# Patient Record
Sex: FEMALE | Race: WHITE | NOT HISPANIC OR LATINO | Employment: FULL TIME
[De-identification: names, ages, dates, MRNs, and addresses within clinical notes are randomized per-mention and may not be internally consistent; named-entity substitution may affect disease eponyms.]

---

## 2017-10-22 ENCOUNTER — HEALTH MAINTENANCE LETTER (OUTPATIENT)
Age: 52
End: 2017-10-22

## 2018-01-11 ENCOUNTER — TRANSFERRED RECORDS (OUTPATIENT)
Dept: HEALTH INFORMATION MANAGEMENT | Facility: CLINIC | Age: 53
End: 2018-01-11

## 2018-01-12 ENCOUNTER — TRANSFERRED RECORDS (OUTPATIENT)
Dept: HEALTH INFORMATION MANAGEMENT | Facility: CLINIC | Age: 53
End: 2018-01-12

## 2018-01-23 ENCOUNTER — TELEPHONE (OUTPATIENT)
Dept: SURGERY | Facility: CLINIC | Age: 53
End: 2018-01-23

## 2018-01-23 NOTE — TELEPHONE ENCOUNTER
Per the request of Dr. Hampton, called patient to schedule a clinic appointment (2nd opinion-rectal cancer). Left a message offering this Thursday 1/25/18 at 2:30 pm.  Provided my direct number, requesting a call back to confirm.   Dr. Hampton updated.

## 2018-01-25 ENCOUNTER — ONCOLOGY VISIT (OUTPATIENT)
Dept: ONCOLOGY | Facility: CLINIC | Age: 53
End: 2018-01-25
Attending: INTERNAL MEDICINE
Payer: COMMERCIAL

## 2018-01-25 ENCOUNTER — OFFICE VISIT (OUTPATIENT)
Dept: SURGERY | Facility: CLINIC | Age: 53
End: 2018-01-25
Payer: COMMERCIAL

## 2018-01-25 VITALS
TEMPERATURE: 98.2 F | HEART RATE: 95 BPM | DIASTOLIC BLOOD PRESSURE: 90 MMHG | BODY MASS INDEX: 32.58 KG/M2 | WEIGHT: 202.7 LBS | SYSTOLIC BLOOD PRESSURE: 128 MMHG | HEIGHT: 66 IN | OXYGEN SATURATION: 99 %

## 2018-01-25 VITALS
HEART RATE: 95 BPM | BODY MASS INDEX: 32.58 KG/M2 | DIASTOLIC BLOOD PRESSURE: 90 MMHG | HEIGHT: 66 IN | SYSTOLIC BLOOD PRESSURE: 128 MMHG | WEIGHT: 202.7 LBS | TEMPERATURE: 98.2 F | OXYGEN SATURATION: 99 %

## 2018-01-25 DIAGNOSIS — C20 RECTAL CANCER (H): Primary | ICD-10-CM

## 2018-01-25 PROCEDURE — G0463 HOSPITAL OUTPT CLINIC VISIT: HCPCS | Mod: ZF

## 2018-01-25 PROCEDURE — 99205 OFFICE O/P NEW HI 60 MIN: CPT | Mod: GC | Performed by: INTERNAL MEDICINE

## 2018-01-25 RX ORDER — ONDANSETRON 4 MG/1
TABLET, FILM COATED ORAL
Qty: 3 TABLET | Refills: 0 | Status: SHIPPED | OUTPATIENT
Start: 2018-01-25 | End: 2018-07-23

## 2018-01-25 RX ORDER — NEOMYCIN SULFATE 500 MG/1
TABLET ORAL
Qty: 6 TABLET | Refills: 0 | Status: SHIPPED | OUTPATIENT
Start: 2018-01-25 | End: 2018-02-26

## 2018-01-25 RX ORDER — POLYETHYLENE GLYCOL 3350 17 G/17G
238 POWDER, FOR SOLUTION ORAL SEE ADMIN INSTRUCTIONS
Qty: 238 G | Refills: 0 | Status: SHIPPED | OUTPATIENT
Start: 2018-01-25 | End: 2018-03-13

## 2018-01-25 RX ORDER — METRONIDAZOLE 500 MG/1
500 TABLET ORAL EVERY 8 HOURS
Qty: 3 TABLET | Refills: 0 | Status: SHIPPED | OUTPATIENT
Start: 2018-01-25 | End: 2018-01-26

## 2018-01-25 ASSESSMENT — PAIN SCALES - GENERAL
PAINLEVEL: NO PAIN (0)
PAINLEVEL: NO PAIN (0)

## 2018-01-25 NOTE — LETTER
2018       RE: Sarah Rajan  [address] [facility] [address]     Dear Colleague,    Thank you for referring your patient, Sarah Rajan, to the [facility] COLON AND RECTAL SURGERY at [facility]. Please see a copy of my visit note below.    Colon and Rectal Surgery Clinic Note      RE: Sarah Rajan  : 1965  JIMMY: 2018      Sarah is a very pleasant 52-year-old woman who presents today for second opinion regarding a low rectal cancer.  This was diagnosed on screening colonoscopy, though the patient has a history of rectal bleeding that had been attributed to hemorrhoids.  Pathology confirmed an invasive adenocarcinoma, moderately differentiated, with intact mismatch repair protein expression.  The patient was previously seen by Dr. Jann Curran.  Evaluation included CT scan of the chest abdomen and pelvis that was negative with the exception of 2 subcentimeter hepatic lesions officially indeterminate and felt to be of low likelihood for metastasis.  Pelvic MRI showed a T1/2 N0 lesion with several small benign-appearing nodes.  I reviewed the imaging and this shows a fairly flat lesion with a depressed center in the left lateral position.  The inferior margin of the tumor measures 4.8 cm from the anal verge.  It lies a centimeter or less from the top of the anal sphincter.  There are no threatened mesorectal margins.  The patient was seen by both medical and radiation oncology at [facility] and neoadjuvant chemoradiation was discussed, though this appears to have been prior to the MRI staging.    Past medical history is significant for depression.      Family history is significant for colorectal cancer in a paternal grandmother.    Social history: The patient is  and seen today with her  Cody.  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  She has 2 children in their teens.  Both children were delivered by  section.  She has no vaginal  deliveries or birth injuries.  She has no issues with fecal incontinence.    The patient is a non-smoker.  She drinks 1 glass of wine daily.    Physical examination pleasant moderately obese woman in no acute distress.  She is 5 foot 6 and weighs 202 pounds, with a BMI of 32.8.  Digital rectal examination shows a flat, depressed lesion with an inferior margin on my exam 4 cm from the anal verge.  This seems to reside just above the puborectalis.  It is mobile.  The margins of the lesion are soft but there is a depressed center.  Flexible sigmoidoscopy confirms the presence of a roughly 5 cm lesion.  On retroflexion, there appears to be a 2-3 cm margin below the inferior tumor along the puborectal impression to the point where the external sphincter is contracted.  Examination was chaperoned by Brett Cullen LPN and Amara Jackson NP.    I had a quite detailed discussion with Tiara about treatment of this tumor.  I advised that I thought a restorative resection would be feasible, though there would be some possibility that a linear stapler would not fit below the tumor and the patient may require a hand sutured coloanal anastomosis or a partial intersphincteric resection.  We discussed the potential for bowel dysfunction and low anterior resection syndrome related to very low anastomoses, as well as the need for a temporary covering ileostomy.  We discussed that there remains a possibility that an abdominoperineal resection with permanent colostomy would prove necessary.  I advised that I would perform a laparoscopic-assisted procedure with an open pelvic dissection.  We discussed the pros and cons of a robotic approach, which Dr. Curran had advocated and I explained that I am not currently doing robotic surgery.  From my review of the imaging, I do not see an indication for neoadjuvant chemoradiation or chemotherapy, though we did discuss the possibility that the staging could be in correct and  "postoperative adjuvant therapy may prove necessary.  Patient is quite anxious about the liver abnormalities on CT, and we will try to define this better by obtaining a liver MRI.    I advised the patient that Dr. Curran is an excellent surgeon and I was perfectly comfortable with him proceeding with surgery, but the patient prefers to have her care at the [place].  I reviewed all the risks associated with major pelvic surgery, including but not limited to bleeding, infection, DVT/PE, MI/CVA, anastomotic failure and its consequences, adjacent organ injury, and death.  I answered all of her and her 's questions to their stated satisfaction.  They expressed her understanding and eagerness to proceed.  I will be presenting her case at tumor board to be certain that there is a consensus on the final treatment plan and that there are no unsuspected concerning features on the MRI.  She will be seen preoperatively in the preanesthesia clinic and by the wound ostomy nurses.  We will look for an elective surgery date.    Total time 45 minutes, +10 minutes flexible sigmoidoscopy time.  Greater than half of the 45 minutes was spent counseling.      For details of past medical history, surgical history, family history, medications, allergies, and review of systems, please see details below.    Medical history:  No past medical history on file.    Surgical history:  No past surgical history on file.    Family history:  No family history on file.    Medications:  Current Outpatient Prescriptions   Medication Sig Dispense Refill     FLUoxetine (PROZAC) 20 MG capsule TK 1 C PO D  1     polyethylene glycol (MIRALAX) Packet Take 238 g by mouth See Admin Instructions One 8.3-ounce bottle (238 g). Refer to surgical packet for medication administration 238 g 0     magnesium citrate solution Take 296 mLs by mouth See Admin Instructions Refer to \"Getting Ready for a Colonoscopy\" patient handout. 296 mL 0     metroNIDAZOLE " "(FLAGYL) 500 MG tablet Take 1 tablet (500 mg) by mouth every 8 hours for 1 day prior to surgery.  Take in conjunction with Neomycin Sulfate. 3 tablet 0     ondansetron (ZOFRAN) 4 MG tablet Take one tablet by mouth every 6 hours as needed for nausea when taking neomycin and flagyl 3 tablet 0     neomycin (MYCIFRADIN) 500 MG tablet Take two tablets by mouth every 8 hours for one day prior to surgery. Take in conjunction with Flagyl 6 tablet 0     AMITRIPTYLINE HCL PO Take 25 mg by mouth At Bedtime       Allergies:  The patientis allergic to inapsine [droperidol].    Social history:  Social History   Substance Use Topics     Smoking status: Former Smoker     Quit date: 2/19/1986     Smokeless tobacco: Never Used     Alcohol use 0.0 oz/week     0 Standard drinks or equivalent per week     Marital status: .    Review of Systems:  Nursing Notes:   [place] Cullen LPN  1/25/2018  2:22 PM  Signed  Chief Complaint   Patient presents with     Clinic Care Coordination - Initial     New pt consult, rectal cancer.       Vitals:    01/25/18 1419   BP: 128/90   BP Location: Left arm   Patient Position: Chair   Cuff Size: Adult Large   Pulse: 95   Temp: 98.2  F (36.8  C)   TempSrc: Oral   SpO2: 99%   Weight: 202 lb 11.2 oz   Height: 5' 6\"       Body mass index is 32.72 kg/(m^2).    GABRIELA Larson MD   Professor and Chief  Division of Colon and Rectal Surgery  [facility]      Referring Provider:  Jann Curran MD  [facility] [address] [place] [address]     Primary Care Provider:  Wayne Green    Again, thank you for allowing me to participate in the care of your patient.      Sincerely,    Óscar Hampton MD      "

## 2018-01-25 NOTE — MR AVS SNAPSHOT
After Visit Summary   1/25/2018    Sarah Rajan    MRN: [id number]           Patient Information     Date Of Birth          1965        Visit Information        Provider Department      1/25/2018 3:30 PM Kyra Mortensen MD [facility]        Today's Diagnoses     Rectal cancer (H)    -  1      Care Instructions    We will present your case to Tumor Board    Schedule MRI Liver    See me back 3-4 weeks after surgery              Follow-ups after your visit        Your next 10 appointments already scheduled     Feb 06, 2018 10:30 AM CST   CONSULT with Mirian Ford MD   Radiation Oncology Clinic ([facility])    [facility]  1st Floor  [address] [facility] [address] [phone]              Future tests that were ordered for you today     Open Future Orders        Priority Expected Expires Ordered    FLEXIBLE SIGMOIDOSCOPY Routine  3/11/2018 1/25/2018    MRI Abdomen & pelvis w & wo contrast Routine  1/25/2019 1/25/2018            Who to contact     If you have questions or need follow up information about today's clinic visit or your schedule please contact [facility] directly at [phone].  Normal or non-critical lab and imaging results will be communicated to you by MyChart, letter or phone within 4 business days after the clinic has received the results. If you do not hear from us within 7 days, please contact the clinic through [employer] [facility] or phone. If you have a critical or abnormal lab result, we will notify you by phone as soon as possible.  Submit refill requests through [employer] or call your pharmacy and they will forward the refill request to us. Please allow 3 business days for your refill to be completed.          Additional Information About Your Visit        [employer] Information     [facility] lets you send messages to your doctor, view your test results, renew your prescriptions, schedule  "appointments and more. To sign up, go to [email or web address] . Click on \"Log in\" on the left side of the screen, which will take you to the Welcome page. Then click on \"Sign up Now\" on the right side of the page.     You will be asked to enter the access code listed below, as well as some personal information. Please follow the directions to create your username and password.     Your access code is: [email or web address]  Expires: 2018  2:46 PM     Your access code will  in 90 days. If you need help or a new code, please call your [place] clinic or [phone].        Care EveryWhere ID     This is your Care EveryWhere ID. This could be used by other organizations to access your [place] medical records  [id number]        Your Vitals Were     Pulse Temperature Height Pulse Oximetry BMI (Body Mass Index)       95 98.2  F (36.8  C) (Oral) 1.676 m (5' 5.98\") 99% 32.73 kg/m2        Blood Pressure from Last 3 Encounters:   18 128/90   18 128/90   16 118/74    Weight from Last 3 Encounters:   18 91.9 kg (202 lb 11.2 oz)   18 91.9 kg (202 lb 11.2 oz)   16 88.9 kg (196 lb)              Today, you had the following     No orders found for display         Today's Medication Changes          These changes are accurate as of 18  5:44 PM.  If you have any questions, ask your nurse or doctor.               Start taking these medicines.        Dose/Directions    magnesium citrate solution   Used for:  Rectal cancer (H)   Started by:  Óscar Hampton MD        Dose:  296 mL   Take 296 mLs by mouth See Admin Instructions Refer to \"Getting Ready for a Colonoscopy\" patient handout.   Quantity:  296 mL   Refills:  0       metroNIDAZOLE 500 MG tablet   Commonly known as:  FLAGYL   Used for:  Rectal cancer (H)   Started by:  Óscar Hampton MD        Dose:  500 mg   Take 1 tablet (500 mg) by mouth every 8 hours for 1 day prior to surgery.  Take in conjunction with Neomycin Sulfate. "   Quantity:  3 tablet   Refills:  0       neomycin 500 MG tablet   Commonly known as:  MYCIFRADIN   Used for:  Rectal cancer (H)   Started by:  Óscar Hampton MD        Take two tablets by mouth every 8 hours for one day prior to surgery. Take in conjunction with Flagyl   Quantity:  6 tablet   Refills:  0       ondansetron 4 MG tablet   Commonly known as:  ZOFRAN   Used for:  Rectal cancer (H)   Started by:  Óscar Hampton MD        Take one tablet by mouth every 6 hours as needed for nausea when taking neomycin and flagyl   Quantity:  3 tablet   Refills:  0       polyethylene glycol Packet   Commonly known as:  MIRALAX   Used for:  Rectal cancer (H)   Started by:  Óscar Hampton MD        Dose:  238 g   Take 238 g by mouth See Admin Instructions One 8.3-ounce bottle (238 g). Refer to surgical packet for medication administration   Quantity:  238 g   Refills:  0            Where to get your medicines      These medications were sent to [facility] [address] [place] [address] [place] [place] AT [address] [place], [place] [address]     Phone:  [phone]     magnesium citrate solution    metroNIDAZOLE 500 MG tablet    neomycin 500 MG tablet    ondansetron 4 MG tablet    polyethylene glycol Packet                Primary Care Provider Office Phone # Fax #    Wayne ManriquezFREDY youngbloodTRACIE [phone]       [facility] [address] [place] [address]        Equal Access to Services     [facility] Hospital[facility]ASON : Hadii aad ku hadasho Soomaali, waaxda luqadaha, qaybta kaalmada adeegyada, luz elena yuan hayalyssa mtz . So [facility] [phone].    ATENCIÓN: Si habla español, tiene a membreno disposición servicios gratuitos de asistencia lingüística. Claudette al [phone].    We comply with applicable federal civil rights laws and [place] laws. We do not discriminate on the basis of race, color, national origin, age, disability, sex,  "sexual orientation, or gender identity.            Thank you!     Thank you for choosing [facility]  for your care. Our goal is always to provide you with excellent care. Hearing back from our patients is one way we can continue to improve our services. Please take a few minutes to complete the written survey that you may receive in the mail after your visit with us. Thank you!             Your Updated Medication List - Protect others around you: Learn how to safely use, store and throw away your medicines at www.disposemymeds.org.          This list is accurate as of 1/25/18  5:44 PM.  Always use your most recent med list.                   Brand Name Dispense Instructions for use Diagnosis    AMITRIPTYLINE HCL PO      Take 25 mg by mouth At Bedtime        FLUoxetine 20 MG capsule    PROzac     TK 1 C PO D        magnesium citrate solution     296 mL    Take 296 mLs by mouth See Admin Instructions Refer to \"Getting Ready for a Colonoscopy\" patient handout.    Rectal cancer (H)       metroNIDAZOLE 500 MG tablet    FLAGYL    3 tablet    Take 1 tablet (500 mg) by mouth every 8 hours for 1 day prior to surgery.  Take in conjunction with Neomycin Sulfate.    Rectal cancer (H)       neomycin 500 MG tablet    MYCIFRADIN    6 tablet    Take two tablets by mouth every 8 hours for one day prior to surgery. Take in conjunction with Flagyl    Rectal cancer (H)       ondansetron 4 MG tablet    ZOFRAN    3 tablet    Take one tablet by mouth every 6 hours as needed for nausea when taking neomycin and flagyl    Rectal cancer (H)       polyethylene glycol Packet    MIRALAX    238 g    Take 238 g by mouth See Admin Instructions One 8.3-ounce bottle (238 g). Refer to surgical packet for medication administration    Rectal cancer (H)         "

## 2018-01-25 NOTE — NURSING NOTE
"Oncology Rooming Note    January 25, 2018 4:38 PM   Sarah Rajan is a 52 year old female who presents for:    No chief complaint on file.    Initial Vitals: /90 (BP Location: Left arm)  Pulse 95  Temp 98.2  F (36.8  C) (Oral)  Ht 1.676 m (5' 5.98\")  Wt 91.9 kg (202 lb 11.2 oz)  SpO2 99%  BMI 32.73 kg/m2 Estimated body mass index is 32.73 kg/(m^2) as calculated from the following:    Height as of this encounter: 1.676 m (5' 5.98\").    Weight as of this encounter: 91.9 kg (202 lb 11.2 oz). Body surface area is 2.07 meters squared.  No Pain (0) Comment: Data Unavailable   No LMP recorded. Patient is postmenopausal.  Allergies reviewed: Yes  Medications reviewed: Yes    Medications: Medication refills not needed today.  [facility] [id number] [place] [address] [facility] [address] [facility] [address]    Clinical concerns: None/ Dr Mortensen was NOT notified.    7 minutes for nursing intake (face to face time)     Candie Garza LPN                "

## 2018-01-25 NOTE — LETTER
2018       RE: Sarah Rajan  [address] [facility] [facility] [address]     Dear Colleague,    Thank you for referring your patient, Sarah Rajan, to the [facility] CANCER [facility]IC. Please see a copy of my visit note below.    Oncology outpatient note    Date of service: 2018    PC: Sarah Rajan is a 52 year old seen for a second opinion on recently diagnosed rectal cancer.  HPI:  Previously had BRBPR although previous anoscope indicated hemorrhoids therefore no further investigation; blood on toilet paper or outside of stool, never mixed in.  Screening colonoscopy 2018 revealed 3cm rectal mass and other polyps which were removed.  Pathology indicated moderately differentiated adenocarcinoma w/ intact MMR proteins.  CT staging scan-no metastatic disease; some liver lesions which are thought to be benign per radiology report, T2N1M0 by pelvic MRI read at [facility]  She is coming here from [facility] for a second opinion; reported some disagreement in staging between specialists.  Her uncle is a hospitalist and her cousin is an oncologist.  Does not want to be in a trial as does not want to get placebo (told that this trial-PROSPECT- is chemo vs chemo +radiation) and that she wants the radiation if there are lymph nodes involved and doesn't want to miss anything    Currently--  Feeling well  No changes in energy, although has been sleeping more in the last two weeks  No weight loss, has gained weight  No night sweats, chills  No abdominal pain  Has been understandably difficult for her to tell her children and a few close friends, coping ok, good social support    ROS:  10 point ROS done, which was negative except as already mentioned    PMH:  Depression  Restless leg syndrome  Dyslipidaemia    Outpatient medications:  Amitriptylline 10mg  Fluoxetine 20mg    FHx  Aunt and maternal grandmother had breast cancer  Pateral grandmother  of colorectal cancer  Mother had ischemic colitis    SHx:  ,  "two teenage children  EtOH: 1 drink daily, occasionally more on weekends  Tobacco: never smoker       Allergies   Allergen Reactions     Inapsine [Droperidol] Other (See Comments)     Elevated blood pressure and increased heart rate       Exam:  /90 (BP Location: Left arm)  Pulse 95  Temp 98.2  F (36.8  C) (Oral)  Ht 1.676 m (5' 5.98\")  Wt 91.9 kg (202 lb 11.2 oz)  SpO2 99%  BMI 32.73 kg/m2  CONSTITUTIONAL: No apparent distress  EYES: PERRLA, without pallor or jaundice  ENT/MOUTH: Ears unremarkable. No oral lesions  CVS: s1s2 normal  RESPIRATORY: Chest is clear  GI: Abdomen is benign  NEURO: He is alert and oriented ×3  INTEGUMENT: no concerning his skin rashes   LYMPHATIC: no palpable lymphadenopathy  MUSCULOSKELETAL: Unremarkable. No bony tenderness.   EXTREMITIES: no pedal edema  PSYCH: Mentation, mood and affect are appropriate    Labs.  Reviewed outside labs.  We also reviewed outside pathology.    Imaging.  Reviewed. The lymph nodes which are read as pathological by outside MRI are very questionable and she could have T2 N0 lesion instead of T2 N1 lesion.    Assessment and Plan  We discussed the situation in great detail today. We would like to discuss her case in the tumor board as well.  If the lymph nodes do not look very suspicious than I would agree with proceeding with surgery up front.  Otherwise it is reasonable to start with chemotherapy with FOLFOX as planned at [facility].  We will also get MRI of the liver to look for the tiny lesions in the liver which most likely are benign.  We discussed that if she proceeds with surgery and then depending upon the final pathology we will determine what adjuvant treatment would be recommended.  She has seen Dr. Hampton earlier today.    She tells me that she is coping well as she has wonderful family support. We offered her to be seen by a psychologist at this is a hard time for you and her family. At this time we will hold off on that but she will let " us know if she thinks it is needed.    We would like to see her back 3-4 weeks after the surgery to go over the final pathology and to discuss further management options    We answered all of her and her family's questions questions to their satisfaction and they are agreeable and comfortable with the plan    This patient was evaluated in conjunction with Dr. Daniella Jang, medical resident and I edited her note accordingly to match her combined assessment and plan    60 minutes were spent with the patient face-to-face and almost all of the time was spent in counseling and coordination of care.        Again, thank you for allowing me to participate in the care of your patient.      Sincerely,    Kyra Mortensen MD

## 2018-01-25 NOTE — NURSING NOTE
"Chief Complaint   Patient presents with     Clinic Care Coordination - Initial     New pt consult, rectal cancer.       Vitals:    01/25/18 1419   BP: 128/90   BP Location: Left arm   Patient Position: Chair   Cuff Size: Adult Large   Pulse: 95   Temp: 98.2  F (36.8  C)   TempSrc: Oral   SpO2: 99%   Weight: 202 lb 11.2 oz   Height: 5' 6\"       Body mass index is 32.72 kg/(m^2).    Brett CAMPOS LPN               "

## 2018-01-25 NOTE — PROGRESS NOTES
Oncology outpatient note    Date of service: 2018    PC: Sarah Rajan is a 52 year old seen for a second opinion on recently diagnosed rectal cancer.  HPI:  Previously had BRBPR although previous anoscope indicated hemorrhoids therefore no further investigation; blood on toilet paper or outside of stool, never mixed in.  Screening colonoscopy 2018 revealed 3cm rectal mass and other polyps which were removed.  Pathology indicated moderately differentiated adenocarcinoma w/ intact MMR proteins.  CT staging scan-no metastatic disease; some liver lesions which are thought to be benign per radiology report, T2N1M0 by pelvic MRI read at [facility]  She is coming here from [facility] for a second opinion; reported some disagreement in staging between specialists.  Her uncle is a hospitalist and her cousin is an oncologist.  Does not want to be in a trial as does not want to get placebo (told that this trial-PROSPECT- is chemo vs chemo +radiation) and that she wants the radiation if there are lymph nodes involved and doesn't want to miss anything    Currently--  Feeling well  No changes in energy, although has been sleeping more in the last two weeks  No weight loss, has gained weight  No night sweats, chills  No abdominal pain  Has been understandably difficult for her to tell her children and a few close friends, coping ok, good social support    ROS:  10 point ROS done, which was negative except as already mentioned    PMH:  Depression  Restless leg syndrome  Dyslipidaemia    Outpatient medications:  Amitriptylline 10mg  Fluoxetine 20mg    FHx  Aunt and maternal grandmother had breast cancer  Pateral grandmother  of colorectal cancer  Mother had ischemic colitis    SHx:  , two teenage children  EtOH: 1 drink daily, occasionally more on weekends  Tobacco: never smoker       Allergies   Allergen Reactions     Inapsine [Droperidol] Other (See Comments)     Elevated blood pressure and increased heart rate  "      Exam:  /90 (BP Location: Left arm)  Pulse 95  Temp 98.2  F (36.8  C) (Oral)  Ht 1.676 m (5' 5.98\")  Wt 91.9 kg (202 lb 11.2 oz)  SpO2 99%  BMI 32.73 kg/m2  CONSTITUTIONAL: No apparent distress  EYES: PERRLA, without pallor or jaundice  ENT/MOUTH: Ears unremarkable. No oral lesions  CVS: s1s2 normal  RESPIRATORY: Chest is clear  GI: Abdomen is benign  NEURO: He is alert and oriented ×3  INTEGUMENT: no concerning his skin rashes   LYMPHATIC: no palpable lymphadenopathy  MUSCULOSKELETAL: Unremarkable. No bony tenderness.   EXTREMITIES: no pedal edema  PSYCH: Mentation, mood and affect are appropriate    Labs.  Reviewed outside labs.  We also reviewed outside pathology.    Imaging.  Reviewed. The lymph nodes which are read as pathological by outside MRI are very questionable and she could have T2 N0 lesion instead of T2 N1 lesion.    Assessment and Plan  We discussed the situation in great detail today. We would like to discuss her case in the tumor board as well.  If the lymph nodes do not look very suspicious than I would agree with proceeding with surgery up front.  Otherwise it is reasonable to start with chemotherapy with FOLFOX as planned at [facility].  We will also get MRI of the liver to look for the tiny lesions in the liver which most likely are benign.  We discussed that if she proceeds with surgery and then depending upon the final pathology we will determine what adjuvant treatment would be recommended.  She has seen Dr. Hampton earlier today.    She tells me that she is coping well as she has wonderful family support. We offered her to be seen by a psychologist at this is a hard time for you and her family. At this time we will hold off on that but she will let us know if she thinks it is needed.    We would like to see her back 3-4 weeks after the surgery to go over the final pathology and to discuss further management options    We answered all of her and her family's questions questions " to their satisfaction and they are agreeable and comfortable with the plan    This patient was evaluated in conjunction with Dr. Daniella Jang, medical resident and I edited her note accordingly to match her combined assessment and plan    60 minutes were spent with the patient face-to-face and almost all of the time was spent in counseling and coordination of care.      Kyra Mortensen

## 2018-01-25 NOTE — MR AVS SNAPSHOT
"              After Visit Summary   1/25/2018    Sarah Rajan    MRN: [id number]           Patient Information     Date Of Birth          1965        Visit Information        Provider Department      1/25/2018 2:30 PM Óscar Hampton MD [facility] Colon and Rectal Surgery        Today's Diagnoses     Rectal cancer (H)    -  1       Follow-ups after your visit        Additional Services     PAC Visit Referral (For [facility] Only)           WOUND CARE REFERRAL       Referral to [facility]. Fax to [phone].    If \"All of the above\" is marked as the reason for the appointment, the reasons are: pre-surgical stoma site marking, pouch re-fitting, treatment of peristomal skin, peristomal hernia belt fitting, ileostomy lavage for possible food blockage, and sterile catheterization from urinary stoma.                  Your next 10 appointments already scheduled     Feb 06, 2018 10:30 AM CST   CONSULT with Mirian Ford MD   Radiation Oncology Clinic ([facility])    [facility]  1st Floor  [address] [address] [phone]              Future tests that were ordered for you today     Open Future Orders        Priority Expected Expires Ordered    FLEXIBLE SIGMOIDOSCOPY Routine  3/11/2018 1/25/2018    MRI Abdomen & pelvis w & wo contrast Routine  1/25/2019 1/25/2018            Who to contact     Please call your clinic at [phone] to:    Ask questions about your health    Make or cancel appointments    Discuss your medicines    Learn about your test results    Speak to your doctor   If you have compliments or concerns about an experience at your clinic, or if you wish to file a complaint, please contact [facility] Patient Relations at [phone] or email us at [email or web address]         Additional Information About Your Visit        MyChart Information     [employer] is an electronic gateway that provides easy, online " "access to your medical records. With [employer], you can request a clinic appointment, read your test results, renew a prescription or communicate with your care team.     To sign up for [employer] visit the website at [email or web address]   You will be asked to enter the access code listed below, as well as some personal information. Please follow the directions to create your username and password.     Your access code is: [email or web address]  Expires: 2018  2:46 PM     Your access code will  in 90 days. If you need help or a new code, please contact your [facility] or call [phone] for assistance.        Care EveryWhere ID     This is your Care EveryWhere ID. This could be used by other organizations to access your [facility] medical records  [id number]        Your Vitals Were     Pulse Temperature Height Pulse Oximetry BMI (Body Mass Index)       95 98.2  F (36.8  C) (Oral) 5' 6\" 99% 32.72 kg/m2        Blood Pressure from Last 3 Encounters:   18 128/90   18 128/90   16 118/74    Weight from Last 3 Encounters:   18 202 lb 11.2 oz   18 202 lb 11.2 oz   16 196 lb              We Performed the Following     Colonoscopy - HIM Scan     Mammogram - HIM Scan     PAC Visit Referral (For [facility] Only)     [facility]-Operative Worksheet     WOUND CARE REFERRAL          Today's Medication Changes          These changes are accurate as of 18  6:11 PM.  If you have any questions, ask your nurse or doctor.               Start taking these medicines.        Dose/Directions    magnesium citrate solution   Used for:  Rectal cancer (H)   Started by:  Óscar Hampton MD        Dose:  296 mL   Take 296 mLs by mouth See Admin Instructions Refer to \"Getting Ready for a Colonoscopy\" patient handout.   Quantity:  296 mL   Refills:  0       metroNIDAZOLE 500 MG tablet   Commonly known as:  FLAGYL   Used for:  Rectal cancer (H)   Started by:  Óscar Hampton MD        " Dose:  500 mg   Take 1 tablet (500 mg) by mouth every 8 hours for 1 day prior to surgery.  Take in conjunction with Neomycin Sulfate.   Quantity:  3 tablet   Refills:  0       neomycin 500 MG tablet   Commonly known as:  MYCIFRADIN   Used for:  Rectal cancer (H)   Started by:  Óscar Hampton MD        Take two tablets by mouth every 8 hours for one day prior to surgery. Take in conjunction with Flagyl   Quantity:  6 tablet   Refills:  0       ondansetron 4 MG tablet   Commonly known as:  ZOFRAN   Used for:  Rectal cancer (H)   Started by:  Óscar Hampton MD        Take one tablet by mouth every 6 hours as needed for nausea when taking neomycin and flagyl   Quantity:  3 tablet   Refills:  0       polyethylene glycol Packet   Commonly known as:  MIRALAX   Used for:  Rectal cancer (H)   Started by:  Óscar Hampton MD        Dose:  238 g   Take 238 g by mouth See Admin Instructions One 8.3-ounce bottle (238 g). Refer to surgical packet for medication administration   Quantity:  238 g   Refills:  0            Where to get your medicines      These medications were sent to [id number] - [place] [address] [place] AT [address] [place], [place] [address]     Phone:  [phone]     magnesium citrate solution    metroNIDAZOLE 500 MG tablet    neomycin 500 MG tablet    ondansetron 4 MG tablet    polyethylene glycol Packet                Primary Care Provider Office Phone # Fax #    Wayne Green [phone]       [facility] [address] [facility] [address]        Equal Access to Services     [facility] AH: Hadii aad ku hadasho Soomaali, waaxda luqadaha, qaybta kaalmada adeegyada, waxay lissy jacob. So [facility] [phone].    ATENCIÓN: Si habla español, tiene a membreno disposición servicios gratuitos de asistencia lingüística. Llame al [phone].    We comply with applicable federal  "civil rights laws and [place] laws. We do not discriminate on the basis of race, color, national origin, age, disability, sex, sexual orientation, or gender identity.            Thank you!     Thank you for choosing [facility] COLON AND RECTAL SURGERY  for your care. Our goal is always to provide you with excellent care. Hearing back from our patients is one way we can continue to improve our services. Please take a few minutes to complete the written survey that you may receive in the mail after your visit with us. Thank you!             Your Updated Medication List - Protect others around you: Learn how to safely use, store and throw away your medicines at www.disposemymeds.org.          This list is accurate as of 1/25/18  6:11 PM.  Always use your most recent med list.                   Brand Name Dispense Instructions for use Diagnosis    AMITRIPTYLINE HCL PO      Take 25 mg by mouth At Bedtime        FLUoxetine 20 MG capsule    PROzac     TK 1 C PO D        magnesium citrate solution     296 mL    Take 296 mLs by mouth See Admin Instructions Refer to \"Getting Ready for a Colonoscopy\" patient handout.    Rectal cancer (H)       metroNIDAZOLE 500 MG tablet    FLAGYL    3 tablet    Take 1 tablet (500 mg) by mouth every 8 hours for 1 day prior to surgery.  Take in conjunction with Neomycin Sulfate.    Rectal cancer (H)       neomycin 500 MG tablet    MYCIFRADIN    6 tablet    Take two tablets by mouth every 8 hours for one day prior to surgery. Take in conjunction with Flagyl    Rectal cancer (H)       ondansetron 4 MG tablet    ZOFRAN    3 tablet    Take one tablet by mouth every 6 hours as needed for nausea when taking neomycin and flagyl    Rectal cancer (H)       polyethylene glycol Packet    MIRALAX    238 g    Take 238 g by mouth See Admin Instructions One 8.3-ounce bottle (238 g). Refer to surgical packet for medication administration    Rectal cancer (H)         "

## 2018-01-25 NOTE — PROGRESS NOTES
Colon and Rectal Surgery Clinic Note      RE: Sarah Satinder  : 1965  JIMMY: 2018      Sarah is a very pleasant 52-year-old woman who presents today for second opinion regarding a low rectal cancer.  This was diagnosed on screening colonoscopy, though the patient has a history of rectal bleeding that had been attributed to hemorrhoids.  Pathology confirmed an invasive adenocarcinoma, moderately differentiated, with intact mismatch repair protein expression.  The patient was previously seen by Dr. Jann Curran.  Evaluation included CT scan of the chest abdomen and pelvis that was negative with the exception of 2 subcentimeter hepatic lesions officially indeterminate and felt to be of low likelihood for metastasis.  Pelvic MRI showed a T1/2 N0 lesion with several small benign-appearing nodes.  I reviewed the imaging and this shows a fairly flat lesion with a depressed center in the left lateral position.  The inferior margin of the tumor measures 4.8 cm from the anal verge.  It lies a centimeter or less from the top of the anal sphincter.  There are no threatened mesorectal margins.  The patient was seen by both medical and radiation oncology at [facility] and neoadjuvant chemoradiation was discussed, though this appears to have been prior to the MRI staging.    Past medical history is significant for depression.      Family history is significant for colorectal cancer in a paternal grandmother.    Social history: The patient is  and seen today with her  Cody.  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  She has 2 children in their teens.  Both children were delivered by  section.  She has no vaginal deliveries or birth injuries.  She has no issues with fecal incontinence.    The patient is a non-smoker.  She drinks 1 glass of wine daily.    Physical examination pleasant moderately obese woman in no acute distress.  She is 5 foot 6 and weighs 202 pounds, with a BMI of 32.8.  Digital  rectal examination shows a flat, depressed lesion with an inferior margin on my exam 4 cm from the anal verge.  This seems to reside just above the puborectalis.  It is mobile.  The margins of the lesion are soft but there is a depressed center.  Flexible sigmoidoscopy confirms the presence of a roughly 5 cm lesion.  On retroflexion, there appears to be a 2-3 cm margin below the inferior tumor along the puborectal impression to the point where the external sphincter is contracted.  Examination was chaperoned by Brett Cullen LPN and Amara Jackson NP.    I had a quite detailed discussion with Tiara about treatment of this tumor.  I advised that I thought a restorative resection would be feasible, though there would be some possibility that a linear stapler would not fit below the tumor and the patient may require a hand sutured coloanal anastomosis or a partial intersphincteric resection.  We discussed the potential for bowel dysfunction and low anterior resection syndrome related to very low anastomoses, as well as the need for a temporary covering ileostomy.  We discussed that there remains a possibility that an abdominoperineal resection with permanent colostomy would prove necessary.  I advised that I would perform a laparoscopic-assisted procedure with an open pelvic dissection.  We discussed the pros and cons of a robotic approach, which Dr. Curran had advocated and I explained that I am not currently doing robotic surgery.  From my review of the imaging, I do not see an indication for neoadjuvant chemoradiation or chemotherapy, though we did discuss the possibility that the staging could be in correct and postoperative adjuvant therapy may prove necessary.  Patient is quite anxious about the liver abnormalities on CT, and we will try to define this better by obtaining a liver MRI.    I advised the patient that Dr. Curran is an excellent surgeon and I was perfectly comfortable with him  "proceeding with surgery, but the patient prefers to have her care at the [facility].  I reviewed all the risks associated with major pelvic surgery, including but not limited to bleeding, infection, DVT/PE, MI/CVA, anastomotic failure and its consequences, adjacent organ injury, and death.  I answered all of her and her 's questions to their stated satisfaction.  They expressed her understanding and eagerness to proceed.  I will be presenting her case at tumor board to be certain that there is a consensus on the final treatment plan and that there are no unsuspected concerning features on the MRI.  She will be seen preoperatively in the preanesthesia clinic and by the wound ostomy nurses.  We will look for an elective surgery date.    Total time 45 minutes, +10 minutes flexible sigmoidoscopy time.  Greater than half of the 45 minutes was spent counseling.      For details of past medical history, surgical history, family history, medications, allergies, and review of systems, please see details below.    Medical history:  No past medical history on file.    Surgical history:  No past surgical history on file.    Family history:  No family history on file.    Medications:  Current Outpatient Prescriptions   Medication Sig Dispense Refill     FLUoxetine (PROZAC) 20 MG capsule TK 1 C PO D  1     polyethylene glycol (MIRALAX) Packet Take 238 g by mouth See Admin Instructions One 8.3-ounce bottle (238 g). Refer to surgical packet for medication administration 238 g 0     magnesium citrate solution Take 296 mLs by mouth See Admin Instructions Refer to \"Getting Ready for a Colonoscopy\" patient handout. 296 mL 0     metroNIDAZOLE (FLAGYL) 500 MG tablet Take 1 tablet (500 mg) by mouth every 8 hours for 1 day prior to surgery.  Take in conjunction with Neomycin Sulfate. 3 tablet 0     ondansetron (ZOFRAN) 4 MG tablet Take one tablet by mouth every 6 hours as needed for nausea when taking neomycin and flagyl 3 tablet 0 " "    neomycin (MYCIFRADIN) 500 MG tablet Take two tablets by mouth every 8 hours for one day prior to surgery. Take in conjunction with Flagyl 6 tablet 0     AMITRIPTYLINE HCL PO Take 25 mg by mouth At Bedtime       Allergies:  The patientis allergic to inapsine [droperidol].    Social history:  Social History   Substance Use Topics     Smoking status: Former Smoker     Quit date: 2/19/1986     Smokeless tobacco: Never Used     Alcohol use 0.0 oz/week     0 Standard drinks or equivalent per week     Marital status: .    Review of Systems:  Nursing Notes:   [place] Cullen LPN  1/25/2018  2:22 PM  Signed  Chief Complaint   Patient presents with     Clinic Care Coordination - Initial     New pt consult, rectal cancer.       Vitals:    01/25/18 1419   BP: 128/90   BP Location: Left arm   Patient Position: Chair   Cuff Size: Adult Large   Pulse: 95   Temp: 98.2  F (36.8  C)   TempSrc: Oral   SpO2: 99%   Weight: 202 lb 11.2 oz   Height: 5' 6\"       Body mass index is 32.72 kg/(m^2).    GABRIELA Larson MD   Professor and Chief  Division of Colon and Rectal Surgery  [facility]      Referring Provider:  Jann Curran MD  [facility] [address] [place] [address]     Primary Care Provider:  Wayne Green  "

## 2018-01-29 PROCEDURE — 00000346 ZZHCL STATISTIC REVIEW OUTSIDE SLIDES TC 88321: Performed by: INTERNAL MEDICINE

## 2018-01-31 LAB — COPATH REPORT: NORMAL

## 2018-02-01 ENCOUNTER — TELEPHONE (OUTPATIENT)
Dept: SURGERY | Facility: CLINIC | Age: 53
End: 2018-02-01

## 2018-02-01 NOTE — TELEPHONE ENCOUNTER
Patient called in regards to surgery scheduling and the feasibility of having surgery and then going on a trip to the [facility] two to three weeks afterward the procedure. This RN explained to the patient that she will be in the hospital for 5-7 days and then have an open abdominal wound after surgery that will not be healed at the time of her trip. Patient asked about the safety of traveling so close after surgery,tenetively having surgery on the 2/14 and then her leaving on her trip 3/3. This RN stated that she cannot predict her post surgical course and how she will both heal and feel after surgery. This nurse told her that she can not make any decisions for her but informed her she would be very far away if she did end up having post surgical complications. Patient verbalized understanding and will think about her plan of care.  Will inform Dr. Hampton of this phone conversation with the patient.

## 2018-02-02 ENCOUNTER — TELEPHONE (OUTPATIENT)
Dept: SURGERY | Facility: CLINIC | Age: 53
End: 2018-02-02

## 2018-02-02 NOTE — TELEPHONE ENCOUNTER
Spoke with clark today regarding her schedule for her text and upcoming surgical procedure. Patient was made aware that she had appointments scheduled on 2/6 and she will have her surgery on 2/14. patient was explained the reasoning behind the schedule and her plan of care going forward.  and she verbalized understanding of plan. Patient will call with questions.

## 2018-02-06 ENCOUNTER — APPOINTMENT (OUTPATIENT)
Dept: SURGERY | Facility: CLINIC | Age: 53
End: 2018-02-06
Payer: COMMERCIAL

## 2018-02-06 ENCOUNTER — OFFICE VISIT (OUTPATIENT)
Dept: RADIATION ONCOLOGY | Facility: CLINIC | Age: 53
End: 2018-02-06
Attending: RADIOLOGY
Payer: COMMERCIAL

## 2018-02-06 ENCOUNTER — OFFICE VISIT (OUTPATIENT)
Dept: WOUND CARE | Facility: CLINIC | Age: 53
End: 2018-02-06
Payer: COMMERCIAL

## 2018-02-06 ENCOUNTER — HOSPITAL ENCOUNTER (OUTPATIENT)
Dept: MRI IMAGING | Facility: CLINIC | Age: 53
Setting detail: OBSERVATION
End: 2018-02-06
Attending: COLON & RECTAL SURGERY
Payer: COMMERCIAL

## 2018-02-06 ENCOUNTER — OFFICE VISIT (OUTPATIENT)
Dept: SURGERY | Facility: CLINIC | Age: 53
End: 2018-02-06
Payer: COMMERCIAL

## 2018-02-06 ENCOUNTER — ALLIED HEALTH/NURSE VISIT (OUTPATIENT)
Dept: SURGERY | Facility: CLINIC | Age: 53
End: 2018-02-06
Payer: COMMERCIAL

## 2018-02-06 ENCOUNTER — ANESTHESIA EVENT (OUTPATIENT)
Dept: SURGERY | Facility: CLINIC | Age: 53
End: 2018-02-06

## 2018-02-06 VITALS
HEIGHT: 66 IN | DIASTOLIC BLOOD PRESSURE: 88 MMHG | WEIGHT: 201 LBS | TEMPERATURE: 98.1 F | RESPIRATION RATE: 16 BRPM | OXYGEN SATURATION: 98 % | BODY MASS INDEX: 32.3 KG/M2 | SYSTOLIC BLOOD PRESSURE: 136 MMHG | HEART RATE: 72 BPM

## 2018-02-06 VITALS — DIASTOLIC BLOOD PRESSURE: 86 MMHG | WEIGHT: 204 LBS | BODY MASS INDEX: 32.94 KG/M2 | SYSTOLIC BLOOD PRESSURE: 122 MMHG

## 2018-02-06 DIAGNOSIS — C20 MALIGNANT NEOPLASM OF RECTUM (H): Primary | ICD-10-CM

## 2018-02-06 DIAGNOSIS — C20 RECTAL CANCER (H): ICD-10-CM

## 2018-02-06 DIAGNOSIS — Z01.818 PREOP EXAMINATION: ICD-10-CM

## 2018-02-06 DIAGNOSIS — Z71.89 OSTOMY NURSE CONSULTATION: Primary | ICD-10-CM

## 2018-02-06 DIAGNOSIS — Z01.818 PREOP EXAMINATION: Primary | ICD-10-CM

## 2018-02-06 LAB
ANION GAP SERPL CALCULATED.3IONS-SCNC: 5 MMOL/L (ref 3–14)
BUN SERPL-MCNC: 14 MG/DL (ref 7–30)
CALCIUM SERPL-MCNC: 8.9 MG/DL (ref 8.5–10.1)
CHLORIDE SERPL-SCNC: 103 MMOL/L (ref 94–109)
CO2 SERPL-SCNC: 30 MMOL/L (ref 20–32)
CREAT SERPL-MCNC: 0.8 MG/DL (ref 0.52–1.04)
ERYTHROCYTE [DISTWIDTH] IN BLOOD BY AUTOMATED COUNT: 12.5 % (ref 10–15)
GFR SERPL CREATININE-BSD FRML MDRD: 76 ML/MIN/1.7M2
GLUCOSE SERPL-MCNC: 91 MG/DL (ref 70–99)
HCT VFR BLD AUTO: 41.3 % (ref 35–47)
HGB BLD-MCNC: 14.1 G/DL (ref 11.7–15.7)
MCH RBC QN AUTO: 30.3 PG (ref 26.5–33)
MCHC RBC AUTO-ENTMCNC: 34.1 G/DL (ref 31.5–36.5)
MCV RBC AUTO: 89 FL (ref 78–100)
PLATELET # BLD AUTO: 186 10E9/L (ref 150–450)
POTASSIUM SERPL-SCNC: 3.9 MMOL/L (ref 3.4–5.3)
RBC # BLD AUTO: 4.65 10E12/L (ref 3.8–5.2)
SODIUM SERPL-SCNC: 138 MMOL/L (ref 133–144)
WBC # BLD AUTO: 4.7 10E9/L (ref 4–11)

## 2018-02-06 PROCEDURE — A9585 GADOBUTROL INJECTION: HCPCS | Performed by: COLON & RECTAL SURGERY

## 2018-02-06 PROCEDURE — 25000128 H RX IP 250 OP 636: Performed by: COLON & RECTAL SURGERY

## 2018-02-06 PROCEDURE — 74183 MRI ABD W/O CNTR FLWD CNTR: CPT

## 2018-02-06 PROCEDURE — G0463 HOSPITAL OUTPT CLINIC VISIT: HCPCS | Mod: 25 | Performed by: RADIOLOGY

## 2018-02-06 RX ORDER — CALCIUM CARBONATE 500 MG/1
1-2 TABLET, CHEWABLE ORAL DAILY
Qty: 150 TABLET | COMMUNITY
Start: 2018-02-06

## 2018-02-06 RX ORDER — GADOBUTROL 604.72 MG/ML
10 INJECTION INTRAVENOUS ONCE
Status: COMPLETED | OUTPATIENT
Start: 2018-02-06 | End: 2018-02-06

## 2018-02-06 RX ADMIN — GADOBUTROL 10 ML: 604.72 INJECTION INTRAVENOUS at 17:02

## 2018-02-06 ASSESSMENT — ENCOUNTER SYMPTOMS
NECK PAIN: 0
BLOOD IN STOOL: 1
SPUTUM PRODUCTION: 0
TINGLING: 0
NERVOUS/ANXIOUS: 1
CLAUDICATION: 0
BACK PAIN: 1
CHILLS: 0
TREMORS: 0
CONSTIPATION: 0
BRUISES/BLEEDS EASILY: 0
LOSS OF CONSCIOUSNESS: 0
DYSURIA: 0
COUGH: 0
DIZZINESS: 0
DEPRESSION: 0
DOUBLE VISION: 0
WEAKNESS: 0
HEMOPTYSIS: 0
SORE THROAT: 0
POLYDIPSIA: 0
HEMATURIA: 0
FLANK PAIN: 0
DIARRHEA: 0
ORTHOPNEA: 0
SEIZURES: 0
FEVER: 0
SPEECH CHANGE: 0
MEMORY LOSS: 0
FOCAL WEAKNESS: 0
ABDOMINAL PAIN: 0
FREQUENCY: 0
PND: 0
EYE DISCHARGE: 0
DIAPHORESIS: 0
INSOMNIA: 0
WEIGHT LOSS: 0
SINUS PAIN: 0
SENSORY CHANGE: 0
PHOTOPHOBIA: 0
SHORTNESS OF BREATH: 0
EYE REDNESS: 0
NAUSEA: 0
VOMITING: 0
HEARTBURN: 1
HALLUCINATIONS: 0
FALLS: 0
WHEEZING: 0
HEADACHES: 0
STRIDOR: 0
PALPITATIONS: 0
MYALGIAS: 0
EYE PAIN: 0
BLURRED VISION: 0

## 2018-02-06 ASSESSMENT — LIFESTYLE VARIABLES
TOBACCO_USE: 1
SUBSTANCE_ABUSE: 0

## 2018-02-06 NOTE — PROGRESS NOTES
WOC Preoperative Ostomy    Referral from Dr. Hampton  Consult attended by patient and spouse  Diagnosis: rectal cancer Date of Surgery: 02/14   Type of Surgery: Laparoscopic Assisted Low Anterior Resection With Loop Ileostomy and Possible Colostomy,   Emotional readiness for surgery: tearful  Physical Limitations: With the following limitations:  Obese abdomen  Abdomen assessed for site by: Patient's ability to visiualize area, avoidance of skin creases and scars, palpating for rectus abdominus muscle and clothing fit  Teaching: Anatomy/picture of stoma, stoma/bowel function postop, intro to pouches, diet, returning to work/lifting, written/media offered and role of WOC/postop followup explained  Stoma site marking:  Marking pen and tegaderm   Location chosen: [facility] Jackson NP was available for supervision of care if needed or if questions should arise and regarding plan of care.  Devika Randolph RN CWON

## 2018-02-06 NOTE — MR AVS SNAPSHOT
After Visit Summary   2018    Sarah Rajan    MRN: [id number]           Patient Information     Date Of Birth          1965        Visit Information        Provider Department      2018 2:00 PM Rn, [facility]        Care Instructions    Preparing for Your Surgery      Name:  Sarah Rajan   MRN:  [id number]   :  1965   Today's Date:  2018     Arriving for surgery:  Surgery date:  18  Arrival time:  05:30 a.m.  Please come to:       [facility] [address]  [address] [place]  [address]    -   parking is available in front of the hospital from 5:15 am to 8:00 pm    -  Stop at the Information Desk in the lobby    -   Inform the information person that you are here for surgery. An escort to 3c will be provided. If you would not like an escort, please proceed to 3C on the 3rd floor. [phone]     What can I eat or drink?  -  Please follow bowel prep.    -  You may have water, apple juice or 7up/Sprite until 2 hours prior to your surgery 05:30.  Please drink 8 to 12 ounces of Gatorade prior to 05:30 a.m.    Which medicines can I take?  -  Do NOT take these medications in the morning, the day of surgery:        -  Please take these medications the day of surgery:         How do I prepare myself?  -  Take two showers: one the night before surgery; and one the morning of surgery.         Use Scrubcare or Hibiclens to wash from neck down.  You may use your own shampoo and conditioner. No other hair products.   -  Do NOT use lotion, powder, deodorant, or antiperspirant the day of your surgery.  -  Do NOT wear any makeup, fingernail polish or jewelry.  -  Begin using Incentive Spirometer 1 week prior to surgery.  Use 4 times per day, up to 5 breaths each time.  Bring Incentive Spirometer to hospital.  -Do not bring your own medications to the hospital, except for inhalers and eye drops.  -  Bring  your ID and insurance card.    Questions or Concerns:  If you have questions or concerns, please call the  Preoperative Assessment Center, Monday-Friday 7AM-7PM:  [phone]          Enhanced Recovery After Surgery     This is a team effort, including you, to get you back on your feet, eating and drinking normally and out of the hospital as quickly as possible.  The goals are:   1) NO INFECTIONS and   2) RETURN TO NORMAL DIET    How can we achieve these goals?  1) STAY ACTIVE: Walk every day before your surgery; try to increase the amount every day.  Walk after surgery as much as you can-the nurses will help you.  Walking speeds healing and gets you home quicker, you heal better at home and have less risk of infection.     2) INCENTIVE SPIROMETER: Practice your incentive spirometer 4 times per day with 5-10 repetitions each time.  Using the incentive spirometer can strengthen your muscles between your ribs and help you have a strong cough after surgery.  A more effective cough can help prevent problems with your lungs.    3) STAY HYDRATED: Drink clear liquids up until 2 hours before your surgery. We would like you to purchase a drink such as Gatorade or Ensure Clear (not the milkshake type).  Drink this before bedtime and on the way into the hospital, drink between 8-12 ounces or until you feel hydrated.  Keeping well hydrated leads to your veins being plump, you wake up faster, and you are less likely to be nauseated. Start drinking water as soon as you can after surgery and advance to clear liquids and food as tolerated.  IV fluids contain salt, drinking fluids will minimize the amount of IV fluids you need and decrease the amount of salt you get.    The most common reason for the patient to be readmitted is dehydration. Staying hydrated after you go home from the hospital is very important.  Ensure or Ensure Clear are good options to keep you hydrated.     4) PAIN MANAGEMENT: If we minimize the amount of  opioids and narcotics, and use regional blocks (which numb the area where your surgery is) along with oral pain medications; you will have less side effects of nausea and constipation. Narcotics can slow down your bowels and cause you to stay in the hospital longer.     Our goal is to keep you comfortable; eating and drinking normally and back home safely.   Using an Incentive Spirometer    An incentive spirometer is a device that helps you do deep breathing exercises. These exercises expand your lungs, aid in circulation, and help prevent pneumonia. Deep breathing exercises also help you breathe better and improve the function of your lungs by:    Keeping your lungs clear    Strengthening your breathing muscles    Helping prevent respiratory complications or problems  The incentive spirometer gives you a way to take an active part in your care. A nurse or therapist will teach you breathing exercises. To do these exercises, you will breathe in through your mouth and not your nose. The incentive spirometer only works correctly if you breathe in through your mouth.    Steps to clear lungs  Step 1. Exhale normally. Then, inhale normally.    Relax and breathe out.  Step 2. Place your lips tightly around the mouthpiece.    Make sure the device is upright and not tilted.  Step 3. Inhale as much air as you can through the mouthpiece (don't breath through your nose).    Inhale slowly and deeply.    Hold your breath long enough to keep the balls or disk raised for at least 3 to 5 seconds, or as instructed by your healthcare provider.  Step 4. Repeat the exercise regularly.  Begin using the Incentive Spirometer one week prior to your surgery, 4 times per day-5 times each.AFTER YOUR SURGERY  Breathing exercises   Breathing exercises help you recover faster. Take deep breaths and let the air out slowly. This will:     Help you wake up after surgery.    Help prevent complications like pneumonia.  Preventing complications will  help you go home sooner.   We may give you a breathing device (incentive spirometer) to encourage you to breathe deeply.   Nausea and vomiting   You may feel sick to your stomach after surgery; if so, let your nurse know.    Pain control:  After surgery, you may have pain. Our goal is to help you manage your pain. Pain medicine will help you feel comfortable enough to do activities that will help you heal.  These activities may include breathing exercises, walking and physical therapy.   To help your health care team treat your pain we will ask: 1) If you have pain  2) where it is located 3) describe your pain in your words  Methods of pain control include medications given by mouth, vein or by nerve block for some surgeries.  We may give you a pain control pump that will:  1) Deliver the medicine through a tube placed in your vein  2) Control the amount of medicine you receive  3) Allow you to push a button to deliver a dose of pain medicine  Sequential Compression Device (SCD) or Pneumo Boots:  You may need to wear SCD S on your legs or feet. These are wraps connected to a machine that pumps in air and releases it. The repeated pumping helps prevent blood clots from forming.           Follow-ups after your visit        Your next 10 appointments already scheduled     Feb 06, 2018  2:00 PM CST   (Arrive by 1:45 PM)   PAC RN ASSESSMENT with Uc Pac Rn   [facility] Preoperative Assessment [place] ([facility])    [address] [place] [address]   [phone]            Feb 06, 2018  2:30 PM CST   NEW OSTOMY NURSE VISIT with Devika Randolph RN   [facility] Wound Ostomy ([facility])    [address] [place] [id number]   [phone]            Feb 06, 2018  2:50 PM CST   (Arrive by 2:35 PM)   PAC Anesthesia Consult with Sonia Pac Anesthesiologist   [facility] Preoperative Assessment [place] ([facility])     [address]  [address] [facility] [address]   [phone]            Feb 06, 2018  4:00 PM CST   (Arrive by 3:45 PM)   MR ABDOMEN & PELVIS W/O & W CONTRAST with UUMR2   [facility], [place], MRI ([facility], [place])    [address] [facility] [address] [facility] [address]   [phone]           Take your medicines as usual, unless your doctor tells you not to. Bring a list of your current medicines to your exam (including vitamins, minerals and over-the-counter drugs). Also bring the results of similar scans you may have had.    The day before your exam, drink extra fluids at least six 8-ounce glasses (unless your doctor tells you to restrict your fluids).   Have a blood test (creatinine test) within 30 days of your exam. Go to your clinic or Diagnostic Imaging Department for this test.   Do not eat or drink for 6 hours prior to exam.  The MRI machine uses a strong magnet. Please wear clothes without metal (snaps, zippers). A sweatsuit works well, or we may give you a hospital gown.  Please remove any body piercings and hair extensions before you arrive. You will also remove watches, jewelry, hairpins, wallets, dentures, partial dental plates and hearing aids. You may wear contact lenses, and you may be able to wear your rings. We have a safe place to keep your personal items, but it is safer to leave them at home.   **IMPORTANT** THE INSTRUCTIONS BELOW ARE ONLY FOR THOSE PATIENTS WHO HAVE BEEN TOLD THEY WILL RECEIVE SEDATION OR GENERAL ANESTHESIA DURING THEIR MRI PROCEDURE:  IF YOU WILL RECEIVE SEDATION (take medicine to help you relax during your exam):   You must get the medicine from your doctor before you arrive. Bring the medicine to the exam. Do not take it at home.   Arrive one hour early. Bring someone who can take you home after the test. Your medicine will make you sleepy. After the exam, you may not drive, take a bus or take a taxi by yourself.   No  eating 8 hours before your exam. You may have clear liquids up until 4 hours before your exam. (Clear liquids include water, clear tea, black coffee and fruit juice without pulp.)  IF YOU WILL RECEIVE ANESTHESIA (be asleep for your exam):   Arrive 1 1/2 hours early. Bring someone who can take you home after the test. You may not drive, take a bus or take a taxi by yourself.   No eating 8 hours before your exam. You may have clear liquids up until 4 hours before your exam. (Clear liquids include water, clear tea, black coffee and fruit juice without pulp.)  If you have any questions, please contact your Imaging Department exam site.            Feb 08, 2018 10:00 AM CST   (Arrive by 9:45 AM)   MR PELVIS W/O & W CONTRAST with UUMR2   [facility], [place], [facility] ([facility], [facility])    [address] [facility] [address] [phone]           Take your medicines as usual, unless your doctor tells you not to. Bring a list of your current medicines to your exam (including vitamins, minerals and over-the-counter drugs).  You will be given intravenous contrast for this exam. To prepare:   The day before your exam, drink extra fluids at least six 8-ounce glasses (unless your doctor tells you to restrict your fluids).   Have a blood test (creatinine test) within 30 days of your exam. Go to your clinic or Diagnostic Imaging Department for this test.  The MRI machine uses a strong magnet. Please wear clothes without metal (snaps, zippers). A sweatsuit works well, or we may give you a hospital gown.  Please remove any body piercings and hair extensions before you arrive. You will also remove watches, jewelry, hairpins, wallets, dentures, partial dental plates and hearing aids. You may wear contact lenses, and you may be able to wear your rings. We have a safe place to keep your personal items, but it is safer to leave them at home.   **IMPORTANT** THE INSTRUCTIONS BELOW ARE ONLY  FOR THOSE PATIENTS WHO HAVE BEEN TOLD THEY WILL RECEIVE SEDATION OR GENERAL ANESTHESIA DURING THEIR MRI PROCEDURE:  IF YOU WILL RECEIVE SEDATION (take medicine to help you relax during your exam):   You must get the medicine from your doctor before you arrive. Bring the medicine to the exam. Do not take it at home.   Arrive one hour early. Bring someone who can take you home after the test. Your medicine will make you sleepy. After the exam, you may not drive, take a bus or take a taxi by yourself.   No eating 8 hours before your exam. You may have clear liquids up until 4 hours before your exam. (Clear liquids include water, clear tea, black coffee and fruit juice without pulp.)  IF YOU WILL RECEIVE ANESTHESIA (be asleep for your exam):   Arrive 1 1/2 hours early. Bring someone who can take you home after the test. You may not drive, take a bus or take a taxi by yourself.   No eating 8 hours before your exam. You may have clear liquids up until 4 hours before your exam. (Clear liquids include water, clear tea, black coffee and fruit juice without pulp.)  Please call the Imaging Department at your exam site with any questions.            Feb 14, 2018   Procedure with Óscar Hampton MD   [facility], [place], Same Day Surgery (--)    [address] [facility] [address] [phone]              Future tests that were ordered for you today     Open Future Orders        Priority Expected Expires Ordered    MRI Pelvis w & w/o contrast Routine 2/7/2018 2/6/2019 2/6/2018            Who to contact     Please call your clinic at [phone] to:    Ask questions about your health    Make or cancel appointments    Discuss your medicines    Learn about your test results    Speak to your doctor   If you have compliments or concerns about an experience at your clinic, or if you wish to file a complaint, please contact [facility] Patient Relations at [phone] or email us at  [email or web address]         Additional Information About Your Visit        MyChart Information     datapinehart gives you secure access to your electronic health record. If you see a primary care provider, you can also send messages to your care team and make appointments. If you have questions, please call your primary care clinic.  If you do not have a primary care provider, please call [phone] and they will assist you.      FOBO is an electronic gateway that provides easy, online access to your medical records. With FOBO, you can request a clinic appointment, read your test results, renew a prescription or communicate with your care team.     To access your existing account, please contact your [facility] or call [phone] for assistance.        Care EveryWhere ID     This is your Care EveryWhere ID. This could be used by other organizations to access your [place] medical records  [id number]         Blood Pressure from Last 3 Encounters:   02/06/18 136/88   02/06/18 122/86   01/25/18 128/90    Weight from Last 3 Encounters:   02/06/18 91.2 kg (201 lb)   02/06/18 92.5 kg (204 lb)   01/25/18 91.9 kg (202 lb 11.2 oz)              Today, you had the following     No orders found for display       Primary Care Provider Office Phone # Fax #    Wayne Manriquezford VICKY [phone]       [facility] [address] [address]        Equal Access to Services     DEAN CHAKRABORTY : Hadii aad ku hadasho Soomaali, waaxda luqadaha, qaybta kaalmada adeegyada, luz elena mtz . So [facility] [phone].    ATENCIÓN: Si habla español, tiene a membreno disposición servicios gratuitos de asistencia lingüística. Llame al [phone].    We comply with applicable federal civil rights laws and [place] laws. We do not discriminate on the basis of race, color, national origin, age, disability, sex, sexual orientation, or  "gender identity.            Thank you!     Thank you for choosing [facility]  for your care. Our goal is always to provide you with excellent care. Hearing back from our patients is one way we can continue to improve our services. Please take a few minutes to complete the written survey that you may receive in the mail after your visit with us. Thank you!             Your Updated Medication List - Protect others around you: Learn how to safely use, store and throw away your medicines at www.disposemymeds.org.          This list is accurate as of 2/6/18  1:40 PM.  Always use your most recent med list.                   Brand Name Dispense Instructions for use Diagnosis    AMITRIPTYLINE HCL PO      Take 20 mg by mouth At Bedtime        calcium carbonate 500 MG chewable tablet    TUMS    150 tablet    Take 1 tablet (500 mg) by mouth daily        FLUoxetine 20 MG capsule    PROzac     1 tablet in the morning        magnesium citrate solution     296 mL    Take 296 mLs by mouth See Admin Instructions Refer to \"Getting Ready for a Colonoscopy\" patient handout.    Rectal cancer (H)       neomycin 500 MG tablet    MYCIFRADIN    6 tablet    Take two tablets by mouth every 8 hours for one day prior to surgery. Take in conjunction with Flagyl    Rectal cancer (H)       ondansetron 4 MG tablet    ZOFRAN    3 tablet    Take one tablet by mouth every 6 hours as needed for nausea when taking neomycin and flagyl    Rectal cancer (H)       polyethylene glycol Packet    MIRALAX    238 g    Take 238 g by mouth See Admin Instructions One 8.3-ounce bottle (238 g). Refer to surgical packet for medication administration    Rectal cancer (H)       TYLENOL PO      Take 1,000 mg by mouth At Bedtime          "

## 2018-02-06 NOTE — LETTER
2/6/2018       RE: Sarah Rajan  [address] [facility] [address]     Dear Colleague,    Thank you for referring your patient, Sarah Rajan, to the [facility]. Please see a copy of my visit note below.      HPI  INITIAL PATIENT ASSESSMENT    Diagnosis: Rectal Cancer    Prior radiation therapy: None    Prior chemotherapy: None    Prior hormonal therapy:No    Pain Eval:  Denies    Psychosocial  Living arrangements: Lives with family  Fall Risk: independent   referral needs: Not needed    Advanced Directive: No  Implantable Cardiac Device? No    Nurse face-to-face time: Level 5:  over 15 min face to face time    Review of Systems   Constitutional: Positive for malaise/fatigue. Negative for chills, diaphoresis, fever and weight loss.   HENT: Negative for congestion, ear discharge, ear pain, hearing loss, nosebleeds, sinus pain, sore throat and tinnitus.    Eyes: Negative for blurred vision, double vision, photophobia, pain, discharge and redness.   Respiratory: Negative for cough, hemoptysis, sputum production, shortness of breath, wheezing and stridor.    Cardiovascular: Negative for chest pain, palpitations, orthopnea, claudication, leg swelling and PND.   Gastrointestinal: Positive for blood in stool and heartburn. Negative for abdominal pain, constipation, diarrhea, melena, nausea and vomiting.   Genitourinary: Negative for dysuria, flank pain, frequency, hematuria and urgency.   Musculoskeletal: Positive for back pain. Negative for falls, joint pain, myalgias and neck pain.   Skin: Negative for itching and rash.   Neurological: Negative for dizziness, tingling, tremors, sensory change, speech change, focal weakness, seizures, loss of consciousness, weakness and headaches.   Endo/Heme/Allergies: Negative for environmental allergies and polydipsia. Does not bruise/bleed easily.   Psychiatric/Behavioral: Negative for depression, hallucinations, memory loss, substance abuse and  suicidal ideas. The patient is nervous/anxious. The patient does not have insomnia.                    RADIATION ONCOLOGY CONSULT   Feb 6, 2018    CHIEF COMPLAINT: Ms. Rajan is a 52 year old female referred to our clinic by Dr. Hampton: for rectal cancer    HISTORY OF PRESENT ILLNESS:   Ms. Rajan is a 52 year old female with diagnosis of rectal cancer.  She originally underwent a screening colonoscopy on 1/9/2018, which demonstrated a fungating rectal mass located approximately 3 cm proximal to the anus.  A biopsy of the mass was done at this time, which was positive for moderately differentiated adenocarcinoma, MMR proteins intact.  She underwent further evaluation with a CT of the chest abdomen pelvis on 1/11/2018, which showed 2 subcentimeter indeterminate lesions in the liver, which were felt most likely to be benign cysts or hemangiomas, but metastatic involvement could not be ruled out.  There was no other evidence of jose involvement or distant metastases.  A pelvic MRI was completed on 1/23/2018.  This showed the biopsy-proven rectal mass measuring 2.7 x 2.1 cm, located 0.7 cm from the sphincter complex, and 4.8 cm from the anal verge.  There appeared to be likely involvement of the muscularis propria, however there was no clear evidence of extension beyond this.  Several subcentimeter perirectal lymph nodes were identified on the MRI, however these all appeared homogeneous in appearance, and were felt most likely to be benign.    The patient was initially seen by medical oncology and radiation oncology at [facility].  At that time, they discussed the possibility of enrolling the patient in a clinical trial.  However, the patient was not interested in this option, and wanted to obtain a second opinion at the [facility].  She was seen by Dr. Hampton with colorectal surgery on 1/25/2018.  A flexible sigmoidoscopy was completed at that time, which reportedly demonstrated a 5 cm lesion extending to  2-3 cm from the external anal sphincter.  They discussed various treatment options, but felt that she would most likely be a candidate for treatment with surgery alone.  Her case was discussed at colorectal tumor conference, at which point it was recommended that she undergo further evaluation with an MRI of the liver for further characterization of the lesions noted on her CT scan.  It was also recommended that she undergo repeat pelvic MRI, as the outside MRI was done with rectal contrast, which could potentially distort the tumor and influence staging.  She presents today for discussion regarding the potential use of radiation therapy in the treatment of her disease.    On exam today, Ms. Rajan reports feeling in her normal state of health.  She does report that prior to her colonoscopy, she had been experiencing rectal itching and mild rectal bleeding.  She had been seen by her primary care provider, and it was felt that these symptoms were related to hemorrhoids.  Aside from this issue, she has never had any GI symptoms in the past.  Her weight has been stable.  She reports that she stopped having menstrual periods approximately 3-4 years ago.  She otherwise has no additional concerns or complaints and remainder of ROS is negative    PAST MEDICAL HISTORY:   Depression  Rectal cancer, per HPI  Restless leg syndrome  Seasonal affective disorder    PAST SURGICAL HISTORY:  2  sections     CHEMOTHERAPY HISTORY: None    RADIATION THERAPY HISTORY: None    PREGNANCY STATUS: No.  Patient is postmenopausal, reporting no menstrual periods for approximately 3-4 years.  Prozac    IMPLANTED CARDIAC DEVICE: None    MEDICATIONS:  Prozac  Amitriptyline  Miralax    ALLERGIES:   Droperidol    SOCIAL HISTORY:  Patient is  and lives with her  in [facility].  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  She is a former smoker, but quit in .  She denies any alcohol or illicit drug use        FAMILY  HISTORY:   Colon cancer in her paternal grandmother, diagnosed in her 70s.  Maternal grandmother with breast cancer,  at age 58  Maternal aunt with breast cancer, diagnosed in her 40s  Paternal aunt with inflammatory breast cancer, diagnosed in her 50s  Ischemic colitis in her mother     REVIEW OF SYMPTOMS:  A 12-point review of systems was performed. Pertinent findings are noted in the HPI.    PHYSICAL EXAMINATION:    /86  Wt 92.5 kg (204 lb)  BMI 32.94 kg/m2, pain 0/10  Gen: Alert, in NAD  Pulm: No wheezing, stridor or respiratory distress  CV: Well-perfused, no cyanosis, no pedal edema  Rectal: Deferred  Musculoskeletal: Normal muscle bulk and tone  Skin: Normal color and turgor  Psychiatric: Appropriate mood and affect    IMAGING: Reviewed    LABORATORY:   Lab Results   Component Value Date    WBC 4.7 2018    HGB 14.1 2018    HCT 41.3 2018    MCV 89 2018     2018     Lab Results   Component Value Date     2018    POTASSIUM 3.9 2018    CHLORIDE 103 2018    CO2 30 2018    GLC 91 2018     ASSESSMENT    Ms. Rajan is a 52 year old female with diagnosis of rectal adenocarcinoma.  Based on her current staging, she is considered a cT1/T2N0M0.     PLAN: We reviewed the patient's clinical history, including her initial presentation, diagnosis, and workup completed thus far.  We also discussed the recent recommendations from the colorectal tumor board.  We explained that the rectal contrast used in her previous MRI made visualization of the posterior tissues more difficult.  For this reason, we recommended that she undergo further evaluation with a repeat pelvic MRI without rectal contrast.  Additionally, it was recommended that she undergo further evaluation of the small lesions seen in her liver on her staging CT scan. She is scheduled to undergo the liver MRI later today, and the pelvic MRI on Thursday morning. Provided that these scans  do not show anything new or concerning, we agree with recommendation to proceed with surgery alone. If, on the other hand, her MRI scans demonstrate a greater depth of invasion of the rectal cancer, concern for lymph node involvement, or concern for involvement of the liver, our treatment recommendations would likely change.  Instead of undergoing treatment with surgery upfront, she would most likely be treated with a combination of chemotherapy and radiation in the neoadjuvant setting, followed by surgical resection.  We briefly reviewed the process of, side effects, and potential long-term complications associated with radiation therapy for rectal cancer.  At this time, we will await the results of her future scans.  If it is felt that neoadjuvant radiation therapy is indicated, we will plan to have her return for follow-up, and informed consent would be signed at that time.  The patient and her  both asked appropriate questions, which were answered to their satisfaction, and verbalized understanding    Ms. Rajan was seen and discussed with staff, Dr. Ford.    Francois Fuentes MD  Resident, PGY-5   Department of Radiation Oncology   [facility]    Ms. Rajan was seen and examined by me. Note above by Dr. Fuentes was reviewed and edited by me and reflects our mutual findings and plan of care.  Mirian Ford MD  Department of Radiation Oncology  [facility]

## 2018-02-06 NOTE — MR AVS SNAPSHOT
After Visit Summary   2/6/2018    Sarah Rajan    MRN: [id number]           Patient Information     Date Of Birth          1965        Visit Information        Provider Department      2/6/2018 10:30 AM Mirian Ford MD Radiation Oncology Clinic        Today's Diagnoses     Malignant neoplasm of rectum (H)    -  1       Follow-ups after your visit        Your next 10 appointments already scheduled     Feb 15, 2018  1:30 PM CST   (Arrive by 1:15 PM)   Return Visit with Kyra Mortensen MD   [facility] ([facility])    [address] [place] [address]  [address] [place] [id number] [phone]              Who to contact     Please call your clinic at [phone] to:    Ask questions about your health    Make or cancel appointments    Discuss your medicines    Learn about your test results    Speak to your doctor            Additional Information About Your Visit        MyChart Information     [employer] gives you secure access to your electronic health record. If you see a primary care provider, you can also send messages to your care team and make appointments. If you have questions, please call your primary care clinic.  If you do not have a primary care provider, please call [phone] and they will assist you.      [employer] is an electronic gateway that provides easy, online access to your medical records. With [employer], you can request a clinic appointment, read your test results, renew a prescription or communicate with your care team.     To access your existing account, please contact your [facility] or call [phone] for assistance.        Care EveryWhere ID     This is your Care EveryWhere ID. This could be used by other organizations to access your [place] medical records  [id number]        Your Vitals Were     BMI (Body Mass Index)                   32.94 kg/m2            Blood Pressure from Last 3  "Encounters:   02/06/18 136/88   02/06/18 122/86   01/25/18 128/90    Weight from Last 3 Encounters:   02/06/18 91.2 kg (201 lb)   02/06/18 92.5 kg (204 lb)   01/25/18 91.9 kg (202 lb 11.2 oz)               Primary Care Provider Office Phone # Fax #    Wayne Green, VICKY [phone]       [facility] [address] [facility] [id number]        Equal Access to Services     [facility]: Hadii shannon li hadasho Soomaali, waaxda luqadaha, qaybta kaalmada adetimothyyachato, luz elena mtz . So [facility] [phone].    ATENCIÓN: Si habla español, tiene a membreno disposición servicios gratuitos de asistencia lingüística. [facility] [phone].    We comply with applicable federal civil rights laws and [place] laws. We do not discriminate on the basis of race, color, national origin, age, disability, sex, sexual orientation, or gender identity.            Thank you!     Thank you for choosing [facility]  for your care. Our goal is always to provide you with excellent care. Hearing back from our patients is one way we can continue to improve our services. Please take a few minutes to complete the written survey that you may receive in the mail after your visit with us. Thank you!             Your Updated Medication List - Protect others around you: Learn how to safely use, store and throw away your medicines at www.disposemymeds.org.          This list is accurate as of 2/6/18 11:59 PM.  Always use your most recent med list.                   Brand Name Dispense Instructions for use Diagnosis    AMITRIPTYLINE HCL PO      Take 20 mg by mouth At Bedtime        calcium carbonate 500 MG chewable tablet    TUMS    150 tablet    Take 1 tablet (500 mg) by mouth daily        FLUoxetine 20 MG capsule    PROzac     1 tablet in the morning        magnesium citrate solution     296 mL    Take 296 mLs by mouth See Admin Instructions Refer to \"Getting Ready for a Colonoscopy\" " patient handout.    Rectal cancer (H)       neomycin 500 MG tablet    MYCIFRADIN    6 tablet    Take two tablets by mouth every 8 hours for one day prior to surgery. Take in conjunction with Flagyl    Rectal cancer (H)       ondansetron 4 MG tablet    ZOFRAN    3 tablet    Take one tablet by mouth every 6 hours as needed for nausea when taking neomycin and flagyl    Rectal cancer (H)       polyethylene glycol Packet    MIRALAX    238 g    Take 238 g by mouth See Admin Instructions One 8.3-ounce bottle (238 g). Refer to surgical packet for medication administration    Rectal cancer (H)       TYLENOL PO      Take 1,000 mg by mouth At Bedtime

## 2018-02-06 NOTE — H&P
Pre-Operative H & P     CC:  Preoperative exam to assess for increased cardiopulmonary risk while undergoing surgery and anesthesia.    Date of Encounter: 2018  Primary Care Physician:  Wayne Green  Associated diagnosis: rectal cancer    HPI  Sarah Rajan is a 52 year old female who presents for pre-operative H & P in preparation for a Laparoscopic Assisted Low Anterior Resection With Loop Ileostomy and Possible Colostomy with Dr. Hampton on 18 at [facility].     Sarah Rajan is a 52 year old female with obesity, history of smoking, RLS, GERD, depression, seasonal affective disorder and insomnia that was just diagnosed with rectal cancer last month after completing her first screening colonoscopy.  She apparently had some history of blood in the stool previously, but it was attributed to hemorrhoids at that time.  She came to Dr. Hampton for a second opinion regarding the rectal cancer and she has elected to proceed with the above listed surgery as recommended.       History is obtained from the patient.     Past Medical History  Past Medical History:   Diagnosis Date     Depression      GERD (gastroesophageal reflux disease)      History of smoking      Insomnia      Obesity      Rectal cancer (H)      Restless leg syndrome      Seasonal affective disorder (H)        Past Surgical History  Past Surgical History:   Procedure Laterality Date      SECTION      x2     COLONOSCOPY       LASIK         Hx of Blood transfusions/reactions: none     Hx of abnormal bleeding or anti-platelet use: none    Menstrual history: No LMP recorded. Patient is postmenopausal.:     Steroid use in the last year: none    Personal or FH with difficulty with Anesthesia:  No personal history of anesthesia complications.  Mother has trouble with hypotension and PONV.      Prior to Admission Medications  Current Outpatient Prescriptions   Medication Sig Dispense Refill      "Acetaminophen (TYLENOL PO) Take 1,000 mg by mouth At Bedtime       calcium carbonate (TUMS) 500 MG chewable tablet Take 1 tablet (500 mg) by mouth daily 150 tablet      FLUoxetine (PROZAC) 20 MG capsule 1 tablet in the morning  1     AMITRIPTYLINE HCL PO Take 20 mg by mouth At Bedtime        polyethylene glycol (MIRALAX) Packet Take 238 g by mouth See Admin Instructions One 8.3-ounce bottle (238 g). Refer to surgical packet for medication administration 238 g 0     magnesium citrate solution Take 296 mLs by mouth See Admin Instructions Refer to \"Getting Ready for a Colonoscopy\" patient handout. 296 mL 0     ondansetron (ZOFRAN) 4 MG tablet Take one tablet by mouth every 6 hours as needed for nausea when taking neomycin and flagyl 3 tablet 0     neomycin (MYCIFRADIN) 500 MG tablet Take two tablets by mouth every 8 hours for one day prior to surgery. 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[employer]     Social History Main Topics     Smoking status: Former Smoker     Packs/day: 0.10     Years: 8.00     Types: Cigarettes     Quit date: 2/19/1986     Smokeless tobacco: Never Used     Alcohol use 4.2 oz/week     0 Standard drinks or equivalent, 7 Glasses of wine per week     Drug use: No     Sexual activity: Not on file     Other Topics Concern     Not on file     Social History Narrative       Family History  Family History   Problem Relation Age of Onset     Hyperlipidemia Mother      Hypertension Mother      GASTROINTESTINAL DISEASE Mother      ischemic colitis     Coronary Artery Disease Mother      stents x 3     Anesthesia Reaction Mother      hypotension, PONV " "    Hyperlipidemia Father      Hypertension Father      Breast Cancer Maternal Grandmother      Coronary Artery Disease Maternal Grandfather      Myocardial Infarction Maternal Grandfather      Colon Cancer Paternal Grandmother      Breast Cancer Maternal Aunt      Breast Cancer Paternal Aunt      Coronary Artery Disease Paternal Grandfather      CEREBROVASCULAR DISEASE Paternal Grandfather      Family History Negative Brother      Coronary Artery Disease Maternal Uncle          ROS/MED HX  The complete review of systems is negative other than noted in the HPI or here.     ENT/Pulmonary:     (+)BRE risk factors snores loudly, tobacco use, Past use 0.1 packs/day  , . .    Neurologic:     (+)other neuro RLS    Cardiovascular:     (+) Dyslipidemia, ----. : . . . :. . No previous cardiac testing       METS/Exercise Tolerance:  >4 METS   Hematologic:  - neg hematologic  ROS      (-) history of blood clots and History of Transfusion   Musculoskeletal:   (+) , , other musculoskeletal- periodic central low back pain      GI/Hepatic:     (+) GERD Asymptomatic on medication, bowel prep,       Renal/Genitourinary:  - ROS Renal section negative       Endo:     (+) Obesity, .      Psychiatric:     (+) psychiatric history depression (insomnia, seasonal affective disorder)      Infectious Disease:  - neg infectious disease ROS       Malignancy:   (+) Malignancy History of GI  GI CA Active status post,         Other:    (+) No chance of pregnancy no H/O Chronic Pain,                 Temp: 98.1  F (36.7  C) Temp src: Oral BP: 136/88 Pulse: 72   Resp: 16 SpO2: 98 %         201 lbs 0 oz  5' 6\"   Body mass index is 32.44 kg/(m^2).       Physical Exam  Constitutional: Awake, alert, cooperative, no apparent distress, and appears stated age. obese  Eyes: Pupils equal, round and reactive to light, extra ocular muscles intact, sclera clear, conjunctiva normal.  HENT: Normocephalic, oral pharynx with moist mucus membranes.  Dentition -Tooth " #31 missing, one right upper crown.  No goiter appreciated.   Respiratory: Clear to auscultation bilaterally, no crackles or wheezing.  Cardiovascular: Regular rate and rhythm, normal S1 and S2, and no murmur noted.  Carotids +2, no bruits. No edema. Palpable pulses to radial  DP and PT arteries.   GI: Normal bowel sounds, soft, non-distended, non-tender, no masses palpated, no hepatosplenomegaly.   Lymph/Hematologic: No cervical lymphadenopathy and no supraclavicular lymphadenopathy.  Genitourinary:  deferred  Skin: Warm and dry.    Musculoskeletal: Full ROM of neck. There is no redness, warmth, or swelling of the exposed joints. Gross motor strength is normal.    Neurologic: Awake, alert, oriented to name, place and time. Cranial nerves II-XII are grossly intact. Gait is normal.   Neuropsychiatric: Calm, cooperative. Normal affect.     Labs: (personally reviewed)   Component      Latest Ref Rng & Units 2/6/2018   Sodium      133 - 144 mmol/L 138   Potassium      3.4 - 5.3 mmol/L 3.9   Chloride      94 - 109 mmol/L 103   Carbon Dioxide      20 - 32 mmol/L 30   Anion Gap      3 - 14 mmol/L 5   Glucose      70 - 99 mg/dL 91   Urea Nitrogen      7 - 30 mg/dL 14   Creatinine      0.52 - 1.04 mg/dL 0.80   GFR Estimate      >60 mL/min/1.7m2 76   GFR Estimate If Black      >60 mL/min/1.7m2 >90   Calcium      8.5 - 10.1 mg/dL 8.9   WBC      4.0 - 11.0 10e9/L 4.7   RBC Count      3.8 - 5.2 10e12/L 4.65   Hemoglobin      11.7 - 15.7 g/dL 14.1   Hematocrit      35.0 - 47.0 % 41.3   MCV      78 - 100 fl 89   MCH      26.5 - 33.0 pg 30.3   MCHC      31.5 - 36.5 g/dL 34.1   RDW      10.0 - 15.0 % 12.5   Platelet Count      150 - 450 10e9/L 186         Outside records reviewed from: Care Everywhere    ASSESSMENT and PLAN  Sarah Rajan is a 52 year old female scheduled for a Laparoscopic Assisted Low Anterior Resection With Loop Ileostomy and Possible Colostomy,  On 2/14/2018 by Dr. Hampton in treatment of rectal cancer.  PAC  referral for risk assessment and optimization for anesthesia with comorbid conditions of: obesity, history of smoking, RLS, GERD, depression, seasonal affective disorder and insomnia.      Pre-operative considerations:  1.  Cardiac:  Functional status- METS >4.  She hasn't been exercising purposefully lately, but up to a few months ago she was doing a run/walk on her treadmill 3 days per week.  She denies any exertional dyspnea.  Intermediate risk surgery with 0.4% risk of major adverse cardiac event.  Risk of MACE < 1%. METS>4.  No further cardiac evaluation needed per 2014 ACC/AHA guidelines for non-cardiac surgery.  2.  Pulm:  Airway feasible.  BRE risk: intermediate.  Quit smoking in 1986.  She is obese with a BMI >30.    3.  GI:  Risk of PONV score = 3.  If > 2, anti-emetic intervention recommended.  GERD is managed well with Tums.      VTE risk:  1.8% due to questionable family history of a maternal aunt with a DVT or PE.      Patient is optimized and is acceptable candidate for the proposed procedure.  No further diagnostic evaluation is needed.     Patient discussed with Dr. Negron.               Oly Altamirano, DNP, RN, APRN  Preoperative Assessment Center  [facility]  Clinic and Surgery Center  Phone: [phone]  Fax: [phone]

## 2018-02-06 NOTE — MR AVS SNAPSHOT
After Visit Summary   2/6/2018    Sarah Rajan    MRN: [id number]           Patient Information     Date Of Birth          1965        Visit Information        Provider Department      2/6/2018 2:30 PM Devika Randolph RN [facility] Wound Ostomy        Today's Diagnoses     Ostomy nurse consultation    -  1       Follow-ups after your visit        Your next 10 appointments already scheduled     Feb 08, 2018 10:00 AM CST   (Arrive by 9:45 AM)   MR PELVIS W/O & W CONTRAST with UUMR2   [facility], [place], [facility] ([facility], [facility])    [address] [id number] [phone]           Take your medicines as usual, unless your doctor tells you not to. Bring a list of your current medicines to your exam (including vitamins, minerals and over-the-counter drugs).  You will be given intravenous contrast for this exam. To prepare:   The day before your exam, drink extra fluids at least six 8-ounce glasses (unless your doctor tells you to restrict your fluids).   Have a blood test (creatinine test) within 30 days of your exam. Go to your clinic or Diagnostic Imaging Department for this test.  The MRI machine uses a strong magnet. Please wear clothes without metal (snaps, zippers). A sweatsuit works well, or we may give you a hospital gown.  Please remove any body piercings and hair extensions before you arrive. You will also remove watches, jewelry, hairpins, wallets, dentures, partial dental plates and hearing aids. You may wear contact lenses, and you may be able to wear your rings. We have a safe place to keep your personal items, but it is safer to leave them at home.   **IMPORTANT** THE INSTRUCTIONS BELOW ARE ONLY FOR THOSE PATIENTS WHO HAVE BEEN TOLD THEY WILL RECEIVE SEDATION OR GENERAL ANESTHESIA DURING THEIR MRI PROCEDURE:  IF YOU WILL RECEIVE SEDATION (take medicine to help you relax during your exam):   You must get the medicine from  your doctor before you arrive. Bring the medicine to the exam. Do not take it at home.   Arrive one hour early. Bring someone who can take you home after the test. Your medicine will make you sleepy. After the exam, you may not drive, take a bus or take a taxi by yourself.   No eating 8 hours before your exam. You may have clear liquids up until 4 hours before your exam. (Clear liquids include water, clear tea, black coffee and fruit juice without pulp.)  IF YOU WILL RECEIVE ANESTHESIA (be asleep for your exam):   Arrive 1 1/2 hours early. Bring someone who can take you home after the test. You may not drive, take a bus or take a taxi by yourself.   No eating 8 hours before your exam. You may have clear liquids up until 4 hours before your exam. (Clear liquids include water, clear tea, black coffee and fruit juice without pulp.)  Please call the Imaging Department at your exam site with any questions.            Feb 14, 2018   Procedure with Óscar Hampton MD   [facility], [place], Same Day Surgery (--)    [address] [id number] [phone]              Future tests that were ordered for you today     Open Future Orders        Priority Expected Expires Ordered    CBC with platelets Routine 2/6/2018 3/8/2018 2/6/2018    Basic metabolic panel Routine 2/6/2018 3/8/2018 2/6/2018    ABO/Rh type and screen Routine 2/6/2018 3/8/2018 2/6/2018    MRI Pelvis w & w/o contrast Routine 2/7/2018 2/6/2019 2/6/2018            Who to contact     Please call your clinic at [phone] to:    Ask questions about your health    Make or cancel appointments    Discuss your medicines    Learn about your test results    Speak to your doctor   If you have compliments or concerns about an experience at your clinic, or if you wish to file a complaint, please contact [facility] Patient Relations at [phone] or email us at [email or web address]         Additional Information About Your Visit         [employer] Information     [employer] gives you secure access to your electronic health record. If you see a primary care provider, you can also send messages to your care team and make appointments. If you have questions, please call your primary care clinic.  If you do not have a primary care provider, please call [phone] and they will assist you.      To[employer]For is an electronic gateway that provides easy, online access to your medical records. With [employer], you can request a clinic appointment, read your test results, renew a prescription or communicate with your care team.     To access your existing account, please contact your [facility] or call [phone] for assistance.        Care EveryWhere ID     This is your Care EveryWhere ID. This could be used by other organizations to access your [place] medical records  [id number]         Blood Pressure from Last 3 Encounters:   02/06/18 136/88   02/06/18 122/86   01/25/18 128/90    Weight from Last 3 Encounters:   02/06/18 91.2 kg (201 lb)   02/06/18 92.5 kg (204 lb)   01/25/18 91.9 kg (202 lb 11.2 oz)              Today, you had the following     No orders found for display       Primary Care Provider Office Phone # Fax #    Wayne Green [phone]       [facility] [address] [id number]        Equal Access to Services     DEAN CHAKRABORTY : Hadii aad ku hadasho Sojustin, waaxda luqadaha, qaybta kaalmada adeegyada, luz elena mtz . So [facility] [phone].    ATENCIÓN: Si habla español, tiene a membreno disposición servicios gratuitos de asistencia lingüística. Claudette al [phone].    We comply with applicable federal civil rights laws and [place] laws. We do not discriminate on the basis of race, color, national origin, age, disability, sex, sexual orientation, or gender identity.            Thank you!     Thank you for choosing [facility]  for your care. Our goal is always to provide you with excellent care. Hearing back from our patients is one way we can continue to improve our services. Please take a few minutes to complete the written survey that you may receive in the mail after your visit with us. Thank you!             Your Updated Medication List - Protect others around you: Learn how to safely use, store and throw away your medicines at www.disposemymeds.org.          This list is accurate as of 2/6/18  4:33 PM.  Always use your most recent med list.                   Brand Name Dispense Instructions for use Diagnosis    AMITRIPTYLINE HCL PO      Take 20 mg by mouth At Bedtime        calcium carbonate 500 MG chewable tablet    TUMS    150 tablet    Take 1 tablet (500 mg) by mouth daily        FLUoxetine 20 MG capsule    PROzac     1 tablet in the morning        magnesium citrate solution     296 mL    Take 296 mLs by mouth See Admin Instructions Refer to \"Getting Ready for a Colonoscopy\" patient handout.    Rectal cancer (H)       neomycin 500 MG tablet    MYCIFRADIN    6 tablet    Take two tablets by mouth every 8 hours for one day prior to surgery. Take in conjunction with Flagyl    Rectal cancer (H)       ondansetron 4 MG tablet    ZOFRAN    3 tablet    Take one tablet by mouth every 6 hours as needed for nausea when taking neomycin and flagyl    Rectal cancer (H)       polyethylene glycol Packet    MIRALAX    238 g    Take 238 g by mouth See Admin Instructions One 8.3-ounce bottle (238 g). Refer to surgical packet for medication administration    Rectal cancer (H)       TYLENOL PO      Take 1,000 mg by mouth At Bedtime          "

## 2018-02-06 NOTE — PATIENT INSTRUCTIONS
Preparing for Your Surgery      Name:  Sarah Rajan   MRN:  [id number]   :  1965   Today's Date:  2018     Arriving for surgery:  Surgery date:  18  Arrival time:  05:30 a.m.  Please come to:       [facility] [address]  [address] [place]  [address]    -   parking is available in front of the hospital from 5:15 am to 8:00 pm    -  Stop at the Information Desk in the lobby    -   Inform the information person that you are here for surgery. An escort to 3c will be provided. If you would not like an escort, please proceed to 3C on the 3rd floor. [phone]     What can I eat or drink?  -  Please follow bowel prep.    -  You may have water, apple juice or 7up/Sprite until 2 hours prior to your surgery 05:30.  Please drink 8 to 12 ounces of Gatorade prior to 05:30 a.m.    Which medicines can I take?  -  Do NOT take these medications in the morning, the day of surgery:     Tums    -  Please take these medications the day of surgery:     Prozac     How do I prepare myself?  -  Take two showers: one the night before surgery; and one the morning of surgery.         Use Scrubcare or Hibiclens to wash from neck down.  You may use your own shampoo and conditioner. No other hair products.   -  Do NOT use lotion, powder, deodorant, or antiperspirant the day of your surgery.  -  Do NOT wear any makeup, fingernail polish or jewelry.  -  Begin using Incentive Spirometer 1 week prior to surgery.  Use 4 times per day, up to 5 breaths each time.  Bring Incentive Spirometer to hospital.  -Do not bring your own medications to the hospital, except for inhalers and eye drops.  -  Bring your ID and insurance card.    Questions or Concerns:  If you have questions or concerns, please call the  Preoperative Assessment Center, Monday-Friday 7AM-7PM:  [phone]          Enhanced Recovery After Surgery     This is a team effort, including you, to get you back on your feet,  eating and drinking normally and out of the hospital as quickly as possible.  The goals are:   1) NO INFECTIONS and   2) RETURN TO NORMAL DIET    How can we achieve these goals?  1) STAY ACTIVE: Walk every day before your surgery; try to increase the amount every day.  Walk after surgery as much as you can-the nurses will help you.  Walking speeds healing and gets you home quicker, you heal better at home and have less risk of infection.     2) INCENTIVE SPIROMETER: Practice your incentive spirometer 4 times per day with 5-10 repetitions each time.  Using the incentive spirometer can strengthen your muscles between your ribs and help you have a strong cough after surgery.  A more effective cough can help prevent problems with your lungs.    3) STAY HYDRATED: Drink clear liquids up until 2 hours before your surgery. We would like you to purchase a drink such as Gatorade or Ensure Clear (not the milkshake type).  Drink this before bedtime and on the way into the hospital, drink between 8-12 ounces or until you feel hydrated.  Keeping well hydrated leads to your veins being plump, you wake up faster, and you are less likely to be nauseated. Start drinking water as soon as you can after surgery and advance to clear liquids and food as tolerated.  IV fluids contain salt, drinking fluids will minimize the amount of IV fluids you need and decrease the amount of salt you get.    The most common reason for the patient to be readmitted is dehydration. Staying hydrated after you go home from the hospital is very important.  Ensure or Ensure Clear are good options to keep you hydrated.     4) PAIN MANAGEMENT: If we minimize the amount of opioids and narcotics, and use regional blocks (which numb the area where your surgery is) along with oral pain medications; you will have less side effects of nausea and constipation. Narcotics can slow down your bowels and cause you to stay in the hospital longer.     Our goal is to keep you  comfortable; eating and drinking normally and back home safely.   Using an Incentive Spirometer    An incentive spirometer is a device that helps you do deep breathing exercises. These exercises expand your lungs, aid in circulation, and help prevent pneumonia. Deep breathing exercises also help you breathe better and improve the function of your lungs by:    Keeping your lungs clear    Strengthening your breathing muscles    Helping prevent respiratory complications or problems  The incentive spirometer gives you a way to take an active part in your care. A nurse or therapist will teach you breathing exercises. To do these exercises, you will breathe in through your mouth and not your nose. The incentive spirometer only works correctly if you breathe in through your mouth.    Steps to clear lungs  Step 1. Exhale normally. Then, inhale normally.    Relax and breathe out.  Step 2. Place your lips tightly around the mouthpiece.    Make sure the device is upright and not tilted.  Step 3. Inhale as much air as you can through the mouthpiece (don't breath through your nose).    Inhale slowly and deeply.    Hold your breath long enough to keep the balls or disk raised for at least 3 to 5 seconds, or as instructed by your healthcare provider.  Step 4. Repeat the exercise regularly.  Begin using the Incentive Spirometer one week prior to your surgery, 4 times per day-5 times each.AFTER YOUR SURGERY  Breathing exercises   Breathing exercises help you recover faster. Take deep breaths and let the air out slowly. This will:     Help you wake up after surgery.    Help prevent complications like pneumonia.  Preventing complications will help you go home sooner.   We may give you a breathing device (incentive spirometer) to encourage you to breathe deeply.   Nausea and vomiting   You may feel sick to your stomach after surgery; if so, let your nurse know.    Pain control:  After surgery, you may have pain. Our goal is to help you  manage your pain. Pain medicine will help you feel comfortable enough to do activities that will help you heal.  These activities may include breathing exercises, walking and physical therapy.   To help your health care team treat your pain we will ask: 1) If you have pain  2) where it is located 3) describe your pain in your words  Methods of pain control include medications given by mouth, vein or by nerve block for some surgeries.  We may give you a pain control pump that will:  1) Deliver the medicine through a tube placed in your vein  2) Control the amount of medicine you receive  3) Allow you to push a button to deliver a dose of pain medicine  Sequential Compression Device (SCD) or Pneumo Boots:  You may need to wear SCD S on your legs or feet. These are wraps connected to a machine that pumps in air and releases it. The repeated pumping helps prevent blood clots from forming.

## 2018-02-06 NOTE — PROGRESS NOTES
HPI  INITIAL PATIENT ASSESSMENT    Diagnosis: Rectal Cancer    Prior radiation therapy: None    Prior chemotherapy: None    Prior hormonal therapy:No    Pain Eval:  Denies    Psychosocial  Living arrangements: Lives with family  Fall Risk: independent   referral needs: Not needed    Advanced Directive: No  Implantable Cardiac Device? No    Nurse face-to-face time: Level 5:  over 15 min face to face time    Review of Systems   Constitutional: Positive for malaise/fatigue. Negative for chills, diaphoresis, fever and weight loss.   HENT: Negative for congestion, ear discharge, ear pain, hearing loss, nosebleeds, sinus pain, sore throat and tinnitus.    Eyes: Negative for blurred vision, double vision, photophobia, pain, discharge and redness.   Respiratory: Negative for cough, hemoptysis, sputum production, shortness of breath, wheezing and stridor.    Cardiovascular: Negative for chest pain, palpitations, orthopnea, claudication, leg swelling and PND.   Gastrointestinal: Positive for blood in stool and heartburn. Negative for abdominal pain, constipation, diarrhea, melena, nausea and vomiting.   Genitourinary: Negative for dysuria, flank pain, frequency, hematuria and urgency.   Musculoskeletal: Positive for back pain. Negative for falls, joint pain, myalgias and neck pain.   Skin: Negative for itching and rash.   Neurological: Negative for dizziness, tingling, tremors, sensory change, speech change, focal weakness, seizures, loss of consciousness, weakness and headaches.   Endo/Heme/Allergies: Negative for environmental allergies and polydipsia. Does not bruise/bleed easily.   Psychiatric/Behavioral: Negative for depression, hallucinations, memory loss, substance abuse and suicidal ideas. The patient is nervous/anxious. The patient does not have insomnia.

## 2018-02-06 NOTE — PROGRESS NOTES
RADIATION ONCOLOGY CONSULT   Feb 6, 2018    CHIEF COMPLAINT: Ms. Rajan is a 52 year old female referred to our clinic by Dr. Hampton: for rectal cancer    HISTORY OF PRESENT ILLNESS:   Ms. Rajan is a 52 year old female with diagnosis of rectal cancer.  She originally underwent a screening colonoscopy on 1/9/2018, which demonstrated a fungating rectal mass located approximately 3 cm proximal to the anus.  A biopsy of the mass was done at this time, which was positive for moderately differentiated adenocarcinoma, MMR proteins intact.  She underwent further evaluation with a CT of the chest abdomen pelvis on 1/11/2018, which showed 2 subcentimeter indeterminate lesions in the liver, which were felt most likely to be benign cysts or hemangiomas, but metastatic involvement could not be ruled out.  There was no other evidence of jose involvement or distant metastases.  A pelvic MRI was completed on 1/23/2018.  This showed the biopsy-proven rectal mass measuring 2.7 x 2.1 cm, located 0.7 cm from the sphincter complex, and 4.8 cm from the anal verge.  There appeared to be likely involvement of the muscularis propria, however there was no clear evidence of extension beyond this.  Several subcentimeter perirectal lymph nodes were identified on the MRI, however these all appeared homogeneous in appearance, and were felt most likely to be benign.    The patient was initially seen by medical oncology and radiation oncology at [facility].  At that time, they discussed the possibility of enrolling the patient in a clinical trial.  However, the patient was not interested in this option, and wanted to obtain a second opinion at the [facility].  She was seen by Dr. Hampton with colorectal surgery on 1/25/2018.  A flexible sigmoidoscopy was completed at that time, which reportedly demonstrated a 5 cm lesion extending to 2-3 cm from the external anal sphincter.  They discussed various treatment options, but felt that she  would most likely be a candidate for treatment with surgery alone.  Her case was discussed at colorectal tumor conference, at which point it was recommended that she undergo further evaluation with an MRI of the liver for further characterization of the lesions noted on her CT scan.  It was also recommended that she undergo repeat pelvic MRI, as the outside MRI was done with rectal contrast, which could potentially distort the tumor and influence staging.  She presents today for discussion regarding the potential use of radiation therapy in the treatment of her disease.    On exam today, Ms. Rajan reports feeling in her normal state of health.  She does report that prior to her colonoscopy, she had been experiencing rectal itching and mild rectal bleeding.  She had been seen by her primary care provider, and it was felt that these symptoms were related to hemorrhoids.  Aside from this issue, she has never had any GI symptoms in the past.  Her weight has been stable.  She reports that she stopped having menstrual periods approximately 3-4 years ago.  She otherwise has no additional concerns or complaints and remainder of ROS is negative    PAST MEDICAL HISTORY:   Depression  Rectal cancer, per HPI  Restless leg syndrome  Seasonal affective disorder    PAST SURGICAL HISTORY:  2  sections     CHEMOTHERAPY HISTORY: None    RADIATION THERAPY HISTORY: None    PREGNANCY STATUS: No.  Patient is postmenopausal, reporting no menstrual periods for approximately 3-4 years.  Prozac    IMPLANTED CARDIAC DEVICE: None    MEDICATIONS:  Prozac  Amitriptyline  Miralax    ALLERGIES:   Droperidol    SOCIAL HISTORY:  Patient is  and lives with her  in [facility].  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  She is a former smoker, but quit in .  She denies any alcohol or illicit drug use        FAMILY HISTORY:   Colon cancer in her paternal grandmother, diagnosed in her 70s.  Maternal grandmother with  breast cancer,  at age 58  Maternal aunt with breast cancer, diagnosed in her 40s  Paternal aunt with inflammatory breast cancer, diagnosed in her 50s  Ischemic colitis in her mother     REVIEW OF SYMPTOMS:  A 12-point review of systems was performed. Pertinent findings are noted in the HPI.    PHYSICAL EXAMINATION:    /86  Wt 92.5 kg (204 lb)  BMI 32.94 kg/m2, pain 0/10  Gen: Alert, in NAD  Pulm: No wheezing, stridor or respiratory distress  CV: Well-perfused, no cyanosis, no pedal edema  Rectal: Deferred  Musculoskeletal: Normal muscle bulk and tone  Skin: Normal color and turgor  Psychiatric: Appropriate mood and affect    IMAGING: Reviewed    LABORATORY:   Lab Results   Component Value Date    WBC 4.7 2018    HGB 14.1 2018    HCT 41.3 2018    MCV 89 2018     2018     Lab Results   Component Value Date     2018    POTASSIUM 3.9 2018    CHLORIDE 103 2018    CO2 30 2018    GLC 91 2018     ASSESSMENT    Ms. Rajan is a 52 year old female with diagnosis of rectal adenocarcinoma.  Based on her current staging, she is considered a cT1/T2N0M0.     PLAN: We reviewed the patient's clinical history, including her initial presentation, diagnosis, and workup completed thus far.  We also discussed the recent recommendations from the colorectal tumor board.  We explained that the rectal contrast used in her previous MRI made visualization of the posterior tissues more difficult.  For this reason, we recommended that she undergo further evaluation with a repeat pelvic MRI without rectal contrast.  Additionally, it was recommended that she undergo further evaluation of the small lesions seen in her liver on her staging CT scan. She is scheduled to undergo the liver MRI later today, and the pelvic MRI on Thursday morning. Provided that these scans do not show anything new or concerning, we agree with recommendation to proceed with surgery alone.  If, on the other hand, her MRI scans demonstrate a greater depth of invasion of the rectal cancer, concern for lymph node involvement, or concern for involvement of the liver, our treatment recommendations would likely change.  Instead of undergoing treatment with surgery upfront, she would most likely be treated with a combination of chemotherapy and radiation in the neoadjuvant setting, followed by surgical resection.  We briefly reviewed the process of, side effects, and potential long-term complications associated with radiation therapy for rectal cancer.  At this time, we will await the results of her future scans.  If it is felt that neoadjuvant radiation therapy is indicated, we will plan to have her return for follow-up, and informed consent would be signed at that time.  The patient and her  both asked appropriate questions, which were answered to their satisfaction, and verbalized understanding    Ms. Rajan was seen and discussed with staff, Dr. Ford.    Francois Fuentes MD  Resident, PGY-5   Department of Radiation Oncology   [facility]    Ms. Rajan was seen and examined by me. Note above by Dr. Fuentes was reviewed and edited by me and reflects our mutual findings and plan of care.  Mirian Ford MD  Department of Radiation Oncology  [facility]

## 2018-02-07 LAB
ABO + RH BLD: NORMAL
ABO + RH BLD: NORMAL
BLD GP AB SCN SERPL QL: NORMAL
BLOOD BANK CMNT PATIENT-IMP: NORMAL
BLOOD BANK CMNT PATIENT-IMP: NORMAL
SPECIMEN EXP DATE BLD: NORMAL

## 2018-02-07 NOTE — PROGRESS NOTES
Marlee Smith,    Your test results are attached.  Your labs are normal and good for surgery.          Oly Altamirano DNP, RN, ANP-C

## 2018-02-08 ENCOUNTER — HOSPITAL ENCOUNTER (OUTPATIENT)
Dept: MRI IMAGING | Facility: CLINIC | Age: 53
Discharge: HOME | End: 2018-02-08
Attending: RADIOLOGY | Admitting: RADIOLOGY
Payer: COMMERCIAL

## 2018-02-08 ENCOUNTER — TELEPHONE (OUTPATIENT)
Dept: SURGERY | Facility: CLINIC | Age: 53
End: 2018-02-08

## 2018-02-08 ENCOUNTER — CARE COORDINATION (OUTPATIENT)
Dept: SURGERY | Facility: CLINIC | Age: 53
End: 2018-02-08

## 2018-02-08 DIAGNOSIS — C20 MALIGNANT NEOPLASM OF RECTUM (H): Primary | ICD-10-CM

## 2018-02-08 DIAGNOSIS — C20 RECTAL CANCER (H): Primary | ICD-10-CM

## 2018-02-08 DIAGNOSIS — C20 MALIGNANT NEOPLASM OF RECTUM (H): ICD-10-CM

## 2018-02-08 PROCEDURE — 25000128 H RX IP 250 OP 636: Performed by: RADIOLOGY

## 2018-02-08 PROCEDURE — 72197 MRI PELVIS W/O & W/DYE: CPT

## 2018-02-08 PROCEDURE — A9585 GADOBUTROL INJECTION: HCPCS | Performed by: RADIOLOGY

## 2018-02-08 RX ORDER — GADOBUTROL 604.72 MG/ML
10 INJECTION INTRAVENOUS ONCE
Status: COMPLETED | OUTPATIENT
Start: 2018-02-08 | End: 2018-02-08

## 2018-02-08 RX ORDER — LORAZEPAM 1 MG/1
TABLET ORAL
Qty: 1 TABLET | Refills: 0 | Status: SHIPPED | OUTPATIENT
Start: 2018-02-08 | End: 2018-02-26

## 2018-02-08 RX ADMIN — GADOBUTROL 10 ML: 604.72 INJECTION INTRAVENOUS at 11:29

## 2018-02-08 NOTE — TELEPHONE ENCOUNTER
I called Sarah and reviewed her abdominal MRI, which continues to show indeterminant liver lesions.  She is scheduled for repeat pelvic MRI today based upon the tumor board discussion that felt that the original imaging was suboptimal.  She gets claustrophobic and we are arranging for 0.5 mg of Ativan for the test.  Will await MRI results before finalizing the treatment plan.

## 2018-02-09 ENCOUNTER — TELEPHONE (OUTPATIENT)
Dept: SURGERY | Facility: CLINIC | Age: 53
End: 2018-02-09

## 2018-02-09 NOTE — TELEPHONE ENCOUNTER
I spoke with Sarah regarding the results of her repeat pelvic MRI scan.  This shows a T4 tumor, with involvement of the posterior levator mechanism.  Based on this, I advised total neoadjuvant chemotherapy and I sent a message to doctors Festus and Logan about this.  I explained that such tumors typically require abdominal perineal resection and less there was very significant regression on follow-up MRI.  I also explained that there would be some possibility of a complete clinical response and the option of pursuing a watch and wait strategy.  I answered all of her questions to her stated satisfaction and she expressed her understanding.  She will put a call into Dr. Mortensen to schedule an appointment.

## 2018-02-09 NOTE — TELEPHONE ENCOUNTER
Per the request of Dr. Hampton, surgery 2/14/18 has been cancelled d/t recent imaging results from MRI.  Patient will return to medical and radiation oncology at this point.

## 2018-02-11 ASSESSMENT — ENCOUNTER SYMPTOMS
ABDOMINAL PAIN: 0
HEARTBURN: 1
JAUNDICE: 0
VOMITING: 0
BLOOD IN STOOL: 1
CONSTIPATION: 0
NAUSEA: 0
BLOATING: 0
DIARRHEA: 0
RECTAL PAIN: 0
BOWEL INCONTINENCE: 0

## 2018-02-15 ENCOUNTER — CARE COORDINATION (OUTPATIENT)
Dept: ONCOLOGY | Facility: CLINIC | Age: 53
End: 2018-02-15

## 2018-02-15 ENCOUNTER — ONCOLOGY VISIT (OUTPATIENT)
Dept: ONCOLOGY | Facility: CLINIC | Age: 53
End: 2018-02-15
Attending: INTERNAL MEDICINE
Payer: COMMERCIAL

## 2018-02-15 VITALS
WEIGHT: 202.6 LBS | BODY MASS INDEX: 32.56 KG/M2 | DIASTOLIC BLOOD PRESSURE: 89 MMHG | HEART RATE: 79 BPM | TEMPERATURE: 98.4 F | HEIGHT: 66 IN | RESPIRATION RATE: 18 BRPM | SYSTOLIC BLOOD PRESSURE: 134 MMHG | OXYGEN SATURATION: 97 %

## 2018-02-15 DIAGNOSIS — C20 MALIGNANT NEOPLASM OF RECTUM (H): Primary | ICD-10-CM

## 2018-02-15 PROCEDURE — 99214 OFFICE O/P EST MOD 30 MIN: CPT | Mod: ZP | Performed by: INTERNAL MEDICINE

## 2018-02-15 PROCEDURE — G0463 HOSPITAL OUTPT CLINIC VISIT: HCPCS | Mod: ZF

## 2018-02-15 RX ORDER — METHYLPREDNISOLONE SODIUM SUCCINATE 125 MG/2ML
125 INJECTION, POWDER, LYOPHILIZED, FOR SOLUTION INTRAMUSCULAR; INTRAVENOUS
Status: CANCELLED
Start: 2018-02-26

## 2018-02-15 RX ORDER — FLUOROURACIL 50 MG/ML
400 INJECTION, SOLUTION INTRAVENOUS ONCE
Status: CANCELLED | OUTPATIENT
Start: 2018-02-26

## 2018-02-15 RX ORDER — MEPERIDINE HYDROCHLORIDE 25 MG/ML
25 INJECTION INTRAMUSCULAR; INTRAVENOUS; SUBCUTANEOUS EVERY 30 MIN PRN
Status: CANCELLED | OUTPATIENT
Start: 2018-02-26

## 2018-02-15 RX ORDER — DIPHENHYDRAMINE HYDROCHLORIDE 50 MG/ML
50 INJECTION INTRAMUSCULAR; INTRAVENOUS
Status: CANCELLED
Start: 2018-02-26

## 2018-02-15 RX ORDER — ALBUTEROL SULFATE 0.83 MG/ML
2.5 SOLUTION RESPIRATORY (INHALATION)
Status: CANCELLED | OUTPATIENT
Start: 2018-02-26

## 2018-02-15 RX ORDER — SODIUM CHLORIDE 9 MG/ML
1000 INJECTION, SOLUTION INTRAVENOUS CONTINUOUS PRN
Status: CANCELLED
Start: 2018-02-26

## 2018-02-15 RX ORDER — ALBUTEROL SULFATE 90 UG/1
1-2 AEROSOL, METERED RESPIRATORY (INHALATION)
Status: CANCELLED
Start: 2018-02-26

## 2018-02-15 RX ORDER — LORAZEPAM 2 MG/ML
0.5 INJECTION INTRAMUSCULAR EVERY 4 HOURS PRN
Status: CANCELLED
Start: 2018-02-26

## 2018-02-15 RX ORDER — EPINEPHRINE 1 MG/ML
0.3 INJECTION, SOLUTION, CONCENTRATE INTRAVENOUS EVERY 5 MIN PRN
Status: CANCELLED | OUTPATIENT
Start: 2018-02-26

## 2018-02-15 RX ORDER — EPINEPHRINE 0.3 MG/.3ML
0.3 INJECTION SUBCUTANEOUS EVERY 5 MIN PRN
Status: CANCELLED | OUTPATIENT
Start: 2018-02-26

## 2018-02-15 ASSESSMENT — PAIN SCALES - GENERAL: PAINLEVEL: NO PAIN (0)

## 2018-02-15 NOTE — LETTER
2/15/2018       RE: Sarah Rajan  [address] [facility] [id number]     Dear Colleague,    Thank you for referring your patient, Sarah Rajan, to the [facility]. Please see a copy of my visit note below.    Oncology outpatient note    Date of service: 2/15/2018     PC: Recently diagnosed rectal cancer. [id number] - MSI Intact    HPI:  Please see previous note for details. I have copied and updated from prior note.  She is a 52-year-old female noticed BRBPR so Screening colonoscopy on 1/9/2018 revealed 3cm rectal mass and other polyps which were removed.  Pathology indicated moderately differentiated adenocarcinoma w/ intact MMR proteins.  CT staging scan showed no metastatic disease; some liver lesions which are thought to be benign per radiology report, T2N1M0 by pelvic MRI read at [facility]. When we initially reviewed her MRI we will not exactly sure whether that was lymph node involvement or not. We opted to repeat MRI which showed T4 N0 lesion. There was up to take her to surgery as there was possibility of personally lesion without lymph node involvement but because of the findings of repeat MRI the surgery was canceled and she is coming back to discuss neoadjuvant treatment.  We also did an MRI of the liver which showed 3 subcentimeter liver lesions which were too small to characterize and will need attention on follow-up.      She feels well with occasional blood tinged stools but denies abdominal pain. No nausea or vomiting. She's been able to eat and drink well. Weight has remained stable. Energy is stable. She is coping well. Denies interval infections.    ROS:  A comprehensive ROS was otherwise neg    I reviewed other hx in EPIC as below    PMH:  Depression  Restless leg syndrome  Dyslipidemia    Current Outpatient Prescriptions   Medication     Acetaminophen (TYLENOL PO)     calcium carbonate (TUMS) 500 MG chewable tablet     AMITRIPTYLINE HCL PO     LORazepam (ATIVAN) 1 MG  "tablet     FLUoxetine (PROZAC) 20 MG capsule     polyethylene glycol (MIRALAX) Packet     magnesium citrate solution     ondansetron (ZOFRAN) 4 MG tablet     neomycin (MYCIFRADIN) 500 MG tablet     No current facility-administered medications for this visit.          FHx  Aunt and maternal grandmother had breast cancer  Pateral grandmother  of colorectal cancer  Mother had ischemic colitis    SHx:  , two teenage children  EtOH: 1 drink daily, occasionally more on weekends  Tobacco: never smoker  She is a microbiologist     Allergies   Allergen Reactions     Inapsine [Droperidol] Other (See Comments)     Elevated blood pressure and increased heart rate       Exam:  /89  Pulse 79  Temp 98.4  F (36.9  C) (Oral)  Resp 18  Ht 1.676 m (5' 5.98\")  Wt 91.9 kg (202 lb 9.6 oz)  SpO2 97%  BMI 32.72 kg/m2  CONSTITUTIONAL: no acute distress  EYES: PERRLA, no palor or icterus.   ENT/MOUTH: no mouth lesions. Oropharynx normal  CVS: s1s2 no m r g .   RESPIRATORY: clear to auscultation b/l  GI: soft non tender no hepatosplenomegaly  NEURO: AAOX3  Grossly non focal neuro exam  INTEGUMENT: no obvious rashes  LYMPHATIC: no palpable cervical, supraclavicular, axillary or inguinal LAD  MUSCULOSKELETAL: Unremarkable. No bony tenderness.   EXTREMITIES: no edema  PSYCH: Mentation, mood and affect are normal. Decision making capacity is intact      Labs.  Reviewed   CEA 1.1    Imaging.  EXAMINATION: MR PELVIS W/O & W CONTRAST, 2018 11:29 AM      COMPARISON: None.     TECHNIQUE:  Images were acquired without and with intravenous contrast  through the pelvis. The following MR images were acquired without  contrast: multiplanar T1, multiplanar T2, axial diffusion-weighted and  axial apparent diffusion coefficient. T1-weighted images with fat  saturation were acquired at the following intervals relative to  intravenous contrast administration: pre-contrast, immediate post  contrast, 1 minute, 3 minutes and delayed.  " Contrast dose: 10 ml  Gadavist injected     HISTORY: New diagnosis of rectal cancer, evaluate tumor extent     FINDINGS:     LOCATION/SIZE:  In the lower rectum there is a partially circumferential and sessile  rectal cancer that is approximately 2.3 cm in length (series 5 image  25 through 16 and subtraction postcontrast series image 13, image 31  through 40).     EXTENT:  The tumor does involve the internal anal sphincters from 3:00 to 9:00  positions. No evidence of involvement of the levator ani musculature.  The tumor is located 4.4 cm cm from the anal verge.      The tumor  extends beyond   the muscularis propria to invade the  perirectal fat at 5:00 to 6 position (series 5 image 19-22). The  maximum extramural spread beyond the muscularis propria is 3 mm.     The tumor is in very close proximity to the levator musculature and  does appear to abut the levator on series 5 image 22.     STAGE: T4     CIRCUMFERENTIAL RESECTION MARGIN (CRM):  In terms of the resection margin, there is threatened margin at the  site of levator abutment (series 5 image 22).     LYMPH NODES:  There are adjacent perirectal lymph node(s), the largest measuring 5  mm.     None of the lymph nodes process irregular borders, heterogeneous  signal intensity or size >10 mm in short axis.     STAGE: N0     VEINS:  There is not evidence of extramural venous extension.     MISCELLANEOUS FINDINGS:  No evidence of diverticulosis. No pelvic free fluid. No dilated bowel  in the field-of-view. Unremarkable appearance to the uterus and  visualized portions of the adnexal structures. Decompressed urinary  bladder no suspicious bone marrow signal in the field-of-view.         IMPRESSION: Low posterior rectal tumor with evidence of extension  posteriorly beyond the muscularis propria. Additionally, this appears  to abut the levator musculature indicating stage T4 disease. Small  lymph nodes without other suspicious characteristics. N0.      MRI  ABDOMEN     CLINICAL HISTORY:  Rectal cancer. Liver protocol.     TECHNIQUE:  Images were acquired with and without intravenous contrast  through the abdomen. The following MR images were acquired: TrueFISP,  multiplanar T2 weighted, axial T1 in/out of phase, axial fat-saturated  T1, diffusion-weighted. Multiplanar T1-weighted images with fat  saturation were before contrast administration and at multiple time  points following the administration of intravenous contrast. Contrast  dose: 10 ml Gadavist injected     FINDINGS:     Comparison study: CT exam 1/11/2018.     Liver: Noncirrhotic configuration. Mild diffuse steatosis. No  arterially enhancing liver lesions.      Subcentimeter hypoenhancing liver lesions with intermediate T2 signal  intensity for example in hepatic segment 7 measuring 7 mm as well as  measuring 9 mm, (series 11 image 46). Lesion in hepatic segment 5/6  measuring 4 mm (series 11, image 29).  The larger lesions show  hyperintense signal on DWI. 3 mm cyst in hepatic segment 6. Hepatic  and portal venous systems are patent. No intrahepatic or extrahepatic  biliary ductal dilatation; the common bile duct measures 4 mm in  diameter. Tiny T2 hyperintense nonenhancing cyst posteriorly in the  right hepatic lobe.     Gallbladder: Normal. No gallstones.     Spleen: Not enlarged. No focal lesions.     Kidneys: No hydronephrosis. No suspicious renal lesions. At least  partial duplication of the right-sided renal collecting system.     Adrenal glands: Unremarkable.     Pancreas: Preserved precontrast increased T1 signal. No focal lesions.  Main pancreatic duct is not dilated. No pancreas divisum.     Bowel: No dilated loops of bowel.     Lymph nodes: Scattered subcentimeter retroperitoneal and mesenteric  lymph nodes measuring up to 6 mm on the left, series 4 image 15, not  enlarged by size criteria.     Blood vessels: Patent abdominal vasculature. No abdominal aortic  aneurysm.     Lung bases: Trace  bilateral pleural effusions. Mild bilateral lower  lobes dependent atelectasis.     Bones and soft tissues: Mild degenerative changes of the spine.  Scattered Schmorl's nodes. Benign-appearing hemangioma at the  vertebral body L5. No suspicious bone lesions.     Mesentery and abdominal wall: Unremarkable.     Ascites: No ascites.         IMPRESSION: In this patient with history of rectal cancer:  1. There are at least 3 subcentimeter liver lesions measuring up to 9  mm, too small to characterize, cannot exclude metastatic disease.  Given small size, consider follow-up ultrasound in 3-6 months to  assess for change in size.  2. Mild diffuse hepatic steatosis.  3. Incidental note is made of at least partial duplication of the  right renal collecting system.    FINAL DIAGNOSIS:    CASE FROM [place], [place] ([id number], OBTAINED 1/9/18):   A. COLON, TRANSVERSE AND DESCENDING, POLYPS, POLYPECTOMY:   - Serrated polyps including sessile serrated adenoma and hyperplastic   polyps, fragmented   - Negative for cytologic dysplasia     B. RECTUM, MASS, BIOPSY:   - Adenocarcinoma, moderately differentiated, invasive   - Mismatch repair proteins are intact by immunohistochemistry     Assessment and Plan  [id number] moderately differentiated adenocarcinoma of the rectum - MSI-stable  We discussed the situation in detail. Because of locally advanced cancer I recommend starting neoadjuvant treatment. We discussed that I would recommend starting with chemotherapy. She would need port placement as I recommend starting FOLFOX. We discussed the rationale schedule and potential side effects of it. She was also given a handout on chemotherapy. She wants to start it on 2/26/2018. They're planning to go to [place] and half months and she will begin to have mass from March 5 detail. We discussed that ideally I would like her not to go because this is the first chemotherapy cycle but she really wants to do that. We discussed that if  she is not feeling well that she should not go if she starts to feel any problems while being that she should go to the nearest emergency room. She understands the situation but is still would like to go on that trip.  My plan is to give her 4 cycles of chemotherapy and then a repeat MRI of the pelvis as well as MRI of the abdomen to look for the indeterminate liver lesion. If she seems to be responding then we will give 4 more cycles of chemotherapy before repeat imaging. If she is not responding then we will start chemotherapy with radiation.    Because of the T4 lesion, she will likely need neoadjuvant chemotherapy and radiation. This will be followed by surgery.    We discussed the importance of eating healthy and exercising regularly.    She will return to clinic before cycle #2 to be evaluated by provider    All of her and her 's questions were answered to their satisfaction and they are agreeable and comfortable with the plan.    Kyra Mortensen

## 2018-02-15 NOTE — PATIENT INSTRUCTIONS
Schedule port placement    Start FOLFOX 2/26. Labs prior  See Cathleen 2 weeks after that with labs and next FOLFOX

## 2018-02-15 NOTE — PROGRESS NOTES
Dx:  Rectal Cancer  Rx:  Fluorouracil and Oxaliplatin    The patient was provided a My Cancer Guidebook, ChemoCare hand-outs on fluorouracil and oxaliplatin and contact phone numbers for the patient's RNCC, Triage and after-hours care.    Possible side effects were reviewed including neutropenia, nausea, vomiting, diarrhea, constip, fatigue, and hair loss.  The patient was encouraged to call for any questions or concerns.  The patient was encouraged to call for T > 100.4, symptoms of feeling feverish, chilled, shaking chills, cough, congestion, shortness of breath,  uncontrolled nausea, vomiting, diarrhea, overwhelming fatigue, or for any further concerns.  Highlighted steps to expect when getting chemotherapy (check in, labs, pre meds, infusion).   Included a one page resource of symptoms and when to contact the Cancer Clinic with questions.   The patient was accompanied by her , Cody.  Discussed port placement, care and reviewed the port book.

## 2018-02-15 NOTE — PROGRESS NOTES
Oncology outpatient note    Date of service: 2/15/2018     PC: Recently diagnosed rectal cancer. [id number] - MSI Intact    HPI:  Please see previous note for details. I have copied and updated from prior note.  She is a 52-year-old female noticed BRBPR so Screening colonoscopy on 1/9/2018 revealed 3cm rectal mass and other polyps which were removed.  Pathology indicated moderately differentiated adenocarcinoma w/ intact MMR proteins.  CT staging scan showed no metastatic disease; some liver lesions which are thought to be benign per radiology report, T2N1M0 by pelvic MRI read at [facility]. When we initially reviewed her MRI we will not exactly sure whether that was lymph node involvement or not. We opted to repeat MRI which showed T4 N0 lesion. There was up to take her to surgery as there was possibility of personally lesion without lymph node involvement but because of the findings of repeat MRI the surgery was canceled and she is coming back to discuss neoadjuvant treatment.  We also did an MRI of the liver which showed 3 subcentimeter liver lesions which were too small to characterize and will need attention on follow-up.      She feels well with occasional blood tinged stools but denies abdominal pain. No nausea or vomiting. She's been able to eat and drink well. Weight has remained stable. Energy is stable. She is coping well. Denies interval infections.    ROS:  A comprehensive ROS was otherwise neg    I reviewed other hx in EPIC as below    PMH:  Depression  Restless leg syndrome  Dyslipidemia    Current Outpatient Prescriptions   Medication     Acetaminophen (TYLENOL PO)     calcium carbonate (TUMS) 500 MG chewable tablet     AMITRIPTYLINE HCL PO     LORazepam (ATIVAN) 1 MG tablet     FLUoxetine (PROZAC) 20 MG capsule     polyethylene glycol (MIRALAX) Packet     magnesium citrate solution     ondansetron (ZOFRAN) 4 MG tablet     neomycin (MYCIFRADIN) 500 MG tablet     No current facility-administered medications  "for this visit.          FHx  Aunt and maternal grandmother had breast cancer  Pateral grandmother  of colorectal cancer  Mother had ischemic colitis    SHx:  , two teenage children  EtOH: 1 drink daily, occasionally more on weekends  Tobacco: never smoker  She is a microbiologist     Allergies   Allergen Reactions     Inapsine [Droperidol] Other (See Comments)     Elevated blood pressure and increased heart rate       Exam:  /89  Pulse 79  Temp 98.4  F (36.9  C) (Oral)  Resp 18  Ht 1.676 m (5' 5.98\")  Wt 91.9 kg (202 lb 9.6 oz)  SpO2 97%  BMI 32.72 kg/m2  CONSTITUTIONAL: no acute distress  EYES: PERRLA, no palor or icterus.   ENT/MOUTH: no mouth lesions. Oropharynx normal  CVS: s1s2 no m r g .   RESPIRATORY: clear to auscultation b/l  GI: soft non tender no hepatosplenomegaly  NEURO: AAOX3  Grossly non focal neuro exam  INTEGUMENT: no obvious rashes  LYMPHATIC: no palpable cervical, supraclavicular, axillary or inguinal LAD  MUSCULOSKELETAL: Unremarkable. No bony tenderness.   EXTREMITIES: no edema  PSYCH: Mentation, mood and affect are normal. Decision making capacity is intact      Labs.  Reviewed   CEA 1.1    Imaging.  EXAMINATION: MR PELVIS W/O & W CONTRAST, 2018 11:29 AM      COMPARISON: None.     TECHNIQUE:  Images were acquired without and with intravenous contrast  through the pelvis. The following MR images were acquired without  contrast: multiplanar T1, multiplanar T2, axial diffusion-weighted and  axial apparent diffusion coefficient. T1-weighted images with fat  saturation were acquired at the following intervals relative to  intravenous contrast administration: pre-contrast, immediate post  contrast, 1 minute, 3 minutes and delayed.  Contrast dose: 10 ml  Gadavist injected     HISTORY: New diagnosis of rectal cancer, evaluate tumor extent     FINDINGS:     LOCATION/SIZE:  In the lower rectum there is a partially circumferential and sessile  rectal cancer that is " approximately 2.3 cm in length (series 5 image  25 through 16 and subtraction postcontrast series image 13, image 31  through 40).     EXTENT:  The tumor does involve the internal anal sphincters from 3:00 to 9:00  positions. No evidence of involvement of the levator ani musculature.  The tumor is located 4.4 cm cm from the anal verge.      The tumor  extends beyond   the muscularis propria to invade the  perirectal fat at 5:00 to 6 position (series 5 image 19-22). The  maximum extramural spread beyond the muscularis propria is 3 mm.     The tumor is in very close proximity to the levator musculature and  does appear to abut the levator on series 5 image 22.     STAGE: T4     CIRCUMFERENTIAL RESECTION MARGIN (CRM):  In terms of the resection margin, there is threatened margin at the  site of levator abutment (series 5 image 22).     LYMPH NODES:  There are adjacent perirectal lymph node(s), the largest measuring 5  mm.     None of the lymph nodes process irregular borders, heterogeneous  signal intensity or size >10 mm in short axis.     STAGE: N0     VEINS:  There is not evidence of extramural venous extension.     MISCELLANEOUS FINDINGS:  No evidence of diverticulosis. No pelvic free fluid. No dilated bowel  in the field-of-view. Unremarkable appearance to the uterus and  visualized portions of the adnexal structures. Decompressed urinary  bladder no suspicious bone marrow signal in the field-of-view.         IMPRESSION: Low posterior rectal tumor with evidence of extension  posteriorly beyond the muscularis propria. Additionally, this appears  to abut the levator musculature indicating stage T4 disease. Small  lymph nodes without other suspicious characteristics. N0.      MRI ABDOMEN     CLINICAL HISTORY:  Rectal cancer. Liver protocol.     TECHNIQUE:  Images were acquired with and without intravenous contrast  through the abdomen. The following MR images were acquired: TrueFISP,  multiplanar T2 weighted, axial  T1 in/out of phase, axial fat-saturated  T1, diffusion-weighted. Multiplanar T1-weighted images with fat  saturation were before contrast administration and at multiple time  points following the administration of intravenous contrast. Contrast  dose: 10 ml Gadavist injected     FINDINGS:     Comparison study: CT exam 1/11/2018.     Liver: Noncirrhotic configuration. Mild diffuse steatosis. No  arterially enhancing liver lesions.      Subcentimeter hypoenhancing liver lesions with intermediate T2 signal  intensity for example in hepatic segment 7 measuring 7 mm as well as  measuring 9 mm, (series 11 image 46). Lesion in hepatic segment 5/6  measuring 4 mm (series 11, image 29).  The larger lesions show  hyperintense signal on DWI. 3 mm cyst in hepatic segment 6. Hepatic  and portal venous systems are patent. No intrahepatic or extrahepatic  biliary ductal dilatation; the common bile duct measures 4 mm in  diameter. Tiny T2 hyperintense nonenhancing cyst posteriorly in the  right hepatic lobe.     Gallbladder: Normal. No gallstones.     Spleen: Not enlarged. No focal lesions.     Kidneys: No hydronephrosis. No suspicious renal lesions. At least  partial duplication of the right-sided renal collecting system.     Adrenal glands: Unremarkable.     Pancreas: Preserved precontrast increased T1 signal. No focal lesions.  Main pancreatic duct is not dilated. No pancreas divisum.     Bowel: No dilated loops of bowel.     Lymph nodes: Scattered subcentimeter retroperitoneal and mesenteric  lymph nodes measuring up to 6 mm on the left, series 4 image 15, not  enlarged by size criteria.     Blood vessels: Patent abdominal vasculature. No abdominal aortic  aneurysm.     Lung bases: Trace bilateral pleural effusions. Mild bilateral lower  lobes dependent atelectasis.     Bones and soft tissues: Mild degenerative changes of the spine.  Scattered Schmorl's nodes. Benign-appearing hemangioma at the  vertebral body L5. No suspicious  bone lesions.     Mesentery and abdominal wall: Unremarkable.     Ascites: No ascites.         IMPRESSION: In this patient with history of rectal cancer:  1. There are at least 3 subcentimeter liver lesions measuring up to 9  mm, too small to characterize, cannot exclude metastatic disease.  Given small size, consider follow-up ultrasound in 3-6 months to  assess for change in size.  2. Mild diffuse hepatic steatosis.  3. Incidental note is made of at least partial duplication of the  right renal collecting system.    FINAL DIAGNOSIS:    CASE FROM [place], [place] ([id number], OBTAINED 1/9/18):   A. COLON, TRANSVERSE AND DESCENDING, POLYPS, POLYPECTOMY:   - Serrated polyps including sessile serrated adenoma and hyperplastic   polyps, fragmented   - Negative for cytologic dysplasia     B. RECTUM, MASS, BIOPSY:   - Adenocarcinoma, moderately differentiated, invasive   - Mismatch repair proteins are intact by immunohistochemistry     Assessment and Plan  [id number] moderately differentiated adenocarcinoma of the rectum - MSI-stable  We discussed the situation in detail. Because of locally advanced cancer I recommend starting neoadjuvant treatment. We discussed that I would recommend starting with chemotherapy. She would need port placement as I recommend starting FOLFOX. We discussed the rationale schedule and potential side effects of it. She was also given a handout on chemotherapy. She wants to start it on 2/26/2018. They're planning to go to [place] and half months and she will begin to have mass from March 5 detail. We discussed that ideally I would like her not to go because this is the first chemotherapy cycle but she really wants to do that. We discussed that if she is not feeling well that she should not go if she starts to feel any problems while being that she should go to the nearest emergency room. She understands the situation but is still would like to go on that trip.  My plan is to give her 4  cycles of chemotherapy and then a repeat MRI of the pelvis as well as MRI of the abdomen to look for the indeterminate liver lesion. If she seems to be responding then we will give 4 more cycles of chemotherapy before repeat imaging. If she is not responding then we will start chemotherapy with radiation.    Because of the T4 lesion, she will likely need neoadjuvant chemotherapy and radiation. This will be followed by surgery.    We discussed the importance of eating healthy and exercising regularly.    She will return to clinic before cycle #2 to be evaluated by provider    All of her and her 's questions were answered to their satisfaction and they are agreeable and comfortable with the plan.    Kyra Mortensen    Answers for HPI/ROS submitted by the patient on 2/11/2018   General Symptoms: No  Skin Symptoms: No  HENT Symptoms: No  EYE SYMPTOMS: No  HEART SYMPTOMS: No  LUNG SYMPTOMS: No  INTESTINAL SYMPTOMS: Yes  URINARY SYMPTOMS: No  GYNECOLOGIC SYMPTOMS: No  BREAST SYMPTOMS: No  SKELETAL SYMPTOMS: No  BLOOD SYMPTOMS: No  NERVOUS SYSTEM SYMPTOMS: No  MENTAL HEALTH SYMPTOMS: No  Heart burn or indigestion: Yes  Nausea: No  Vomiting: No  Abdominal pain: No  Bloating: No  Constipation: No  Diarrhea: No  Blood in stool: Yes  Black stools: No  Rectal or Anal pain: No  Fecal incontinence: No  Yellowing of skin or eyes: No  Vomit with blood: No  Change in stools: No

## 2018-02-15 NOTE — NURSING NOTE
"Oncology Rooming Note    February 15, 2018 1:39 PM   Sarah Rajan is a 52 year old female who presents for:    Chief Complaint   Patient presents with     Oncology Clinic Visit     Rectal Ca F/U.     Initial Vitals: /89  Pulse 79  Temp 98.4  F (36.9  C) (Oral)  Resp 18  Ht 1.676 m (5' 5.98\")  Wt 91.9 kg (202 lb 9.6 oz)  SpO2 97%  BMI 32.72 kg/m2 Estimated body mass index is 32.72 kg/(m^2) as calculated from the following:    Height as of this encounter: 1.676 m (5' 5.98\").    Weight as of this encounter: 91.9 kg (202 lb 9.6 oz). Body surface area is 2.07 meters squared.  No Pain (0) Comment: Data Unavailable   No LMP recorded. Patient is postmenopausal.  Allergies reviewed: Yes  Medications reviewed: Yes    Medications: Medication refills not needed today.  Pharmacy name entered into [facility] [address] [place] [address] BIALEY ALVAREZ AT [facility]    Clinical concerns: None Dr Mortensen was NOT notified.    7 minutes for nursing intake (face to face time)     Candie Garza LPN              "

## 2018-02-15 NOTE — MR AVS SNAPSHOT
After Visit Summary   2/15/2018    Sarah Rajan    MRN: [id number]           Patient Information     Date Of Birth          1965        Visit Information        Provider Department      2/15/2018 1:30 PM Kyra Mortensen MD [facility]        Today's Diagnoses     Malignant neoplasm of rectum (H)    -  1      Care Instructions    Schedule port placement    Start FOLFOX 2/26. Labs prior  See Cathleen 2 weeks after that with labs and next FOLFOX          Follow-ups after your visit        Your next 10 appointments already scheduled     Feb 19, 2018  9:15 AM CST   (Arrive by 7:45 AM)   IR CHEST PORT PLACEMENT > 5 YRS OF AGE with [id number]   [facility] ([facility])    [address] [facility]  5th Floor  [facility] [address]              1. Your doctor will need to do a history and physical within 7 days before this procedure. 2. Your doctor will which medications should not be taken the morning of the exam. 3. Laboratory tests are to be obtained by your doctor prior to the exam (Basic Metabolic Panel, CBCP, PTT and INR) (No labs needed if you are having a tunneled catheter exchange or removal) 4. If you have allergies to x-ray contrast or iodine, contact your doctor or a Radiology nurse prior to the exam day for instructions. 5. Someone will need to drive you to and from the hospital. 6. If you are or may be pregnant, contact your doctor or a Radiology nurse prior to the day of the exam. 7. If you have diabetes, check with your doctor or a Radiology nurse to see if your insulin needs to be adjusted for the exam. 8. If you are taking a medication called Glucophage or Glucovance; these medications need to be held the day of the exam and for approximately 48 hours following. A blood sample must be drawn so your creatinine level can be checked before resuming this medication. 9. If you are taking Coumadin (to thin you blood) please contact your doctor or  a Radiology nurse at least 3 days before the exam for special instructions. 10. You should not have received contrast within 48 hours of this exam. 11. The day before your exam you may eat your regular diet and are encouraged to drink at least 2 quarts of clear liquids. Drink no alcoholic beverages for 24 hours prior to the exam. 12. If you have a colostomy you will need to irrigate it with tap water at 8PM the evening before and again at 6AM the morning of the exam. 13. Do not smoke for 24 hours prior to the procedure. 14. Birth to 4 years: - Breast feeding must be stopped 4 hours prior to exam - Solid food or formula must be stopped 6 hours prior to exam - Tube feedings must be stopped 6 hours prior to exam 15. 4-10 years old: - Nothing to eat or drink 6 hours prior to exam 16. 10+ years old: - Nothing to eat or drink 8 hours prior to exam 17. The morning of the exam you may brush your teeth and take medications as directed with a sip of water. 18. When discharged, you cannot drive until morning, and an adult must be with you until then. You should stay in the [facility] overnight. 19. Bring a list of all drugs you are taking; include supplements and over-the-counter medications. Wear comfortable clothes and leave your valuables at home.            Feb 19, 2018   Procedure with Gaurav Yates PA-C   [facility] ([facility])    [address] [place] [address]  [address]th [place] [id number]   [phone]           Located in the Clinics and Surgery Center at [address] [place] [address].   parking is very convenient and highly recommended.  is a $6 flat rate fee.  Both  and self parkers should enter the main arrival plaza from [facility]; parking attendants will direct you based on your parking preference.            Feb 26, 2018  7:30 AM [facility]   [facility]ic Lab Draw with  MASONIC LAB DRAW   [facility] ([facility]  [facility])    [address] [place] [address]  [address]  [facility] [address]   [phone]            Feb 26, 2018  8:00 AM CST   Infusion 240 with UC ONCOLOGY INFUSION, UC 22 ATC   [facility] ([facility])    [address] [facility]  [address]  [facility] [address]   [phone]            Mar 12, 2018 10:45 AM CDT   Masonic Lab Draw with  MASONIC LAB DRAW   [facility] ([facility])    [address] [place] [address]  [address]  [facility] [address]   [phone]            Mar 12, 2018 11:20 AM CDT   (Arrive by 11:05 AM)   Return Visit with REX Almaguer   [facility] ([facility])    [address] [place] [address]  [address]  [facility] [address]   [phone]            Mar 12, 2018 12:00 PM CDT   Infusion 240 with UC ONCOLOGY INFUSION, UC 24 ATC   [facility] ([facility])    [address] [place] [address]  [address]  [facility] [address]   [phone]              Future tests that were ordered for you today     Open Future Orders        Priority Expected Expires Ordered    IR Chest Port Placement > 5 Yrs of Age Routine  2/15/2019 2/15/2018            Who to contact     If you have questions or need follow up information about today's clinic visit or your schedule please contact [facility] directly at [phone].  Normal or non-critical lab and imaging results will be communicated to you by MyChart, letter or phone within 4 business days after the clinic has received the results. If you do not hear from us within 7 days, please contact the clinic through [employer] or phone. If you have a critical or abnormal lab result, we will notify you by phone as soon as possible.  Submit refill requests through [employer] or call your pharmacy and they will forward the refill request to us. Please allow 3 business days for your refill  "to be completed.          Additional Information About Your Visit        MyChart Information     [email or web address] gives you secure access to your electronic health record. If you see a primary care provider, you can also send messages to your care team and make appointments. If you have questions, please call your primary care clinic.  If you do not have a primary care provider, please call [phone] and they will assist you.        Care EveryWhere ID     This is your Care EveryWhere ID. This could be used by other organizations to access your [place] medical records  [id number]        Your Vitals Were     Pulse Temperature Respirations Height Pulse Oximetry BMI (Body Mass Index)    79 98.4  F (36.9  C) (Oral) 18 1.676 m (5' 5.98\") 97% 32.72 kg/m2       Blood Pressure from Last 3 Encounters:   02/15/18 134/89   02/06/18 136/88   02/06/18 122/86    Weight from Last 3 Encounters:   02/15/18 91.9 kg (202 lb 9.6 oz)   02/06/18 91.2 kg (201 lb)   02/06/18 92.5 kg (204 lb)               Primary Care Provider Office Phone # Fax #    Wayne Manriquezford, DPTRACIE [phone]       [facility] [address] [facility] [address]        Equal Access to Services     DEAN CHAKRABORTY AH: Hadii aad ku hadasho Soomaali, waaxda luqadaha, qaybta kaalmada adeegyada, waxraven idiin hayaan shania jacob. So [facility] [phone].    ATENCIÓN: Si habla español, tiene a membreno disposición servicios gratuitos de asistencia lingüística. Llame al [phone].    We comply with applicable federal civil rights laws and [place] laws. We do not discriminate on the basis of race, color, national origin, age, disability, sex, sexual orientation, or gender identity.            Thank you!     Thank you for choosing [facility]  for your care. Our goal is always to provide you with excellent care. Hearing back from our patients is one way we can continue to improve our services. Please take a few minutes to " "complete the written survey that you may receive in the mail after your visit with us. Thank you!             Your Updated Medication List - Protect others around you: Learn how to safely use, store and throw away your medicines at www.disposemymeds.org.          This list is accurate as of 2/15/18  3:19 PM.  Always use your most recent med list.                   Brand Name Dispense Instructions for use Diagnosis    AMITRIPTYLINE HCL PO      Take 20 mg by mouth At Bedtime        calcium carbonate 500 MG chewable tablet    TUMS    150 tablet    Take 1-2 chew tab by mouth daily        FLUoxetine 20 MG capsule    PROzac     1 tablet in the morning        LORazepam 1 MG tablet    ATIVAN    1 tablet    Take I tablet after support staff at clinic okay's you to take the medication the day of your procedure. Please have a  accompany you to the clinic on the day of the procedure.    Rectal cancer (H)       magnesium citrate solution     296 mL    Take 296 mLs by mouth See Admin Instructions Refer to \"Getting Ready for a Colonoscopy\" patient handout.    Rectal cancer (H)       neomycin 500 MG tablet    MYCIFRADIN    6 tablet    Take two tablets by mouth every 8 hours for one day prior to surgery. Take in conjunction with Flagyl    Rectal cancer (H)       ondansetron 4 MG tablet    ZOFRAN    3 tablet    Take one tablet by mouth every 6 hours as needed for nausea when taking neomycin and flagyl    Rectal cancer (H)       polyethylene glycol Packet    MIRALAX    238 g    Take 238 g by mouth See Admin Instructions One 8.3-ounce bottle (238 g). Refer to surgical packet for medication administration    Rectal cancer (H)       TYLENOL PO      Take 1,000 mg by mouth At Bedtime          "

## 2018-02-16 ENCOUNTER — ANESTHESIA EVENT (OUTPATIENT)
Dept: SURGERY | Facility: AMBULATORY SURGERY CENTER | Age: 53
End: 2018-02-16

## 2018-02-19 ENCOUNTER — HOSPITAL ENCOUNTER (OUTPATIENT)
Facility: AMBULATORY SURGERY CENTER | Age: 53
End: 2018-02-19
Attending: PHYSICIAN ASSISTANT
Payer: COMMERCIAL

## 2018-02-19 ENCOUNTER — SURGERY (OUTPATIENT)
Age: 53
End: 2018-02-19

## 2018-02-19 ENCOUNTER — RADIANT APPOINTMENT (OUTPATIENT)
Dept: RADIOLOGY | Facility: AMBULATORY SURGERY CENTER | Age: 53
End: 2018-02-19
Attending: INTERNAL MEDICINE
Payer: COMMERCIAL

## 2018-02-19 ENCOUNTER — ANESTHESIA (OUTPATIENT)
Dept: SURGERY | Facility: AMBULATORY SURGERY CENTER | Age: 53
End: 2018-02-19

## 2018-02-19 VITALS
HEIGHT: 66 IN | SYSTOLIC BLOOD PRESSURE: 130 MMHG | DIASTOLIC BLOOD PRESSURE: 72 MMHG | BODY MASS INDEX: 32.14 KG/M2 | RESPIRATION RATE: 18 BRPM | WEIGHT: 200 LBS | OXYGEN SATURATION: 100 % | HEART RATE: 57 BPM | TEMPERATURE: 97.7 F

## 2018-02-19 DIAGNOSIS — C20 MALIGNANT NEOPLASM OF RECTUM (H): ICD-10-CM

## 2018-02-19 LAB — INR PPP: 0.93 (ref 0.86–1.14)

## 2018-02-19 DEVICE — CATH PORT POWERPORT CLEARVUE ISP 8FR 5608062
Type: IMPLANTABLE DEVICE | Site: CHEST  WALL | Status: NON-FUNCTIONAL
Removed: 2022-10-26

## 2018-02-19 RX ORDER — ONDANSETRON 2 MG/ML
4 INJECTION INTRAMUSCULAR; INTRAVENOUS EVERY 30 MIN PRN
Status: DISCONTINUED | OUTPATIENT
Start: 2018-02-19 | End: 2018-02-20 | Stop reason: HOSPADM

## 2018-02-19 RX ORDER — SODIUM CHLORIDE, SODIUM LACTATE, POTASSIUM CHLORIDE, CALCIUM CHLORIDE 600; 310; 30; 20 MG/100ML; MG/100ML; MG/100ML; MG/100ML
INJECTION, SOLUTION INTRAVENOUS CONTINUOUS
Status: DISCONTINUED | OUTPATIENT
Start: 2018-02-19 | End: 2018-02-20 | Stop reason: HOSPADM

## 2018-02-19 RX ORDER — PROPOFOL 10 MG/ML
INJECTION, EMULSION INTRAVENOUS PRN
Status: DISCONTINUED | OUTPATIENT
Start: 2018-02-19 | End: 2018-02-19

## 2018-02-19 RX ORDER — FENTANYL CITRATE 50 UG/ML
25-50 INJECTION, SOLUTION INTRAMUSCULAR; INTRAVENOUS EVERY 5 MIN PRN
Status: DISCONTINUED | OUTPATIENT
Start: 2018-02-19 | End: 2018-02-20 | Stop reason: HOSPADM

## 2018-02-19 RX ORDER — HEPARIN SODIUM (PORCINE) LOCK FLUSH IV SOLN 100 UNIT/ML 100 UNIT/ML
5 SOLUTION INTRAVENOUS
Status: DISCONTINUED | OUTPATIENT
Start: 2018-02-19 | End: 2018-02-20 | Stop reason: HOSPADM

## 2018-02-19 RX ORDER — LIDOCAINE 40 MG/G
CREAM TOPICAL
Status: DISCONTINUED | OUTPATIENT
Start: 2018-02-19 | End: 2018-02-20 | Stop reason: HOSPADM

## 2018-02-19 RX ORDER — LIDOCAINE HYDROCHLORIDE 20 MG/ML
INJECTION, SOLUTION INFILTRATION; PERINEURAL PRN
Status: DISCONTINUED | OUTPATIENT
Start: 2018-02-19 | End: 2018-02-19

## 2018-02-19 RX ORDER — NALOXONE HYDROCHLORIDE 0.4 MG/ML
.1-.4 INJECTION, SOLUTION INTRAMUSCULAR; INTRAVENOUS; SUBCUTANEOUS
Status: DISCONTINUED | OUTPATIENT
Start: 2018-02-19 | End: 2018-02-20 | Stop reason: HOSPADM

## 2018-02-19 RX ORDER — PROPOFOL 10 MG/ML
INJECTION, EMULSION INTRAVENOUS CONTINUOUS PRN
Status: DISCONTINUED | OUTPATIENT
Start: 2018-02-19 | End: 2018-02-19

## 2018-02-19 RX ORDER — ONDANSETRON 4 MG/1
4 TABLET, ORALLY DISINTEGRATING ORAL EVERY 30 MIN PRN
Status: DISCONTINUED | OUTPATIENT
Start: 2018-02-19 | End: 2018-02-20 | Stop reason: HOSPADM

## 2018-02-19 RX ORDER — SODIUM CHLORIDE 9 MG/ML
INJECTION, SOLUTION INTRAVENOUS CONTINUOUS
Status: DISCONTINUED | OUTPATIENT
Start: 2018-02-19 | End: 2018-02-20 | Stop reason: HOSPADM

## 2018-02-19 RX ORDER — HEPARIN SODIUM,PORCINE 10 UNIT/ML
5 VIAL (ML) INTRAVENOUS EVERY 24 HOURS
Status: DISCONTINUED | OUTPATIENT
Start: 2018-02-19 | End: 2018-02-20 | Stop reason: HOSPADM

## 2018-02-19 RX ADMIN — LIDOCAINE HYDROCHLORIDE 60 MG: 20 INJECTION, SOLUTION INFILTRATION; PERINEURAL at 09:01

## 2018-02-19 RX ADMIN — PROPOFOL 50 MG: 10 INJECTION, EMULSION INTRAVENOUS at 09:02

## 2018-02-19 RX ADMIN — Medication 10 ML: at 09:29

## 2018-02-19 RX ADMIN — SODIUM CHLORIDE, SODIUM LACTATE, POTASSIUM CHLORIDE, CALCIUM CHLORIDE: 600; 310; 30; 20 INJECTION, SOLUTION INTRAVENOUS at 08:57

## 2018-02-19 RX ADMIN — PROPOFOL 30 MG: 10 INJECTION, EMULSION INTRAVENOUS at 09:19

## 2018-02-19 RX ADMIN — PROPOFOL 100 MCG/KG/MIN: 10 INJECTION, EMULSION INTRAVENOUS at 09:02

## 2018-02-19 RX ADMIN — PROPOFOL 50 MG: 10 INJECTION, EMULSION INTRAVENOUS at 09:14

## 2018-02-19 NOTE — PROCEDURES
Interventional Radiology Brief Post Procedure Note    Procedure: IR Chest Port Placement    Proceduralist: Gaurav Yates PA-C    Assistant: None    Time Out: Prior to the start of the procedure and with procedural staff participation, I verbally confirmed the patient s identity using two indicators, relevant allergies, that the procedure was appropriate and matched the consent or emergent situation, and that the correct equipment/implants were available. Immediately prior to starting the procedure I conducted the Time Out with the procedural staff and re-confirmed the patient s name, procedure, and site/side. (The Joint Commission universal protocol was followed.)  Yes        Sedation: Monitored Anesthesia Care (MAC) administered and documented by Anesthesia Care Provider    Findings: Completed image-guided placement of 8 Malay 23 cm single lumen power-injectable central venous port via right IJ. Aspirates and flushes freely, heparin locked and is ready for immediate use.    Estimated Blood Loss: Less than 10 ml    Fluoroscopy Time:  0.3 minute(s)    SPECIMENS: None    Complications: 1. None     Condition: Stable    Plan: Follow-up per primary team.     Comments: See dictated procedure note for full details.    Gaurav Yates PA-C

## 2018-02-19 NOTE — ANESTHESIA POSTPROCEDURE EVALUATION
Patient: Sarah Satinder    Procedure(s):  central venous Chest Port Placement - Wound Class: I-Clean    Diagnosis:Malignant Neoplasm of Rectum  Diagnosis Additional Information: No value filed.    Anesthesia Type:  MAC    Note:  Anesthesia Post Evaluation    Patient location during evaluation: Phase 2  Patient participation: Able to fully participate in evaluation  Level of consciousness: awake  Pain management: adequate  Airway patency: patent  Cardiovascular status: acceptable  Respiratory status: acceptable  Hydration status: acceptable  PONV: none     Anesthetic complications: None          Last vitals:  Vitals:    02/19/18 0751 02/19/18 0946 02/19/18 1010   BP: (!) 143/99 (!) 133/91 130/72   Pulse: 73 67 57   Resp: 16 16 18   Temp: 36.6  C (97.9  F) 36.5  C (97.7  F)    SpO2: 96% 97% 100%         Electronically Signed By: Paco De La Rosa MD  February 19, 2018  10:21 AM

## 2018-02-19 NOTE — DISCHARGE INSTRUCTIONS
[facility]  Home Care Following Anesthesia  For 24 hours after surgery:  1. Get plenty of rest.  A responsible adult must stay with you for at least 24 hours after you leave the surgery center.  2. Do not drive or use heavy equipment.  If you have weakness or tingling, don't drive or use heavy equipment until this feeling goes away.   3. Do not drink alcohol.   4. Avoid strenuous or risky activities.  Ask for help when climbing stairs.  5. You may feel lightheaded.  IF so, sit for a few minutes before standing.  Have someone help you get up.   6. If you have nausea (feel sick to your stomach): Drink only clear liquids such as apple juice, ginger ale, broth or 7-Up.  Rest may also help.  Be sure to drink enough fluids.  Move to a regular diet as you feel able.   7. You may have a slight fever.  Call the doctor if your fever is over 100 F (37.7 C) (taken under the tongue) or lasts longer than 24 hours.  8. You may have a dry mouth, a sore throat, muscle aches or trouble sleeping. These should go away after 24 hours.  9. Do not make important or legal decisions.         Tips for taking pain medications  To get the best pain relief possible, remember these points:    Take pain medications as directed, before pain becomes severe.    Pain medication can upset your stomach: taking it with food may help.    Constipation is a common side effect of pain medication. Drink plenty of  fluids.    Eat foods high in fiber. Take a stool softener if recommended by your doctor or pharmacist.    Do not drink alcohol, drive or operate machinery while taking pain medications.    Ask about other ways to control pain, such as with heat, ice or relaxation.    Tylenol/Acetaminophen Consumption  To help encourage the safe use of acetaminophen, the makers of TYLENOL  have lowered the maximum daily dose for single-ingredient Extra Strength TYLENOL  (acetaminophen) products sold in the [place] from 8 pills per  day (4,000 mg) to 6 pills per day (3,000 mg). The dosing interval has also changed from 2 pills every 4-6 hours to 2 pills every 6 hours.    If you feel your pain relief is insufficient, you may take Tylenol/Acetaminophen in addition to your narcotic pain medication.     Be careful not to exceed 3,000 mg of Tylenol/Acetaminophen in a 24 hour period from all sources.    If you are taking extra strength Tylenol/acetaminophen (500 mg), the maximum dose is 6 tablets in 24 hours.    If you are taking regular strength acetaminophen (325 mg), the maximum dose is 9 tablets in 24 hours.    Call a doctor for any of the followin. Signs of infection (fever, growing tenderness at the surgery site, a large amount of drainage or bleeding, severe pain, foul-smelling drainage, redness, swelling).  2. It has been over 8 to 10 hours since surgery and you are still not able to urinate (pass water).  3. Headache for over 24 hours.  4. Numbness, tingling or weakness the day after surgery (if you had spinal anesthesia).  Your doctor is:   Gaurav Yates PA-C    Contact Number:  [phone]  ([place] control desk)  - Monday - Friday 8:00 am - 4:30 pm      After hours for urgent concerns:  [phone]  - After 4:30 pm Monday - Friday, Weekends and Holidays.   - Ask for Interventional Radiology on-call.  Someone is available 24 hours a day.  - [facility] toll free number:  [phone]    For emergency care, call the:  [facility]:  [phone] (TTY for hearing impaired: [phone])        A collaboration between [facility] and [facility]  Experts in minimally invasive, targeted treatments performed using imaging guidance    Venous Access Device,  Port Catheter or Tunneled Central Line Placement    Today you had a procedure today to install a venous access device; either a tunneled central vein catheter or a subcutaneous port catheter.    One of our Radiology PAs performed  this procedure for you today:  ? Gama Zapien PA-C  ? CHARISSA Cook PA-C  ? Giovanny Vallejo PA-C  ? Ericka James PA-C   ? Gaurav Yates PA-C    After you go home:  - Drink plenty of fluids.  Generally 6-8 (8 ounce) glasses a day is recommended.  - Resume your regular diet unless otherwise ordered by a medical provider.  - Keep any applied tape/gauze dressings clean and dry.  Change tape/gauze dressings if they get wet or soiled.  - You may shower the following day after procedure, however cover and protect from moisture any tape/gauze dressings.  You may let water hit and run over dried skin glue, but do not scrub.  Pat the area dry after showering.  - Port placement incisions are closed with absorbable suture, meaning they do not need to be removed at a later date, and a topical skin adhesive (skin glue).  This glue will wear off in 7-14 days.  Do not remove before this time.  If 14 days have passed and residual glue is present, you may gently remove it.  - Do not apply gels, lotions, or ointments to the glue site for the first 10 days as this may cause the glue to prematurely soften and fail.  - Do not perform strenuous activities or lift greater than 10 pounds for the next three days.  - If there is bleeding or oozing from the procedure site, apply firm pressure to the area for 5-10 minutes.  If the bleeding continues seek medical advice at the numbers below.  - Mild procedure site discomfort can be treated with an ice pack and over-the-counter pain relievers.        For 24 hours after any sedation used:  - Relax and take it easy.  No strenuous activities.  - Do not drive or operate machines at home or at work.  - No alcohol consumption.  - Do not make any important or legal decisions.    Call our Interventional Radiology (IR) service if:  - If you start bleeding from the procedure site.  If you do start to bleed from the site, lie down and hold some pressure on the site.  Our radiology provider can  help you decide if you need to return to the hospital.  - If you have new or worsening pain related to the procedure.  - If you have concerning swelling at the procedure site.  - If you develop persistent nausea or vomiting.  - If you develop hives or a rash or any unexplained itching.  - If you have a fever of greater than 100.5  F and chills in the first 5 days after procedure.  - Any other concerns related to your procedure.      [facility]  Interventional Radiology (IR)  [address] [place] [address]    Gaurav Yates PA-C    Contact Number:  [phone]  (IR control desk)  - Monday - Friday 8:00 am - 4:30 pm    After hours for urgent concerns:  [phone]  - After 4:30 pm Monday - Friday, Weekends and Holidays.   - Ask for Interventional Radiology on-call.  Someone is available 24 hours a day.  - [facility] toll free number:  [phone]

## 2018-02-19 NOTE — IP AVS SNAPSHOT
[facility]    [address] [place] [address]    Phone:  [phone]    Fax:  [phone]                                       After Visit Summary   2/19/2018    Sarah Rajan    MRN: [id number]           After Visit Summary Signature Page     I have received my discharge instructions, and my questions have been answered. I have discussed any challenges I see with this plan with the nurse or doctor.    ..........................................................................................................................................  Patient/Patient Representative Signature      ..........................................................................................................................................  Patient Representative Print Name and Relationship to Patient    ..................................................               ................................................  Date                                            Time    ..........................................................................................................................................  Reviewed by Signature/Title    ...................................................              ..............................................  Date                                                            Time

## 2018-02-19 NOTE — ANESTHESIA CARE TRANSFER NOTE
Patient: Sarah Satinder    Procedure(s):  central venous Chest Port Placement - Wound Class: I-Clean    Diagnosis: Malignant Neoplasm of Rectum  Diagnosis Additional Information: No value filed.    Anesthesia Type:   MAC     Note:  Airway :Room Air  Patient transferred to:Phase II  Comments: To phase 2 report to RN    133/91, 16, 78, 98%Handoff Report: Identifed the Patient, Identified the Reponsible Provider, Reviewed the pertinent medical history, Discussed the surgical course, Reviewed Intra-OP anesthesia mangement and issues during anesthesia, Set expectations for post-procedure period and Allowed opportunity for questions and acknowledgement of understanding      Vitals: (Last set prior to Anesthesia Care Transfer)    CRNA VITALS  2/19/2018 0911 - 2/19/2018 0950      2/19/2018             NIBP: 120/86    NIBP Mean: 97    Resp Rate (set): 10                Electronically Signed By: LISANDRA Rosario CRNA  February 19, 2018  9:50 AM

## 2018-02-19 NOTE — IP AVS SNAPSHOT
MRN:[id number]                      After Visit Summary   2/19/2018    Sarah Rajan    MRN: [id number]           Thank you!     Thank you for choosing [facility] for your care. Our goal is always to provide you with excellent care. Hearing back from our patients is one way we can continue to improve our services. Please take a few minutes to complete the written survey that you may receive in the mail after you visit with us. Thank you!        Patient Information     Date Of Birth          1965        About your hospital stay     You were admitted on:  February 19, 2018 You last received care in the:  [facility]    You were discharged on:  February 19, 2018       Who to Call     For medical emergencies, please call 911.  For non-urgent questions about your medical care, please call your primary care provider or clinic, [phone]  For questions related to your surgery, please call your surgery clinic        Attending Provider     Provider Gaurav Sultana PA-C Physician Assistant       Primary Care Provider Office Phone # Fax #    Wayne Thorne VICKY Green [phone]      Your next 10 appointments already scheduled     Feb 26, 2018  7:30 AM CST   Masonic Lab Draw with UC MASONIC LAB DRAW   [facility] ([facility])    [address] [place] [address]  [address]  [facility] [address] [phone]            Feb 26, 2018  8:00 AM CST   Infusion 240 with UC ONCOLOGY INFUSION, UC 22 ATC   [facility] ([facility])    [address]  [address]  [facility] [address]   [phone]            Mar 12, 2018 10:45 AM CDT   Masonic Lab Draw with UC MASONIC LAB DRAW   [facility] Masonic Lab Draw ([facility])    [address]  [address]  [facility] [address]   [phone]            Mar 12, 2018 11:20 AM CDT   (Arrive by 11:05  AM)   Return Visit with REX Almaguer   [facility] ([facility])    [address]  [address]  [facility] [id number] [phone]            Mar 12, 2018 12:00 PM CDT   Infusion 240 with UC ONCOLOGY INFUSION, UC 24 ATC   [facility] ([facility] [place])    [address]  [address]  [facility] [place] [facility] [place] [id number] [phone]              Further instructions from your care team       [facility]  Home Care Following Anesthesia  For 24 hours after surgery:  1. Get plenty of rest.  A responsible adult must stay with you for at least 24 hours after you leave the surgery center.  2. Do not drive or use heavy equipment.  If you have weakness or tingling, don't drive or use heavy equipment until this feeling goes away.   3. Do not drink alcohol.   4. Avoid strenuous or risky activities.  Ask for help when climbing stairs.  5. You may feel lightheaded.  IF so, sit for a few minutes before standing.  Have someone help you get up.   6. If you have nausea (feel sick to your stomach): Drink only clear liquids such as apple juice, ginger ale, broth or 7-Up.  Rest may also help.  Be sure to drink enough fluids.  Move to a regular diet as you feel able.   7. You may have a slight fever.  Call the doctor if your fever is over 100 F (37.7 C) (taken under the tongue) or lasts longer than 24 hours.  8. You may have a dry mouth, a sore throat, muscle aches or trouble sleeping. These should go away after 24 hours.  9. Do not make important or legal decisions.         Tips for taking pain medications  To get the best pain relief possible, remember these points:    Take pain medications as directed, before pain becomes severe.    Pain medication can upset your stomach: taking it with food may help.    Constipation is a common side effect of pain medication. Drink plenty of  fluids.    Eat foods high  in fiber. Take a stool softener if recommended by your doctor or pharmacist.    Do not drink alcohol, drive or operate machinery while taking pain medications.    Ask about other ways to control pain, such as with heat, ice or relaxation.    Tylenol/Acetaminophen Consumption  To help encourage the safe use of acetaminophen, the makers of TYLENOL  have lowered the maximum daily dose for single-ingredient Extra Strength TYLENOL  (acetaminophen) products sold in the [place] from 8 pills per day (4,000 mg) to 6 pills per day (3,000 mg). The dosing interval has also changed from 2 pills every 4-6 hours to 2 pills every 6 hours.    If you feel your pain relief is insufficient, you may take Tylenol/Acetaminophen in addition to your narcotic pain medication.     Be careful not to exceed 3,000 mg of Tylenol/Acetaminophen in a 24 hour period from all sources.    If you are taking extra strength Tylenol/acetaminophen (500 mg), the maximum dose is 6 tablets in 24 hours.    If you are taking regular strength acetaminophen (325 mg), the maximum dose is 9 tablets in 24 hours.    Call a doctor for any of the followin. Signs of infection (fever, growing tenderness at the surgery site, a large amount of drainage or bleeding, severe pain, foul-smelling drainage, redness, swelling).  2. It has been over 8 to 10 hours since surgery and you are still not able to urinate (pass water).  3. Headache for over 24 hours.  4. Numbness, tingling or weakness the day after surgery (if you had spinal anesthesia).  Your doctor is:   Gaurav Yates PA-C    Contact Number:  [phone]  ([employer] control desk)  - Monday - Friday 8:00 am - 4:30 pm      After hours for urgent concerns:  [phone]  - After 4:30 pm Monday - Friday, Weekends and Holidays.   - Ask for Interventional Radiology on-call.  Someone is available 24 hours a day.  - [facility] toll free number:  [phone]    For emergency care, call the:  [facility]:  [phone]  (TTY for hearing impaired: [phone])        A collaboration between [facility] and [facility]  Experts in minimally invasive, targeted treatments performed using imaging guidance    Venous Access Device,  Port Catheter or Tunneled Central Line Placement    Today you had a procedure today to install a venous access device; either a tunneled central vein catheter or a subcutaneous port catheter.    One of our Radiology PAs performed this procedure for you today:  ? Gama Zapien PA-C  ? CHARISSA Cook PA-C  ? Giovanny Vallejo PA-C  ? Ericka James PA-C   ? Gaurav Yates PA-C    After you go home:  - Drink plenty of fluids.  Generally 6-8 (8 ounce) glasses a day is recommended.  - Resume your regular diet unless otherwise ordered by a medical provider.  - Keep any applied tape/gauze dressings clean and dry.  Change tape/gauze dressings if they get wet or soiled.  - You may shower the following day after procedure, however cover and protect from moisture any tape/gauze dressings.  You may let water hit and run over dried skin glue, but do not scrub.  Pat the area dry after showering.  - Port placement incisions are closed with absorbable suture, meaning they do not need to be removed at a later date, and a topical skin adhesive (skin glue).  This glue will wear off in 7-14 days.  Do not remove before this time.  If 14 days have passed and residual glue is present, you may gently remove it.  - Do not apply gels, lotions, or ointments to the glue site for the first 10 days as this may cause the glue to prematurely soften and fail.  - Do not perform strenuous activities or lift greater than 10 pounds for the next three days.  - If there is bleeding or oozing from the procedure site, apply firm pressure to the area for 5-10 minutes.  If the bleeding continues seek medical advice at the numbers below.  - Mild procedure site discomfort can be treated with an ice  "pack and over-the-counter pain relievers.        For 24 hours after any sedation used:  - Relax and take it easy.  No strenuous activities.  - Do not drive or operate machines at home or at work.  - No alcohol consumption.  - Do not make any important or legal decisions.    Call our Interventional Radiology (IR) service if:  - If you start bleeding from the procedure site.  If you do start to bleed from the site, lie down and hold some pressure on the site.  Our radiology provider can help you decide if you need to return to the hospital.  - If you have new or worsening pain related to the procedure.  - If you have concerning swelling at the procedure site.  - If you develop persistent nausea or vomiting.  - If you develop hives or a rash or any unexplained itching.  - If you have a fever of greater than 100.5  F and chills in the first 5 days after procedure.  - Any other concerns related to your procedure.      [facility]  Interventional Radiology (IR)  [address]  [address] [place] [address]    Gaurav Yates PA-C    Contact Number:  [phone]  (IR control desk)  - Monday - Friday 8:00 am - 4:30 pm    After hours for urgent concerns:  [phone]  - After 4:30 pm Monday - Friday, Weekends and Holidays.   - Ask for Interventional Radiology on-call.  Someone is available 24 hours a day.  - [facility] toll free number:  [phone]              Pending Results     Date and Time Order Name Status Description    2/19/2018 0840 IR CHEST PORT PLACEMENT > 5 YRS OF AGE In process             Admission Information     Date & Time Provider Department Dept. Phone    2/19/2018 Gaurav Yates PA-C [facility] [phone]      Your Vitals Were     Blood Pressure Pulse Temperature Respirations Height Weight    143/99 73 97.9  F (36.6  C) (Oral) 16 1.676 m (5' 5.98\") 90.7 kg (200 lb)    Pulse Oximetry BMI (Body Mass Index)                96% " 32.3 kg/m2          [employer] Information     [employer] gives you secure access to your electronic health record. If you see a primary care provider, you can also send messages to your care team and make appointments. If you have questions, please call your primary care clinic.  If you do not have a primary care provider, please call [phone] and they will assist you.      [employer] is an electronic gateway that provides easy, online access to your medical records. With [employer], you can request a clinic appointment, read your test results, renew a prescription or communicate with your care team.     To access your existing account, please contact your [facility] or call [phone] for assistance.        Care EveryWhere ID     This is your Care EveryWhere ID. This could be used by other organizations to access your [facility] medical records  [id number]        Equal Access to Services     DEAN CHAKRABORTY : Jorge Armendariz, karma fletcher, ritika cotton, luz elena jacob. So [facility] [phone].    ATENCIÓN: Si habla español, tiene a membreno disposición servicios gratuitos de asistencia lingüística. Claudette al [phone].    We comply with applicable federal civil rights laws and [place] laws. We do not discriminate on the basis of race, color, national origin, age, disability, sex, sexual orientation, or gender identity.               Review of your medicines      UNREVIEWED medicines. Ask your doctor about these medicines        Dose / Directions    AMITRIPTYLINE HCL PO        Dose:  20 mg   Take 20 mg by mouth At Bedtime   Refills:  0       calcium carbonate 500 MG chewable tablet   Commonly known as:  TUMS        Dose:  1-2 chew tab   Take 1-2 chew tab by mouth daily   Quantity:  150 tablet   Refills:  0       FLUoxetine 20 MG capsule   Commonly known as:  PROzac        1 tablet in the morning   Refills:  1       LORazepam 1 MG tablet  "  Commonly known as:  ATIVAN   Used for:  Rectal cancer (H)        Take I tablet after support staff at clinic okay's you to take the medication the day of your procedure. Please have a  accompany you to the clinic on the day of the procedure.   Quantity:  1 tablet   Refills:  0       magnesium citrate solution   Used for:  Rectal cancer (H)        Dose:  296 mL   Take 296 mLs by mouth See Admin Instructions Refer to \"Getting Ready for a Colonoscopy\" patient handout.   Quantity:  296 mL   Refills:  0       neomycin 500 MG tablet   Commonly known as:  MYCIFRADIN   Used for:  Rectal cancer (H)        Take two tablets by mouth every 8 hours for one day prior to surgery. Take in conjunction with Flagyl   Quantity:  6 tablet   Refills:  0       ondansetron 4 MG tablet   Commonly known as:  ZOFRAN   Used for:  Rectal cancer (H)        Take one tablet by mouth every 6 hours as needed for nausea when taking neomycin and flagyl   Quantity:  3 tablet   Refills:  0       polyethylene glycol Packet   Commonly known as:  MIRALAX   Used for:  Rectal cancer (H)        Dose:  238 g   Take 238 g by mouth See Admin Instructions One 8.3-ounce bottle (238 g). Refer to surgical packet for medication administration   Quantity:  238 g   Refills:  0       TYLENOL PO        Dose:  1000 mg   Take 1,000 mg by mouth At Bedtime   Refills:  0                Protect others around you: Learn how to safely use, store and throw away your medicines at www.disposemymeds.org.             Medication List: This is a list of all your medications and when to take them. Check marks below indicate your daily home schedule. Keep this list as a reference.      Medications           Morning Afternoon Evening Bedtime As Needed    AMITRIPTYLINE HCL PO   Take 20 mg by mouth At Bedtime                                calcium carbonate 500 MG chewable tablet   Commonly known as:  TUMS   Take 1-2 chew tab by mouth daily                                FLUoxetine " "20 MG capsule   Commonly known as:  PROzac   1 tablet in the morning                                LORazepam 1 MG tablet   Commonly known as:  ATIVAN   Take I tablet after support staff at clinic okay's you to take the medication the day of your procedure. Please have a  accompany you to the clinic on the day of the procedure.                                magnesium citrate solution   Take 296 mLs by mouth See Admin Instructions Refer to \"Getting Ready for a Colonoscopy\" patient handout.                                neomycin 500 MG tablet   Commonly known as:  MYCIFRADIN   Take two tablets by mouth every 8 hours for one day prior to surgery. Take in conjunction with Flagyl                                ondansetron 4 MG tablet   Commonly known as:  ZOFRAN   Take one tablet by mouth every 6 hours as needed for nausea when taking neomycin and flagyl                                polyethylene glycol Packet   Commonly known as:  MIRALAX   Take 238 g by mouth See Admin Instructions One 8.3-ounce bottle (238 g). Refer to surgical packet for medication administration                                TYLENOL PO   Take 1,000 mg by mouth At Bedtime                                  "

## 2018-02-19 NOTE — ANESTHESIA PREPROCEDURE EVALUATION
Anesthesia Evaluation     .             ROS/MED HX    ENT/Pulmonary:  - neg pulmonary ROS     Neurologic:  - neg neurologic ROS     Cardiovascular:  - neg cardiovascular ROS       METS/Exercise Tolerance:  >4 METS   Hematologic:  - neg hematologic  ROS       Musculoskeletal:  - neg musculoskeletal ROS       GI/Hepatic:     (+) GERD Asymptomatic on medication,       Renal/Genitourinary:  - ROS Renal section negative       Endo:     (+) Obesity, .      Psychiatric:         Infectious Disease:  - neg infectious disease ROS       Malignancy:   (+) Malignancy           Other:                     Physical Exam      Airway   Mallampati: I  TM distance: >3 FB  Neck ROM: full    Dental   (+) missing    Cardiovascular   Rhythm and rate: regular      Pulmonary    breath sounds clear to auscultation                    Anesthesia Plan      History & Physical Review  History and physical reviewed and following examination; no interval change.    ASA Status:  2 .    NPO Status:  > 8 hours    Plan for MAC Maintenance will be TIVA.  Reason for MAC:  Deep or markedly invasive procedure (G8)  PONV prophylaxis:  Ondansetron (or other 5HT-3)       Postoperative Care  Postoperative pain management:  Multi-modal analgesia.      Consents  Anesthetic plan, risks, benefits and alternatives discussed with:  Patient..                          .

## 2018-02-23 RX ORDER — PROCHLORPERAZINE MALEATE 10 MG
10 TABLET ORAL EVERY 6 HOURS PRN
Qty: 20 TABLET | Refills: 1 | Status: SHIPPED | OUTPATIENT
Start: 2018-02-23 | End: 2018-03-12

## 2018-02-23 RX ORDER — LORAZEPAM 0.5 MG/1
0.5 TABLET ORAL EVERY 4 HOURS PRN
Qty: 30 TABLET | Refills: 2 | Status: SHIPPED | OUTPATIENT
Start: 2018-02-23 | End: 2018-03-12

## 2018-02-26 ENCOUNTER — HOME INFUSION (PRE-WILLOW HOME INFUSION) (OUTPATIENT)
Dept: PHARMACY | Facility: CLINIC | Age: 53
End: 2018-02-26

## 2018-02-26 ENCOUNTER — INFUSION THERAPY VISIT (OUTPATIENT)
Dept: ONCOLOGY | Facility: CLINIC | Age: 53
End: 2018-02-26
Attending: INTERNAL MEDICINE
Payer: COMMERCIAL

## 2018-02-26 VITALS
BODY MASS INDEX: 33.2 KG/M2 | SYSTOLIC BLOOD PRESSURE: 125 MMHG | HEART RATE: 79 BPM | WEIGHT: 205.6 LBS | TEMPERATURE: 98 F | RESPIRATION RATE: 16 BRPM | OXYGEN SATURATION: 96 % | DIASTOLIC BLOOD PRESSURE: 85 MMHG

## 2018-02-26 DIAGNOSIS — C20 MALIGNANT NEOPLASM OF RECTUM (H): Primary | ICD-10-CM

## 2018-02-26 LAB
ALBUMIN SERPL-MCNC: 3.8 G/DL (ref 3.4–5)
ALP SERPL-CCNC: 79 U/L (ref 40–150)
ALT SERPL W P-5'-P-CCNC: 27 U/L (ref 0–50)
ANION GAP SERPL CALCULATED.3IONS-SCNC: 6 MMOL/L (ref 3–14)
AST SERPL W P-5'-P-CCNC: 12 U/L (ref 0–45)
BASOPHILS # BLD AUTO: 0 10E9/L (ref 0–0.2)
BASOPHILS NFR BLD AUTO: 0.8 %
BILIRUB SERPL-MCNC: 0.4 MG/DL (ref 0.2–1.3)
BUN SERPL-MCNC: 12 MG/DL (ref 7–30)
CALCIUM SERPL-MCNC: 8.8 MG/DL (ref 8.5–10.1)
CHLORIDE SERPL-SCNC: 103 MMOL/L (ref 94–109)
CO2 SERPL-SCNC: 28 MMOL/L (ref 20–32)
CREAT SERPL-MCNC: 0.63 MG/DL (ref 0.52–1.04)
DIFFERENTIAL METHOD BLD: NORMAL
EOSINOPHIL # BLD AUTO: 0.1 10E9/L (ref 0–0.7)
EOSINOPHIL NFR BLD AUTO: 2.5 %
ERYTHROCYTE [DISTWIDTH] IN BLOOD BY AUTOMATED COUNT: 12.7 % (ref 10–15)
GFR SERPL CREATININE-BSD FRML MDRD: >90 ML/MIN/1.7M2
GLUCOSE SERPL-MCNC: 85 MG/DL (ref 70–99)
HCT VFR BLD AUTO: 41 % (ref 35–47)
HGB BLD-MCNC: 13.8 G/DL (ref 11.7–15.7)
IMM GRANULOCYTES # BLD: 0 10E9/L (ref 0–0.4)
IMM GRANULOCYTES NFR BLD: 0.4 %
LYMPHOCYTES # BLD AUTO: 1.7 10E9/L (ref 0.8–5.3)
LYMPHOCYTES NFR BLD AUTO: 33.3 %
MCH RBC QN AUTO: 29.9 PG (ref 26.5–33)
MCHC RBC AUTO-ENTMCNC: 33.7 G/DL (ref 31.5–36.5)
MCV RBC AUTO: 89 FL (ref 78–100)
MONOCYTES # BLD AUTO: 0.4 10E9/L (ref 0–1.3)
MONOCYTES NFR BLD AUTO: 6.9 %
NEUTROPHILS # BLD AUTO: 2.9 10E9/L (ref 1.6–8.3)
NEUTROPHILS NFR BLD AUTO: 56.1 %
NRBC # BLD AUTO: 0 10*3/UL
NRBC BLD AUTO-RTO: 0 /100
PLATELET # BLD AUTO: 175 10E9/L (ref 150–450)
POTASSIUM SERPL-SCNC: 3.8 MMOL/L (ref 3.4–5.3)
PROT SERPL-MCNC: 7 G/DL (ref 6.8–8.8)
RBC # BLD AUTO: 4.62 10E12/L (ref 3.8–5.2)
SODIUM SERPL-SCNC: 137 MMOL/L (ref 133–144)
WBC # BLD AUTO: 5.2 10E9/L (ref 4–11)

## 2018-02-26 PROCEDURE — 80053 COMPREHEN METABOLIC PANEL: CPT | Performed by: INTERNAL MEDICINE

## 2018-02-26 PROCEDURE — 25000128 H RX IP 250 OP 636: Mod: ZF | Performed by: INTERNAL MEDICINE

## 2018-02-26 PROCEDURE — 85025 COMPLETE CBC W/AUTO DIFF WBC: CPT | Performed by: INTERNAL MEDICINE

## 2018-02-26 PROCEDURE — 96375 TX/PRO/DX INJ NEW DRUG ADDON: CPT

## 2018-02-26 PROCEDURE — 96368 THER/DIAG CONCURRENT INF: CPT

## 2018-02-26 PROCEDURE — 96411 CHEMO IV PUSH ADDL DRUG: CPT

## 2018-02-26 PROCEDURE — 96416 CHEMO PROLONG INFUSE W/PUMP: CPT

## 2018-02-26 PROCEDURE — 96415 CHEMO IV INFUSION ADDL HR: CPT

## 2018-02-26 PROCEDURE — 96413 CHEMO IV INFUSION 1 HR: CPT

## 2018-02-26 RX ORDER — LIDOCAINE/PRILOCAINE 2.5 %-2.5%
CREAM (GRAM) TOPICAL
Qty: 30 G | Refills: 3 | Status: SHIPPED | OUTPATIENT
Start: 2018-02-26

## 2018-02-26 RX ORDER — ONDANSETRON 2 MG/ML
8 INJECTION INTRAMUSCULAR; INTRAVENOUS ONCE
Status: COMPLETED | OUTPATIENT
Start: 2018-02-26 | End: 2018-02-26

## 2018-02-26 RX ORDER — FLUOROURACIL 50 MG/ML
400 INJECTION, SOLUTION INTRAVENOUS ONCE
Status: COMPLETED | OUTPATIENT
Start: 2018-02-26 | End: 2018-02-26

## 2018-02-26 RX ORDER — HEPARIN SODIUM (PORCINE) LOCK FLUSH IV SOLN 100 UNIT/ML 100 UNIT/ML
5 SOLUTION INTRAVENOUS EVERY 8 HOURS
Status: DISCONTINUED | OUTPATIENT
Start: 2018-02-26 | End: 2018-02-26 | Stop reason: HOSPADM

## 2018-02-26 RX ADMIN — FLUOROURACIL 830 MG: 50 INJECTION, SOLUTION INTRAVENOUS at 10:56

## 2018-02-26 RX ADMIN — SODIUM CHLORIDE, PRESERVATIVE FREE 5 ML: 5 INJECTION INTRAVENOUS at 07:20

## 2018-02-26 RX ADMIN — LEUCOVORIN CALCIUM 750 MG: 350 INJECTION, POWDER, LYOPHILIZED, FOR SOLUTION INTRAMUSCULAR; INTRAVENOUS at 08:58

## 2018-02-26 RX ADMIN — DEXTROSE 500 ML: 5 SOLUTION INTRAVENOUS at 08:29

## 2018-02-26 RX ADMIN — OXALIPLATIN 176 MG: 5 INJECTION INTRAVENOUS at 08:58

## 2018-02-26 RX ADMIN — ONDANSETRON 8 MG: 2 INJECTION INTRAMUSCULAR; INTRAVENOUS at 08:40

## 2018-02-26 RX ADMIN — DEXAMETHASONE SODIUM PHOSPHATE: 10 INJECTION, SOLUTION INTRAMUSCULAR; INTRAVENOUS at 08:28

## 2018-02-26 ASSESSMENT — PAIN SCALES - GENERAL: PAINLEVEL: NO PAIN (0)

## 2018-02-26 NOTE — PROGRESS NOTES
"  Infusion Nursing Note:  Sarah Rajan presents today for C1D1 Oxaliplatin-Leucovorin-Fluorouracil bolus/pump.    Patient seen by provider today: No    Treatment Conditions:  Lab Results   Component Value Date    HGB 13.8 02/26/2018     Lab Results   Component Value Date    WBC 5.2 02/26/2018      Lab Results   Component Value Date    ANEU 2.9 02/26/2018     Lab Results   Component Value Date     02/26/2018      Lab Results   Component Value Date     02/26/2018                   Lab Results   Component Value Date    POTASSIUM 3.8 02/26/2018           No results found for: MAG         Lab Results   Component Value Date    CR 0.63 02/26/2018                   Lab Results   Component Value Date    GEETA 8.8 02/26/2018                Lab Results   Component Value Date    BILITOTAL 0.4 02/26/2018           Lab Results   Component Value Date    ALBUMIN 3.8 02/26/2018                    Lab Results   Component Value Date    ALT 27 02/26/2018           Lab Results   Component Value Date    AST 12 02/26/2018       Results reviewed, labs MET treatment parameters, ok to proceed with treatment.    Intravenous Access:  Implanted Port.  Access left intact at time of discharge with connections checked by Daniella Ware RN.      Note:   Results reviewed, copy given to patient.  Proceed with treatment.    Prior to discharge: Port is secured in place with tegaderm and flushed with 10cc NS with positive blood return noted.  Continuous home infusion Dosi-Fuser pump connected.    All connectors secured in place and clamps taped open.    Pump started, \"running\" noted on display (CADD): YES.  Patient instructed to call our clinic or [facility] with any questions or concerns at home.  Patient verbalized understanding.    Patient set up for pump disconnect at home with [facility] on 2/28@0900, Cesilia @ [facility] aware.    First time getting chemotherapy today.Chemotherapy teaching, side effects, and schedule " reviewed with patient. Pt instructed to call triage (or MD on call if after hours/weekends) with chills/temp >=100.5. Pt stated understanding of plan.   Copy of AVS given to patient. + Blood return from PORT pre and post infusion.  Tolerated infusion without incident.  Prescriptions filled today.   D/C in care of spouse.  Pt will return 3/12 for next appointment.       Feli Hinds RN

## 2018-02-26 NOTE — MR AVS SNAPSHOT
After Visit Summary   2/26/2018    Sarah Rajan    MRN: [id number]           Patient Information     Date Of Birth          1965        Visit Information        Provider Department      2/26/2018 8:00 AM  22 ATC;  ONCOLOGY INFUSION [facility]        Today's Diagnoses     Malignant neoplasm of rectum (H)    -  1      Care Instructions    Contact Numbers  [facility]: [phone]    After Hours:  [phone]  Triage: [phone]    Please call the [facility] Triage line if you experience a temperature greater than or equal to 100.5, shaking chills, have uncontrolled nausea, vomiting and/or diarrhea, dizziness, shortness of breath, chest pain, bleeding, unexplained bruising, or if you have any other new/concerning symptoms, questions or concerns.     If it is after hours, weekends, or holidays, please call the main hospital  at  [phone] and ask to speak to the Oncology doctor on call.     If you are having any concerning symptoms or wish to speak to a provider before your next infusion visit, please call your care coordinator or triage to notify them so we can adequately serve you.     If you need a refill on a narcotic prescription or other medication, please call triage before your infusion appointment.         February 2018 Sunday Monday Tuesday Wednesday Thursday Friday Saturday                       1     2     3       4     5     6     UMP CONSULT   10:30 AM   (90 min.)   Mirian Ford MD   Radiation Oncology Clinic     LAB   12:45 PM   (15 min.)    LAB   [facility] Lab     PAC EVAL   12:45 PM   (60 min.)   8,  Pac Yael   [facility] Preoperative Assessment Center     PAC RN ASSESSMENT    1:45 PM   (30 min.)   Rn, [facility] Preoperative Assessment Center     UMP NEW OSTOMY NURSE    2:30 PM   (60 min.)   Devika Randolph RN   [facility] Wound Ostomy     PAC ANESTHESIA CONSULT    2:35 PM   (10 min.)   Anesthesiologist, [facility] Pac   M  [facility] Preoperative Assessment Center     MR ABDOMEN & PELVIS WWO    3:45 PM   (60 min.)   UUMR2   [facility], MRI     LAB    5:30 PM   (15 min.)   UC LAB   [facility] Lab 7     8     MR PELVIS WWO    9:45 AM   (60 min.)   UUMR2   [facility], MRI 9     10       11     12     13     14     15     UMP RETURN    1:15 PM   (30 min.)   Kyra Mortensen MD   [facility] 16     17       18     19     Outpatient Visit    7:39 AM   [facility]     IR CHEST PORT PLACEMENT >5 YRS    7:45 AM   (75 min.)   [id number]   [facility] ASC Imaging     INSERT PORT VASCULAR ACCESS    9:15 AM   Gaurav Yates PA-C   [facility] OR 20     21     22     23     24       25     26     [facility] DRAW    7:30 AM   (15 min.)    [facility] LAB DRAW   [facility] Lab Draw     UMP ONC INFUSION 240    8:00 AM   (240 min.)    ONCOLOGY INFUSION   [facility] 27     28    PUMP DC @9:00                           March 2018 Sunday Monday Tuesday Wednesday Thursday Friday Saturday                       1     2     3       4     5     6     7     8     9     10       11     12     [facility] LAB DRAW   10:45 AM   (15 min.)    [facility]   [facility]     UMP RETURN   11:05 AM   (50 min.)   Sandy Rose PA   [facility]     UMP ONC INFUSION 240   12:00 PM   (240 min.)    ONCOLOGY INFUSION   [facility] 13     14     15     16     17       18     19     20     21     22     23     24       25     26     27     28     29     30     31                 Recent Results (from the past 24 hour(s))   CBC with platelets differential    Collection Time: 02/26/18  7:29 AM   Result Value Ref Range    WBC 5.2 4.0 - 11.0 10e9/L    RBC Count 4.62 3.8 - 5.2 10e12/L    Hemoglobin 13.8 11.7 - 15.7 g/dL    Hematocrit 41.0 35.0 - 47.0 %    MCV 89 78 - 100 fl    MCH 29.9 26.5 - 33.0 pg    MCHC 33.7 31.5 - 36.5 g/dL    RDW 12.7 10.0 -  15.0 %    Platelet Count 175 150 - 450 10e9/L    Diff Method Automated Method     % Neutrophils 56.1 %    % Lymphocytes 33.3 %    % Monocytes 6.9 %    % Eosinophils 2.5 %    % Basophils 0.8 %    % Immature Granulocytes 0.4 %    Nucleated RBCs 0 0 /100    Absolute Neutrophil 2.9 1.6 - 8.3 10e9/L    Absolute Lymphocytes 1.7 0.8 - 5.3 10e9/L    Absolute Monocytes 0.4 0.0 - 1.3 10e9/L    Absolute Eosinophils 0.1 0.0 - 0.7 10e9/L    Absolute Basophils 0.0 0.0 - 0.2 10e9/L    Abs Immature Granulocytes 0.0 0 - 0.4 10e9/L    Absolute Nucleated RBC 0.0    Comprehensive metabolic panel    Collection Time: 02/26/18  7:29 AM   Result Value Ref Range    Sodium 137 133 - 144 mmol/L    Potassium 3.8 3.4 - 5.3 mmol/L    Chloride 103 94 - 109 mmol/L    Carbon Dioxide 28 20 - 32 mmol/L    Anion Gap 6 3 - 14 mmol/L    Glucose 85 70 - 99 mg/dL    Urea Nitrogen 12 7 - 30 mg/dL    Creatinine 0.63 0.52 - 1.04 mg/dL    GFR Estimate >90 >60 mL/min/1.7m2    GFR Estimate If Black >90 >60 mL/min/1.7m2    Calcium 8.8 8.5 - 10.1 mg/dL    Bilirubin Total 0.4 0.2 - 1.3 mg/dL    Albumin 3.8 3.4 - 5.0 g/dL    Protein Total 7.0 6.8 - 8.8 g/dL    Alkaline Phosphatase 79 40 - 150 U/L    ALT 27 0 - 50 U/L    AST 12 0 - 45 U/L                 Follow-ups after your visit        Your next 10 appointments already scheduled     Mar 12, 2018 10:45 AM CDT   [facility] Draw with  [facility] LAB DRAW   [facility] Lab Draw ([facility])    [address] [place] [address]  [address]  [facility] [address] [phone]            Mar 12, 2018 11:20 AM CDT   (Arrive by 11:05 AM)   Return Visit with REX Almaguer   [facility] ([facility])    [address] [place] [address]  [address]  [facility] [address] [phone]            Mar 12, 2018 12:00 PM CDT   Infusion 240 with UC ONCOLOGY INFUSION, UC 24 ATC   [facility] ([facility])    [address]  [address]  [address]  [facility] [address] [phone]              Who to contact     If you have questions or need follow up information about today's clinic visit or your schedule please contact [facility] directly at [phone].  Normal or non-critical lab and imaging results will be communicated to you by MyChart, letter or phone within 4 business days after the clinic has received the results. If you do not hear from us within 7 days, please contact the clinic through [employer] or phone. If you have a critical or abnormal lab result, we will notify you by phone as soon as possible.  Submit refill requests through [employer] or call your pharmacy and they will forward the refill request to us. Please allow 3 business days for your refill to be completed.          Additional Information About Your Visit        [employer] Information     [employer] gives you secure access to your electronic health record. If you see a primary care provider, you can also send messages to your care team and make appointments. If you have questions, please call your primary care clinic.  If you do not have a primary care provider, please call [phone] and they will assist you.        Care EveryWhere ID     This is your Care EveryWhere ID. This could be used by other organizations to access your [facility] medical records  [id number]        Your Vitals Were     Pulse Temperature Respirations Pulse Oximetry BMI (Body Mass Index)       79 98  F (36.7  C) 16 96% 33.2 kg/m2        Blood Pressure from Last 3 Encounters:   02/26/18 125/85   02/19/18 130/72   02/15/18 134/89    Weight from Last 3 Encounters:   02/26/18 93.3 kg (205 lb 9.6 oz)   02/19/18 90.7 kg (200 lb)   02/15/18 91.9 kg (202 lb 9.6 oz)              We Performed the Following     CBC with platelets differential     Comprehensive metabolic panel          Today's Medication Changes          These changes are accurate as of 2/26/18 10:43 AM.  If you have any  questions, ask your nurse or doctor.               Start taking these medicines.        Dose/Directions    lidocaine-prilocaine cream   Commonly known as:  EMLA   Used for:  Malignant neoplasm of rectum (H)        Apply 45 minutes prior to procedure.   Quantity:  30 g   Refills:  3       prochlorperazine 10 MG tablet   Commonly known as:  COMPAZINE   Used for:  Malignant neoplasm of rectum (H)        Dose:  10 mg   Take 1 tablet (10 mg) by mouth every 6 hours as needed for nausea or vomiting   Quantity:  20 tablet   Refills:  1         These medicines have changed or have updated prescriptions.        Dose/Directions    * LORazepam 1 MG tablet   Commonly known as:  ATIVAN   This may have changed:  Another medication with the same name was added. Make sure you understand how and when to take each.   Used for:  Rectal cancer (H)        Take I tablet after support staff at clinic okay's you to take the medication the day of your procedure. Please have a  accompany you to the clinic on the day of the procedure.   Quantity:  1 tablet   Refills:  0       * LORazepam 0.5 MG tablet   Commonly known as:  ATIVAN   This may have changed:  You were already taking a medication with the same name, and this prescription was added. Make sure you understand how and when to take each.   Used for:  Malignant neoplasm of rectum (H)        Dose:  0.5 mg   Take 1 tablet (0.5 mg) by mouth every 4 hours as needed (Anxiety, Nausea/Vomiting or Sleep)   Quantity:  30 tablet   Refills:  2       * Notice:  This list has 2 medication(s) that are the same as other medications prescribed for you. Read the directions carefully, and ask your doctor or other care provider to review them with you.         Where to get your medicines      These medications were sent to [place], [place] - [address] [facility] [address] [address]    Hours:  TRANSPLANT PHONE NUMBER [phone]  Phone:  [phone]     lidocaine-prilocaine cream    prochlorperazine 10 MG tablet         Some of these will need a paper prescription and others can be bought over the counter.  Ask your nurse if you have questions.     Bring a paper prescription for each of these medications     LORazepam 0.5 MG tablet                Primary Care Provider Office Phone # Fax #    Wayne Green [phone]       [facility] [address] [facility] [address]        Equal Access to Services     DEAN CHAKRABORTY : Hadii aad ku hadasho Soomaali, waaxda luqadaha, qaybta kaalmada adeegyada, waxay lissy hayalyssa mtz . [facility] [phone].    ATENCIÓN: Si habla español, tiene a membreno disposición servicios gratuitos de asistencia lingüística. [facility] [phone].    We comply with applicable federal civil rights laws and [place] laws. We do not discriminate on the basis of race, color, national origin, age, disability, sex, sexual orientation, or gender identity.            Thank you!     Thank you for choosing [facility]  for your care. Our goal is always to provide you with excellent care. Hearing back from our patients is one way we can continue to improve our services. Please take a few minutes to complete the written survey that you may receive in the mail after your visit with us. Thank you!             Your Updated Medication List - Protect others around you: Learn how to safely use, store and throw away your medicines at www.disposemymeds.org.          This list is accurate as of 2/26/18 10:43 AM.  Always use your most recent med list.                   Brand Name Dispense Instructions for use Diagnosis    AMITRIPTYLINE HCL PO      Take 20 mg by mouth At Bedtime        calcium carbonate 500 MG chewable tablet    TUMS    150 tablet    Take 1-2 chew tab by mouth daily        FLUoxetine 20 MG capsule    PROzac     1 tablet in the morning         "lidocaine-prilocaine cream    EMLA    30 g    Apply 45 minutes prior to procedure.    Malignant neoplasm of rectum (H)       * LORazepam 1 MG tablet    ATIVAN    1 tablet    Take I tablet after support staff at clinic okay's you to take the medication the day of your procedure. Please have a  accompany you to the clinic on the day of the procedure.    Rectal cancer (H)       * LORazepam 0.5 MG tablet    ATIVAN    30 tablet    Take 1 tablet (0.5 mg) by mouth every 4 hours as needed (Anxiety, Nausea/Vomiting or Sleep)    Malignant neoplasm of rectum (H)       magnesium citrate solution     296 mL    Take 296 mLs by mouth See Admin Instructions Refer to \"Getting Ready for a Colonoscopy\" patient handout.    Rectal cancer (H)       neomycin 500 MG tablet    MYCIFRADIN    6 tablet    Take two tablets by mouth every 8 hours for one day prior to surgery. Take in conjunction with Flagyl    Rectal cancer (H)       ondansetron 4 MG tablet    ZOFRAN    3 tablet    Take one tablet by mouth every 6 hours as needed for nausea when taking neomycin and flagyl    Rectal cancer (H)       polyethylene glycol Packet    MIRALAX    238 g    Take 238 g by mouth See Admin Instructions One 8.3-ounce bottle (238 g). Refer to surgical packet for medication administration    Rectal cancer (H)       prochlorperazine 10 MG tablet    COMPAZINE    20 tablet    Take 1 tablet (10 mg) by mouth every 6 hours as needed for nausea or vomiting    Malignant neoplasm of rectum (H)       TYLENOL PO      Take 1,000 mg by mouth At Bedtime        * Notice:  This list has 2 medication(s) that are the same as other medications prescribed for you. Read the directions carefully, and ask your doctor or other care provider to review them with you.      "

## 2018-02-26 NOTE — PATIENT INSTRUCTIONS
Contact Numbers  [facility]: [phone]    After Hours:  [phone]  Triage: [phone]    Please call the [facility] Triage line if you experience a temperature greater than or equal to 100.5, shaking chills, have uncontrolled nausea, vomiting and/or diarrhea, dizziness, shortness of breath, chest pain, bleeding, unexplained bruising, or if you have any other new/concerning symptoms, questions or concerns.     If it is after hours, weekends, or holidays, please call the main hospital  at  [phone] and ask to speak to the Oncology doctor on call.     If you are having any concerning symptoms or wish to speak to a provider before your next infusion visit, please call your care coordinator or triage to notify them so we can adequately serve you.     If you need a refill on a narcotic prescription or other medication, please call triage before your infusion appointment.         February 2018 Sunday Monday Tuesday Wednesday Thursday Friday Saturday                       1     2     3       4     5     6     UMP CONSULT   10:30 AM   (90 min.)   Mirian Ford MD   Radiation Oncology Clinic     LAB   12:45 PM   (15 min.)   [facility] Lab     PAC EVAL   12:45 PM   (60 min.)   8, [facility] Preoperative Assessment [place]     PAC RN ASSESSMENT    1:45 PM   (30 min.)   Rn, [facility] Preoperative Assessment [facility] NEW OSTOMY NURSE    2:30 PM   (60 min.)   Devika Randolph RN   [facility] Wound Ostomy     PAC ANESTHESIA CONSULT    2:35 PM   (10 min.)   Anesthesiologist, [facility] Preoperative Assessment Center     MR ABDOMEN & PELVIS WWO    3:45 PM   (60 min.)   UUMR2   [facility]     LAB    5:30 PM   (15 min.)   [facility] Lab 7     8     MR PELVIS WWO    9:45 AM   (60 min.)   UR2   [facility] 9     10       11     12     13     14     15     UMP RETURN    1:15 PM   (30 min.)   Kyra Mortensen MD   [facility] Cancer  Clinic 16     17       18     19     Outpatient Visit    7:39 AM   [facility] Surgery [facility] Procedure Center     IR CHEST PORT PLACEMENT >5 YRS    7:45 AM   (75 min.)   [email or web address]   [facility] ASC Imaging     INSERT PORT VASCULAR ACCESS    9:15 AM   Gaurav Yates PA-C    OR 20     21     22     23     24       25     26     [facility] DRAW    7:30 AM   (15 min.)    [facility]     UMP ONC INFUSION 240    8:00 AM   (240 min.)    ONCOLOGY INFUSION   [facility] 27     28    PUMP DC @9:00                           March 2018 Sunday Monday Tuesday Wednesday Thursday Friday Saturday                       1     2     3       4     5     6     7     8     9     10       11     12     [facility] LAB DRAW   10:45 AM   (15 min.)    [facility] LAB DRAW   [facility] Lab Draw     UMP RETURN   11:05 AM   (50 min.)   Sandy Rose PA   [facility]     UMP ONC INFUSION 240   12:00 PM   (240 min.)    ONCOLOGY INFUSION   [facility] 13     14     15     16     17       18     19     20     21     22     23     24       25     26     27     28     29     30     31                 Recent Results (from the past 24 hour(s))   CBC with platelets differential    Collection Time: 02/26/18  7:29 AM   Result Value Ref Range    WBC 5.2 4.0 - 11.0 10e9/L    RBC Count 4.62 3.8 - 5.2 10e12/L    Hemoglobin 13.8 11.7 - 15.7 g/dL    Hematocrit 41.0 35.0 - 47.0 %    MCV 89 78 - 100 fl    MCH 29.9 26.5 - 33.0 pg    MCHC 33.7 31.5 - 36.5 g/dL    RDW 12.7 10.0 - 15.0 %    Platelet Count 175 150 - 450 10e9/L    Diff Method Automated Method     % Neutrophils 56.1 %    % Lymphocytes 33.3 %    % Monocytes 6.9 %    % Eosinophils 2.5 %    % Basophils 0.8 %    % Immature Granulocytes 0.4 %    Nucleated RBCs 0 0 /100    Absolute Neutrophil 2.9 1.6 - 8.3 10e9/L    Absolute Lymphocytes 1.7 0.8 - 5.3 10e9/L    Absolute Monocytes 0.4 0.0 - 1.3 10e9/L     Absolute Eosinophils 0.1 0.0 - 0.7 10e9/L    Absolute Basophils 0.0 0.0 - 0.2 10e9/L    Abs Immature Granulocytes 0.0 0 - 0.4 10e9/L    Absolute Nucleated RBC 0.0    Comprehensive metabolic panel    Collection Time: 02/26/18  7:29 AM   Result Value Ref Range    Sodium 137 133 - 144 mmol/L    Potassium 3.8 3.4 - 5.3 mmol/L    Chloride 103 94 - 109 mmol/L    Carbon Dioxide 28 20 - 32 mmol/L    Anion Gap 6 3 - 14 mmol/L    Glucose 85 70 - 99 mg/dL    Urea Nitrogen 12 7 - 30 mg/dL    Creatinine 0.63 0.52 - 1.04 mg/dL    GFR Estimate >90 >60 mL/min/1.7m2    GFR Estimate If Black >90 >60 mL/min/1.7m2    Calcium 8.8 8.5 - 10.1 mg/dL    Bilirubin Total 0.4 0.2 - 1.3 mg/dL    Albumin 3.8 3.4 - 5.0 g/dL    Protein Total 7.0 6.8 - 8.8 g/dL    Alkaline Phosphatase 79 40 - 150 U/L    ALT 27 0 - 50 U/L    AST 12 0 - 45 U/L

## 2018-02-27 NOTE — PROGRESS NOTES
This is a recent snapshot of the patient's [facility] medical record.  For current drug dose and complete information and questions, call [phone] or In [facility] pool, fv home infusion ([id number])  CSN Number:  [id number]

## 2018-02-28 ENCOUNTER — HOME INFUSION (PRE-WILLOW HOME INFUSION) (OUTPATIENT)
Dept: PHARMACY | Facility: CLINIC | Age: 53
End: 2018-02-28

## 2018-02-28 ENCOUNTER — TELEPHONE (OUTPATIENT)
Dept: SURGERY | Facility: CLINIC | Age: 53
End: 2018-02-28

## 2018-02-28 NOTE — TELEPHONE ENCOUNTER
call was made by this Writer to [facility] to explain that patient was needing to have chemotherapy and radiation before having a surgical procedure due to the staging of the rectal tumor. [facility] representative stated that the prior authorizations are only good for three months and if the surgery was scheduled outside of three months another prior authorization will be needed.

## 2018-02-28 NOTE — TELEPHONE ENCOUNTER
----- Message from Anjelica Arndt sent at 2/27/2018 11:05 AM CST -----  Regarding: FW: Is surgery / procedure going to be rescheduled?  Contact: [phone]  Thanks for taking care of this!  ----- Message -----     From: Tangela Larkin     Sent: 2/26/2018   1:14 PM       To: Anjelica Arndt  Subject: Is surgery / procedure going to be reschedul#    Marlee Bisi Dejesus from [facility] called in regard to the surgery / procedure that was cancelled on 2/14/18.  Bisi wanted to know if the surgery / procedure will be rescheduled and if the authorization needs to be still out there.  Please follow up with Bisi at [phone].  Thank you!    Kind regards,  Tangela    Please DO NOT send this message and/or reply back to sender. Call Center Representatives DO NOT respond to messages.

## 2018-03-01 ENCOUNTER — HOME INFUSION (PRE-WILLOW HOME INFUSION) (OUTPATIENT)
Dept: PHARMACY | Facility: CLINIC | Age: 53
End: 2018-03-01

## 2018-03-02 NOTE — PROGRESS NOTES
This is a recent snapshot of the patient's [facility] medical record.  For current drug dose and complete information and questions, call [phone] or In [facility] pool, fv home infusion ([id number])  CSN Number:  [id number]

## 2018-03-06 RX ORDER — METHYLPREDNISOLONE SODIUM SUCCINATE 125 MG/2ML
125 INJECTION, POWDER, LYOPHILIZED, FOR SOLUTION INTRAMUSCULAR; INTRAVENOUS
Status: CANCELLED
Start: 2018-03-12

## 2018-03-06 RX ORDER — ALBUTEROL SULFATE 90 UG/1
1-2 AEROSOL, METERED RESPIRATORY (INHALATION)
Status: CANCELLED
Start: 2018-03-12

## 2018-03-06 RX ORDER — EPINEPHRINE 0.3 MG/.3ML
0.3 INJECTION SUBCUTANEOUS EVERY 5 MIN PRN
Status: CANCELLED | OUTPATIENT
Start: 2018-03-12

## 2018-03-06 RX ORDER — ALBUTEROL SULFATE 0.83 MG/ML
2.5 SOLUTION RESPIRATORY (INHALATION)
Status: CANCELLED | OUTPATIENT
Start: 2018-03-12

## 2018-03-06 RX ORDER — MEPERIDINE HYDROCHLORIDE 50 MG/ML
25 INJECTION INTRAMUSCULAR; INTRAVENOUS; SUBCUTANEOUS EVERY 30 MIN PRN
Status: CANCELLED | OUTPATIENT
Start: 2018-03-12

## 2018-03-06 RX ORDER — FLUOROURACIL 50 MG/ML
400 INJECTION, SOLUTION INTRAVENOUS ONCE
Status: CANCELLED | OUTPATIENT
Start: 2018-03-12

## 2018-03-06 RX ORDER — EPINEPHRINE 1 MG/ML
0.3 INJECTION, SOLUTION INTRAMUSCULAR; SUBCUTANEOUS EVERY 5 MIN PRN
Status: CANCELLED | OUTPATIENT
Start: 2018-03-12

## 2018-03-06 RX ORDER — DIPHENHYDRAMINE HYDROCHLORIDE 50 MG/ML
50 INJECTION INTRAMUSCULAR; INTRAVENOUS
Status: CANCELLED
Start: 2018-03-12

## 2018-03-06 RX ORDER — SODIUM CHLORIDE 9 MG/ML
1000 INJECTION, SOLUTION INTRAVENOUS CONTINUOUS PRN
Status: CANCELLED
Start: 2018-03-12

## 2018-03-06 RX ORDER — LORAZEPAM 2 MG/ML
0.5 INJECTION INTRAMUSCULAR EVERY 4 HOURS PRN
Status: CANCELLED
Start: 2018-03-12

## 2018-03-07 ENCOUNTER — HOME INFUSION (PRE-WILLOW HOME INFUSION) (OUTPATIENT)
Dept: PHARMACY | Facility: CLINIC | Age: 53
End: 2018-03-07

## 2018-03-07 ENCOUNTER — CARE COORDINATION (OUTPATIENT)
Dept: ONCOLOGY | Facility: CLINIC | Age: 53
End: 2018-03-07

## 2018-03-12 ENCOUNTER — ONCOLOGY VISIT (OUTPATIENT)
Dept: ONCOLOGY | Facility: CLINIC | Age: 53
End: 2018-03-12
Attending: PHYSICIAN ASSISTANT
Payer: COMMERCIAL

## 2018-03-12 ENCOUNTER — APPOINTMENT (OUTPATIENT)
Dept: LAB | Facility: CLINIC | Age: 53
End: 2018-03-12
Attending: PHYSICIAN ASSISTANT
Payer: COMMERCIAL

## 2018-03-12 ENCOUNTER — HOME INFUSION (PRE-WILLOW HOME INFUSION) (OUTPATIENT)
Dept: PHARMACY | Facility: CLINIC | Age: 53
End: 2018-03-12

## 2018-03-12 VITALS
DIASTOLIC BLOOD PRESSURE: 82 MMHG | HEART RATE: 83 BPM | BODY MASS INDEX: 33.09 KG/M2 | SYSTOLIC BLOOD PRESSURE: 115 MMHG | TEMPERATURE: 98.3 F | OXYGEN SATURATION: 97 % | RESPIRATION RATE: 16 BRPM | WEIGHT: 204.9 LBS

## 2018-03-12 DIAGNOSIS — C20 MALIGNANT NEOPLASM OF RECTUM (H): Primary | ICD-10-CM

## 2018-03-12 DIAGNOSIS — C20 MALIGNANT NEOPLASM OF RECTUM (H): ICD-10-CM

## 2018-03-12 LAB
ALBUMIN SERPL-MCNC: 3.7 G/DL (ref 3.4–5)
ALP SERPL-CCNC: 76 U/L (ref 40–150)
ALT SERPL W P-5'-P-CCNC: 37 U/L (ref 0–50)
ANION GAP SERPL CALCULATED.3IONS-SCNC: 6 MMOL/L (ref 3–14)
AST SERPL W P-5'-P-CCNC: 21 U/L (ref 0–45)
BASOPHILS # BLD AUTO: 0 10E9/L (ref 0–0.2)
BASOPHILS NFR BLD AUTO: 0.7 %
BILIRUB SERPL-MCNC: 0.4 MG/DL (ref 0.2–1.3)
BUN SERPL-MCNC: 15 MG/DL (ref 7–30)
CALCIUM SERPL-MCNC: 9 MG/DL (ref 8.5–10.1)
CHLORIDE SERPL-SCNC: 105 MMOL/L (ref 94–109)
CO2 SERPL-SCNC: 27 MMOL/L (ref 20–32)
CREAT SERPL-MCNC: 0.66 MG/DL (ref 0.52–1.04)
DIFFERENTIAL METHOD BLD: NORMAL
EOSINOPHIL # BLD AUTO: 0.2 10E9/L (ref 0–0.7)
EOSINOPHIL NFR BLD AUTO: 3.7 %
ERYTHROCYTE [DISTWIDTH] IN BLOOD BY AUTOMATED COUNT: 13.2 % (ref 10–15)
GFR SERPL CREATININE-BSD FRML MDRD: >90 ML/MIN/1.7M2
GLUCOSE SERPL-MCNC: 90 MG/DL (ref 70–99)
HCT VFR BLD AUTO: 39.1 % (ref 35–47)
HGB BLD-MCNC: 13.4 G/DL (ref 11.7–15.7)
IMM GRANULOCYTES # BLD: 0 10E9/L (ref 0–0.4)
IMM GRANULOCYTES NFR BLD: 0.2 %
LYMPHOCYTES # BLD AUTO: 1.4 10E9/L (ref 0.8–5.3)
LYMPHOCYTES NFR BLD AUTO: 33.7 %
MCH RBC QN AUTO: 30.4 PG (ref 26.5–33)
MCHC RBC AUTO-ENTMCNC: 34.3 G/DL (ref 31.5–36.5)
MCV RBC AUTO: 89 FL (ref 78–100)
MONOCYTES # BLD AUTO: 0.4 10E9/L (ref 0–1.3)
MONOCYTES NFR BLD AUTO: 10.1 %
NEUTROPHILS # BLD AUTO: 2.1 10E9/L (ref 1.6–8.3)
NEUTROPHILS NFR BLD AUTO: 51.6 %
NRBC # BLD AUTO: 0 10*3/UL
NRBC BLD AUTO-RTO: 0 /100
PLATELET # BLD AUTO: 181 10E9/L (ref 150–450)
POTASSIUM SERPL-SCNC: 4.2 MMOL/L (ref 3.4–5.3)
PROT SERPL-MCNC: 7 G/DL (ref 6.8–8.8)
RBC # BLD AUTO: 4.41 10E12/L (ref 3.8–5.2)
SODIUM SERPL-SCNC: 139 MMOL/L (ref 133–144)
WBC # BLD AUTO: 4.1 10E9/L (ref 4–11)

## 2018-03-12 PROCEDURE — 96368 THER/DIAG CONCURRENT INF: CPT

## 2018-03-12 PROCEDURE — 80053 COMPREHEN METABOLIC PANEL: CPT | Performed by: INTERNAL MEDICINE

## 2018-03-12 PROCEDURE — G0463 HOSPITAL OUTPT CLINIC VISIT: HCPCS | Mod: 25

## 2018-03-12 PROCEDURE — G0463 HOSPITAL OUTPT CLINIC VISIT: HCPCS | Mod: ZF

## 2018-03-12 PROCEDURE — 25000128 H RX IP 250 OP 636: Mod: ZF | Performed by: PHYSICIAN ASSISTANT

## 2018-03-12 PROCEDURE — 85025 COMPLETE CBC W/AUTO DIFF WBC: CPT | Performed by: INTERNAL MEDICINE

## 2018-03-12 PROCEDURE — 96415 CHEMO IV INFUSION ADDL HR: CPT

## 2018-03-12 PROCEDURE — 96413 CHEMO IV INFUSION 1 HR: CPT

## 2018-03-12 PROCEDURE — 99214 OFFICE O/P EST MOD 30 MIN: CPT | Mod: ZP | Performed by: PHYSICIAN ASSISTANT

## 2018-03-12 PROCEDURE — 25000128 H RX IP 250 OP 636: Mod: ZF | Performed by: INTERNAL MEDICINE

## 2018-03-12 PROCEDURE — G0498 CHEMO EXTEND IV INFUS W/PUMP: HCPCS

## 2018-03-12 PROCEDURE — 96411 CHEMO IV PUSH ADDL DRUG: CPT

## 2018-03-12 PROCEDURE — 96375 TX/PRO/DX INJ NEW DRUG ADDON: CPT

## 2018-03-12 RX ORDER — LORAZEPAM 0.5 MG/1
0.5 TABLET ORAL EVERY 4 HOURS PRN
Qty: 30 TABLET | Refills: 2 | Status: SHIPPED | OUTPATIENT
Start: 2018-03-12 | End: 2018-06-05

## 2018-03-12 RX ORDER — ONDANSETRON 2 MG/ML
8 INJECTION INTRAMUSCULAR; INTRAVENOUS ONCE
Status: COMPLETED | OUTPATIENT
Start: 2018-03-12 | End: 2018-03-12

## 2018-03-12 RX ORDER — PROCHLORPERAZINE MALEATE 10 MG
10 TABLET ORAL EVERY 6 HOURS PRN
Qty: 20 TABLET | Refills: 1 | Status: SHIPPED | OUTPATIENT
Start: 2018-03-12 | End: 2018-06-25

## 2018-03-12 RX ORDER — LORAZEPAM 2 MG/ML
0.5 INJECTION INTRAMUSCULAR EVERY 4 HOURS PRN
Status: DISCONTINUED | OUTPATIENT
Start: 2018-03-12 | End: 2018-03-12 | Stop reason: HOSPADM

## 2018-03-12 RX ORDER — FLUOROURACIL 50 MG/ML
400 INJECTION, SOLUTION INTRAVENOUS ONCE
Status: COMPLETED | OUTPATIENT
Start: 2018-03-12 | End: 2018-03-12

## 2018-03-12 RX ORDER — HEPARIN SODIUM (PORCINE) LOCK FLUSH IV SOLN 100 UNIT/ML 100 UNIT/ML
5 SOLUTION INTRAVENOUS EVERY 8 HOURS
Status: DISCONTINUED | OUTPATIENT
Start: 2018-03-12 | End: 2018-03-12 | Stop reason: HOSPADM

## 2018-03-12 RX ADMIN — ONDANSETRON 8 MG: 2 INJECTION INTRAMUSCULAR; INTRAVENOUS at 12:43

## 2018-03-12 RX ADMIN — DEXAMETHASONE SODIUM PHOSPHATE: 10 INJECTION, SOLUTION INTRAMUSCULAR; INTRAVENOUS at 12:55

## 2018-03-12 RX ADMIN — SODIUM CHLORIDE, PRESERVATIVE FREE 5 ML: 5 INJECTION INTRAVENOUS at 11:15

## 2018-03-12 RX ADMIN — OXALIPLATIN 176 MG: 5 INJECTION INTRAVENOUS at 13:07

## 2018-03-12 RX ADMIN — DEXTROSE 250 ML: 5 SOLUTION INTRAVENOUS at 12:42

## 2018-03-12 RX ADMIN — FLUOROURACIL 830 MG: 50 INJECTION, SOLUTION INTRAVENOUS at 15:13

## 2018-03-12 RX ADMIN — LEUCOVORIN CALCIUM 750 MG: 350 INJECTION, POWDER, LYOPHILIZED, FOR SOLUTION INTRAMUSCULAR; INTRAVENOUS at 13:08

## 2018-03-12 RX ADMIN — LORAZEPAM 0.5 MG: 2 INJECTION, SOLUTION INTRAMUSCULAR; INTRAVENOUS at 13:02

## 2018-03-12 ASSESSMENT — PAIN SCALES - GENERAL: PAINLEVEL: NO PAIN (0)

## 2018-03-12 NOTE — PROGRESS NOTES
Infusion Nursing Note:  Sarah Rajan presents today for Cycle 2 Day 1 Oxaliplatin, Leucovorin, Fluorouracil bolus and pump hook-up.    Patient seen by provider today: Yes: REX Castro   present during visit today: Not Applicable.    Note: Patient stated that her port site was very itchy during last chemo infusion. Patient requested a new dressing. A piece of sterile gauze was placed under port and Opsite dressing was placed over port site. Tegaderm was still used to secure thermoregulator.     C-series pump and thermoregulator intact upon patient's discharge. Connections double checked by Keesha Escamilla RN. [facility] notified of patient's pump hook-up time and will disconnect patient's pump on 3/14/18 at 1315.      Intravenous Access:  Implanted Port.    Treatment Conditions:  Lab Results   Component Value Date    HGB 13.4 03/12/2018     Lab Results   Component Value Date    WBC 4.1 03/12/2018      Lab Results   Component Value Date    ANEU 2.1 03/12/2018     Lab Results   Component Value Date     03/12/2018      Lab Results   Component Value Date     03/12/2018                   Lab Results   Component Value Date    POTASSIUM 4.2 03/12/2018       Lab Results   Component Value Date    CR 0.66 03/12/2018                   Lab Results   Component Value Date    GEETA 9.0 03/12/2018                Lab Results   Component Value Date    BILITOTAL 0.4 03/12/2018           Lab Results   Component Value Date    ALBUMIN 3.7 03/12/2018                    Lab Results   Component Value Date    ALT 37 03/12/2018           Lab Results   Component Value Date    AST 21 03/12/2018     Results reviewed, labs MET treatment parameters, ok to proceed with treatment.    Post Infusion Assessment:  Patient tolerated infusion without incident.  Blood return noted pre and post infusion.  Site patent and intact, free from redness, edema or discomfort.  No evidence of extravasations.    Discharge Plan:   Prescription  refills given for Ativan, Compazine.  Copy of AVS reviewed with patient and/or family. Patient will return 3/26/18 for next appointment.  Patient discharged in stable condition accompanied by: .  Departure Mode: Ambulatory.    Sahara Franco RN

## 2018-03-12 NOTE — LETTER
3/12/2018      RE: Sarah Rajan  [address] [facility] [address]       Oncology/Hematology Visit Note  Mar 12, 2018    Reason for Visit: follow up of [id number] moderately differentiated adenocarcinoma of the rectum     History of Present Illness: Sarah Rajan is a 52 year old female with  recently diagnosed locally advanced rectal cancer. She is currently undergoing treatment with neoadjuvant FOLFOX. Please see Dr. Mortensen's previous notes for further details on the patient's history. She comes in today for routine follow up prior to cycle 2.    Interval History:  Sarah states that once disconnected from her pump she began having more nausea. She had been taking scheduled compazine and ativan, but stopped and then had nausea and vomiting x 2. She then resumed taking anti-emetic medication with relief of symptoms. Denies having diarrhea, but since colonoscopy she has had softer/loose stools, with occasional blood and mucous. She also was told she has hemorrhoids, but she denies any rectal pain or pain with bowel movement. She noticed some intermittent cold sensitivity in her hands/feet and throat. She managed by wearing gloves, socks and drank fluids at room temperature. She denies any current numbness/tingling in hands/feet. She also had some itching at her port site which she attributes to the butterfly needle irritating her skin. She did not have any redness aside from when she scratched the area. She does not appear to have had any reaction to the dressing adhesive. Port needle is likewise bothering her today. She also had some difficulty falling asleep and staying asleep during the first week of chemotherapy. She is otherwise feeling well. She returned from a 5 day trip from [place] and the [facility]. Her 10-point review of systems is otherwise negative.     Current Outpatient Prescriptions   Medication Sig Dispense Refill     lidocaine-prilocaine (EMLA) cream Apply 45 minutes prior to procedure. 30 g 3      prochlorperazine (COMPAZINE) 10 MG tablet Take 1 tablet (10 mg) by mouth every 6 hours as needed for nausea or vomiting 20 tablet 1     LORazepam (ATIVAN) 0.5 MG tablet Take 1 tablet (0.5 mg) by mouth every 4 hours as needed (Anxiety, Nausea/Vomiting or Sleep) 30 tablet 2     Acetaminophen (TYLENOL PO) Take 1,000 mg by mouth At Bedtime       calcium carbonate (TUMS) 500 MG chewable tablet Take 1-2 chew tab by mouth daily  150 tablet      FLUoxetine (PROZAC) 20 MG capsule 1 tablet in the morning  1     polyethylene glycol (MIRALAX) Packet Take 238 g by mouth See Admin Instructions One 8.3-ounce bottle (238 g). Refer to surgical packet for medication administration 238 g 0     ondansetron (ZOFRAN) 4 MG tablet Take one tablet by mouth every 6 hours as needed for nausea when taking neomycin and flagyl 3 tablet 0     AMITRIPTYLINE HCL PO Take 20 mg by mouth At Bedtime          Physical Examination:  General: The patient is a pleasant female in no acute distress.  /82  Pulse 83  Temp 98.3  F (36.8  C)  Resp 16  Wt 92.9 kg (204 lb 14.4 oz)  SpO2 97%  BMI 33.09 kg/m2  Wt Readings from Last 10 Encounters:   03/12/18 92.9 kg (204 lb 14.4 oz)   02/26/18 93.3 kg (205 lb 9.6 oz)   02/19/18 90.7 kg (200 lb)   02/15/18 91.9 kg (202 lb 9.6 oz)   02/06/18 91.2 kg (201 lb)   02/06/18 92.5 kg (204 lb)   01/25/18 91.9 kg (202 lb 11.2 oz)   01/25/18 91.9 kg (202 lb 11.2 oz)   02/19/16 88.9 kg (196 lb)     HEENT: EOMI, PERRL. Sclerae are anicteric. Oral mucosa is pink and moist with no lesions or thrush.   Lymph: Neck is supple with no lymphadenopathy in the cervical or supraclavicular areas.   Chest: Right port dressing c/d/i. No erythema of skin.  Heart: Regular rate and rhythm.   Lungs: Clear to auscultation bilaterally.   Abdomen: Bowel sounds present, soft, nontender with no palpable hepatosplenomegaly or masses.   Extremities: No lower extremity edema noted bilaterally.   Neuro: Cranial nerves II through XII are  grossly intact.  Skin: No rashes, petechiae, or bruising noted on exposed skin.    Laboratory Data:  Results for EDGAR MARTINEZ (MRN [id number]) as of 3/12/2018 13:04   3/12/2018 11:24   Sodium 139   Potassium 4.2   Chloride 105   Carbon Dioxide 27   Urea Nitrogen 15   Creatinine 0.66   GFR Estimate >90   GFR Estimate If Black >90   Calcium 9.0   Anion Gap 6   Albumin 3.7   Protein Total 7.0   Bilirubin Total 0.4   Alkaline Phosphatase 76   ALT 37   AST 21   Glucose 90   WBC 4.1   Hemoglobin 13.4   Hematocrit 39.1   Platelet Count 181   RBC Count 4.41   MCV 89   MCH 30.4   MCHC 34.3   RDW 13.2   Diff Method Automated Method   % Neutrophils 51.6   % Lymphocytes 33.7   % Monocytes 10.1   % Eosinophils 3.7   % Basophils 0.7   % Immature Granulocytes 0.2   Nucleated RBCs 0   Absolute Neutrophil 2.1   Absolute Lymphocytes 1.4   Absolute Monocytes 0.4   Absolute Eosinophils 0.2   Absolute Basophils 0.0   Abs Immature Granulocytes 0.0   Absolute Nucleated RBC 0.0       Assessment and Plan:    1. Locally advanced moderately differentiated adenocarcinoma of the rectum: [id number]. MSI stable. Started neoadjuvant FOLFOX on 2/26. Plan for 4 cycles then restaging with MRI pelvis + abdomen. Presents today for C2. Tolerated well aside from some intermittent cold neuropathy and nausea/vomiting. Labs reviewed. Proceed with C2  --Will schedule C3 and C4 with Cathleen Ramos. Will schedule MRI after C4 and Dr. Mortensen  --she does not appear to have any dermatitis near port site. Recommended she have infusion RN see if they can adjust the port access needle so as not to cause her skin irritation/itching.    2. Nausea/vomiting: She was taking scheduled compazine and ativan without any vomiting but started having difficulty when she stopped around day 3-4. Discussed staying on scheduled compazine and zofran during this time period this cycle. Discussed that if this is not effective, we can add other anti-emetics like Emend to her next cycle.  Discussed she can also call or send [employer] message if she needs IV anti-emetics or fluids. She would prefer [place] location if possible.  --New rx given to patient today for ativan, compazine    3. Insomnia: Difficulty falling asleep and staying asleep during first few days of chemo. Discussed this could be 2/2 dexamethasone. She was taking ativan qhs but would wake after about 4 hours. Discussed options such as melatonin and benadryl. She will give these a trial.    4. Seasonal Affective Disorder: She has been prescribed Prozac but is currently not taking this medication.    5. Restless Leg syndrome: On amitriptyline.       Sandy Rose PA-C  [facility]  [address] [place], [place] [address] [phone]      REX Tillman

## 2018-03-12 NOTE — MR AVS SNAPSHOT
After Visit Summary   3/12/2018    Sarah Rajan    MRN: [id number]           Patient Information     Date Of Birth          1965        Visit Information        Provider Department      3/12/2018 11:20 AM Sandy Rose PA [facility]        Today's Diagnoses     Malignant neoplasm of rectum (H)           Follow-ups after your visit        Your next 10 appointments already scheduled     Mar 26, 2018 12:00 PM CDT   [facility] Lab Draw with [facility]IC LAB DRAW   [facility]    [address]  [address]  [facility] [address]   [phone]            Mar 26, 2018 12:30 PM CDT   (Arrive by 12:15 PM)   Return Visit with LISSA Rdz [facility] ([facility])    [address] [place] [address]  [address]  [facility] [address]   [phone]            Mar 26, 2018  1:30 PM CDT   Infusion 240 with UC ONCOLOGY INFUSION, UC 32 ATC   [facility] ([facility])    [address] [place] [address]  [address]  [facility] [address]   [phone]            Apr 09, 2018 10:30 AM CDT   Masonic Lab Draw with [facility] MASONIC LAB DRAW   [facility] Masonic Lab Draw ([facility])    [address] [place] [address]  [address]  [facility] [address]   [phone]            Apr 09, 2018 11:10 AM CDT   (Arrive by 10:55 AM)   Return Visit with LISSA Rdz [facility] ([facility])    [address] [place] [address]  [address]  [facility] [address]   [phone]            Apr 09, 2018 12:30 PM CDT   Infusion 240 with UC ONCOLOGY INFUSION, UC 30 ATC   [facility] ([facility])    [address] [place] [address]  [address]  [facility] [address]   [phone]            Apr 19, 2018 11:45 AM CDT   Masonic Lab Draw with [facility] MASONIC LAB DRAW   [facility]  Lab Draw ([facility])    [address]  [address]  [facility] [id number] [phone]            Apr 19, 2018 12:15 PM CDT   (Arrive by 12:00 PM)   MR ABDOMEN & PELVIS W/O & W CONTRAST with UCMR1   [facility] MRI ([facility])    [address]  1st Floor  [facility] [id number] [phone]           Take your medicines as usual, unless your doctor tells you not to. Bring a list of your current medicines to your exam (including vitamins, minerals and over-the-counter drugs). Also bring the results of similar scans you may have had.    You may or may not receive IV contrast for this exam pending the discretion of the Radiologist.   Do not eat or drink for 6 hours prior to exam.  The MRI machine uses a strong magnet. Please wear clothes without metal (snaps, zippers). A sweatsuit works well, or we may give you a hospital gown.  Please remove any body piercings and hair extensions before you arrive. You will also remove watches, jewelry, hairpins, wallets, dentures, partial dental plates and hearing aids. You may wear contact lenses, and you may be able to wear your rings. We have a safe place to keep your personal items, but it is safer to leave them at home.  **IMPORTANT** THE INSTRUCTIONS BELOW ARE ONLY FOR THOSE PATIENTS WHO HAVE BEEN PRESCRIBED SEDATION OR GENERAL ANESTHESIA DURING THEIR MRI PROCEDURE:  IF YOUR DOCTOR PRESCRIBED ORAL SEDATION (take medicine to help you relax during your exam):   You must get the medicine from your doctor (oral medication) before you arrive. Bring the medicine to the exam. Do not take it at home. You ll be told when to take it upon arriving for your exam.   Arrive one hour early. Bring someone who can take you home after the test. Your medicine will make you sleepy. After the exam, you may not drive, take a bus or take a taxi by yourself.  IF YOUR DOCTOR PRESCRIBED IV SEDATION:   Arrive one hour  early. Bring someone who can take you home after the test. Your medicine will make you sleepy. After the exam, you may not drive, take a bus or take a taxi by yourself.   No eating 6 hours before your exam. You may have clear liquids up until 4 hours before your exam. (Clear liquids include water, clear tea, black coffee and fruit juice without pulp.)  IF YOUR DOCTOR PRESCRIBED ANESTHESIA (be asleep for your exam):   Arrive 1 1/2 hours early. Bring someone who can take you home after the test. You may not drive, take a bus or take a taxi by yourself.   No eating 8 hours before your exam. You may have clear liquids up until 4 hours before your exam. (Clear liquids include water, clear tea, black coffee and fruit juice without pulp.)   You will spend four to five hours in the recovery room.  If you have any questions, please contact your Imaging Department exam site.              Future tests that were ordered for you today     Open Future Orders        Priority Expected Expires Ordered    MRI Abdomen & pelvis w & wo contrast Routine 4/20/2018 6/10/2018 3/12/2018            Who to contact     If you have questions or need follow up information about today's clinic visit or your schedule please contact [facility] directly at [phone].  Normal or non-critical lab and imaging results will be communicated to you by CMEhart, letter or phone within 4 business days after the clinic has received the results. If you do not hear from us within 7 days, please contact the clinic through CMEhart or phone. If you have a critical or abnormal lab result, we will notify you by phone as soon as possible.  Submit refill requests through [facility] or call your pharmacy and they will forward the refill request to us. Please allow 3 business days for your refill to be completed.          Additional Information About Your Visit        [facility] Information     [facility] gives you secure access to your electronic health  record. If you see a primary care provider, you can also send messages to your care team and make appointments. If you have questions, please call your primary care clinic.  If you do not have a primary care provider, please call [phone] and they will assist you.        Care EveryWhere ID     This is your Care EveryWhere ID. This could be used by other organizations to access your [place] medical records  [id number]        Your Vitals Were     Pulse Temperature Respirations Pulse Oximetry BMI (Body Mass Index)       83 98.3  F (36.8  C) 16 97% 33.09 kg/m2        Blood Pressure from Last 3 Encounters:   03/12/18 115/82   02/26/18 125/85   02/19/18 130/72    Weight from Last 3 Encounters:   03/12/18 92.9 kg (204 lb 14.4 oz)   02/26/18 93.3 kg (205 lb 9.6 oz)   02/19/18 90.7 kg (200 lb)                 Where to get your medicines      These medications were sent to [facility] [address] [facility] [address]  [address] [facility] [address] [place] [id number]    Hours:  TRANSPLANT PHONE NUMBER [phone] Phone:  [phone]     prochlorperazine 10 MG tablet         Some of these will need a paper prescription and others can be bought over the counter.  Ask your nurse if you have questions.     Bring a paper prescription for each of these medications     LORazepam 0.5 MG tablet          Primary Care Provider Office Phone # Fax #    Wayne Green, FREDY [phone]       [facility] [address] [facility] [address]        Equal Access to Services     DEAN CHAKRABORTY AH: Hadliban Armendariz, warashaadda lumeli, qaybben kaalluz elena aguillon. So [facility] [phone].    ATENCIÓN: Si habla español, tiene a membreno disposición servicios gratuitos de asistencia lingüística. Claudette mandel [phone].    We comply with applicable federal civil rights laws and [place] laws. We do not discriminate on the basis  of race, color, national origin, age, disability, sex, sexual orientation, or gender identity.            Thank you!     Thank you for choosing [facility]  for your care. Our goal is always to provide you with excellent care. Hearing back from our patients is one way we can continue to improve our services. Please take a few minutes to complete the written survey that you may receive in the mail after your visit with us. Thank you!             Your Updated Medication List - Protect others around you: Learn how to safely use, store and throw away your medicines at www.disposemymeds.org.          This list is accurate as of 3/12/18  3:11 PM.  Always use your most recent med list.                   Brand Name Dispense Instructions for use Diagnosis    AMITRIPTYLINE HCL PO      Take 20 mg by mouth At Bedtime        calcium carbonate 500 MG chewable tablet    TUMS    150 tablet    Take 1-2 chew tab by mouth daily        FLUoxetine 20 MG capsule    PROzac     1 tablet in the morning        lidocaine-prilocaine cream    EMLA    30 g    Apply 45 minutes prior to procedure.    Malignant neoplasm of rectum (H)       LORazepam 0.5 MG tablet    ATIVAN    30 tablet    Take 1 tablet (0.5 mg) by mouth every 4 hours as needed (Anxiety, Nausea/Vomiting or Sleep)    Malignant neoplasm of rectum (H)       ondansetron 4 MG tablet    ZOFRAN    3 tablet    Take one tablet by mouth every 6 hours as needed for nausea when taking neomycin and flagyl    Rectal cancer (H)       polyethylene glycol Packet    MIRALAX    238 g    Take 238 g by mouth See Admin Instructions One 8.3-ounce bottle (238 g). Refer to surgical packet for medication administration    Rectal cancer (H)       prochlorperazine 10 MG tablet    COMPAZINE    20 tablet    Take 1 tablet (10 mg) by mouth every 6 hours as needed for nausea or vomiting    Malignant neoplasm of rectum (H)       TYLENOL PO      Take 1,000 mg by mouth At Bedtime

## 2018-03-12 NOTE — LETTER
3/12/2018      RE: Sarah Rajan  [address] [facility] [address]       Oncology/Hematology Visit Note  Mar 12, 2018    Reason for Visit: follow up of [id number] moderately differentiated adenocarcinoma of the rectum     History of Present Illness: Sarah Rajan is a 52 year old female with  recently diagnosed locally advanced rectal cancer. She is currently undergoing treatment with neoadjuvant FOLFOX. Please see Dr. Mortensen's previous notes for further details on the patient's history. She comes in today for routine follow up prior to cycle 2.    Interval History:  Sarah states that once disconnected from her pump she began having more nausea. She had been taking scheduled compazine and ativan, but stopped and then had nausea and vomiting x 2. She then resumed taking anti-emetic medication with relief of symptoms. Denies having diarrhea, but since colonoscopy she has had softer/loose stools, with occasional blood and mucous. She also was told she has hemorrhoids, but she denies any rectal pain or pain with bowel movement. She noticed some intermittent cold sensitivity in her hands/feet and throat. She managed by wearing gloves, socks and drank fluids at room temperature. She denies any current numbness/tingling in hands/feet. She also had some itching at her port site which she attributes to the butterfly needle irritating her skin. She did not have any redness aside from when she scratched the area. She does not appear to have had any reaction to the dressing adhesive. Port needle is likewise bothering her today. She also had some difficulty falling asleep and staying asleep during the first week of chemotherapy. She is otherwise feeling well. She returned from a 5 day trip from [place] and the [facility]. Her 10-point review of systems is otherwise negative.     Current Outpatient Prescriptions   Medication Sig Dispense Refill     lidocaine-prilocaine (EMLA) cream Apply 45 minutes prior to procedure. 30 g 3      prochlorperazine (COMPAZINE) 10 MG tablet Take 1 tablet (10 mg) by mouth every 6 hours as needed for nausea or vomiting 20 tablet 1     LORazepam (ATIVAN) 0.5 MG tablet Take 1 tablet (0.5 mg) by mouth every 4 hours as needed (Anxiety, Nausea/Vomiting or Sleep) 30 tablet 2     Acetaminophen (TYLENOL PO) Take 1,000 mg by mouth At Bedtime       calcium carbonate (TUMS) 500 MG chewable tablet Take 1-2 chew tab by mouth daily  150 tablet      FLUoxetine (PROZAC) 20 MG capsule 1 tablet in the morning  1     polyethylene glycol (MIRALAX) Packet Take 238 g by mouth See Admin Instructions One 8.3-ounce bottle (238 g). Refer to surgical packet for medication administration 238 g 0     ondansetron (ZOFRAN) 4 MG tablet Take one tablet by mouth every 6 hours as needed for nausea when taking neomycin and flagyl 3 tablet 0     AMITRIPTYLINE HCL PO Take 20 mg by mouth At Bedtime          Physical Examination:  General: The patient is a pleasant female in no acute distress.  /82  Pulse 83  Temp 98.3  F (36.8  C)  Resp 16  Wt 92.9 kg (204 lb 14.4 oz)  SpO2 97%  BMI 33.09 kg/m2  Wt Readings from Last 10 Encounters:   03/12/18 92.9 kg (204 lb 14.4 oz)   02/26/18 93.3 kg (205 lb 9.6 oz)   02/19/18 90.7 kg (200 lb)   02/15/18 91.9 kg (202 lb 9.6 oz)   02/06/18 91.2 kg (201 lb)   02/06/18 92.5 kg (204 lb)   01/25/18 91.9 kg (202 lb 11.2 oz)   01/25/18 91.9 kg (202 lb 11.2 oz)   02/19/16 88.9 kg (196 lb)     HEENT: EOMI, PERRL. Sclerae are anicteric. Oral mucosa is pink and moist with no lesions or thrush.   Lymph: Neck is supple with no lymphadenopathy in the cervical or supraclavicular areas.   Chest: Right port dressing c/d/i. No erythema of skin.  Heart: Regular rate and rhythm.   Lungs: Clear to auscultation bilaterally.   Abdomen: Bowel sounds present, soft, nontender with no palpable hepatosplenomegaly or masses.   Extremities: No lower extremity edema noted bilaterally.   Neuro: Cranial nerves II through XII are  grossly intact.  Skin: No rashes, petechiae, or bruising noted on exposed skin.    Laboratory Data:  Results for EDGAR MARTINEZ (MRN [id number]) as of 3/12/2018 13:04   3/12/2018 11:24   Sodium 139   Potassium 4.2   Chloride 105   Carbon Dioxide 27   Urea Nitrogen 15   Creatinine 0.66   GFR Estimate >90   GFR Estimate If Black >90   Calcium 9.0   Anion Gap 6   Albumin 3.7   Protein Total 7.0   Bilirubin Total 0.4   Alkaline Phosphatase 76   ALT 37   AST 21   Glucose 90   WBC 4.1   Hemoglobin 13.4   Hematocrit 39.1   Platelet Count 181   RBC Count 4.41   MCV 89   MCH 30.4   MCHC 34.3   RDW 13.2   Diff Method Automated Method   % Neutrophils 51.6   % Lymphocytes 33.7   % Monocytes 10.1   % Eosinophils 3.7   % Basophils 0.7   % Immature Granulocytes 0.2   Nucleated RBCs 0   Absolute Neutrophil 2.1   Absolute Lymphocytes 1.4   Absolute Monocytes 0.4   Absolute Eosinophils 0.2   Absolute Basophils 0.0   Abs Immature Granulocytes 0.0   Absolute Nucleated RBC 0.0       Assessment and Plan:    1. 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 MSI stable. Started neoadjuvant FOLFOX on 2/26. Plan for 4 cycles then restaging with MRI pelvis + abdomen. Presents today for C2. Tolerated well aside from some intermittent cold neuropathy and nausea/vomiting. Labs reviewed. Proceed with C2  --Will schedule C3 and C4 with Cathleen Ramos. Will schedule MRI after C4 and Dr. Mortensen  --she does not appear to have any dermatitis near port site. Recommended she have infusion RN see if they can adjust the port access needle so as not to cause her skin irritation/itching.    2. Nausea/vomiting: She was taking scheduled compazine and ativan without any vomiting but started having difficulty when she stopped around day 3-4. Discussed staying on scheduled compazine and zofran during this time period this cycle. Discussed that if this is not effective, we can add other anti-emetics like Emend to her next cycle.  Discussed she can also call or send [employer] message if she needs IV anti-emetics or fluids. She would prefer [place] location if possible.  --New rx given to patient today for ativan, compazine    3. Insomnia: Difficulty falling asleep and staying asleep during first few days of chemo. Discussed this could be 2/2 dexamethasone. She was taking ativan qhs but would wake after about 4 hours. Discussed options such as melatonin and benadryl. She will give these a trial.    4. Seasonal Affective Disorder: She has been prescribed Prozac but is currently not taking this medication.    5. Restless Leg syndrome: On amitriptyline.       Sandy Rose PA-C  [facility]  [address] [place], [place] [id number] [phone]

## 2018-03-12 NOTE — PROGRESS NOTES
Oncology/Hematology Visit Note  Mar 12, 2018    Reason for Visit: follow up of [id number] moderately differentiated adenocarcinoma of the rectum     History of Present Illness: Sarah Rajan is a 52 year old female with  recently diagnosed locally advanced rectal cancer. She is currently undergoing treatment with neoadjuvant FOLFOX. Please see Dr. Mortensen's previous notes for further details on the patient's history. She comes in today for routine follow up prior to cycle 2.    Interval History:  Sarah states that once disconnected from her pump she began having more nausea. She had been taking scheduled compazine and ativan, but stopped and then had nausea and vomiting x 2. She then resumed taking anti-emetic medication with relief of symptoms. Denies having diarrhea, but since colonoscopy she has had softer/loose stools, with occasional blood and mucous. She also was told she has hemorrhoids, but she denies any rectal pain or pain with bowel movement. She noticed some intermittent cold sensitivity in her hands/feet and throat. She managed by wearing gloves, socks and drank fluids at room temperature. She denies any current numbness/tingling in hands/feet. She also had some itching at her port site which she attributes to the butterfly needle irritating her skin. She did not have any redness aside from when she scratched the area. She does not appear to have had any reaction to the dressing adhesive. Port needle is likewise bothering her today. She also had some difficulty falling asleep and staying asleep during the first week of chemotherapy. She is otherwise feeling well. She returned from a 5 day trip from [place] and the [facility]. Her 10-point review of systems is otherwise negative.     Current Outpatient Prescriptions   Medication Sig Dispense Refill     lidocaine-prilocaine (EMLA) cream Apply 45 minutes prior to procedure. 30 g 3     prochlorperazine (COMPAZINE) 10 MG tablet Take 1 tablet (10 mg) by mouth every 6  hours as needed for nausea or vomiting 20 tablet 1     LORazepam (ATIVAN) 0.5 MG tablet Take 1 tablet (0.5 mg) by mouth every 4 hours as needed (Anxiety, Nausea/Vomiting or Sleep) 30 tablet 2     Acetaminophen (TYLENOL PO) Take 1,000 mg by mouth At Bedtime       calcium carbonate (TUMS) 500 MG chewable tablet Take 1-2 chew tab by mouth daily  150 tablet      FLUoxetine (PROZAC) 20 MG capsule 1 tablet in the morning  1     polyethylene glycol (MIRALAX) Packet Take 238 g by mouth See Admin Instructions One 8.3-ounce bottle (238 g). Refer to surgical packet for medication administration 238 g 0     ondansetron (ZOFRAN) 4 MG tablet Take one tablet by mouth every 6 hours as needed for nausea when taking neomycin and flagyl 3 tablet 0     AMITRIPTYLINE HCL PO Take 20 mg by mouth At Bedtime          Physical Examination:  General: The patient is a pleasant female in no acute distress.  /82  Pulse 83  Temp 98.3  F (36.8  C)  Resp 16  Wt 92.9 kg (204 lb 14.4 oz)  SpO2 97%  BMI 33.09 kg/m2  Wt Readings from Last 10 Encounters:   03/12/18 92.9 kg (204 lb 14.4 oz)   02/26/18 93.3 kg (205 lb 9.6 oz)   02/19/18 90.7 kg (200 lb)   02/15/18 91.9 kg (202 lb 9.6 oz)   02/06/18 91.2 kg (201 lb)   02/06/18 92.5 kg (204 lb)   01/25/18 91.9 kg (202 lb 11.2 oz)   01/25/18 91.9 kg (202 lb 11.2 oz)   02/19/16 88.9 kg (196 lb)     HEENT: EOMI, PERRL. Sclerae are anicteric. Oral mucosa is pink and moist with no lesions or thrush.   Lymph: Neck is supple with no lymphadenopathy in the cervical or supraclavicular areas.   Chest: Right port dressing c/d/i. No erythema of skin.  Heart: Regular rate and rhythm.   Lungs: Clear to auscultation bilaterally.   Abdomen: Bowel sounds present, soft, nontender with no palpable hepatosplenomegaly or masses.   Extremities: No lower extremity edema noted bilaterally.   Neuro: Cranial nerves II through XII are grossly intact.  Skin: No rashes, petechiae, or bruising noted on exposed  skin.    Laboratory Data:  Results for EDGAR MARTINEZ (MRN [id number]) as of 3/12/2018 13:04   3/12/2018 11:24   Sodium 139   Potassium 4.2   Chloride 105   Carbon Dioxide 27   Urea Nitrogen 15   Creatinine 0.66   GFR Estimate >90   GFR Estimate If Black >90   Calcium 9.0   Anion Gap 6   Albumin 3.7   Protein Total 7.0   Bilirubin Total 0.4   Alkaline Phosphatase 76   ALT 37   AST 21   Glucose 90   WBC 4.1   Hemoglobin 13.4   Hematocrit 39.1   Platelet Count 181   RBC Count 4.41   MCV 89   MCH 30.4   MCHC 34.3   RDW 13.2   Diff Method Automated Method   % Neutrophils 51.6   % Lymphocytes 33.7   % Monocytes 10.1   % Eosinophils 3.7   % Basophils 0.7   % Immature Granulocytes 0.2   Nucleated RBCs 0   Absolute Neutrophil 2.1   Absolute Lymphocytes 1.4   Absolute Monocytes 0.4   Absolute Eosinophils 0.2   Absolute Basophils 0.0   Abs Immature Granulocytes 0.0   Absolute Nucleated RBC 0.0       Assessment and Plan:    1. Locally advanced moderately differentiated adenocarcinoma of the rectum: [id number]. MSI stable. Started neoadjuvant FOLFOX on 2/26. Plan for 4 cycles then restaging with MRI pelvis + abdomen. Presents today for C2. Tolerated well aside from some intermittent cold neuropathy and nausea/vomiting. Labs reviewed. Proceed with C2  --Will schedule C3 and C4 with Cathleen Ramos. Will schedule MRI after C4 and Dr. Mortensen  --she does not appear to have any dermatitis near port site. Recommended she have infusion RN see if they can adjust the port access needle so as not to cause her skin irritation/itching.    2. Nausea/vomiting: She was taking scheduled compazine and ativan without any vomiting but started having difficulty when she stopped around day 3-4. Discussed staying on scheduled compazine and zofran during this time period this cycle. Discussed that if this is not effective, we can add other anti-emetics like Emend to her next cycle. Discussed she can also call or send [employer] message if she needs IV  anti-emetics or fluids. She would prefer [place] location if possible.  --New rx given to patient today for ativan, compazine    3. Insomnia: Difficulty falling asleep and staying asleep during first few days of chemo. Discussed this could be 2/2 dexamethasone. She was taking ativan qhs but would wake after about 4 hours. Discussed options such as melatonin and benadryl. She will give these a trial.    4. Seasonal Affective Disorder: She has been prescribed Prozac but is currently not taking this medication.    5. Restless Leg syndrome: On amitriptyline.       Sandy Rose PA-C  [facility]  [address], [place] [id number] [phone]

## 2018-03-12 NOTE — MR AVS SNAPSHOT
After Visit Summary   3/12/2018    Sarah Rajan    MRN: [id number]           Patient Information     Date Of Birth          1965        Visit Information        Provider Department      3/12/2018 12:00 PM UC 24 ATC; UC ONCOLOGY INFUSION [facility]        Today's Diagnoses     Malignant neoplasm of rectum (H)    -  1       Follow-ups after your visit        Your next 10 appointments already scheduled     Mar 26, 2018 12:00 PM CDT   [facility] Lab Draw with [facility] LAB DRAW   [facility] ([facility])    [address] [place] [address]  [address]  [facility] [address]   [phone]            Mar 26, 2018 12:30 PM CDT   (Arrive by 12:15 PM)   Return Visit with LISSA Rdz [facility] ([facility])    [address] [place] [address]  [address]  [facility] [address]   [phone]            Mar 26, 2018  1:30 PM CDT   Infusion 240 with UC ONCOLOGY INFUSION, UC 32 ATC   [facility] ([facility])    [address] [place] [address]  [address]  [facility] [address]   [phone]            Apr 09, 2018 10:30 AM CDT   [facility] Lab Draw with UC [facility] LAB DRAW   [facility] Lab Draw ([facility])    [address] [place] [address]  [address]  [facility] [address]   [phone]            Apr 09, 2018 11:10 AM CDT   (Arrive by 10:55 AM)   Return Visit with LISSA Rdz [facility] ([facility])    [address] [place] [address]  [address]  [facility] [address]   [phone]            Apr 09, 2018 12:30 PM CDT   Infusion 240 with UC ONCOLOGY INFUSION, UC 30 ATC   [facility] ([facility])    [address] [place] [address]  [address]  [facility] [address]   [phone]            Apr 19, 2018 11:45 AM CDT   [facility] Lab Draw with UC [facility] LAB DRAW   [facility] ([facility])    [address]  [address]  [facility] [id number] [phone]            Apr 19, 2018 12:15 PM CDT   (Arrive by 12:00 PM)   MR ABDOMEN & PELVIS W/O & W CONTRAST with UCMR1   [facility] ([facility])    [address]  1st Floor  [facility] [id number] [phone]           Take your medicines as usual, unless your doctor tells you not to. Bring a list of your current medicines to your exam (including vitamins, minerals and over-the-counter drugs). Also bring the results of similar scans you may have had.    You may or may not receive IV contrast for this exam pending the discretion of the Radiologist.   Do not eat or drink for 6 hours prior to exam.  The MRI machine uses a strong magnet. Please wear clothes without metal (snaps, zippers). A sweatsuit works well, or we may give you a hospital gown.  Please remove any body piercings and hair extensions before you arrive. You will also remove watches, jewelry, hairpins, wallets, dentures, partial dental plates and hearing aids. You may wear contact lenses, and you may be able to wear your rings. We have a safe place to keep your personal items, but it is safer to leave them at home.  **IMPORTANT** THE INSTRUCTIONS BELOW ARE ONLY FOR THOSE PATIENTS WHO HAVE BEEN PRESCRIBED SEDATION OR GENERAL ANESTHESIA DURING THEIR MRI PROCEDURE:  IF YOUR DOCTOR PRESCRIBED ORAL SEDATION (take medicine to help you relax during your exam):   You must get the medicine from your doctor (oral medication) before you arrive. Bring the medicine to the exam. Do not take it at home. You ll be told when to take it upon arriving for your exam.   Arrive one hour early. Bring someone who can take you home after the test. Your medicine will make you sleepy. After the exam, you may not drive, take a bus or take a taxi by yourself.  IF YOUR DOCTOR PRESCRIBED IV SEDATION:    Arrive one hour early. Bring someone who can take you home after the test. Your medicine will make you sleepy. After the exam, you may not drive, take a bus or take a taxi by yourself.   No eating 6 hours before your exam. You may have clear liquids up until 4 hours before your exam. (Clear liquids include water, clear tea, black coffee and fruit juice without pulp.)  IF YOUR DOCTOR PRESCRIBED ANESTHESIA (be asleep for your exam):   Arrive 1 1/2 hours early. Bring someone who can take you home after the test. You may not drive, take a bus or take a taxi by yourself.   No eating 8 hours before your exam. You may have clear liquids up until 4 hours before your exam. (Clear liquids include water, clear tea, black coffee and fruit juice without pulp.)   You will spend four to five hours in the recovery room.  If you have any questions, please contact your Imaging Department exam site.              Future tests that were ordered for you today     Open Future Orders        Priority Expected Expires Ordered    MRI Abdomen & pelvis w & wo contrast Routine 4/20/2018 6/10/2018 3/12/2018            Who to contact     If you have questions or need follow up information about today's clinic visit or your schedule please contact [facility] directly at [phone].  Normal or non-critical lab and imaging results will be communicated to you by [employer], letter or phone within 4 business days after the clinic has received the results. If you do not hear from us within 7 days, please contact the clinic through [employer] or phone. If you have a critical or abnormal lab result, we will notify you by phone as soon as possible.  Submit refill requests through [employer] or call your pharmacy and they will forward the refill request to us. Please allow 3 business days for your refill to be completed.          Additional Information About Your Visit        [employer] Information     [employer] gives you secure access to your  electronic health record. If you see a primary care provider, you can also send messages to your care team and make appointments. If you have questions, please call your primary care clinic.  If you do not have a primary care provider, please call [phone] and they will assist you.        Care EveryWhere ID     This is your Care EveryWhere ID. This could be used by other organizations to access your [place] medical records  [id number]         Blood Pressure from Last 3 Encounters:   03/12/18 115/82   02/26/18 125/85   02/19/18 130/72    Weight from Last 3 Encounters:   03/12/18 92.9 kg (204 lb 14.4 oz)   02/26/18 93.3 kg (205 lb 9.6 oz)   02/19/18 90.7 kg (200 lb)              We Performed the Following     CBC with platelets differential     Comprehensive metabolic panel          Where to get your medicines      These medications were sent to [address] [place] [address] [address] [address]    Hours:  TRANSPLANT PHONE NUMBER [phone] Phone:  [phone]     prochlorperazine 10 MG tablet         Some of these will need a paper prescription and others can be bought over the counter.  Ask your nurse if you have questions.     Bring a paper prescription for each of these medications     LORazepam 0.5 MG tablet          Primary Care Provider Office Phone # Fax #    Wayne Green [phone]       [facility] [address] [facility] [address]        Equal Access to Services     DEAN CHAKRABORTY : Hadii shannon ku hadasho Soomaali, waaxda luqadaha, qaybta kaalmada adeegyada, luz elena jacob. So [facility] [phone].    ATENCIÓN: Si habla español, tiene a membreno disposición servicios gratuitos de asistencia lingüística. Llame al [phone].    We comply with applicable federal civil rights laws and [place] laws. We do not discriminate on the basis of race, color,  national origin, age, disability, sex, sexual orientation, or gender identity.            Thank you!     Thank you for choosing [facility]  for your care. Our goal is always to provide you with excellent care. Hearing back from our patients is one way we can continue to improve our services. Please take a few minutes to complete the written survey that you may receive in the mail after your visit with us. Thank you!             Your Updated Medication List - Protect others around you: Learn how to safely use, store and throw away your medicines at www.disposemymeds.org.          This list is accurate as of 3/12/18  4:07 PM.  Always use your most recent med list.                   Brand Name Dispense Instructions for use Diagnosis    AMITRIPTYLINE HCL PO      Take 20 mg by mouth At Bedtime        calcium carbonate 500 MG chewable tablet    TUMS    150 tablet    Take 1-2 chew tab by mouth daily        FLUoxetine 20 MG capsule    PROzac     1 tablet in the morning        lidocaine-prilocaine cream    EMLA    30 g    Apply 45 minutes prior to procedure.    Malignant neoplasm of rectum (H)       LORazepam 0.5 MG tablet    ATIVAN    30 tablet    Take 1 tablet (0.5 mg) by mouth every 4 hours as needed (Anxiety, Nausea/Vomiting or Sleep)    Malignant neoplasm of rectum (H)       ondansetron 4 MG tablet    ZOFRAN    3 tablet    Take one tablet by mouth every 6 hours as needed for nausea when taking neomycin and flagyl    Rectal cancer (H)       polyethylene glycol Packet    MIRALAX    238 g    Take 238 g by mouth See Admin Instructions One 8.3-ounce bottle (238 g). Refer to surgical packet for medication administration    Rectal cancer (H)       prochlorperazine 10 MG tablet    COMPAZINE    20 tablet    Take 1 tablet (10 mg) by mouth every 6 hours as needed for nausea or vomiting    Malignant neoplasm of rectum (H)       TYLENOL PO      Take 1,000 mg by mouth At Bedtime

## 2018-03-12 NOTE — NURSING NOTE
"Oncology Rooming Note    March 12, 2018 11:38 AM   Sarah Rajan is a 52 year old female who presents for:    Chief Complaint   Patient presents with     Port Draw     accessed with power needle, heparin locked, vitals checked     Oncology Clinic Visit     Return for Rectal Ca , Lab, Tx     Initial Vitals: /82  Pulse 83  Temp 98.3  F (36.8  C)  Resp 16  Wt 92.9 kg (204 lb 14.4 oz)  SpO2 97%  BMI 33.09 kg/m2 Estimated body mass index is 33.09 kg/(m^2) as calculated from the following:    Height as of 2/19/18: 1.676 m (5' 5.98\").    Weight as of this encounter: 92.9 kg (204 lb 14.4 oz). Body surface area is 2.08 meters squared.  No Pain (0) Comment: Data Unavailable   No LMP recorded. Patient is postmenopausal.  Allergies reviewed: Yes  Medications reviewed: Yes    Medications: MEDICATION REFILLS NEEDED TODAY. Provider was notified.  Pharmacy name entered into [facility]:    [facility] [address] [place] [address] DR ALVAREZ AT [facility] OF [place], [place] - [address] [facility] [phone]    Clinical concerns: Refills Compazine   Tatsumi was notified.    5 minutes for nursing intake (face to face time)     Jerilyn Valero MA              "

## 2018-03-13 ENCOUNTER — HOSPITAL ENCOUNTER (EMERGENCY)
Facility: CLINIC | Age: 53
Discharge: HOME | End: 2018-03-13
Attending: FAMILY MEDICINE | Admitting: FAMILY MEDICINE
Payer: COMMERCIAL

## 2018-03-13 ENCOUNTER — APPOINTMENT (OUTPATIENT)
Dept: GENERAL RADIOLOGY | Facility: CLINIC | Age: 53
End: 2018-03-13
Attending: FAMILY MEDICINE
Payer: COMMERCIAL

## 2018-03-13 ENCOUNTER — HOME INFUSION (PRE-WILLOW HOME INFUSION) (OUTPATIENT)
Dept: PHARMACY | Facility: CLINIC | Age: 53
End: 2018-03-13

## 2018-03-13 VITALS
OXYGEN SATURATION: 98 % | HEART RATE: 89 BPM | DIASTOLIC BLOOD PRESSURE: 71 MMHG | RESPIRATION RATE: 16 BRPM | TEMPERATURE: 98.3 F | SYSTOLIC BLOOD PRESSURE: 115 MMHG

## 2018-03-13 DIAGNOSIS — R50.9 FEVER, UNSPECIFIED FEVER CAUSE: ICD-10-CM

## 2018-03-13 DIAGNOSIS — R21 MORBILLIFORM RASH: ICD-10-CM

## 2018-03-13 LAB
ALBUMIN SERPL-MCNC: 3.8 G/DL (ref 3.4–5)
ALBUMIN UR-MCNC: NEGATIVE MG/DL
ALP SERPL-CCNC: 77 U/L (ref 40–150)
ALT SERPL W P-5'-P-CCNC: 45 U/L (ref 0–50)
ANION GAP SERPL CALCULATED.3IONS-SCNC: 5 MMOL/L (ref 3–14)
APPEARANCE UR: CLEAR
AST SERPL W P-5'-P-CCNC: 23 U/L (ref 0–45)
BACTERIA #/AREA URNS HPF: ABNORMAL /HPF
BASOPHILS # BLD AUTO: 0 10E9/L (ref 0–0.2)
BASOPHILS NFR BLD AUTO: 0.3 %
BILIRUB SERPL-MCNC: 0.5 MG/DL (ref 0.2–1.3)
BILIRUB UR QL STRIP: NEGATIVE
BUN SERPL-MCNC: 10 MG/DL (ref 7–30)
CALCIUM SERPL-MCNC: 8.4 MG/DL (ref 8.5–10.1)
CHLORIDE SERPL-SCNC: 104 MMOL/L (ref 94–109)
CO2 SERPL-SCNC: 26 MMOL/L (ref 20–32)
COLOR UR AUTO: ABNORMAL
CREAT SERPL-MCNC: 0.75 MG/DL (ref 0.52–1.04)
DIFFERENTIAL METHOD BLD: ABNORMAL
EOSINOPHIL # BLD AUTO: 0.1 10E9/L (ref 0–0.7)
EOSINOPHIL NFR BLD AUTO: 1.2 %
ERYTHROCYTE [DISTWIDTH] IN BLOOD BY AUTOMATED COUNT: 13.1 % (ref 10–15)
GFR SERPL CREATININE-BSD FRML MDRD: 82 ML/MIN/1.7M2
GLUCOSE SERPL-MCNC: 106 MG/DL (ref 70–99)
GLUCOSE UR STRIP-MCNC: NEGATIVE MG/DL
HCT VFR BLD AUTO: 38.6 % (ref 35–47)
HGB BLD-MCNC: 13.1 G/DL (ref 11.7–15.7)
HGB UR QL STRIP: NEGATIVE
IMM GRANULOCYTES # BLD: 0 10E9/L (ref 0–0.4)
IMM GRANULOCYTES NFR BLD: 0.1 %
KETONES UR STRIP-MCNC: NEGATIVE MG/DL
LACTATE BLD-SCNC: 1.5 MMOL/L (ref 0.7–2)
LEUKOCYTE ESTERASE UR QL STRIP: ABNORMAL
LYMPHOCYTES # BLD AUTO: 0.2 10E9/L (ref 0.8–5.3)
LYMPHOCYTES NFR BLD AUTO: 3.2 %
MCH RBC QN AUTO: 30 PG (ref 26.5–33)
MCHC RBC AUTO-ENTMCNC: 33.9 G/DL (ref 31.5–36.5)
MCV RBC AUTO: 88 FL (ref 78–100)
MONOCYTES # BLD AUTO: 0.4 10E9/L (ref 0–1.3)
MONOCYTES NFR BLD AUTO: 5.4 %
MUCOUS THREADS #/AREA URNS LPF: PRESENT /LPF
NEUTROPHILS # BLD AUTO: 6.2 10E9/L (ref 1.6–8.3)
NEUTROPHILS NFR BLD AUTO: 89.8 %
NITRATE UR QL: NEGATIVE
PH UR STRIP: 6 PH (ref 5–7)
PLATELET # BLD AUTO: 163 10E9/L (ref 150–450)
POTASSIUM SERPL-SCNC: 3.5 MMOL/L (ref 3.4–5.3)
PROT SERPL-MCNC: 7.3 G/DL (ref 6.8–8.8)
RBC # BLD AUTO: 4.37 10E12/L (ref 3.8–5.2)
RBC #/AREA URNS AUTO: 1 /HPF (ref 0–2)
SODIUM SERPL-SCNC: 135 MMOL/L (ref 133–144)
SOURCE: ABNORMAL
SP GR UR STRIP: 1 (ref 1–1.03)
UROBILINOGEN UR STRIP-MCNC: 0 MG/DL (ref 0–2)
WBC # BLD AUTO: 6.9 10E9/L (ref 4–11)
WBC #/AREA URNS AUTO: 2 /HPF (ref 0–5)

## 2018-03-13 PROCEDURE — 85025 COMPLETE CBC W/AUTO DIFF WBC: CPT | Performed by: FAMILY MEDICINE

## 2018-03-13 PROCEDURE — 71046 X-RAY EXAM CHEST 2 VIEWS: CPT

## 2018-03-13 PROCEDURE — 81001 URINALYSIS AUTO W/SCOPE: CPT | Performed by: FAMILY MEDICINE

## 2018-03-13 PROCEDURE — 99284 EMERGENCY DEPT VISIT MOD MDM: CPT | Mod: 25 | Performed by: FAMILY MEDICINE

## 2018-03-13 PROCEDURE — 25000132 ZZH RX MED GY IP 250 OP 250 PS 637: Performed by: FAMILY MEDICINE

## 2018-03-13 PROCEDURE — 25000128 H RX IP 250 OP 636: Performed by: FAMILY MEDICINE

## 2018-03-13 PROCEDURE — 99285 EMERGENCY DEPT VISIT HI MDM: CPT | Mod: Z6 | Performed by: FAMILY MEDICINE

## 2018-03-13 PROCEDURE — 80053 COMPREHEN METABOLIC PANEL: CPT | Performed by: FAMILY MEDICINE

## 2018-03-13 PROCEDURE — 87040 BLOOD CULTURE FOR BACTERIA: CPT | Performed by: FAMILY MEDICINE

## 2018-03-13 PROCEDURE — 83605 ASSAY OF LACTIC ACID: CPT | Performed by: FAMILY MEDICINE

## 2018-03-13 PROCEDURE — 87086 URINE CULTURE/COLONY COUNT: CPT | Performed by: FAMILY MEDICINE

## 2018-03-13 RX ORDER — SODIUM CHLORIDE 9 MG/ML
1000 INJECTION, SOLUTION INTRAVENOUS CONTINUOUS
Status: DISCONTINUED | OUTPATIENT
Start: 2018-03-13 | End: 2018-03-13 | Stop reason: HOSPADM

## 2018-03-13 RX ADMIN — IBUPROFEN 600 MG: 400 TABLET ORAL at 13:32

## 2018-03-13 RX ADMIN — SODIUM CHLORIDE 1000 ML: 9 INJECTION, SOLUTION INTRAVENOUS at 13:32

## 2018-03-13 NOTE — DISCHARGE INSTRUCTIONS
Call Dr. Mortensen in the next 1-2 days with report on how you are doing.  Call him if you develop worsening symptoms including rising fever, pain, breathing difficulty, or other new concerning symptoms, or return to the emergency department.    Continue with your infusions

## 2018-03-13 NOTE — ED AVS SNAPSHOT
[facility] Emergency Department    [address] [address]    Phone:  [phone]    Fax:  [phone]                                       Sarah Rajan   MRN: [id number]    Department:  [facility] Emergency Department   Date of Visit:  3/13/2018           Patient Information     Date Of Birth          1965        Your diagnoses for this visit were:     Fever, unspecified fever cause     Morbilliform rash        You were seen by Michael Davies MD.        Discharge Instructions       Call Dr. Mortensen in the next 1-2 days with report on how you are doing.  Call him if you develop worsening symptoms including rising fever, pain, breathing difficulty, or other new concerning symptoms, or return to the emergency department.    Continue with your infusions      Future Appointments        Provider Department Dept Phone Center    3/26/2018 12:00 PM [facility] [phone] [facility]    3/26/2018 12:30 PM Cathleen Ramos PA-C [facility] [phone] [facility]    3/26/2018 1:30 PM [facility]; [facility]cology Infusion [place] [phone] [facility]    4/9/2018 10:30 AM [facility] [phone] [facility]    4/9/2018 11:10 AM Cathleen Ramos PA-C [facility] [place] [address] [phone] [facility]    4/9/2018 12:30 PM [facility]; Oncology Infusion [facility] [phone] [facility]    4/19/2018 11:45 AM [facility] Draw [phone] [facility]    4/19/2018 12:15 PM [facility] MRI ROOM 1 [facility] [phone] [facility]    4/19/2018 2:30 PM Kyra Mortensen MD [place] [phone] [facility]    4/23/2018 12:30 PM [facility] Draw [phone] [facility]    4/23/2018 1:00 PM [facility]; Oncology Infusion [facility] [phone] [facility]       24 Hour Appointment Hotline       To make an appointment at any [facility], call [phone] ([phone]). If you don't have a family doctor or clinic, we will help you find one. [facility] clinics are conveniently located to serve the needs of you and your family.             Review of your medicines      Our records show that you are taking the medicines listed below. If these are incorrect, please call your family doctor or clinic.        Dose / Directions Last dose taken    AMITRIPTYLINE HCL PO   Dose:  20 mg        Take 20 mg by mouth At Bedtime   Refills:  0        calcium carbonate 500 MG chewable tablet   Commonly known as:  TUMS   Dose:  1-2 chew tab   Quantity:  150 tablet        Take 1-2 chew tab by mouth daily   Refills:  0        fluorouracil        Administer 4,968 mg intravenously . Continuous infusion over 46-48 hr via ambulatory pump q14d Supplied by outside provider with pump.   Refills:  0        FLUoxetine 20 MG capsule   Commonly known as:  PROzac        1 tablet in the morning   Refills:  1        lidocaine-prilocaine cream   Commonly known as:  EMLA   Quantity:  30 g        Apply 45 minutes prior to procedure.   Refills:  3        LORazepam 0.5 MG tablet   Commonly known as:  ATIVAN   Dose:  0.5 mg   Quantity:  30 tablet        Take 1 tablet (0.5 mg) by mouth every 4 hours as needed (Anxiety, Nausea/Vomiting or Sleep)   Refills:  2        ondansetron 4 MG tablet   Commonly known as:  ZOFRAN   Quantity:  3 tablet        Take one tablet by mouth every 6 hours as needed for nausea when taking neomycin and flagyl   Refills:  0        prochlorperazine 10 MG tablet   Commonly known as:  COMPAZINE   Dose:  10 mg   Quantity:  20 tablet        Take 1 tablet (10 mg) by mouth every 6 hours as needed for nausea or vomiting   Refills:  1        RANITIDINE HCL PO   Dose:  150 mg   Indication:  Ulcer of the Duodenum        Take 150 mg by mouth daily as needed for heartburn   Refills:  0         TYLENOL PO   Dose:  1000 mg        Take 1,000 mg by mouth At Bedtime   Refills:  0                Procedures and tests performed during your visit     Procedure/Test Number of Times Performed    Blood culture 2    Blood culture (one site) 1    CBC with platelets differential 1    Comprehensive metabolic panel 1    Lactic acid whole blood 1    Peripheral IV catheter 1    UA reflex to Microscopic 1    Urine Culture 1    XR Chest 2 Views 1      Orders Needing Specimen Collection     None      Pending Results     Date and Time Order Name Status Description    3/13/2018 1232 Blood culture In process     3/13/2018 1232 Blood culture In process     3/13/2018 1232 Urine Culture In process             Pending Culture Results     Date and Time Order Name Status Description    3/13/2018 1232 Blood culture In process     3/13/2018 1232 Blood culture In process     3/13/2018 1232 Urine Culture In process             Pending Results Instructions     If you had any lab results that were not finalized at the time of your Discharge, you can call the ED Lab Result RN at [phone]. You will be contacted by this team for any positive Lab results or changes in treatment. The nurses are available 7 days a week from 10A to 6:30P.  You can leave a message 24 hours per day and they will return your call.        Test Results From Your Hospital Stay        3/13/2018 12:43 PM      Component Results     Component Value Ref Range & Units Status    WBC 6.9 4.0 - 11.0 10e9/L Final    RBC Count 4.37 3.8 - 5.2 10e12/L Final    Hemoglobin 13.1 11.7 - 15.7 g/dL Final    Hematocrit 38.6 35.0 - 47.0 % Final    MCV 88 78 - 100 fl Final    MCH 30.0 26.5 - 33.0 pg Final    MCHC 33.9 31.5 - 36.5 g/dL Final    RDW 13.1 10.0 - 15.0 % Final    Platelet Count 163 150 - 450 10e9/L Final    Diff Method Automated Method  Final    % Neutrophils 89.8 % Final    % Lymphocytes 3.2 % Final    % Monocytes 5.4 % Final    % Eosinophils 1.2 % Final    % Basophils 0.3 %  Final    % Immature Granulocytes 0.1 % Final    Absolute Neutrophil 6.2 1.6 - 8.3 10e9/L Final    Absolute Lymphocytes 0.2 (L) 0.8 - 5.3 10e9/L Final    Absolute Monocytes 0.4 0.0 - 1.3 10e9/L Final    Absolute Eosinophils 0.1 0.0 - 0.7 10e9/L Final    Absolute Basophils 0.0 0.0 - 0.2 10e9/L Final    Abs Immature Granulocytes 0.0 0 - 0.4 10e9/L Final         3/13/2018  2:25 PM         3/13/2018  2:49 PM      Component Results     Component Value Ref Range & Units Status    Color Urine Straw  Final    Appearance Urine Clear  Final    Glucose Urine Negative NEG^Negative mg/dL Final    Bilirubin Urine Negative NEG^Negative Final    Ketones Urine Negative NEG^Negative mg/dL Final    Specific Gravity Urine 1.005 1.003 - 1.035 Final    Blood Urine Negative NEG^Negative Final    pH Urine 6.0 5.0 - 7.0 pH Final    Protein Albumin Urine Negative NEG^Negative mg/dL Final    Urobilinogen mg/dL 0.0 0.0 - 2.0 mg/dL Final    Nitrite Urine Negative NEG^Negative Final    Leukocyte Esterase Urine Trace (A) NEG^Negative Final    Source Midstream Urine  Final    RBC Urine 1 0 - 2 /HPF Final    WBC Urine 2 0 - 5 /HPF Final    Bacteria Urine Few (A) NEG^Negative /HPF Final    Mucous Urine Present (A) NEG^Negative /LPF Final         3/13/2018  1:05 PM         3/13/2018  2:18 PM         3/13/2018  3:17 PM      Narrative     CHEST TWO VIEWS  3/13/2018 2:48 PM    HISTORY:  Fever; chemotherapy.     COMPARISON:  None.        Impression     IMPRESSION:  Right IJ line tip in the SVC in satisfactory location.  Otherwise negative chest. Lungs are clear.     JANICE ROD MD         3/13/2018 12:58 PM      Component Results     Component Value Ref Range & Units Status    Sodium 135 133 - 144 mmol/L Final    Potassium 3.5 3.4 - 5.3 mmol/L Final    Chloride 104 94 - 109 mmol/L Final    Carbon Dioxide 26 20 - 32 mmol/L Final    Anion Gap 5 3 - 14 mmol/L Final    Glucose 106 (H) 70 - 99 mg/dL Final    Urea Nitrogen 10 7 - 30 mg/dL Final    Creatinine  0.75 0.52 - 1.04 mg/dL Final    GFR Estimate 82 >60 mL/min/1.7m2 Final    Non  GFR Calc    GFR Estimate If Black >90 >60 mL/min/1.7m2 Final    African American GFR Calc    Calcium 8.4 (L) 8.5 - 10.1 mg/dL Final    Bilirubin Total 0.5 0.2 - 1.3 mg/dL Final    Albumin 3.8 3.4 - 5.0 g/dL Final    Protein Total 7.3 6.8 - 8.8 g/dL Final    Alkaline Phosphatase 77 40 - 150 U/L Final    ALT 45 0 - 50 U/L Final    AST 23 0 - 45 U/L Final         3/13/2018 12:45 PM      Component Results     Component Value Ref Range & Units Status    Lactic Acid 1.5 0.7 - 2.0 mmol/L Final                Thank you for choosing [facility]       Thank you for choosing [facility] for your care. Our goal is always to provide you with excellent care. Hearing back from our patients is one way we can continue to improve our services. Please take a few minutes to complete the written survey that you may receive in the mail after you visit with us. Thank you!        [employer] Information     [employer] gives you secure access to your electronic health record. If you see a primary care provider, you can also send messages to your care team and make appointments. If you have questions, please call your primary care clinic.  If you do not have a primary care provider, please call [phone] and they will assist you.        Care EveryWhere ID     This is your Care EveryWhere ID. This could be used by other organizations to access your [facility] medical records  [id number]        Equal Access to Services     DEAN CHAKRABORTY : Hadii shannon jaramilloo Sopavanali, waaxda luqadaha, qaybta kaalmada adeegyada, waxay idiin hayaan adetimothy jacob. So [facility] [phone].    ATENCIÓN: Si habla español, tiene a membreno disposición servicios gratuitos de asistencia lingüística. Llame al [phone].    We comply with applicable federal civil rights laws and [place] laws. We do not discriminate on the basis of race, color, national origin, age, disability, sex,  sexual orientation, or gender identity.            After Visit Summary       This is your record. Keep this with you and show to your community pharmacist(s) and doctor(s) at your next visit.

## 2018-03-13 NOTE — ED AVS SNAPSHOT
[facility] Emergency Department    [address] [address]    Phone:  [phone]    Fax:  [phone]                                       Sarah Rajan   MRN: [id number]    Department:  [facility] Emergency Department   Date of Visit:  3/13/2018           After Visit Summary Signature Page     I have received my discharge instructions, and my questions have been answered. I have discussed any challenges I see with this plan with the nurse or doctor.    ..........................................................................................................................................  Patient/Patient Representative Signature      ..........................................................................................................................................  Patient Representative Print Name and Relationship to Patient    ..................................................               ................................................  Date                                            Time    ..........................................................................................................................................  Reviewed by Signature/Title    ...................................................              ..............................................  Date                                                            Time

## 2018-03-13 NOTE — ED PROVIDER NOTES
History     Chief Complaint   Patient presents with     Fever     100.5 today     Headache     HPI  Sarah Rajan is a 52 year old female, with a history of rectal differentiated adenocarcinoma [id number], who presents with fever, chills and headache since last night after her last chemotherapy treatment yesterday. She is being treated with FOLFOX chemotherapy treatment per Oncology. She recorded a fever of 100.6 F at home this morning after her infusion along with chills and a headache. She had an infusion therapy appointment today for more chemotherapy, but was sent to the ED because of her fever. This is her second round of chemotherapy. She had her first round approximately 2 weeks ago and tolerated it well. Then she went to the [facility] for 5 days and returned to start her second round of chemotherapy yesterday.  She is currently infusing fluorouracil.  She has not been around anyone else who was sick, and did not note any mosquito bites while in the [facility]. She has not had any vomiting, abdominal pain, changes in urination, diarrhea or constipation.     Problem List:    Patient Active Problem List    Diagnosis Date Noted     Malignant neoplasm of rectum (H) 2018     Priority: Medium        Past Medical History:    Past Medical History:   Diagnosis Date     Depression      GERD (gastroesophageal reflux disease)      History of smoking      Insomnia      Obesity      Rectal cancer (H)      Restless leg syndrome      Seasonal affective disorder (H)        Past Surgical History:    Past Surgical History:   Procedure Laterality Date      SECTION      x2     COLONOSCOPY       INSERT PORT VASCULAR ACCESS Right 2018    Procedure: INSERT PORT VASCULAR ACCESS;  central venous Chest Port Placement;  Surgeon: Gaurav Yates PA-C;  Location:  OR     [facility]         Family History:    Family History   Problem Relation Age of Onset     Hyperlipidemia Mother      Hypertension Mother      GASTROINTESTINAL  DISEASE Mother      ischemic colitis     Coronary Artery Disease Mother      stents x 3     Anesthesia Reaction Mother      hypotension, PONV     Hyperlipidemia Father      Hypertension Father      Breast Cancer Maternal Grandmother      Coronary Artery Disease Maternal Grandfather      Myocardial Infarction Maternal Grandfather      Colon Cancer Paternal Grandmother      Breast Cancer Maternal Aunt      Breast Cancer Paternal Aunt      Coronary Artery Disease Paternal Grandfather      CEREBROVASCULAR DISEASE Paternal Grandfather      Family History Negative Brother      Coronary Artery Disease Maternal Uncle        Social History:  Marital Status:   [2]  Social History   Substance Use Topics     Smoking status: Former Smoker     Packs/day: 0.10     Years: 8.00     Types: Cigarettes     Quit date: 2/19/1986     Smokeless tobacco: Never Used     Alcohol use 4.2 oz/week     0 Standard drinks or equivalent, 7 Glasses of wine per week        Medications:      RANITIDINE HCL PO   fluorouracil   LORazepam (ATIVAN) 0.5 MG tablet   prochlorperazine (COMPAZINE) 10 MG tablet   lidocaine-prilocaine (EMLA) cream   Acetaminophen (TYLENOL PO)   calcium carbonate (TUMS) 500 MG chewable tablet   AMITRIPTYLINE HCL PO   FLUoxetine (PROZAC) 20 MG capsule   ondansetron (ZOFRAN) 4 MG tablet         Review of Systems   Constitutional: Positive for fevers and chills.   Respiratory: No shortness of breath, or cough.   Cardiovascular: No chest pain.  Gastrointestinal: Positive for nausea. Negative for vomiting, diarrhea, constipation, or abdominal pain.  Genitourinary: No urinary changes.   Skin: Positive for rash on the upper and lower extremities.  Hematologic: No bruising.  Neurologic: Positive for headache.   All other systems are reviewed and are negative      Physical Exam   BP: 114/72  Pulse: 89  Heart Rate: 99  Temp: 99.9  F (37.7  C)  Resp: 16  SpO2: 96 %      Physical Exam   Constitutional: Alert, in no acute distress,  laying in bed, smiling  HEENT: Atraumatic and normocephalic. PERRLA. Wet oral mucosa, no lesions.   Cardiovascular: regular rate and rhythm, normal S1 and S2, no rubs, murmurs or gallops, peripheral pulses strong and equal bilaterally, no peripheral edema   Respiratory: lung sounds clear to auscultation and equal bilaterally, no wheezes, crackles, or rhonchi  GI: soft, non-distended abdomen, non-tender to palpation  Skin: There is a morbiliform rash primarily on the extensor surfaces of the bilateral upper extremities, and bilateral lower extremities, with involvement also of the right anterior medial thigh and back. Rash is warm. Skin is dry.   Neuro: alert, logical thought, no focal neurological abnormalities  Psych: appropriate mood and affect, judgement and insight, cooperative with exam                      ED Course     ED Course     Procedures               Critical Care time:  none               Results for orders placed or performed during the hospital encounter of 03/13/18 (from the past 24 hour(s))   CBC with platelets differential   Result Value Ref Range    WBC 6.9 4.0 - 11.0 10e9/L    RBC Count 4.37 3.8 - 5.2 10e12/L    Hemoglobin 13.1 11.7 - 15.7 g/dL    Hematocrit 38.6 35.0 - 47.0 %    MCV 88 78 - 100 fl    MCH 30.0 26.5 - 33.0 pg    MCHC 33.9 31.5 - 36.5 g/dL    RDW 13.1 10.0 - 15.0 %    Platelet Count 163 150 - 450 10e9/L    Diff Method Automated Method     % Neutrophils 89.8 %    % Lymphocytes 3.2 %    % Monocytes 5.4 %    % Eosinophils 1.2 %    % Basophils 0.3 %    % Immature Granulocytes 0.1 %    Absolute Neutrophil 6.2 1.6 - 8.3 10e9/L    Absolute Lymphocytes 0.2 (L) 0.8 - 5.3 10e9/L    Absolute Monocytes 0.4 0.0 - 1.3 10e9/L    Absolute Eosinophils 0.1 0.0 - 0.7 10e9/L    Absolute Basophils 0.0 0.0 - 0.2 10e9/L    Abs Immature Granulocytes 0.0 0 - 0.4 10e9/L   Comprehensive metabolic panel   Result Value Ref Range    Sodium 135 133 - 144 mmol/L    Potassium 3.5 3.4 - 5.3 mmol/L    Chloride 104  94 - 109 mmol/L    Carbon Dioxide 26 20 - 32 mmol/L    Anion Gap 5 3 - 14 mmol/L    Glucose 106 (H) 70 - 99 mg/dL    Urea Nitrogen 10 7 - 30 mg/dL    Creatinine 0.75 0.52 - 1.04 mg/dL    GFR Estimate 82 >60 mL/min/1.7m2    GFR Estimate If Black >90 >60 mL/min/1.7m2    Calcium 8.4 (L) 8.5 - 10.1 mg/dL    Bilirubin Total 0.5 0.2 - 1.3 mg/dL    Albumin 3.8 3.4 - 5.0 g/dL    Protein Total 7.3 6.8 - 8.8 g/dL    Alkaline Phosphatase 77 40 - 150 U/L    ALT 45 0 - 50 U/L    AST 23 0 - 45 U/L   Lactic acid whole blood   Result Value Ref Range    Lactic Acid 1.5 0.7 - 2.0 mmol/L   UA reflex to Microscopic   Result Value Ref Range    Color Urine Straw     Appearance Urine Clear     Glucose Urine Negative NEG^Negative mg/dL    Bilirubin Urine Negative NEG^Negative    Ketones Urine Negative NEG^Negative mg/dL    Specific Gravity Urine 1.005 1.003 - 1.035    Blood Urine Negative NEG^Negative    pH Urine 6.0 5.0 - 7.0 pH    Protein Albumin Urine Negative NEG^Negative mg/dL    Urobilinogen mg/dL 0.0 0.0 - 2.0 mg/dL    Nitrite Urine Negative NEG^Negative    Leukocyte Esterase Urine Trace (A) NEG^Negative    Source Midstream Urine     RBC Urine 1 0 - 2 /HPF    WBC Urine 2 0 - 5 /HPF    Bacteria Urine Few (A) NEG^Negative /HPF    Mucous Urine Present (A) NEG^Negative /LPF   XR Chest 2 Views    Narrative    CHEST TWO VIEWS  3/13/2018 2:48 PM    HISTORY:  Fever; chemotherapy.     COMPARISON:  None.      Impression    IMPRESSION:  Right IJ line tip in the SVC in satisfactory location.  Otherwise negative chest. Lungs are clear.     JANICE ROD MD       Medications   0.9% sodium chloride BOLUS (0 mLs Intravenous Stopped 3/13/18 1341)     Followed by   sodium chloride 0.9% infusion (not administered)   ibuprofen (ADVIL/MOTRIN) tablet 600 mg (600 mg Oral Given 3/13/18 1332)       Assessments & Plan (with Medical Decision Making)     52-year-old female on FOLFOX therapy for rectal cancer currently receiving her second round of therapy  presented with low-grade fever of 100.6 at home and a rash.  The rash is morbilliform in appearance and likely a drug eruption.  It is minimally symptomatic.  It is not pruritic.  She appears nontoxic.  Her laboratory evaluation is reassuring with an unremarkable CBC, blood chemistries, urine.  Chest x-ray was normal.  Cultures were obtained from the blood from peripheral sites as well as from the port.  I discussed her case with her oncologist Dr. Mortensen, he recommends that she call him with an update in the next day or 2.  We will await culture results.  No further workup needed at this time.  She is having increasing fevers or develops specific symptoms, see her oncologist or return to the emergency department.  I reviewed all this with the patient and her  and they are comfortable with this plan and her questions were all answered.    I have reviewed the nursing notes.    I have reviewed the findings, diagnosis, plan and need for follow up with the patient.       Discharge Medication List as of 3/13/2018  5:04 PM          Final diagnoses:   Fever, unspecified fever cause   Morbilliform rash       CARLOS Ochoa    3/13/2018   [facility] EMERGENCY DEPARTMENT     Michael Davies MD  03/13/18 [id number]

## 2018-03-13 NOTE — ED NOTES
Pt has had h/a on and off that started yesterday, did take tylenol at 0100 with some help but now it's getting worse again and was wondering if she could try some ibuprofen. H/A is across her forehead, thinks it could also be sinus.

## 2018-03-13 NOTE — PROGRESS NOTES
This is a recent snapshot of the patient's [facility] medical record.  For current drug dose and complete information and questions, call [phone] or In Basket pool, [facility] home infusion ([id number])  CSN Number:  [id number]

## 2018-03-14 ENCOUNTER — HOME INFUSION (PRE-WILLOW HOME INFUSION) (OUTPATIENT)
Dept: PHARMACY | Facility: CLINIC | Age: 53
End: 2018-03-14

## 2018-03-14 LAB
BACTERIA SPEC CULT: NO GROWTH
Lab: NORMAL
SPECIMEN SOURCE: NORMAL

## 2018-03-19 LAB
BACTERIA SPEC CULT: NO GROWTH
Lab: NORMAL
Lab: NORMAL
SPECIMEN SOURCE: NORMAL

## 2018-03-20 NOTE — PROGRESS NOTES
This is a recent snapshot of the patient's [facility] medical record.  For current drug dose and complete information and questions, call [phone] or In Basket pool, fv home infusion ([id number])  CSN Number:  [id number]

## 2018-03-20 NOTE — PROGRESS NOTES
This is a recent snapshot of the patient's [facility] medical record.  For current drug dose and complete information and questions, call [phone] or In Basket pool, fv home infusion ([id number])  CSN Number:  [id number]

## 2018-03-26 ENCOUNTER — APPOINTMENT (OUTPATIENT)
Dept: LAB | Facility: CLINIC | Age: 53
End: 2018-03-26
Attending: INTERNAL MEDICINE
Payer: COMMERCIAL

## 2018-03-26 ENCOUNTER — ONCOLOGY VISIT (OUTPATIENT)
Dept: ONCOLOGY | Facility: CLINIC | Age: 53
End: 2018-03-26
Attending: INTERNAL MEDICINE
Payer: COMMERCIAL

## 2018-03-26 ENCOUNTER — HOME INFUSION (PRE-WILLOW HOME INFUSION) (OUTPATIENT)
Dept: PHARMACY | Facility: CLINIC | Age: 53
End: 2018-03-26

## 2018-03-26 VITALS
RESPIRATION RATE: 16 BRPM | SYSTOLIC BLOOD PRESSURE: 134 MMHG | WEIGHT: 203.4 LBS | HEART RATE: 95 BPM | TEMPERATURE: 97.9 F | OXYGEN SATURATION: 98 % | HEIGHT: 66 IN | DIASTOLIC BLOOD PRESSURE: 88 MMHG | BODY MASS INDEX: 32.69 KG/M2

## 2018-03-26 DIAGNOSIS — C20 MALIGNANT NEOPLASM OF RECTUM (H): Primary | ICD-10-CM

## 2018-03-26 DIAGNOSIS — G47.09 OTHER INSOMNIA: ICD-10-CM

## 2018-03-26 LAB
ALBUMIN SERPL-MCNC: 4 G/DL (ref 3.4–5)
ALP SERPL-CCNC: 82 U/L (ref 40–150)
ALT SERPL W P-5'-P-CCNC: 36 U/L (ref 0–50)
ANION GAP SERPL CALCULATED.3IONS-SCNC: 7 MMOL/L (ref 3–14)
AST SERPL W P-5'-P-CCNC: 19 U/L (ref 0–45)
BASOPHILS # BLD AUTO: 0 10E9/L (ref 0–0.2)
BASOPHILS NFR BLD AUTO: 0.7 %
BILIRUB SERPL-MCNC: 0.4 MG/DL (ref 0.2–1.3)
BUN SERPL-MCNC: 13 MG/DL (ref 7–30)
CALCIUM SERPL-MCNC: 9 MG/DL (ref 8.5–10.1)
CHLORIDE SERPL-SCNC: 105 MMOL/L (ref 94–109)
CO2 SERPL-SCNC: 26 MMOL/L (ref 20–32)
CREAT SERPL-MCNC: 0.68 MG/DL (ref 0.52–1.04)
DIFFERENTIAL METHOD BLD: NORMAL
EOSINOPHIL # BLD AUTO: 0.1 10E9/L (ref 0–0.7)
EOSINOPHIL NFR BLD AUTO: 2.1 %
ERYTHROCYTE [DISTWIDTH] IN BLOOD BY AUTOMATED COUNT: 13.6 % (ref 10–15)
GFR SERPL CREATININE-BSD FRML MDRD: >90 ML/MIN/1.7M2
GLUCOSE SERPL-MCNC: 104 MG/DL (ref 70–99)
HCT VFR BLD AUTO: 39.1 % (ref 35–47)
HGB BLD-MCNC: 13.6 G/DL (ref 11.7–15.7)
IMM GRANULOCYTES # BLD: 0 10E9/L (ref 0–0.4)
IMM GRANULOCYTES NFR BLD: 0.2 %
LYMPHOCYTES # BLD AUTO: 1.5 10E9/L (ref 0.8–5.3)
LYMPHOCYTES NFR BLD AUTO: 34.4 %
MCH RBC QN AUTO: 30.6 PG (ref 26.5–33)
MCHC RBC AUTO-ENTMCNC: 34.8 G/DL (ref 31.5–36.5)
MCV RBC AUTO: 88 FL (ref 78–100)
MONOCYTES # BLD AUTO: 0.4 10E9/L (ref 0–1.3)
MONOCYTES NFR BLD AUTO: 9.7 %
NEUTROPHILS # BLD AUTO: 2.2 10E9/L (ref 1.6–8.3)
NEUTROPHILS NFR BLD AUTO: 52.9 %
NRBC # BLD AUTO: 0 10*3/UL
NRBC BLD AUTO-RTO: 0 /100
PLATELET # BLD AUTO: 162 10E9/L (ref 150–450)
POTASSIUM SERPL-SCNC: 3.5 MMOL/L (ref 3.4–5.3)
PROT SERPL-MCNC: 7.4 G/DL (ref 6.8–8.8)
RBC # BLD AUTO: 4.45 10E12/L (ref 3.8–5.2)
SODIUM SERPL-SCNC: 138 MMOL/L (ref 133–144)
WBC # BLD AUTO: 4.2 10E9/L (ref 4–11)

## 2018-03-26 PROCEDURE — 25000128 H RX IP 250 OP 636: Mod: ZF | Performed by: PHYSICIAN ASSISTANT

## 2018-03-26 PROCEDURE — 96367 TX/PROPH/DG ADDL SEQ IV INF: CPT

## 2018-03-26 PROCEDURE — 96413 CHEMO IV INFUSION 1 HR: CPT

## 2018-03-26 PROCEDURE — 99214 OFFICE O/P EST MOD 30 MIN: CPT | Mod: ZP | Performed by: PHYSICIAN ASSISTANT

## 2018-03-26 PROCEDURE — 96411 CHEMO IV PUSH ADDL DRUG: CPT

## 2018-03-26 PROCEDURE — 85025 COMPLETE CBC W/AUTO DIFF WBC: CPT | Performed by: PHYSICIAN ASSISTANT

## 2018-03-26 PROCEDURE — 80053 COMPREHEN METABOLIC PANEL: CPT | Performed by: PHYSICIAN ASSISTANT

## 2018-03-26 PROCEDURE — 96368 THER/DIAG CONCURRENT INF: CPT

## 2018-03-26 PROCEDURE — 96415 CHEMO IV INFUSION ADDL HR: CPT

## 2018-03-26 PROCEDURE — 96375 TX/PRO/DX INJ NEW DRUG ADDON: CPT

## 2018-03-26 PROCEDURE — G0463 HOSPITAL OUTPT CLINIC VISIT: HCPCS | Mod: ZF

## 2018-03-26 PROCEDURE — G0498 CHEMO EXTEND IV INFUS W/PUMP: HCPCS

## 2018-03-26 RX ORDER — DIPHENHYDRAMINE HYDROCHLORIDE 50 MG/ML
50 INJECTION INTRAMUSCULAR; INTRAVENOUS ONCE
Status: COMPLETED | OUTPATIENT
Start: 2018-03-26 | End: 2018-03-26

## 2018-03-26 RX ORDER — EPINEPHRINE 1 MG/ML
0.3 INJECTION, SOLUTION, CONCENTRATE INTRAVENOUS EVERY 5 MIN PRN
Status: CANCELLED | OUTPATIENT
Start: 2018-03-26

## 2018-03-26 RX ORDER — DIPHENHYDRAMINE HYDROCHLORIDE 50 MG/ML
50 INJECTION INTRAMUSCULAR; INTRAVENOUS ONCE
Status: CANCELLED
Start: 2018-03-26 | End: 2018-03-26

## 2018-03-26 RX ORDER — ONDANSETRON 2 MG/ML
8 INJECTION INTRAMUSCULAR; INTRAVENOUS ONCE
Status: COMPLETED | OUTPATIENT
Start: 2018-03-26 | End: 2018-03-26

## 2018-03-26 RX ORDER — LORAZEPAM 2 MG/ML
0.5 INJECTION INTRAMUSCULAR EVERY 4 HOURS PRN
Status: DISCONTINUED | OUTPATIENT
Start: 2018-03-26 | End: 2018-04-03 | Stop reason: HOSPADM

## 2018-03-26 RX ORDER — METHYLPREDNISOLONE SODIUM SUCCINATE 125 MG/2ML
125 INJECTION, POWDER, LYOPHILIZED, FOR SOLUTION INTRAMUSCULAR; INTRAVENOUS
Status: CANCELLED
Start: 2018-03-26

## 2018-03-26 RX ORDER — FLUOROURACIL 50 MG/ML
400 INJECTION, SOLUTION INTRAVENOUS ONCE
Status: CANCELLED | OUTPATIENT
Start: 2018-03-26

## 2018-03-26 RX ORDER — DIPHENHYDRAMINE HYDROCHLORIDE 50 MG/ML
50 INJECTION INTRAMUSCULAR; INTRAVENOUS
Status: CANCELLED
Start: 2018-03-26

## 2018-03-26 RX ORDER — ONDANSETRON 2 MG/ML
8 INJECTION INTRAMUSCULAR; INTRAVENOUS ONCE
Status: CANCELLED
Start: 2018-03-26 | End: 2018-03-26

## 2018-03-26 RX ORDER — TRAZODONE HYDROCHLORIDE 50 MG/1
25-100 TABLET, FILM COATED ORAL AT BEDTIME
Qty: 30 TABLET | Refills: 3 | Status: SHIPPED | OUTPATIENT
Start: 2018-03-26 | End: 2018-04-16

## 2018-03-26 RX ORDER — EPINEPHRINE 0.3 MG/.3ML
0.3 INJECTION SUBCUTANEOUS EVERY 5 MIN PRN
Status: CANCELLED | OUTPATIENT
Start: 2018-03-26

## 2018-03-26 RX ORDER — ALBUTEROL SULFATE 90 UG/1
1-2 AEROSOL, METERED RESPIRATORY (INHALATION)
Status: CANCELLED
Start: 2018-03-26

## 2018-03-26 RX ORDER — HEPARIN SODIUM (PORCINE) LOCK FLUSH IV SOLN 100 UNIT/ML 100 UNIT/ML
5 SOLUTION INTRAVENOUS ONCE
Status: COMPLETED | OUTPATIENT
Start: 2018-03-26 | End: 2018-03-26

## 2018-03-26 RX ORDER — EPINEPHRINE 0.3 MG/.3ML
INJECTION SUBCUTANEOUS
Status: DISCONTINUED
Start: 2018-03-26 | End: 2018-03-26 | Stop reason: WASHOUT

## 2018-03-26 RX ORDER — SODIUM CHLORIDE 9 MG/ML
1000 INJECTION, SOLUTION INTRAVENOUS CONTINUOUS PRN
Status: CANCELLED
Start: 2018-03-26

## 2018-03-26 RX ORDER — MEPERIDINE HYDROCHLORIDE 25 MG/ML
25 INJECTION INTRAMUSCULAR; INTRAVENOUS; SUBCUTANEOUS EVERY 30 MIN PRN
Status: CANCELLED | OUTPATIENT
Start: 2018-03-26

## 2018-03-26 RX ORDER — FLUOROURACIL 50 MG/ML
400 INJECTION, SOLUTION INTRAVENOUS ONCE
Status: COMPLETED | OUTPATIENT
Start: 2018-03-26 | End: 2018-03-26

## 2018-03-26 RX ORDER — LORAZEPAM 2 MG/ML
0.5 INJECTION INTRAMUSCULAR EVERY 4 HOURS PRN
Status: CANCELLED
Start: 2018-03-26

## 2018-03-26 RX ORDER — ALBUTEROL SULFATE 0.83 MG/ML
2.5 SOLUTION RESPIRATORY (INHALATION)
Status: CANCELLED | OUTPATIENT
Start: 2018-03-26

## 2018-03-26 RX ADMIN — SODIUM CHLORIDE, PRESERVATIVE FREE 5 ML: 5 INJECTION INTRAVENOUS at 12:02

## 2018-03-26 RX ADMIN — ONDANSETRON 8 MG: 2 INJECTION INTRAMUSCULAR; INTRAVENOUS at 13:58

## 2018-03-26 RX ADMIN — FLUOROURACIL 830 MG: 50 INJECTION, SOLUTION INTRAVENOUS at 16:43

## 2018-03-26 RX ADMIN — LORAZEPAM 0.5 MG: 2 INJECTION, SOLUTION INTRAMUSCULAR; INTRAVENOUS at 16:54

## 2018-03-26 RX ADMIN — OXALIPLATIN 176 MG: 5 INJECTION INTRAVENOUS at 14:31

## 2018-03-26 RX ADMIN — LEUCOVORIN CALCIUM 750 MG: 50 INJECTION, POWDER, LYOPHILIZED, FOR SOLUTION INTRAMUSCULAR; INTRAVENOUS at 14:30

## 2018-03-26 RX ADMIN — DEXAMETHASONE SODIUM PHOSPHATE 150 MG: 10 INJECTION, SOLUTION INTRAMUSCULAR; INTRAVENOUS at 14:05

## 2018-03-26 RX ADMIN — DIPHENHYDRAMINE HYDROCHLORIDE 50 MG: 50 INJECTION, SOLUTION INTRAMUSCULAR; INTRAVENOUS at 13:56

## 2018-03-26 RX ADMIN — DEXTROSE 500 ML: 5 SOLUTION INTRAVENOUS at 13:51

## 2018-03-26 ASSESSMENT — PAIN SCALES - GENERAL: PAINLEVEL: NO PAIN (0)

## 2018-03-26 NOTE — LETTER
3/26/2018      RE: Sarah Rajan  [address] [facility] [address]       Oncology/Hematology Visit Note  Mar 26, 2018    Reason for Visit: follow up of [id number] moderately differentiated adenocarcinoma of the rectum     History of Present Illness: Sarah Rajan is a 52 year old female with  recently diagnosed locally advanced rectal cancer. She noticed BRBPR so screening colonoscopy on 1/9/2018 revealed 3 cm rectal mass and other polyps which were removed.  Pathology indicated moderately differentiated adenocarcinoma w/ intact MMR proteins.  CT staging scan showed no metastatic disease; some liver lesions which are thought to be benign per radiology report, T2N1M0 by pelvic MRI read at [facility]. When we initially reviewed her MRI we were not exactly sure whether that was lymph node involvement or not. We opted to repeat MRI which showed T4 N0 lesion.   We also did an MRI of the liver which showed 3 subcentimeter liver lesions which were too small to characterize and will need attention on follow-up. She is currently undergoing treatment with neoadjuvant 5-FU and oxaliplatin (FOLFOX0, which she started on 2/26/18. The day after she received cycle 2, she developed fevers, chills, headache, and an itchy rash on her arms and legs, for which she was evaluated in the ED. She was sent home on Benadryl. Please see Dr. Mortensen's previous notes for further details on the patient's history. She comes in today for routine follow up prior to cycle 3.    Interval History:  Patient reports that following her ER visit, she took 50 mg of Benadryl 3 times a day for the remainder of the week.  She reports that she had an itchy rash on her arms, legs, upper chest, and that her face was blotchy.  She also noticed some dry skin.  She denies having any mouth sores but did notice some sores in her nose.  She also reports some rectal discomfort, but denies any open areas or hemorrhoids.  She wonders what she may apply to the rectal  area.  She reports cold sensitivity most intensely for the first week after oxaliplatin.  The cold sensitivity then gradually improves.  She also noticed nausea for the first week, but did not vomit with cycle 2 due to taking her anti-emetics more regularly.  She also reports feeling quite fatigued for the first 5 days after chemotherapy.  She has had some difficulty falling and staying asleep since starting with the chemotherapy.  She found no relief from Ativan and has not tried any over-the-counter products.  She denies other concerns.    Current Outpatient Prescriptions   Medication Sig Dispense Refill     traZODone (DESYREL) 50 MG tablet Take 0.5-2 tablets ( mg) by mouth At Bedtime 30 tablet 3     RANITIDINE HCL PO Take 150 mg by mouth daily as needed for heartburn       fluorouracil Administer 4,968 mg intravenously . Continuous infusion over 46-48 hr via ambulatory pump q14d Supplied by outside provider with pump.       LORazepam (ATIVAN) 0.5 MG tablet Take 1 tablet (0.5 mg) by mouth every 4 hours as needed (Anxiety, Nausea/Vomiting or Sleep) 30 tablet 2     prochlorperazine (COMPAZINE) 10 MG tablet Take 1 tablet (10 mg) by mouth every 6 hours as needed for nausea or vomiting 20 tablet 1     lidocaine-prilocaine (EMLA) cream Apply 45 minutes prior to procedure. 30 g 3     Acetaminophen (TYLENOL PO) Take 1,000 mg by mouth At Bedtime       calcium carbonate (TUMS) 500 MG chewable tablet Take 1-2 chew tab by mouth daily  150 tablet      ondansetron (ZOFRAN) 4 MG tablet Take one tablet by mouth every 6 hours as needed for nausea when taking neomycin and flagyl 3 tablet 0     FLUoxetine (PROZAC) 20 MG capsule 1 tablet in the morning  1     AMITRIPTYLINE HCL PO Take 20 mg by mouth At Bedtime        Physical Examination:  General: The patient is a pleasant female in no acute distress. She is here today with her .  /88 (BP Location: Right arm, Cuff Size: Adult Regular)  Pulse 95  Temp 97.9  F  "(36.6  C) (Oral)  Resp 16  Ht 1.676 m (5' 5.98\")  Wt 92.3 kg (203 lb 6.4 oz)  SpO2 98%  BMI 32.85 kg/m2  Wt Readings from Last 10 Encounters:   03/12/18 92.9 kg (204 lb 14.4 oz)   02/26/18 93.3 kg (205 lb 9.6 oz)   02/19/18 90.7 kg (200 lb)   02/15/18 91.9 kg (202 lb 9.6 oz)   02/06/18 91.2 kg (201 lb)   02/06/18 92.5 kg (204 lb)   01/25/18 91.9 kg (202 lb 11.2 oz)   01/25/18 91.9 kg (202 lb 11.2 oz)   02/19/16 88.9 kg (196 lb)   HEENT: EOMI, PERRL. Sclerae are anicteric. Oral mucosa is pink and moist with no lesions or thrush.   Lymph: Neck is supple with no lymphadenopathy in the cervical or supraclavicular areas.   Heart: Regular rate and rhythm.   Lungs: Clear to auscultation bilaterally.   Abdomen: Bowel sounds present, soft, nontender with no palpable hepatosplenomegaly or masses.   Extremities: No lower extremity edema noted bilaterally.   Neuro: Cranial nerves II through XII are grossly intact.  Skin: No rashes, petechiae, or bruising noted on exposed skin including upper chest, arms and legs. No skin changes noted on palms.     Laboratory Data:   3/26/2018 12:12   Sodium 138   Potassium 3.5   Chloride 105   Carbon Dioxide 26   Urea Nitrogen 13   Creatinine 0.68   GFR Estimate >90   GFR Estimate If Black >90   Calcium 9.0   Anion Gap 7   Albumin 4.0   Protein Total 7.4   Bilirubin Total 0.4   Alkaline Phosphatase 82   ALT 36   AST 19   Glucose 104 (H)   WBC 4.2   Hemoglobin 13.6   Hematocrit 39.1   Platelet Count 162   RBC Count 4.45   MCV 88   MCH 30.6   MCHC 34.8   RDW 13.6   Diff Method Automated Method   % Neutrophils 52.9   % Lymphocytes 34.4   % Monocytes 9.7   % Eosinophils 2.1   % Basophils 0.7   % Immature Granulocytes 0.2   Nucleated RBCs 0   Absolute Neutrophil 2.2   Absolute Lymphocytes 1.5   Absolute Monocytes 0.4   Absolute Eosinophils 0.1   Absolute Basophils 0.0   Abs Immature Granulocytes 0.0   Absolute Nucleated RBC 0.0     Assessment and Plan:  1. Locally advanced moderately " differentiated adenocarcinoma of the rectum: [id number]. MSI stable. Started neoadjuvant FOLFOX on 2/26. Plan for 4 cycles then restaging with MRI pelvis + abdomen. Presents today for C3. She will continue with cycle 3 today, but will add in IV Benadryl 50 mg and increase IV dexamethasone from 12 to 20 mg due to the rash she had the day following her last infusion. She will return for follow up with me in 2 weeks prior to cycle 4. She will see Dr. Mortensen with an abd/pelvis MRI prior to consideration of cycle 5. She will jamey sooner for concerns.     2. Nausea/vomiting: Mildly improved with scheduling her antiemetics. Therefore, will add in IV Emend to her antiemetic regimen.     3. Insomnia: Persistent into the second week following chemotherapy. No relief from Ativan. Will try trazodone  mg qhs. Advised of potential drug interaction with amitriptyline, though patient is not currently taking this medication.    4. Seasonal Affective Disorder: She has been prescribed Prozac but is currently not taking this medication.    5. Restless Leg syndrome: Not currently taking amitriptyline.     6. Nose sores: None currently. Advised to try Aquaphor.     7. Rectal discomfort: None currently. Discussed trying Desitin barrier cream and Tucks medicated wipes.     Cathleen Ramos PA-C  [facility]  [address] [place], [place] [id number] [phone]

## 2018-03-26 NOTE — MR AVS SNAPSHOT
After Visit Summary   3/26/2018    Sarah Rajan    MRN: [id number]           Patient Information     Date Of Birth          1965        Visit Information        Provider Department      3/26/2018 12:30 PM Cathleen Ramos PA-C [facility]        Today's Diagnoses     Malignant neoplasm of rectum (H)    -  1    Other insomnia           Follow-ups after your visit        Your next 10 appointments already scheduled     Apr 10, 2018  8:15 AM CDT   [facility] Lab Draw with  [facility] DRAW   [facility] ([facility])    [address]  [address]  [facility] [address]   [phone]            Apr 10, 2018  8:40 AM CDT   (Arrive by 8:25 AM)   Return Visit with Cathleen Ramos PA-C   [facility] ([facility])    [address]  [address]  [facility] [address]   [phone]            Apr 10, 2018 10:00 AM CDT   Infusion 240 with  ONCOLOGY INFUSION, UC 12 ATC   [facility] ([facility])    [address]  [address]  [facility] [address]   [phone]            Apr 16, 2018  9:00 AM CDT   (Arrive by 8:45 AM)   NEW CANCER VISIT with Komal Clark MD   [facility] ([facility])    [address]  [address]  [facility] [address]   [phone]            Apr 19, 2018 11:45 AM CDT   [facility] Lab Draw with  [facility] LAB DRAW   [facility] ([facility])    [address] [place] [address]  [address]  [facility] [address]   [phone]            Apr 19, 2018 12:15 PM CDT   (Arrive by 12:00 PM)   MR ABDOMEN & PELVIS W/O & W CONTRAST with UCMR1   [facility] ([facility])    [address] [place] [address]  1st Floor  [facility] [address]   [phone]           Take your medicines as usual, unless your doctor tells you  not to. Bring a list of your current medicines to your exam (including vitamins, minerals and over-the-counter drugs). Also bring the results of similar scans you may have had.    You may or may not receive IV contrast for this exam pending the discretion of the Radiologist.   Do not eat or drink for 6 hours prior to exam.  The MRI machine uses a strong magnet. Please wear clothes without metal (snaps, zippers). A sweatsuit works well, or we may give you a hospital gown.  Please remove any body piercings and hair extensions before you arrive. You will also remove watches, jewelry, hairpins, wallets, dentures, partial dental plates and hearing aids. You may wear contact lenses, and you may be able to wear your rings. We have a safe place to keep your personal items, but it is safer to leave them at home.  **IMPORTANT** THE INSTRUCTIONS BELOW ARE ONLY FOR THOSE PATIENTS WHO HAVE BEEN PRESCRIBED SEDATION OR GENERAL ANESTHESIA DURING THEIR MRI PROCEDURE:  IF YOUR DOCTOR PRESCRIBED ORAL SEDATION (take medicine to help you relax during your exam):   You must get the medicine from your doctor (oral medication) before you arrive. Bring the medicine to the exam. Do not take it at home. You ll be told when to take it upon arriving for your exam.   Arrive one hour early. Bring someone who can take you home after the test. Your medicine will make you sleepy. After the exam, you may not drive, take a bus or take a taxi by yourself.  IF YOUR DOCTOR PRESCRIBED IV SEDATION:   Arrive one hour early. Bring someone who can take you home after the test. Your medicine will make you sleepy. After the exam, you may not drive, take a bus or take a taxi by yourself.   No eating 6 hours before your exam. You may have clear liquids up until 4 hours before your exam. (Clear liquids include water, clear tea, black coffee and fruit juice without pulp.)  IF YOUR DOCTOR PRESCRIBED ANESTHESIA (be asleep for your exam):   Arrive 1 1/2 hours early.  Bring someone who can take you home after the test. You may not drive, take a bus or take a taxi by yourself.   No eating 8 hours before your exam. You may have clear liquids up until 4 hours before your exam. (Clear liquids include water, clear tea, black coffee and fruit juice without pulp.)   You will spend four to five hours in the recovery room.  If you have any questions, please contact your Imaging Department exam site.            Apr 19, 2018  2:30 PM CDT   (Arrive by 2:15 PM)   Return Visit with Kyra Mortensen MD   [facility] ([facility])    [address] [place] [address]  [address]  [facility] [address] [phone]            Apr 23, 2018 12:30 PM CDT   [facility] Lab Draw with  [facility] LAB DRAW   [facility] ([facility])    [address] [place] [address]  [address]  [facility] [address] [phone]            Apr 23, 2018  1:00 PM CDT   Infusion 240 with  ONCOLOGY INFUSION   [facility] ([facility])    [address] [place] [address]  [address]  [facility] [address] [phone]              Who to contact     If you have questions or need follow up information about today's clinic visit or your schedule please contact [facility] directly at [phone].  Normal or non-critical lab and imaging results will be communicated to you by MyChart, letter or phone within 4 business days after the clinic has received the results. If you do not hear from us within 7 days, please contact the clinic through [employer] or phone. If you have a critical or abnormal lab result, we will notify you by phone as soon as possible.  Submit refill requests through [employer] or call your pharmacy and they will forward the refill request to us. Please allow 3 business days for your refill to be completed.          Additional Information About Your Visit        MyChart Information     [employer]  "gives you secure access to your electronic health record. If you see a primary care provider, you can also send messages to your care team and make appointments. If you have questions, please call your primary care clinic.  If you do not have a primary care provider, please call [phone] and they will assist you.        Care EveryWhere ID     This is your Care EveryWhere ID. This could be used by other organizations to access your [facility] medical records  [id number]        Your Vitals Were     Pulse Temperature Respirations Height Pulse Oximetry BMI (Body Mass Index)    95 97.9  F (36.6  C) (Oral) 16 1.676 m (5' 5.98\") 98% 32.85 kg/m2       Blood Pressure from Last 3 Encounters:   03/26/18 134/88   03/13/18 115/71   03/12/18 115/82    Weight from Last 3 Encounters:   03/26/18 92.3 kg (203 lb 6.4 oz)   03/12/18 92.9 kg (204 lb 14.4 oz)   02/26/18 93.3 kg (205 lb 9.6 oz)              Today, you had the following     No orders found for display         Today's Medication Changes          These changes are accurate as of 3/26/18  2:16 PM.  If you have any questions, ask your nurse or doctor.               Start taking these medicines.        Dose/Directions    traZODone 50 MG tablet   Commonly known as:  DESYREL   Used for:  Other insomnia   Started by:  Cathleen Ramos PA-C        Dose:   mg   Take 0.5-2 tablets ( mg) by mouth At Bedtime   Quantity:  30 tablet   Refills:  3            Where to get your medicines      These medications were sent to [facility] [place], [place] - [address]  [address], [facility] [address]    Hours:  TRANSPLANT PHONE NUMBER [phone] Phone:  [phone]     traZODone 50 MG tablet                Primary Care Provider Office Phone # Fax #    Wayne Green [phone]       [facility] [address] [address]        Equal Access to Services     [facility] " GAAR : Hadii aad ku michael Armendariz, waaxda luqadaha, qaybta kadorisda yury, luz elena lissy julianaford jauregui trishatelly mtz . So [facility] [phone].    ATENCIÓN: Si habla sangita, tiene a membreno disposición servicios gratuitos de asistencia lingüística. Llame al [phone].    We comply with applicable federal civil rights laws and [place] laws. We do not discriminate on the basis of race, color, national origin, age, disability, sex, sexual orientation, or gender identity.            Thank you!     Thank you for choosing [facility]  for your care. Our goal is always to provide you with excellent care. Hearing back from our patients is one way we can continue to improve our services. Please take a few minutes to complete the written survey that you may receive in the mail after your visit with us. Thank you!             Your Updated Medication List - Protect others around you: Learn how to safely use, store and throw away your medicines at www.disposemymeds.org.          This list is accurate as of 3/26/18  2:17 PM.  Always use your most recent med list.                   Brand Name Dispense Instructions for use Diagnosis    AMITRIPTYLINE HCL PO      Take 20 mg by mouth At Bedtime        calcium carbonate 500 MG chewable tablet    TUMS    150 tablet    Take 1-2 chew tab by mouth daily        fluorouracil      Administer 4,968 mg intravenously . Continuous infusion over 46-48 hr via ambulatory pump q14d Supplied by outside provider with pump.        FLUoxetine 20 MG capsule    PROzac     1 tablet in the morning        lidocaine-prilocaine cream    EMLA    30 g    Apply 45 minutes prior to procedure.    Malignant neoplasm of rectum (H)       LORazepam 0.5 MG tablet    ATIVAN    30 tablet    Take 1 tablet (0.5 mg) by mouth every 4 hours as needed (Anxiety, Nausea/Vomiting or Sleep)    Malignant neoplasm of rectum (H)       ondansetron 4 MG tablet    ZOFRAN    3 tablet    Take one tablet by mouth  every 6 hours as needed for nausea when taking neomycin and flagyl    Rectal cancer (H)       prochlorperazine 10 MG tablet    COMPAZINE    20 tablet    Take 1 tablet (10 mg) by mouth every 6 hours as needed for nausea or vomiting    Malignant neoplasm of rectum (H)       RANITIDINE HCL PO      Take 150 mg by mouth daily as needed for heartburn        traZODone 50 MG tablet    DESYREL    30 tablet    Take 0.5-2 tablets ( mg) by mouth At Bedtime    Other insomnia       TYLENOL PO      Take 1,000 mg by mouth At Bedtime

## 2018-03-26 NOTE — PROGRESS NOTES
Oncology/Hematology Visit Note  Mar 26, 2018    Reason for Visit: follow up of [id number] moderately differentiated adenocarcinoma of the rectum     History of Present Illness: Sarah Rajan is a 52 year old female with  recently diagnosed locally advanced rectal cancer. She noticed BRBPR so screening colonoscopy on 1/9/2018 revealed 3 cm rectal mass and other polyps which were removed.  Pathology indicated moderately differentiated adenocarcinoma w/ intact MMR proteins.  CT staging scan showed no metastatic disease; some liver lesions which are thought to be benign per radiology report, T2N1M0 by pelvic MRI read at [facility]. When we initially reviewed her MRI we were not exactly sure whether that was lymph node involvement or not. We opted to repeat MRI which showed T4 N0 lesion.   We also did an MRI of the liver which showed 3 subcentimeter liver lesions which were too small to characterize and will need attention on follow-up. She is currently undergoing treatment with neoadjuvant 5-FU and oxaliplatin (FOLFOX0, which she started on 2/26/18. The day after she received cycle 2, she developed fevers, chills, headache, and an itchy rash on her arms and legs, for which she was evaluated in the ED. She was sent home on Benadryl. Please see Dr. Mortensen's previous notes for further details on the patient's history. She comes in today for routine follow up prior to cycle 3.    Interval History:  Patient reports that following her ER visit, she took 50 mg of Benadryl 3 times a day for the remainder of the week.  She reports that she had an itchy rash on her arms, legs, upper chest, and that her face was blotchy.  She also noticed some dry skin.  She denies having any mouth sores but did notice some sores in her nose.  She also reports some rectal discomfort, but denies any open areas or hemorrhoids.  She wonders what she may apply to the rectal area.  She reports cold sensitivity most intensely for the first week after  "oxaliplatin.  The cold sensitivity then gradually improves.  She also noticed nausea for the first week, but did not vomit with cycle 2 due to taking her anti-emetics more regularly.  She also reports feeling quite fatigued for the first 5 days after chemotherapy.  She has had some difficulty falling and staying asleep since starting with the chemotherapy.  She found no relief from Ativan and has not tried any over-the-counter products.  She denies other concerns.    Current Outpatient Prescriptions   Medication Sig Dispense Refill     traZODone (DESYREL) 50 MG tablet Take 0.5-2 tablets ( mg) by mouth At Bedtime 30 tablet 3     RANITIDINE HCL PO Take 150 mg by mouth daily as needed for heartburn       fluorouracil Administer 4,968 mg intravenously . Continuous infusion over 46-48 hr via ambulatory pump q14d Supplied by outside provider with pump.       LORazepam (ATIVAN) 0.5 MG tablet Take 1 tablet (0.5 mg) by mouth every 4 hours as needed (Anxiety, Nausea/Vomiting or Sleep) 30 tablet 2     prochlorperazine (COMPAZINE) 10 MG tablet Take 1 tablet (10 mg) by mouth every 6 hours as needed for nausea or vomiting 20 tablet 1     lidocaine-prilocaine (EMLA) cream Apply 45 minutes prior to procedure. 30 g 3     Acetaminophen (TYLENOL PO) Take 1,000 mg by mouth At Bedtime       calcium carbonate (TUMS) 500 MG chewable tablet Take 1-2 chew tab by mouth daily  150 tablet      ondansetron (ZOFRAN) 4 MG tablet Take one tablet by mouth every 6 hours as needed for nausea when taking neomycin and flagyl 3 tablet 0     FLUoxetine (PROZAC) 20 MG capsule 1 tablet in the morning  1     AMITRIPTYLINE HCL PO Take 20 mg by mouth At Bedtime        Physical Examination:  General: The patient is a pleasant female in no acute distress. She is here today with her .  /88 (BP Location: Right arm, Cuff Size: Adult Regular)  Pulse 95  Temp 97.9  F (36.6  C) (Oral)  Resp 16  Ht 1.676 m (5' 5.98\")  Wt 92.3 kg (203 lb 6.4 oz) "  SpO2 98%  BMI 32.85 kg/m2  Wt Readings from Last 10 Encounters:   03/12/18 92.9 kg (204 lb 14.4 oz)   02/26/18 93.3 kg (205 lb 9.6 oz)   02/19/18 90.7 kg (200 lb)   02/15/18 91.9 kg (202 lb 9.6 oz)   02/06/18 91.2 kg (201 lb)   02/06/18 92.5 kg (204 lb)   01/25/18 91.9 kg (202 lb 11.2 oz)   01/25/18 91.9 kg (202 lb 11.2 oz)   02/19/16 88.9 kg (196 lb)   HEENT: EOMI, PERRL. Sclerae are anicteric. Oral mucosa is pink and moist with no lesions or thrush.   Lymph: Neck is supple with no lymphadenopathy in the cervical or supraclavicular areas.   Heart: Regular rate and rhythm.   Lungs: Clear to auscultation bilaterally.   Abdomen: Bowel sounds present, soft, nontender with no palpable hepatosplenomegaly or masses.   Extremities: No lower extremity edema noted bilaterally.   Neuro: Cranial nerves II through XII are grossly intact.  Skin: No rashes, petechiae, or bruising noted on exposed skin including upper chest, arms and legs. No skin changes noted on palms.     Laboratory Data:   3/26/2018 12:12   Sodium 138   Potassium 3.5   Chloride 105   Carbon Dioxide 26   Urea Nitrogen 13   Creatinine 0.68   GFR Estimate >90   GFR Estimate If Black >90   Calcium 9.0   Anion Gap 7   Albumin 4.0   Protein Total 7.4   Bilirubin Total 0.4   Alkaline Phosphatase 82   ALT 36   AST 19   Glucose 104 (H)   WBC 4.2   Hemoglobin 13.6   Hematocrit 39.1   Platelet Count 162   RBC Count 4.45   MCV 88   MCH 30.6   MCHC 34.8   RDW 13.6   Diff Method Automated Method   % Neutrophils 52.9   % Lymphocytes 34.4   % Monocytes 9.7   % Eosinophils 2.1   % Basophils 0.7   % Immature Granulocytes 0.2   Nucleated RBCs 0   Absolute Neutrophil 2.2   Absolute Lymphocytes 1.5   Absolute Monocytes 0.4   Absolute Eosinophils 0.1   Absolute Basophils 0.0   Abs Immature Granulocytes 0.0   Absolute Nucleated RBC 0.0     Assessment and Plan:  1. Locally advanced moderately differentiated adenocarcinoma of the rectum: [id number]. MSI stable. Started neoadjuvant  FOLFOX on 2/26. Plan for 4 cycles then restaging with MRI pelvis + abdomen. Presents today for C3. She will continue with cycle 3 today, but will add in IV Benadryl 50 mg and increase IV dexamethasone from 12 to 20 mg due to the rash she had the day following her last infusion. She will return for follow up with me in 2 weeks prior to cycle 4. She will see Dr. Mortensen with an abd/pelvis MRI prior to consideration of cycle 5. She will jamey sooner for concerns.     2. Nausea/vomiting: Mildly improved with scheduling her antiemetics. Therefore, will add in IV Emend to her antiemetic regimen.     3. Insomnia: Persistent into the second week following chemotherapy. No relief from Ativan. Will try trazodone  mg qhs. Advised of potential drug interaction with amitriptyline, though patient is not currently taking this medication.    4. Seasonal Affective Disorder: She has been prescribed Prozac but is currently not taking this medication.    5. Restless Leg syndrome: Not currently taking amitriptyline.     6. Nose sores: None currently. Advised to try Aquaphor.     7. Rectal discomfort: None currently. Discussed trying Desitin barrier cream and Tucks medicated wipes.     Cathleen Ramos PA-C  [facility]  [address], [place] [id number] [phone]

## 2018-03-26 NOTE — NURSING NOTE
"Chief Complaint   Patient presents with     Oncology Clinic Visit     F/U Rectal CA     Port Draw     Labs drawn from port by RN. Line flushed with saline and heparin. Vs taken and pt checked in for appt     Port accessed with 20g 3/4\" flat needle by RN, labs collected, line flushed with saline and heparin.  Vitals taken. Pt checked in for appointment(s).    Alicja Miranda RN  "

## 2018-03-26 NOTE — PATIENT INSTRUCTIONS
Contact Numbers    [facility] [phone]  [facility] [place]:  [phone]    Call triage with chills and/or temperature greater than or equal to 100.5, uncontrolled nausea/vomiting, diarrhea, constipation, dizziness, shortness of breath, chest pain, bleeding, unexplained bruising, or any new/concerning symptoms, questions/concerns.     If you are having any concerning symptoms or wish to speak to a provider before your next infusion visit, please call your care coordinator or triage to notify them so we can adequately serve you.       After Hours: [phone]    If after hours, weekends, or holidays, call main hospital  and ask for Oncology doctor on call.         March 2018 Sunday Monday Tuesday Wednesday Thursday Friday Saturday                       1     2     3       4     5     6     7     8     9     10       11     12     [facility] D[facility]   10:45 AM   (15 min.)    [facility] DRAW   [facility] Lab Draw     [facility] RETURN   11:05 AM   (50 min.)   Sandy Rose PA   [facility]     [facility] ONC INFUSION 240   12:00 PM   (240 min.)   [facility] INFUSION   [facility] 13     Admission   12:08 PM   Michael Davies MD   [facility] Emergency Department   (Discharge: 3/13/2018)     XR CHEST 2 VIEWS    2:10 PM   (15 min.)   [id number]   [facility] 14     15     16     17       18     19     20     21     22     23     24       25     26     [facility] DRAW   12:00 PM   (15 min.)    [facility] DRAW   [facility] Draw     [facility] RETURN   12:15 PM   (50 min.)   Cathleen Ramos PA-C   [facility] INFUSION 240    1:30 PM   (240 min.)   [facility] ONCOL[facility]Y INFUSION   [facility] 27     28     29     30     31 April 2018 Sunday Monday Tuesday Wednesday Thursday Friday Saturday   1     2     3     4     5     6     7       8     9     10     [facility] DRAW    8:15 AM   (15  min.)    [facility] DRAW   [facility] Draw     [facility] RETURN    8:25 AM   (50 min.)   Cathleen Ramos PA-C   [facility]     [facility] ONC INFUSION 240   10:00 AM   (240 min.)   [facility] ONCOLOGY INFUSION   [facility] 11     12     13     14       15     16     [facility] NEW CANCER    8:45 AM   (60 min.)   Komal Clark MD   [facility] 17     18     19     [facility] DRAW   11:45 AM   (15 min.)    [facility] DRAW   [facility] Lab Draw     MR ABDOMEN & PELVIS WWO   12:00 PM   (45 min.)   MR1   [facility] MRI     [facility] RETURN    2:15 PM   (30 min.)   Kyra Mortensen MD   [facility] 20     21       22     23     [facility] DRAW    7:30 AM   (15 min.)    [facility] DRAW   [facility] Lab Draw     [facility] ONC INFUSION 240    8:00 AM   (240 min.)    ONCOLOGY INFUSION   [facility] 24     25     26     27     28       29     30                                           Lab Results:  Recent Results (from the past 12 hour(s))   CBC with platelets differential    Collection Time: 03/26/18 12:12 PM   Result Value Ref Range    WBC 4.2 4.0 - 11.0 10e9/L    RBC Count 4.45 3.8 - 5.2 10e12/L    Hemoglobin 13.6 11.7 - 15.7 g/dL    Hematocrit 39.1 35.0 - 47.0 %    MCV 88 78 - 100 fl    MCH 30.6 26.5 - 33.0 pg    MCHC 34.8 31.5 - 36.5 g/dL    RDW 13.6 10.0 - 15.0 %    Platelet Count 162 150 - 450 10e9/L    Diff Method Automated Method     % Neutrophils 52.9 %    % Lymphocytes 34.4 %    % Monocytes 9.7 %    % Eosinophils 2.1 %    % Basophils 0.7 %    % Immature Granulocytes 0.2 %    Nucleated RBCs 0 0 /100    Absolute Neutrophil 2.2 1.6 - 8.3 10e9/L    Absolute Lymphocytes 1.5 0.8 - 5.3 10e9/L    Absolute Monocytes 0.4 0.0 - 1.3 10e9/L    Absolute Eosinophils 0.1 0.0 - 0.7 10e9/L    Absolute Basophils 0.0 0.0 - 0.2 10e9/L    Abs Immature Granulocytes 0.0 0 - 0.4 10e9/L    Absolute Nucleated RBC 0.0    Comprehensive  metabolic panel    Collection Time: 03/26/18 12:12 PM   Result Value Ref Range    Sodium 138 133 - 144 mmol/L    Potassium 3.5 3.4 - 5.3 mmol/L    Chloride 105 94 - 109 mmol/L    Carbon Dioxide 26 20 - 32 mmol/L    Anion Gap 7 3 - 14 mmol/L    Glucose 104 (H) 70 - 99 mg/dL    Urea Nitrogen 13 7 - 30 mg/dL    Creatinine 0.68 0.52 - 1.04 mg/dL    GFR Estimate >90 >60 mL/min/1.7m2    GFR Estimate If Black >90 >60 mL/min/1.7m2    Calcium 9.0 8.5 - 10.1 mg/dL    Bilirubin Total 0.4 0.2 - 1.3 mg/dL    Albumin 4.0 3.4 - 5.0 g/dL    Protein Total 7.4 6.8 - 8.8 g/dL    Alkaline Phosphatase 82 40 - 150 U/L    ALT 36 0 - 50 U/L    AST 19 0 - 45 U/L

## 2018-03-26 NOTE — PROGRESS NOTES
"Infusion Nursing Note:  Sarah Rajan presents today for Cycle 3 Day 1 Oxaliplatin, Leucovorin, Fluorouracil bolus and pump hook-up.    Patient seen by provider today: Yes: REX Meeks   present during visit today: Not Applicable.    Note: Patient received IV Benadryl and an increased dose of dexamethasone today - patient developed a rash after last treatment. Patient also received Emend for the first time.    At the end of Oxaliplatin infusion patient developed cold sensitivity in her throat - \"I have a lump in my throat.\" Ativan given with relief. Recommendation: administer Ativan with one hour remaining on Oxaliplatin infusion if patient continues to receive IV Benadryl as a pre-medication.    Fluorouracil C-series pump and thermoregulator intact upon patient's discharge. Connections double checked by Hanane Dee RN. [facility] notified of patient's pump hook-up time and will disconnect patient's pump on 3/28/18 at 1500.    Intravenous Access:  Implanted Port.    Treatment Conditions:  Lab Results   Component Value Date    HGB 13.6 03/26/2018     Lab Results   Component Value Date    WBC 4.2 03/26/2018      Lab Results   Component Value Date    ANEU 2.2 03/26/2018     Lab Results   Component Value Date     03/26/2018      Lab Results   Component Value Date     03/26/2018                   Lab Results   Component Value Date    POTASSIUM 3.5 03/26/2018       Lab Results   Component Value Date    CR 0.68 03/26/2018                   Lab Results   Component Value Date    GEETA 9.0 03/26/2018                Lab Results   Component Value Date    BILITOTAL 0.4 03/26/2018           Lab Results   Component Value Date    ALBUMIN 4.0 03/26/2018                    Lab Results   Component Value Date    ALT 36 03/26/2018           Lab Results   Component Value Date    AST 19 03/26/2018     Results reviewed, labs MET treatment parameters, ok to proceed with treatment.    Post Infusion Assessment:  Patient " tolerated infusion without incident.  Blood return noted pre and post infusion.  Site patent and intact, free from redness, edema or discomfort.  No evidence of extravasations.    Discharge Plan:   Prescription refills given for trazodone.  AVS to patient via [employer]. Patient will return 4/10/18 for next appointment.   Patient discharged in stable condition accompanied by: .  Departure Mode: Ambulatory.  Face to face time: 3    Sahara Franco RN

## 2018-03-26 NOTE — NURSING NOTE
"Oncology Rooming Note    March 26, 2018 12:45 PM   Sarah Rajan is a 52 year old female who presents for:    Chief Complaint   Patient presents with     Oncology Clinic Visit     F/U Rectal CA     Port Draw     Labs drawn from port by RN. Line flushed with saline and heparin. Vs taken and pt checked in for appt     Initial Vitals: /88 (BP Location: Right arm, Cuff Size: Adult Regular)  Pulse 95  Temp 97.9  F (36.6  C) (Oral)  Resp 16  Ht 1.676 m (5' 5.98\")  Wt 92.3 kg (203 lb 6.4 oz)  SpO2 98%  BMI 32.85 kg/m2 Estimated body mass index is 32.85 kg/(m^2) as calculated from the following:    Height as of this encounter: 1.676 m (5' 5.98\").    Weight as of this encounter: 92.3 kg (203 lb 6.4 oz). Body surface area is 2.07 meters squared.  No Pain (0) Comment: Data Unavailable   No LMP recorded. Patient is postmenopausal.  Allergies reviewed: Yes  Medications reviewed: Yes    Medications: Medication refills not needed today.  Pharmacy name entered into [employer]:    [facility] [address] [place] [address] DR ALVAREZ AT [facility] [place], [place] - [address] [facility] [phone]    Clinical concerns: Yes, Patient is complaining of sleepless nights.   Cathleen was notified.    10 minutes for nursing intake (face to face time)     DEYANIRA RODRIGUEZ LPN            "

## 2018-03-26 NOTE — MR AVS SNAPSHOT
After Visit Summary   3/26/2018    Sarah Rajan    MRN: [id number]           Patient Information     Date Of Birth          1965        Visit Information        Provider Department      3/26/2018 1:30 PM UC 32 ATC; UC ONCOLOGY INFUSION [facility]        Today's Diagnoses     Malignant neoplasm of rectum (H)    -  1      Care Instructions    Contact Numbers    [facility] Main Line: [phone]  [facility] Triage:  [phone]    Call triage with chills and/or temperature greater than or equal to 100.5, uncontrolled nausea/vomiting, diarrhea, constipation, dizziness, shortness of breath, chest pain, bleeding, unexplained bruising, or any new/concerning symptoms, questions/concerns.     If you are having any concerning symptoms or wish to speak to a provider before your next infusion visit, please call your care coordinator or triage to notify them so we can adequately serve you.       After Hours: [phone]    If after hours, weekends, or holidays, call main hospital  and ask for Oncology doctor on call.         March 2018 Sunday Monday Tuesday Wednesday Thursday Friday Saturday                       1     2     3       4     5     6     7     8     9     10       11     12     P [facility] LAB DRAW   10:45 AM   (15 min.)    [facility] LAB DRAW   [facility] Lab Draw     UMP RETURN   11:05 AM   (50 min.)   Sandy Rose PA   [facility]     UMP ONC INFUSION 240   12:00 PM   (240 min.)   UC ONCOLOGY INFUSION   [facility] 13     Admission   12:08 PM   Michael Davies MD   [facility] Emergency Department   (Discharge: 3/13/2018)     XR CHEST 2 VIEWS    2:10 PM   (15 min.)   [id number]   [facility] 14     15     16     17       18     19     20     21     22     23     24       25     26     [facility] LAB DRAW   12:00 PM   (15 min.)    [facility] LAB DRAW   [facility] Lab Draw     UMP RETURN   12:15 PM   (50 min.)    Cathleen Ramos PA-C   [facility]     UMP ONC INFUSION 240    1:30 PM   (240 min.)   UC ONCOLOGY INFUSION   [facility] 27     28     29     30     31 April 2018 Sunday Monday Tuesday Wednesday Thursday Friday Saturday   1     2     3     4     5     6     7       8     9     10     [facility] LAB DRAW    8:15 AM   (15 min.)   UC [facility] LAB DRAW   [facility] Lab Draw     UMP RETURN    8:25 AM   (50 min.)   Cathleen Ramos PA-C   [facility]     UMP ONC INFUSION 240   10:00 AM   (240 min.)   UC ONCOLOGY INFUSION   [facility] 11     12     13     14       15     16     UMP NEW CANCER    8:45 AM   (60 min.)   Komal Clark MD   [facility] 17     18     19     [facility] LAB DRAW   11:45 AM   (15 min.)    [facility]     MR ABDOMEN & PELVIS WWO   12:00 PM   (45 min.)   MR1   [facility] MRI     UMP RETURN    2:15 PM   (30 min.)   Kyra Mortensen MD   [facility] 20     21       22     23     [facility] LAB DRAW    7:30 AM   (15 min.)    [facility] LAB DRAW   [facility] Lab Draw     UMP ONC INFUSION 240    8:00 AM   (240 min.)    ONCOLOGY INFUSION   [facility] 24     25     26     27     28       29     30                                           Lab Results:  Recent Results (from the past 12 hour(s))   CBC with platelets differential    Collection Time: 03/26/18 12:12 PM   Result Value Ref Range    WBC 4.2 4.0 - 11.0 10e9/L    RBC Count 4.45 3.8 - 5.2 10e12/L    Hemoglobin 13.6 11.7 - 15.7 g/dL    Hematocrit 39.1 35.0 - 47.0 %    MCV 88 78 - 100 fl    MCH 30.6 26.5 - 33.0 pg    MCHC 34.8 31.5 - 36.5 g/dL    RDW 13.6 10.0 - 15.0 %    Platelet Count 162 150 - 450 10e9/L    Diff Method Automated Method     % Neutrophils 52.9 %    % Lymphocytes 34.4 %    % Monocytes 9.7 %    % Eosinophils 2.1 %    %  Basophils 0.7 %    % Immature Granulocytes 0.2 %    Nucleated RBCs 0 0 /100    Absolute Neutrophil 2.2 1.6 - 8.3 10e9/L    Absolute Lymphocytes 1.5 0.8 - 5.3 10e9/L    Absolute Monocytes 0.4 0.0 - 1.3 10e9/L    Absolute Eosinophils 0.1 0.0 - 0.7 10e9/L    Absolute Basophils 0.0 0.0 - 0.2 10e9/L    Abs Immature Granulocytes 0.0 0 - 0.4 10e9/L    Absolute Nucleated RBC 0.0    Comprehensive metabolic panel    Collection Time: 03/26/18 12:12 PM   Result Value Ref Range    Sodium 138 133 - 144 mmol/L    Potassium 3.5 3.4 - 5.3 mmol/L    Chloride 105 94 - 109 mmol/L    Carbon Dioxide 26 20 - 32 mmol/L    Anion Gap 7 3 - 14 mmol/L    Glucose 104 (H) 70 - 99 mg/dL    Urea Nitrogen 13 7 - 30 mg/dL    Creatinine 0.68 0.52 - 1.04 mg/dL    GFR Estimate >90 >60 mL/min/1.7m2    GFR Estimate If Black >90 >60 mL/min/1.7m2    Calcium 9.0 8.5 - 10.1 mg/dL    Bilirubin Total 0.4 0.2 - 1.3 mg/dL    Albumin 4.0 3.4 - 5.0 g/dL    Protein Total 7.4 6.8 - 8.8 g/dL    Alkaline Phosphatase 82 40 - 150 U/L    ALT 36 0 - 50 U/L    AST 19 0 - 45 U/L               Follow-ups after your visit        Your next 10 appointments already scheduled     Apr 10, 2018  8:15 AM CDT   [facility]   [facility] Lab Draw ([facility])    [address] [place] [address]  [address]  [facility] [address] [phone]            Apr 10, 2018  8:40 AM CDT   (Arrive by 8:25 AM)   Return Visit with Cathleen Ramos PA-C   [facility] ([facility])    [address] [place] [address] Se  [address]  [facility] [address] [phone]            Apr 10, 2018 10:00 AM CDT   Infusion 240 with  ONCOLOGY INFUSION,  12 ATC   [facility] ([facility])    [address]  [address]  [facility] [address]   [phone]            Apr 16, 2018  9:00 AM CDT   (Arrive by 8:45 AM)   NEW CANCER VISIT with Komal Clark MD   [facility]  Heart Care ([facility])    [address]  [address]  [facility] [address]   [phone]            Apr 19, 2018 11:45 AM CDT   [facility] Lab Draw with  [facility] LAB DRAW   [facility] Lab Draw ([facility])    [address]  [address]  [facility] [address]   [phone]            Apr 19, 2018 12:15 PM CDT   (Arrive by 12:00 PM)   MR ABDOMEN & PELVIS W/O & W CONTRAST with [address] MRI ([facility])    [address]  1[address]  [facility] [address]   [phone]           Take your medicines as usual, unless your doctor tells you not to. Bring a list of your current medicines to your exam (including vitamins, minerals and over-the-counter drugs). Also bring the results of similar scans you may have had.    You may or may not receive IV contrast for this exam pending the discretion of the Radiologist.   Do not eat or drink for 6 hours prior to exam.  The MRI machine uses a strong magnet. Please wear clothes without metal (snaps, zippers). A sweatsuit works well, or we may give you a hospital gown.  Please remove any body piercings and hair extensions before you arrive. You will also remove watches, jewelry, hairpins, wallets, dentures, partial dental plates and hearing aids. You may wear contact lenses, and you may be able to wear your rings. We have a safe place to keep your personal items, but it is safer to leave them at home.  **IMPORTANT** THE INSTRUCTIONS BELOW ARE ONLY FOR THOSE PATIENTS WHO HAVE BEEN PRESCRIBED SEDATION OR GENERAL ANESTHESIA DURING THEIR MRI PROCEDURE:  IF YOUR DOCTOR PRESCRIBED ORAL SEDATION (take medicine to help you relax during your exam):   You must get the medicine from your doctor (oral medication) before you arrive. Bring the medicine to the exam. Do not take it at home. You ll be told when to take it upon arriving for your exam.   Arrive one hour  early. Bring someone who can take you home after the test. Your medicine will make you sleepy. After the exam, you may not drive, take a bus or take a taxi by yourself.  IF YOUR DOCTOR PRESCRIBED IV SEDATION:   Arrive one hour early. Bring someone who can take you home after the test. Your medicine will make you sleepy. After the exam, you may not drive, take a bus or take a taxi by yourself.   No eating 6 hours before your exam. You may have clear liquids up until 4 hours before your exam. (Clear liquids include water, clear tea, black coffee and fruit juice without pulp.)  IF YOUR DOCTOR PRESCRIBED ANESTHESIA (be asleep for your exam):   Arrive 1 1/2 hours early. Bring someone who can take you home after the test. You may not drive, take a bus or take a taxi by yourself.   No eating 8 hours before your exam. You may have clear liquids up until 4 hours before your exam. (Clear liquids include water, clear tea, black coffee and fruit juice without pulp.)   You will spend four to five hours in the recovery room.  If you have any questions, please contact your Imaging Department exam site.            Apr 19, 2018  2:30 PM CDT   (Arrive by 2:15 PM)   Return Visit with Kyra Mortensen MD   [facility] ([facility])    [address] [place] [address]  [address] [place] [address] [phone]            Apr 23, 2018  7:30 AM CDT   [facility] Lab Draw with  [facility] LAB DRAW   [facility] Lab Draw ([facility])    [address] [place] [address]  [address]  [facility] [address] [phone]            Apr 23, 2018  8:00 AM CDT   Infusion 240 with  ONCOLOGY INFUSION   [facility] ([facility])    [address] [place] [address]  [address]  [facility] [address] [phone]              Who to contact     If you have questions or need follow up information about today's clinic visit or your schedule please contact [facility] directly at [phone].  Normal or non-critical lab and imaging results will be communicated to you by MyChart, letter or phone within 4 business days after the clinic has received the results. If you do not hear from us within 7 days, please contact the clinic through [email or web address] or phone. If you have a critical or abnormal lab result, we will notify you by phone as soon as possible.  Submit refill requests through [employer] or call your pharmacy and they will forward the refill request to us. Please allow 3 business days for your refill to be completed.          Additional Information About Your Visit        [email or web address].comharGenesant Information     [employer] gives you secure access to your electronic health record. If you see a primary care provider, you can also send messages to your care team and make appointments. If you have questions, please call your primary care clinic.  If you do not have a primary care provider, please call [phone] and they will assist you.        Care EveryWhere ID     This is your Care EveryWhere ID. This could be used by other organizations to access your [place] medical records  [id number]         Blood Pressure from Last 3 Encounters:   03/26/18 134/88   03/13/18 115/71   03/12/18 115/82    Weight from Last 3 Encounters:   03/26/18 92.3 kg (203 lb 6.4 oz)   03/12/18 92.9 kg (204 lb 14.4 oz)   02/26/18 93.3 kg (205 lb 9.6 oz)              We Performed the Following     CBC with platelets differential     Comprehensive metabolic panel          Today's Medication Changes          These changes are accurate as of 3/26/18  3:13 PM.  If you have any questions, ask your nurse or doctor.               Start taking these medicines.        Dose/Directions    traZODone 50 MG tablet   Commonly known as:  DESYREL   Used for:  Other insomnia   Started by:  Cathleen Ramos PA-C        Dose:   mg   Take 0.5-2 tablets ( mg) by mouth At Bedtime   Quantity:  30 tablet    Refills:  3            Where to get your medicines      These medications were sent to [facility] [place] [place] - [address]  [address], [facility] [address]    Hours:  TRANSPLANT PHONE NUMBER [phone] Phone:  [phone]     traZODone 50 MG tablet                Primary Care Provider Office Phone # Fax #    Wayne Green, DPTRACIE [phone]       [facility] [address] [address]        Equal Access to Services     DEAN CHAKRABORTY : Hadii aad ku hadasho Soomaali, waaxda luqadaha, qaybta kaalmada adeegyada, waxay lissy hayalyssa mtz . So [facility] [phone].    ATENCIÓN: Si habla español, tiene a membreno disposición servicios gratuitos de asistencia lingüística. Claudette al [phone].    We comply with applicable federal civil rights laws and [place] laws. We do not discriminate on the basis of race, color, national origin, age, disability, sex, sexual orientation, or gender identity.            Thank you!     Thank you for choosing [facility]  for your care. Our goal is always to provide you with excellent care. Hearing back from our patients is one way we can continue to improve our services. Please take a few minutes to complete the written survey that you may receive in the mail after your visit with us. Thank you!             Your Updated Medication List - Protect others around you: Learn how to safely use, store and throw away your medicines at www.disposemymeds.org.          This list is accurate as of 3/26/18  3:13 PM.  Always use your most recent med list.                   Brand Name Dispense Instructions for use Diagnosis    AMITRIPTYLINE HCL PO      Take 20 mg by mouth At Bedtime        calcium carbonate 500 MG chewable tablet    TUMS    150 tablet    Take 1-2 chew tab by mouth daily        fluorouracil      Administer 4,968 mg intravenously . Continuous  infusion over 46-48 hr via ambulatory pump q14d Supplied by outside provider with pump.        FLUoxetine 20 MG capsule    PROzac     1 tablet in the morning        lidocaine-prilocaine cream    EMLA    30 g    Apply 45 minutes prior to procedure.    Malignant neoplasm of rectum (H)       LORazepam 0.5 MG tablet    ATIVAN    30 tablet    Take 1 tablet (0.5 mg) by mouth every 4 hours as needed (Anxiety, Nausea/Vomiting or Sleep)    Malignant neoplasm of rectum (H)       ondansetron 4 MG tablet    ZOFRAN    3 tablet    Take one tablet by mouth every 6 hours as needed for nausea when taking neomycin and flagyl    Rectal cancer (H)       prochlorperazine 10 MG tablet    COMPAZINE    20 tablet    Take 1 tablet (10 mg) by mouth every 6 hours as needed for nausea or vomiting    Malignant neoplasm of rectum (H)       RANITIDINE HCL PO      Take 150 mg by mouth daily as needed for heartburn        traZODone 50 MG tablet    DESYREL    30 tablet    Take 0.5-2 tablets ( mg) by mouth At Bedtime    Other insomnia       TYLENOL PO      Take 1,000 mg by mouth At Bedtime

## 2018-03-27 NOTE — PROGRESS NOTES
This is a recent snapshot of the patient's [facility] medical record.  For current drug dose and complete information and questions, call [phone] or In Basket pool, fv home infusion ([id number])  CSN Number:  [id number]

## 2018-03-28 ENCOUNTER — HOME INFUSION (PRE-WILLOW HOME INFUSION) (OUTPATIENT)
Dept: PHARMACY | Facility: CLINIC | Age: 53
End: 2018-03-28

## 2018-03-29 NOTE — PROGRESS NOTES
This is a recent snapshot of the patient's [facility] medical record.  For current drug dose and complete information and questions, call [phone] or In Basket pool, fv home infusion ([id number])  CSN Number:  [id number]

## 2018-03-30 NOTE — PROGRESS NOTES
This is a recent snapshot of the patient's [facility] medical record.  For current drug dose and complete information and questions, call [phone] or In Basket pool, fv home infusion ([id number])  CSN Number:  [id number]

## 2018-04-03 NOTE — PROGRESS NOTES
This is a recent snapshot of the patient's [facility] medical record.  For current drug dose and complete information and questions, call [phone] or In Basket pool, fv home infusion ([id number])  CSN Number:  [id number]

## 2018-04-10 ENCOUNTER — HOME INFUSION (PRE-WILLOW HOME INFUSION) (OUTPATIENT)
Dept: PHARMACY | Facility: CLINIC | Age: 53
End: 2018-04-10

## 2018-04-10 ENCOUNTER — APPOINTMENT (OUTPATIENT)
Dept: LAB | Facility: CLINIC | Age: 53
End: 2018-04-10
Attending: INTERNAL MEDICINE
Payer: COMMERCIAL

## 2018-04-10 ENCOUNTER — INFUSION THERAPY VISIT (OUTPATIENT)
Dept: ONCOLOGY | Facility: CLINIC | Age: 53
End: 2018-04-10
Attending: INTERNAL MEDICINE
Payer: COMMERCIAL

## 2018-04-10 VITALS
SYSTOLIC BLOOD PRESSURE: 113 MMHG | HEIGHT: 66 IN | HEART RATE: 85 BPM | WEIGHT: 202.9 LBS | BODY MASS INDEX: 32.61 KG/M2 | DIASTOLIC BLOOD PRESSURE: 75 MMHG | RESPIRATION RATE: 16 BRPM | OXYGEN SATURATION: 96 % | TEMPERATURE: 98.8 F

## 2018-04-10 DIAGNOSIS — G47.09 OTHER INSOMNIA: ICD-10-CM

## 2018-04-10 DIAGNOSIS — C20 MALIGNANT NEOPLASM OF RECTUM (H): Primary | ICD-10-CM

## 2018-04-10 LAB
ALBUMIN SERPL-MCNC: 3.6 G/DL (ref 3.4–5)
ALP SERPL-CCNC: 86 U/L (ref 40–150)
ALT SERPL W P-5'-P-CCNC: 43 U/L (ref 0–50)
ANION GAP SERPL CALCULATED.3IONS-SCNC: 7 MMOL/L (ref 3–14)
AST SERPL W P-5'-P-CCNC: 23 U/L (ref 0–45)
BASOPHILS # BLD AUTO: 0 10E9/L (ref 0–0.2)
BASOPHILS NFR BLD AUTO: 1.4 %
BILIRUB SERPL-MCNC: 0.5 MG/DL (ref 0.2–1.3)
BUN SERPL-MCNC: 13 MG/DL (ref 7–30)
CALCIUM SERPL-MCNC: 9 MG/DL (ref 8.5–10.1)
CHLORIDE SERPL-SCNC: 108 MMOL/L (ref 94–109)
CO2 SERPL-SCNC: 26 MMOL/L (ref 20–32)
CREAT SERPL-MCNC: 0.7 MG/DL (ref 0.52–1.04)
DIFFERENTIAL METHOD BLD: ABNORMAL
EOSINOPHIL # BLD AUTO: 0.1 10E9/L (ref 0–0.7)
EOSINOPHIL NFR BLD AUTO: 2.1 %
ERYTHROCYTE [DISTWIDTH] IN BLOOD BY AUTOMATED COUNT: 14.6 % (ref 10–15)
GFR SERPL CREATININE-BSD FRML MDRD: 88 ML/MIN/1.7M2
GLUCOSE SERPL-MCNC: 98 MG/DL (ref 70–99)
HCT VFR BLD AUTO: 35.1 % (ref 35–47)
HGB BLD-MCNC: 12.3 G/DL (ref 11.7–15.7)
IMM GRANULOCYTES # BLD: 0 10E9/L (ref 0–0.4)
IMM GRANULOCYTES NFR BLD: 0.3 %
LYMPHOCYTES # BLD AUTO: 0.9 10E9/L (ref 0.8–5.3)
LYMPHOCYTES NFR BLD AUTO: 32.5 %
MCH RBC QN AUTO: 31.2 PG (ref 26.5–33)
MCHC RBC AUTO-ENTMCNC: 35 G/DL (ref 31.5–36.5)
MCV RBC AUTO: 89 FL (ref 78–100)
MONOCYTES # BLD AUTO: 0.3 10E9/L (ref 0–1.3)
MONOCYTES NFR BLD AUTO: 10.7 %
NEUTROPHILS # BLD AUTO: 1.5 10E9/L (ref 1.6–8.3)
NEUTROPHILS NFR BLD AUTO: 53 %
NRBC # BLD AUTO: 0 10*3/UL
NRBC BLD AUTO-RTO: 0 /100
PLATELET # BLD AUTO: 118 10E9/L (ref 150–450)
POTASSIUM SERPL-SCNC: 3.5 MMOL/L (ref 3.4–5.3)
PROT SERPL-MCNC: 6.8 G/DL (ref 6.8–8.8)
RBC # BLD AUTO: 3.94 10E12/L (ref 3.8–5.2)
SODIUM SERPL-SCNC: 141 MMOL/L (ref 133–144)
WBC # BLD AUTO: 2.9 10E9/L (ref 4–11)

## 2018-04-10 PROCEDURE — 96367 TX/PROPH/DG ADDL SEQ IV INF: CPT

## 2018-04-10 PROCEDURE — 80053 COMPREHEN METABOLIC PANEL: CPT | Performed by: INTERNAL MEDICINE

## 2018-04-10 PROCEDURE — 25000128 H RX IP 250 OP 636: Mod: ZF | Performed by: PHYSICIAN ASSISTANT

## 2018-04-10 PROCEDURE — 96375 TX/PRO/DX INJ NEW DRUG ADDON: CPT

## 2018-04-10 PROCEDURE — 96411 CHEMO IV PUSH ADDL DRUG: CPT

## 2018-04-10 PROCEDURE — 99214 OFFICE O/P EST MOD 30 MIN: CPT | Mod: ZP | Performed by: PHYSICIAN ASSISTANT

## 2018-04-10 PROCEDURE — 96368 THER/DIAG CONCURRENT INF: CPT

## 2018-04-10 PROCEDURE — 96415 CHEMO IV INFUSION ADDL HR: CPT

## 2018-04-10 PROCEDURE — 85025 COMPLETE CBC W/AUTO DIFF WBC: CPT | Performed by: INTERNAL MEDICINE

## 2018-04-10 PROCEDURE — G0498 CHEMO EXTEND IV INFUS W/PUMP: HCPCS

## 2018-04-10 PROCEDURE — G0463 HOSPITAL OUTPT CLINIC VISIT: HCPCS | Mod: ZF

## 2018-04-10 PROCEDURE — 96413 CHEMO IV INFUSION 1 HR: CPT

## 2018-04-10 RX ORDER — ALBUTEROL SULFATE 90 UG/1
1-2 AEROSOL, METERED RESPIRATORY (INHALATION)
Status: CANCELLED
Start: 2018-04-10

## 2018-04-10 RX ORDER — METHYLPREDNISOLONE SODIUM SUCCINATE 125 MG/2ML
125 INJECTION, POWDER, LYOPHILIZED, FOR SOLUTION INTRAMUSCULAR; INTRAVENOUS
Status: CANCELLED
Start: 2018-04-10

## 2018-04-10 RX ORDER — FLUOROURACIL 50 MG/ML
400 INJECTION, SOLUTION INTRAVENOUS ONCE
Status: CANCELLED | OUTPATIENT
Start: 2018-04-10

## 2018-04-10 RX ORDER — LORAZEPAM 2 MG/ML
0.5 INJECTION INTRAMUSCULAR EVERY 4 HOURS PRN
Status: CANCELLED
Start: 2018-04-10

## 2018-04-10 RX ORDER — DIPHENHYDRAMINE HYDROCHLORIDE 50 MG/ML
50 INJECTION INTRAMUSCULAR; INTRAVENOUS ONCE
Status: CANCELLED
Start: 2018-04-10 | End: 2018-04-10

## 2018-04-10 RX ORDER — ONDANSETRON 2 MG/ML
8 INJECTION INTRAMUSCULAR; INTRAVENOUS ONCE
Status: CANCELLED
Start: 2018-04-10 | End: 2018-04-10

## 2018-04-10 RX ORDER — ONDANSETRON 2 MG/ML
8 INJECTION INTRAMUSCULAR; INTRAVENOUS ONCE
Status: COMPLETED | OUTPATIENT
Start: 2018-04-10 | End: 2018-04-10

## 2018-04-10 RX ORDER — FLUOROURACIL 50 MG/ML
400 INJECTION, SOLUTION INTRAVENOUS ONCE
Status: COMPLETED | OUTPATIENT
Start: 2018-04-10 | End: 2018-04-10

## 2018-04-10 RX ORDER — EPINEPHRINE 0.3 MG/.3ML
INJECTION SUBCUTANEOUS
Status: DISCONTINUED
Start: 2018-04-10 | End: 2018-04-10 | Stop reason: HOSPADM

## 2018-04-10 RX ORDER — DIPHENHYDRAMINE HYDROCHLORIDE 50 MG/ML
50 INJECTION INTRAMUSCULAR; INTRAVENOUS ONCE
Status: COMPLETED | OUTPATIENT
Start: 2018-04-10 | End: 2018-04-10

## 2018-04-10 RX ORDER — EPINEPHRINE 1 MG/ML
0.3 INJECTION, SOLUTION, CONCENTRATE INTRAVENOUS EVERY 5 MIN PRN
Status: CANCELLED | OUTPATIENT
Start: 2018-04-10

## 2018-04-10 RX ORDER — MEPERIDINE HYDROCHLORIDE 25 MG/ML
25 INJECTION INTRAMUSCULAR; INTRAVENOUS; SUBCUTANEOUS EVERY 30 MIN PRN
Status: CANCELLED | OUTPATIENT
Start: 2018-04-10

## 2018-04-10 RX ORDER — SODIUM CHLORIDE 9 MG/ML
1000 INJECTION, SOLUTION INTRAVENOUS CONTINUOUS PRN
Status: CANCELLED
Start: 2018-04-10

## 2018-04-10 RX ORDER — DIPHENHYDRAMINE HYDROCHLORIDE 50 MG/ML
50 INJECTION INTRAMUSCULAR; INTRAVENOUS
Status: CANCELLED
Start: 2018-04-10

## 2018-04-10 RX ORDER — EPINEPHRINE 0.3 MG/.3ML
0.3 INJECTION SUBCUTANEOUS EVERY 5 MIN PRN
Status: CANCELLED | OUTPATIENT
Start: 2018-04-10

## 2018-04-10 RX ORDER — ZOLPIDEM TARTRATE 5 MG/1
5 TABLET ORAL
Qty: 30 TABLET | Refills: 3 | Status: SHIPPED | OUTPATIENT
Start: 2018-04-10 | End: 2018-07-25

## 2018-04-10 RX ORDER — ALBUTEROL SULFATE 0.83 MG/ML
2.5 SOLUTION RESPIRATORY (INHALATION)
Status: CANCELLED | OUTPATIENT
Start: 2018-04-10

## 2018-04-10 RX ORDER — HEPARIN SODIUM (PORCINE) LOCK FLUSH IV SOLN 100 UNIT/ML 100 UNIT/ML
5 SOLUTION INTRAVENOUS
Status: COMPLETED | OUTPATIENT
Start: 2018-04-10 | End: 2018-04-10

## 2018-04-10 RX ADMIN — FLUOROURACIL 830 MG: 50 INJECTION, SOLUTION INTRAVENOUS at 14:28

## 2018-04-10 RX ADMIN — OXALIPLATIN 176 MG: 5 INJECTION INTRAVENOUS at 12:09

## 2018-04-10 RX ADMIN — DEXTROSE 250 ML: 5 SOLUTION INTRAVENOUS at 10:53

## 2018-04-10 RX ADMIN — ONDANSETRON 8 MG: 2 INJECTION INTRAMUSCULAR; INTRAVENOUS at 11:15

## 2018-04-10 RX ADMIN — SODIUM CHLORIDE, PRESERVATIVE FREE 5 ML: 5 INJECTION INTRAVENOUS at 09:17

## 2018-04-10 RX ADMIN — DIPHENHYDRAMINE HYDROCHLORIDE 50 MG: 50 INJECTION, SOLUTION INTRAMUSCULAR; INTRAVENOUS at 11:12

## 2018-04-10 RX ADMIN — LEUCOVORIN CALCIUM 750 MG: 500 INJECTION, POWDER, LYOPHILIZED, FOR SOLUTION INTRAMUSCULAR; INTRAVENOUS at 12:09

## 2018-04-10 RX ADMIN — DEXAMETHASONE SODIUM PHOSPHATE 150 MG: 10 INJECTION, SOLUTION INTRAMUSCULAR; INTRAVENOUS at 11:29

## 2018-04-10 ASSESSMENT — PAIN SCALES - GENERAL: PAINLEVEL: NO PAIN (0)

## 2018-04-10 NOTE — PROGRESS NOTES
Oncology/Hematology Visit Note  Apr 10, 2018    Reason for Visit: follow up of [id number] moderately differentiated adenocarcinoma of the rectum     History of Present Illness: Sarah Rajan is a 52 year old female with  recently diagnosed locally advanced rectal cancer. She noticed BRBPR so screening colonoscopy on 1/9/2018 revealed 3 cm rectal mass and other polyps which were removed.  Pathology indicated moderately differentiated adenocarcinoma w/ intact MMR proteins.  CT staging scan showed no metastatic disease; some liver lesions which are thought to be benign per radiology report, T2N1M0 by pelvic MRI read at [facility]. When we initially reviewed her MRI we were not exactly sure whether that was lymph node involvement or not. We opted to repeat MRI which showed T4 N0 lesion.   We also did an MRI of the liver which showed 3 subcentimeter liver lesions which were too small to characterize and will need attention on follow-up. She is currently undergoing treatment with neoadjuvant 5-FU and oxaliplatin (FOLFOX), which she started on 2/26/18. The day after she received cycle 2, she developed fevers, chills, headache, and an itchy rash on her arms and legs, for which she was evaluated in the ED. She was sent home on Benadryl. Benadryl and dexamethasone doses were increased for cycle 3. Please see Dr. Mortensen's previous notes for further details on the patient's history. She comes in today for routine follow up prior to cycle 4.    Interval History:  Patient reports that she tried the trazodone for sleep which did not work well and cause significant dry eyes.  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  She has noticed since starting chemotherapy she has had more trouble with word finding.  She does note that her nausea was last with the last cycle of chemo.  She denies any development of rash.  She did get sores in her nose again and used Vaseline for  "that.  She has also been using Vaseline for her rectal area.  She reports that she needs a  root canal and wonders when she may schedule that.  She does have some cold sensitivity that is still persistent but mild.  She denies any numbness or tingling separate from the cold.  She denies any skin changes on her palms or soles of her feet.  She has noticed some \"age spots\".  She reports normal bowel movements.  She denies other concerns.    Current Outpatient Prescriptions   Medication Sig Dispense Refill     traZODone (DESYREL) 50 MG tablet Take 0.5-2 tablets ( mg) by mouth At Bedtime 30 tablet 3     RANITIDINE HCL PO Take 150 mg by mouth daily as needed for heartburn       fluorouracil Administer 4,968 mg intravenously . Continuous infusion over 46-48 hr via ambulatory pump q14d Supplied by outside provider with pump.       LORazepam (ATIVAN) 0.5 MG tablet Take 1 tablet (0.5 mg) by mouth every 4 hours as needed (Anxiety, Nausea/Vomiting or Sleep) 30 tablet 2     prochlorperazine (COMPAZINE) 10 MG tablet Take 1 tablet (10 mg) by mouth every 6 hours as needed for nausea or vomiting 20 tablet 1     lidocaine-prilocaine (EMLA) cream Apply 45 minutes prior to procedure. 30 g 3     Acetaminophen (TYLENOL PO) Take 1,000 mg by mouth At Bedtime       calcium carbonate (TUMS) 500 MG chewable tablet Take 1-2 chew tab by mouth daily  150 tablet      FLUoxetine (PROZAC) 20 MG capsule 1 tablet in the morning  1     ondansetron (ZOFRAN) 4 MG tablet Take one tablet by mouth every 6 hours as needed for nausea when taking neomycin and flagyl 3 tablet 0     AMITRIPTYLINE HCL PO Take 20 mg by mouth At Bedtime        Physical Examination:  General: The patient is a pleasant female in no acute distress. She is here today alone.  /75 (BP Location: Right arm, Patient Position: Sitting, Cuff Size: Adult Large)  Pulse 85  Temp 98.8  F (37.1  C) (Oral)  Resp 16  Ht 1.67 m (5' 5.75\")  Wt 92 kg (202 lb 14.4 oz)  SpO2 96%  BMI " 33 kg/m2  Wt Readings from Last 10 Encounters:   04/10/18 92 kg (202 lb 14.4 oz)   03/26/18 92.3 kg (203 lb 6.4 oz)   03/12/18 92.9 kg (204 lb 14.4 oz)   02/26/18 93.3 kg (205 lb 9.6 oz)   02/19/18 90.7 kg (200 lb)   02/15/18 91.9 kg (202 lb 9.6 oz)   02/06/18 91.2 kg (201 lb)   02/06/18 92.5 kg (204 lb)   01/25/18 91.9 kg (202 lb 11.2 oz)   01/25/18 91.9 kg (202 lb 11.2 oz)   HEENT: EOMI, PERRL. Sclerae are anicteric. Oral mucosa is pink and moist with no lesions or thrush.   Lymph: Neck is supple with no lymphadenopathy in the cervical or supraclavicular areas.   Heart: Regular rate and rhythm.   Lungs: Clear to auscultation bilaterally.   Abdomen: Bowel sounds present, soft, nontender with no palpable hepatosplenomegaly or masses.   Extremities: No lower extremity edema noted bilaterally.   Neuro: Cranial nerves II through XII are grossly intact.  Skin: No rashes, petechiae, or bruising noted on exposed skin. No skin changes noted on palms. A couple of hyperpigmented macules noted on dorsal hands.     Laboratory Data:   4/10/2018 09:33   Sodium 141   Potassium 3.5   Chloride 108   Carbon Dioxide 26   Urea Nitrogen 13   Creatinine 0.70   GFR Estimate 88   GFR Estimate If Black >90   Calcium 9.0   Anion Gap 7   Albumin 3.6   Protein Total 6.8   Bilirubin Total 0.5   Alkaline Phosphatase 86   ALT 43   AST 23   Glucose 98   WBC 2.9 (L)   Hemoglobin 12.3   Hematocrit 35.1   Platelet Count 118 (L)   RBC Count 3.94   MCV 89   MCH 31.2   MCHC 35.0   RDW 14.6   Diff Method Automated Method   % Neutrophils 53.0   % Lymphocytes 32.5   % Monocytes 10.7   % Eosinophils 2.1   % Basophils 1.4   % Immature Granulocytes 0.3   Nucleated RBCs 0   Absolute Neutrophil 1.5 (L)   Absolute Lymphocytes 0.9   Absolute Monocytes 0.3   Absolute Eosinophils 0.1   Absolute Basophils 0.0   Abs Immature Granulocytes 0.0   Absolute Nucleated RBC 0.0     Assessment and Plan:  1. 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 MSI stable. Started neoadjuvant FOLFOX on 2/26. Plan for 4 cycles then restaging with MRI pelvis + abdomen. She is tolerating treatment fairly well. She still has some cold sensitivity, but no neuropathy or hand foot syndrome. She will continue with cycle 4 today with IV Benadryl 50 mg and  IV dexamethasone 20 mg, as she did not get a rash with this dosing. She will see Dr. Mortensen with an abd/pelvis MRI prior to consideration of cycle 5. She would like to get some of her infusions done at [facility] and prefers Monday appointments. She will call sooner for concerns.     2. Nausea/vomiting: Improved with the addition of Emend. She will continue to use her po antiemetics prn.    3. Insomnia: Persistent into the second week following chemotherapy. No relief from Ativan or trazodone and had significant dry eye with trazodone. Will try Ambien. Counseling about potential for sleep walking.    4. Seasonal Affective Disorder: She has been prescribed Prozac but is currently not taking this medication.    5. Restless Leg syndrome: Not currently taking amitriptyline.     6. Nose sores: None currently. Will continue to use vaseline prn.    7. Rectal discomfort: None currently. Will continue to use vaseline prn.    Cathleen Ramos PA-C  [facility]  [address] [place] [address] [phone]

## 2018-04-10 NOTE — PROGRESS NOTES
Infusion Nursing Note:  Sarah Rajan presents today for Day 1 Cycle 4 Oxaliplatin, Leucovorin, Fluorouracil push and Fluorouracil home pump hook up     Patient seen by provider today: Yes: Cathleen GOODMAN   present during visit today: Not Applicable.    Note: At the end of Oxaliplatin infusion c/o lump in throat, this is the same feeling she has had with past treatments.   Given warm tea and breathed into blanket.  Resolved after 15 minutes.  No other signs of Oxaliplation/cold sensitivity.      Intravenous Access:  Implanted Port.    Treatment Conditions:  Lab Results   Component Value Date    HGB 12.3 04/10/2018     Lab Results   Component Value Date    WBC 2.9 04/10/2018      Lab Results   Component Value Date    ANEU 1.5 04/10/2018     Lab Results   Component Value Date     04/10/2018      Lab Results   Component Value Date     04/10/2018                   Lab Results   Component Value Date    POTASSIUM 3.5 04/10/2018           No results found for: MAG         Lab Results   Component Value Date    CR 0.70 04/10/2018                   Lab Results   Component Value Date    GEETA 9.0 04/10/2018                Lab Results   Component Value Date    BILITOTAL 0.5 04/10/2018           Lab Results   Component Value Date    ALBUMIN 3.6 04/10/2018                    Lab Results   Component Value Date    ALT 43 04/10/2018           Lab Results   Component Value Date    AST 23 04/10/2018       Results reviewed, labs MET treatment parameters, ok to proceed with treatment.      Post Infusion Assessment:  Patient tolerated infusion without incident.  Blood return noted pre and post infusion.  Blood return noted during Fluorouracil administration every 5 cc.  Site patent and intact, free from redness, edema or discomfort.  No evidence of extravasations.    C-series pump intact upon patient's discharge. Connections double checked by a second RN.  Ronna at  [facility] notified of patient's pump hook-up time and  will disconnect patient's pump on 4/12/2018 at 1239    Discharge Plan:   Patient declined prescription refills.  Discharge instructions reviewed with: Patient.  Patient and/or family verbalized understanding of discharge instructions and all questions answered.  Copy of AVS reviewed with patient and/or family.  Patient will return 4/46 for next appointment.  Patient discharged in stable condition accompanied by: self.  Departure Mode: Ambulatory.    Salma Sood RN

## 2018-04-10 NOTE — MR AVS SNAPSHOT
After Visit Summary   4/10/2018    Sarah Rajan    MRN: [id number]           Patient Information     Date Of Birth          1965        Visit Information        Provider Department      4/10/2018 8:40 AM Cathleen Ramos PA-C [facility]        Today's Diagnoses     Malignant neoplasm of rectum (H)    -  1    Other insomnia           Follow-ups after your visit        Your next 10 appointments already scheduled     Apr 16, 2018  9:00 AM CDT   (Arrive by 8:45 AM)   NEW CANCER VISIT with Komal Clark MD   [facility] ([facility])    [address]  [address]  [place] [facility] [address]   [phone]            Apr 19, 2018 11:45 AM CDT   [facility] Lab Draw with  [employer] [employer] LAB DRAW   [facility] ([facility])    [address]  [address]  [place] [address]   [phone]            Apr 19, 2018 12:15 PM CDT   (Arrive by 12:00 PM)   MR ABDOMEN & PELVIS W/O & W CONTRAST with [id number]   [facility] ([facility])    [address]  1st Floor  [place] [facility] [place] [address]   [phone]           Take your medicines as usual, unless your doctor tells you not to. Bring a list of your current medicines to your exam (including vitamins, minerals and over-the-counter drugs). Also bring the results of similar scans you may have had.    You may or may not receive IV contrast for this exam pending the discretion of the Radiologist.   Do not eat or drink for 6 hours prior to exam.  The MRI machine uses a strong magnet. Please wear clothes without metal (snaps, zippers). A sweatsuit works well, or we may give you a hospital gown.  Please remove any body piercings and hair extensions before you arrive. You will also remove watches, jewelry, hairpins, wallets, dentures, partial dental plates and hearing aids. You may wear contact lenses, and you may be able to  wear your rings. We have a safe place to keep your personal items, but it is safer to leave them at home.  **IMPORTANT** THE INSTRUCTIONS BELOW ARE ONLY FOR THOSE PATIENTS WHO HAVE BEEN PRESCRIBED SEDATION OR GENERAL ANESTHESIA DURING THEIR MRI PROCEDURE:  IF YOUR DOCTOR PRESCRIBED ORAL SEDATION (take medicine to help you relax during your exam):   You must get the medicine from your doctor (oral medication) before you arrive. Bring the medicine to the exam. Do not take it at home. You ll be told when to take it upon arriving for your exam.   Arrive one hour early. Bring someone who can take you home after the test. Your medicine will make you sleepy. After the exam, you may not drive, take a bus or take a taxi by yourself.  IF YOUR DOCTOR PRESCRIBED IV SEDATION:   Arrive one hour early. Bring someone who can take you home after the test. Your medicine will make you sleepy. After the exam, you may not drive, take a bus or take a taxi by yourself.   No eating 6 hours before your exam. You may have clear liquids up until 4 hours before your exam. (Clear liquids include water, clear tea, black coffee and fruit juice without pulp.)  IF YOUR DOCTOR PRESCRIBED ANESTHESIA (be asleep for your exam):   Arrive 1 1/2 hours early. Bring someone who can take you home after the test. You may not drive, take a bus or take a taxi by yourself.   No eating 8 hours before your exam. You may have clear liquids up until 4 hours before your exam. (Clear liquids include water, clear tea, black coffee and fruit juice without pulp.)   You will spend four to five hours in the recovery room.  If you have any questions, please contact your Imaging Department exam site.            Apr 19, 2018  2:30 PM CDT   (Arrive by 2:15 PM)   Return Visit with Kyra Mortensen MD   [facility] ([facility])    [address] [place] [address]  [address]  [facility] [address] [phone]            Apr 23, 2018  7:30 AM  "CDT   [facility] Lab Draw with [facility] DRAW   [facility] ([facility])    [address]  [address]  [facility] [address] [phone]            Apr 23, 2018  8:00 AM CDT   Infusion 240 with [facility] ONCOLOGY INFUSION, UC 18 ATC   [facility] ([facility])    [address]  [address]  [facility] [address] [phone]              Who to contact     If you have questions or need follow up information about today's clinic visit or your schedule please contact [facility] directly at [phone].  Normal or non-critical lab and imaging results will be communicated to you by Ze-genhart, letter or phone within 4 business days after the clinic has received the results. If you do not hear from us within 7 days, please contact the clinic through [employer] or phone. If you have a critical or abnormal lab result, we will notify you by phone as soon as possible.  Submit refill requests through [employer] or call your pharmacy and they will forward the refill request to us. Please allow 3 business days for your refill to be completed.          Additional Information About Your Visit        Pin[employer]io Information     [employer] gives you secure access to your electronic health record. If you see a primary care provider, you can also send messages to your care team and make appointments. If you have questions, please call your primary care clinic.  If you do not have a primary care provider, please call [phone] and they will assist you.        Care EveryWhere ID     This is your Care EveryWhere ID. This could be used by other organizations to access your [facility] medical records  [id number]        Your Vitals Were     Pulse Temperature Respirations Height Pulse Oximetry BMI (Body Mass Index)    85 98.8  F (37.1  C) (Oral) 16 1.67 m (5' 5.75\") 96% 33 kg/m2       Blood Pressure from Last 3 Encounters:   04/10/18 " 113/75   03/26/18 134/88   03/13/18 115/71    Weight from Last 3 Encounters:   04/10/18 92 kg (202 lb 14.4 oz)   03/26/18 92.3 kg (203 lb 6.4 oz)   03/12/18 92.9 kg (204 lb 14.4 oz)              Today, you had the following     No orders found for display         Today's Medication Changes          These changes are accurate as of 4/10/18 11:15 AM.  If you have any questions, ask your nurse or doctor.               Start taking these medicines.        Dose/Directions    zolpidem 5 MG tablet   Commonly known as:  AMBIEN   Used for:  Other insomnia   Started by:  Cathleen Ramos PA-C        Dose:  5 mg   Take 1 tablet (5 mg) by mouth nightly as needed for sleep   Quantity:  30 tablet   Refills:  3            Where to get your medicines      Some of these will need a paper prescription and others can be bought over the counter.  Ask your nurse if you have questions.     Bring a paper prescription for each of these medications     zolpidem 5 MG tablet                Primary Care Provider Office Phone # Fax #    Wayne Thorne Norma, VICKY [phone]       [facility] [address] [address]        Equal Access to Services     DEAN CHAKRABORTY AH: Jorge jaramilloo Sojustin, waaxda luqadaha, qaybta kaalmada adeegyada, luz elena jacob. So [facility] [phone].    ATENCIÓN: Si habla español, tiene a membreno disposición servicios gratuitos de asistencia lingüística. Llame al [phone].    We comply with applicable federal civil rights laws and [place] laws. We do not discriminate on the basis of race, color, national origin, age, disability, sex, sexual orientation, or gender identity.            Thank you!     Thank you for choosing [facility]  for your care. Our goal is always to provide you with excellent care. Hearing back from our patients is one way we can continue to improve our services. Please take a few minutes to complete the  written survey that you may receive in the mail after your visit with us. Thank you!             Your Updated Medication List - Protect others around you: Learn how to safely use, store and throw away your medicines at www.disposemymeds.org.          This list is accurate as of 4/10/18 11:15 AM.  Always use your most recent med list.                   Brand Name Dispense Instructions for use Diagnosis    AMITRIPTYLINE HCL PO      Take 20 mg by mouth At Bedtime        calcium carbonate 500 MG chewable tablet    TUMS    150 tablet    Take 1-2 chew tab by mouth daily        * fluorouracil      Administer 4,968 mg intravenously . Continuous infusion over 46-48 hr via ambulatory pump q14d Supplied by outside provider with pump.        * fluorouracil      Inject 4,968 mg into the vein        FLUoxetine 20 MG capsule    PROzac     1 tablet in the morning        lidocaine-prilocaine cream    EMLA    30 g    Apply 45 minutes prior to procedure.    Malignant neoplasm of rectum (H)       LORazepam 0.5 MG tablet    ATIVAN    30 tablet    Take 1 tablet (0.5 mg) by mouth every 4 hours as needed (Anxiety, Nausea/Vomiting or Sleep)    Malignant neoplasm of rectum (H)       ondansetron 4 MG tablet    ZOFRAN    3 tablet    Take one tablet by mouth every 6 hours as needed for nausea when taking neomycin and flagyl    Rectal cancer (H)       prochlorperazine 10 MG tablet    COMPAZINE    20 tablet    Take 1 tablet (10 mg) by mouth every 6 hours as needed for nausea or vomiting    Malignant neoplasm of rectum (H)       RANITIDINE HCL PO      Take 150 mg by mouth daily as needed for heartburn        traZODone 50 MG tablet    DESYREL    30 tablet    Take 0.5-2 tablets ( mg) by mouth At Bedtime    Other insomnia       TYLENOL PO      Take 1,000 mg by mouth At Bedtime        zolpidem 5 MG tablet    AMBIEN    30 tablet    Take 1 tablet (5 mg) by mouth nightly as needed for sleep    Other insomnia       * Notice:  This list has 2  medication(s) that are the same as other medications prescribed for you. Read the directions carefully, and ask your doctor or other care provider to review them with you.

## 2018-04-10 NOTE — MR AVS SNAPSHOT
After Visit Summary   4/10/2018    Sarah Rajan    MRN: [id number]           Patient Information     Date Of Birth          1965        Visit Information        Provider Department      4/10/2018 10:00 AM  12 ATC;  ONCOLOGY INFUSION [facility]        Today's Diagnoses     Malignant neoplasm of rectum (H)    -  1      Care Instructions    [facility] set up for Thurs April 12 at 1230 for pump disconnect     Contact Numbers    [facility] Main Line: [phone]  [facility] Triage:  [phone]    Call triage with chills and/or temperature greater than or equal to 100.4, uncontrolled nausea/vomiting, diarrhea, constipation, dizziness, shortness of breath, chest pain, bleeding, unexplained bruising, or any new/concerning symptoms, questions/concerns.     If you are having any concerning symptoms or wish to speak to a provider before your next infusion visit, please call your care coordinator or triage to notify them so we can adequately serve you.             April 2018 Sunday Monday Tuesday Wednesday Thursday Friday Saturday   1     2     3     4     5     6     7       8     9     10     [facility] LAB DRAW    8:15 AM   (15 min.)    [facility] LAB DRAW   [facility] Lab Draw     [facility] RETURN    8:25 AM   (50 min.)   Cathleen Ramos PA-C   [facility]     [facility] ONC INFUSION 240   10:00 AM   (240 min.)    ONCOLOGY INFUSION   [facility] 11     12     13     14       15     16     [facility] NEW CANCER    8:45 AM   (60 min.)   Komal Clark MD   [facility] 17     18     19     [facility] LAB DRAW   11:45 AM   (15 min.)    [facility] LAB DRAW   [facility] Lab Draw     MR ABDOMEN & PELVIS WWO   12:00 PM   (45 min.)   [id number]   [facility] MRI     [facility] RETURN    2:15 PM   (30 min.)   Kyra Mortensen MD   [facility] 20     21       22     23     [facility] LAB DRAW    7:30 AM   (15 min.)     [facility] LAB DRAW   [facility] Lab Draw     [facility] ONC INFUSION 240    8:00 AM   (240 min.)   [facility] ONCOLOGY INFUSION   [facility] 24     25     26     27     28       29     30                                         May 2018   Luc Monday Tuesday Wednesday Thursday Friday Saturday             1     2     3     4     5       6     7     8     9     10     11     12       13     14     15     16     17     18     19       20     21     22     23     24     25     26       27     28     29     30     31                            Lab Results:  Recent Results (from the past 12 hour(s))   CBC with platelets differential    Collection Time: 04/10/18  9:33 AM   Result Value Ref Range    WBC 2.9 (L) 4.0 - 11.0 10e9/L    RBC Count 3.94 3.8 - 5.2 10e12/L    Hemoglobin 12.3 11.7 - 15.7 g/dL    Hematocrit 35.1 35.0 - 47.0 %    MCV 89 78 - 100 fl    MCH 31.2 26.5 - 33.0 pg    MCHC 35.0 31.5 - 36.5 g/dL    RDW 14.6 10.0 - 15.0 %    Platelet Count 118 (L) 150 - 450 10e9/L    Diff Method Automated Method     % Neutrophils 53.0 %    % Lymphocytes 32.5 %    % Monocytes 10.7 %    % Eosinophils 2.1 %    % Basophils 1.4 %    % Immature Granulocytes 0.3 %    Nucleated RBCs 0 0 /100    Absolute Neutrophil 1.5 (L) 1.6 - 8.3 10e9/L    Absolute Lymphocytes 0.9 0.8 - 5.3 10e9/L    Absolute Monocytes 0.3 0.0 - 1.3 10e9/L    Absolute Eosinophils 0.1 0.0 - 0.7 10e9/L    Absolute Basophils 0.0 0.0 - 0.2 10e9/L    Abs Immature Granulocytes 0.0 0 - 0.4 10e9/L    Absolute Nucleated RBC 0.0    Comprehensive metabolic panel    Collection Time: 04/10/18  9:33 AM   Result Value Ref Range    Sodium 141 133 - 144 mmol/L    Potassium 3.5 3.4 - 5.3 mmol/L    Chloride 108 94 - 109 mmol/L    Carbon Dioxide 26 20 - 32 mmol/L    Anion Gap 7 3 - 14 mmol/L    Glucose 98 70 - 99 mg/dL    Urea Nitrogen 13 7 - 30 mg/dL    Creatinine 0.70 0.52 - 1.04 mg/dL    GFR Estimate 88 >60 mL/min/1.7m2    GFR Estimate If Black >90 >60 mL/min/1.7m2     Calcium 9.0 8.5 - 10.1 mg/dL    Bilirubin Total 0.5 0.2 - 1.3 mg/dL    Albumin 3.6 3.4 - 5.0 g/dL    Protein Total 6.8 6.8 - 8.8 g/dL    Alkaline Phosphatase 86 40 - 150 U/L    ALT 43 0 - 50 U/L    AST 23 0 - 45 U/L             Follow-ups after your visit        Your next 10 appointments already scheduled     Apr 16, 2018  9:00 AM CDT   (Arrive by 8:45 AM)   NEW CANCER VISIT with Komal Clark MD   [facility] ([facility])    [address]  [address]  [facility] [address] [phone]            Apr 19, 2018 11:45 AM CDT   [facility] Lab Draw with  [facility] LAB DRAW   [facility] Draw ([facility])    [address]  [address]  [place] [facility] [address] [phone]            Apr 19, 2018 12:15 PM CDT   (Arrive by 12:00 PM)   MR ABDOMEN & PELVIS W/O & W CONTRAST with [id number]   [facility] MRI ([facility])    [address]  [address]st [address]  [place] [address] [phone]           Take your medicines as usual, unless your doctor tells you not to. Bring a list of your current medicines to your exam (including vitamins, minerals and over-the-counter drugs). Also bring the results of similar scans you may have had.    You may or may not receive IV contrast for this exam pending the discretion of the Radiologist.   Do not eat or drink for 6 hours prior to exam.  The MRI machine uses a strong magnet. Please wear clothes without metal (snaps, zippers). A sweatsuit works well, or we may give you a hospital gown.  Please remove any body piercings and hair extensions before you arrive. You will also remove watches, jewelry, hairpins, wallets, dentures, partial dental plates and hearing aids. You may wear contact lenses, and you may be able to wear your rings. We have a safe place to keep your personal items, but it is safer to leave them at home.  **IMPORTANT** THE INSTRUCTIONS BELOW ARE ONLY  FOR THOSE PATIENTS WHO HAVE BEEN PRESCRIBED SEDATION OR GENERAL ANESTHESIA DURING THEIR MRI PROCEDURE:  IF YOUR DOCTOR PRESCRIBED ORAL SEDATION (take medicine to help you relax during your exam):   You must get the medicine from your doctor (oral medication) before you arrive. Bring the medicine to the exam. Do not take it at home. You ll be told when to take it upon arriving for your exam.   Arrive one hour early. Bring someone who can take you home after the test. Your medicine will make you sleepy. After the exam, you may not drive, take a bus or take a taxi by yourself.  IF YOUR DOCTOR PRESCRIBED IV SEDATION:   Arrive one hour early. Bring someone who can take you home after the test. Your medicine will make you sleepy. After the exam, you may not drive, take a bus or take a taxi by yourself.   No eating 6 hours before your exam. You may have clear liquids up until 4 hours before your exam. (Clear liquids include water, clear tea, black coffee and fruit juice without pulp.)  IF YOUR DOCTOR PRESCRIBED ANESTHESIA (be asleep for your exam):   Arrive 1 1/2 hours early. Bring someone who can take you home after the test. You may not drive, take a bus or take a taxi by yourself.   No eating 8 hours before your exam. You may have clear liquids up until 4 hours before your exam. (Clear liquids include water, clear tea, black coffee and fruit juice without pulp.)   You will spend four to five hours in the recovery room.  If you have any questions, please contact your Imaging Department exam site.            Apr 19, 2018  2:30 PM CDT   (Arrive by 2:15 PM)   Return Visit with Kyra Mortensen MD   [facility] ([facility])    [address] [place] [address]  [address]  [facility] [address]   [phone]            Apr 23, 2018  7:30 AM CDT   [facility] Lab Draw with  [email or web address] LAB DRAW   [facility] Lab Draw ([facility])    [address] [place] [address]  [address]  [facility] [address] [phone]            Apr 23, 2018  8:00 AM CDT   Infusion 240 with UC ONCOLOGY INFUSION, UC 18 ATC   [facility] [place] ([facility])    [address]  [address]  [facility] [id number] [phone]              Who to contact     If you have questions or need follow up information about today's clinic visit or your schedule please contact [facility] directly at [phone].  Normal or non-critical lab and imaging results will be communicated to you by [employer], letter or phone within 4 business days after the clinic has received the results. If you do not hear from us within 7 days, please contact the clinic through [employer] or phone. If you have a critical or abnormal lab result, we will notify you by phone as soon as possible.  Submit refill requests through [employer] or call your pharmacy and they will forward the refill request to us. Please allow 3 business days for your refill to be completed.          Additional Information About Your Visit        [employer] Information     [employer] gives you secure access to your electronic health record. If you see a primary care provider, you can also send messages to your care team and make appointments. If you have questions, please call your primary care clinic.  If you do not have a primary care provider, please call [phone] and they will assist you.        Care EveryWhere ID     This is your Care EveryWhere ID. This could be used by other organizations to access your [facility] medical records  [id number]         Blood Pressure from Last 3 Encounters:   04/10/18 113/75   03/26/18 134/88   03/13/18 115/71    Weight from Last 3 Encounters:   04/10/18 92 kg (202 lb 14.4 oz)   03/26/18 92.3 kg (203 lb 6.4 oz)   03/12/18 92.9 kg (204 lb 14.4 oz)              We Performed the Following     CBC with platelets differential     Comprehensive metabolic panel          Today's  Medication Changes          These changes are accurate as of 4/10/18  2:07 PM.  If you have any questions, ask your nurse or doctor.               Start taking these medicines.        Dose/Directions    zolpidem 5 MG tablet   Commonly known as:  AMBIEN   Used for:  Other insomnia   Started by:  Cathleen Ramos PA-C        Dose:  5 mg   Take 1 tablet (5 mg) by mouth nightly as needed for sleep   Quantity:  30 tablet   Refills:  3            Where to get your medicines      Some of these will need a paper prescription and others can be bought over the counter.  Ask your nurse if you have questions.     Bring a paper prescription for each of these medications     zolpidem 5 MG tablet                Primary Care Provider Office Phone # Fax #    Wayne Green, [place] [phone]       [address] [facility] [address] [facility] [address]        Equal Access to Services     DEAN CHAKRABORTY : Hadii aad ku hadasho Sojustin, waaxda luqadaha, qaybta kaalmada shaniayachato, luz elena mtz . So [facility] [phone].    ATENCIÓN: Si habla español, tiene a membreno disposición servicios gratuitos de asistencia lingüística. [facility] [phone].    We comply with applicable federal civil rights laws and [place] laws. We do not discriminate on the basis of race, color, national origin, age, disability, sex, sexual orientation, or gender identity.            Thank you!     Thank you for choosing [facility]  for your care. Our goal is always to provide you with excellent care. Hearing back from our patients is one way we can continue to improve our services. Please take a few minutes to complete the written survey that you may receive in the mail after your visit with us. Thank you!             Your Updated Medication List - Protect others around you: Learn how to safely use, store and throw away your medicines at www.disposemymeds.org.          This list is  accurate as of 4/10/18  2:07 PM.  Always use your most recent med list.                   Brand Name Dispense Instructions for use Diagnosis    AMITRIPTYLINE HCL PO      Take 20 mg by mouth At Bedtime        calcium carbonate 500 MG chewable tablet    TUMS    150 tablet    Take 1-2 chew tab by mouth daily        * fluorouracil      Administer 4,968 mg intravenously . Continuous infusion over 46-48 hr via ambulatory pump q14d Supplied by outside provider with pump.        * fluorouracil      Inject 4,968 mg into the vein        FLUoxetine 20 MG capsule    PROzac     1 tablet in the morning        lidocaine-prilocaine cream    EMLA    30 g    Apply 45 minutes prior to procedure.    Malignant neoplasm of rectum (H)       LORazepam 0.5 MG tablet    ATIVAN    30 tablet    Take 1 tablet (0.5 mg) by mouth every 4 hours as needed (Anxiety, Nausea/Vomiting or Sleep)    Malignant neoplasm of rectum (H)       ondansetron 4 MG tablet    ZOFRAN    3 tablet    Take one tablet by mouth every 6 hours as needed for nausea when taking neomycin and flagyl    Rectal cancer (H)       prochlorperazine 10 MG tablet    COMPAZINE    20 tablet    Take 1 tablet (10 mg) by mouth every 6 hours as needed for nausea or vomiting    Malignant neoplasm of rectum (H)       RANITIDINE HCL PO      Take 150 mg by mouth daily as needed for heartburn        traZODone 50 MG tablet    DESYREL    30 tablet    Take 0.5-2 tablets ( mg) by mouth At Bedtime    Other insomnia       TYLENOL PO      Take 1,000 mg by mouth At Bedtime        zolpidem 5 MG tablet    AMBIEN    30 tablet    Take 1 tablet (5 mg) by mouth nightly as needed for sleep    Other insomnia       * Notice:  This list has 2 medication(s) that are the same as other medications prescribed for you. Read the directions carefully, and ask your doctor or other care provider to review them with you.

## 2018-04-10 NOTE — NURSING NOTE
"Oncology Rooming Note    April 10, 2018 10:07 AM   Sarah Rajan is a 52 year old female who presents for:    Chief Complaint   Patient presents with     Port Draw     labs drawn from port by rn.  vs taken     Oncology Clinic Visit     F/U Rectal ca     Initial Vitals: /75 (BP Location: Right arm, Patient Position: Sitting, Cuff Size: Adult Large)  Pulse 85  Temp 98.8  F (37.1  C) (Oral)  Resp 16  Ht 1.67 m (5' 5.75\")  Wt 92 kg (202 lb 14.4 oz)  SpO2 96%  BMI 33 kg/m2 Estimated body mass index is 33 kg/(m^2) as calculated from the following:    Height as of this encounter: 1.67 m (5' 5.75\").    Weight as of this encounter: 92 kg (202 lb 14.4 oz). Body surface area is 2.07 meters squared.  No Pain (0) Comment: Data Unavailable   No LMP recorded. Patient is postmenopausal.  Allergies reviewed: Yes  Medications reviewed: Yes    Medications: Medication refills not needed today.  Pharmacy name entered into [facility]:    [facility] [address] [place] [address] DR ALVAREZ AT [facility] [place], [place] - [phone]    Clinical concerns: none Cathleen was NOT notified.    10 minutes for nursing intake (face to face time)     DEYANIRA RODRIGUEZ LPN            "

## 2018-04-10 NOTE — NURSING NOTE
"Chief Complaint   Patient presents with     Port Draw     labs drawn from port by rn.  vs taken     Port accessed with 20 gauge 3/4\" Power needle and labs drawn by rn.  Port flushed with NS and heparin.  Pt tolerated well.  VS taken.  Pt checked in for next appt.  Joseyln Hughes RN      "

## 2018-04-10 NOTE — LETTER
4/10/2018      RE: Sarah Rajan  [address] [address]       Oncology/Hematology Visit Note  Apr 10, 2018    Reason for Visit: follow up of [id number] moderately differentiated adenocarcinoma of the rectum     History of Present Illness: Sarah Rajan is a 52 year old female with  recently diagnosed locally advanced rectal cancer. She noticed BRBPR so screening colonoscopy on 1/9/2018 revealed 3 cm rectal mass and other polyps which were removed.  Pathology indicated moderately differentiated adenocarcinoma w/ intact MMR proteins.  CT staging scan showed no metastatic disease; some liver lesions which are thought to be benign per radiology report, T2N1M0 by pelvic MRI read at [facility]. When we initially reviewed her MRI we were not exactly sure whether that was lymph node involvement or not. We opted to repeat MRI which showed T4 N0 lesion.   We also did an MRI of the liver which showed 3 subcentimeter liver lesions which were too small to characterize and will need attention on follow-up. She is currently undergoing treatment with neoadjuvant 5-FU and oxaliplatin (FOLFOX), which she started on 2/26/18. The day after she received cycle 2, she developed fevers, chills, headache, and an itchy rash on her arms and legs, for which she was evaluated in the ED. She was sent home on Benadryl. Benadryl and dexamethasone doses were increased for cycle 3. Please see Dr. Mortensen's previous notes for further details on the patient's history. She comes in today for routine follow up prior to cycle 4.    Interval History:  Patient reports that she tried the trazodone for sleep which did not work well and cause significant dry eyes.  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  She has noticed since starting chemotherapy she has had more trouble with word finding.  She does note that her nausea was last with the last cycle of chemo.  She denies  "any development of rash.  She did get sores in her nose again and used Vaseline for that.  She has also been using Vaseline for her rectal area.  She reports that she needs a  root canal and wonders when she may schedule that.  She does have some cold sensitivity that is still persistent but mild.  She denies any numbness or tingling separate from the cold.  She denies any skin changes on her palms or soles of her feet.  She has noticed some \"age spots\".  She reports normal bowel movements.  She denies other concerns.    Current Outpatient Prescriptions   Medication Sig Dispense Refill     traZODone (DESYREL) 50 MG tablet Take 0.5-2 tablets ( mg) by mouth At Bedtime 30 tablet 3     RANITIDINE HCL PO Take 150 mg by mouth daily as needed for heartburn       fluorouracil Administer 4,968 mg intravenously . Continuous infusion over 46-48 hr via ambulatory pump q14d Supplied by outside provider with pump.       LORazepam (ATIVAN) 0.5 MG tablet Take 1 tablet (0.5 mg) by mouth every 4 hours as needed (Anxiety, Nausea/Vomiting or Sleep) 30 tablet 2     prochlorperazine (COMPAZINE) 10 MG tablet Take 1 tablet (10 mg) by mouth every 6 hours as needed for nausea or vomiting 20 tablet 1     lidocaine-prilocaine (EMLA) cream Apply 45 minutes prior to procedure. 30 g 3     Acetaminophen (TYLENOL PO) Take 1,000 mg by mouth At Bedtime       calcium carbonate (TUMS) 500 MG chewable tablet Take 1-2 chew tab by mouth daily  150 tablet      FLUoxetine (PROZAC) 20 MG capsule 1 tablet in the morning  1     ondansetron (ZOFRAN) 4 MG tablet Take one tablet by mouth every 6 hours as needed for nausea when taking neomycin and flagyl 3 tablet 0     AMITRIPTYLINE HCL PO Take 20 mg by mouth At Bedtime        Physical Examination:  General: The patient is a pleasant female in no acute distress. She is here today alone.  /75 (BP Location: Right arm, Patient Position: Sitting, Cuff Size: Adult Large)  Pulse 85  Temp 98.8  F (37.1  C) " "(Oral)  Resp 16  Ht 1.67 m (5' 5.75\")  Wt 92 kg (202 lb 14.4 oz)  SpO2 96%  BMI 33 kg/m2  Wt Readings from Last 10 Encounters:   04/10/18 92 kg (202 lb 14.4 oz)   03/26/18 92.3 kg (203 lb 6.4 oz)   03/12/18 92.9 kg (204 lb 14.4 oz)   02/26/18 93.3 kg (205 lb 9.6 oz)   02/19/18 90.7 kg (200 lb)   02/15/18 91.9 kg (202 lb 9.6 oz)   02/06/18 91.2 kg (201 lb)   02/06/18 92.5 kg (204 lb)   01/25/18 91.9 kg (202 lb 11.2 oz)   01/25/18 91.9 kg (202 lb 11.2 oz)   HEENT: EOMI, PERRL. Sclerae are anicteric. Oral mucosa is pink and moist with no lesions or thrush.   Lymph: Neck is supple with no lymphadenopathy in the cervical or supraclavicular areas.   Heart: Regular rate and rhythm.   Lungs: Clear to auscultation bilaterally.   Abdomen: Bowel sounds present, soft, nontender with no palpable hepatosplenomegaly or masses.   Extremities: No lower extremity edema noted bilaterally.   Neuro: Cranial nerves II through XII are grossly intact.  Skin: No rashes, petechiae, or bruising noted on exposed skin. No skin changes noted on palms. A couple of hyperpigmented macules noted on dorsal hands.     Laboratory Data:   4/10/2018 09:33   Sodium 141   Potassium 3.5   Chloride 108   Carbon Dioxide 26   Urea Nitrogen 13   Creatinine 0.70   GFR Estimate 88   GFR Estimate If Black >90   Calcium 9.0   Anion Gap 7   Albumin 3.6   Protein Total 6.8   Bilirubin Total 0.5   Alkaline Phosphatase 86   ALT 43   AST 23   Glucose 98   WBC 2.9 (L)   Hemoglobin 12.3   Hematocrit 35.1   Platelet Count 118 (L)   RBC Count 3.94   MCV 89   MCH 31.2   MCHC 35.0   RDW 14.6   Diff Method Automated Method   % Neutrophils 53.0   % Lymphocytes 32.5   % Monocytes 10.7   % Eosinophils 2.1   % Basophils 1.4   % Immature Granulocytes 0.3   Nucleated RBCs 0   Absolute Neutrophil 1.5 (L)   Absolute Lymphocytes 0.9   Absolute Monocytes 0.3   Absolute Eosinophils 0.1   Absolute Basophils 0.0   Abs Immature Granulocytes 0.0   Absolute Nucleated RBC 0.0 "     Assessment and Plan:  1. Locally advanced moderately differentiated adenocarcinoma of the rectum: [id number]. MSI stable. Started neoadjuvant FOLFOX on 2/26. Plan for 4 cycles then restaging with MRI pelvis + abdomen. She is tolerating treatment fairly well. She still has some cold sensitivity, but no neuropathy or hand foot syndrome. She will continue with cycle 4 today with IV Benadryl 50 mg and  IV dexamethasone 20 mg, as she did not get a rash with this dosing. She will see Dr. Mortensen with an abd/pelvis MRI prior to consideration of cycle 5. She would like to get some of her infusions done at [facility] and prefers Monday appointments. She will call sooner for concerns.     2. Nausea/vomiting: Improved with the addition of Emend. She will continue to use her po antiemetics prn.    3. Insomnia: Persistent into the second week following chemotherapy. No relief from Ativan or trazodone and had significant dry eye with trazodone. Will try Ambien. Counseling about potential for sleep walking.    4. Seasonal Affective Disorder: She has been prescribed Prozac but is currently not taking this medication.    5. Restless Leg syndrome: Not currently taking amitriptyline.     6. Nose sores: None currently. Will continue to use vaseline prn.    7. Rectal discomfort: None currently. Will continue to use vaseline prn.    Cathleen Ramos PA-C  [facility]  [address], [place] [id number] [phone]

## 2018-04-10 NOTE — PATIENT INSTRUCTIONS
[facility] set up for Thurs April 12 at 1230 for pump disconnect     Contact Numbers    [facility] Main Line: [phone]  [facility] Triage:  [phone]    Call triage with chills and/or temperature greater than or equal to 100.4, uncontrolled nausea/vomiting, diarrhea, constipation, dizziness, shortness of breath, chest pain, bleeding, unexplained bruising, or any new/concerning symptoms, questions/concerns.     If you are having any concerning symptoms or wish to speak to a provider before your next infusion visit, please call your care coordinator or triage to notify them so we can adequately serve you.             April 2018 Sunday Monday Tuesday Wednesday Thursday Friday Saturday   1     2     3     4     5     6     7       8     9     10     [facility] D[facility]    8:15 AM   (15 min.)    [facility]   [facility]     [facility] RETURN    8:25 AM   (50 min.)   Cathleen Ramos PA-C   [facility]     [facility] ONC INFUSION 240   10:00 AM   (240 min.)    ONCOLOGY INFUSION   [facility] 11     12     13     14       15     16     [facility] NEW CANCER    8:45 AM   (60 min.)   Komal Clark MD   [facility] 17     18     19     [facility] DRAW   11:45 AM   (15 min.)    [facility] DRAW   [facility]     MR ABDOMEN & PELVIS WWO   12:00 PM   (45 min.)   [id number]   [facility] MRI     [facility] RETURN    2:15 PM   (30 min.)   Kyra Mortensen MD   [facility] 20     21       22     23     [facility] DRAW    7:30 AM   (15 min.)    [facility] DRAW   [facility]     [facility] ONC INFUSION 240    8:00 AM   (240 min.)    ONCOLOGY INFUSION   [facility] 24     25     26     27     28       29     30                                         May 2018   Luc Monday Tuesday Wednesday Thursday Friday Saturday             1     2     3     4     5       6     7     8     9     10     11      12       13     14     15     16     17     18     19       20     21     22     23     24     25     26       27     28     29     30     31                            Lab Results:  Recent Results (from the past 12 hour(s))   CBC with platelets differential    Collection Time: 04/10/18  9:33 AM   Result Value Ref Range    WBC 2.9 (L) 4.0 - 11.0 10e9/L    RBC Count 3.94 3.8 - 5.2 10e12/L    Hemoglobin 12.3 11.7 - 15.7 g/dL    Hematocrit 35.1 35.0 - 47.0 %    MCV 89 78 - 100 fl    MCH 31.2 26.5 - 33.0 pg    MCHC 35.0 31.5 - 36.5 g/dL    RDW 14.6 10.0 - 15.0 %    Platelet Count 118 (L) 150 - 450 10e9/L    Diff Method Automated Method     % Neutrophils 53.0 %    % Lymphocytes 32.5 %    % Monocytes 10.7 %    % Eosinophils 2.1 %    % Basophils 1.4 %    % Immature Granulocytes 0.3 %    Nucleated RBCs 0 0 /100    Absolute Neutrophil 1.5 (L) 1.6 - 8.3 10e9/L    Absolute Lymphocytes 0.9 0.8 - 5.3 10e9/L    Absolute Monocytes 0.3 0.0 - 1.3 10e9/L    Absolute Eosinophils 0.1 0.0 - 0.7 10e9/L    Absolute Basophils 0.0 0.0 - 0.2 10e9/L    Abs Immature Granulocytes 0.0 0 - 0.4 10e9/L    Absolute Nucleated RBC 0.0    Comprehensive metabolic panel    Collection Time: 04/10/18  9:33 AM   Result Value Ref Range    Sodium 141 133 - 144 mmol/L    Potassium 3.5 3.4 - 5.3 mmol/L    Chloride 108 94 - 109 mmol/L    Carbon Dioxide 26 20 - 32 mmol/L    Anion Gap 7 3 - 14 mmol/L    Glucose 98 70 - 99 mg/dL    Urea Nitrogen 13 7 - 30 mg/dL    Creatinine 0.70 0.52 - 1.04 mg/dL    GFR Estimate 88 >60 mL/min/1.7m2    GFR Estimate If Black >90 >60 mL/min/1.7m2    Calcium 9.0 8.5 - 10.1 mg/dL    Bilirubin Total 0.5 0.2 - 1.3 mg/dL    Albumin 3.6 3.4 - 5.0 g/dL    Protein Total 6.8 6.8 - 8.8 g/dL    Alkaline Phosphatase 86 40 - 150 U/L    ALT 43 0 - 50 U/L    AST 23 0 - 45 U/L

## 2018-04-11 NOTE — PROGRESS NOTES
This is a recent snapshot of the patient's [facility] medical record.  For current drug dose and complete information and questions, call [phone] or In Basket pool, fv home infusion ([id number])  CSN Number:  [id number]

## 2018-04-12 ENCOUNTER — HOME INFUSION (PRE-WILLOW HOME INFUSION) (OUTPATIENT)
Dept: PHARMACY | Facility: CLINIC | Age: 53
End: 2018-04-12

## 2018-04-13 NOTE — PROGRESS NOTES
This is a recent snapshot of the patient's [facility] medical record.  For current drug dose and complete information and questions, call [phone] or In Basket pool, fv home infusion ([id number])  CSN Number:  [id number]

## 2018-04-14 ENCOUNTER — HOSPITAL ENCOUNTER (EMERGENCY)
Facility: CLINIC | Age: 53
Discharge: HOME | End: 2018-04-14
Attending: FAMILY MEDICINE | Admitting: FAMILY MEDICINE
Payer: COMMERCIAL

## 2018-04-14 ENCOUNTER — HOME INFUSION (PRE-WILLOW HOME INFUSION) (OUTPATIENT)
Dept: PHARMACY | Facility: CLINIC | Age: 53
End: 2018-04-14

## 2018-04-14 VITALS
RESPIRATION RATE: 16 BRPM | BODY MASS INDEX: 32.99 KG/M2 | TEMPERATURE: 97 F | HEART RATE: 102 BPM | WEIGHT: 202.82 LBS | SYSTOLIC BLOOD PRESSURE: 124 MMHG | DIASTOLIC BLOOD PRESSURE: 84 MMHG | OXYGEN SATURATION: 100 %

## 2018-04-14 DIAGNOSIS — E86.0 DEHYDRATION: ICD-10-CM

## 2018-04-14 LAB
ALBUMIN SERPL-MCNC: 3.7 G/DL (ref 3.4–5)
ALBUMIN SERPL-MCNC: NORMAL G/DL (ref 3.4–5)
ALBUMIN UR-MCNC: NEGATIVE MG/DL
ALP SERPL-CCNC: 73 U/L (ref 40–150)
ALP SERPL-CCNC: NORMAL U/L (ref 40–150)
ALT SERPL W P-5'-P-CCNC: 50 U/L (ref 0–50)
ALT SERPL W P-5'-P-CCNC: NORMAL U/L (ref 0–50)
ANION GAP SERPL CALCULATED.3IONS-SCNC: 5 MMOL/L (ref 3–14)
ANION GAP SERPL CALCULATED.3IONS-SCNC: NORMAL MMOL/L (ref 6–17)
APPEARANCE UR: CLEAR
AST SERPL W P-5'-P-CCNC: 23 U/L (ref 0–45)
AST SERPL W P-5'-P-CCNC: NORMAL U/L (ref 0–45)
BASOPHILS # BLD AUTO: 0 10E9/L (ref 0–0.2)
BASOPHILS NFR BLD AUTO: 0 %
BILIRUB SERPL-MCNC: 0.7 MG/DL (ref 0.2–1.3)
BILIRUB SERPL-MCNC: NORMAL MG/DL (ref 0.2–1.3)
BILIRUB UR QL STRIP: NEGATIVE
BUN SERPL-MCNC: 12 MG/DL (ref 7–30)
BUN SERPL-MCNC: NORMAL MG/DL (ref 7–30)
CALCIUM SERPL-MCNC: 8.2 MG/DL (ref 8.5–10.1)
CALCIUM SERPL-MCNC: NORMAL MG/DL (ref 8.5–10.1)
CHLORIDE SERPL-SCNC: 108 MMOL/L (ref 94–109)
CHLORIDE SERPL-SCNC: NORMAL MMOL/L (ref 94–109)
CO2 SERPL-SCNC: 28 MMOL/L (ref 20–32)
CO2 SERPL-SCNC: NORMAL MMOL/L (ref 20–32)
COLOR UR AUTO: YELLOW
CREAT SERPL-MCNC: 0.69 MG/DL (ref 0.52–1.04)
CREAT SERPL-MCNC: NORMAL MG/DL (ref 0.52–1.04)
DIFFERENTIAL METHOD BLD: ABNORMAL
EOSINOPHIL # BLD AUTO: 0 10E9/L (ref 0–0.7)
EOSINOPHIL NFR BLD AUTO: 1.8 %
ERYTHROCYTE [DISTWIDTH] IN BLOOD BY AUTOMATED COUNT: 13.6 % (ref 10–15)
GFR SERPL CREATININE-BSD FRML MDRD: >90 ML/MIN/1.7M2
GFR SERPL CREATININE-BSD FRML MDRD: NORMAL ML/MIN/1.7M2
GLUCOSE SERPL-MCNC: 86 MG/DL (ref 70–99)
GLUCOSE SERPL-MCNC: NORMAL MG/DL (ref 70–99)
GLUCOSE UR STRIP-MCNC: NEGATIVE MG/DL
HCT VFR BLD AUTO: 23.3 % (ref 35–47)
HGB BLD-MCNC: 7.9 G/DL (ref 11.7–15.7)
HGB UR QL STRIP: NEGATIVE
IMM GRANULOCYTES # BLD: 0 10E9/L (ref 0–0.4)
IMM GRANULOCYTES NFR BLD: 0 %
KETONES UR STRIP-MCNC: NEGATIVE MG/DL
LACTATE BLD-SCNC: 0.8 MMOL/L (ref 0.7–2)
LEUKOCYTE ESTERASE UR QL STRIP: NEGATIVE
LYMPHOCYTES # BLD AUTO: 0.6 10E9/L (ref 0.8–5.3)
LYMPHOCYTES NFR BLD AUTO: 27.7 %
MCH RBC QN AUTO: 30.2 PG (ref 26.5–33)
MCHC RBC AUTO-ENTMCNC: 33.9 G/DL (ref 31.5–36.5)
MCV RBC AUTO: 89 FL (ref 78–100)
MONOCYTES # BLD AUTO: 0.1 10E9/L (ref 0–1.3)
MONOCYTES NFR BLD AUTO: 5.9 %
NEUTROPHILS # BLD AUTO: 1.4 10E9/L (ref 1.6–8.3)
NEUTROPHILS NFR BLD AUTO: 64.6 %
NITRATE UR QL: NEGATIVE
PH UR STRIP: 7 PH (ref 5–7)
PLATELET # BLD AUTO: 80 10E9/L (ref 150–450)
POTASSIUM SERPL-SCNC: 3.4 MMOL/L (ref 3.4–5.3)
POTASSIUM SERPL-SCNC: NORMAL MMOL/L (ref 3.4–5.3)
PROT SERPL-MCNC: 6.5 G/DL (ref 6.8–8.8)
PROT SERPL-MCNC: NORMAL G/DL (ref 6.8–8.8)
RBC # BLD AUTO: 2.62 10E12/L (ref 3.8–5.2)
SODIUM SERPL-SCNC: 141 MMOL/L (ref 133–144)
SODIUM SERPL-SCNC: NORMAL MMOL/L (ref 133–144)
SOURCE: NORMAL
SP GR UR STRIP: 1.01 (ref 1–1.03)
UROBILINOGEN UR STRIP-MCNC: 2 MG/DL (ref 0–2)
WBC # BLD AUTO: 2.2 10E9/L (ref 4–11)

## 2018-04-14 PROCEDURE — 83605 ASSAY OF LACTIC ACID: CPT | Performed by: FAMILY MEDICINE

## 2018-04-14 PROCEDURE — 85025 COMPLETE CBC W/AUTO DIFF WBC: CPT | Performed by: FAMILY MEDICINE

## 2018-04-14 PROCEDURE — 80053 COMPREHEN METABOLIC PANEL: CPT | Performed by: FAMILY MEDICINE

## 2018-04-14 PROCEDURE — 81003 URINALYSIS AUTO W/O SCOPE: CPT | Performed by: FAMILY MEDICINE

## 2018-04-14 PROCEDURE — 96360 HYDRATION IV INFUSION INIT: CPT | Performed by: FAMILY MEDICINE

## 2018-04-14 PROCEDURE — 25000128 H RX IP 250 OP 636: Performed by: FAMILY MEDICINE

## 2018-04-14 PROCEDURE — 99283 EMERGENCY DEPT VISIT LOW MDM: CPT | Mod: 25 | Performed by: FAMILY MEDICINE

## 2018-04-14 PROCEDURE — 99284 EMERGENCY DEPT VISIT MOD MDM: CPT | Mod: Z6 | Performed by: FAMILY MEDICINE

## 2018-04-14 RX ORDER — LIDOCAINE 40 MG/G
CREAM TOPICAL
Status: DISCONTINUED | OUTPATIENT
Start: 2018-04-14 | End: 2018-04-14 | Stop reason: HOSPADM

## 2018-04-14 RX ORDER — HEPARIN SODIUM,PORCINE 10 UNIT/ML
5-10 VIAL (ML) INTRAVENOUS EVERY 24 HOURS
Status: DISCONTINUED | OUTPATIENT
Start: 2018-04-14 | End: 2018-04-14 | Stop reason: HOSPADM

## 2018-04-14 RX ADMIN — SODIUM CHLORIDE 1000 ML: 9 INJECTION, SOLUTION INTRAVENOUS at 15:29

## 2018-04-14 RX ADMIN — SODIUM CHLORIDE, PRESERVATIVE FREE 5 ML: 5 INJECTION INTRAVENOUS at 17:35

## 2018-04-14 ASSESSMENT — ENCOUNTER SYMPTOMS
VOMITING: 0
HEMATURIA: 0
FREQUENCY: 0
DYSURIA: 0
CONSTIPATION: 0
FATIGUE: 1
CHILLS: 0
SHORTNESS OF BREATH: 0
APPETITE CHANGE: 0
NAUSEA: 0
SORE THROAT: 0
COUGH: 0
FEVER: 0
DIARRHEA: 0
ABDOMINAL PAIN: 0

## 2018-04-14 NOTE — ED NOTES
First contact with PT.   PORT A CATH removed with no diff.   All D/C home info given and all questions answered. PT verbalized understanding.

## 2018-04-14 NOTE — DISCHARGE INSTRUCTIONS
Dehydration (Adult)  Dehydration occurs when your body loses too much fluid. This may be the result of prolonged vomiting or diarrhea, excessive sweating, or a high fever. It may also happen if you don t drink enough fluid when you re sick or out in the heat. Misuse of diuretics (water pills) can also be a cause.  Symptoms include thirst and decreased urine output. You may also feel dizzy, weak, fatigued, or very drowsy. The diet described below is usually enough to treat dehydration. In some cases, you may need medicine.  Home care    Drink at least 12 8-ounce glasses of fluid every day to resolve the dehydration. Fluid may include water; orange juice; lemonade; apple, grape, or cranberry juice; clear fruit drinks; electrolyte replacement and sports drinks; and teas and coffee without caffeine. Don't drink alcohol. If you have been diagnosed with a kidney disease, ask your doctor how much and what types of fluids you should drink to prevent dehydration. If you have kidney disease, fluid can build up in the body. This can be dangerous to your health.    If you have a fever, muscle aches, or a headache as a result of a cold or flu, you may take acetaminophen or ibuprofen, unless another medicine was prescribed. If you have chronic liver or kidney disease, or have ever had a stomach ulcer or gastrointestinal bleeding, talk with your healthcare provider before using these medicines. Don't take aspirin if you are younger than 18 and have a fever. Aspirin raises the chance for severe liver injury.  Follow-up care  Follow up with your healthcare provider, or as advised.  When to seek medical advice  Call your healthcare provider right away if any of these occur:    Continued vomiting    Frequent diarrhea (more than 5 times a day); blood (red or black color) or mucus in diarrhea    Blood in vomit or stool    Swollen abdomen or increasing abdominal pain    Weakness, dizziness, or fainting    Unusual drowsiness or  confusion    Reduced urine output or extreme thirst    Fever of 100.4 F (38 C) or higher  Date Last Reviewed: 5/1/2017 2000-2017 [employer]. [address] [place] [address], [place] [address]. All rights reserved. This information is not intended as a substitute for professional medical care. Always follow your healthcare professional's instructions.

## 2018-04-14 NOTE — ED PROVIDER NOTES
History     Chief Complaint   Patient presents with     Urinary Retention     Pt hasn't voided for more than 24 hours.  Pt is on chemo at home, home infusion staff unsure if patient is dehydrated or retaining urine.      The history is provided by the patient.     Sarah Rajan is a 52 year old female with a past medical history of GERD and rectal cancer who presents to the ED for evaluation of urinary retention. Patient is currently going through chemotherapy for rectal cancer. She has been doing chemo every other week. She is typically gets better after two days, but she has continued to feel lethargic It was noted that her WBC was low 4 days ago. Patient states that she has been drinking plenty of fluids, but has not been voiding. She has been dehydrated in the past, but it's typically when she is ill. Denies decreased appetite, nausea, diarrhea, vomiting or abdominal pain. No sore throat, cough, shortness of breath, chest pain or ear pain.      Problem List:    Patient Active Problem List    Diagnosis Date Noted     Malignant neoplasm of rectum (H) 2018     Priority: Medium        Past Medical History:    Past Medical History:   Diagnosis Date     Depression      GERD (gastroesophageal reflux disease)      History of smoking      Insomnia      Obesity      Rectal cancer (H)      Restless leg syndrome      Seasonal affective disorder (H)        Past Surgical History:    Past Surgical History:   Procedure Laterality Date      SECTION      x2     COLONOSCOPY       INSERT PORT VASCULAR ACCESS Right 2018    Procedure: INSERT PORT VASCULAR ACCESS;  central venous Chest Port Placement;  Surgeon: Gaurav Yates PA-C;  Location:  OR     [facility]         Family History:    Family History   Problem Relation Age of Onset     Hyperlipidemia Mother      Hypertension Mother      GASTROINTESTINAL DISEASE Mother      ischemic colitis     Coronary Artery Disease Mother      stents x 3     Anesthesia  Reaction Mother      hypotension, PONV     Hyperlipidemia Father      Hypertension Father      Breast Cancer Maternal Grandmother      Coronary Artery Disease Maternal Grandfather      Myocardial Infarction Maternal Grandfather      Colon Cancer Paternal Grandmother      Breast Cancer Maternal Aunt      Breast Cancer Paternal Aunt      Coronary Artery Disease Paternal Grandfather      CEREBROVASCULAR DISEASE Paternal Grandfather      Family History Negative Brother      Coronary Artery Disease Maternal Uncle        Social History:  Marital Status:   [2]  Social History   Substance Use Topics     Smoking status: Former Smoker     Packs/day: 0.10     Years: 8.00     Types: Cigarettes     Quit date: 2/19/1986     Smokeless tobacco: Never Used     Alcohol use 4.2 oz/week     0 Standard drinks or equivalent, 7 Glasses of wine per week        Medications:      zolpidem (AMBIEN) 5 MG tablet   traZODone (DESYREL) 50 MG tablet   RANITIDINE HCL PO   fluorouracil   Acetaminophen (TYLENOL PO)   calcium carbonate (TUMS) 500 MG chewable tablet   FLUoxetine (PROZAC) 20 MG capsule   AMITRIPTYLINE HCL PO   LORazepam (ATIVAN) 0.5 MG tablet   prochlorperazine (COMPAZINE) 10 MG tablet   lidocaine-prilocaine (EMLA) cream   ondansetron (ZOFRAN) 4 MG tablet       Review of Systems   Constitutional: Positive for fatigue. Negative for appetite change, chills and fever.   HENT: Negative for ear pain and sore throat.    Respiratory: Negative for cough and shortness of breath.    Cardiovascular: Negative for chest pain.   Gastrointestinal: Negative for abdominal pain, constipation, diarrhea, nausea and vomiting.   Genitourinary: Positive for decreased urine volume. Negative for dysuria, frequency, hematuria and urgency.   All other systems reviewed and are negative.    Physical Exam   BP: 124/84  Pulse: 102  Temp: 97  F (36.1  C)  Resp: 16  Weight: 92 kg (202 lb 13.2 oz)  SpO2: 100 %    Physical Exam   Constitutional: She is oriented to  person, place, and time. She appears well-developed and well-nourished. No distress.   HENT:   Head: Normocephalic and atraumatic.   Right Ear: Tympanic membrane, external ear and ear canal normal.   Left Ear: Tympanic membrane, external ear and ear canal normal.   Mouth/Throat: Uvula is midline, oropharynx is clear and moist and mucous membranes are normal. No oral lesions. No oropharyngeal exudate, posterior oropharyngeal edema or posterior oropharyngeal erythema.   Eyes: Conjunctivae are normal. No scleral icterus.   Neck: Normal range of motion. Neck supple.   Cardiovascular: Normal rate, regular rhythm, normal heart sounds and intact distal pulses.  Exam reveals no gallop and no friction rub.    No murmur heard.  Pulmonary/Chest: Effort normal and breath sounds normal. No respiratory distress. She has no decreased breath sounds. She has no wheezes. She has no rhonchi. She has no rales.   Abdominal: Soft. Bowel sounds are normal. She exhibits no distension. There is no tenderness. There is no rigidity, no rebound and no guarding.   Lymphadenopathy:     She has no cervical adenopathy.   Neurological: She is alert and oriented to person, place, and time.   Skin: Skin is warm. No rash noted. No pallor.   Psychiatric: She has a normal mood and affect. Her behavior is normal.   Nursing note and vitals reviewed.    ED Course     ED Course     Procedures               Critical Care time:  none               Results for orders placed or performed during the hospital encounter of 04/14/18 (from the past 24 hour(s))   UA reflex to Microscopic   Result Value Ref Range    Color Urine Yellow     Appearance Urine Clear     Glucose Urine Negative NEG^Negative mg/dL    Bilirubin Urine Negative NEG^Negative    Ketones Urine Negative NEG^Negative mg/dL    Specific Gravity Urine 1.012 1.003 - 1.035    Blood Urine Negative NEG^Negative    pH Urine 7.0 5.0 - 7.0 pH    Protein Albumin Urine Negative NEG^Negative mg/dL    Urobilinogen  mg/dL 2.0 0.0 - 2.0 mg/dL    Nitrite Urine Negative NEG^Negative    Leukocyte Esterase Urine Negative NEG^Negative    Source Midstream Urine    CBC with platelets, differential   Result Value Ref Range    WBC 2.2 (L) 4.0 - 11.0 10e9/L    RBC Count 2.62 (L) 3.8 - 5.2 10e12/L    Hemoglobin 7.9 (L) 11.7 - 15.7 g/dL    Hematocrit 23.3 (L) 35.0 - 47.0 %    MCV 89 78 - 100 fl    MCH 30.2 26.5 - 33.0 pg    MCHC 33.9 31.5 - 36.5 g/dL    RDW 13.6 10.0 - 15.0 %    Platelet Count 80 (L) 150 - 450 10e9/L    Diff Method Automated Method     % Neutrophils 64.6 %    % Lymphocytes 27.7 %    % Monocytes 5.9 %    % Eosinophils 1.8 %    % Basophils 0.0 %    % Immature Granulocytes 0.0 %    Absolute Neutrophil 1.4 (L) 1.6 - 8.3 10e9/L    Absolute Lymphocytes 0.6 (L) 0.8 - 5.3 10e9/L    Absolute Monocytes 0.1 0.0 - 1.3 10e9/L    Absolute Eosinophils 0.0 0.0 - 0.7 10e9/L    Absolute Basophils 0.0 0.0 - 0.2 10e9/L    Abs Immature Granulocytes 0.0 0 - 0.4 10e9/L   Comprehensive metabolic panel   Result Value Ref Range    Sodium Canceled, Test credited 133 - 144 mmol/L    Potassium Canceled, Test credited 3.4 - 5.3 mmol/L    Chloride Canceled, Test credited 94 - 109 mmol/L    Carbon Dioxide Canceled, Test credited 20 - 32 mmol/L    Anion Gap Canceled, Test credited 6 - 17 mmol/L    Glucose Canceled, Test credited 70 - 99 mg/dL    Urea Nitrogen Canceled, Test credited 7 - 30 mg/dL    Creatinine Canceled, Test credited 0.52 - 1.04 mg/dL    GFR Estimate Canceled, Test credited >60 mL/min/1.7m2    GFR Estimate If Black Canceled, Test credited >60 mL/min/1.7m2    Calcium Canceled, Test credited 8.5 - 10.1 mg/dL    Bilirubin Total Canceled, Test credited 0.2 - 1.3 mg/dL    Albumin Canceled, Test credited 3.4 - 5.0 g/dL    Protein Total Canceled, Test credited 6.8 - 8.8 g/dL    Alkaline Phosphatase Canceled, Test credited 40 - 150 U/L    ALT Canceled, Test credited 0 - 50 U/L    AST Canceled, Test credited 0 - 45 U/L   Comprehensive metabolic  panel   Result Value Ref Range    Sodium 141 133 - 144 mmol/L    Potassium 3.4 3.4 - 5.3 mmol/L    Chloride 108 94 - 109 mmol/L    Carbon Dioxide 28 20 - 32 mmol/L    Anion Gap 5 3 - 14 mmol/L    Glucose 86 70 - 99 mg/dL    Urea Nitrogen 12 7 - 30 mg/dL    Creatinine 0.69 0.52 - 1.04 mg/dL    GFR Estimate >90 >60 mL/min/1.7m2    GFR Estimate If Black >90 >60 mL/min/1.7m2    Calcium 8.2 (L) 8.5 - 10.1 mg/dL    Bilirubin Total 0.7 0.2 - 1.3 mg/dL    Albumin 3.7 3.4 - 5.0 g/dL    Protein Total 6.5 (L) 6.8 - 8.8 g/dL    Alkaline Phosphatase 73 40 - 150 U/L    ALT 50 0 - 50 U/L    AST 23 0 - 45 U/L   Lactic acid whole blood   Result Value Ref Range    Lactic Acid 0.8 0.7 - 2.0 mmol/L       Medications   lidocaine 1 % 1 mL (not administered)   lidocaine (LMX4) kit (not administered)   sodium chloride (PF) 0.9% PF flush 3 mL (not administered)   sodium chloride (PF) 0.9% PF flush 3 mL (3 mLs Intravenous Not Given 4/14/18 1642)   0.9% sodium chloride BOLUS (0 mLs Intravenous Stopped 4/14/18 1645)       Assessments & Plan (with Medical Decision Making)     I have reviewed the nursing notes.    I have reviewed the findings, diagnosis, plan and need for follow up with the patient.  Results were reviewed with the patient and her .  She felt much better after 1 L IV fluids.  And was requesting to go home.  I gave her hard copy of her blood count so she would have that for her hematologist G oncology doctor.    New Prescriptions    No medications on file       Final diagnoses:   Dehydration     This document serves as a record of services personally performed by Tanner Dhaliwal MD. It was created on their behalf by Sharon Johnson, a trained medical scribe. The creation of this record is based on the provider's personal observations and the statements of the patient. This document has been checked and approved by the attending provider.    Note: Chart documentation done in part with Dragon Voice Recognition  software. Although reviewed after completion, some word and grammatical errors may remain.      4/14/2018   [facility] EMERGENCY DEPARTMENT     Tanner Dhaliwal MD  04/14/18 1707       Tanner Dhaliwal MD  04/14/18 1702

## 2018-04-14 NOTE — ED AVS SNAPSHOT
[facility] Emergency Department    [address]    [facility] [address]    Phone:  [phone]    Fax:  [phone]                                       Sarah Rajan   MRN: [id number]    Department:  [facility] Emergency Department   Date of Visit:  4/14/2018           Patient Information     Date Of Birth          1965        Your diagnoses for this visit were:     Dehydration        You were seen by Tanner Dhaliwal MD.      Follow-up Information     Follow up with Kyra Mortensen MD.    Specialty:  Hematology & Oncology    Why:  As needed    Contact information:    [place] [facility] [address]  [phone]          Discharge Instructions         Dehydration (Adult)  Dehydration occurs when your body loses too much fluid. This may be the result of prolonged vomiting or diarrhea, excessive sweating, or a high fever. It may also happen if you don t drink enough fluid when you re sick or out in the heat. Misuse of diuretics (water pills) can also be a cause.  Symptoms include thirst and decreased urine output. You may also feel dizzy, weak, fatigued, or very drowsy. The diet described below is usually enough to treat dehydration. In some cases, you may need medicine.  Home care    Drink at least 12 8-ounce glasses of fluid every day to resolve the dehydration. Fluid may include water; orange juice; lemonade; apple, grape, or cranberry juice; clear fruit drinks; electrolyte replacement and sports drinks; and teas and coffee without caffeine. Don't drink alcohol. If you have been diagnosed with a kidney disease, ask your doctor how much and what types of fluids you should drink to prevent dehydration. If you have kidney disease, fluid can build up in the body. This can be dangerous to your health.    If you have a fever, muscle aches, or a headache as a result of a cold or flu, you may take acetaminophen or ibuprofen, unless another medicine was prescribed. If you have chronic  liver or kidney disease, or have ever had a stomach ulcer or gastrointestinal bleeding, talk with your healthcare provider before using these medicines. Don't take aspirin if you are younger than 18 and have a fever. Aspirin raises the chance for severe liver injury.  Follow-up care  Follow up with your healthcare provider, or as advised.  When to seek medical advice  Call your healthcare provider right away if any of these occur:    Continued vomiting    Frequent diarrhea (more than 5 times a day); blood (red or black color) or mucus in diarrhea    Blood in vomit or stool    Swollen abdomen or increasing abdominal pain    Weakness, dizziness, or fainting    Unusual drowsiness or confusion    Reduced urine output or extreme thirst    Fever of 100.4 F (38 C) or higher  Date Last Reviewed: 5/1/2017 2000-2017 [employer]. [address] [place] [address]. All rights reserved. This information is not intended as a substitute for professional medical care. Always follow your healthcare professional's instructions.          Your next 10 appointments already scheduled     Apr 16, 2018  9:00 AM CDT   (Arrive by 8:45 AM)   NEW CANCER VISIT with Komal Clark MD   [facility] ([facility])    [address]  [address]  [facility] [address] [phone]            Apr 19, 2018 11:45 AM CDT   Masonic Lab Draw with  MASONIC LAB DRAW   [facility] Mason[facility] Lab Draw ([facility])    [address]  [address]  [facility] [address] [phone]            Apr 19, 2018 12:15 PM CDT   (Arrive by 12:00 PM)   MR ABDOMEN & PELVIS W/O & W CONTRAST with [id number]   [facility] MRI ([facility])    [address]  1st Floor  [facility] [address] [phone]           Take your medicines as usual, unless your doctor tells you not to. Bring a list of your current medicines to your exam  (including vitamins, minerals and over-the-counter drugs). Also bring the results of similar scans you may have had.    You may or may not receive IV contrast for this exam pending the discretion of the Radiologist.   Do not eat or drink for 6 hours prior to exam.  The MRI machine uses a strong magnet. Please wear clothes without metal (snaps, zippers). A sweatsuit works well, or we may give you a hospital gown.  Please remove any body piercings and hair extensions before you arrive. You will also remove watches, jewelry, hairpins, wallets, dentures, partial dental plates and hearing aids. You may wear contact lenses, and you may be able to wear your rings. We have a safe place to keep your personal items, but it is safer to leave them at home.  **IMPORTANT** THE INSTRUCTIONS BELOW ARE ONLY FOR THOSE PATIENTS WHO HAVE BEEN PRESCRIBED SEDATION OR GENERAL ANESTHESIA DURING THEIR MRI PROCEDURE:  IF YOUR DOCTOR PRESCRIBED ORAL SEDATION (take medicine to help you relax during your exam):   You must get the medicine from your doctor (oral medication) before you arrive. Bring the medicine to the exam. Do not take it at home. You ll be told when to take it upon arriving for your exam.   Arrive one hour early. Bring someone who can take you home after the test. Your medicine will make you sleepy. After the exam, you may not drive, take a bus or take a taxi by yourself.  IF YOUR DOCTOR PRESCRIBED IV SEDATION:   Arrive one hour early. Bring someone who can take you home after the test. Your medicine will make you sleepy. After the exam, you may not drive, take a bus or take a taxi by yourself.   No eating 6 hours before your exam. You may have clear liquids up until 4 hours before your exam. (Clear liquids include water, clear tea, black coffee and fruit juice without pulp.)  IF YOUR DOCTOR PRESCRIBED ANESTHESIA (be asleep for your exam):   Arrive 1 1/2 hours early. Bring someone who can take you home after the test. You may  not drive, take a bus or take a taxi by yourself.   No eating 8 hours before your exam. You may have clear liquids up until 4 hours before your exam. (Clear liquids include water, clear tea, black coffee and fruit juice without pulp.)   You will spend four to five hours in the recovery room.  If you have any questions, please contact your Imaging Department exam site.            Apr 19, 2018  2:30 PM CDT   (Arrive by 2:15 PM)   Return Visit with Kyra Mortensen MD   [facility] ([facility])    [address] [place] [address]  [address]  [facility] [address] [phone]            Apr 23, 2018  7:30 AM CDT   Masonic Lab Draw with [facility]ONIC LAB DRAW   [facility] Lab Draw ([facility])    [address] [place] [address]  [address]  [facility] [id number] [phone]            Apr 23, 2018  8:00 AM CDT   Infusion 240 with  ONCOLOGY INFUSION, UC 18 ATC   [facility] ([facility])    [address] [place] [address]  [address]  [facility] [id number] [phone]              24 Hour Appointment Hotline       To make an appointment at any [facility], call [phone] ([phone]). If you don't have a family doctor or clinic, we will help you find one. [facility] clinics are conveniently located to serve the needs of you and your family.             Review of your medicines      Our records show that you are taking the medicines listed below. If these are incorrect, please call your family doctor or clinic.        Dose / Directions Last dose taken    AMITRIPTYLINE HCL PO   Dose:  20 mg        Take 20 mg by mouth At Bedtime   Refills:  0        calcium carbonate 500 MG chewable tablet   Commonly known as:  TUMS   Dose:  1-2 chew tab   Quantity:  150 tablet        Take 1-2 chew tab by mouth daily   Refills:  0        fluorouracil        Administer 4,968 mg intravenously . Continuous infusion over 46-48 hr via  ambulatory pump q14d Supplied by outside provider with pump.   Refills:  0        FLUoxetine 20 MG capsule   Commonly known as:  PROzac        1 tablet in the morning   Refills:  1        lidocaine-prilocaine cream   Commonly known as:  EMLA   Quantity:  30 g        Apply 45 minutes prior to procedure.   Refills:  3        LORazepam 0.5 MG tablet   Commonly known as:  ATIVAN   Dose:  0.5 mg   Quantity:  30 tablet        Take 1 tablet (0.5 mg) by mouth every 4 hours as needed (Anxiety, Nausea/Vomiting or Sleep)   Refills:  2        ondansetron 4 MG tablet   Commonly known as:  ZOFRAN   Quantity:  3 tablet        Take one tablet by mouth every 6 hours as needed for nausea when taking neomycin and flagyl   Refills:  0        prochlorperazine 10 MG tablet   Commonly known as:  COMPAZINE   Dose:  10 mg   Quantity:  20 tablet        Take 1 tablet (10 mg) by mouth every 6 hours as needed for nausea or vomiting   Refills:  1        RANITIDINE HCL PO   Dose:  150 mg   Indication:  Ulcer of the Duodenum        Take 150 mg by mouth daily as needed for heartburn   Refills:  0        traZODone 50 MG tablet   Commonly known as:  DESYREL   Dose:   mg   Quantity:  30 tablet        Take 0.5-2 tablets ( mg) by mouth At Bedtime   Refills:  3        TYLENOL PO   Dose:  1000 mg        Take 1,000 mg by mouth At Bedtime   Refills:  0        zolpidem 5 MG tablet   Commonly known as:  AMBIEN   Dose:  5 mg   Quantity:  30 tablet        Take 1 tablet (5 mg) by mouth nightly as needed for sleep   Refills:  3                Procedures and tests performed during your visit     Procedure/Test Number of Times Performed    CBC with platelets, differential 1    Comprehensive metabolic panel 2    Lactic acid whole blood 1    UA reflex to Microscopic 1      Orders Needing Specimen Collection     None      Pending Results     No orders found from 4/12/2018 to 4/15/2018.            Pending Culture Results     No orders found from 4/12/2018  to 4/15/2018.            Pending Results Instructions     If you had any lab results that were not finalized at the time of your Discharge, you can call the ED Lab Result RN at [phone]. You will be contacted by this team for any positive Lab results or changes in treatment. The nurses are available 7 days a week from 10A to 6:30P.  You can leave a message 24 hours per day and they will return your call.        Test Results From Your Hospital Stay        4/14/2018  2:26 PM      Component Results     Component Value Ref Range & Units Status    Color Urine Yellow  Final    Appearance Urine Clear  Final    Glucose Urine Negative NEG^Negative mg/dL Final    Bilirubin Urine Negative NEG^Negative Final    Ketones Urine Negative NEG^Negative mg/dL Final    Specific Gravity Urine 1.012 1.003 - 1.035 Final    Blood Urine Negative NEG^Negative Final    pH Urine 7.0 5.0 - 7.0 pH Final    Protein Albumin Urine Negative NEG^Negative mg/dL Final    Urobilinogen mg/dL 2.0 0.0 - 2.0 mg/dL Final    Nitrite Urine Negative NEG^Negative Final    Leukocyte Esterase Urine Negative NEG^Negative Final    Source Midstream Urine  Final         4/14/2018  3:42 PM      Component Results     Component Value Ref Range & Units Status    WBC 2.2 (L) 4.0 - 11.0 10e9/L Final    RBC Count 2.62 (L) 3.8 - 5.2 10e12/L Final    Hemoglobin 7.9 (L) 11.7 - 15.7 g/dL Final    Hematocrit 23.3 (L) 35.0 - 47.0 % Final    MCV 89 78 - 100 fl Final    MCH 30.2 26.5 - 33.0 pg Final    MCHC 33.9 31.5 - 36.5 g/dL Final    RDW 13.6 10.0 - 15.0 % Final    Platelet Count 80 (L) 150 - 450 10e9/L Final    Diff Method Automated Method  Final    % Neutrophils 64.6 % Final    % Lymphocytes 27.7 % Final    % Monocytes 5.9 % Final    % Eosinophils 1.8 % Final    % Basophils 0.0 % Final    % Immature Granulocytes 0.0 % Final    Absolute Neutrophil 1.4 (L) 1.6 - 8.3 10e9/L Final    Absolute Lymphocytes 0.6 (L) 0.8 - 5.3 10e9/L Final    Absolute Monocytes 0.1 0.0 - 1.3 10e9/L  Final    Absolute Eosinophils 0.0 0.0 - 0.7 10e9/L Final    Absolute Basophils 0.0 0.0 - 0.2 10e9/L Final    Abs Immature Granulocytes 0.0 0 - 0.4 10e9/L Final         4/14/2018  4:10 PM      Component Results     Component Value Ref Range & Units Status    Sodium Canceled, Test credited 133 - 144 mmol/L Final    SUSPECTED CONTAMINATION    Potassium Canceled, Test credited 3.4 - 5.3 mmol/L Final    SUSPECTED CONTAMINATION    Chloride Canceled, Test credited 94 - 109 mmol/L Final    SUSPECTED CONTAMINATION    Carbon Dioxide Canceled, Test credited 20 - 32 mmol/L Final    SUSPECTED CONTAMINATION    Anion Gap Canceled, Test credited 6 - 17 mmol/L Final    SUSPECTED CONTAMINATION    Glucose Canceled, Test credited 70 - 99 mg/dL Final    SUSPECTED CONTAMINATION    Urea Nitrogen Canceled, Test credited 7 - 30 mg/dL Final    SUSPECTED CONTAMINATION    Creatinine Canceled, Test credited 0.52 - 1.04 mg/dL Final    SUSPECTED CONTAMINATION    GFR Estimate Canceled, Test credited >60 mL/min/1.7m2 Final    SUSPECTED CONTAMINATION    GFR Estimate If Black Canceled, Test credited >60 mL/min/1.7m2 Final    SUSPECTED CONTAMINATION    Calcium Canceled, Test credited 8.5 - 10.1 mg/dL Final    SUSPECTED CONTAMINATION    Bilirubin Total Canceled, Test credited 0.2 - 1.3 mg/dL Final    SUSPECTED CONTAMINATION    Albumin Canceled, Test credited 3.4 - 5.0 g/dL Final    SUSPECTED CONTAMINATION    Protein Total Canceled, Test credited 6.8 - 8.8 g/dL Final    SUSPECTED CONTAMINATION    Alkaline Phosphatase Canceled, Test credited 40 - 150 U/L Final    SUSPECTED CONTAMINATION    ALT Canceled, Test credited 0 - 50 U/L Final    SUSPECTED CONTAMINATION    AST Canceled, Test credited 0 - 45 U/L Final    SUSPECTED CONTAMINATION         4/14/2018  4:57 PM      Component Results     Component Value Ref Range & Units Status    Sodium 141 133 - 144 mmol/L Final    Potassium 3.4 3.4 - 5.3 mmol/L Final    Chloride 108 94 - 109 mmol/L Final    Carbon  Dioxide 28 20 - 32 mmol/L Final    Anion Gap 5 3 - 14 mmol/L Final    Glucose 86 70 - 99 mg/dL Final    Urea Nitrogen 12 7 - 30 mg/dL Final    Creatinine 0.69 0.52 - 1.04 mg/dL Final    GFR Estimate >90 >60 mL/min/1.7m2 Final    Non  GFR Calc    GFR Estimate If Black >90 >60 mL/min/1.7m2 Final    African American GFR Calc    Calcium 8.2 (L) 8.5 - 10.1 mg/dL Final    Bilirubin Total 0.7 0.2 - 1.3 mg/dL Final    Albumin 3.7 3.4 - 5.0 g/dL Final    Protein Total 6.5 (L) 6.8 - 8.8 g/dL Final    Alkaline Phosphatase 73 40 - 150 U/L Final    ALT 50 0 - 50 U/L Final    AST 23 0 - 45 U/L Final         4/14/2018  4:33 PM      Component Results     Component Value Ref Range & Units Status    Lactic Acid 0.8 0.7 - 2.0 mmol/L Final                Thank you for choosing [facility]       Thank you for choosing [facility] for your care. Our goal is always to provide you with excellent care. Hearing back from our patients is one way we can continue to improve our services. Please take a few minutes to complete the written survey that you may receive in the mail after you visit with us. Thank you!        [employer] Information     [employer] gives you secure access to your electronic health record. If you see a primary care provider, you can also send messages to your care team and make appointments. If you have questions, please call your primary care clinic.  If you do not have a primary care provider, please call [phone] and they will assist you.        Care EveryWhere ID     This is your Care EveryWhere ID. This could be used by other organizations to access your [facility] medical records  [id number]        Equal Access to Services     DEAN CHAKRABORTY AH: Jorge Armendariz, karma fletcher, ritika cotton, luz elena Black [facility] [phone].    ATENCIÓN: Si habla español, tiene a membreno disposición servicios gratuitos de asistencia lingüística. Llame al [phone].    We  comply with applicable federal civil rights laws and [place] laws. We do not discriminate on the basis of race, color, national origin, age, disability, sex, sexual orientation, or gender identity.            After Visit Summary       This is your record. Keep this with you and show to your community pharmacist(s) and doctor(s) at your next visit.

## 2018-04-14 NOTE — ED AVS SNAPSHOT
[facility] Emergency Department    [address] [address]    Phone:  [phone]    Fax:  [phone]                                       Sarah Rajan   MRN: [id number]    Department:  [facility] Emergency Department   Date of Visit:  4/14/2018           After Visit Summary Signature Page     I have received my discharge instructions, and my questions have been answered. I have discussed any challenges I see with this plan with the nurse or doctor.    ..........................................................................................................................................  Patient/Patient Representative Signature      ..........................................................................................................................................  Patient Representative Print Name and Relationship to Patient    ..................................................               ................................................  Date                                            Time    ..........................................................................................................................................  Reviewed by Signature/Title    ...................................................              ..............................................  Date                                                            Time

## 2018-04-16 ENCOUNTER — ONCOLOGY VISIT (OUTPATIENT)
Dept: ONCOLOGY | Facility: CLINIC | Age: 53
End: 2018-04-16
Attending: PHYSICIAN ASSISTANT
Payer: COMMERCIAL

## 2018-04-16 ENCOUNTER — APPOINTMENT (OUTPATIENT)
Dept: LAB | Facility: CLINIC | Age: 53
End: 2018-04-16
Attending: INTERNAL MEDICINE
Payer: COMMERCIAL

## 2018-04-16 ENCOUNTER — OFFICE VISIT (OUTPATIENT)
Dept: CARDIOLOGY | Facility: CLINIC | Age: 53
End: 2018-04-16
Attending: INTERNAL MEDICINE
Payer: COMMERCIAL

## 2018-04-16 VITALS
BODY MASS INDEX: 31.48 KG/M2 | HEIGHT: 66 IN | OXYGEN SATURATION: 97 % | WEIGHT: 195.9 LBS | SYSTOLIC BLOOD PRESSURE: 124 MMHG | TEMPERATURE: 98.5 F | DIASTOLIC BLOOD PRESSURE: 90 MMHG | HEART RATE: 96 BPM

## 2018-04-16 VITALS
HEART RATE: 96 BPM | HEIGHT: 66 IN | OXYGEN SATURATION: 97 % | SYSTOLIC BLOOD PRESSURE: 124 MMHG | BODY MASS INDEX: 31.61 KG/M2 | WEIGHT: 196.7 LBS | DIASTOLIC BLOOD PRESSURE: 90 MMHG

## 2018-04-16 DIAGNOSIS — D64.9 ANEMIA, UNSPECIFIED TYPE: ICD-10-CM

## 2018-04-16 DIAGNOSIS — C20 MALIGNANT NEOPLASM OF RECTUM (H): ICD-10-CM

## 2018-04-16 DIAGNOSIS — C20 MALIGNANT NEOPLASM OF RECTUM (H): Primary | ICD-10-CM

## 2018-04-16 DIAGNOSIS — F41.9 ANXIETY: Primary | ICD-10-CM

## 2018-04-16 DIAGNOSIS — E78.01 FAMILIAL HYPERCHOLESTEROLEMIA: ICD-10-CM

## 2018-04-16 DIAGNOSIS — R03.0 ELEVATED BLOOD PRESSURE READING WITHOUT DIAGNOSIS OF HYPERTENSION: ICD-10-CM

## 2018-04-16 DIAGNOSIS — R94.31 ABNORMAL EKG: ICD-10-CM

## 2018-04-16 LAB
ABO + RH BLD: NORMAL
ABO + RH BLD: NORMAL
BASOPHILS # BLD AUTO: 0 10E9/L (ref 0–0.2)
BASOPHILS NFR BLD AUTO: 0.9 %
BILIRUB DIRECT SERPL-MCNC: 0.2 MG/DL (ref 0–0.2)
BILIRUB SERPL-MCNC: 0.8 MG/DL (ref 0.2–1.3)
BLD GP AB SCN SERPL QL: NORMAL
BLOOD BANK CMNT PATIENT-IMP: NORMAL
DIFFERENTIAL METHOD BLD: ABNORMAL
EOSINOPHIL # BLD AUTO: 0.1 10E9/L (ref 0–0.7)
EOSINOPHIL NFR BLD AUTO: 2.1 %
ERYTHROCYTE [DISTWIDTH] IN BLOOD BY AUTOMATED COUNT: 14 % (ref 10–15)
HCT VFR BLD AUTO: 35.4 % (ref 35–47)
HGB BLD-MCNC: 12.8 G/DL (ref 11.7–15.7)
IMM GRANULOCYTES # BLD: 0 10E9/L (ref 0–0.4)
IMM GRANULOCYTES NFR BLD: 0.3 %
LDH SERPL L TO P-CCNC: 247 U/L (ref 81–234)
LYMPHOCYTES # BLD AUTO: 1.1 10E9/L (ref 0.8–5.3)
LYMPHOCYTES NFR BLD AUTO: 31.6 %
MCH RBC QN AUTO: 31.3 PG (ref 26.5–33)
MCHC RBC AUTO-ENTMCNC: 36.2 G/DL (ref 31.5–36.5)
MCV RBC AUTO: 87 FL (ref 78–100)
MONOCYTES # BLD AUTO: 0.2 10E9/L (ref 0–1.3)
MONOCYTES NFR BLD AUTO: 7.2 %
NEUTROPHILS # BLD AUTO: 1.9 10E9/L (ref 1.6–8.3)
NEUTROPHILS NFR BLD AUTO: 57.9 %
NRBC # BLD AUTO: 0 10*3/UL
NRBC BLD AUTO-RTO: 0 /100
PLATELET # BLD AUTO: 128 10E9/L (ref 150–450)
PLATELET # BLD EST: ABNORMAL 10*3/UL
RBC # BLD AUTO: 4.09 10E12/L (ref 3.8–5.2)
RETICS # AUTO: 20.5 10E9/L (ref 25–95)
RETICS/RBC NFR AUTO: 0.5 % (ref 0.5–2)
SPECIMEN EXP DATE BLD: NORMAL
WBC # BLD AUTO: 3.3 10E9/L (ref 4–11)

## 2018-04-16 PROCEDURE — G0463 HOSPITAL OUTPT CLINIC VISIT: HCPCS

## 2018-04-16 PROCEDURE — 86850 RBC ANTIBODY SCREEN: CPT | Performed by: PHYSICIAN ASSISTANT

## 2018-04-16 PROCEDURE — 82247 BILIRUBIN TOTAL: CPT | Performed by: PHYSICIAN ASSISTANT

## 2018-04-16 PROCEDURE — 93005 ELECTROCARDIOGRAM TRACING: CPT | Mod: ZF

## 2018-04-16 PROCEDURE — 82248 BILIRUBIN DIRECT: CPT | Performed by: PHYSICIAN ASSISTANT

## 2018-04-16 PROCEDURE — 85045 AUTOMATED RETICULOCYTE COUNT: CPT | Performed by: PHYSICIAN ASSISTANT

## 2018-04-16 PROCEDURE — 36591 DRAW BLOOD OFF VENOUS DEVICE: CPT

## 2018-04-16 PROCEDURE — 99204 OFFICE O/P NEW MOD 45 MIN: CPT | Mod: GC | Performed by: INTERNAL MEDICINE

## 2018-04-16 PROCEDURE — 83615 LACTATE (LD) (LDH) ENZYME: CPT | Performed by: PHYSICIAN ASSISTANT

## 2018-04-16 PROCEDURE — 86901 BLOOD TYPING SEROLOGIC RH(D): CPT | Performed by: PHYSICIAN ASSISTANT

## 2018-04-16 PROCEDURE — 99214 OFFICE O/P EST MOD 30 MIN: CPT | Mod: ZP | Performed by: PHYSICIAN ASSISTANT

## 2018-04-16 PROCEDURE — 86900 BLOOD TYPING SEROLOGIC ABO: CPT | Performed by: PHYSICIAN ASSISTANT

## 2018-04-16 PROCEDURE — 36592 COLLECT BLOOD FROM PICC: CPT

## 2018-04-16 PROCEDURE — 85025 COMPLETE CBC W/AUTO DIFF WBC: CPT | Performed by: PHYSICIAN ASSISTANT

## 2018-04-16 PROCEDURE — 83010 ASSAY OF HAPTOGLOBIN QUANT: CPT | Performed by: PHYSICIAN ASSISTANT

## 2018-04-16 PROCEDURE — 93010 ELECTROCARDIOGRAM REPORT: CPT | Mod: ZP | Performed by: INTERNAL MEDICINE

## 2018-04-16 PROCEDURE — 25000128 H RX IP 250 OP 636: Mod: ZF | Performed by: PHYSICIAN ASSISTANT

## 2018-04-16 RX ORDER — HEPARIN SODIUM (PORCINE) LOCK FLUSH IV SOLN 100 UNIT/ML 100 UNIT/ML
5 SOLUTION INTRAVENOUS ONCE
Status: COMPLETED | OUTPATIENT
Start: 2018-04-16 | End: 2018-04-16

## 2018-04-16 RX ADMIN — SODIUM CHLORIDE, PRESERVATIVE FREE 5 ML: 5 INJECTION INTRAVENOUS at 11:54

## 2018-04-16 ASSESSMENT — PAIN SCALES - GENERAL
PAINLEVEL: NO PAIN (0)
PAINLEVEL: NO PAIN (0)

## 2018-04-16 NOTE — NURSING NOTE
"Oncology Rooming Note    April 16, 2018 10:51 AM   Sarah Rajan is a 52 year old female who presents for:    Chief Complaint   Patient presents with     Oncology Clinic Visit     return     Initial Vitals: /90 (BP Location: Left arm)  Pulse 96  Wt 88.9 kg (196 lb)  SpO2 97%  BMI 31.64 kg/m2 Estimated body mass index is 31.64 kg/(m^2) as calculated from the following:    Height as of an earlier encounter on 4/16/18: 1.676 m (5' 6\").    Weight as of this encounter: 88.9 kg (196 lb). Body surface area is 2.03 meters squared.  No Pain (0) Comment: Data Unavailable   No LMP recorded. Patient is postmenopausal.  Allergies reviewed: Yes  Medications reviewed: Yes    Medications: Medication refills not needed today.  Pharmacy name entered into [facility]:    [facility] [address] [place] [address] DR ALVAREZ AT [facility] [place], [place] - [address] [facility] [phone]    Clinical concerns: none     6 minutes for nursing intake (face to face time)     Jelly Fields RN                        "

## 2018-04-16 NOTE — LETTER
2018      RE: Sarah Rajan  [address] [facility] [address]       Dear Colleague,    Thank you for the opportunity to participate in the care of your patient, Sarah Rajan, at the [facility] at [facility]. Please see a copy of my visit note below.    Cardiology Clinic note    HPI: This is a 53 yo F with a history of locally advanced adenocarcinoma of the rectum ([id number]) after presenting with BRBPR . She is currently undergoing treatment with neoadjuvant 5-FU and oxaliplatin (FOLFOX), which she started on 18    She states that when she had her first colonoscopy she had screening labs that had high LDL and normal triglycerides. She in on no cardiac meds. Her last lipid panel from 2018 shows a total cholesterol of 278, triglycerides 137, HDL 57, . She does not smoke currently but quit 25 years ago about 2 packs a week for about 8 years. She does not exercise. Endorses intermittent palpitations not related with exercise. Experiences shortness of breath more with walking up the stairs, denies any chest pain.     She does not eat fried food at home but does eat it when she goes out eats some vegetables and salads and red meats at night.     Family history  -MGF and PGF both  of heart attacks (MGF 63) PGF 84  - mother ischemic colitis  - mother, father, brother high cholesterol  - maternal aunt  suddenly at 72y.   - maternal uncle had CAD in his 50s, maternal aunts with CAD in 50-60s yr of age    PAST MEDICAL HISTORY:  Past Medical History:   Diagnosis Date     Depression      GERD (gastroesophageal reflux disease)      History of smoking      Insomnia      Obesity      Rectal cancer (H)      Restless leg syndrome      Seasonal affective disorder (H)        CURRENT MEDICATIONS:  Current Outpatient Prescriptions   Medication Sig Dispense Refill     RANITIDINE HCL PO Take 150 mg by mouth daily as needed for heartburn       fluorouracil  Administer 4,968 mg intravenously . Continuous infusion over 46-48 hr via ambulatory pump q14d Supplied by outside provider with pump.       LORazepam (ATIVAN) 0.5 MG tablet Take 1 tablet (0.5 mg) by mouth every 4 hours as needed (Anxiety, Nausea/Vomiting or Sleep) 30 tablet 2     prochlorperazine (COMPAZINE) 10 MG tablet Take 1 tablet (10 mg) by mouth every 6 hours as needed for nausea or vomiting 20 tablet 1     lidocaine-prilocaine (EMLA) cream Apply 45 minutes prior to procedure. 30 g 3     Acetaminophen (TYLENOL PO) Take 1,000 mg by mouth At Bedtime       calcium carbonate (TUMS) 500 MG chewable tablet Take 1-2 chew tab by mouth daily  150 tablet      FLUoxetine (PROZAC) 20 MG capsule 1 tablet in the morning  1     ondansetron (ZOFRAN) 4 MG tablet Take one tablet by mouth every 6 hours as needed for nausea when taking neomycin and flagyl 3 tablet 0     AMITRIPTYLINE HCL PO Take 20 mg by mouth At Bedtime        zolpidem (AMBIEN) 5 MG tablet Take 1 tablet (5 mg) by mouth nightly as needed for sleep (Patient not taking: Reported on 2018) 30 tablet 3     traZODone (DESYREL) 50 MG tablet Take 0.5-2 tablets ( mg) by mouth At Bedtime (Patient not taking: Reported on 2018) 30 tablet 3       PAST SURGICAL HISTORY:  Past Surgical History:   Procedure Laterality Date      SECTION      x2     COLONOSCOPY       INSERT PORT VASCULAR ACCESS Right 2018    Procedure: INSERT PORT VASCULAR ACCESS;  central venous Chest Port Placement;  Surgeon: Gaurav Yates PA-C;  Location:  OR     [facility]         ALLERGIES     Allergies   Allergen Reactions     Inapsine [Droperidol] Other (See Comments)     Elevated blood pressure and increased heart rate       FAMILY HISTORY:  Family History   Problem Relation Age of Onset     Hyperlipidemia Mother      Hypertension Mother      GASTROINTESTINAL DISEASE Mother      ischemic colitis     Coronary Artery Disease Mother      stents x 3     Anesthesia Reaction  "Mother      hypotension, PONV     Hyperlipidemia Father      Hypertension Father      Breast Cancer Maternal Grandmother      Coronary Artery Disease Maternal Grandfather      Myocardial Infarction Maternal Grandfather      Colon Cancer Paternal Grandmother      Breast Cancer Maternal Aunt      Breast Cancer Paternal Aunt      Coronary Artery Disease Paternal Grandfather      CEREBROVASCULAR DISEASE Paternal Grandfather      Family History Negative Brother      Coronary Artery Disease Maternal Uncle        SOCIAL HISTORY:  Social History     Social History     Marital status:      Spouse name: Cody Rajan     Number of children: 2     Years of education: N/A     Occupational History     microbiologist [employer]     Social History Main Topics     Smoking status: Former Smoker     Packs/day: 0.10     Years: 8.00     Types: Cigarettes     Quit date: 2/19/1986     Smokeless tobacco: Never Used     Alcohol use 4.2 oz/week     0 Standard drinks or equivalent, 7 Glasses of wine per week     Drug use: No     Sexual activity: Not on file     Other Topics Concern     Not on file     Social History Narrative       ROS:   Constitutional: weight loss  ENT: No visual disturbance, ear ache, epistaxis, sore throat  Allergies/Immunologic: Negative.   Respiratory: No cough, hemoptysia  Cardiovascular: As per HPI  GI: see HPI  : No urinary frequency, dysuria, or hematuria  Integument: Negative  Psychiatric: Negative  Neuro: Negative  Endocrinology: Negative   Musculoskeletal: Negative    EXAM:  /90  Pulse 96  Ht 1.676 m (5' 6\")  Wt 89.2 kg (196 lb 11.2 oz)  SpO2 97%  BMI 31.75 kg/m2  In general, the patient is a pleasant female in no apparent distress.      HEENT: NC/AT.  PERRLA.  EOMI.  Sclerae white, not injected.    Neck: Carotids 2+ bilaterally without bruits.  No jugular venous distension.   Lymph: No cervical adenopathy. No thyromegaly.   Heart: RRR. Normal S1, S2. No murmur, rub, click, or gallop. There is no " heave.    Lungs: Clear bilaterally.  No rhonchi, wheezes, rales.   GI: Soft, nontender, nondistended.   Extremities: No edema.  The pulses are 2+at the radial and DP bilaterally.  Neuro: grossly non focal.   Skin: no rashes.  Musculoskeletal: normal muscle strength, no acute arthritis, gait normal.      Labs:  LIPID RESULTS:  No results found for: CHOL, HDL, LDL, TRIG, CHOLHDLRATIO, NHDL    LIVER ENZYME RESULTS:  Lab Results   Component Value Date    AST 23 04/14/2018    ALT 50 04/14/2018       CBC RESULTS:  Lab Results   Component Value Date    WBC 2.2 (L) 04/14/2018    RBC 2.62 (L) 04/14/2018    HGB 7.9 (L) 04/14/2018    HCT 23.3 (L) 04/14/2018    MCV 89 04/14/2018    MCH 30.2 04/14/2018    MCHC 33.9 04/14/2018    RDW 13.6 04/14/2018    PLT 80 (L) 04/14/2018       BMP RESULTS:  Lab Results   Component Value Date     04/14/2018    POTASSIUM 3.4 04/14/2018    CHLORIDE 108 04/14/2018    CO2 28 04/14/2018    ANIONGAP 5 04/14/2018    GLC 86 04/14/2018    BUN 12 04/14/2018    CR 0.69 04/14/2018    GFRESTIMATED >90 04/14/2018    GFRESTBLACK >90 04/14/2018    GEETA 8.2 (L) 04/14/2018        A1C RESULTS:  No results found for: A1C    INR RESULTS:  Lab Results   Component Value Date    INR 0.93 02/19/2018       Procedures:  ECG Normal sinus rhythm with some nonspecific TW changes in the lateral leads      Assessment and Plan: 3 yo F with a history of locally advanced adenocarcinoma of the rectum ([id number]) after presenting with BRBPR currently undergoing neoadjuvant treatment 5-FU (on oxaliplatin, leucovorin, fluoracil started on 2/26/18) 3/4 cycles here for lipid management.     - hyperlipidemia  -  per guidelines would qualify for statin therapy, her 10-yr ASCVD risk is 1.9%   - has increasing LFT's   - has room for improvement given she eats a lot of sweets  - recommended exercise, low fat and sweet diet  - reassess once LFT's are stable for candidacy and need for statin therapy for improvement in LDL    - T  wave abnormality in the lateral leads- given change in ECG and possible need for surgery later in her disease course would recommend an echo to assess for cardiac function and wall motion abnormalities.     Case discussed with Dr. Eduardo Bhatia  Cardiology fellow, PGY-4        ATTENDING ATTESTATION:  This patient has been seen and examined by me April 16, 2018 with Dr. Asaf Bhatia. I have reviewed the vitals, laboratory and imaging data relevant to this patient's care. I have edited this note to reflect our joint assessment and plan, and discussed the plan with the patient.    Ms. Rajan is a 52 year old with locally advanced rectal cancer for which she is receiving neoadjuvant chemotherapy with Oxaliplatin, Leucovorin, Fluorouracil. She was diagnosed after she developed rectal bleeding in Jan 2018. She has liver lesions that are being monitored.     From a cardiac standpoint she denies any prior history of coronary disease, heart failure or arrhythmias.  At the time of her colonoscopy she  underwent a health screening that included lipid panel which was notable for elevated lipids - total cholesterol 278, triglycerides 137, HDL 57, . This is a familial problem for her and her brother has dyslipidemia too. There is a family history of premature coronary artery disease on her maternal side with her maternal uncle and aunt with heart disease in their 50s, mother had CAD in her 60s.  On exam today she is without any evidence of hypervolemia.  Her diastolic blood pressures is mildly elevated at 90 mmHg.  Her heart rate is in the upper 90s.  Her EKG is notable for sinus rhythm and nonspecific ST-T wave changes.      I have recommended an echocardiogram to assess her baseline cardiac function.  She agrees to monitor her blood pressure as she might need anti-hypertensive medications if the blood pressure continues to be high.  She was in the emergency department on the 14th for dehydration  and her hemoglobin was 7.9 and she is planning to go to the oncology clinic today to have this reassessed. In terms of primary prevention for coronary disease in light of her elevated lipids and the premature CAD in the family, it is complex to address this in light of liver lesions, mildly abnormal LFTs and active chemotherapy, so I have held off on initiating statin therapy for primary prevention at this point.  Once she has finished the required chemo, and after stabilization of LFTs lesions and liver function, I would recommend low dose Lipitor or pravastatin. She would also be a good candidate for aspirin therapy for primary prevention. In the meantime, she agrees to watch her diet and continue with any feasible daily exercise.    F/u in 3 months    Komal Clark MD, MS  Staff Cardiologist   Pager: [phone]      CC  Patient Care Team:  Wayne Green DPM as PCP - General (Podiatry)  Kyra Mortensen MD as MD (Hematology & Oncology)  Yoselin Garner RN as Nurse Coordinator (Oncology)

## 2018-04-16 NOTE — NURSING NOTE
Cardiac Testing: Patient given instructions regarding  echocardiogram . Discussed purpose, preparation, procedure and when to expect results reported back to the patient. Patient demonstrated understanding of this information and agreed to call with further questions or concerns.    Med Reconcile: Reviewed and verified all current medications with the patient. The updated medication list was printed and given to the patient.    Return Appointment: Follow up with Dr Clark in 3 months. Patient given instructions regarding scheduling next clinic visit. Patient demonstrated understanding of this information and agreed to call with further questions or concerns.    Patient stated she understood all health information given and agreed to call with further questions or concerns.    Bryant Altamirano, RN  RN Care Coordinator  [facility]  [phone]

## 2018-04-16 NOTE — PATIENT INSTRUCTIONS
You were seen today in the Cardiovascular Clinic at the [facility].     Cardiology Providers you saw during your visit: Dr Komal Clark    Diagnosis: Familial Hypercholesterolemia     Results: Dr Clark reviewed the results of your EKG with you in clinic today    Recommendations:   1.  We will schedule an echocardiogram  2.  Continue to work on diet and exercise to help improve your cholesterol levels    Follow-up: Follow up with Dr Clark in 3 months.       For emergencies call 911.    For any scheduling needs or nursing related questions, please call [phone].    Thank you for your visit today! If you have questions or concerns about today's visit, please call me.      Bryant Altamirano, RN  RN Care Coordinator  [facility]  [phone]    Press option 1 for the [place] and then 3 for nursing related questions

## 2018-04-16 NOTE — MR AVS SNAPSHOT
After Visit Summary   4/16/2018    Sarah Rajan    MRN: [id number]           Patient Information     Date Of Birth          1965        Visit Information        Provider Department      4/16/2018 9:00 AM Komal Clark MD [facility]        Today's Diagnoses     Malignant neoplasm of rectum (H)    -  1    Abnormal EKG        Familial hypercholesterolemia        Elevated blood pressure reading without diagnosis of hypertension          Care Instructions    You were seen today in the Cardiovascular Clinic at the [facility].     Cardiology Providers you saw during your visit: Dr Komal Clark    Diagnosis: Familial Hypercholesterolemia     Results: Dr Clark reviewed the results of your EKG with you in clinic today    Recommendations:   1.  We will schedule an echocardiogram  2.  Continue to work on diet and exercise to help improve your cholesterol levels    Follow-up: Follow up with Dr Clark in 3 months.       For emergencies call 911.    For any scheduling needs or nursing related questions, please call [phone].    Thank you for your visit today! If you have questions or concerns about today's visit, please call me.      Bryant Altamirano RN  RN Care Coordinator  [facility]  [phone]    Press option 1 for the University and then 3 for nursing related questions                  Follow-ups after your visit        Additional Services     Follow-Up with Cardiologist                 Your next 10 appointments already scheduled     Apr 19, 2018 10:30 AM CDT   [facility] with [facility]   [facility]    [address] [address] [phone]           1. Please bring or wear a comfortable two-piece outfit. 2. You may eat, drink and take your normal medicines. 3. For any questions that cannot be answered, please contact the ordering physician            Apr  19, 2018 11:45 AM CDT   Masonic Lab Draw with  MASONIC LAB DRAW   [facility] Masonic Lab Draw ([facility])    [address]  [address]  [facility] [id number] [phone]            Apr 19, 2018 12:15 PM CDT   (Arrive by 12:00 PM)   MR ABDOMEN & PELVIS W/O & W CONTRAST with [id number]   [facility] MRI ([facility])    [address]  1st Floor  [facility] [id number] [phone]           Take your medicines as usual, unless your doctor tells you not to. Bring a list of your current medicines to your exam (including vitamins, minerals and over-the-counter drugs). Also bring the results of similar scans you may have had.    You may or may not receive IV contrast for this exam pending the discretion of the Radiologist.   Do not eat or drink for 6 hours prior to exam.  The MRI machine uses a strong magnet. Please wear clothes without metal (snaps, zippers). A sweatsuit works well, or we may give you a hospital gown.  Please remove any body piercings and hair extensions before you arrive. You will also remove watches, jewelry, hairpins, wallets, dentures, partial dental plates and hearing aids. You may wear contact lenses, and you may be able to wear your rings. We have a safe place to keep your personal items, but it is safer to leave them at home.  **IMPORTANT** THE INSTRUCTIONS BELOW ARE ONLY FOR THOSE PATIENTS WHO HAVE BEEN PRESCRIBED SEDATION OR GENERAL ANESTHESIA DURING THEIR MRI PROCEDURE:  IF YOUR DOCTOR PRESCRIBED ORAL SEDATION (take medicine to help you relax during your exam):   You must get the medicine from your doctor (oral medication) before you arrive. Bring the medicine to the exam. Do not take it at home. You ll be told when to take it upon arriving for your exam.   Arrive one hour early. Bring someone who can take you home after the test. Your medicine will make you sleepy. After the exam, you may not drive, take a bus  or take a taxi by yourself.  IF YOUR DOCTOR PRESCRIBED IV SEDATION:   Arrive one hour early. Bring someone who can take you home after the test. Your medicine will make you sleepy. After the exam, you may not drive, take a bus or take a taxi by yourself.   No eating 6 hours before your exam. You may have clear liquids up until 4 hours before your exam. (Clear liquids include water, clear tea, black coffee and fruit juice without pulp.)  IF YOUR DOCTOR PRESCRIBED ANESTHESIA (be asleep for your exam):   Arrive 1 1/2 hours early. Bring someone who can take you home after the test. You may not drive, take a bus or take a taxi by yourself.   No eating 8 hours before your exam. You may have clear liquids up until 4 hours before your exam. (Clear liquids include water, clear tea, black coffee and fruit juice without pulp.)   You will spend four to five hours in the recovery room.  If you have any questions, please contact your Imaging Department exam site.            Apr 19, 2018  2:30 PM CDT   (Arrive by 2:15 PM)   Return Visit with Kyra Mortensen MD   [facility] ([facility])    [address] [place] [address]  [address]  [place] [address] [phone]            Apr 23, 2018  7:30 AM CDT   Masonic Lab Draw with  MASONIC LAB DRAW   [facility] Lab Draw ([facility])    [address] [place] [address]  [address]  [place] [id number] [phone]            Apr 23, 2018  8:00 AM CDT   Infusion 240 with  ONCOLOGY INFUSION, UC 18 ATC   [facility] ([facility])    [address] [place] [address]  [address]  [employer] [id number] [phone]              Who to contact     If you have questions or need follow up information about today's clinic visit or your schedule please contact [facility] directly at [phone].  Normal or non-critical lab and imaging results will be communicated to you by Joanna  "letter or phone within 4 business days after the clinic has received the results. If you do not hear from us within 7 days, please contact the clinic through [employer] or phone. If you have a critical or abnormal lab result, we will notify you by phone as soon as possible.  Submit refill requests through [employer] or call your pharmacy and they will forward the refill request to us. Please allow 3 business days for your refill to be completed.          Additional Information About Your Visit        BitAccess Information     [employer] gives you secure access to your electronic health record. If you see a primary care provider, you can also send messages to your care team and make appointments. If you have questions, please call your primary care clinic.  If you do not have a primary care provider, please call [phone] and they will assist you.        Care EveryWhere ID     This is your Care EveryWhere ID. This could be used by other organizations to access your [facility] medical records  [id number]        Your Vitals Were     Pulse Height Pulse Oximetry BMI (Body Mass Index)          96 1.676 m (5' 6\") 97% 31.75 kg/m2         Blood Pressure from Last 3 Encounters:   04/16/18 124/90   04/16/18 124/90   04/14/18 124/84    Weight from Last 3 Encounters:   04/16/18 88.9 kg (195 lb 14.4 oz)   04/16/18 89.2 kg (196 lb 11.2 oz)   04/14/18 92 kg (202 lb 13.2 oz)              We Performed the Following     EKG 12-lead, tracing only (Same Day)          Today's Medication Changes          These changes are accurate as of 4/16/18 11:59 PM.  If you have any questions, ask your nurse or doctor.               Stop taking these medicines if you haven't already. Please contact your care team if you have questions.     AMITRIPTYLINE HCL PO   Stopped by:  Cathleen Ramos PA-C           FLUoxetine 20 MG capsule   Commonly known as:  PROzac   Stopped by:  Cathleen Ramos PA-C           traZODone 50 MG tablet   Commonly known as:  " DESYREL   Stopped by:  Cathleen Ramos PA-C                    Primary Care Provider Office Phone # Fax #    Wayne Green, VICKY [phone]       [facility] [address] [facility] [address]        Equal Access to Services     DEAN CHAKRABORTY : Hadii aad ku hadasho Soomaali, waaxda luqadaha, qaybta kaalmada adeegyada, waxay lissy hayalyssa bessraziatelly jacob. So [facility] [phone].    ATENCIÓN: Si habla español, tiene a membreno disposición servicios gratuitos de asistencia lingüística. Claudette al [phone].    We comply with applicable federal civil rights laws and [place] laws. We do not discriminate on the basis of race, color, national origin, age, disability, sex, sexual orientation, or gender identity.            Thank you!     Thank you for choosing [facility]  for your care. Our goal is always to provide you with excellent care. Hearing back from our patients is one way we can continue to improve our services. Please take a few minutes to complete the written survey that you may receive in the mail after your visit with us. Thank you!             Your Updated Medication List - Protect others around you: Learn how to safely use, store and throw away your medicines at www.disposemymeds.org.          This list is accurate as of 4/16/18 11:59 PM.  Always use your most recent med list.                   Brand Name Dispense Instructions for use Diagnosis    calcium carbonate 500 MG chewable tablet    TUMS    150 tablet    Take 1-2 chew tab by mouth daily        fluorouracil      Administer 4,968 mg intravenously . Continuous infusion over 46-48 hr via ambulatory pump q14d Supplied by outside provider with pump.        lidocaine-prilocaine cream    EMLA    30 g    Apply 45 minutes prior to procedure.    Malignant neoplasm of rectum (H)       LORazepam 0.5 MG tablet    ATIVAN    30 tablet    Take 1 tablet (0.5 mg) by mouth every 4 hours as needed (Anxiety,  Nausea/Vomiting or Sleep)    Malignant neoplasm of rectum (H)       ondansetron 4 MG tablet    ZOFRAN    3 tablet    Take one tablet by mouth every 6 hours as needed for nausea when taking neomycin and flagyl    Rectal cancer (H)       prochlorperazine 10 MG tablet    COMPAZINE    20 tablet    Take 1 tablet (10 mg) by mouth every 6 hours as needed for nausea or vomiting    Malignant neoplasm of rectum (H)       RANITIDINE HCL PO      Take 150 mg by mouth daily as needed for heartburn        TYLENOL PO      Take 1,000 mg by mouth At Bedtime        zolpidem 5 MG tablet    AMBIEN    30 tablet    Take 1 tablet (5 mg) by mouth nightly as needed for sleep    Other insomnia

## 2018-04-16 NOTE — PROGRESS NOTES
Cardiology Clinic note    HPI: This is a 53 yo F with a history of locally advanced adenocarcinoma of the rectum ([id number]) after presenting with BRBPR . She is currently undergoing treatment with neoadjuvant 5-FU and oxaliplatin (FOLFOX), which she started on 18    She states that when she had her first colonoscopy she had screening labs that had high LDL and normal triglycerides. She in on no cardiac meds. Her last lipid panel from 2018 shows a total cholesterol of 278, triglycerides 137, HDL 57, . She does not smoke currently but quit 25 years ago about 2 packs a week for about 8 years. She does not exercise. Endorses intermittent palpitations not related with exercise. Experiences shortness of breath more with walking up the stairs, denies any chest pain.     She does not eat fried food at home but does eat it when she goes out eats some vegetables and salads and red meats at night.     Family history  -MGF and PGF both  of heart attacks (MGF 63) PGF 84  - mother ischemic colitis  - mother, father, brother high cholesterol  - maternal aunt  suddenly at 72y.   - maternal uncle had CAD in his 50s, maternal aunts with CAD in 50-60s yr of age    PAST MEDICAL HISTORY:  Past Medical History:   Diagnosis Date     Depression      GERD (gastroesophageal reflux disease)      History of smoking      Insomnia      Obesity      Rectal cancer (H)      Restless leg syndrome      Seasonal affective disorder (H)        CURRENT MEDICATIONS:  Current Outpatient Prescriptions   Medication Sig Dispense Refill     RANITIDINE HCL PO Take 150 mg by mouth daily as needed for heartburn       fluorouracil Administer 4,968 mg intravenously . Continuous infusion over 46-48 hr via ambulatory pump q14d Supplied by outside provider with pump.       LORazepam (ATIVAN) 0.5 MG tablet Take 1 tablet (0.5 mg) by mouth every 4 hours as needed (Anxiety, Nausea/Vomiting or Sleep) 30 tablet 2     prochlorperazine (COMPAZINE) 10 MG  tablet Take 1 tablet (10 mg) by mouth every 6 hours as needed for nausea or vomiting 20 tablet 1     lidocaine-prilocaine (EMLA) cream Apply 45 minutes prior to procedure. 30 g 3     Acetaminophen (TYLENOL PO) Take 1,000 mg by mouth At Bedtime       calcium carbonate (TUMS) 500 MG chewable tablet Take 1-2 chew tab by mouth daily  150 tablet      FLUoxetine (PROZAC) 20 MG capsule 1 tablet in the morning  1     ondansetron (ZOFRAN) 4 MG tablet Take one tablet by mouth every 6 hours as needed for nausea when taking neomycin and flagyl 3 tablet 0     AMITRIPTYLINE HCL PO Take 20 mg by mouth At Bedtime        zolpidem (AMBIEN) 5 MG tablet Take 1 tablet (5 mg) by mouth nightly as needed for sleep (Patient not taking: Reported on 2018) 30 tablet 3     traZODone (DESYREL) 50 MG tablet Take 0.5-2 tablets ( mg) by mouth At Bedtime (Patient not taking: Reported on 2018) 30 tablet 3       PAST SURGICAL HISTORY:  Past Surgical History:   Procedure Laterality Date      SECTION      x2     COLONOSCOPY       INSERT PORT VASCULAR ACCESS Right 2018    Procedure: INSERT PORT VASCULAR ACCESS;  central venous Chest Port Placement;  Surgeon: Gaurav Yates PA-C;  [facility]         ALLERGIES     Allergies   Allergen Reactions     Inapsine [Droperidol] Other (See Comments)     Elevated blood pressure and increased heart rate       FAMILY HISTORY:  Family History   Problem Relation Age of Onset     Hyperlipidemia Mother      Hypertension Mother      GASTROINTESTINAL DISEASE Mother      ischemic colitis     Coronary Artery Disease Mother      stents x 3     Anesthesia Reaction Mother      hypotension, PONV     Hyperlipidemia Father      Hypertension Father      Breast Cancer Maternal Grandmother      Coronary Artery Disease Maternal Grandfather      Myocardial Infarction Maternal Grandfather      Colon Cancer Paternal Grandmother      Breast Cancer Maternal Aunt      Breast Cancer Paternal  "Aunt      Coronary Artery Disease Paternal Grandfather      CEREBROVASCULAR DISEASE Paternal Grandfather      Family History Negative Brother      Coronary Artery Disease Maternal Uncle        SOCIAL HISTORY:  Social History     Social History     Marital status:      Spouse name: Cody Rajan     Number of children: 2     Years of education: N/A     Occupational History     microbiologist [employer]     Social History Main Topics     Smoking status: Former Smoker     Packs/day: 0.10     Years: 8.00     Types: Cigarettes     Quit date: 2/19/1986     Smokeless tobacco: Never Used     Alcohol use 4.2 oz/week     0 Standard drinks or equivalent, 7 Glasses of wine per week     Drug use: No     Sexual activity: Not on file     Other Topics Concern     Not on file     Social History Narrative       ROS:   Constitutional: weight loss  ENT: No visual disturbance, ear ache, epistaxis, sore throat  Allergies/Immunologic: Negative.   Respiratory: No cough, hemoptysia  Cardiovascular: As per HPI  GI: see HPI  : No urinary frequency, dysuria, or hematuria  Integument: Negative  Psychiatric: Negative  Neuro: Negative  Endocrinology: Negative   Musculoskeletal: Negative    EXAM:  /90  Pulse 96  Ht 1.676 m (5' 6\")  Wt 89.2 kg (196 lb 11.2 oz)  SpO2 97%  BMI 31.75 kg/m2  In general, the patient is a pleasant female in no apparent distress.      HEENT: NC/AT.  PERRLA.  EOMI.  Sclerae white, not injected.    Neck: Carotids 2+ bilaterally without bruits.  No jugular venous distension.   Lymph: No cervical adenopathy. No thyromegaly.   Heart: RRR. Normal S1, S2. No murmur, rub, click, or gallop. There is no heave.    Lungs: Clear bilaterally.  No rhonchi, wheezes, rales.   GI: Soft, nontender, nondistended.   Extremities: No edema.  The pulses are 2+at the radial and DP bilaterally.  Neuro: grossly non focal.   Skin: no rashes.  Musculoskeletal: normal muscle strength, no acute arthritis, gait normal.      Labs:  LIPID " RESULTS:  No results found for: CHOL, HDL, LDL, TRIG, CHOLHDLRATIO, NHDL    LIVER ENZYME RESULTS:  Lab Results   Component Value Date    AST 23 04/14/2018    ALT 50 04/14/2018       CBC RESULTS:  Lab Results   Component Value Date    WBC 2.2 (L) 04/14/2018    RBC 2.62 (L) 04/14/2018    HGB 7.9 (L) 04/14/2018    HCT 23.3 (L) 04/14/2018    MCV 89 04/14/2018    MCH 30.2 04/14/2018    MCHC 33.9 04/14/2018    RDW 13.6 04/14/2018    PLT 80 (L) 04/14/2018       BMP RESULTS:  Lab Results   Component Value Date     04/14/2018    POTASSIUM 3.4 04/14/2018    CHLORIDE 108 04/14/2018    CO2 28 04/14/2018    ANIONGAP 5 04/14/2018    GLC 86 04/14/2018    BUN 12 04/14/2018    CR 0.69 04/14/2018    GFRESTIMATED >90 04/14/2018    GFRESTBLACK >90 04/14/2018    GEETA 8.2 (L) 04/14/2018        A1C RESULTS:  No results found for: A1C    INR RESULTS:  Lab Results   Component Value Date    INR 0.93 02/19/2018       Procedures:  ECG Normal sinus rhythm with some nonspecific TW changes in the lateral leads      Assessment and Plan: 3 yo F with a history of locally advanced adenocarcinoma of the rectum ([id number]) after presenting with BRBPR currently undergoing neoadjuvant treatment 5-FU (on oxaliplatin, leucovorin, fluoracil started on 2/26/18) 3/4 cycles here for lipid management.     - hyperlipidemia  -  per guidelines would qualify for statin therapy, her 10-yr ASCVD risk is 1.9%   - has increasing LFT's   - has room for improvement given she eats a lot of sweets  - recommended exercise, low fat and sweet diet  - reassess once LFT's are stable for candidacy and need for statin therapy for improvement in LDL    - T wave abnormality in the lateral leads- given change in ECG and possible need for surgery later in her disease course would recommend an echo to assess for cardiac function and wall motion abnormalities.     Case discussed with Dr. Eduardo Bhatia  Cardiology fellow, PGY-4        ATTENDING  ATTESTATION:  This patient has been seen and examined by me April 16, 2018 with Dr. Asaf Bhatia. I have reviewed the vitals, laboratory and imaging data relevant to this patient's care. I have edited this note to reflect our joint assessment and plan, and discussed the plan with the patient.    Ms. Rajan is a 52 year old with locally advanced rectal cancer for which she is receiving neoadjuvant chemotherapy with Oxaliplatin, Leucovorin, Fluorouracil. She was diagnosed after she developed rectal bleeding in Jan 2018. She has liver lesions that are being monitored.     From a cardiac standpoint she denies any prior history of coronary disease, heart failure or arrhythmias.  At the time of her colonoscopy she  underwent a health screening that included lipid panel which was notable for elevated lipids - total cholesterol 278, triglycerides 137, HDL 57, . This is a familial problem for her and her brother has dyslipidemia too. There is a family history of premature coronary artery disease on her maternal side with her maternal uncle and aunt with heart disease in their 50s, mother had CAD in her 60s.  On exam today she is without any evidence of hypervolemia.  Her diastolic blood pressures is mildly elevated at 90 mmHg.  Her heart rate is in the upper 90s.  Her EKG is notable for sinus rhythm and nonspecific ST-T wave changes.      I have recommended an echocardiogram to assess her baseline cardiac function.  She agrees to monitor her blood pressure as she might need anti-hypertensive medications if the blood pressure continues to be high.  She was in the emergency department on the 14th for dehydration and her hemoglobin was 7.9 and she is planning to go to the oncology clinic today to have this reassessed. In terms of primary prevention for coronary disease in light of her elevated lipids and the premature CAD in the family, it is complex to address this in light of liver lesions, mildly abnormal LFTs and  active chemotherapy, so I have held off on initiating statin therapy for primary prevention at this point.  Once she has finished the required chemo, and after stabilization of LFTs lesions and liver function, I would recommend low dose Lipitor or pravastatin. She would also be a good candidate for aspirin therapy for primary prevention. In the meantime, she agrees to watch her diet and continue with any feasible daily exercise.    F/u in 3 months    Komal Clark MD, MS  Staff Cardiologist   Pager: [phone]      CC  Patient Care Team:  Wayne Green DPM as PCP - General (Podiatry)  Kyra Mortensen MD as MD (Hematology & Oncology)  Yoselin Garner, JANE as Nurse Coordinator (Oncology)  SELF, REFERRED

## 2018-04-16 NOTE — PROGRESS NOTES
Oncology/Hematology Visit Note  Apr 16, 2018    Reason for Visit: follow up of [id number] moderately differentiated adenocarcinoma of the rectum     History of Present Illness: Sarah Rajan is a 52 year old female with  recently diagnosed locally advanced rectal cancer. She noticed BRBPR so screening colonoscopy on 1/9/2018 revealed 3 cm rectal mass and other polyps which were removed.  Pathology indicated moderately differentiated adenocarcinoma w/ intact MMR proteins.  CT staging scan showed no metastatic disease; some liver lesions which are thought to be benign per radiology report, T2N1M0 by pelvic MRI read at [facility]. When we initially reviewed her MRI we were not exactly sure whether that was lymph node involvement or not. We opted to repeat MRI which showed T4 N0 lesion.   We also did an MRI of the liver which showed 3 subcentimeter liver lesions which were too small to characterize and will need attention on follow-up. She is currently undergoing treatment with neoadjuvant 5-FU and oxaliplatin (FOLFOX), which she started on 2/26/18. The day after she received cycle 2, she developed fevers, chills, headache, and an itchy rash on her arms and legs, for which she was evaluated in the ED. She was sent home on Benadryl. Benadryl and dexamethasone doses were increased for cycle 3. She received cycle 4 on 4/10/18. Please see Dr. Mortensen's previous notes for further details on the patient's history. She comes in today on an add on basis to evaluate a feeling of coming out of her skin.     Interval History:  Patient reports that she went to the emergency room on April 14 as she felt like she was coming out of her skin.  She reports that she felt this way previously when she was dehydrated.  She received 1 L of IV normal saline and felt much better afterwards.  However, she now feels more anxious and irritable.  Other than the previous dehydration episode, she denies having this issue in the past.  She has been taking  Benadryl and Ativan.  She took 25 mg of Benadryl 3 times a day yesterday and 0.5 mg of Ativan 3 times yesterday.  She denies any chest pain.  She has dyspnea on exertion with stairs that is unchanged since starting chemotherapy.  She feels her energy is okay.  She did have a headache yesterday that was alleviated with Tylenol.  She otherwise denies recent headaches.  She denies feeling that her heart is racing.  Her  notes that she did feel similarly following cycle 3 around her pump unhook time, but that the symptoms subsided much more quickly than they did this time, as they are still present.  She denies any bleeding issues.  She denies any dark tarry stools.  She denies other concerns.      Current Outpatient Prescriptions   Medication Sig Dispense Refill     RANITIDINE HCL PO Take 150 mg by mouth daily as needed for heartburn       fluorouracil Administer 4,968 mg intravenously . Continuous infusion over 46-48 hr via ambulatory pump q14d Supplied by outside provider with pump.       LORazepam (ATIVAN) 0.5 MG tablet Take 1 tablet (0.5 mg) by mouth every 4 hours as needed (Anxiety, Nausea/Vomiting or Sleep) 30 tablet 2     lidocaine-prilocaine (EMLA) cream Apply 45 minutes prior to procedure. 30 g 3     Acetaminophen (TYLENOL PO) Take 1,000 mg by mouth At Bedtime       calcium carbonate (TUMS) 500 MG chewable tablet Take 1-2 chew tab by mouth daily  150 tablet      ondansetron (ZOFRAN) 4 MG tablet Take one tablet by mouth every 6 hours as needed for nausea when taking neomycin and flagyl 3 tablet 0     zolpidem (AMBIEN) 5 MG tablet Take 1 tablet (5 mg) by mouth nightly as needed for sleep (Patient not taking: Reported on 4/16/2018) 30 tablet 3     traZODone (DESYREL) 50 MG tablet Take 0.5-2 tablets ( mg) by mouth At Bedtime (Patient not taking: Reported on 4/16/2018) 30 tablet 3     prochlorperazine (COMPAZINE) 10 MG tablet Take 1 tablet (10 mg) by mouth every 6 hours as needed for nausea or vomiting  "(Patient not taking: Reported on 4/16/2018) 20 tablet 1     FLUoxetine (PROZAC) 20 MG capsule 1 tablet in the morning  1     AMITRIPTYLINE HCL PO Take 20 mg by mouth At Bedtime        Physical Examination:  General: The patient is a pleasant female in no acute distress. She is here today with her .  /90 (BP Location: Left arm)  Pulse 96  Temp 98.5  F (36.9  C)  Ht 1.676 m (5' 5.98\")  Wt 88.9 kg (195 lb 14.4 oz)  SpO2 97%  BMI 31.63 kg/m2  Wt Readings from Last 10 Encounters:   04/16/18 88.9 kg (195 lb 14.4 oz)   04/16/18 89.2 kg (196 lb 11.2 oz)   04/14/18 92 kg (202 lb 13.2 oz)   04/10/18 92 kg (202 lb 14.4 oz)   03/26/18 92.3 kg (203 lb 6.4 oz)   03/12/18 92.9 kg (204 lb 14.4 oz)   02/26/18 93.3 kg (205 lb 9.6 oz)   02/19/18 90.7 kg (200 lb)   02/15/18 91.9 kg (202 lb 9.6 oz)   02/06/18 91.2 kg (201 lb)   HEENT: EOMI, PERRL. Sclerae are anicteric. Oral mucosa is pink and moist with no lesions or thrush.   Lymph: Neck is supple with no lymphadenopathy in the cervical or supraclavicular areas.   Heart: Regular rate and rhythm.   Lungs: Clear to auscultation bilaterally.   Abdomen: Bowel sounds present, soft, nontender with no palpable hepatosplenomegaly or masses.   Extremities: No lower extremity edema noted bilaterally.   Neuro: Cranial nerves II through XII are grossly intact.  Skin: No rashes, petechiae, or bruising noted on exposed skin.     Laboratory Data:   4/16/2018 12:06   Bilirubin Total 0.8   Bilirubin Direct 0.2   Lactate Dehydrogenase 247 (H)   WBC 3.3 (L)   Hemoglobin 12.8   Hematocrit 35.4   Platelet Count 128 (L)   RBC Count 4.09   MCV 87   MCH 31.3   MCHC 36.2   RDW 14.0   Diff Method Automated Method   % Neutrophils 57.9   % Lymphocytes 31.6   % Monocytes 7.2   % Eosinophils 2.1   % Basophils 0.9   % Immature Granulocytes 0.3   Nucleated RBCs 0   Absolute Neutrophil 1.9   Absolute Lymphocytes 1.1   Absolute Monocytes 0.2   Absolute Eosinophils 0.1   Absolute Basophils 0.0   Abs " Immature Granulocytes 0.0   Absolute Nucleated RBC 0.0   Platelet Estimate Confirming automa...   % Retic 0.5   Absolute Retic 20.5 (L)     Assessment and Plan:  1. Anemia. Patient had a dramatic drop in her hemoglobin in the ED on 4/14. Will recheck labs today with CBC, retic, type and screen, LD, and bilirubin. Patient denies any bleeding issues. Her bilirubin in the ED was normal, so hemolysis seems unlikely. Her  reports they had some issues with contaminated samples in the ED, so will retest for accuracy today as well.     2. Anxiety and irritability. Possibly secondary to Benadryl or Ativan. Will try stopping Ativan. If not improved with stopping Ativan, would try stopping the Benadryl in the event that she is having a paradoxical effect to one or both of these medications.     3. Locally advanced moderately differentiated adenocarcinoma of the rectum: [id number]. MSI stable. Started neoadjuvant FOLFOX on 2/26. Plan for 4 cycles then restaging with MRI pelvis + abdomen. She has received 4 cycles of FOLFOX, last on 4/10/18. She will see Dr. Mortensen with an abd/pelvis MRI prior to consideration of cycle 5. She would like to get some of her infusions done at [facility] and prefers Monday appointments. She will call sooner for concerns.     Cathleen Ramos PA-C  [facility]  [address] [place] [address] [phone]    Addendum: Hemoglobin is much improved from the last check, which I suspect was not accurate. Her WBC count and platelets are also improved from prior.

## 2018-04-16 NOTE — NURSING NOTE
Chief Complaint   Patient presents with     New Patient     52 year old female with history of rectal cancer presenting for evaluation for cholesterol management-- needs EKG     Vitals were taken and medications were reconciled. EKG was performed    Juainta LECHUGA  9:10 AM

## 2018-04-16 NOTE — MR AVS SNAPSHOT
After Visit Summary   4/16/2018    Sarah Rajan    MRN: [id number]           Patient Information     Date Of Birth          1965        Visit Information        Provider Department      4/16/2018 11:10 AM Cathleen aRmos PA-C [facility]        Today's Diagnoses     Anxiety    -  1    Anemia, unspecified type        Malignant neoplasm of rectum (H)           Follow-ups after your visit        Your next 10 appointments already scheduled     Apr 19, 2018 11:45 AM CDT   [facility] Lab Draw with [facility] LAB DRAW   [facility] ([facility])    [address]  [address]  [place] [address] [phone]            Apr 19, 2018 12:15 PM CDT   (Arrive by 12:00 PM)   MR ABDOMEN & PELVIS W/O & W CONTRAST with [id number]   [facility] ([facility])    [address]  1st Floor  [facility] [id number] [phone]           Take your medicines as usual, unless your doctor tells you not to. Bring a list of your current medicines to your exam (including vitamins, minerals and over-the-counter drugs). Also bring the results of similar scans you may have had.    You may or may not receive IV contrast for this exam pending the discretion of the Radiologist.   Do not eat or drink for 6 hours prior to exam.  The MRI machine uses a strong magnet. Please wear clothes without metal (snaps, zippers). A sweatsuit works well, or we may give you a hospital gown.  Please remove any body piercings and hair extensions before you arrive. You will also remove watches, jewelry, hairpins, wallets, dentures, partial dental plates and hearing aids. You may wear contact lenses, and you may be able to wear your rings. We have a safe place to keep your personal items, but it is safer to leave them at home.  **IMPORTANT** THE INSTRUCTIONS BELOW ARE ONLY FOR THOSE PATIENTS WHO HAVE BEEN PRESCRIBED SEDATION OR  GENERAL ANESTHESIA DURING THEIR MRI PROCEDURE:  IF YOUR DOCTOR PRESCRIBED ORAL SEDATION (take medicine to help you relax during your exam):   You must get the medicine from your doctor (oral medication) before you arrive. Bring the medicine to the exam. Do not take it at home. You ll be told when to take it upon arriving for your exam.   Arrive one hour early. Bring someone who can take you home after the test. Your medicine will make you sleepy. After the exam, you may not drive, take a bus or take a taxi by yourself.  IF YOUR DOCTOR PRESCRIBED IV SEDATION:   Arrive one hour early. Bring someone who can take you home after the test. Your medicine will make you sleepy. After the exam, you may not drive, take a bus or take a taxi by yourself.   No eating 6 hours before your exam. You may have clear liquids up until 4 hours before your exam. (Clear liquids include water, clear tea, black coffee and fruit juice without pulp.)  IF YOUR DOCTOR PRESCRIBED ANESTHESIA (be asleep for your exam):   Arrive 1 1/2 hours early. Bring someone who can take you home after the test. You may not drive, take a bus or take a taxi by yourself.   No eating 8 hours before your exam. You may have clear liquids up until 4 hours before your exam. (Clear liquids include water, clear tea, black coffee and fruit juice without pulp.)   You will spend four to five hours in the recovery room.  If you have any questions, please contact your Imaging Department exam site.            Apr 19, 2018  2:30 PM CDT   (Arrive by 2:15 PM)   Return Visit with Kyra Mortensen MD   [facility] ([facility])    [address] [place] [address]  [address] [place] [address]   [phone]            Apr 23, 2018  7:30 AM CDT   [facility] Lab Draw with  [facility] LAB DRAW   [facility] ([facility])    [address] [place] [address]  [address] [place] [address]   [phone]            Apr 23,  "2018  8:00 AM CDT   Infusion 240 with UC ONCOLOGY INFUSION, UC 18 ATC   [facility] ([facility])    [address]  [address]  [facility] [address] [phone]              Future tests that were ordered for you today     Open Future Orders        Priority Expected Expires Ordered    Follow-Up with Cardiologist Routine 7/15/2018 10/13/2018 4/16/2018    Echo Complete Routine 4/19/2018 10/13/2018 4/16/2018            Who to contact     If you have questions or need follow up information about today's clinic visit or your schedule please contact [facility] [facility] directly at [phone].  Normal or non-critical lab and imaging results will be communicated to you by [employer], letter or phone within 4 business days after the clinic has received the results. If you do not hear from us within 7 days, please contact the clinic through [employer] or phone. If you have a critical or abnormal lab result, we will notify you by phone as soon as possible.  Submit refill requests through [employer] or call your pharmacy and they will forward the refill request to us. Please allow 3 business days for your refill to be completed.          Additional Information About Your Visit        [employer] Information     [employer] gives you secure access to your electronic health record. If you see a primary care provider, you can also send messages to your care team and make appointments. If you have questions, please call your primary care clinic.  If you do not have a primary care provider, please call [phone] and they will assist you.        Care EveryWhere ID     This is your Care EveryWhere ID. This could be used by other organizations to access your [place] medical records  [id number]        Your Vitals Were     Pulse Temperature Height Pulse Oximetry BMI (Body Mass Index)       96 98.5  F (36.9  C) 1.676 m (5' 5.98\") 97% 31.63 kg/m2        Blood Pressure from Last 3 " Encounters:   04/16/18 124/90   04/16/18 124/90   04/14/18 124/84    Weight from Last 3 Encounters:   04/16/18 88.9 kg (195 lb 14.4 oz)   04/16/18 89.2 kg (196 lb 11.2 oz)   04/14/18 92 kg (202 lb 13.2 oz)              We Performed the Following     *CBC with platelets differential     ABO/Rh type and screen     Bilirubin Direct and Total     Haptoglobin     Lactate Dehydrogenase     Reticulocyte count          Today's Medication Changes          These changes are accurate as of 4/16/18  1:40 PM.  If you have any questions, ask your nurse or doctor.               Stop taking these medicines if you haven't already. Please contact your care team if you have questions.     AMITRIPTYLINE HCL PO   Stopped by:  Cathleen Ramos PA-C           FLUoxetine 20 MG capsule   Commonly known as:  PROzac   Stopped by:  Cathleen Ramos PA-C           traZODone 50 MG tablet   Commonly known as:  DESYREL   Stopped by:  Cathleen Ramos PA-C                    Primary Care Provider Office Phone # Fax #    Wayne Green [phone]       [facility] [address] [address]        Equal Access to Services     DEAN CHAKRABORTY : Hadii shannon ku hadasho Soomaali, waaxda luqadaha, qaybta kaalmada adeegyada, waxay idiin hayalyssa jacob. So [facility] [phone].    ATENCIÓN: Si habla español, tiene a membreno disposición servicios gratuitos de asistencia lingüística. Llame al [phone].    We comply with applicable federal civil rights laws and [place] laws. We do not discriminate on the basis of race, color, national origin, age, disability, sex, sexual orientation, or gender identity.            Thank you!     Thank you for choosing [facility]  for your care. Our goal is always to provide you with excellent care. Hearing back from our patients is one way we can continue to improve our services. Please take a few minutes to complete the written  survey that you may receive in the mail after your visit with us. Thank you!             Your Updated Medication List - Protect others around you: Learn how to safely use, store and throw away your medicines at www.disposemymeds.org.          This list is accurate as of 4/16/18  1:40 PM.  Always use your most recent med list.                   Brand Name Dispense Instructions for use Diagnosis    calcium carbonate 500 MG chewable tablet    TUMS    150 tablet    Take 1-2 chew tab by mouth daily        fluorouracil      Administer 4,968 mg intravenously . Continuous infusion over 46-48 hr via ambulatory pump q14d Supplied by outside provider with pump.        lidocaine-prilocaine cream    EMLA    30 g    Apply 45 minutes prior to procedure.    Malignant neoplasm of rectum (H)       LORazepam 0.5 MG tablet    ATIVAN    30 tablet    Take 1 tablet (0.5 mg) by mouth every 4 hours as needed (Anxiety, Nausea/Vomiting or Sleep)    Malignant neoplasm of rectum (H)       ondansetron 4 MG tablet    ZOFRAN    3 tablet    Take one tablet by mouth every 6 hours as needed for nausea when taking neomycin and flagyl    Rectal cancer (H)       prochlorperazine 10 MG tablet    COMPAZINE    20 tablet    Take 1 tablet (10 mg) by mouth every 6 hours as needed for nausea or vomiting    Malignant neoplasm of rectum (H)       RANITIDINE HCL PO      Take 150 mg by mouth daily as needed for heartburn        TYLENOL PO      Take 1,000 mg by mouth At Bedtime        zolpidem 5 MG tablet    AMBIEN    30 tablet    Take 1 tablet (5 mg) by mouth nightly as needed for sleep    Other insomnia

## 2018-04-16 NOTE — NURSING NOTE
Chief Complaint   Patient presents with     Oncology Clinic Visit     return     Port Draw     Labs collected via portacath, heparin locked and deaccessed.  Hx rectal CA.

## 2018-04-17 ENCOUNTER — MYC MEDICAL ADVICE (OUTPATIENT)
Dept: CARDIOLOGY | Facility: CLINIC | Age: 53
End: 2018-04-17

## 2018-04-17 LAB
HAPTOGLOB SERPL-MCNC: 128 MG/DL (ref 15–200)
INTERPRETATION ECG - MUSE: NORMAL

## 2018-04-17 NOTE — PROGRESS NOTES
This is a recent snapshot of the patient's [facility] medical record.  For current drug dose and complete information and questions, call [phone] or In Basket pool, fv home infusion ([id number])  CSN Number:  [id number]

## 2018-04-19 ENCOUNTER — HOSPITAL ENCOUNTER (OUTPATIENT)
Dept: CARDIOLOGY | Facility: CLINIC | Age: 53
Discharge: HOME | End: 2018-04-19
Attending: INTERNAL MEDICINE | Admitting: INTERNAL MEDICINE
Payer: COMMERCIAL

## 2018-04-19 ENCOUNTER — RADIANT APPOINTMENT (OUTPATIENT)
Dept: MRI IMAGING | Facility: CLINIC | Age: 53
End: 2018-04-19
Attending: PHYSICIAN ASSISTANT
Payer: COMMERCIAL

## 2018-04-19 ENCOUNTER — ONCOLOGY VISIT (OUTPATIENT)
Dept: ONCOLOGY | Facility: CLINIC | Age: 53
End: 2018-04-19
Attending: INTERNAL MEDICINE
Payer: COMMERCIAL

## 2018-04-19 VITALS
WEIGHT: 196 LBS | RESPIRATION RATE: 18 BRPM | OXYGEN SATURATION: 98 % | TEMPERATURE: 99 F | BODY MASS INDEX: 31.5 KG/M2 | SYSTOLIC BLOOD PRESSURE: 117 MMHG | HEIGHT: 66 IN | HEART RATE: 86 BPM | DIASTOLIC BLOOD PRESSURE: 79 MMHG

## 2018-04-19 DIAGNOSIS — G47.00 INSOMNIA, UNSPECIFIED TYPE: Primary | ICD-10-CM

## 2018-04-19 DIAGNOSIS — R94.31 ABNORMAL EKG: ICD-10-CM

## 2018-04-19 DIAGNOSIS — C20 MALIGNANT NEOPLASM OF RECTUM (H): ICD-10-CM

## 2018-04-19 DIAGNOSIS — K76.9 LIVER LESION: ICD-10-CM

## 2018-04-19 PROCEDURE — 99215 OFFICE O/P EST HI 40 MIN: CPT | Mod: ZP | Performed by: INTERNAL MEDICINE

## 2018-04-19 PROCEDURE — 93306 TTE W/DOPPLER COMPLETE: CPT

## 2018-04-19 PROCEDURE — 93306 TTE W/DOPPLER COMPLETE: CPT | Mod: 26 | Performed by: INTERNAL MEDICINE

## 2018-04-19 PROCEDURE — 36591 DRAW BLOOD OFF VENOUS DEVICE: CPT

## 2018-04-19 PROCEDURE — 25000128 H RX IP 250 OP 636: Performed by: INTERNAL MEDICINE

## 2018-04-19 PROCEDURE — G0463 HOSPITAL OUTPT CLINIC VISIT: HCPCS | Mod: ZF

## 2018-04-19 RX ORDER — SODIUM CHLORIDE 9 MG/ML
1000 INJECTION, SOLUTION INTRAVENOUS CONTINUOUS PRN
Status: CANCELLED
Start: 2018-04-23

## 2018-04-19 RX ORDER — LORAZEPAM 2 MG/ML
0.5 INJECTION INTRAMUSCULAR EVERY 4 HOURS PRN
Status: CANCELLED
Start: 2018-04-23

## 2018-04-19 RX ORDER — METHYLPREDNISOLONE SODIUM SUCCINATE 125 MG/2ML
125 INJECTION, POWDER, LYOPHILIZED, FOR SOLUTION INTRAMUSCULAR; INTRAVENOUS
Status: CANCELLED
Start: 2018-04-23

## 2018-04-19 RX ORDER — DIPHENHYDRAMINE HYDROCHLORIDE 50 MG/ML
50 INJECTION INTRAMUSCULAR; INTRAVENOUS ONCE
Status: CANCELLED
Start: 2018-04-23 | End: 2018-04-23

## 2018-04-19 RX ORDER — ONDANSETRON 2 MG/ML
8 INJECTION INTRAMUSCULAR; INTRAVENOUS ONCE
Status: CANCELLED
Start: 2018-04-23 | End: 2018-04-23

## 2018-04-19 RX ORDER — HEPARIN SODIUM (PORCINE) LOCK FLUSH IV SOLN 100 UNIT/ML 100 UNIT/ML
5 SOLUTION INTRAVENOUS EVERY 8 HOURS PRN
Status: DISCONTINUED | OUTPATIENT
Start: 2018-04-19 | End: 2018-04-27 | Stop reason: HOSPADM

## 2018-04-19 RX ORDER — ALBUTEROL SULFATE 90 UG/1
1-2 AEROSOL, METERED RESPIRATORY (INHALATION)
Status: CANCELLED
Start: 2018-04-23

## 2018-04-19 RX ORDER — ALBUTEROL SULFATE 0.83 MG/ML
2.5 SOLUTION RESPIRATORY (INHALATION)
Status: CANCELLED | OUTPATIENT
Start: 2018-04-23

## 2018-04-19 RX ORDER — MEPERIDINE HYDROCHLORIDE 25 MG/ML
25 INJECTION INTRAMUSCULAR; INTRAVENOUS; SUBCUTANEOUS EVERY 30 MIN PRN
Status: CANCELLED | OUTPATIENT
Start: 2018-04-23

## 2018-04-19 RX ORDER — FLUOROURACIL 50 MG/ML
400 INJECTION, SOLUTION INTRAVENOUS ONCE
Status: CANCELLED | OUTPATIENT
Start: 2018-04-23

## 2018-04-19 RX ORDER — DIPHENHYDRAMINE HYDROCHLORIDE 50 MG/ML
50 INJECTION INTRAMUSCULAR; INTRAVENOUS
Status: CANCELLED
Start: 2018-04-23

## 2018-04-19 RX ORDER — EPINEPHRINE 0.3 MG/.3ML
0.3 INJECTION SUBCUTANEOUS EVERY 5 MIN PRN
Status: CANCELLED | OUTPATIENT
Start: 2018-04-23

## 2018-04-19 RX ORDER — GADOBUTROL 604.72 MG/ML
7.5 INJECTION INTRAVENOUS ONCE
Status: COMPLETED | OUTPATIENT
Start: 2018-04-19 | End: 2018-04-19

## 2018-04-19 RX ORDER — EPINEPHRINE 1 MG/ML
0.3 INJECTION, SOLUTION, CONCENTRATE INTRAVENOUS EVERY 5 MIN PRN
Status: CANCELLED | OUTPATIENT
Start: 2018-04-23

## 2018-04-19 RX ADMIN — GADOBUTROL 7.5 ML: 604.72 INJECTION INTRAVENOUS at 12:22

## 2018-04-19 RX ADMIN — SODIUM CHLORIDE, PRESERVATIVE FREE 5 ML: 5 INJECTION INTRAVENOUS at 12:00

## 2018-04-19 ASSESSMENT — PAIN SCALES - GENERAL: PAINLEVEL: NO PAIN (0)

## 2018-04-19 NOTE — NURSING NOTE
Chief Complaint   Patient presents with     Port Draw     No lab orders. Port accessed by RN. Line flushed and hep locked.      Petra Stevens RN

## 2018-04-19 NOTE — NURSING NOTE
Port de-access per provider. Site WNL. Bandage placed over site. Patient tolerated well.     DEYANIRA RODRIGUEZ LPN

## 2018-04-19 NOTE — NURSING NOTE
"Oncology Rooming Note    April 19, 2018 1:36 PM   Sarah Rajan is a 52 year old female who presents for:    Chief Complaint   Patient presents with     Oncology Clinic Visit     Return visit related to Rectal Cancer     Initial Vitals: /79 (BP Location: Left arm, Patient Position: Sitting, Cuff Size: Adult Large)  Pulse 86  Temp 99  F (37.2  C) (Tympanic)  Resp 18  Ht 1.676 m (5' 5.98\")  Wt 88.9 kg (196 lb)  SpO2 98%  BMI 31.65 kg/m2 Estimated body mass index is 31.65 kg/(m^2) as calculated from the following:    Height as of this encounter: 1.676 m (5' 5.98\").    Weight as of this encounter: 88.9 kg (196 lb). Body surface area is 2.03 meters squared.  No Pain (0) Comment: Data Unavailable   No LMP recorded. Patient is postmenopausal.  Allergies reviewed: Yes  Medications reviewed: Yes    Medications: Medication refills not needed today.  Pharmacy name entered into [facility]:    [facility] [address] [place] [address] DR ALVAREZ AT [facility] [place], [place] - [address] [place] [address] [phone]    Clinical concerns: No new concerns. Provider was notified.    10 minutes for nursing intake (face to face time)     Trudi Oakes LPN            "

## 2018-04-19 NOTE — PROGRESS NOTES
Oncology outpatient note    Date of service: 4/19/2018     PC: Recently diagnosed rectal cancer. [id number] - MSI Intact    HPI:  Please see previous note for details. I have copied and updated from prior note.  She is a 52-year-old female noticed BRBPR so Screening colonoscopy on 1/9/2018 revealed 3cm rectal mass and other polyps which were removed.  Pathology indicated moderately differentiated adenocarcinoma w/ intact MMR proteins.  CT staging scan showed no metastatic disease; some liver lesions which are thought to be benign per radiology report, T2N1M0 by pelvic MRI read at [facility]. When we initially reviewed her MRI we will not exactly sure whether that was lymph node involvement or not. We opted to repeat MRI which showed T4 N0 lesion. There was up to take her to surgery as there was possibility of personally lesion without lymph node involvement but because of the findings of repeat MRI the surgery was canceled and she is coming back to discuss neoadjuvant treatment.  We also did an MRI of the liver which showed 3 subcentimeter liver lesions which were too small to characterize and will need attention on follow-up.  She started neoadjuvant 5-FU and oxaliplatin (FOLFOX), which she started on 2/26/18. The day after she received cycle 2, she developed fevers, chills, headache, and an itchy rash on her arms and legs, for which she was evaluated in the ED. She was sent home on Benadryl. Benadryl and dexamethasone doses were increased for cycle 3.   She tolerated that cycle well    C#4 was given on 4/10/18    Interval history  She comes in today and tells me that overall she is tolerating chemotherapy well. She feels some fatigue for the first few days after chemotherapy. She has mild constipation on the day the 5-FU pump gets unhooked and she takes MiraLAX. She has mild nausea for which she takes Compazine. She has not vomited. Overall she is able to eat and drink well. Overall her bowel movements are fine. Denies  "any pain. No bleeding. After the first few days her energy starts to come back. She denies any mouth sores but has noticed sores in her nose for which she is applying Vaseline. At times they bleed. Her main complaint has been insomnia and she has tried several medications for it including Ambien, Ativan, Benadryl, and trazodone. She thinks nothing is working. At times she feels anxious because of not able to sleep properly. She tells me that in the past she has tried amitriptyline and it had worked so she is wondering if she can try that again. She has stopped taking Ativan and that helped the feeling of \"about to get out of the skin \". Denies any fevers or infections. No troubles breathing. No new swellings. She has cold sensitivity for a week or so although she cannot drink cold water for 10 days also. She does not have any persistent neuropathy.      ECOG 1    ROS:  Rest of comprehensive ROS was unremarkable      I reviewed other hx in EPIC as below    PMH:  Depression  Restless leg syndrome  Dyslipidemia    Current Outpatient Prescriptions   Medication     Acetaminophen (TYLENOL PO)     calcium carbonate (TUMS) 500 MG chewable tablet     fluorouracil     lidocaine-prilocaine (EMLA) cream     LORazepam (ATIVAN) 0.5 MG tablet     ondansetron (ZOFRAN) 4 MG tablet     prochlorperazine (COMPAZINE) 10 MG tablet     RANITIDINE HCL PO     zolpidem (AMBIEN) 5 MG tablet     No current facility-administered medications for this visit.      Facility-Administered Medications Ordered in Other Visits   Medication     heparin 100 UNIT/ML injection 5 mL     sodium chloride (PF) 0.9% PF flush 20 mL         FHx  Aunt and maternal grandmother had breast cancer  Pateral grandmother  of colorectal cancer  Mother had ischemic colitis    SHx:  , two teenage children  EtOH: 1 drink daily, occasionally more on weekends  Tobacco: never smoker  She is a microbiologist     Allergies   Allergen Reactions     Inapsine [Droperidol] " "Other (See Comments)     Elevated blood pressure and increased heart rate       Exam:  /79 (BP Location: Left arm, Patient Position: Sitting, Cuff Size: Adult Large)  Pulse 86  Temp 99  F (37.2  C) (Tympanic)  Resp 18  Ht 1.676 m (5' 5.98\")  Wt 88.9 kg (196 lb)  SpO2 98%  BMI 31.65 kg/m2  CONSTITUTIONAL: No apparent distress  EYES: PERRLA, without pallor or jaundice  ENT/MOUTH: Ears unremarkable. No oral lesions. She has sores in both of her nostrils with dried blood  CVS: s1s2 normal  RESPIRATORY: Chest is clear  GI: Abdomen is benign  NEURO: She is alert and oriented ×3  INTEGUMENT: no concerning his skin rashes   LYMPHATIC: no palpable lymphadenopathy  MUSCULOSKELETAL: Unremarkable. No bony tenderness.   EXTREMITIES: no pedal edema  PSYCH: Mentation, mood and affect are appropriate        Labs.  Reviewed       Imaging.  She had MRI of the pelvis done today and I reviewed it with the radiologist. As per preliminary report that his some shrinkage of the rectal tumor. No new lesions are seen. No abnormal perirectal lymph nodes are seen. We need to follow the final report.      Pathology    FINAL DIAGNOSIS:    CASE FROM [place], [place] ([id number], OBTAINED 1/9/18):   A. COLON, TRANSVERSE AND DESCENDING, POLYPS, POLYPECTOMY:   - Serrated polyps including sessile serrated adenoma and hyperplastic   polyps, fragmented   - Negative for cytologic dysplasia     B. RECTUM, MASS, BIOPSY:   - Adenocarcinoma, moderately differentiated, invasive   - Mismatch repair proteins are intact by immunohistochemistry     Assessment and Plan  [id number] moderately differentiated adenocarcinoma of the rectum - MSI-stable  She was started on neoadjuvant FOLFOX on 2/26/2018. She has received 4 cycles up until now.  Overall she is tolerating the chemotherapy well and a repeat MRI of the pelvis which was done today shows some shrinkage of the tumor but we need to follow the final report which probably would be " available tomorrow.  We discussed the situation in detail. Assuming that there are no surprising findings on the final report we will continue with cycle #5 of FOLFOX on 4/23/2018 as is scheduled.  On her previous liver MRI there were 3 indeterminate lesions and I wanted to do a repeat MRI of the abdomen which was not done and we will reschedule that. She wants to schedule it during the week off April 30 and we will do so.    Overall the plan is to give her 4 more cycles of chemotherapy and then a repeat imaging.  After completing 8 cycles of FOLFOX we will check MRI of the pelvis as well as MRI of the abdomen. She should also get a CT scan of the chest.    After completing neoadjuvant chemotherapy, the plan is to proceed with combined chemotherapy and irradiation followed by surgery      Nausea. This is mild and it is controlled with Compazine. She will continue that.    Constipation. She gets constipated on the day the 5-FU pump gets unhooked. She will continue to take MiraLAX as before as it is helping her.    Insomnia. This has been her main complaint. She has tried multiple medications as mentioned above including Ambien, Ativan, trazodone and Benadryl. She does not think anything is working and at times she gets anxiety due to this. In the past amitriptyline has helped her and she is wondering if she can try that and I believe it is reasonable to try that. She was on amitriptyline 25 mg at night and I gave her a prescription for the same. If she is able to tolerate it well then she can increase it to 50 mg as needed and if needed. I also recommended that she gets evaluated by a psychologist because of anxiety but at this time she is not interested in that.    Cold sensitivity. This is due to oxaliplatin. I told her to keep herself warm. There is no persistent neuropathy so at this time we do not need to make any adjustment to oxaliplatin dose.    Fatigue. This is due to chemotherapy. I advised her to eat  healthy and remain active and exercises on a regular basis to combat fatigue.    Nose sores. She will continue to apply Vaseline and I asked her to keep it humidified.    She will be seen in the clinic before cycle #6.    All of her and her 's questions were answered to their satisfaction and they are agreeable and comfortable with the plan.    Kyra Mortenesn

## 2018-04-19 NOTE — MR AVS SNAPSHOT
After Visit Summary   4/19/2018    Sarah Rajan    MRN: [id number]           Patient Information     Date Of Birth          1965        Visit Information        Provider Department      4/19/2018 2:30 PM Kyra Mortensen MD [facility]        Today's Diagnoses     Insomnia, unspecified type    -  1    Malignant neoplasm of rectum (H)        Liver lesion          Care Instructions    Proceed with chemo on Monday    De-access port today    Schedule MRI the week of April 30th    See Cathleen with labs on 5/7    Try amitriptyline 25 mg as needed for sleep    We will plan to do MRI abdomen and Pelvis and CT chest after C#8                      Follow-ups after your visit        Your next 10 appointments already scheduled     Apr 19, 2018  2:30 PM CDT   (Arrive by 2:15 PM)   Return Visit with Kyra Mortensen MD   [facility] ([facility])    [address] [place] [address]  [address]  [facility] [id number] [phone]            Apr 23, 2018  7:30 AM CDT   [facility] Draw with [facility] DRAW   [facility] Lab Draw ([facility])    [address] [place] [address]  [address]  [facility] [id number] [phone]            Apr 23, 2018  8:00 AM CDT   Infusion 240 with  ONCOLOGY INFUSION, UC 18 ATC   [facility] ([facility])    [address] [place] [address]  [address]  [facility] [id number] [phone]              Future tests that were ordered for you today     Open Future Orders        Priority Expected Expires Ordered    MRI Abdomen w & w/o contrast Routine  7/18/2018 4/19/2018            Who to contact     If you have questions or need follow up information about today's clinic visit or your schedule please contact [facility] directly at [phone].  Normal or non-critical lab and imaging results will be communicated to you by MyChart, letter or phone within  "4 business days after the clinic has received the results. If you do not hear from us within 7 days, please contact the clinic through [employer] or phone. If you have a critical or abnormal lab result, we will notify you by phone as soon as possible.  Submit refill requests through [employer] or call your pharmacy and they will forward the refill request to us. Please allow 3 business days for your refill to be completed.          Additional Information About Your Visit        Parametric Information     [employer] gives you secure access to your electronic health record. If you see a primary care provider, you can also send messages to your care team and make appointments. If you have questions, please call your primary care clinic.  If you do not have a primary care provider, please call [phone] and they will assist you.        Care EveryWhere ID     This is your Care EveryWhere ID. This could be used by other organizations to access your [place] medical records  [id number]        Your Vitals Were     Pulse Temperature Respirations Height Pulse Oximetry BMI (Body Mass Index)    86 99  F (37.2  C) (Tympanic) 18 1.676 m (5' 5.98\") 98% 31.65 kg/m2       Blood Pressure from Last 3 Encounters:   04/19/18 117/79   04/16/18 124/90   04/16/18 124/90    Weight from Last 3 Encounters:   04/19/18 88.9 kg (196 lb)   04/16/18 88.9 kg (195 lb 14.4 oz)   04/16/18 89.2 kg (196 lb 11.2 oz)                 Today's Medication Changes          These changes are accurate as of 4/19/18  2:29 PM.  If you have any questions, ask your nurse or doctor.               Start taking these medicines.        Dose/Directions    amitriptyline 25 MG tablet   Commonly known as:  ELAVIL   Used for:  Insomnia, unspecified type, Malignant neoplasm of rectum (H), Liver lesion   Started by:  Kyra Mortensen MD        Dose:  25 mg   Take 1 tablet (25 mg) by mouth At Bedtime   Quantity:  30 tablet   Refills:  1            Where to get your medicines      These " medications were sent to [place], [place] - [address]  [address], [facility] [address]    Hours:  TRANSPLANT PHONE NUMBER [phone] Phone:  [phone]     amitriptyline 25 MG tablet                Primary Care Provider Office Phone # Fax #    Wayne Green [phone]       [facility] [address] [address]        Equal Access to Services     DEAN CHAKRABORTY : Hadii aad ku hadasho Soomaali, waaxda luqadaha, qaybta kaalmada adeegyada, waxay idiin hayaan adeeg khkenton mtz . So [facility] [phone].    ATENCIÓN: Si habla español, tiene a membreno disposición servicios gratuitos de asistencia lingüística. Claudette al [phone].    We comply with applicable federal civil rights laws and [place] laws. We do not discriminate on the basis of race, color, national origin, age, disability, sex, sexual orientation, or gender identity.            Thank you!     Thank you for choosing [facility]  for your care. Our goal is always to provide you with excellent care. Hearing back from our patients is one way we can continue to improve our services. Please take a few minutes to complete the written survey that you may receive in the mail after your visit with us. Thank you!             Your Updated Medication List - Protect others around you: Learn how to safely use, store and throw away your medicines at www.disposemymeds.org.          This list is accurate as of 4/19/18  2:29 PM.  Always use your most recent med list.                   Brand Name Dispense Instructions for use Diagnosis    amitriptyline 25 MG tablet    ELAVIL    30 tablet    Take 1 tablet (25 mg) by mouth At Bedtime    Insomnia, unspecified type, Malignant neoplasm of rectum (H), Liver lesion       calcium carbonate 500 MG chewable tablet    TUMS    150 tablet    Take 1-2 chew tab by mouth daily        fluorouracil       Administer 4,968 mg intravenously . Continuous infusion over 46-48 hr via ambulatory pump q14d Supplied by outside provider with pump.        lidocaine-prilocaine cream    EMLA    30 g    Apply 45 minutes prior to procedure.    Malignant neoplasm of rectum (H)       LORazepam 0.5 MG tablet    ATIVAN    30 tablet    Take 1 tablet (0.5 mg) by mouth every 4 hours as needed (Anxiety, Nausea/Vomiting or Sleep)    Malignant neoplasm of rectum (H)       ondansetron 4 MG tablet    ZOFRAN    3 tablet    Take one tablet by mouth every 6 hours as needed for nausea when taking neomycin and flagyl    Rectal cancer (H)       prochlorperazine 10 MG tablet    COMPAZINE    20 tablet    Take 1 tablet (10 mg) by mouth every 6 hours as needed for nausea or vomiting    Malignant neoplasm of rectum (H)       RANITIDINE HCL PO      Take 150 mg by mouth daily as needed for heartburn        TYLENOL PO      Take 1,000 mg by mouth At Bedtime        zolpidem 5 MG tablet    AMBIEN    30 tablet    Take 1 tablet (5 mg) by mouth nightly as needed for sleep    Other insomnia

## 2018-04-19 NOTE — PATIENT INSTRUCTIONS
Proceed with chemo on Monday    De-access port today    Schedule MRI the week of April 30th    See Cathleen with labs on 5/7    Try amitriptyline 25 mg as needed for sleep    We will plan to do MRI abdomen and Pelvis and CT chest after C#8

## 2018-04-19 NOTE — LETTER
4/19/2018       RE: Sarah Rajan  [address] [address]     Dear Colleague,    Thank you for referring your patient, Sarah Rajan, to the [facility]. Please see a copy of my visit note below.    Oncology outpatient note    Date of service: 4/19/2018     PC: Recently diagnosed rectal cancer. [id number] - MSI Intact    HPI:  Please see previous note for details. I have copied and updated from prior note.  She is a 52-year-old female noticed BRBPR so Screening colonoscopy on 1/9/2018 revealed 3cm rectal mass and other polyps which were removed.  Pathology indicated moderately differentiated adenocarcinoma w/ intact MMR proteins.  CT staging scan showed no metastatic disease; some liver lesions which are thought to be benign per radiology report, T2N1M0 by pelvic MRI read at [facility]. When we initially reviewed her MRI we will not exactly sure whether that was lymph node involvement or not. We opted to repeat MRI which showed T4 N0 lesion. There was up to take her to surgery as there was possibility of personally lesion without lymph node involvement but because of the findings of repeat MRI the surgery was canceled and she is coming back to discuss neoadjuvant treatment.  We also did an MRI of the liver which showed 3 subcentimeter liver lesions which were too small to characterize and will need attention on follow-up.  She started neoadjuvant 5-FU and oxaliplatin (FOLFOX), which she started on 2/26/18. The day after she received cycle 2, she developed fevers, chills, headache, and an itchy rash on her arms and legs, for which she was evaluated in the ED. She was sent home on Benadryl. Benadryl and dexamethasone doses were increased for cycle 3.   She tolerated that cycle well    C#4 was given on 4/10/18    Interval history  She comes in today and tells me that overall she is tolerating chemotherapy well. She feels some fatigue for the first few days after chemotherapy. She has  "mild constipation on the day the 5-FU pump gets unhooked and she takes MiraLAX. She has mild nausea for which she takes Compazine. She has not vomited. Overall she is able to eat and drink well. Overall her bowel movements are fine. Denies any pain. No bleeding. After the first few days her energy starts to come back. She denies any mouth sores but has noticed sores in her nose for which she is applying Vaseline. At times they bleed. Her main complaint has been insomnia and she has tried several medications for it including Ambien, Ativan, Benadryl, and trazodone. She thinks nothing is working. At times she feels anxious because of not able to sleep properly. She tells me that in the past she has tried amitriptyline and it had worked so she is wondering if she can try that again. She has stopped taking Ativan and that helped the feeling of \"about to get out of the skin \". Denies any fevers or infections. No troubles breathing. No new swellings. She has cold sensitivity for a week or so although she cannot drink cold water for 10 days also. She does not have any persistent neuropathy.      ECOG 1    ROS:  Rest of comprehensive ROS was unremarkable      I reviewed other hx in EPIC as below    PMH:  Depression  Restless leg syndrome  Dyslipidemia    Current Outpatient Prescriptions   Medication     Acetaminophen (TYLENOL PO)     calcium carbonate (TUMS) 500 MG chewable tablet     fluorouracil     lidocaine-prilocaine (EMLA) cream     LORazepam (ATIVAN) 0.5 MG tablet     ondansetron (ZOFRAN) 4 MG tablet     prochlorperazine (COMPAZINE) 10 MG tablet     RANITIDINE HCL PO     zolpidem (AMBIEN) 5 MG tablet     No current facility-administered medications for this visit.      Facility-Administered Medications Ordered in Other Visits   Medication     heparin 100 UNIT/ML injection 5 mL     sodium chloride (PF) 0.9% PF flush 20 mL         FHx  Aunt and maternal grandmother had breast cancer  Pateral grandmother  of " "colorectal cancer  Mother had ischemic colitis    SHx:  , two teenage children  EtOH: 1 drink daily, occasionally more on weekends  Tobacco: never smoker  She is a microbiologist     Allergies   Allergen Reactions     Inapsine [Droperidol] Other (See Comments)     Elevated blood pressure and increased heart rate       Exam:  /79 (BP Location: Left arm, Patient Position: Sitting, Cuff Size: Adult Large)  Pulse 86  Temp 99  F (37.2  C) (Tympanic)  Resp 18  Ht 1.676 m (5' 5.98\")  Wt 88.9 kg (196 lb)  SpO2 98%  BMI 31.65 kg/m2  CONSTITUTIONAL: No apparent distress  EYES: PERRLA, without pallor or jaundice  ENT/MOUTH: Ears unremarkable. No oral lesions. She has sores in both of her nostrils with dried blood  CVS: s1s2 normal  RESPIRATORY: Chest is clear  GI: Abdomen is benign  NEURO: She is alert and oriented ×3  INTEGUMENT: no concerning his skin rashes   LYMPHATIC: no palpable lymphadenopathy  MUSCULOSKELETAL: Unremarkable. No bony tenderness.   EXTREMITIES: no pedal edema  PSYCH: Mentation, mood and affect are appropriate        Labs.  Reviewed       Imaging.  She had MRI of the pelvis done today and I reviewed it with the radiologist. As per preliminary report that his some shrinkage of the rectal tumor. No new lesions are seen. No abnormal perirectal lymph nodes are seen. We need to follow the final report.      Pathology    FINAL DIAGNOSIS:    CASE FROM [place], [place] ([id number], OBTAINED 1/9/18):   A. COLON, TRANSVERSE AND DESCENDING, POLYPS, POLYPECTOMY:   - Serrated polyps including sessile serrated adenoma and hyperplastic   polyps, fragmented   - Negative for cytologic dysplasia     B. RECTUM, MASS, BIOPSY:   - Adenocarcinoma, moderately differentiated, invasive   - Mismatch repair proteins are intact by immunohistochemistry     Assessment and Plan  [id number] moderately differentiated adenocarcinoma of the rectum - MSI-stable  She was started on neoadjuvant FOLFOX on 2/26/2018. " She has received 4 cycles up until now.  Overall she is tolerating the chemotherapy well and a repeat MRI of the pelvis which was done today shows some shrinkage of the tumor but we need to follow the final report which probably would be available tomorrow.  We discussed the situation in detail. Assuming that there are no surprising findings on the final report we will continue with cycle #5 of FOLFOX on 4/23/2018 as is scheduled.  On her previous liver MRI there were 3 indeterminate lesions and I wanted to do a repeat MRI of the abdomen which was not done and we will reschedule that. She wants to schedule it during the week off April 30 and we will do so.    Overall the plan is to give her 4 more cycles of chemotherapy and then a repeat imaging.  After completing 8 cycles of FOLFOX we will check MRI of the pelvis as well as MRI of the abdomen. She should also get a CT scan of the chest.    After completing neoadjuvant chemotherapy, the plan is to proceed with combined chemotherapy and irradiation followed by surgery      Nausea. This is mild and it is controlled with Compazine. She will continue that.    Constipation. She gets constipated on the day the 5-FU pump gets unhooked. She will continue to take MiraLAX as before as it is helping her.    Insomnia. This has been her main complaint. She has tried multiple medications as mentioned above including Ambien, Ativan, trazodone and Benadryl. She does not think anything is working and at times she gets anxiety due to this. In the past amitriptyline has helped her and she is wondering if she can try that and I believe it is reasonable to try that. She was on amitriptyline 25 mg at night and I gave her a prescription for the same. If she is able to tolerate it well then she can increase it to 50 mg as needed and if needed. I also recommended that she gets evaluated by a psychologist because of anxiety but at this time she is not interested in that.    Cold sensitivity.  This is due to oxaliplatin. I told her to keep herself warm. There is no persistent neuropathy so at this time we do not need to make any adjustment to oxaliplatin dose.    Fatigue. This is due to chemotherapy. I advised her to eat healthy and remain active and exercises on a regular basis to combat fatigue.    Nose sores. She will continue to apply Vaseline and I asked her to keep it humidified.    She will be seen in the clinic before cycle #6.    All of her and her 's questions were answered to their satisfaction and they are agreeable and comfortable with the plan.    Kyra Mortensen

## 2018-04-23 ENCOUNTER — APPOINTMENT (OUTPATIENT)
Dept: LAB | Facility: CLINIC | Age: 53
End: 2018-04-23
Attending: INTERNAL MEDICINE
Payer: COMMERCIAL

## 2018-04-23 ENCOUNTER — INFUSION THERAPY VISIT (OUTPATIENT)
Dept: ONCOLOGY | Facility: CLINIC | Age: 53
End: 2018-04-23
Attending: INTERNAL MEDICINE
Payer: COMMERCIAL

## 2018-04-23 ENCOUNTER — HOME INFUSION (PRE-WILLOW HOME INFUSION) (OUTPATIENT)
Dept: PHARMACY | Facility: CLINIC | Age: 53
End: 2018-04-23

## 2018-04-23 VITALS
BODY MASS INDEX: 32.36 KG/M2 | RESPIRATION RATE: 16 BRPM | TEMPERATURE: 98 F | OXYGEN SATURATION: 99 % | HEART RATE: 83 BPM | WEIGHT: 200.4 LBS | SYSTOLIC BLOOD PRESSURE: 117 MMHG | DIASTOLIC BLOOD PRESSURE: 83 MMHG

## 2018-04-23 DIAGNOSIS — C20 MALIGNANT NEOPLASM OF RECTUM (H): Primary | ICD-10-CM

## 2018-04-23 LAB
ALBUMIN SERPL-MCNC: 3.7 G/DL (ref 3.4–5)
ALP SERPL-CCNC: 74 U/L (ref 40–150)
ALT SERPL W P-5'-P-CCNC: 57 U/L (ref 0–50)
ANION GAP SERPL CALCULATED.3IONS-SCNC: 7 MMOL/L (ref 3–14)
AST SERPL W P-5'-P-CCNC: 33 U/L (ref 0–45)
BASOPHILS # BLD AUTO: 0 10E9/L (ref 0–0.2)
BASOPHILS NFR BLD AUTO: 0.9 %
BILIRUB SERPL-MCNC: 0.3 MG/DL (ref 0.2–1.3)
BUN SERPL-MCNC: 13 MG/DL (ref 7–30)
CALCIUM SERPL-MCNC: 8.8 MG/DL (ref 8.5–10.1)
CHLORIDE SERPL-SCNC: 110 MMOL/L (ref 94–109)
CO2 SERPL-SCNC: 25 MMOL/L (ref 20–32)
CREAT SERPL-MCNC: 0.72 MG/DL (ref 0.52–1.04)
DIFFERENTIAL METHOD BLD: ABNORMAL
EOSINOPHIL # BLD AUTO: 0.2 10E9/L (ref 0–0.7)
EOSINOPHIL NFR BLD AUTO: 4.3 %
ERYTHROCYTE [DISTWIDTH] IN BLOOD BY AUTOMATED COUNT: 14.9 % (ref 10–15)
GFR SERPL CREATININE-BSD FRML MDRD: 86 ML/MIN/1.7M2
GLUCOSE SERPL-MCNC: 93 MG/DL (ref 70–99)
HCT VFR BLD AUTO: 34.2 % (ref 35–47)
HGB BLD-MCNC: 12.1 G/DL (ref 11.7–15.7)
IMM GRANULOCYTES # BLD: 0 10E9/L (ref 0–0.4)
IMM GRANULOCYTES NFR BLD: 0.3 %
LYMPHOCYTES # BLD AUTO: 1.1 10E9/L (ref 0.8–5.3)
LYMPHOCYTES NFR BLD AUTO: 30.5 %
MCH RBC QN AUTO: 31.3 PG (ref 26.5–33)
MCHC RBC AUTO-ENTMCNC: 35.4 G/DL (ref 31.5–36.5)
MCV RBC AUTO: 89 FL (ref 78–100)
MONOCYTES # BLD AUTO: 0.5 10E9/L (ref 0–1.3)
MONOCYTES NFR BLD AUTO: 14.1 %
NEUTROPHILS # BLD AUTO: 1.7 10E9/L (ref 1.6–8.3)
NEUTROPHILS NFR BLD AUTO: 49.9 %
NRBC # BLD AUTO: 0 10*3/UL
NRBC BLD AUTO-RTO: 0 /100
PLATELET # BLD AUTO: 147 10E9/L (ref 150–450)
POTASSIUM SERPL-SCNC: 3.8 MMOL/L (ref 3.4–5.3)
PROT SERPL-MCNC: 6.8 G/DL (ref 6.8–8.8)
RBC # BLD AUTO: 3.86 10E12/L (ref 3.8–5.2)
SODIUM SERPL-SCNC: 142 MMOL/L (ref 133–144)
WBC # BLD AUTO: 3.5 10E9/L (ref 4–11)

## 2018-04-23 PROCEDURE — 96368 THER/DIAG CONCURRENT INF: CPT

## 2018-04-23 PROCEDURE — 96367 TX/PROPH/DG ADDL SEQ IV INF: CPT

## 2018-04-23 PROCEDURE — 96411 CHEMO IV PUSH ADDL DRUG: CPT

## 2018-04-23 PROCEDURE — 85025 COMPLETE CBC W/AUTO DIFF WBC: CPT | Performed by: INTERNAL MEDICINE

## 2018-04-23 PROCEDURE — 96415 CHEMO IV INFUSION ADDL HR: CPT

## 2018-04-23 PROCEDURE — G0498 CHEMO EXTEND IV INFUS W/PUMP: HCPCS

## 2018-04-23 PROCEDURE — 96413 CHEMO IV INFUSION 1 HR: CPT

## 2018-04-23 PROCEDURE — 96375 TX/PRO/DX INJ NEW DRUG ADDON: CPT

## 2018-04-23 PROCEDURE — 80053 COMPREHEN METABOLIC PANEL: CPT | Performed by: INTERNAL MEDICINE

## 2018-04-23 PROCEDURE — 25000128 H RX IP 250 OP 636: Mod: ZF | Performed by: INTERNAL MEDICINE

## 2018-04-23 RX ORDER — FLUOROURACIL 50 MG/ML
400 INJECTION, SOLUTION INTRAVENOUS ONCE
Status: COMPLETED | OUTPATIENT
Start: 2018-04-23 | End: 2018-04-23

## 2018-04-23 RX ORDER — ONDANSETRON 2 MG/ML
8 INJECTION INTRAMUSCULAR; INTRAVENOUS ONCE
Status: COMPLETED | OUTPATIENT
Start: 2018-04-23 | End: 2018-04-23

## 2018-04-23 RX ORDER — HEPARIN SODIUM (PORCINE) LOCK FLUSH IV SOLN 100 UNIT/ML 100 UNIT/ML
5 SOLUTION INTRAVENOUS EVERY 8 HOURS
Status: DISCONTINUED | OUTPATIENT
Start: 2018-04-23 | End: 2018-04-23 | Stop reason: HOSPADM

## 2018-04-23 RX ORDER — DIPHENHYDRAMINE HYDROCHLORIDE 50 MG/ML
50 INJECTION INTRAMUSCULAR; INTRAVENOUS ONCE
Status: DISCONTINUED | OUTPATIENT
Start: 2018-04-23 | End: 2018-04-23

## 2018-04-23 RX ADMIN — DIPHENHYDRAMINE HYDROCHLORIDE 50 MG: 50 INJECTION INTRAMUSCULAR; INTRAVENOUS at 09:04

## 2018-04-23 RX ADMIN — FLUOROURACIL 830 MG: 50 INJECTION, SOLUTION INTRAVENOUS at 12:03

## 2018-04-23 RX ADMIN — DEXAMETHASONE SODIUM PHOSPHATE 150 MG: 10 INJECTION, SOLUTION INTRAMUSCULAR; INTRAVENOUS at 09:18

## 2018-04-23 RX ADMIN — SODIUM CHLORIDE, PRESERVATIVE FREE 5 ML: 5 INJECTION INTRAVENOUS at 07:45

## 2018-04-23 RX ADMIN — ONDANSETRON 8 MG: 2 INJECTION INTRAMUSCULAR; INTRAVENOUS at 08:57

## 2018-04-23 RX ADMIN — DEXTROSE MONOHYDRATE 250 ML: 50 INJECTION, SOLUTION INTRAVENOUS at 08:56

## 2018-04-23 RX ADMIN — LEUCOVORIN CALCIUM 700 MG: 350 INJECTION, POWDER, LYOPHILIZED, FOR SOLUTION INTRAMUSCULAR; INTRAVENOUS at 09:52

## 2018-04-23 RX ADMIN — OXALIPLATIN 176 MG: 5 INJECTION INTRAVENOUS at 09:58

## 2018-04-23 ASSESSMENT — PAIN SCALES - GENERAL: PAINLEVEL: NO PAIN (0)

## 2018-04-23 NOTE — PATIENT INSTRUCTIONS
Contact Numbers    [facility] Main Line: [phone]  [facility] Triage:  [phone]    Call triage with chills and/or temperature greater than or equal to 100.5, uncontrolled nausea/vomiting, diarrhea, constipation, dizziness, shortness of breath, chest pain, bleeding, unexplained bruising, or any new/concerning symptoms, questions/concerns.     If you are having any concerning symptoms or wish to speak to a provider before your next infusion visit, please call your care coordinator or triage to notify them so we can adequately serve you.       After Hours: [phone]    If after hours, weekends, or holidays, call main hospital  and ask for Oncology doctor on call.           April 2018 Sunday Monday Tuesday Wednesday Thursday Friday Saturday   1     2     3     4     5     6     7       8     9     10     [facility] LAB DRAW    8:15 AM   (15 min.)    [facility] LAB DRAW   [facility] Lab Draw     [facility] RETURN    8:25 AM   (50 min.)   Cathleen Ramos PA-C   [facility] ONC INFUSION 240   10:00 AM   (240 min.)    ONCOLOGY INFUSION   [facility] 11     12     13     14     Admission    2:09 PM   Tanner Dhaliwal MD   [facility] Emergency Department   (Discharge: 4/14/2018)   15     16     [facility] NEW CANCER    8:45 AM   (60 min.)   Komal Clark MD   [facility] RETURN   10:55 AM   (50 min.)   Cathleen Ramos PA-C   [facility] LAB DRAW   11:15 AM   (15 min.)    [facility] LAB DRAW   [facility] Lab Draw 17     18     19     ECH COMPLETE   10:30 AM   (60 min.)   [id number]   [facility] [place],  [facility] LAB DRAW   11:45 AM   (15 min.)    [facility] LAB DRAW   [facility] Lab Draw     MR ABDOMEN & PELVIS WWO   12:00 PM   (45 min.)   [id number]   [facility] MRI     [facility] RETURN    2:15 PM   (30 min.)   Kyra Mortensen MD   [facility] 20     21        22     23     [facility] LAB DRAW    7:30 AM   (15 min.)    [facility] LAB DRAW   [facility] Lab Draw     [facility] ONC INFUSION 240    8:00 AM   (240 min.)    ONCOLOGY INFUSION   [facility] 24     25     26     27     28       29     30                                         May 2018   Luc Monday Tuesday Wednesday Thursday Friday Saturday             1     2     MR [place] WWO   10:45 AM   (60 min.)   [id number]   [facility] [place], [facility] 3     4     5       6     7     [facility] LAB DRAW    6:30 AM   (15 min.)    [facility] LAB DRAW   [facility] Lab Draw     [facility] RETURN    6:45 AM   (50 min.)   Cathleen Ramos PA-C   [facility] Cancer [facility] 8     9     10     11     12       13     14     15     16     17     18     19       20     21     22     23     24     25     26       27     28     29     30     31                            Lab Results:  Recent Results (from the past 12 hour(s))   CBC with platelets differential    Collection Time: 04/23/18  7:54 AM   Result Value Ref Range    WBC 3.5 (L) 4.0 - 11.0 10e9/L    RBC Count 3.86 3.8 - 5.2 10e12/L    Hemoglobin 12.1 11.7 - 15.7 g/dL    Hematocrit 34.2 (L) 35.0 - 47.0 %    MCV 89 78 - 100 fl    MCH 31.3 26.5 - 33.0 pg    MCHC 35.4 31.5 - 36.5 g/dL    RDW 14.9 10.0 - 15.0 %    Platelet Count 147 (L) 150 - 450 10e9/L    Diff Method Automated Method     % Neutrophils 49.9 %    % Lymphocytes 30.5 %    % Monocytes 14.1 %    % Eosinophils 4.3 %    % Basophils 0.9 %    % Immature Granulocytes 0.3 %    Nucleated RBCs 0 0 /100    Absolute Neutrophil 1.7 1.6 - 8.3 10e9/L    Absolute Lymphocytes 1.1 0.8 - 5.3 10e9/L    Absolute Monocytes 0.5 0.0 - 1.3 10e9/L    Absolute Eosinophils 0.2 0.0 - 0.7 10e9/L    Absolute Basophils 0.0 0.0 - 0.2 10e9/L    Abs Immature Granulocytes 0.0 0 - 0.4 10e9/L    Absolute Nucleated RBC 0.0    Comprehensive metabolic panel    Collection Time: 04/23/18  7:54 AM   Result Value Ref Range    Sodium 142 133 - 144  mmol/L    Potassium 3.8 3.4 - 5.3 mmol/L    Chloride 110 (H) 94 - 109 mmol/L    Carbon Dioxide 25 20 - 32 mmol/L    Anion Gap 7 3 - 14 mmol/L    Glucose 93 70 - 99 mg/dL    Urea Nitrogen 13 7 - 30 mg/dL    Creatinine 0.72 0.52 - 1.04 mg/dL    GFR Estimate 86 >60 mL/min/1.7m2    GFR Estimate If Black >90 >60 mL/min/1.7m2    Calcium 8.8 8.5 - 10.1 mg/dL    Bilirubin Total 0.3 0.2 - 1.3 mg/dL    Albumin 3.7 3.4 - 5.0 g/dL    Protein Total 6.8 6.8 - 8.8 g/dL    Alkaline Phosphatase 74 40 - 150 U/L    ALT 57 (H) 0 - 50 U/L    AST 33 0 - 45 U/L

## 2018-04-23 NOTE — NURSING NOTE
Chief Complaint   Patient presents with     Port Draw     accessed with power needle, heparin lcoked, vitals checked

## 2018-04-23 NOTE — MR AVS SNAPSHOT
After Visit Summary   4/23/2018    Sarah Rajan    MRN: [id number]           Patient Information     Date Of Birth          1965        Visit Information        Provider Department      4/23/2018 8:00 AM UC 18 ATC; UC ONCOLOGY INFUSION [facility]        Today's Diagnoses     Malignant neoplasm of rectum (H)    -  1      Care Instructions    Contact Numbers    [facility] Main Line: [phone]  [facility] Triage:  [phone]    Call triage with chills and/or temperature greater than or equal to 100.5, uncontrolled nausea/vomiting, diarrhea, constipation, dizziness, shortness of breath, chest pain, bleeding, unexplained bruising, or any new/concerning symptoms, questions/concerns.     If you are having any concerning symptoms or wish to speak to a provider before your next infusion visit, please call your care coordinator or triage to notify them so we can adequately serve you.       After Hours: [phone]    If after hours, weekends, or holidays, call main hospital  and ask for Oncology doctor on call.           April 2018 Sunday Monday Tuesday Wednesday Thursday Friday Saturday   1     2     3     4     5     6     7       8     9     10     [facility] MASONIC LAB DRAW    8:15 AM   (15 min.)    MASONIC LAB DRAW   [facility] Lab Draw     [facility] RETURN    8:25 AM   (50 min.)   Cathleen Ramos PA-C   [facility] ONC INFUSION 240   10:00 AM   (240 min.)    ONCOLOGY INFUSION   [facility] 11     12     13     14     Admission    2:09 PM   Tanner Dhaliwal MD   [facility] Emergency Department   (Discharge: 4/14/2018)   15     16     [facility] NEW CANCER    8:45 AM   (60 min.)   Komal Clark MD   [facility] RETURN   10:55 AM   (50 min.)   Cathleen Ramos PA-C   [facility] MASONIC LAB DRAW   11:15 AM   (15 min.)    MASONIC LAB DRAW   [facility] Lab Draw 17     18      19     ECH COMPLETE   10:30 AM   (60 min.)   [id number]   [facility] [place],  Echocardiography     [facility] MASONIC LAB DRAW   11:45 AM   (15 min.)    [facility] LAB DRAW   [facility]     MR ABDOMEN & PELVIS WWO   12:00 PM   (45 min.)   [id number]   [facility] MRI     [facility] RETURN    2:15 PM   (30 min.)   Kyra Mortensen MD   [facility] 20     21       22     23     [facility] MASONIC LAB DRAW    7:30 AM   (15 min.)    MASONIC LAB DRAW   [facility] Lab Draw     [facility] ONC INFUSION 240    8:00 AM   (240 min.)    ONCOLOGY INFUSION   [facility] 24     25     26     27     28       29     30                                         May 2018   Luc Monday Tuesday Wednesday Thursday Friday Saturday             1     2     MR ABDOMEN WWO   10:45 AM   (60 min.)   [email or web address]   [facility] [place], MRI 3     4     5       6     7     [facility] MASONIC LAB DRAW    6:30 AM   (15 min.)    MASONIC LAB DRAW   [facility] Lab Draw     [facility] RETURN    6:45 AM   (50 min.)   Cathleen Ramos PA-C   [facility] 8     9     10     11     12       13     14     15     16     17     18     19       20     21     22     23     24     25     26       27     28     29     30     31                            Lab Results:  Recent Results (from the past 12 hour(s))   CBC with platelets differential    Collection Time: 04/23/18  7:54 AM   Result Value Ref Range    WBC 3.5 (L) 4.0 - 11.0 10e9/L    RBC Count 3.86 3.8 - 5.2 10e12/L    Hemoglobin 12.1 11.7 - 15.7 g/dL    Hematocrit 34.2 (L) 35.0 - 47.0 %    MCV 89 78 - 100 fl    MCH 31.3 26.5 - 33.0 pg    MCHC 35.4 31.5 - 36.5 g/dL    RDW 14.9 10.0 - 15.0 %    Platelet Count 147 (L) 150 - 450 10e9/L    Diff Method Automated Method     % Neutrophils 49.9 %    % Lymphocytes 30.5 %    % Monocytes 14.1 %    % Eosinophils 4.3 %    % Basophils 0.9 %    % Immature Granulocytes 0.3 %    Nucleated RBCs 0 0 /100    Absolute Neutrophil 1.7 1.6 -  8.3 10e9/L    Absolute Lymphocytes 1.1 0.8 - 5.3 10e9/L    Absolute Monocytes 0.5 0.0 - 1.3 10e9/L    Absolute Eosinophils 0.2 0.0 - 0.7 10e9/L    Absolute Basophils 0.0 0.0 - 0.2 10e9/L    Abs Immature Granulocytes 0.0 0 - 0.4 10e9/L    Absolute Nucleated RBC 0.0    Comprehensive metabolic panel    Collection Time: 04/23/18  7:54 AM   Result Value Ref Range    Sodium 142 133 - 144 mmol/L    Potassium 3.8 3.4 - 5.3 mmol/L    Chloride 110 (H) 94 - 109 mmol/L    Carbon Dioxide 25 20 - 32 mmol/L    Anion Gap 7 3 - 14 mmol/L    Glucose 93 70 - 99 mg/dL    Urea Nitrogen 13 7 - 30 mg/dL    Creatinine 0.72 0.52 - 1.04 mg/dL    GFR Estimate 86 >60 mL/min/1.7m2    GFR Estimate If Black >90 >60 mL/min/1.7m2    Calcium 8.8 8.5 - 10.1 mg/dL    Bilirubin Total 0.3 0.2 - 1.3 mg/dL    Albumin 3.7 3.4 - 5.0 g/dL    Protein Total 6.8 6.8 - 8.8 g/dL    Alkaline Phosphatase 74 40 - 150 U/L    ALT 57 (H) 0 - 50 U/L    AST 33 0 - 45 U/L               Follow-ups after your visit        Your next 10 appointments already scheduled     May 02, 2018 11:00 AM CDT   (Arrive by 10:45 AM)   MR ABDOMEN W/O & W CONTRAST with UUMR1   [facility] [place] ([facility])    [address] [facility] [place] [facility] [address] [phone]           Take your medicines as usual, unless your doctor tells you not to. Bring a list of your current medicines to your exam (including vitamins, minerals and over-the-counter drugs). Also bring the results of similar scans you may have had.    You may or may not receive IV contrast for this exam pending the discretion of the Radiologist.   Do not eat or drink for 6 hours prior to exam.  The MRI machine uses a strong magnet. Please wear clothes without metal (snaps, zippers). A sweatsuit works well, or we may give you a hospital gown.  Please remove any body piercings and hair extensions before you arrive. You will also remove watches, jewelry, hairpins, wallets,  dentures, partial dental plates and hearing aids. You may wear contact lenses, and you may be able to wear your rings. We have a safe place to keep your personal items, but it is safer to leave them at home.  **IMPORTANT** THE INSTRUCTIONS BELOW ARE ONLY FOR THOSE PATIENTS WHO HAVE BEEN PRESCRIBED SEDATION OR GENERAL ANESTHESIA DURING THEIR MRI PROCEDURE:  IF YOUR DOCTOR PRESCRIBED ORAL SEDATION (take medicine to help you relax during your exam):   You must get the medicine from your doctor (oral medication) before you arrive. Bring the medicine to the exam. Do not take it at home. You ll be told when to take it upon arriving for your exam.   Arrive one hour early. Bring someone who can take you home after the test. Your medicine will make you sleepy. After the exam, you may not drive, take a bus or take a taxi by yourself.  IF YOUR DOCTOR PRESCRIBED IV SEDATION:   Arrive one hour early. Bring someone who can take you home after the test. Your medicine will make you sleepy. After the exam, you may not drive, take a bus or take a taxi by yourself.   No eating 6 hours before your exam. You may have clear liquids up until 4 hours before your exam. (Clear liquids include water, clear tea, black coffee and fruit juice without pulp.)  IF YOUR DOCTOR PRESCRIBED ANESTHESIA (be asleep for your exam):   Arrive 1 1/2 hours early. Bring someone who can take you home after the test. You may not drive, take a bus or take a taxi by yourself.   No eating 8 hours before your exam. You may have clear liquids up until 4 hours before your exam. (Clear liquids include water, clear tea, black coffee and fruit juice without pulp.)   You will spend four to five hours in the recovery room.  If you have any questions, please contact your Imaging Department exam site.            May 07, 2018  6:30 AM T   Masonic Lab Draw with  MASONIC LAB DRAW   [facility] Masonic Lab Draw ([facility])    [address]  [address]  [place] [facility] [address] [phone]            May 07, 2018  7:00 AM CDT   (Arrive by 6:45 AM)   Return Visit with Cathleen Ramos PA-C   [facility] ([facility])    [address]  [address]  [place] [id number] [phone]              Who to contact     If you have questions or need follow up information about today's clinic visit or your schedule please contact [facility] [place] directly at [phone].  Normal or non-critical lab and imaging results will be communicated to you by View3hart, letter or phone within 4 business days after the clinic has received the results. If you do not hear from us within 7 days, please contact the clinic through View3hart or phone. If you have a critical or abnormal lab result, we will notify you by phone as soon as possible.  Submit refill requests through Channel M or call your pharmacy and they will forward the refill request to us. Please allow 3 business days for your refill to be completed.          Additional Information About Your Visit        MyChart Information     Channel M gives you secure access to your electronic health record. If you see a primary care provider, you can also send messages to your care team and make appointments. If you have questions, please call your primary care clinic.  If you do not have a primary care provider, please call [phone] and they will assist you.        Care EveryWhere ID     This is your Care EveryWhere ID. This could be used by other organizations to access your [place] medical records  [id number]        Your Vitals Were     Pulse Temperature Respirations Pulse Oximetry BMI (Body Mass Index)       83 98  F (36.7  C) 16 99% 32.36 kg/m2        Blood Pressure from Last 3 Encounters:   04/23/18 117/83   04/19/18 117/79   04/16/18 124/90    Weight from Last 3 Encounters:   04/23/18 90.9 kg (200 lb 6.4 oz)   04/19/18 88.9 kg (196 lb)   04/16/18 88.9  kg (195 lb 14.4 oz)              We Performed the Following     CBC with platelets differential     Comprehensive metabolic panel        Primary Care Provider Office Phone # Fax #    Wayne Green [phone]       [facility] [address] [address]        Equal Access to Services     DEAN CHAKRABORTY : Hadii aad ku hadasho Soomaali, waaxda luqadaha, qaybta kaalmada adeegyada, waxay majoin hayaan adeeg shahriarraziatelly jacob. So [facility] [phone].    ATENCIÓN: Si habla español, tiene a membreno disposición servicios gratuitos de asistencia lingüística. [facility] [phone].    We comply with applicable federal civil rights laws and [place] laws. We do not discriminate on the basis of race, color, national origin, age, disability, sex, sexual orientation, or gender identity.            Thank you!     Thank you for choosing [facility]  for your care. Our goal is always to provide you with excellent care. Hearing back from our patients is one way we can continue to improve our services. Please take a few minutes to complete the written survey that you may receive in the mail after your visit with us. Thank you!             Your Updated Medication List - Protect others around you: Learn how to safely use, store and throw away your medicines at www.disposemymeds.org.          This list is accurate as of 4/23/18  2:36 PM.  Always use your most recent med list.                   Brand Name Dispense Instructions for use Diagnosis    amitriptyline 25 MG tablet    ELAVIL    30 tablet    Take 1 tablet (25 mg) by mouth At Bedtime    Insomnia, unspecified type, Malignant neoplasm of rectum (H), Liver lesion       calcium carbonate 500 MG chewable tablet    TUMS    150 tablet    Take 1-2 chew tab by mouth daily        fluorouracil      Administer 4,968 mg intravenously . Continuous infusion over 46-48 hr via ambulatory pump q14d Supplied by outside provider with  pump.        lidocaine-prilocaine cream    EMLA    30 g    Apply 45 minutes prior to procedure.    Malignant neoplasm of rectum (H)       LORazepam 0.5 MG tablet    ATIVAN    30 tablet    Take 1 tablet (0.5 mg) by mouth every 4 hours as needed (Anxiety, Nausea/Vomiting or Sleep)    Malignant neoplasm of rectum (H)       ondansetron 4 MG tablet    ZOFRAN    3 tablet    Take one tablet by mouth every 6 hours as needed for nausea when taking neomycin and flagyl    Rectal cancer (H)       prochlorperazine 10 MG tablet    COMPAZINE    20 tablet    Take 1 tablet (10 mg) by mouth every 6 hours as needed for nausea or vomiting    Malignant neoplasm of rectum (H)       RANITIDINE HCL PO      Take 150 mg by mouth daily as needed for heartburn        TYLENOL PO      Take 1,000 mg by mouth At Bedtime        zolpidem 5 MG tablet    AMBIEN    30 tablet    Take 1 tablet (5 mg) by mouth nightly as needed for sleep    Other insomnia

## 2018-04-23 NOTE — PROGRESS NOTES
"Infusion Nursing Note:  Sarah Rajan presents today for cycle 5, day 1 Oxaliplatin, Leucovorin, Fluorouracil bolus, and Fluorouracil pump connect.    Patient seen by provider today: No   present during visit today: Not Applicable.    Note: Patient arrived to infusion center with complaints of fatigue, decreased appetite, intermittent small self limiting nose bleeds, minimal intermittent numbness and tingling to bilateral hands. Denies complaints of pain, dyspnea, dizziness, nausea, dysgeusia, and mouth sores.     Patient reports difficulty sleeping the week after receiving chemo and reports that ativan seems to make this worse. Also reports that fatigue, weakness, nausea, cold sensitivity are significant during the week, but resolves toward the end of the week.    Prior to discharge: Port is secured in place with tegaderm and flushed with 10cc NS with positive blood return noted.  Continuous home infusion Dosi-Fuser pump connected.    All connectors and dose restrictor secured in place and clamps taped open; all of these items verified by Monica Altamirano RN.    Pump started, \"running\" noted on display (CADD): Not Applicable.  Patient instructed to call our clinic or [facility] with any questions or concerns at home.  Patient verbalized understanding.    Patient set up for pump disconnect at home with [facility] on 4/25/2018 at 1000, and verfied by JANE Boykin.      Intravenous Access:  Implanted Port.    Treatment Conditions:  Lab Results   Component Value Date    HGB 12.1 04/23/2018     Lab Results   Component Value Date    WBC 3.5 04/23/2018      Lab Results   Component Value Date    ANEU 1.7 04/23/2018     Lab Results   Component Value Date     04/23/2018      Lab Results   Component Value Date     04/23/2018                   Lab Results   Component Value Date    POTASSIUM 3.8 04/23/2018             Lab Results   Component Value Date    CR 0.72 04/23/2018             "       Lab Results   Component Value Date    GEETA 8.8 04/23/2018                Lab Results   Component Value Date    BILITOTAL 0.3 04/23/2018           Lab Results   Component Value Date    ALBUMIN 3.7 04/23/2018                    Lab Results   Component Value Date    ALT 57 04/23/2018           Lab Results   Component Value Date    AST 33 04/23/2018     Results reviewed, labs MET treatment parameters, ok to proceed with treatment.    Post Infusion Assessment:  Patient tolerated infusion without incident.  Blood return noted pre and post infusion.  Site patent and intact, free from redness, edema or discomfort.  No evidence of extravasations.    Discharge Plan:   Prescription refills given for EMLA cream.  Discharge instructions reviewed with: Patient and Family.  Patient and/or family verbalized understanding of discharge instructions and all questions answered.  AVS to patient via [place].  Patient will return 5/2/2018 for next appointment.   Patient discharged in stable condition accompanied by: .  Departure Mode: Ambulatory.    Manuel Pa RN

## 2018-04-24 NOTE — PROGRESS NOTES
This is a recent snapshot of the patient's [facility] medical record.  For current drug dose and complete information and questions, call [phone] or In Basket pool, [facility] home infusion ([id number])  CSN Number:  [id number]

## 2018-04-25 ENCOUNTER — TELEPHONE (OUTPATIENT)
Dept: ONCOLOGY | Facility: CLINIC | Age: 53
End: 2018-04-25

## 2018-04-25 ENCOUNTER — HOME INFUSION (PRE-WILLOW HOME INFUSION) (OUTPATIENT)
Dept: PHARMACY | Facility: CLINIC | Age: 53
End: 2018-04-25

## 2018-04-25 DIAGNOSIS — R45.89 HOPELESSNESS: Primary | ICD-10-CM

## 2018-04-25 NOTE — TELEPHONE ENCOUNTER
"[facility] nurse Loni called to report her concerns after seeing Sarah today.     Reported: Feelings of hopelessness, extreme fatigue, flat affect and that Sarah had the appearance of complete exhaustion. VS were taken by Loni and she reports Sarah is afebrile, with no outwards s/s of infection (no redness, swelling to port site). She lives with her  and denied when Loni asked her, about any thoughts of harming herself or others. She is eating and drinking and does not show signs of self neglect.     Called Sarah to discuss. She said during chemotherapy she uses compazine frequently which makes her very sleepy and insomnia is not an issue. However when she is off chemotherapy she struggles with insomnia. She uses amitriptyline 25mg as ambien, ativan, benadryl and trazodone were not effective. Discussed per Dr. Mortensen's recommendations she can increase this to 50mg. She says her  is helpful and she feels supported by him. Discussed meeting with psychologist or psychiatrist, she said this feels like another \"to do\" on her list. However she said she would be OK if I looked into getting a psychiatry appointment for her.  We also discussed cancer support groups, she lives close to [place] and I told her I would contact our social workers to see what groups are in the area. She was interested in this.     Routed to Dr. Mortensen, RNCC Yoselin Garner and  Fara for their recommendations and assistance.     Will follow up with Sarah tomorrow via phone to see how she is doing.   "

## 2018-04-25 NOTE — TELEPHONE ENCOUNTER
Called Dr. Garcia's nurse coordinator Nisreen who gave me directions on entering a referral for Behavioral/Spiritual health. Nisreen will plan on calling Sarah tomorrow to discuss meeting with Dr. Garcia. Their team also includes therapists and would consist of 3-5 meetings and medication management as well as a referral to psychiatry for ongoing long term psychiatry services.

## 2018-04-30 NOTE — PROGRESS NOTES
This is a recent snapshot of the patient's [facility] medical record.  For current drug dose and complete information and questions, call [phone] or In Basket pool, fv home infusion ([id number])  CSN Number:  [id number]

## 2018-05-02 ENCOUNTER — HOSPITAL ENCOUNTER (OUTPATIENT)
Dept: MRI IMAGING | Facility: CLINIC | Age: 53
Discharge: HOME | End: 2018-05-02
Attending: INTERNAL MEDICINE | Admitting: INTERNAL MEDICINE
Payer: COMMERCIAL

## 2018-05-02 DIAGNOSIS — K76.9 LIVER LESION: ICD-10-CM

## 2018-05-02 DIAGNOSIS — C20 MALIGNANT NEOPLASM OF RECTUM (H): ICD-10-CM

## 2018-05-02 LAB — RADIOLOGIST FLAGS: NORMAL

## 2018-05-02 PROCEDURE — 25000128 H RX IP 250 OP 636: Performed by: INTERNAL MEDICINE

## 2018-05-02 PROCEDURE — 74183 MRI ABD W/O CNTR FLWD CNTR: CPT

## 2018-05-02 PROCEDURE — A9585 GADOBUTROL INJECTION: HCPCS | Performed by: INTERNAL MEDICINE

## 2018-05-02 RX ORDER — GADOBUTROL 604.72 MG/ML
10 INJECTION INTRAVENOUS ONCE
Status: COMPLETED | OUTPATIENT
Start: 2018-05-02 | End: 2018-05-02

## 2018-05-02 RX ADMIN — GADOBUTROL 10 ML: 604.72 INJECTION INTRAVENOUS at 12:12

## 2018-05-02 RX ADMIN — SODIUM CHLORIDE, PRESERVATIVE FREE 5 ML: 5 INJECTION INTRAVENOUS at 12:22

## 2018-05-04 ENCOUNTER — TELEPHONE (OUTPATIENT)
Dept: BEHAVIORAL HEALTH | Facility: CLINIC | Age: 53
End: 2018-05-04

## 2018-05-04 NOTE — TELEPHONE ENCOUNTER
Writer spoke with client about Dr. Mortensen's referral to the Behavioral/Spiritual Health Team. Client stated she was referred because of her insomnia. She stated she is going to be having some changes in her cancer treatments and would like to wait to see how these affect her before she would like to make an appointment. Writer gave client her phone number and encouraged her to call back to make an appointment any time.

## 2018-05-07 ENCOUNTER — ALLIED HEALTH/NURSE VISIT (OUTPATIENT)
Dept: ONCOLOGY | Facility: CLINIC | Age: 53
End: 2018-05-07

## 2018-05-07 ENCOUNTER — APPOINTMENT (OUTPATIENT)
Dept: LAB | Facility: CLINIC | Age: 53
End: 2018-05-07
Attending: INTERNAL MEDICINE
Payer: COMMERCIAL

## 2018-05-07 ENCOUNTER — HOME INFUSION (PRE-WILLOW HOME INFUSION) (OUTPATIENT)
Dept: PHARMACY | Facility: CLINIC | Age: 53
End: 2018-05-07

## 2018-05-07 ENCOUNTER — ONCOLOGY VISIT (OUTPATIENT)
Dept: ONCOLOGY | Facility: CLINIC | Age: 53
End: 2018-05-07
Attending: INTERNAL MEDICINE
Payer: COMMERCIAL

## 2018-05-07 VITALS
OXYGEN SATURATION: 98 % | TEMPERATURE: 97.5 F | DIASTOLIC BLOOD PRESSURE: 78 MMHG | WEIGHT: 196.2 LBS | RESPIRATION RATE: 16 BRPM | BODY MASS INDEX: 31.53 KG/M2 | HEART RATE: 80 BPM | SYSTOLIC BLOOD PRESSURE: 139 MMHG | HEIGHT: 66 IN

## 2018-05-07 DIAGNOSIS — C20 MALIGNANT NEOPLASM OF RECTUM (H): Primary | ICD-10-CM

## 2018-05-07 DIAGNOSIS — Z71.9 VISIT FOR COUNSELING: Primary | ICD-10-CM

## 2018-05-07 DIAGNOSIS — F32.89 OTHER DEPRESSION: ICD-10-CM

## 2018-05-07 DIAGNOSIS — E87.6 HYPOKALEMIA: ICD-10-CM

## 2018-05-07 DIAGNOSIS — G47.09 OTHER INSOMNIA: ICD-10-CM

## 2018-05-07 LAB
ALBUMIN SERPL-MCNC: 3.8 G/DL (ref 3.4–5)
ALP SERPL-CCNC: 85 U/L (ref 40–150)
ALT SERPL W P-5'-P-CCNC: 109 U/L (ref 0–50)
ANION GAP SERPL CALCULATED.3IONS-SCNC: 8 MMOL/L (ref 3–14)
AST SERPL W P-5'-P-CCNC: 60 U/L (ref 0–45)
BASOPHILS # BLD AUTO: 0 10E9/L (ref 0–0.2)
BASOPHILS NFR BLD AUTO: 0.6 %
BILIRUB SERPL-MCNC: 0.8 MG/DL (ref 0.2–1.3)
BUN SERPL-MCNC: 9 MG/DL (ref 7–30)
CALCIUM SERPL-MCNC: 8.6 MG/DL (ref 8.5–10.1)
CHLORIDE SERPL-SCNC: 108 MMOL/L (ref 94–109)
CO2 SERPL-SCNC: 26 MMOL/L (ref 20–32)
CREAT SERPL-MCNC: 0.72 MG/DL (ref 0.52–1.04)
DIFFERENTIAL METHOD BLD: ABNORMAL
EOSINOPHIL # BLD AUTO: 0.1 10E9/L (ref 0–0.7)
EOSINOPHIL NFR BLD AUTO: 2.8 %
ERYTHROCYTE [DISTWIDTH] IN BLOOD BY AUTOMATED COUNT: 16.1 % (ref 10–15)
GFR SERPL CREATININE-BSD FRML MDRD: 85 ML/MIN/1.7M2
GLUCOSE SERPL-MCNC: 108 MG/DL (ref 70–99)
HCT VFR BLD AUTO: 32.7 % (ref 35–47)
HGB BLD-MCNC: 11.8 G/DL (ref 11.7–15.7)
IMM GRANULOCYTES # BLD: 0 10E9/L (ref 0–0.4)
IMM GRANULOCYTES NFR BLD: 0.3 %
LYMPHOCYTES # BLD AUTO: 0.9 10E9/L (ref 0.8–5.3)
LYMPHOCYTES NFR BLD AUTO: 29.7 %
MCH RBC QN AUTO: 32 PG (ref 26.5–33)
MCHC RBC AUTO-ENTMCNC: 36.1 G/DL (ref 31.5–36.5)
MCV RBC AUTO: 89 FL (ref 78–100)
MONOCYTES # BLD AUTO: 0.4 10E9/L (ref 0–1.3)
MONOCYTES NFR BLD AUTO: 13 %
NEUTROPHILS # BLD AUTO: 1.7 10E9/L (ref 1.6–8.3)
NEUTROPHILS NFR BLD AUTO: 53.6 %
NRBC # BLD AUTO: 0 10*3/UL
NRBC BLD AUTO-RTO: 0 /100
PLATELET # BLD AUTO: 117 10E9/L (ref 150–450)
POTASSIUM SERPL-SCNC: 3.1 MMOL/L (ref 3.4–5.3)
PROT SERPL-MCNC: 6.6 G/DL (ref 6.8–8.8)
RBC # BLD AUTO: 3.69 10E12/L (ref 3.8–5.2)
SODIUM SERPL-SCNC: 142 MMOL/L (ref 133–144)
WBC # BLD AUTO: 3.2 10E9/L (ref 4–11)

## 2018-05-07 PROCEDURE — 85025 COMPLETE CBC W/AUTO DIFF WBC: CPT | Performed by: INTERNAL MEDICINE

## 2018-05-07 PROCEDURE — 96367 TX/PROPH/DG ADDL SEQ IV INF: CPT

## 2018-05-07 PROCEDURE — 25000128 H RX IP 250 OP 636: Mod: ZF | Performed by: PHYSICIAN ASSISTANT

## 2018-05-07 PROCEDURE — 80053 COMPREHEN METABOLIC PANEL: CPT | Performed by: INTERNAL MEDICINE

## 2018-05-07 PROCEDURE — G0463 HOSPITAL OUTPT CLINIC VISIT: HCPCS | Mod: ZF

## 2018-05-07 PROCEDURE — 99214 OFFICE O/P EST MOD 30 MIN: CPT | Mod: ZP | Performed by: PHYSICIAN ASSISTANT

## 2018-05-07 PROCEDURE — 96375 TX/PRO/DX INJ NEW DRUG ADDON: CPT

## 2018-05-07 PROCEDURE — 96415 CHEMO IV INFUSION ADDL HR: CPT

## 2018-05-07 PROCEDURE — G0498 CHEMO EXTEND IV INFUS W/PUMP: HCPCS

## 2018-05-07 PROCEDURE — 96413 CHEMO IV INFUSION 1 HR: CPT

## 2018-05-07 PROCEDURE — 96368 THER/DIAG CONCURRENT INF: CPT

## 2018-05-07 PROCEDURE — 96411 CHEMO IV PUSH ADDL DRUG: CPT

## 2018-05-07 RX ORDER — POTASSIUM CHLORIDE 1500 MG/1
20 TABLET, EXTENDED RELEASE ORAL 2 TIMES DAILY
Qty: 10 TABLET | Refills: 0 | Status: SHIPPED | OUTPATIENT
Start: 2018-05-07 | End: 2018-05-21

## 2018-05-07 RX ORDER — LORAZEPAM 2 MG/ML
0.5 INJECTION INTRAMUSCULAR EVERY 4 HOURS PRN
Status: CANCELLED
Start: 2018-05-07

## 2018-05-07 RX ORDER — HEPARIN SODIUM (PORCINE) LOCK FLUSH IV SOLN 100 UNIT/ML 100 UNIT/ML
5 SOLUTION INTRAVENOUS ONCE
Status: COMPLETED | OUTPATIENT
Start: 2018-05-07 | End: 2018-05-07

## 2018-05-07 RX ORDER — ONDANSETRON 2 MG/ML
8 INJECTION INTRAMUSCULAR; INTRAVENOUS ONCE
Status: COMPLETED | OUTPATIENT
Start: 2018-05-07 | End: 2018-05-07

## 2018-05-07 RX ORDER — EPINEPHRINE 1 MG/ML
0.3 INJECTION, SOLUTION, CONCENTRATE INTRAVENOUS EVERY 5 MIN PRN
Status: CANCELLED | OUTPATIENT
Start: 2018-05-07

## 2018-05-07 RX ORDER — FLUOROURACIL 50 MG/ML
400 INJECTION, SOLUTION INTRAVENOUS ONCE
Status: CANCELLED | OUTPATIENT
Start: 2018-05-07

## 2018-05-07 RX ORDER — LORAZEPAM 2 MG/ML
0.5 INJECTION INTRAMUSCULAR EVERY 4 HOURS PRN
Status: DISCONTINUED | OUTPATIENT
Start: 2018-05-07 | End: 2018-05-07 | Stop reason: HOSPADM

## 2018-05-07 RX ORDER — EPINEPHRINE 0.3 MG/.3ML
0.3 INJECTION SUBCUTANEOUS EVERY 5 MIN PRN
Status: CANCELLED | OUTPATIENT
Start: 2018-05-07

## 2018-05-07 RX ORDER — ALBUTEROL SULFATE 90 UG/1
1-2 AEROSOL, METERED RESPIRATORY (INHALATION)
Status: CANCELLED
Start: 2018-05-07

## 2018-05-07 RX ORDER — LORAZEPAM 1 MG/1
1 TABLET ORAL AT BEDTIME
Qty: 30 TABLET | Refills: 0 | Status: SHIPPED | OUTPATIENT
Start: 2018-05-07 | End: 2018-07-25

## 2018-05-07 RX ORDER — ALBUTEROL SULFATE 0.83 MG/ML
2.5 SOLUTION RESPIRATORY (INHALATION)
Status: CANCELLED | OUTPATIENT
Start: 2018-05-07

## 2018-05-07 RX ORDER — DIPHENHYDRAMINE HYDROCHLORIDE 50 MG/ML
50 INJECTION INTRAMUSCULAR; INTRAVENOUS
Status: CANCELLED
Start: 2018-05-07

## 2018-05-07 RX ORDER — ONDANSETRON 2 MG/ML
8 INJECTION INTRAMUSCULAR; INTRAVENOUS ONCE
Status: CANCELLED
Start: 2018-05-07 | End: 2018-05-07

## 2018-05-07 RX ORDER — FLUOROURACIL 50 MG/ML
400 INJECTION, SOLUTION INTRAVENOUS ONCE
Status: COMPLETED | OUTPATIENT
Start: 2018-05-07 | End: 2018-05-07

## 2018-05-07 RX ORDER — MEPERIDINE HYDROCHLORIDE 25 MG/ML
25 INJECTION INTRAMUSCULAR; INTRAVENOUS; SUBCUTANEOUS EVERY 30 MIN PRN
Status: CANCELLED | OUTPATIENT
Start: 2018-05-07

## 2018-05-07 RX ORDER — METHYLPREDNISOLONE SODIUM SUCCINATE 125 MG/2ML
125 INJECTION, POWDER, LYOPHILIZED, FOR SOLUTION INTRAMUSCULAR; INTRAVENOUS
Status: CANCELLED
Start: 2018-05-07

## 2018-05-07 RX ORDER — SODIUM CHLORIDE 9 MG/ML
1000 INJECTION, SOLUTION INTRAVENOUS CONTINUOUS PRN
Status: CANCELLED
Start: 2018-05-07

## 2018-05-07 RX ADMIN — SODIUM CHLORIDE, PRESERVATIVE FREE 5 ML: 5 INJECTION INTRAVENOUS at 06:35

## 2018-05-07 RX ADMIN — DEXAMETHASONE SODIUM PHOSPHATE: 10 INJECTION, SOLUTION INTRAMUSCULAR; INTRAVENOUS at 08:38

## 2018-05-07 RX ADMIN — ONDANSETRON 8 MG: 2 INJECTION INTRAMUSCULAR; INTRAVENOUS at 08:30

## 2018-05-07 RX ADMIN — DEXTROSE MONOHYDRATE 250 ML: 50 INJECTION, SOLUTION INTRAVENOUS at 08:07

## 2018-05-07 RX ADMIN — DIPHENHYDRAMINE HYDROCHLORIDE 50 MG: 50 INJECTION INTRAMUSCULAR; INTRAVENOUS at 09:05

## 2018-05-07 RX ADMIN — LORAZEPAM 0.5 MG: 2 INJECTION INTRAMUSCULAR; INTRAVENOUS at 09:42

## 2018-05-07 RX ADMIN — OXALIPLATIN 176 MG: 100 INJECTION, SOLUTION, CONCENTRATE INTRAVENOUS at 09:55

## 2018-05-07 RX ADMIN — LEUCOVORIN CALCIUM 700 MG: 350 INJECTION, POWDER, LYOPHILIZED, FOR SOLUTION INTRAMUSCULAR; INTRAVENOUS at 09:50

## 2018-05-07 RX ADMIN — FLUOROURACIL 830 MG: 50 INJECTION, SOLUTION INTRAVENOUS at 12:06

## 2018-05-07 ASSESSMENT — PAIN SCALES - GENERAL: PAINLEVEL: NO PAIN (0)

## 2018-05-07 NOTE — PROGRESS NOTES
Infusion Nursing Note:  Sarah Rajan presents today for C6 D1 Leucovorin/Oxaliplatin/Fluorouracil inj/Fluorouracil pump connect.    Patient seen by provider today: Yes: Cathleen Ramos PA-C   present during visit today: Not Applicable.    Note: Patient presents to clinic today with emotional distress. Patient requested to see , see note from Kevin LAZO     Drug Administration Record    Drug Name: Ativan  Dose: .025ml/0.5mg  Route Administered: IV  NDC#: 5779-8737-40  Amount of waste (mL): 0.75ml/1.5mg  Reason for waste: single use vial      Intravenous Access:  Implanted Port.    Treatment Conditions:  Lab Results   Component Value Date    HGB 11.8 05/07/2018     Lab Results   Component Value Date    WBC 3.2 05/07/2018      Lab Results   Component Value Date    ANEU 1.7 05/07/2018     Lab Results   Component Value Date     05/07/2018      Lab Results   Component Value Date     05/07/2018                   Lab Results   Component Value Date    POTASSIUM 3.1 05/07/2018           No results found for: MAG         Lab Results   Component Value Date    CR 0.72 05/07/2018                   Lab Results   Component Value Date    GEETA 8.6 05/07/2018                Lab Results   Component Value Date    BILITOTAL 0.8 05/07/2018           Lab Results   Component Value Date    ALBUMIN 3.8 05/07/2018                    Lab Results   Component Value Date     05/07/2018           Lab Results   Component Value Date    AST 60 05/07/2018       Results reviewed, labs MET treatment parameters, ok to proceed with treatment.      Post Infusion Assessment:  Patient tolerated infusion without incident.  Blood return noted pre and post infusion.  Blood return noted during administration every 2 cc.  Site patent and intact, free from redness, edema or discomfort.  No evidence of extravasations.    Discharge Plan:   Prescription refills given for Ativan, Prozac, and Potassium.  Discharge instructions  reviewed with: Patient and .  Patient and/or family verbalized understanding of discharge instructions and all questions answered.  Copy of AVS reviewed with patient and/or family.  Patient will return 5/21/18 for next appointment.  Patient discharged in stable condition accompanied by: self and .  Departure Mode: Ambulatory.  Face to Face time: 0 minutes.    Prior to discharge: Port is secured in place with tegaderm and flushed with 10cc NS with positive blood return noted.  Continuous home infusion Eclipse C-Series connected.    All connectors secured in place and clamps taped open, checked by Ethel Willett RN.   Patient instructed to call our clinic or [facility] with any questions or concerns at home.  Patient verbalized understanding.    Patient set up for pump disconnect at  home infusion. Talked with Db and is set up for 5/9/18 @ 1030.     Bryant Lopez RN

## 2018-05-07 NOTE — NURSING NOTE
"Oncology Rooming Note    May 7, 2018 6:58 AM   Sarah Rajan is a 52 year old female who presents for:    Chief Complaint   Patient presents with     Port Draw     labs drawn via port by RN     Oncology Clinic Visit     Return: Rectal CA     Initial Vitals: /78 (BP Location: Right arm, Patient Position: Chair, Cuff Size: Adult Large)  Pulse 80  Temp 97.5  F (36.4  C) (Oral)  Resp 16  Ht 1.676 m (5' 6\")  Wt 89 kg (196 lb 3.2 oz)  SpO2 98%  BMI 31.67 kg/m2 Estimated body mass index is 31.67 kg/(m^2) as calculated from the following:    Height as of this encounter: 1.676 m (5' 6\").    Weight as of this encounter: 89 kg (196 lb 3.2 oz). Body surface area is 2.04 meters squared.  No Pain (0) Comment: Data Unavailable   No LMP recorded. Patient is postmenopausal.  Allergies reviewed: Yes  Medications reviewed: Yes    Medications: MEDICATION REFILLS NEEDED TODAY. Provider was notified.   Fluoxitine  Pharmacy name entered into [facility]:    [facility] [address] [place] [address] DR ALVAREZ AT [facility] [place], [place] - [phone]    Clinical concerns: new concerns are that she is having some depression and also some insomnia Cathleen Ramos was notified.    10 minutes for nursing intake (face to face time)     Guille Bowman CMA              "

## 2018-05-07 NOTE — LETTER
5/7/2018      RE: Sarah Rajan  [address] [facility] [id number]       Oncology/Hematology Visit Note  May 7, 2018    Reason for Visit: follow up of [id number] moderately differentiated adenocarcinoma of the rectum     History of Present Illness: Sarah Rajan is a 52 year old female with  recently diagnosed locally advanced rectal cancer. She noticed BRBPR so screening colonoscopy on 1/9/2018 revealed 3 cm rectal mass and other polyps which were removed.  Pathology indicated moderately differentiated adenocarcinoma w/ intact MMR proteins.  CT staging scan showed no metastatic disease; some liver lesions which are thought to be benign per radiology report, [id number] by pelvic MRI read at [facility]. When we initially reviewed her MRI we were not exactly sure whether that was lymph node involvement or not. We opted to repeat MRI which showed T4 N0 lesion.   We also did an MRI of the liver which showed 3 subcentimeter liver lesions which were too small to characterize and will need attention on follow-up. She is currently undergoing treatment with neoadjuvant 5-FU and oxaliplatin (FOLFOX), which she started on 2/26/18. The day after she received cycle 2, she developed fevers, chills, headache, and an itchy rash on her arms and legs, for which she was evaluated in the ED. She was sent home on Benadryl. Benadryl and dexamethasone doses were increased for cycle 3. She received cycle 4 on 4/10/18. Pelvic MRI on 4/19/18 showed improvement in the rectal mass. Abdominal MRI on 5/2/18 showed stable indeterminate liver lesions. Please see Dr. Mortensen's previous notes for further details on the patient's history. She comes in today for routine follow up prior to cycle 6 FOLFOX.     Interval History:  Patient reports that she has continued to have difficulty with insomnia despite trying several medications.  Taking 0.5 mg of Ativan did not significantly help, but when she did once take 1 mg of Ativan she was able to sleep for 6  hours.  She did not find any relief from amitriptyline.  She has been having difficulty with staying asleep since cycle 2 of chemotherapy.  She reports that her mood has been down for the last 6 weeks.  Yesterday, she spent most of the day crying.  She previously was on fluoxetine for seasonal affective disorder, but stopped that around the time she started chemotherapy.  She worries a lot throughout the day.  She reports that yesterday both of her parents were in the emergency room and this was stressful for her.  She denies any suicidal or homicidal ideations.  She continues to have difficulty with constipation around the time of her pump disconnected.  She typically takes MiraLAX once a day starting on the day she starts chemotherapy.  She continues to have cold sensitivity, but denies any numbness or tingling separate from the cold.  She is taking at thousand milligrams of Tylenol in the evenings for headaches.  She is very rarely drinking any alcohol.  She denies other concerns.    Current Outpatient Prescriptions   Medication Sig Dispense Refill     Acetaminophen (TYLENOL PO) Take 1,000 mg by mouth At Bedtime       calcium carbonate (TUMS) 500 MG chewable tablet Take 1-2 chew tab by mouth daily  150 tablet      fluorouracil Administer 4,968 mg intravenously . Continuous infusion over 46-48 hr via ambulatory pump q14d Supplied by outside provider with pump.       FLUoxetine (PROZAC) 20 MG capsule Take 1 capsule (20 mg) by mouth daily 30 capsule 3     lidocaine-prilocaine (EMLA) cream Apply 45 minutes prior to procedure. 30 g 3     LORazepam (ATIVAN) 0.5 MG tablet Take 1 tablet (0.5 mg) by mouth every 4 hours as needed (Anxiety, Nausea/Vomiting or Sleep) 30 tablet 2     LORazepam (ATIVAN) 1 MG tablet Take 1 tablet (1 mg) by mouth At Bedtime 30 tablet 0     Potassium Chloride ER 20 MEQ TBCR Take 1 tablet (20 mEq) by mouth 2 times daily for 5 days 10 tablet 0     prochlorperazine (COMPAZINE) 10 MG tablet Take 1  "tablet (10 mg) by mouth every 6 hours as needed for nausea or vomiting 20 tablet 1     RANITIDINE HCL PO Take 150 mg by mouth daily as needed for heartburn       zolpidem (AMBIEN) 5 MG tablet Take 1 tablet (5 mg) by mouth nightly as needed for sleep 30 tablet 3     amitriptyline (ELAVIL) 25 MG tablet Take 1 tablet (25 mg) by mouth At Bedtime (Patient not taking: Reported on 5/7/2018) 30 tablet 1     ondansetron (ZOFRAN) 4 MG tablet Take one tablet by mouth every 6 hours as needed for nausea when taking neomycin and flagyl (Patient not taking: Reported on 5/7/2018) 3 tablet 0     Physical Examination:  General: The patient is a pleasant female. She is tearful throughout the visit today. She is here today with her .   /78 (BP Location: Right arm, Patient Position: Chair, Cuff Size: Adult Large)  Pulse 80  Temp 97.5  F (36.4  C) (Oral)  Resp 16  Ht 1.676 m (5' 6\")  Wt 89 kg (196 lb 3.2 oz)  SpO2 98%  BMI 31.67 kg/m2  Wt Readings from Last 10 Encounters:   05/07/18 89 kg (196 lb 3.2 oz)   04/23/18 90.9 kg (200 lb 6.4 oz)   04/19/18 88.9 kg (196 lb)   04/16/18 88.9 kg (195 lb 14.4 oz)   04/16/18 89.2 kg (196 lb 11.2 oz)   04/14/18 92 kg (202 lb 13.2 oz)   04/10/18 92 kg (202 lb 14.4 oz)   03/26/18 92.3 kg (203 lb 6.4 oz)   03/12/18 92.9 kg (204 lb 14.4 oz)   02/26/18 93.3 kg (205 lb 9.6 oz)   HEENT: EOMI, PERRL. Sclerae are anicteric. Oral mucosa is pink and moist with no lesions or thrush.   Lymph: Neck is supple with no lymphadenopathy in the cervical or supraclavicular areas.   Heart: Regular rate and rhythm.   Lungs: Clear to auscultation bilaterally.   Abdomen: Bowel sounds present, soft, nontender with no palpable hepatosplenomegaly or masses.   Extremities: No lower extremity edema noted bilaterally.   Neuro: Cranial nerves II through XII are grossly intact.  Skin: No rashes, petechiae, or bruising noted on exposed skin.     Laboratory Data:   5/7/2018 06:46   Sodium 142   Potassium 3.1 (L) "   Chloride 108   Carbon Dioxide 26   Urea Nitrogen 9   Creatinine 0.72   GFR Estimate 85   GFR Estimate If Black >90   Calcium 8.6   Anion Gap 8   Albumin 3.8   Protein Total 6.6 (L)   Bilirubin Total 0.8   Alkaline Phosphatase 85    (H)   AST 60 (H)   Glucose 108 (H)   WBC 3.2 (L)   Hemoglobin 11.8   Hematocrit 32.7 (L)   Platelet Count 117 (L)   RBC Count 3.69 (L)   MCV 89   MCH 32.0   MCHC 36.1   RDW 16.1 (H)   Diff Method Automated Method   % Neutrophils 53.6   % Lymphocytes 29.7   % Monocytes 13.0   % Eosinophils 2.8   % Basophils 0.6   % Immature Granulocytes 0.3   Nucleated RBCs 0   Absolute Neutrophil 1.7   Absolute Lymphocytes 0.9   Absolute Monocytes 0.4   Absolute Eosinophils 0.1   Absolute Basophils 0.0   Abs Immature Granulocytes 0.0   Absolute Nucleated RBC 0.0     Assessment and Plan:  1. Locally advanced moderately differentiated adenocarcinoma of the rectum: [id number]. MSI stable. Started neoadjuvant FOLFOX on 2/26/18. She is tolerating treatment reasonably well with cold sensitivity and constipation. Imaging after 4 cycles showed improvement in the rectal lesion. She will continue with cycle 6 today. She will follow up with me prior to cycles 7 and 8. She will have a chest CT and abd/pelvis MRI after 8 cycles of chemotherapy and will see Dr. Mortensen to review. Plan for chemorads following neoadjuvant chemotherapy. She has previously met with Dr. Ford.     2. Depression and anxiety. She will resume fluoxetine 20 mg daily. We reviewed that it can take 4-6 weeks to see full effect, though some patient see improvement in symptoms sooner. She may take 1 mg Ativan qhs to help with sleep. We discussed that we do need to be careful with taking Ativan long term, but that it is okay to use in the short term to help with sleep until her mood and anxiety improve. She is agreeable to meeting with a health psychologist. Will also plan to decrease her dexamethasone from 20 mg to 10 mg. Discussed if rash  returns, then resume Benadryl. She does receive IV Benadryl.     3. Constipation. Will increase MiraLax to bid for the first few days following chemotherapy.     4. Elevated LFT's. Suspect secondary to chemotherapy. She has minimal Tylenol and rare alcohol use. Will continue to monitor.    5. Hypokalemia. Suspect related to poor po intake with depressed mood. She will take potassium chloride 20 mEq bid x 5 days. Will recheck at next visit.     Cathleen Ramos PA-C  [facility]  [address] [place] [id number]  [phone]

## 2018-05-07 NOTE — MR AVS SNAPSHOT
After Visit Summary   5/7/2018    Sarah Rajan    MRN: [id number]           Patient Information     Date Of Birth          1965        Visit Information        Provider Department      5/7/2018 7:30 AM UC 13 ATC;  ONCOLOGY INFUSION [facility]        Today's Diagnoses     Malignant neoplasm of rectum (H)    -  1      [facility] Main Line: [phone]    Call triage nurse with chills and/or temperature greater than or equal to 100.4, uncontrolled nausea/vomiting, diarrhea, constipation, dizziness, shortness of breath, chest pain, bleeding, unexplained bruising, or any new/concerning symptoms, questions/concerns.   If you are having any concerning symptoms or wish to speak to a provider before your next infusion visit, please call your care coordinator or triage to notify them so we can adequately serve you.   Triage Nurse Line: [phone]    If after hours, weekends, or holidays, call main hospital  and ask for Oncology doctor on call @ [phone]               May 2018   Luc Monday Tuesday Wednesday Thursday Friday Saturday             1     2     MR ABDOMEN WWO   11:00 AM   (60 min.)   UUMR1   [facility] [place] 3     4     5       6     7     [facility] LAB DRAW    6:30 AM   (15 min.)    [facility] LAB DRAW   [facility] Lab Draw     P RETURN    6:45 AM   (50 min.)   Cathleen Ramos PA-C   [facility] ONC INFUSION 240    7:30 AM   (240 min.)    ONCOLOGY INFUSION   [facility] 8     9     10     11     12       13     14     15     16     17     18     19       20     21     [facility] LAB DRAW    6:30 AM   (15 min.)    [facility] LAB DRAW   [facility] Lab Draw     [facility] RETURN    6:45 AM   (50 min.)   Cathleen Ramos PA-C [facility] ONC INFUSION 240    8:00 AM   (240 min.)    ONCOLOGY INFUSION   [facility]  [facility] 22     23     24     25     26       27     28     29     30     31 June 2018 Sunday Monday Tuesday Wednesday Thursday Friday Saturday                            1     2       3     4     5     [facility] LAB DRAW    8:30 AM   (15 min.)    [facility]   [facility]     [facility] RETURN    8:45 AM   (60 min.)   Shanice Ellison PA-C   [facility]     [facility] ONC INFUSION 240   10:00 AM   (240 min.)    ONCOLOGY INFUSION   [facility] 6     7     8     9       10     11     12     13     14     15     16       17     18     19     MR ABDOMEN WWO    4:45 PM   (45 min.)   UCMR1   [facility] MRI 20     MR PELVIS WWO    9:15 AM   (45 min.)   [id number]   [facility] MRI     CT CHEST WO   10:40 AM   (20 min.)   CT2   [facility] CT 21     [facility] LAB DRAW   12:00 PM   (15 min.)    [facility] LAB DRAW   [facility]     [facility] RETURN   12:15 PM   (30 min.)   Kyra Mortensen MD   [facility]    1:00 PM   (240 min.)    ONCOLOGY INFUSION   [facility] 22     23       24     25     26     27     28     29     30                  Lab Results:  Recent Results (from the past 12 hour(s))   CBC with platelets differential    Collection Time: 05/07/18  6:46 AM   Result Value Ref Range    WBC 3.2 (L) 4.0 - 11.0 10e9/L    RBC Count 3.69 (L) 3.8 - 5.2 10e12/L    Hemoglobin 11.8 11.7 - 15.7 g/dL    Hematocrit 32.7 (L) 35.0 - 47.0 %    MCV 89 78 - 100 fl    MCH 32.0 26.5 - 33.0 pg    MCHC 36.1 31.5 - 36.5 g/dL    RDW 16.1 (H) 10.0 - 15.0 %    Platelet Count 117 (L) 150 - 450 10e9/L    Diff Method Automated Method     % Neutrophils 53.6 %    % Lymphocytes 29.7 %    % Monocytes 13.0 %    % Eosinophils 2.8 %    % Basophils 0.6 %    % Immature Granulocytes 0.3 %    Nucleated RBCs 0 0 /100    Absolute Neutrophil 1.7 1.6 - 8.3 10e9/L    Absolute  Lymphocytes 0.9 0.8 - 5.3 10e9/L    Absolute Monocytes 0.4 0.0 - 1.3 10e9/L    Absolute Eosinophils 0.1 0.0 - 0.7 10e9/L    Absolute Basophils 0.0 0.0 - 0.2 10e9/L    Abs Immature Granulocytes 0.0 0 - 0.4 10e9/L    Absolute Nucleated RBC 0.0    Comprehensive metabolic panel    Collection Time: 05/07/18  6:46 AM   Result Value Ref Range    Sodium 142 133 - 144 mmol/L    Potassium 3.1 (L) 3.4 - 5.3 mmol/L    Chloride 108 94 - 109 mmol/L    Carbon Dioxide 26 20 - 32 mmol/L    Anion Gap 8 3 - 14 mmol/L    Glucose 108 (H) 70 - 99 mg/dL    Urea Nitrogen 9 7 - 30 mg/dL    Creatinine 0.72 0.52 - 1.04 mg/dL    GFR Estimate 85 >60 mL/min/1.7m2    GFR Estimate If Black >90 >60 mL/min/1.7m2    Calcium 8.6 8.5 - 10.1 mg/dL    Bilirubin Total 0.8 0.2 - 1.3 mg/dL    Albumin 3.8 3.4 - 5.0 g/dL    Protein Total 6.6 (L) 6.8 - 8.8 g/dL    Alkaline Phosphatase 85 40 - 150 U/L     (H) 0 - 50 U/L    AST 60 (H) 0 - 45 U/L               Follow-ups after your visit        Your next 10 appointments already scheduled     May 21, 2018  6:30 AM CDT   [facility]   [facility] Lab Draw ([facility])    [address] [place] [address]  [address]  [facility] [address] [phone]            May 21, 2018  7:00 AM CDT   (Arrive by 6:45 AM)   Return Visit with Cathleen Ramos PA-C   [facility] ([facility])    [address] [place] [address]  [address]  [facility] [address] [phone]            May 21, 2018  8:00 AM CDT   Infusion 240 with  ONCOLOGY INFUSION, UC 18 ATC   [facility] ([facility])    [address] [place] [address]  [address]  [facility] [address] [phone]            Jun 05, 2018  8:30 AM CDT   [facility] Lab Draw with  [facility] LAB DRAW   [facility] Lab Draw ([facility])    [address] [id number] [phone]             Jun 05, 2018  9:00 AM CDT   (Arrive by 8:45 AM)   Return Visit with Shanice Ellison PA-C   [facility] ([facility])    [address]  [address]  [facility] [address] [phone]            Jun 05, 2018 10:00 AM CDT   Infusion 240 with  ONCOLOGY INFUSION, UC 17 ATC   [facility] ([facility])    [address]  [address]  [facility] [id number] [phone]            Jun 19, 2018  4:45 PM CDT   MR ABDOMEN W/O & W CONTRAST with UCMR1   [facility] MRI ([facility])    [address]  1st Floor  [facility] [id number] [phone]           Take your medicines as usual, unless your doctor tells you not to. Bring a list of your current medicines to your exam (including vitamins, minerals and over-the-counter drugs). Also bring the results of similar scans you may have had.    You may or may not receive IV contrast for this exam pending the discretion of the Radiologist.   Do not eat or drink for 6 hours prior to exam.  The MRI machine uses a strong magnet. Please wear clothes without metal (snaps, zippers). A sweatsuit works well, or we may give you a hospital gown.  Please remove any body piercings and hair extensions before you arrive. You will also remove watches, jewelry, hairpins, wallets, dentures, partial dental plates and hearing aids. You may wear contact lenses, and you may be able to wear your rings. We have a safe place to keep your personal items, but it is safer to leave them at home.  **IMPORTANT** THE INSTRUCTIONS BELOW ARE ONLY FOR THOSE PATIENTS WHO HAVE BEEN PRESCRIBED SEDATION OR GENERAL ANESTHESIA DURING THEIR MRI PROCEDURE:  IF YOUR DOCTOR PRESCRIBED ORAL SEDATION (take medicine to help you relax during your exam):   You must get the medicine from your doctor (oral medication) before you arrive. Bring the medicine to the exam. Do not  take it at home. You ll be told when to take it upon arriving for your exam.   Arrive one hour early. Bring someone who can take you home after the test. Your medicine will make you sleepy. After the exam, you may not drive, take a bus or take a taxi by yourself.  IF YOUR DOCTOR PRESCRIBED IV SEDATION:   Arrive one hour early. Bring someone who can take you home after the test. Your medicine will make you sleepy. After the exam, you may not drive, take a bus or take a taxi by yourself.   No eating 6 hours before your exam. You may have clear liquids up until 4 hours before your exam. (Clear liquids include water, clear tea, black coffee and fruit juice without pulp.)  IF YOUR DOCTOR PRESCRIBED ANESTHESIA (be asleep for your exam):   Arrive 1 1/2 hours early. Bring someone who can take you home after the test. You may not drive, take a bus or take a taxi by yourself.   No eating 8 hours before your exam. You may have clear liquids up until 4 hours before your exam. (Clear liquids include water, clear tea, black coffee and fruit juice without pulp.)   You will spend four to five hours in the recovery room.  If you have any questions, please contact your Imaging Department exam site.            Jun 20, 2018  9:15 AM CDT   MR PELVIS W/O & W CONTRAST with [id number]   [facility] MRI ([facility])    [address] [address] [phone]           Take your medicines as usual, unless your doctor tells you not to. Bring a list of your current medicines to your exam (including vitamins, minerals and over-the-counter drugs).  You may or may not receive intravenous (IV) contrast for this exam pending the discretion of the Radiologist.  You do not need to do anything special to prepare.  The MRI machine uses a strong magnet. Please wear clothes without metal (snaps, zippers). A sweatsuit works well, or we may give you a hospital gown.  Please remove any body  piercings and hair extensions before you arrive. You will also remove watches, jewelry, hairpins, wallets, dentures, partial dental plates and hearing aids. You may wear contact lenses, and you may be able to wear your rings. We have a safe place to keep your personal items, but it is safer to leave them at home.  **IMPORTANT** THE INSTRUCTIONS BELOW ARE ONLY FOR THOSE PATIENTS WHO HAVE BEEN PRESCRIBED SEDATION OR GENERAL ANESTHESIA DURING THEIR MRI PROCEDURE:  IF YOUR DOCTOR PRESCRIBED ORAL SEDATION (take medicine to help you relax during your exam):   You must get the medicine from your doctor (oral medication) before you arrive. Bring the medicine to the exam. Do not take it at home. You ll be told when to take it upon arriving for your exam.   Arrive one hour early. Bring someone who can take you home after the test. Your medicine will make you sleepy. After the exam, you may not drive, take a bus or take a taxi by yourself.  IF YOUR DOCTOR PRESCRIBED IV SEDATION:   Arrive one hour early. Bring someone who can take you home after the test. Your medicine will make you sleepy. After the exam, you may not drive, take a bus or take a taxi by yourself.   No eating 6 hours before your exam. You may have clear liquids up until 4 hours before your exam. (Clear liquids include water, clear tea, black coffee and fruit juice without pulp.)  IF YOUR DOCTOR PRESCRIBED ANESTHESIA (be asleep for your exam):   Arrive 1 1/2 hours early. Bring someone who can take you home after the test. You may not drive, take a bus or take a taxi by yourself.   No eating 8 hours before your exam. You may have clear liquids up until 4 hours before your exam. (Clear liquids include water, clear tea, black coffee and fruit juice without pulp.)   You will spend four to five hours in the recovery room.  Please call the Imaging Department at your exam site with any questions.              Future tests that were ordered for you today     Open Future  Orders        Priority Expected Expires Ordered    MRI Abdomen w & w/o contrast Routine 6/18/2018 6/25/2018 5/7/2018    MRI Pelvis w & w/o contrast Routine 6/18/2018 6/25/2018 5/7/2018    MRI Abdomen & pelvis w & wo contrast Routine 6/19/2018 8/5/2018 5/7/2018    CT Chest w/o contrast Routine 6/18/2018 5/7/2019 5/7/2018            Who to contact     If you have questions or need follow up information about today's clinic visit or your schedule please contact [facility] directly at [phone].  Normal or non-critical lab and imaging results will be communicated to you by Entredahart, letter or phone within 4 business days after the clinic has received the results. If you do not hear from us within 7 days, please contact the clinic through HipSnipt or phone. If you have a critical or abnormal lab result, we will notify you by phone as soon as possible.  Submit refill requests through Tuniu or call your pharmacy and they will forward the refill request to us. Please allow 3 business days for your refill to be completed.          Additional Information About Your Visit        EntredaharMondeca Information     Tuniu gives you secure access to your electronic health record. If you see a primary care provider, you can also send messages to your care team and make appointments. If you have questions, please call your primary care clinic.  If you do not have a primary care provider, please call [phone] and they will assist you.        Care EveryWhere ID     This is your Care EveryWhere ID. This could be used by other organizations to access your [place] medical records  [id number]         Blood Pressure from Last 3 Encounters:   05/07/18 139/78   04/23/18 117/83   04/19/18 117/79    Weight from Last 3 Encounters:   05/07/18 89 kg (196 lb 3.2 oz)   04/23/18 90.9 kg (200 lb 6.4 oz)   04/19/18 88.9 kg (196 lb)              We Performed the Following     CBC with platelets differential     Comprehensive  metabolic panel          Today's Medication Changes          These changes are accurate as of 5/7/18  8:56 AM.  If you have any questions, ask your nurse or doctor.               Start taking these medicines.        Dose/Directions    FLUoxetine 20 MG capsule   Commonly known as:  PROzac   Used for:  Other depression   Started by:  Cathleen Ramos PA-C        Dose:  20 mg   Take 1 capsule (20 mg) by mouth daily   Quantity:  30 capsule   Refills:  3       Potassium Chloride ER 20 MEQ Tbcr   Used for:  Hypokalemia   Started by:  Cathleen Ramos PA-C        Dose:  20 mEq   Take 1 tablet (20 mEq) by mouth 2 times daily for 5 days   Quantity:  10 tablet   Refills:  0         These medicines have changed or have updated prescriptions.        Dose/Directions    * LORazepam 0.5 MG tablet   Commonly known as:  ATIVAN   This may have changed:  Another medication with the same name was added. Make sure you understand how and when to take each.   Used for:  Malignant neoplasm of rectum (H)   Changed by:  Cathleen Ramos PA-C        Dose:  0.5 mg   Take 1 tablet (0.5 mg) by mouth every 4 hours as needed (Anxiety, Nausea/Vomiting or Sleep)   Quantity:  30 tablet   Refills:  2       * LORazepam 1 MG tablet   Commonly known as:  ATIVAN   This may have changed:  You were already taking a medication with the same name, and this prescription was added. Make sure you understand how and when to take each.   Used for:  Other insomnia   Changed by:  Cathleen Ramos PA-C        Dose:  1 mg   Take 1 tablet (1 mg) by mouth At Bedtime   Quantity:  30 tablet   Refills:  0       * Notice:  This list has 2 medication(s) that are the same as other medications prescribed for you. Read the directions carefully, and ask your doctor or other care provider to review them with you.         Where to get your medicines      These medications were sent to [place], [place] - [address] [place] [address]  [address]  [address] [facility] [address], [facility] [address]    Hours:  TRANSPLANT PHONE NUMBER [phone] Phone:  [phone]     FLUoxetine 20 MG capsule    Potassium Chloride ER 20 MEQ Tbcr         Some of these will need a paper prescription and others can be bought over the counter.  Ask your nurse if you have questions.     Bring a paper prescription for each of these medications     LORazepam 1 MG tablet                Primary Care Provider Office Phone # Fax #    Wayne Green, VICKY [phone]       [facility] [address] [facility] [address]        Equal Access to Services     DEAN CHAKRABORTY : Hadii aad ku hadasho Soomaali, waaxda luqadaha, qaybta kaalmada adeegyada, luz elena mtz . So [facility] [phone].    ATENCIÓN: Si habla español, tiene a membreno disposición servicios gratuitos de asistencia lingüística. Claudette al [phone].    We comply with applicable federal civil rights laws and [place] laws. We do not discriminate on the basis of race, color, national origin, age, disability, sex, sexual orientation, or gender identity.            Thank you!     Thank you for choosing [facility]  for your care. Our goal is always to provide you with excellent care. Hearing back from our patients is one way we can continue to improve our services. Please take a few minutes to complete the written survey that you may receive in the mail after your visit with us. Thank you!             Your Updated Medication List - Protect others around you: Learn how to safely use, store and throw away your medicines at www.disposemymeds.org.          This list is accurate as of 5/7/18  8:56 AM.  Always use your most recent med list.                   Brand Name Dispense Instructions for use Diagnosis    amitriptyline 25 MG tablet    ELAVIL    30 tablet    Take 1 tablet (25 mg) by mouth At Bedtime    Insomnia, unspecified type, Malignant neoplasm  of rectum (H), Liver lesion       calcium carbonate 500 MG chewable tablet    TUMS    150 tablet    Take 1-2 chew tab by mouth daily        fluorouracil      Administer 4,968 mg intravenously . Continuous infusion over 46-48 hr via ambulatory pump q14d Supplied by outside provider with pump.        FLUoxetine 20 MG capsule    PROzac    30 capsule    Take 1 capsule (20 mg) by mouth daily    Other depression       lidocaine-prilocaine cream    EMLA    30 g    Apply 45 minutes prior to procedure.    Malignant neoplasm of rectum (H)       * LORazepam 0.5 MG tablet    ATIVAN    30 tablet    Take 1 tablet (0.5 mg) by mouth every 4 hours as needed (Anxiety, Nausea/Vomiting or Sleep)    Malignant neoplasm of rectum (H)       * LORazepam 1 MG tablet    ATIVAN    30 tablet    Take 1 tablet (1 mg) by mouth At Bedtime    Other insomnia       ondansetron 4 MG tablet    ZOFRAN    3 tablet    Take one tablet by mouth every 6 hours as needed for nausea when taking neomycin and flagyl    Rectal cancer (H)       Potassium Chloride ER 20 MEQ Tbcr     10 tablet    Take 1 tablet (20 mEq) by mouth 2 times daily for 5 days    Hypokalemia       prochlorperazine 10 MG tablet    COMPAZINE    20 tablet    Take 1 tablet (10 mg) by mouth every 6 hours as needed for nausea or vomiting    Malignant neoplasm of rectum (H)       RANITIDINE HCL PO      Take 150 mg by mouth daily as needed for heartburn        TYLENOL PO      Take 1,000 mg by mouth At Bedtime        zolpidem 5 MG tablet    AMBIEN    30 tablet    Take 1 tablet (5 mg) by mouth nightly as needed for sleep    Other insomnia       * Notice:  This list has 2 medication(s) that are the same as other medications prescribed for you. Read the directions carefully, and ask your doctor or other care provider to review them with you.

## 2018-05-07 NOTE — NURSING NOTE
Chief Complaint   Patient presents with     Port Draw     labs drawn via port by RN     /78 (BP Location: Right arm, Patient Position: Chair, Cuff Size: Adult Large)  Pulse 80  Temp 97.5  F (36.4  C) (Oral)  Wt 89 kg (196 lb 3.2 oz)  SpO2 98%  BMI 31.68 kg/m2    Vitals taken. Port accessed by RN. Labs collected and sent. Line flushed with NS & Heparin. Pt tolerated well. Pt checked in for next appointment.    Es Zaragoza, RN

## 2018-05-07 NOTE — MR AVS SNAPSHOT
After Visit Summary   5/7/2018    Sarah Rajan    MRN: [id number]           Patient Information     Date Of Birth          1965        Visit Information        Provider Department      5/7/2018 11:04 AM Fara Isaac MSW [facility]        Today's Diagnoses     Visit for counseling    -  1       Follow-ups after your visit        Your next 10 appointments already scheduled     May 21, 2018  6:30 AM CDT   [facility] Lab Draw with [facility] DRAW   [facility] ([facility])    [address] [place] [address]  [address]  [facility] [address]   [phone]            May 21, 2018  7:00 AM CDT   (Arrive by 6:45 AM)   Return Visit with Cathleen Ramos PA-C   [facility] ([facility])    [address] [place] [address]  [address]  [facility] [address]   [phone]            May 21, 2018  8:00 AM CDT   Infusion 240 with UC ONCOLOGY INFUSION, UC 18 ATC   [facility]    [address] [place] [address]  [address]  [facility] [address]   [phone]            Jun 05, 2018  8:30 AM CDT   [facility] Lab Draw with [facility] LAB DRAW   [facility]    [address] [place] [address]  [address]  [facility] [address]   [phone]            Jun 05, 2018  9:00 AM CDT   (Arrive by 8:45 AM)   Return Visit with Shanice Ellison PA-C   [facility] ([facility])    [address] [place] [address]  [address]  [facility] [address]   [phone]            Jun 05, 2018 10:00 AM CDT   Infusion 240 with UC ONCOLOGY INFUSION, UC 17 ATC   [facility] ([facility])    [address] [place] [address]  [address]  [facility] [address]   [phone]            Jun 19, 2018  4:45 PM CDT   MR ABDOMEN W/O & W CONTRAST with [id number]   [facility] MRI ([facility]  [facility])    [address] [facility] [address] [phone]           Take your medicines as usual, unless your doctor tells you not to. Bring a list of your current medicines to your exam (including vitamins, minerals and over-the-counter drugs). Also bring the results of similar scans you may have had.    You may or may not receive IV contrast for this exam pending the discretion of the Radiologist.   Do not eat or drink for 6 hours prior to exam.  The MRI machine uses a strong magnet. Please wear clothes without metal (snaps, zippers). A sweatsuit works well, or we may give you a hospital gown.  Please remove any body piercings and hair extensions before you arrive. You will also remove watches, jewelry, hairpins, wallets, dentures, partial dental plates and hearing aids. You may wear contact lenses, and you may be able to wear your rings. We have a safe place to keep your personal items, but it is safer to leave them at home.  **IMPORTANT** THE INSTRUCTIONS BELOW ARE ONLY FOR THOSE PATIENTS WHO HAVE BEEN PRESCRIBED SEDATION OR GENERAL ANESTHESIA DURING THEIR MRI PROCEDURE:  IF YOUR DOCTOR PRESCRIBED ORAL SEDATION (take medicine to help you relax during your exam):   You must get the medicine from your doctor (oral medication) before you arrive. Bring the medicine to the exam. Do not take it at home. You ll be told when to take it upon arriving for your exam.   Arrive one hour early. Bring someone who can take you home after the test. Your medicine will make you sleepy. After the exam, you may not drive, take a bus or take a taxi by yourself.  IF YOUR DOCTOR PRESCRIBED IV SEDATION:   Arrive one hour early. Bring someone who can take you home after the test. Your medicine will make you sleepy. After the exam, you may not drive, take a bus or take a taxi by yourself.   No eating 6 hours before your exam. You may have clear liquids up until 4 hours before your exam.  (Clear liquids include water, clear tea, black coffee and fruit juice without pulp.)  IF YOUR DOCTOR PRESCRIBED ANESTHESIA (be asleep for your exam):   Arrive 1 1/2 hours early. Bring someone who can take you home after the test. You may not drive, take a bus or take a taxi by yourself.   No eating 8 hours before your exam. You may have clear liquids up until 4 hours before your exam. (Clear liquids include water, clear tea, black coffee and fruit juice without pulp.)   You will spend four to five hours in the recovery room.  If you have any questions, please contact your Imaging Department exam site.            Jun 20, 2018  9:15 AM CDT   MR PELVIS W/O & W CONTRAST with [id number]   [facility] MRI ([facility])    [address] [address] [phone]           Take your medicines as usual, unless your doctor tells you not to. Bring a list of your current medicines to your exam (including vitamins, minerals and over-the-counter drugs).  You may or may not receive intravenous (IV) contrast for this exam pending the discretion of the Radiologist.  You do not need to do anything special to prepare.  The MRI machine uses a strong magnet. Please wear clothes without metal (snaps, zippers). A sweatsuit works well, or we may give you a hospital gown.  Please remove any body piercings and hair extensions before you arrive. You will also remove watches, jewelry, hairpins, wallets, dentures, partial dental plates and hearing aids. You may wear contact lenses, and you may be able to wear your rings. We have a safe place to keep your personal items, but it is safer to leave them at home.  **IMPORTANT** THE INSTRUCTIONS BELOW ARE ONLY FOR THOSE PATIENTS WHO HAVE BEEN PRESCRIBED SEDATION OR GENERAL ANESTHESIA DURING THEIR MRI PROCEDURE:  IF YOUR DOCTOR PRESCRIBED ORAL SEDATION (take medicine to help you relax during your exam):   You must get the medicine from  your doctor (oral medication) before you arrive. Bring the medicine to the exam. Do not take it at home. You ll be told when to take it upon arriving for your exam.   Arrive one hour early. Bring someone who can take you home after the test. Your medicine will make you sleepy. After the exam, you may not drive, take a bus or take a taxi by yourself.  IF YOUR DOCTOR PRESCRIBED IV SEDATION:   Arrive one hour early. Bring someone who can take you home after the test. Your medicine will make you sleepy. After the exam, you may not drive, take a bus or take a taxi by yourself.   No eating 6 hours before your exam. You may have clear liquids up until 4 hours before your exam. (Clear liquids include water, clear tea, black coffee and fruit juice without pulp.)  IF YOUR DOCTOR PRESCRIBED ANESTHESIA (be asleep for your exam):   Arrive 1 1/2 hours early. Bring someone who can take you home after the test. You may not drive, take a bus or take a taxi by yourself.   No eating 8 hours before your exam. You may have clear liquids up until 4 hours before your exam. (Clear liquids include water, clear tea, black coffee and fruit juice without pulp.)   You will spend four to five hours in the recovery room.  Please call the Imaging Department at your exam site with any questions.              Future tests that were ordered for you today     Open Future Orders        Priority Expected Expires Ordered    MRI Abdomen w & w/o contrast Routine 6/18/2018 6/25/2018 5/7/2018    MRI Pelvis w & w/o contrast Routine 6/18/2018 6/25/2018 5/7/2018    MRI Abdomen & pelvis w & wo contrast Routine 6/19/2018 8/5/2018 5/7/2018    CT Chest w/o contrast Routine 6/18/2018 5/7/2019 5/7/2018            Who to contact     If you have questions or need follow up information about today's clinic visit or your schedule please contact [facility] directly at [phone].  Normal or non-critical lab and imaging results will be communicated to  you by MyChart, letter or phone within 4 business days after the clinic has received the results. If you do not hear from us within 7 days, please contact the clinic through [employer] or phone. If you have a critical or abnormal lab result, we will notify you by phone as soon as possible.  Submit refill requests through [employer] or call your pharmacy and they will forward the refill request to us. Please allow 3 business days for your refill to be completed.          Additional Information About Your Visit        [employer] Information     [employer] gives you secure access to your electronic health record. If you see a primary care provider, you can also send messages to your care team and make appointments. If you have questions, please call your primary care clinic.  If you do not have a primary care provider, please call [phone] and they will assist you.        Care EveryWhere ID     This is your Care EveryWhere ID. This could be used by other organizations to access your [place] medical records  [id number]         Blood Pressure from Last 3 Encounters:   05/07/18 139/78   04/23/18 117/83   04/19/18 117/79    Weight from Last 3 Encounters:   05/07/18 89 kg (196 lb 3.2 oz)   04/23/18 90.9 kg (200 lb 6.4 oz)   04/19/18 88.9 kg (196 lb)              Today, you had the following     No orders found for display         Today's Medication Changes          These changes are accurate as of 5/7/18  2:45 PM.  If you have any questions, ask your nurse or doctor.               Start taking these medicines.        Dose/Directions    FLUoxetine 20 MG capsule   Commonly known as:  PROzac   Used for:  Other depression   Started by:  Cathleen Ramos PA-C        Dose:  20 mg   Take 1 capsule (20 mg) by mouth daily   Quantity:  30 capsule   Refills:  3       Potassium Chloride ER 20 MEQ Tbcr   Used for:  Hypokalemia   Started by:  Cathleen Ramos PA-C        Dose:  20 mEq   Take 1 tablet (20 mEq) by mouth 2 times daily  for 5 days   Quantity:  10 tablet   Refills:  0         These medicines have changed or have updated prescriptions.        Dose/Directions    * LORazepam 0.5 MG tablet   Commonly known as:  ATIVAN   This may have changed:  Another medication with the same name was added. Make sure you understand how and when to take each.   Used for:  Malignant neoplasm of rectum (H)   Changed by:  Cathleen Ramos PA-C        Dose:  0.5 mg   Take 1 tablet (0.5 mg) by mouth every 4 hours as needed (Anxiety, Nausea/Vomiting or Sleep)   Quantity:  30 tablet   Refills:  2       * LORazepam 1 MG tablet   Commonly known as:  ATIVAN   This may have changed:  You were already taking a medication with the same name, and this prescription was added. Make sure you understand how and when to take each.   Used for:  Other insomnia   Changed by:  Cathleen Ramos PA-C        Dose:  1 mg   Take 1 tablet (1 mg) by mouth At Bedtime   Quantity:  30 tablet   Refills:  0       * Notice:  This list has 2 medication(s) that are the same as other medications prescribed for you. Read the directions carefully, and ask your doctor or other care provider to review them with you.         Where to get your medicines      These medications were sent to [address] [address]    Hours:  TRANSPLANT PHONE NUMBER [phone] Phone:  [phone]     FLUoxetine 20 MG capsule    Potassium Chloride ER 20 MEQ Tbcr         Some of these will need a paper prescription and others can be bought over the counter.  Ask your nurse if you have questions.     Bring a paper prescription for each of these medications     LORazepam 1 MG tablet                Primary Care Provider Office Phone # Fax #    Wayne Green [phone]       [facility] [address] [facility] [address]        Equal Access to Services      DEAN CHAKRABORTY : Hadii aad ku michael Armendariz, waaxda luqadaha, qaybta kaalmada yury, luz elena lissy julianaford jauregui trishatelly mtz . So [facility] [phone].    ATENCIÓN: Si habla sangita, tiene a membreno disposición servicios gratuitos de asistencia lingüística. Julietaame al [phone].    We comply with applicable federal civil rights laws and [place] laws. We do not discriminate on the basis of race, color, national origin, age, disability, sex, sexual orientation, or gender identity.            Thank you!     Thank you for choosing [facility]  for your care. Our goal is always to provide you with excellent care. Hearing back from our patients is one way we can continue to improve our services. Please take a few minutes to complete the written survey that you may receive in the mail after your visit with us. Thank you!             Your Updated Medication List - Protect others around you: Learn how to safely use, store and throw away your medicines at www.disposemymeds.org.          This list is accurate as of 5/7/18  2:45 PM.  Always use your most recent med list.                   Brand Name Dispense Instructions for use Diagnosis    amitriptyline 25 MG tablet    ELAVIL    30 tablet    Take 1 tablet (25 mg) by mouth At Bedtime    Insomnia, unspecified type, Malignant neoplasm of rectum (H), Liver lesion       calcium carbonate 500 MG chewable tablet    TUMS    150 tablet    Take 1-2 chew tab by mouth daily        fluorouracil      Administer 4,968 mg intravenously . Continuous infusion over 46-48 hr via ambulatory pump q14d Supplied by outside provider with pump.        FLUoxetine 20 MG capsule    PROzac    30 capsule    Take 1 capsule (20 mg) by mouth daily    Other depression       lidocaine-prilocaine cream    EMLA    30 g    Apply 45 minutes prior to procedure.    Malignant neoplasm of rectum (H)       * LORazepam 0.5 MG tablet    ATIVAN    30 tablet    Take 1 tablet (0.5 mg) by mouth every 4  hours as needed (Anxiety, Nausea/Vomiting or Sleep)    Malignant neoplasm of rectum (H)       * LORazepam 1 MG tablet    ATIVAN    30 tablet    Take 1 tablet (1 mg) by mouth At Bedtime    Other insomnia       ondansetron 4 MG tablet    ZOFRAN    3 tablet    Take one tablet by mouth every 6 hours as needed for nausea when taking neomycin and flagyl    Rectal cancer (H)       Potassium Chloride ER 20 MEQ Tbcr     10 tablet    Take 1 tablet (20 mEq) by mouth 2 times daily for 5 days    Hypokalemia       prochlorperazine 10 MG tablet    COMPAZINE    20 tablet    Take 1 tablet (10 mg) by mouth every 6 hours as needed for nausea or vomiting    Malignant neoplasm of rectum (H)       RANITIDINE HCL PO      Take 150 mg by mouth daily as needed for heartburn        TYLENOL PO      Take 1,000 mg by mouth At Bedtime        zolpidem 5 MG tablet    AMBIEN    30 tablet    Take 1 tablet (5 mg) by mouth nightly as needed for sleep    Other insomnia       * Notice:  This list has 2 medication(s) that are the same as other medications prescribed for you. Read the directions carefully, and ask your doctor or other care provider to review them with you.

## 2018-05-07 NOTE — PATIENT INSTRUCTIONS
[facility] Main Line: [phone]    Call triage nurse with chills and/or temperature greater than or equal to 100.4, uncontrolled nausea/vomiting, diarrhea, constipation, dizziness, shortness of breath, chest pain, bleeding, unexplained bruising, or any new/concerning symptoms, questions/concerns.   If you are having any concerning symptoms or wish to speak to a provider before your next infusion visit, please call your care coordinator or triage to notify them so we can adequately serve you.   Triage Nurse Line: [phone]    If after hours, weekends, or holidays, call main hospital  and ask for Oncology doctor on call @ [phone]               May 2018   Luc Monday Tuesday Wednesday Thursday Friday Saturday             1     2     MR CAROLINA WWO   11:00 AM   (60 min.)   [id number]   [facility] [place], [facility] 3     4     5       6     7     [facility] DRAW    6:30 AM   (15 min.)    MASONIC LAB DRAW   [facility] Masonic Lab Draw     [facility] RETURN    6:45 AM   (50 min.)   Cathleen Ramos PA-C   [facility] ONC INFUSION 240    7:30 AM   (240 min.)    ONCOLOGY INFUSION   [facility] 8     9     10     11     12       13     14     15     16     17     18     19       20     21     [facility] DRAW    6:30 AM   (15 min.)    MASONIC LAB DRAW   [facility] Masonic Lab Draw     [facility] RETURN    6:45 AM   (50 min.)   Cathleen Ramos PA-C   [facility] ONC INFUSION 240    8:00 AM   (240 min.)    ONCOLOGY INFUSION   [facility] 22     23     24     25     26       27     28     29     30     31 June 2018 Sunday Monday Tuesday Wednesday Thursday Friday Saturday                            1     2       3     4     5     [facility] DRAW    8:30 AM   (15 min.)    [facility]     [facility] RETURN    8:45 AM   (60 min.)   Shanice Ellison PA-C    [facility] ONC INFUSION 240   10:00 AM   (240 min.)    ONCOLOGY INFUSION   [facility] 6     7     8     9       10     11     12     13     14     15     16       17     18     19     MR ABDOMEN WWO    4:45 PM   (45 min.)   UCMR1   [facility] MRI 20     MR PELVIS WWO    9:15 AM   (45 min.)   [id number]   [facility] MRI     CT CHEST WO   10:40 AM   (20 min.)   UCCT2   [facility] CT 21     [facility] MASONIC LAB DRAW   12:00 PM   (15 min.)   [facility] LAB DRAW   [facility] Lab Draw     [facility] RETURN   12:15 PM   (30 min.)   Kyra Mortensen MD   [facility] ONC INFUSION 240    1:00 PM   (240 min.)    ONCOLOGY INFUSION   [facility] 22     23       24     25     26     27     28     29     30                  Lab Results:  Recent Results (from the past 12 hour(s))   CBC with platelets differential    Collection Time: 05/07/18  6:46 AM   Result Value Ref Range    WBC 3.2 (L) 4.0 - 11.0 10e9/L    RBC Count 3.69 (L) 3.8 - 5.2 10e12/L    Hemoglobin 11.8 11.7 - 15.7 g/dL    Hematocrit 32.7 (L) 35.0 - 47.0 %    MCV 89 78 - 100 fl    MCH 32.0 26.5 - 33.0 pg    MCHC 36.1 31.5 - 36.5 g/dL    RDW 16.1 (H) 10.0 - 15.0 %    Platelet Count 117 (L) 150 - 450 10e9/L    Diff Method Automated Method     % Neutrophils 53.6 %    % Lymphocytes 29.7 %    % Monocytes 13.0 %    % Eosinophils 2.8 %    % Basophils 0.6 %    % Immature Granulocytes 0.3 %    Nucleated RBCs 0 0 /100    Absolute Neutrophil 1.7 1.6 - 8.3 10e9/L    Absolute Lymphocytes 0.9 0.8 - 5.3 10e9/L    Absolute Monocytes 0.4 0.0 - 1.3 10e9/L    Absolute Eosinophils 0.1 0.0 - 0.7 10e9/L    Absolute Basophils 0.0 0.0 - 0.2 10e9/L    Abs Immature Granulocytes 0.0 0 - 0.4 10e9/L    Absolute Nucleated RBC 0.0    Comprehensive metabolic panel    Collection Time: 05/07/18  6:46 AM   Result Value Ref Range    Sodium 142 133 - 144 mmol/L    Potassium 3.1 (L)  3.4 - 5.3 mmol/L    Chloride 108 94 - 109 mmol/L    Carbon Dioxide 26 20 - 32 mmol/L    Anion Gap 8 3 - 14 mmol/L    Glucose 108 (H) 70 - 99 mg/dL    Urea Nitrogen 9 7 - 30 mg/dL    Creatinine 0.72 0.52 - 1.04 mg/dL    GFR Estimate 85 >60 mL/min/1.7m2    GFR Estimate If Black >90 >60 mL/min/1.7m2    Calcium 8.6 8.5 - 10.1 mg/dL    Bilirubin Total 0.8 0.2 - 1.3 mg/dL    Albumin 3.8 3.4 - 5.0 g/dL    Protein Total 6.6 (L) 6.8 - 8.8 g/dL    Alkaline Phosphatase 85 40 - 150 U/L     (H) 0 - 50 U/L    AST 60 (H) 0 - 45 U/L

## 2018-05-07 NOTE — PROGRESS NOTES
Oncology/Hematology Visit Note  May 7, 2018    Reason for Visit: follow up of [id number] moderately differentiated adenocarcinoma of the rectum     History of Present Illness: Sarah Rajan is a 52 year old female with  recently diagnosed locally advanced rectal cancer. She noticed BRBPR so screening colonoscopy on 1/9/2018 revealed 3 cm rectal mass and other polyps which were removed.  Pathology indicated moderately differentiated adenocarcinoma w/ intact MMR proteins.  CT staging scan showed no metastatic disease; some liver lesions which are thought to be benign per radiology report, T2N1M0 by pelvic MRI read at [facility]. When we initially reviewed her MRI we were not exactly sure whether that was lymph node involvement or not. We opted to repeat MRI which showed T4 N0 lesion.   We also did an MRI of the liver which showed 3 subcentimeter liver lesions which were too small to characterize and will need attention on follow-up. She is currently undergoing treatment with neoadjuvant 5-FU and oxaliplatin (FOLFOX), which she started on 2/26/18. The day after she received cycle 2, she developed fevers, chills, headache, and an itchy rash on her arms and legs, for which she was evaluated in the ED. She was sent home on Benadryl. Benadryl and dexamethasone doses were increased for cycle 3. She received cycle 4 on 4/10/18. Pelvic MRI on 4/19/18 showed improvement in the rectal mass. Abdominal MRI on 5/2/18 showed stable indeterminate liver lesions. Please see Dr. Mortensen's previous notes for further details on the patient's history. She comes in today for routine follow up prior to cycle 6 FOLFOX.     Interval History:  Patient reports that she has continued to have difficulty with insomnia despite trying several medications.  Taking 0.5 mg of Ativan did not significantly help, but when she did once take 1 mg of Ativan she was able to sleep for 6 hours.  She did not find any relief from amitriptyline.  She has been having  difficulty with staying asleep since cycle 2 of chemotherapy.  She reports that her mood has been down for the last 6 weeks.  Yesterday, she spent most of the day crying.  She previously was on fluoxetine for seasonal affective disorder, but stopped that around the time she started chemotherapy.  She worries a lot throughout the day.  She reports that yesterday both of her parents were in the emergency room and this was stressful for her.  She denies any suicidal or homicidal ideations.  She continues to have difficulty with constipation around the time of her pump disconnected.  She typically takes MiraLAX once a day starting on the day she starts chemotherapy.  She continues to have cold sensitivity, but denies any numbness or tingling separate from the cold.  She is taking at thousand milligrams of Tylenol in the evenings for headaches.  She is very rarely drinking any alcohol.  She denies other concerns.    Current Outpatient Prescriptions   Medication Sig Dispense Refill     Acetaminophen (TYLENOL PO) Take 1,000 mg by mouth At Bedtime       calcium carbonate (TUMS) 500 MG chewable tablet Take 1-2 chew tab by mouth daily  150 tablet      fluorouracil Administer 4,968 mg intravenously . Continuous infusion over 46-48 hr via ambulatory pump q14d Supplied by outside provider with pump.       FLUoxetine (PROZAC) 20 MG capsule Take 1 capsule (20 mg) by mouth daily 30 capsule 3     lidocaine-prilocaine (EMLA) cream Apply 45 minutes prior to procedure. 30 g 3     LORazepam (ATIVAN) 0.5 MG tablet Take 1 tablet (0.5 mg) by mouth every 4 hours as needed (Anxiety, Nausea/Vomiting or Sleep) 30 tablet 2     LORazepam (ATIVAN) 1 MG tablet Take 1 tablet (1 mg) by mouth At Bedtime 30 tablet 0     Potassium Chloride ER 20 MEQ TBCR Take 1 tablet (20 mEq) by mouth 2 times daily for 5 days 10 tablet 0     prochlorperazine (COMPAZINE) 10 MG tablet Take 1 tablet (10 mg) by mouth every 6 hours as needed for nausea or vomiting 20  "tablet 1     RANITIDINE HCL PO Take 150 mg by mouth daily as needed for heartburn       zolpidem (AMBIEN) 5 MG tablet Take 1 tablet (5 mg) by mouth nightly as needed for sleep 30 tablet 3     amitriptyline (ELAVIL) 25 MG tablet Take 1 tablet (25 mg) by mouth At Bedtime (Patient not taking: Reported on 5/7/2018) 30 tablet 1     ondansetron (ZOFRAN) 4 MG tablet Take one tablet by mouth every 6 hours as needed for nausea when taking neomycin and flagyl (Patient not taking: Reported on 5/7/2018) 3 tablet 0     Physical Examination:  General: The patient is a pleasant female. She is tearful throughout the visit today. She is here today with her .   /78 (BP Location: Right arm, Patient Position: Chair, Cuff Size: Adult Large)  Pulse 80  Temp 97.5  F (36.4  C) (Oral)  Resp 16  Ht 1.676 m (5' 6\")  Wt 89 kg (196 lb 3.2 oz)  SpO2 98%  BMI 31.67 kg/m2  Wt Readings from Last 10 Encounters:   05/07/18 89 kg (196 lb 3.2 oz)   04/23/18 90.9 kg (200 lb 6.4 oz)   04/19/18 88.9 kg (196 lb)   04/16/18 88.9 kg (195 lb 14.4 oz)   04/16/18 89.2 kg (196 lb 11.2 oz)   04/14/18 92 kg (202 lb 13.2 oz)   04/10/18 92 kg (202 lb 14.4 oz)   03/26/18 92.3 kg (203 lb 6.4 oz)   03/12/18 92.9 kg (204 lb 14.4 oz)   02/26/18 93.3 kg (205 lb 9.6 oz)   HEENT: EOMI, PERRL. Sclerae are anicteric. Oral mucosa is pink and moist with no lesions or thrush.   Lymph: Neck is supple with no lymphadenopathy in the cervical or supraclavicular areas.   Heart: Regular rate and rhythm.   Lungs: Clear to auscultation bilaterally.   Abdomen: Bowel sounds present, soft, nontender with no palpable hepatosplenomegaly or masses.   Extremities: No lower extremity edema noted bilaterally.   Neuro: Cranial nerves II through XII are grossly intact.  Skin: No rashes, petechiae, or bruising noted on exposed skin.     Laboratory Data:   5/7/2018 06:46   Sodium 142   Potassium 3.1 (L)   Chloride 108   Carbon Dioxide 26   Urea Nitrogen 9   Creatinine 0.72   GFR " Estimate 85   GFR Estimate If Black >90   Calcium 8.6   Anion Gap 8   Albumin 3.8   Protein Total 6.6 (L)   Bilirubin Total 0.8   Alkaline Phosphatase 85    (H)   AST 60 (H)   Glucose 108 (H)   WBC 3.2 (L)   Hemoglobin 11.8   Hematocrit 32.7 (L)   Platelet Count 117 (L)   RBC Count 3.69 (L)   MCV 89   MCH 32.0   MCHC 36.1   RDW 16.1 (H)   Diff Method Automated Method   % Neutrophils 53.6   % Lymphocytes 29.7   % Monocytes 13.0   % Eosinophils 2.8   % Basophils 0.6   % Immature Granulocytes 0.3   Nucleated RBCs 0   Absolute Neutrophil 1.7   Absolute Lymphocytes 0.9   Absolute Monocytes 0.4   Absolute Eosinophils 0.1   Absolute Basophils 0.0   Abs Immature Granulocytes 0.0   Absolute Nucleated RBC 0.0     Assessment and Plan:  1. Locally advanced moderately differentiated adenocarcinoma of the rectum: [id number]. MSI stable. Started neoadjuvant FOLFOX on 2/26/18. She is tolerating treatment reasonably well with cold sensitivity and constipation. Imaging after 4 cycles showed improvement in the rectal lesion. She will continue with cycle 6 today. She will follow up with me prior to cycles 7 and 8. She will have a chest CT and abd/pelvis MRI after 8 cycles of chemotherapy and will see Dr. Mortensen to review. Plan for chemorads following neoadjuvant chemotherapy. She has previously met with Dr. Ford.     2. Depression and anxiety. She will resume fluoxetine 20 mg daily. We reviewed that it can take 4-6 weeks to see full effect, though some patient see improvement in symptoms sooner. She may take 1 mg Ativan qhs to help with sleep. We discussed that we do need to be careful with taking Ativan long term, but that it is okay to use in the short term to help with sleep until her mood and anxiety improve. She is agreeable to meeting with a health psychologist. Will also plan to decrease her dexamethasone from 20 mg to 10 mg. Discussed if rash returns, then resume Benadryl. She does receive IV Benadryl.     3.  Constipation. Will increase MiraLax to bid for the first few days following chemotherapy.     4. Elevated LFT's. Suspect secondary to chemotherapy. She has minimal Tylenol and rare alcohol use. Will continue to monitor.    5. Hypokalemia. Suspect related to poor po intake with depressed mood. She will take potassium chloride 20 mEq bid x 5 days. Will recheck at next visit.     Cathleen Ramos PA-C  [facility]  [address], [place] [address] [phone]

## 2018-05-07 NOTE — MR AVS SNAPSHOT
After Visit Summary   5/7/2018    Sarah Rajan    MRN: [id number]           Patient Information     Date Of Birth          1965        Visit Information        Provider Department      5/7/2018 7:00 AM Cathleen Ramos PA-C [facility]        Today's Diagnoses     Malignant neoplasm of rectum (H)    -  1    Other depression        Other insomnia        Hypokalemia           Follow-ups after your visit        Your next 10 appointments already scheduled     May 21, 2018  6:30 AM CDT   [facility] Lab Draw with [facility] LAB DRAW   [facility] Mason[facility] Lab Draw ([facility])    [address]  [address]  [facility] [address]   [phone]            May 21, 2018  7:00 AM CDT   (Arrive by 6:45 AM)   Return Visit with Cathleen Ramos PA-C   [facility] ([facility])    [address] [place] [address]  [address]  [facility] [address]   [phone]            May 21, 2018  8:00 AM CDT   Infusion 240 with UC ONCOLOGY INFUSION, UC 18 ATC   [facility] ([facility])    [address]  [address]  [facility] [address]   [phone]            Jun 05, 2018  8:30 AM CDT   M[facility] Lab Draw with [facility] LAB DRAW   [facility] Masonic Lab Draw ([facility])    [address]  [address]  [facility] [address]   [phone]            Jun 05, 2018  9:00 AM CDT   (Arrive by 8:45 AM)   Return Visit with Shanice Ellison PA-C   [facility] ([facility])    [address] [place] [address]  [address]  [facility] [address]   [phone]            Jun 05, 2018 10:00 AM CDT   Infusion 240 with UC ONCOLOGY INFUSION, UC 17 ATC   [facility] ([facility])    [address] [place] [address]  [address]  [facility] [address]   [phone]            Jun 21, 2018 12:00 PM CDT    [facility]   [facility] Lab Draw ([facility])    [address]  [address]  [facility] [id number] [phone]            Jun 21, 2018 12:30 PM CDT   (Arrive by 12:15 PM)   Return Visit with Kyra Mortensen MD   [facility] ([facility])    [address]  [address]  [facility] [id number] [phone]              Future tests that were ordered for you today     Open Future Orders        Priority Expected Expires Ordered    MRI Abdomen & pelvis w & wo contrast Routine 6/19/2018 8/5/2018 5/7/2018    CT Chest w/o contrast Routine 6/18/2018 5/7/2019 5/7/2018            Who to contact     If you have questions or need follow up information about today's clinic visit or your schedule please contact [facility] directly at [phone].  Normal or non-critical lab and imaging results will be communicated to you by [employer], letter or phone within 4 business days after the clinic has received the results. If you do not hear from us within 7 days, please contact the clinic through [employer] or phone. If you have a critical or abnormal lab result, we will notify you by phone as soon as possible.  Submit refill requests through [employer] or call your pharmacy and they will forward the refill request to us. Please allow 3 business days for your refill to be completed.          Additional Information About Your Visit        Know[employer] Information     [employer] gives you secure access to your electronic health record. If you see a primary care provider, you can also send messages to your care team and make appointments. If you have questions, please call your primary care clinic.  If you do not have a primary care provider, please call [phone] and they will assist you.        Care EveryWhere ID     This is your Care EveryWhere ID. This could be used by other organizations to access your  "[facility] medical records  [id number]        Your Vitals Were     Pulse Temperature Respirations Height Pulse Oximetry BMI (Body Mass Index)    80 97.5  F (36.4  C) (Oral) 16 1.676 m (5' 6\") 98% 31.67 kg/m2       Blood Pressure from Last 3 Encounters:   05/07/18 139/78   04/23/18 117/83   04/19/18 117/79    Weight from Last 3 Encounters:   05/07/18 89 kg (196 lb 3.2 oz)   04/23/18 90.9 kg (200 lb 6.4 oz)   04/19/18 88.9 kg (196 lb)                 Today's Medication Changes          These changes are accurate as of 5/7/18  8:03 AM.  If you have any questions, ask your nurse or doctor.               Start taking these medicines.        Dose/Directions    FLUoxetine 20 MG capsule   Commonly known as:  PROzac   Used for:  Other depression   Started by:  Cathleen Ramos PA-C        Dose:  20 mg   Take 1 capsule (20 mg) by mouth daily   Quantity:  30 capsule   Refills:  3       Potassium Chloride ER 20 MEQ Tbcr   Used for:  Hypokalemia   Started by:  Cathleen Ramos PA-C        Dose:  20 mEq   Take 1 tablet (20 mEq) by mouth 2 times daily for 5 days   Quantity:  10 tablet   Refills:  0         These medicines have changed or have updated prescriptions.        Dose/Directions    * LORazepam 0.5 MG tablet   Commonly known as:  ATIVAN   This may have changed:  Another medication with the same name was added. Make sure you understand how and when to take each.   Used for:  Malignant neoplasm of rectum (H)   Changed by:  Cathleen Ramos PA-C        Dose:  0.5 mg   Take 1 tablet (0.5 mg) by mouth every 4 hours as needed (Anxiety, Nausea/Vomiting or Sleep)   Quantity:  30 tablet   Refills:  2       * LORazepam 1 MG tablet   Commonly known as:  ATIVAN   This may have changed:  You were already taking a medication with the same name, and this prescription was added. Make sure you understand how and when to take each.   Used for:  Other insomnia   Changed by:  Cathleen Ramos PA-C        Dose:  1 mg   Take 1 " tablet (1 mg) by mouth At Bedtime   Quantity:  30 tablet   Refills:  0       * Notice:  This list has 2 medication(s) that are the same as other medications prescribed for you. Read the directions carefully, and ask your doctor or other care provider to review them with you.         Where to get your medicines      These medications were sent to [place], [place] - [address]  [address], [facility] [address]    Hours:  TRANSPLANT PHONE NUMBER [phone] Phone:  [phone]     FLUoxetine 20 MG capsule    Potassium Chloride ER 20 MEQ Tbcr         Some of these will need a paper prescription and others can be bought over the counter.  Ask your nurse if you have questions.     Bring a paper prescription for each of these medications     LORazepam 1 MG tablet                Primary Care Provider Office Phone # Fax #    Wayne Green [phone]       [facility] [address] [address]        Equal Access to Services     DEAN CHAKRABORTY AH: Hadii aad ku hadasho Soomaali, waaxda luqadaha, qaybta kaalmada adeegyada, waxay idiin hayalyssa mtz . So [facility] [phone].    ATENCIÓN: Si habla español, tiene a membreno disposición servicios gratuitos de asistencia lingüística. Claudette al [phone].    We comply with applicable federal civil rights laws and [place] laws. We do not discriminate on the basis of race, color, national origin, age, disability, sex, sexual orientation, or gender identity.            Thank you!     Thank you for choosing [facility]  for your care. Our goal is always to provide you with excellent care. Hearing back from our patients is one way we can continue to improve our services. Please take a few minutes to complete the written survey that you may receive in the mail after your visit with us. Thank you!             Your Updated Medication List -  Protect others around you: Learn how to safely use, store and throw away your medicines at www.disposemymeds.org.          This list is accurate as of 5/7/18  8:03 AM.  Always use your most recent med list.                   Brand Name Dispense Instructions for use Diagnosis    amitriptyline 25 MG tablet    ELAVIL    30 tablet    Take 1 tablet (25 mg) by mouth At Bedtime    Insomnia, unspecified type, Malignant neoplasm of rectum (H), Liver lesion       calcium carbonate 500 MG chewable tablet    TUMS    150 tablet    Take 1-2 chew tab by mouth daily        fluorouracil      Administer 4,968 mg intravenously . Continuous infusion over 46-48 hr via ambulatory pump q14d Supplied by outside provider with pump.        FLUoxetine 20 MG capsule    PROzac    30 capsule    Take 1 capsule (20 mg) by mouth daily    Other depression       lidocaine-prilocaine cream    EMLA    30 g    Apply 45 minutes prior to procedure.    Malignant neoplasm of rectum (H)       * LORazepam 0.5 MG tablet    ATIVAN    30 tablet    Take 1 tablet (0.5 mg) by mouth every 4 hours as needed (Anxiety, Nausea/Vomiting or Sleep)    Malignant neoplasm of rectum (H)       * LORazepam 1 MG tablet    ATIVAN    30 tablet    Take 1 tablet (1 mg) by mouth At Bedtime    Other insomnia       ondansetron 4 MG tablet    ZOFRAN    3 tablet    Take one tablet by mouth every 6 hours as needed for nausea when taking neomycin and flagyl    Rectal cancer (H)       Potassium Chloride ER 20 MEQ Tbcr     10 tablet    Take 1 tablet (20 mEq) by mouth 2 times daily for 5 days    Hypokalemia       prochlorperazine 10 MG tablet    COMPAZINE    20 tablet    Take 1 tablet (10 mg) by mouth every 6 hours as needed for nausea or vomiting    Malignant neoplasm of rectum (H)       RANITIDINE HCL PO      Take 150 mg by mouth daily as needed for heartburn        TYLENOL PO      Take 1,000 mg by mouth At Bedtime        zolpidem 5 MG tablet    AMBIEN    30 tablet    Take 1 tablet (5 mg)  by mouth nightly as needed for sleep    Other insomnia       * Notice:  This list has 2 medication(s) that are the same as other medications prescribed for you. Read the directions carefully, and ask your doctor or other care provider to review them with you.

## 2018-05-07 NOTE — PROGRESS NOTES
Social Work Follow-Up Encounter Visit  Oncology Clinic    Data/Intervention:  Patient Name:  Sarah Rajan  /Age:  1965 (52 year old)    Reason for Follow-Up:  Depression, anxiety, supportive visit    Collaborated With:    -infusion RN's  -patient  -patient's -Cody    Resources Provided:  Palliative care SW information  Franca Angie referral    Assessment:  Received request from infusion RN's re: pt having difficult morning during appts.  SW met with pt and pt's -Cody to offer assistance and support.  Pt described a long history of depression and anxiety specifically around any forms of death.  Pt and pt's  discussed pt feeling more and more depressed and anxious over the past couple weeks but really struggling the past week as pt has not been able to sleep.  Both of pt's parents were also in the emergency dept this weekend which really triggered pt's depression and anxiety.  Reviewed pt's coping strategies, pt states she finds comfort in being [place] the ocean, actually has a trip planned to leave this  but very anxious as she fears something will happen with her parents when she is gone.  Pt's  described previous trip where a death and accident happen when they were gone.  Pt's  described much support in their family/friend group, including a neighbor/friend whom is a nurse, a relative whom is a physician (oncologist).  Pt and spouse have 2 teenage children, which pt worries about the one whom as started to drive.    Pt very open to trying medication, therapy (ideally in home, or during or before/after her infusion appts).  SW discussed palliative SW and possible community agencies that offer in home therapy, pt open to referrals to both to determine what would be best for her.    Pt thoroughly described worrying all the time and feeling sad, but knows she can feel better.  Pt states she is not a harm to herself or others, but just so sad and anxious and wanting to  not feel that way anymore.  Pt's  appears very supportive to pt and very open to help for pt as well.  Offered supportive reassurance to pt for being open to discuss her worries and seeking help.  Pt aware that provider has re-prescribed medication to assist with anxiety, depression and sleeping, pt planning to start taking today. Pt and  appreciative of SW visit and referrals for further support.     Plan:  Provided patient/family with SW's contact information and availability.         Fara Isaac (Martens), LINDASW, MSW   - [facility]  Phone: [phone]  Pager: [phone]  5/7/2018

## 2018-05-08 ASSESSMENT — PATIENT HEALTH QUESTIONNAIRE - PHQ9: SUM OF ALL RESPONSES TO PHQ QUESTIONS 1-9: 25

## 2018-05-09 ENCOUNTER — HOME INFUSION (PRE-WILLOW HOME INFUSION) (OUTPATIENT)
Dept: PHARMACY | Facility: CLINIC | Age: 53
End: 2018-05-09

## 2018-05-10 ENCOUNTER — TELEPHONE (OUTPATIENT)
Dept: PALLIATIVE CARE | Facility: CLINIC | Age: 53
End: 2018-05-10

## 2018-05-10 NOTE — TELEPHONE ENCOUNTER
Palliative Care Clinical Social Work Phone Contact    Data: Referral from PIOTR Ochoa in oncology re: difficulty coping, anxiety and depressed mood. Patient interested in psychotherapy, ideally before infusion here, or in-home.    Intervention: Called and left message for Sarah briefly introducing self and role and inviting her to call back so we can work on coordinating a visit with myself or another counselor here soon.    Response/Assessment: Unable to assess today.    Plan: Wait for call back. Remain available.   Counselors potentially beneficial for Sarah here include:  Myself  SHANTHI Nicholson in palliative care  Troy Huff LP in oncology  SUKUMAR Sharp in behavioral health clinician role for [facility] - He often has short term/immediate availability to meet patients since this is the format of his role.  Health psychology (Ellen Dorado, Terra Lopez, et al)  It may be useful to look at various counselor options together with her since schedule is a concern, she prefers to see someone when she has another appt in the [facility] also.      SUZI Lockett, SHANTHI  Clinical , Palliative Care Program  [facility]  Office Phone: [phone]  [facility]: [phone]  [facility]: [phone]

## 2018-05-10 NOTE — PROGRESS NOTES
This is a recent snapshot of the patient's [facility] medical record.  For current drug dose and complete information and questions, call [phone] or In Basket pool, fv home infusion ([id number])  CSN Number:  [id number]

## 2018-05-11 ENCOUNTER — TELEPHONE (OUTPATIENT)
Dept: PALLIATIVE CARE | Facility: CLINIC | Age: 53
End: 2018-05-11

## 2018-05-11 ENCOUNTER — TELEPHONE (OUTPATIENT)
Dept: ONCOLOGY | Facility: CLINIC | Age: 53
End: 2018-05-11

## 2018-05-11 DIAGNOSIS — T45.1X5A CHEMOTHERAPY INDUCED NAUSEA AND VOMITING: Primary | ICD-10-CM

## 2018-05-11 DIAGNOSIS — R11.2 CHEMOTHERAPY INDUCED NAUSEA AND VOMITING: Primary | ICD-10-CM

## 2018-05-11 RX ORDER — ONDANSETRON 8 MG/1
8 TABLET, ORALLY DISINTEGRATING ORAL EVERY 8 HOURS PRN
Qty: 60 TABLET | Refills: 1 | Status: SHIPPED | OUTPATIENT
Start: 2018-05-11 | End: 2018-07-25

## 2018-05-11 NOTE — TELEPHONE ENCOUNTER
Patient and  report struggling with severe sleepiness. (primarily spoke to Cody during the phone call), he reports: Sarah is using ativan and compazine for nausea and she is so sleepy that she will nap several times throughout the day for hours at a time and after being up for only 10-15 minutes she will feel like going back to bed. She also started on Prozac 20mg on 5/7. Depression symptoms seem slightly improved. Cody verified with Sarah even when she does not use Ativan she is still extremely sleepy.     IB message to Cathleen Ramos for her recommendations

## 2018-05-11 NOTE — TELEPHONE ENCOUNTER
Palliative Care Clinical Social Work Phone Contact    Data: Exchanged messages with Sarah. She called back to say she would like to schedule a counseling visit.    Intervention: I called back and confirmed she is welcome to schedule, gave scheduling number, and also invited her to call me back if wanting to discuss and coordinate more. Encouraged using scheduling if possible since they are easier to reach. Also mentioned Lashaun Islas is hard to coordinate with my schedule.    Response/Assessment: Unable to assess today. Sarah is interested in meeting soon.     Plan: Remain available    SUZI Lockett, [facility]  Clinical , Palliative Care Program  [facility]  Office Phone: [phone]  [facility]: [phone]  [facility]: [phone]

## 2018-05-12 ENCOUNTER — HOSPITAL ENCOUNTER (EMERGENCY)
Facility: CLINIC | Age: 53
Discharge: HOME | End: 2018-05-12
Attending: EMERGENCY MEDICINE | Admitting: EMERGENCY MEDICINE
Payer: COMMERCIAL

## 2018-05-12 ENCOUNTER — NURSE TRIAGE (OUTPATIENT)
Dept: NURSING | Facility: CLINIC | Age: 53
End: 2018-05-12

## 2018-05-12 VITALS
DIASTOLIC BLOOD PRESSURE: 90 MMHG | BODY MASS INDEX: 31.02 KG/M2 | WEIGHT: 193 LBS | TEMPERATURE: 98.6 F | SYSTOLIC BLOOD PRESSURE: 132 MMHG | HEART RATE: 89 BPM | OXYGEN SATURATION: 98 % | HEIGHT: 66 IN | RESPIRATION RATE: 10 BRPM

## 2018-05-12 DIAGNOSIS — R11.0 NAUSEA: ICD-10-CM

## 2018-05-12 DIAGNOSIS — R63.0 LOSS OF APPETITE: ICD-10-CM

## 2018-05-12 DIAGNOSIS — E86.0 DEHYDRATION: ICD-10-CM

## 2018-05-12 DIAGNOSIS — C20 MALIGNANT NEOPLASM OF RECTUM (H): ICD-10-CM

## 2018-05-12 LAB
ALBUMIN SERPL-MCNC: 4.2 G/DL (ref 3.4–5)
ALBUMIN UR-MCNC: 30 MG/DL
ALP SERPL-CCNC: 98 U/L (ref 40–150)
ALT SERPL W P-5'-P-CCNC: 235 U/L (ref 0–50)
ANION GAP SERPL CALCULATED.3IONS-SCNC: 7 MMOL/L (ref 3–14)
APPEARANCE UR: CLEAR
AST SERPL W P-5'-P-CCNC: 117 U/L (ref 0–45)
BASOPHILS # BLD AUTO: 0 10E9/L (ref 0–0.2)
BASOPHILS NFR BLD AUTO: 0.8 %
BILIRUB SERPL-MCNC: 1.4 MG/DL (ref 0.2–1.3)
BILIRUB UR QL STRIP: NEGATIVE
BUN SERPL-MCNC: 13 MG/DL (ref 7–30)
CALCIUM SERPL-MCNC: 8.9 MG/DL (ref 8.5–10.1)
CHLORIDE SERPL-SCNC: 103 MMOL/L (ref 94–109)
CO2 SERPL-SCNC: 25 MMOL/L (ref 20–32)
COLOR UR AUTO: YELLOW
CREAT SERPL-MCNC: 0.68 MG/DL (ref 0.52–1.04)
DIFFERENTIAL METHOD BLD: ABNORMAL
EOSINOPHIL # BLD AUTO: 0 10E9/L (ref 0–0.7)
EOSINOPHIL NFR BLD AUTO: 0.8 %
ERYTHROCYTE [DISTWIDTH] IN BLOOD BY AUTOMATED COUNT: 15.1 % (ref 10–15)
GFR SERPL CREATININE-BSD FRML MDRD: >90 ML/MIN/1.7M2
GLUCOSE SERPL-MCNC: 106 MG/DL (ref 70–99)
GLUCOSE UR STRIP-MCNC: NEGATIVE MG/DL
HCT VFR BLD AUTO: 36.3 % (ref 35–47)
HGB BLD-MCNC: 12.8 G/DL (ref 11.7–15.7)
HGB UR QL STRIP: NEGATIVE
HYALINE CASTS #/AREA URNS LPF: 1 /LPF (ref 0–2)
IMM GRANULOCYTES # BLD: 0 10E9/L (ref 0–0.4)
IMM GRANULOCYTES NFR BLD: 0 %
KETONES UR STRIP-MCNC: NEGATIVE MG/DL
LEUKOCYTE ESTERASE UR QL STRIP: NEGATIVE
LYMPHOCYTES # BLD AUTO: 0.8 10E9/L (ref 0.8–5.3)
LYMPHOCYTES NFR BLD AUTO: 31.4 %
MCH RBC QN AUTO: 31.1 PG (ref 26.5–33)
MCHC RBC AUTO-ENTMCNC: 35.3 G/DL (ref 31.5–36.5)
MCV RBC AUTO: 88 FL (ref 78–100)
MONOCYTES # BLD AUTO: 0.2 10E9/L (ref 0–1.3)
MONOCYTES NFR BLD AUTO: 7.3 %
MUCOUS THREADS #/AREA URNS LPF: PRESENT /LPF
NEUTROPHILS # BLD AUTO: 1.5 10E9/L (ref 1.6–8.3)
NEUTROPHILS NFR BLD AUTO: 59.7 %
NITRATE UR QL: NEGATIVE
PH UR STRIP: 6 PH (ref 5–7)
PLATELET # BLD AUTO: 98 10E9/L (ref 150–450)
POTASSIUM SERPL-SCNC: 3.8 MMOL/L (ref 3.4–5.3)
PROT SERPL-MCNC: 7.3 G/DL (ref 6.8–8.8)
RBC # BLD AUTO: 4.12 10E12/L (ref 3.8–5.2)
RBC #/AREA URNS AUTO: 1 /HPF (ref 0–2)
SODIUM SERPL-SCNC: 135 MMOL/L (ref 133–144)
SOURCE: ABNORMAL
SP GR UR STRIP: 1.02 (ref 1–1.03)
SQUAMOUS #/AREA URNS AUTO: <1 /HPF (ref 0–1)
UROBILINOGEN UR STRIP-MCNC: 4 MG/DL (ref 0–2)
WBC # BLD AUTO: 2.5 10E9/L (ref 4–11)
WBC #/AREA URNS AUTO: 1 /HPF (ref 0–5)

## 2018-05-12 PROCEDURE — 80053 COMPREHEN METABOLIC PANEL: CPT | Performed by: EMERGENCY MEDICINE

## 2018-05-12 PROCEDURE — 81001 URINALYSIS AUTO W/SCOPE: CPT | Performed by: EMERGENCY MEDICINE

## 2018-05-12 PROCEDURE — 99283 EMERGENCY DEPT VISIT LOW MDM: CPT | Mod: 25 | Performed by: EMERGENCY MEDICINE

## 2018-05-12 PROCEDURE — 25000128 H RX IP 250 OP 636: Performed by: EMERGENCY MEDICINE

## 2018-05-12 PROCEDURE — 96360 HYDRATION IV INFUSION INIT: CPT | Performed by: EMERGENCY MEDICINE

## 2018-05-12 PROCEDURE — 96361 HYDRATE IV INFUSION ADD-ON: CPT | Performed by: EMERGENCY MEDICINE

## 2018-05-12 PROCEDURE — 99283 EMERGENCY DEPT VISIT LOW MDM: CPT | Mod: Z6 | Performed by: EMERGENCY MEDICINE

## 2018-05-12 PROCEDURE — 85025 COMPLETE CBC W/AUTO DIFF WBC: CPT | Performed by: EMERGENCY MEDICINE

## 2018-05-12 RX ORDER — HEPARIN SODIUM (PORCINE) LOCK FLUSH IV SOLN 100 UNIT/ML 100 UNIT/ML
5 SOLUTION INTRAVENOUS ONCE
Status: COMPLETED | OUTPATIENT
Start: 2018-05-12 | End: 2018-05-12

## 2018-05-12 RX ORDER — SODIUM CHLORIDE, SODIUM LACTATE, POTASSIUM CHLORIDE, CALCIUM CHLORIDE 600; 310; 30; 20 MG/100ML; MG/100ML; MG/100ML; MG/100ML
INJECTION, SOLUTION INTRAVENOUS CONTINUOUS
Status: DISCONTINUED | OUTPATIENT
Start: 2018-05-12 | End: 2018-05-12 | Stop reason: HOSPADM

## 2018-05-12 RX ORDER — SODIUM CHLORIDE 9 MG/ML
INJECTION, SOLUTION INTRAVENOUS CONTINUOUS
Status: DISCONTINUED | OUTPATIENT
Start: 2018-05-12 | End: 2018-05-12

## 2018-05-12 RX ADMIN — HEPARIN SODIUM (PORCINE) LOCK FLUSH IV SOLN 100 UNIT/ML 5 ML: 100 SOLUTION at 12:55

## 2018-05-12 RX ADMIN — SODIUM CHLORIDE, POTASSIUM CHLORIDE, SODIUM LACTATE AND CALCIUM CHLORIDE 1000 ML: 600; 310; 30; 20 INJECTION, SOLUTION INTRAVENOUS at 10:01

## 2018-05-12 RX ADMIN — SODIUM CHLORIDE 1000 ML: 9 INJECTION, SOLUTION INTRAVENOUS at 10:49

## 2018-05-12 ASSESSMENT — ENCOUNTER SYMPTOMS
DIARRHEA: 1
ABDOMINAL PAIN: 1
SHORTNESS OF BREATH: 0
FEVER: 0
VOMITING: 0

## 2018-05-12 NOTE — ED AVS SNAPSHOT
[facility]    [address] [address]    Phone:  [phone]    Fax:  [phone]                                       Sarah Rajan   MRN: [id number]    Department:  [facility]   Date of Visit:  5/12/2018           Patient Information     Date Of Birth          1965        Your diagnoses for this visit were:     Dehydration     Malignant neoplasm of rectum (H)     Nausea     Loss of appetite        You were seen by Holland Escamilla MD.        Discharge Instructions       Continue home medications.   Follow up with primary doctor.  Discuss having standing fluid order.    Liver tests were slightly elevated as well.  Follow up with primary doctors for this as well.            Dehydration (Adult)  Dehydration occurs when your body loses too much fluid. This may be the result of prolonged vomiting or diarrhea, excessive sweating, or a high fever. It may also happen if you don t drink enough fluid when you re sick or out in the heat. Misuse of diuretics (water pills) can also be a cause.  Symptoms include thirst and decreased urine output. You may also feel dizzy, weak, fatigued, or very drowsy. The diet described below is usually enough to treat dehydration. In some cases, you may need medicine.  Home care    Drink at least 12 8-ounce glasses of fluid every day to resolve the dehydration. Fluid may include water; orange juice; lemonade; apple, grape, or cranberry juice; clear fruit drinks; electrolyte replacement and sports drinks; and teas and coffee without caffeine. Don't drink alcohol. If you have been diagnosed with a kidney disease, ask your doctor how much and what types of fluids you should drink to prevent dehydration. If you have kidney disease, fluid can build up in the body. This can be dangerous to your health.    If you have a fever, muscle aches, or a headache as a result of a cold or flu, you may take acetaminophen or  ibuprofen, unless another medicine was prescribed. If you have chronic liver or kidney disease, or have ever had a stomach ulcer or gastrointestinal bleeding, talk with your healthcare provider before using these medicines. Don't take aspirin if you are younger than 18 and have a fever. Aspirin raises the chance for severe liver injury.  Follow-up care  Follow up with your healthcare provider, or as advised.  When to seek medical advice  Call your healthcare provider right away if any of these occur:    Continued vomiting    Frequent diarrhea (more than 5 times a day); blood (red or black color) or mucus in diarrhea    Blood in vomit or stool    Swollen abdomen or increasing abdominal pain    Weakness, dizziness, or fainting    Unusual drowsiness or confusion    Reduced urine output or extreme thirst    Fever of 100.4 F (38 C) or higher  Date Last Reviewed: 5/1/2017 2000-2017 [employer]. [address] [place] [address]. All rights reserved. This information is not intended as a substitute for professional medical care. Always follow your healthcare professional's instructions.          Your next 10 appointments already scheduled     May 21, 2018  6:30 AM CDT   [facility] Lab Draw with [facility] LAB DRAW   [facility] ([facility])    [address] [place] [address]  [address]  [facility] [address]   [phone]            May 21, 2018  7:00 AM CDT   (Arrive by 6:45 AM)   Return Visit with Cathleen Ramos PA-C   [facility] ([facility])    [address] [place] [address]  [address]  [facility] [address]   [phone]            May 21, 2018  8:00 AM CDT   Infusion 240 with VERONICA ONCOLOGY INFUSION, UC 18 ATC   [facility] ([facility])    [address] [place] [address]  [address]  [facility] [address]   [phone]            Jun 05, 2018  8:30 AM CDT   [facility] Lab Draw with UC  [facility] LAB DRAW   [facility] Lab Draw ([facility])    [address]  [address]  [facility] [address]   [phone]            Jun 05, 2018  9:00 AM CDT   (Arrive by 8:45 AM)   Return Visit with Shanice Ellison PA-C   [facility] ([facility])    [address]  [address]  [facility] [address]   [phone]            Jun 05, 2018 10:00 AM CDT   Infusion 240 with UC ONCOLOGY INFUSION, UC 17 ATC   [facility] ([facility])    [address]  [address]  [facility] [address]   [phone]            Jun 19, 2018  4:45 PM CDT   MR ABDOMEN W/O & W CONTRAST with [id number]   [facility] ([facility])    [address]  1st Floor  [facility] [address] [phone]           Take your medicines as usual, unless your doctor tells you not to. Bring a list of your current medicines to your exam (including vitamins, minerals and over-the-counter drugs). Also bring the results of similar scans you may have had.    You may or may not receive IV contrast for this exam pending the discretion of the Radiologist.   Do not eat or drink for 6 hours prior to exam.  The MRI machine uses a strong magnet. Please wear clothes without metal (snaps, zippers). A sweatsuit works well, or we may give you a hospital gown.  Please remove any body piercings and hair extensions before you arrive. You will also remove watches, jewelry, hairpins, wallets, dentures, partial dental plates and hearing aids. You may wear contact lenses, and you may be able to wear your rings. We have a safe place to keep your personal items, but it is safer to leave them at home.  **IMPORTANT** THE INSTRUCTIONS BELOW ARE ONLY FOR THOSE PATIENTS WHO HAVE BEEN PRESCRIBED SEDATION OR GENERAL ANESTHESIA DURING THEIR MRI PROCEDURE:  IF YOUR DOCTOR PRESCRIBED ORAL SEDATION (take  medicine to help you relax during your exam):   You must get the medicine from your doctor (oral medication) before you arrive. Bring the medicine to the exam. Do not take it at home. You ll be told when to take it upon arriving for your exam.   Arrive one hour early. Bring someone who can take you home after the test. Your medicine will make you sleepy. After the exam, you may not drive, take a bus or take a taxi by yourself.  IF YOUR DOCTOR PRESCRIBED IV SEDATION:   Arrive one hour early. Bring someone who can take you home after the test. Your medicine will make you sleepy. After the exam, you may not drive, take a bus or take a taxi by yourself.   No eating 6 hours before your exam. You may have clear liquids up until 4 hours before your exam. (Clear liquids include water, clear tea, black coffee and fruit juice without pulp.)  IF YOUR DOCTOR PRESCRIBED ANESTHESIA (be asleep for your exam):   Arrive 1 1/2 hours early. Bring someone who can take you home after the test. You may not drive, take a bus or take a taxi by yourself.   No eating 8 hours before your exam. You may have clear liquids up until 4 hours before your exam. (Clear liquids include water, clear tea, black coffee and fruit juice without pulp.)   You will spend four to five hours in the recovery room.  If you have any questions, please contact your Imaging Department exam site.            Jun 20, 2018  9:15 AM CDT   MR PELVIS W/O & W CONTRAST with [id number]   [facility] MRI ([facility])    [address] [address] [phone]           Take your medicines as usual, unless your doctor tells you not to. Bring a list of your current medicines to your exam (including vitamins, minerals and over-the-counter drugs).  You may or may not receive intravenous (IV) contrast for this exam pending the discretion of the Radiologist.  You do not need to do anything special to prepare.  The  MRI machine uses a strong magnet. Please wear clothes without metal (snaps, zippers). A sweatsuit works well, or we may give you a hospital gown.  Please remove any body piercings and hair extensions before you arrive. You will also remove watches, jewelry, hairpins, wallets, dentures, partial dental plates and hearing aids. You may wear contact lenses, and you may be able to wear your rings. We have a safe place to keep your personal items, but it is safer to leave them at home.  **IMPORTANT** THE INSTRUCTIONS BELOW ARE ONLY FOR THOSE PATIENTS WHO HAVE BEEN PRESCRIBED SEDATION OR GENERAL ANESTHESIA DURING THEIR MRI PROCEDURE:  IF YOUR DOCTOR PRESCRIBED ORAL SEDATION (take medicine to help you relax during your exam):   You must get the medicine from your doctor (oral medication) before you arrive. Bring the medicine to the exam. Do not take it at home. You ll be told when to take it upon arriving for your exam.   Arrive one hour early. Bring someone who can take you home after the test. Your medicine will make you sleepy. After the exam, you may not drive, take a bus or take a taxi by yourself.  IF YOUR DOCTOR PRESCRIBED IV SEDATION:   Arrive one hour early. Bring someone who can take you home after the test. Your medicine will make you sleepy. After the exam, you may not drive, take a bus or take a taxi by yourself.   No eating 6 hours before your exam. You may have clear liquids up until 4 hours before your exam. (Clear liquids include water, clear tea, black coffee and fruit juice without pulp.)  IF YOUR DOCTOR PRESCRIBED ANESTHESIA (be asleep for your exam):   Arrive 1 1/2 hours early. Bring someone who can take you home after the test. You may not drive, take a bus or take a taxi by yourself.   No eating 8 hours before your exam. You may have clear liquids up until 4 hours before your exam. (Clear liquids include water, clear tea, black coffee and fruit juice without pulp.)   You will spend four to five hours  in the recovery room.  Please call the Imaging Department at your exam site with any questions.              24 Hour Appointment Hotline       To make an appointment at any [facility], call [phone] ([phone]). If you don't have a family doctor or clinic, we will help you find one. [facility] clinics are conveniently located to serve the needs of you and your family.             Review of your medicines      Our records show that you are taking the medicines listed below. If these are incorrect, please call your family doctor or clinic.        Dose / Directions Last dose taken    amitriptyline 25 MG tablet   Commonly known as:  ELAVIL   Dose:  25 mg   Quantity:  30 tablet        Take 1 tablet (25 mg) by mouth At Bedtime   Refills:  1        calcium carbonate 500 MG chewable tablet   Commonly known as:  TUMS   Dose:  1-2 chew tab   Quantity:  150 tablet        Take 1-2 chew tab by mouth daily   Refills:  0        fluorouracil        Administer 4,968 mg intravenously . Continuous infusion over 46-48 hr via ambulatory pump q14d Supplied by outside provider with pump.   Refills:  0        FLUoxetine 20 MG capsule   Commonly known as:  PROzac   Dose:  20 mg   Quantity:  30 capsule        Take 1 capsule (20 mg) by mouth daily   Refills:  3        lidocaine-prilocaine cream   Commonly known as:  EMLA   Quantity:  30 g        Apply 45 minutes prior to procedure.   Refills:  3        * LORazepam 0.5 MG tablet   Commonly known as:  ATIVAN   Dose:  0.5 mg   Quantity:  30 tablet        Take 1 tablet (0.5 mg) by mouth every 4 hours as needed (Anxiety, Nausea/Vomiting or Sleep)   Refills:  2        * LORazepam 1 MG tablet   Commonly known as:  ATIVAN   Dose:  1 mg   Quantity:  30 tablet        Take 1 tablet (1 mg) by mouth At Bedtime   Refills:  0        ondansetron 4 MG tablet   Commonly known as:  ZOFRAN   Quantity:  3 tablet        Take one tablet by mouth every 6 hours as needed for nausea when taking neomycin  and flagyl   Refills:  0        ondansetron 8 MG ODT tab   Commonly known as:  ZOFRAN-ODT   Dose:  8 mg   Quantity:  60 tablet        Take 1 tablet (8 mg) by mouth every 8 hours as needed for nausea   Refills:  1        Potassium Chloride ER 20 MEQ Tbcr   Dose:  20 mEq   Quantity:  10 tablet        Take 1 tablet (20 mEq) by mouth 2 times daily for 5 days   Refills:  0        prochlorperazine 10 MG tablet   Commonly known as:  COMPAZINE   Dose:  10 mg   Quantity:  20 tablet        Take 1 tablet (10 mg) by mouth every 6 hours as needed for nausea or vomiting   Refills:  1        RANITIDINE HCL PO   Dose:  150 mg   Indication:  Ulcer of the Duodenum        Take 150 mg by mouth daily as needed for heartburn   Refills:  0        TYLENOL PO   Dose:  1000 mg        Take 1,000 mg by mouth At Bedtime   Refills:  0        zolpidem 5 MG tablet   Commonly known as:  AMBIEN   Dose:  5 mg   Quantity:  30 tablet        Take 1 tablet (5 mg) by mouth nightly as needed for sleep   Refills:  3        * Notice:  This list has 2 medication(s) that are the same as other medications prescribed for you. Read the directions carefully, and ask your doctor or other care provider to review them with you.            Procedures and tests performed during your visit     CBC with platelets differential    Comprehensive metabolic panel    UA reflex to Microscopic      Orders Needing Specimen Collection     None      Pending Results     No orders found from 5/10/2018 to 5/13/2018.            Pending Culture Results     No orders found from 5/10/2018 to 5/13/2018.            Pending Results Instructions     If you had any lab results that were not finalized at the time of your Discharge, you can call the ED Lab Result RN at [phone]. You will be contacted by this team for any positive Lab results or changes in treatment. The nurses are available 7 days a week from 10A to 6:30P.  You can leave a message 24 hours per day and they will return your  call.        Test Results From Your Hospital Stay        5/12/2018 10:17 AM      Component Results     Component Value Ref Range & Units Status    WBC 2.5 (L) 4.0 - 11.0 10e9/L Final    RBC Count 4.12 3.8 - 5.2 10e12/L Final    Hemoglobin 12.8 11.7 - 15.7 g/dL Final    Hematocrit 36.3 35.0 - 47.0 % Final    MCV 88 78 - 100 fl Final    MCH 31.1 26.5 - 33.0 pg Final    MCHC 35.3 31.5 - 36.5 g/dL Final    RDW 15.1 (H) 10.0 - 15.0 % Final    Platelet Count 98 (L) 150 - 450 10e9/L Final    Diff Method Automated Method  Final    % Neutrophils 59.7 % Final    % Lymphocytes 31.4 % Final    % Monocytes 7.3 % Final    % Eosinophils 0.8 % Final    % Basophils 0.8 % Final    % Immature Granulocytes 0.0 % Final    Absolute Neutrophil 1.5 (L) 1.6 - 8.3 10e9/L Final    Absolute Lymphocytes 0.8 0.8 - 5.3 10e9/L Final    Absolute Monocytes 0.2 0.0 - 1.3 10e9/L Final    Absolute Eosinophils 0.0 0.0 - 0.7 10e9/L Final    Absolute Basophils 0.0 0.0 - 0.2 10e9/L Final    Abs Immature Granulocytes 0.0 0 - 0.4 10e9/L Final         5/12/2018 10:39 AM      Component Results     Component Value Ref Range & Units Status    Sodium 135 133 - 144 mmol/L Final    Potassium 3.8 3.4 - 5.3 mmol/L Final    Chloride 103 94 - 109 mmol/L Final    Carbon Dioxide 25 20 - 32 mmol/L Final    Anion Gap 7 3 - 14 mmol/L Final    Glucose 106 (H) 70 - 99 mg/dL Final    Urea Nitrogen 13 7 - 30 mg/dL Final    Creatinine 0.68 0.52 - 1.04 mg/dL Final    GFR Estimate >90 >60 mL/min/1.7m2 Final    Non  GFR Calc    GFR Estimate If Black >90 >60 mL/min/1.7m2 Final    African American GFR Calc    Calcium 8.9 8.5 - 10.1 mg/dL Final    Bilirubin Total 1.4 (H) 0.2 - 1.3 mg/dL Final    Albumin 4.2 3.4 - 5.0 g/dL Final    Protein Total 7.3 6.8 - 8.8 g/dL Final    Alkaline Phosphatase 98 40 - 150 U/L Final     (H) 0 - 50 U/L Final     (H) 0 - 45 U/L Final         5/12/2018 10:40 AM      Component Results     Component Value Ref Range & Units  Status    Color Urine Yellow  Final    Appearance Urine Clear  Final    Glucose Urine Negative NEG^Negative mg/dL Final    Bilirubin Urine Negative NEG^Negative Final    Ketones Urine Negative NEG^Negative mg/dL Final    Specific Gravity Urine 1.021 1.003 - 1.035 Final    Blood Urine Negative NEG^Negative Final    pH Urine 6.0 5.0 - 7.0 pH Final    Protein Albumin Urine 30 (A) NEG^Negative mg/dL Final    Urobilinogen mg/dL 4.0 (H) 0.0 - 2.0 mg/dL Final    Nitrite Urine Negative NEG^Negative Final    Leukocyte Esterase Urine Negative NEG^Negative Final    Source Midstream Urine  Final    RBC Urine 1 0 - 2 /HPF Final    WBC Urine 1 0 - 5 /HPF Final    Squamous Epithelial /HPF Urine <1 0 - 1 /HPF Final    Mucous Urine Present (A) NEG^Negative /LPF Final    Hyaline Casts 1 0 - 2 /LPF Final                Thank you for choosing [facility]       Thank you for choosing [facility] for your care. Our goal is always to provide you with excellent care. Hearing back from our patients is one way we can continue to improve our services. Please take a few minutes to complete the written survey that you may receive in the mail after you visit with us. Thank you!        [employer] Information     [employer] gives you secure access to your electronic health record. If you see a primary care provider, you can also send messages to your care team and make appointments. If you have questions, please call your primary care clinic.  If you do not have a primary care provider, please call [phone] and they will assist you.        Care EveryWhere ID     This is your Care EveryWhere ID. This could be used by other organizations to access your [facility] medical records  [id number]        Equal Access to Services     DEAN CHAKRABORTY : Jorge Armendariz, waaxda luqadaha, qaybta kaalmada luz elena cotton. So [facility] [phone].    ATENCIÓN: Si habla español, tiene a membreno disposición servicios gratuitos de  asistencia lingüística. Claudette al [phone].    We comply with applicable federal civil rights laws and [place] laws. We do not discriminate on the basis of race, color, national origin, age, disability, sex, sexual orientation, or gender identity.            After Visit Summary       This is your record. Keep this with you and show to your community pharmacist(s) and doctor(s) at your next visit.

## 2018-05-12 NOTE — ED NOTES
Pt feeling some better, I am going to order her a try and let her eat what she want's and will continue IV infusions. She has been up to BR, tolerates activity well

## 2018-05-12 NOTE — ED PROVIDER NOTES
History     Chief Complaint   Patient presents with     Dehydration     Post infusion - states she received her infusion this week and hasn't been eating or drinking.     DEJUAN Rajan is a 52 year old female who has past medical history significant for rectal carcinoma which on most recent MRI showed T4 N0 lesion, and patient is currently on FOLFOX chemotherapy.  She just completed cycle 6 of 8 of the chemotherapy on Wednesday, 3 days ago.  Patient has 2 day infusion cycle through right upper chest wall port, and she states that she has had decreased food, in addition a liquid intake over the past 1 week.  Her symptoms began at the initiation of this most recent chemotherapy cycle on Monday, 5 days ago.  She has some appetite, however it quickly goes away, and then patient loses her appetite completely, and has had decreased and very minimal food intake over the past 1 week.  She continues to try to drink fluids, however has had decreased fluid intake, with decreased urination as well.  Minimal bowel movements, which have been loose in nature.  She denies any nausea, or vomiting.  Feels generalized fatigue and weakness.  Denies any headache, vision changes, chest pain, cough, shortness of breath.  No recent surgical procedures.  Plan is for 8 cycles of chemotherapy.  Patient has previously received dexamethasone, in addition of Benadryl during the time surrounding the chemotherapy infusion.  She is additionally on Ativan, Compazine, and fluoxetine.  The fluoxetine is making her sleepy throughout the day, affecting her mood, and appetite.  Additionally, there have been some changes in the regimen of Ativan, decreasing from 1 mg to 0.5 mg.  Mood has been up and down.  Denies severe depression.    Problem List:    Patient Active Problem List    Diagnosis Date Noted     Malignant neoplasm of rectum (H) 02/06/2018     Priority: Medium        Past Medical History:    Past Medical History:   Diagnosis Date      Depression      GERD (gastroesophageal reflux disease)      History of smoking      Insomnia      Obesity      Rectal cancer (H)      Restless leg syndrome      Seasonal affective disorder (H)        Past Surgical History:    Past Surgical History:   Procedure Laterality Date      SECTION      x2     COLONOSCOPY       INSERT PORT VASCULAR ACCESS Right 2018    Procedure: INSERT PORT VASCULAR ACCESS;  central venous Chest Port Placement;  Surgeon: Gaurav Yates PA-C;  Location:  OR     [facility]         Family History:    Family History   Problem Relation Age of Onset     Hyperlipidemia Mother      Hypertension Mother      GASTROINTESTINAL DISEASE Mother      ischemic colitis     Coronary Artery Disease Mother      stents x 3     Anesthesia Reaction Mother      hypotension, PONV     Hyperlipidemia Father      Hypertension Father      Breast Cancer Maternal Grandmother      Coronary Artery Disease Maternal Grandfather      Myocardial Infarction Maternal Grandfather      Colon Cancer Paternal Grandmother      Breast Cancer Maternal Aunt      Breast Cancer Paternal Aunt      Coronary Artery Disease Paternal Grandfather      CEREBROVASCULAR DISEASE Paternal Grandfather      Family History Negative Brother      Coronary Artery Disease Maternal Uncle        Social History:  Marital Status:   [2]  Social History   Substance Use Topics     Smoking status: Former Smoker     Packs/day: 0.10     Years: 8.00     Types: Cigarettes     Quit date: 1986     Smokeless tobacco: Never Used     Alcohol use 4.2 oz/week     0 Standard drinks or equivalent, 7 Glasses of wine per week        Medications:      Acetaminophen (TYLENOL PO)   amitriptyline (ELAVIL) 25 MG tablet   calcium carbonate (TUMS) 500 MG chewable tablet   fluorouracil   FLUoxetine (PROZAC) 20 MG capsule   lidocaine-prilocaine (EMLA) cream   LORazepam (ATIVAN) 0.5 MG tablet   LORazepam (ATIVAN) 1 MG tablet   ondansetron (ZOFRAN) 4 MG tablet  "  ondansetron (ZOFRAN-ODT) 8 MG ODT tab   Potassium Chloride ER 20 MEQ TBCR   prochlorperazine (COMPAZINE) 10 MG tablet   RANITIDINE HCL PO   zolpidem (AMBIEN) 5 MG tablet         Review of Systems   Constitutional: Negative for fever.   Respiratory: Negative for shortness of breath.    Cardiovascular: Negative for chest pain.   Gastrointestinal: Positive for abdominal pain and diarrhea. Negative for vomiting.   All other systems reviewed and are negative.      Physical Exam   BP: (!) 153/102  Pulse: 89  Temp: 98.6  F (37  C)  Resp: 16  Height: 167.6 cm (5' 6\")  Weight: 87.5 kg (193 lb)  SpO2: 98 %      Physical Exam  BP (!) 143/99  Pulse 89  Temp 98.6  F (37  C) (Oral)  Resp 16  Ht 1.676 m (5' 6\")  Wt 87.5 kg (193 lb)  SpO2 98%  BMI 31.15 kg/m2  General: alert, interactive, in no apparent distress  Head: atraumatic  Nose: no rhinorrhea or epistaxis  Ears: no external auditory canal discharge or bleeding.    Eyes: Sclera nonicteric. Conjunctiva noninjected. PERRL, EOMI  Mouth: no tonsillar erythema, edema, or exudate  Neck: supple, no palp LAD  Lungs: CTAB.  Right upper chest wall port in place.  CV: RRR, S1/S2; peripheral pulses palpable and symmetric  Abdomen: soft, nt, nd, no guarding or rebound. Positive bowel sounds  Extremities: no cyanosis or edema  Skin: no rash or diaphoresis  Neuro:  strength 5/5 in UE and LEs bilaterally, sensation intact to light touch in UE and LEs bilaterally;       ED Course     ED Course     Procedures               Critical Care time:  none               Results for orders placed or performed during the hospital encounter of 05/12/18 (from the past 24 hour(s))   CBC with platelets differential   Result Value Ref Range    WBC 2.5 (L) 4.0 - 11.0 10e9/L    RBC Count 4.12 3.8 - 5.2 10e12/L    Hemoglobin 12.8 11.7 - 15.7 g/dL    Hematocrit 36.3 35.0 - 47.0 %    MCV 88 78 - 100 fl    MCH 31.1 26.5 - 33.0 pg    MCHC 35.3 31.5 - 36.5 g/dL    RDW 15.1 (H) 10.0 - 15.0 %    Platelet " Count 98 (L) 150 - 450 10e9/L    Diff Method Automated Method     % Neutrophils 59.7 %    % Lymphocytes 31.4 %    % Monocytes 7.3 %    % Eosinophils 0.8 %    % Basophils 0.8 %    % Immature Granulocytes 0.0 %    Absolute Neutrophil 1.5 (L) 1.6 - 8.3 10e9/L    Absolute Lymphocytes 0.8 0.8 - 5.3 10e9/L    Absolute Monocytes 0.2 0.0 - 1.3 10e9/L    Absolute Eosinophils 0.0 0.0 - 0.7 10e9/L    Absolute Basophils 0.0 0.0 - 0.2 10e9/L    Abs Immature Granulocytes 0.0 0 - 0.4 10e9/L   Comprehensive metabolic panel   Result Value Ref Range    Sodium 135 133 - 144 mmol/L    Potassium 3.8 3.4 - 5.3 mmol/L    Chloride 103 94 - 109 mmol/L    Carbon Dioxide 25 20 - 32 mmol/L    Anion Gap 7 3 - 14 mmol/L    Glucose 106 (H) 70 - 99 mg/dL    Urea Nitrogen 13 7 - 30 mg/dL    Creatinine 0.68 0.52 - 1.04 mg/dL    GFR Estimate >90 >60 mL/min/1.7m2    GFR Estimate If Black >90 >60 mL/min/1.7m2    Calcium 8.9 8.5 - 10.1 mg/dL    Bilirubin Total 1.4 (H) 0.2 - 1.3 mg/dL    Albumin 4.2 3.4 - 5.0 g/dL    Protein Total 7.3 6.8 - 8.8 g/dL    Alkaline Phosphatase 98 40 - 150 U/L     (H) 0 - 50 U/L     (H) 0 - 45 U/L   UA reflex to Microscopic   Result Value Ref Range    Color Urine Yellow     Appearance Urine Clear     Glucose Urine Negative NEG^Negative mg/dL    Bilirubin Urine Negative NEG^Negative    Ketones Urine Negative NEG^Negative mg/dL    Specific Gravity Urine 1.021 1.003 - 1.035    Blood Urine Negative NEG^Negative    pH Urine 6.0 5.0 - 7.0 pH    Protein Albumin Urine 30 (A) NEG^Negative mg/dL    Urobilinogen mg/dL 4.0 (H) 0.0 - 2.0 mg/dL    Nitrite Urine Negative NEG^Negative    Leukocyte Esterase Urine Negative NEG^Negative    Source Midstream Urine     RBC Urine 1 0 - 2 /HPF    WBC Urine 1 0 - 5 /HPF    Squamous Epithelial /HPF Urine <1 0 - 1 /HPF    Mucous Urine Present (A) NEG^Negative /LPF    Hyaline Casts 1 0 - 2 /LPF       Medications   lactated ringers infusion ( Intravenous Stopped 5/12/18 1050)   heparin 100  UNIT/ML injection 5 mL (not administered)   0.9% sodium chloride BOLUS (0 mLs Intravenous Stopped 5/12/18 2025)       Assessments & Plan (with Medical Decision Making)  52 year old female, with history of rectal carcinoma, T4N0 lesion, with cycle 6 of chemotherapy which occurred this week, with completion of that cycle 3 days ago, and now with decreased appetite, generalized fatigue, and weakness.     Patient has had similar types of occurrences during previous episodes of chemotherapy cycles.  Also has had adjustments of medications prior to, and during chemotherapy with adjustments of her dexamethasone administration.  Patient is currently taking Compazine, Ativan at home for insomnia.  She has had decreased appetite over the past 1 week.    Patient arrives afebrile, normal vitals upon arrival.  She is mildly ill appearing, non-toxic.  IV established, and labs drawn.  Patient with slight decrease of white blood cell, 2.5, consistent with prior values, and consistent with previous chemotherapy earlier this week.  Her urinalysis shows some concentration, however no signs of infection.    CMP does show elevation of liver tests including transaminases, and bilirubin.  Uncertain exact cause, however would favor side effect from the chemotherapy.  No specific focal abdominal pains that I feel would warrant imaging at this point.  She has had prior workup including scans of the liver that have showed nonspecific, small nodules of the liver.  No focal pains that I feel warrant further workup for gallbladder etiology at this point.  She has minimal Tylenol and rare alcohol use.  Patient is encouraged to follow-up with primary care provider regarding elevated liver tests.  She was given 2 L IV fluids in the emergency department, and does feel much improved.  Encouraged to discuss with her oncology team having standing fluid order, and otherwise return if severe worsening of symptoms.  She was able to tolerate small amounts  of food during her ED stay.     I have reviewed the nursing notes.    I have reviewed the findings, diagnosis, plan and need for follow up with the patient.       New Prescriptions    No medications on file       Final diagnoses:   Dehydration   Malignant neoplasm of rectum (H)   Nausea   Loss of appetite       5/12/2018   [facility] EMERGENCY DEPARTMENT     Holland Escamilla MD  05/12/18 5813

## 2018-05-12 NOTE — DISCHARGE INSTRUCTIONS
Continue home medications.   Follow up with primary doctor.  Discuss having standing fluid order.    Liver tests were slightly elevated as well.  Follow up with primary doctors for this as well.            Dehydration (Adult)  Dehydration occurs when your body loses too much fluid. This may be the result of prolonged vomiting or diarrhea, excessive sweating, or a high fever. It may also happen if you don t drink enough fluid when you re sick or out in the heat. Misuse of diuretics (water pills) can also be a cause.  Symptoms include thirst and decreased urine output. You may also feel dizzy, weak, fatigued, or very drowsy. The diet described below is usually enough to treat dehydration. In some cases, you may need medicine.  Home care    Drink at least 12 8-ounce glasses of fluid every day to resolve the dehydration. Fluid may include water; orange juice; lemonade; apple, grape, or cranberry juice; clear fruit drinks; electrolyte replacement and sports drinks; and teas and coffee without caffeine. Don't drink alcohol. If you have been diagnosed with a kidney disease, ask your doctor how much and what types of fluids you should drink to prevent dehydration. If you have kidney disease, fluid can build up in the body. This can be dangerous to your health.    If you have a fever, muscle aches, or a headache as a result of a cold or flu, you may take acetaminophen or ibuprofen, unless another medicine was prescribed. If you have chronic liver or kidney disease, or have ever had a stomach ulcer or gastrointestinal bleeding, talk with your healthcare provider before using these medicines. Don't take aspirin if you are younger than 18 and have a fever. Aspirin raises the chance for severe liver injury.  Follow-up care  Follow up with your healthcare provider, or as advised.  When to seek medical advice  Call your healthcare provider right away if any of these occur:    Continued vomiting    Frequent diarrhea (more than 5  times a day); blood (red or black color) or mucus in diarrhea    Blood in vomit or stool    Swollen abdomen or increasing abdominal pain    Weakness, dizziness, or fainting    Unusual drowsiness or confusion    Reduced urine output or extreme thirst    Fever of 100.4 F (38 C) or higher  Date Last Reviewed: 5/1/2017 2000-2017 [employer]. [address] [place] [address]. All rights reserved. This information is not intended as a substitute for professional medical care. Always follow your healthcare professional's instructions.

## 2018-05-12 NOTE — TELEPHONE ENCOUNTER
"\"She is having some weakness since pump was disconnected.\" Spouse reporting he would like to take patient into Emergency Room now for fluids. Reporting increased fatigue denies other symptoms. Afebrile. Stating similar symptoms in past when pump was disconnected. Offered to page on call Oncologist. Caller declines stating he had been calling for past hour. Plans to bring patient to ED now.     Cathleen Vigil RN  [facility]      "

## 2018-05-12 NOTE — ED AVS SNAPSHOT
[facility] Emergency Department    [address] [address]    Phone:  [phone]    Fax:  [phone]                                       Sarah Rajan   MRN: [id number]    Department:  [facility] Emergency Department   Date of Visit:  5/12/2018           After Visit Summary Signature Page     I have received my discharge instructions, and my questions have been answered. I have discussed any challenges I see with this plan with the nurse or doctor.    ..........................................................................................................................................  Patient/Patient Representative Signature      ..........................................................................................................................................  Patient Representative Print Name and Relationship to Patient    ..................................................               ................................................  Date                                            Time    ..........................................................................................................................................  Reviewed by Signature/Title    ...................................................              ..............................................  Date                                                            Time

## 2018-05-15 ENCOUNTER — TELEPHONE (OUTPATIENT)
Dept: ONCOLOGY | Facility: CLINIC | Age: 53
End: 2018-05-15

## 2018-05-15 NOTE — TELEPHONE ENCOUNTER
Post-Hospital Call  Pt admitted to ER on 5/12/18 for lowered appetite, fatigue, weakness. Last FOLFOX chemo was 5/7/18, pt was given IVF in ER and discharged home.     I called pt home # and cell # and left messages on both. Advised pt to call back to report her status at home. Left my name & [facility] Triage # for call back. Pt has next appt at [facility] on 5/21/18 for SAULO & chemo.

## 2018-05-17 NOTE — TELEPHONE ENCOUNTER
Pt called back and stated she received message. She is currently in [place] and will be back home by Saturday. Stated she is recovering slowly (fatigue wise) but not having any nausea, diarrhea or weakness. She will discuss with SAULO on Monday either having home IVF or local IVF after Folfox same week to prevent ED visits.

## 2018-05-17 NOTE — TELEPHONE ENCOUNTER
As requested by Cathleen Ramos PA-C, I called patient to follow up on ER visit and pt's status at home (see below). Again no answer on pt's home # or cell#. I left another detailed message to call back and report her status at home. I provided [facility] Nursing line for call back; I also reminded pt of her returns appts on Mon 5/21/18 for labs, SAULO & next chemo round.

## 2018-05-21 ENCOUNTER — HOME INFUSION (PRE-WILLOW HOME INFUSION) (OUTPATIENT)
Dept: PHARMACY | Facility: CLINIC | Age: 53
End: 2018-05-21

## 2018-05-21 ENCOUNTER — APPOINTMENT (OUTPATIENT)
Dept: LAB | Facility: CLINIC | Age: 53
End: 2018-05-21
Attending: INTERNAL MEDICINE
Payer: COMMERCIAL

## 2018-05-21 ENCOUNTER — INFUSION THERAPY VISIT (OUTPATIENT)
Dept: ONCOLOGY | Facility: CLINIC | Age: 53
End: 2018-05-21
Attending: INTERNAL MEDICINE
Payer: COMMERCIAL

## 2018-05-21 VITALS
BODY MASS INDEX: 30.7 KG/M2 | WEIGHT: 191 LBS | OXYGEN SATURATION: 98 % | HEIGHT: 66 IN | DIASTOLIC BLOOD PRESSURE: 78 MMHG | SYSTOLIC BLOOD PRESSURE: 120 MMHG | HEART RATE: 67 BPM | TEMPERATURE: 98.5 F

## 2018-05-21 DIAGNOSIS — G47.09 OTHER INSOMNIA: Primary | ICD-10-CM

## 2018-05-21 DIAGNOSIS — E87.6 HYPOKALEMIA: ICD-10-CM

## 2018-05-21 DIAGNOSIS — C20 MALIGNANT NEOPLASM OF RECTUM (H): ICD-10-CM

## 2018-05-21 DIAGNOSIS — C20 MALIGNANT NEOPLASM OF RECTUM (H): Primary | ICD-10-CM

## 2018-05-21 LAB
ALBUMIN SERPL-MCNC: 3.8 G/DL (ref 3.4–5)
ALP SERPL-CCNC: 84 U/L (ref 40–150)
ALT SERPL W P-5'-P-CCNC: 106 U/L (ref 0–50)
ANION GAP SERPL CALCULATED.3IONS-SCNC: 7 MMOL/L (ref 3–14)
AST SERPL W P-5'-P-CCNC: 65 U/L (ref 0–45)
BASOPHILS # BLD AUTO: 0 10E9/L (ref 0–0.2)
BASOPHILS NFR BLD AUTO: 0.7 %
BILIRUB SERPL-MCNC: 0.7 MG/DL (ref 0.2–1.3)
BUN SERPL-MCNC: 9 MG/DL (ref 7–30)
CALCIUM SERPL-MCNC: 9.2 MG/DL (ref 8.5–10.1)
CHLORIDE SERPL-SCNC: 109 MMOL/L (ref 94–109)
CO2 SERPL-SCNC: 25 MMOL/L (ref 20–32)
CREAT SERPL-MCNC: 0.71 MG/DL (ref 0.52–1.04)
DIFFERENTIAL METHOD BLD: ABNORMAL
EOSINOPHIL # BLD AUTO: 0.1 10E9/L (ref 0–0.7)
EOSINOPHIL NFR BLD AUTO: 2.2 %
ERYTHROCYTE [DISTWIDTH] IN BLOOD BY AUTOMATED COUNT: 17.1 % (ref 10–15)
GFR SERPL CREATININE-BSD FRML MDRD: 87 ML/MIN/1.7M2
GLUCOSE SERPL-MCNC: 92 MG/DL (ref 70–99)
HCT VFR BLD AUTO: 31.8 % (ref 35–47)
HGB BLD-MCNC: 11.1 G/DL (ref 11.7–15.7)
IMM GRANULOCYTES # BLD: 0 10E9/L (ref 0–0.4)
IMM GRANULOCYTES NFR BLD: 0.7 %
LYMPHOCYTES # BLD AUTO: 1 10E9/L (ref 0.8–5.3)
LYMPHOCYTES NFR BLD AUTO: 35.3 %
MCH RBC QN AUTO: 31.9 PG (ref 26.5–33)
MCHC RBC AUTO-ENTMCNC: 34.9 G/DL (ref 31.5–36.5)
MCV RBC AUTO: 91 FL (ref 78–100)
MONOCYTES # BLD AUTO: 0.4 10E9/L (ref 0–1.3)
MONOCYTES NFR BLD AUTO: 12.9 %
NEUTROPHILS # BLD AUTO: 1.3 10E9/L (ref 1.6–8.3)
NEUTROPHILS NFR BLD AUTO: 48.2 %
NRBC # BLD AUTO: 0 10*3/UL
NRBC BLD AUTO-RTO: 0 /100
PLATELET # BLD AUTO: 115 10E9/L (ref 150–450)
POTASSIUM SERPL-SCNC: 3.2 MMOL/L (ref 3.4–5.3)
PROT SERPL-MCNC: 6.6 G/DL (ref 6.8–8.8)
RBC # BLD AUTO: 3.48 10E12/L (ref 3.8–5.2)
SODIUM SERPL-SCNC: 141 MMOL/L (ref 133–144)
WBC # BLD AUTO: 2.7 10E9/L (ref 4–11)

## 2018-05-21 PROCEDURE — 96413 CHEMO IV INFUSION 1 HR: CPT

## 2018-05-21 PROCEDURE — 25000128 H RX IP 250 OP 636: Mod: ZF | Performed by: PHYSICIAN ASSISTANT

## 2018-05-21 PROCEDURE — 85025 COMPLETE CBC W/AUTO DIFF WBC: CPT | Performed by: PHYSICIAN ASSISTANT

## 2018-05-21 PROCEDURE — 96367 TX/PROPH/DG ADDL SEQ IV INF: CPT

## 2018-05-21 PROCEDURE — 96361 HYDRATE IV INFUSION ADD-ON: CPT

## 2018-05-21 PROCEDURE — G0463 HOSPITAL OUTPT CLINIC VISIT: HCPCS | Mod: ZF

## 2018-05-21 PROCEDURE — 96415 CHEMO IV INFUSION ADDL HR: CPT

## 2018-05-21 PROCEDURE — 99214 OFFICE O/P EST MOD 30 MIN: CPT | Mod: ZP | Performed by: PHYSICIAN ASSISTANT

## 2018-05-21 PROCEDURE — G0498 CHEMO EXTEND IV INFUS W/PUMP: HCPCS

## 2018-05-21 PROCEDURE — 96411 CHEMO IV PUSH ADDL DRUG: CPT

## 2018-05-21 PROCEDURE — 96368 THER/DIAG CONCURRENT INF: CPT

## 2018-05-21 PROCEDURE — 96375 TX/PRO/DX INJ NEW DRUG ADDON: CPT

## 2018-05-21 PROCEDURE — 80053 COMPREHEN METABOLIC PANEL: CPT | Performed by: PHYSICIAN ASSISTANT

## 2018-05-21 RX ORDER — EPINEPHRINE 1 MG/ML
0.3 INJECTION, SOLUTION INTRAMUSCULAR; SUBCUTANEOUS EVERY 5 MIN PRN
Status: CANCELLED | OUTPATIENT
Start: 2018-05-21

## 2018-05-21 RX ORDER — POTASSIUM CHLORIDE 1500 MG/1
20 TABLET, EXTENDED RELEASE ORAL 2 TIMES DAILY
Qty: 10 TABLET | Refills: 0 | Status: SHIPPED | OUTPATIENT
Start: 2018-05-21 | End: 2018-07-25

## 2018-05-21 RX ORDER — HEPARIN SODIUM (PORCINE) LOCK FLUSH IV SOLN 100 UNIT/ML 100 UNIT/ML
5 SOLUTION INTRAVENOUS ONCE
Status: COMPLETED | OUTPATIENT
Start: 2018-05-21 | End: 2018-05-21

## 2018-05-21 RX ORDER — FLUOROURACIL 50 MG/ML
400 INJECTION, SOLUTION INTRAVENOUS ONCE
Status: CANCELLED | OUTPATIENT
Start: 2018-05-21

## 2018-05-21 RX ORDER — ESZOPICLONE 1 MG/1
1-2 TABLET, FILM COATED ORAL AT BEDTIME
Qty: 30 TABLET | Refills: 1 | Status: SHIPPED | OUTPATIENT
Start: 2018-05-21 | End: 2018-07-05

## 2018-05-21 RX ORDER — SODIUM CHLORIDE 9 MG/ML
1000 INJECTION, SOLUTION INTRAVENOUS CONTINUOUS PRN
Status: CANCELLED
Start: 2018-05-21

## 2018-05-21 RX ORDER — ALBUTEROL SULFATE 90 UG/1
1-2 AEROSOL, METERED RESPIRATORY (INHALATION)
Status: CANCELLED
Start: 2018-05-21

## 2018-05-21 RX ORDER — DIPHENHYDRAMINE HYDROCHLORIDE 50 MG/ML
50 INJECTION INTRAMUSCULAR; INTRAVENOUS
Status: CANCELLED
Start: 2018-05-21

## 2018-05-21 RX ORDER — FLUOROURACIL 50 MG/ML
400 INJECTION, SOLUTION INTRAVENOUS ONCE
Status: COMPLETED | OUTPATIENT
Start: 2018-05-21 | End: 2018-05-21

## 2018-05-21 RX ORDER — LORAZEPAM 2 MG/ML
0.5 INJECTION INTRAMUSCULAR EVERY 4 HOURS PRN
Status: DISCONTINUED | OUTPATIENT
Start: 2018-05-21 | End: 2018-05-21 | Stop reason: HOSPADM

## 2018-05-21 RX ORDER — MEPERIDINE HYDROCHLORIDE 25 MG/ML
25 INJECTION INTRAMUSCULAR; INTRAVENOUS; SUBCUTANEOUS EVERY 30 MIN PRN
Status: CANCELLED | OUTPATIENT
Start: 2018-05-21

## 2018-05-21 RX ORDER — LORAZEPAM 2 MG/ML
0.5 INJECTION INTRAMUSCULAR EVERY 4 HOURS PRN
Status: CANCELLED
Start: 2018-05-21

## 2018-05-21 RX ORDER — ALBUTEROL SULFATE 0.83 MG/ML
2.5 SOLUTION RESPIRATORY (INHALATION)
Status: CANCELLED | OUTPATIENT
Start: 2018-05-21

## 2018-05-21 RX ORDER — METHYLPREDNISOLONE SODIUM SUCCINATE 125 MG/2ML
125 INJECTION, POWDER, LYOPHILIZED, FOR SOLUTION INTRAMUSCULAR; INTRAVENOUS
Status: CANCELLED
Start: 2018-05-21

## 2018-05-21 RX ORDER — ONDANSETRON 2 MG/ML
8 INJECTION INTRAMUSCULAR; INTRAVENOUS ONCE
Status: CANCELLED
Start: 2018-05-21 | End: 2018-05-21

## 2018-05-21 RX ORDER — ONDANSETRON 2 MG/ML
8 INJECTION INTRAMUSCULAR; INTRAVENOUS ONCE
Status: COMPLETED | OUTPATIENT
Start: 2018-05-21 | End: 2018-05-21

## 2018-05-21 RX ORDER — EPINEPHRINE 0.3 MG/.3ML
0.3 INJECTION SUBCUTANEOUS EVERY 5 MIN PRN
Status: CANCELLED | OUTPATIENT
Start: 2018-05-21

## 2018-05-21 RX ADMIN — FLUOROURACIL 805 MG: 50 INJECTION, SOLUTION INTRAVENOUS at 11:55

## 2018-05-21 RX ADMIN — SODIUM CHLORIDE 1000 ML: 9 INJECTION, SOLUTION INTRAVENOUS at 08:17

## 2018-05-21 RX ADMIN — LEUCOVORIN CALCIUM 700 MG: 500 INJECTION, POWDER, LYOPHILIZED, FOR SOLUTION INTRAMUSCULAR; INTRAVENOUS at 09:51

## 2018-05-21 RX ADMIN — DIPHENHYDRAMINE HYDROCHLORIDE 50 MG: 50 INJECTION INTRAMUSCULAR; INTRAVENOUS at 09:01

## 2018-05-21 RX ADMIN — DEXAMETHASONE SODIUM PHOSPHATE: 10 INJECTION, SOLUTION INTRAMUSCULAR; INTRAVENOUS at 09:19

## 2018-05-21 RX ADMIN — ONDANSETRON 8 MG: 2 INJECTION INTRAMUSCULAR; INTRAVENOUS at 08:31

## 2018-05-21 RX ADMIN — SODIUM CHLORIDE, PRESERVATIVE FREE 5 ML: 5 INJECTION INTRAVENOUS at 06:51

## 2018-05-21 RX ADMIN — LORAZEPAM 0.5 MG: 2 INJECTION INTRAMUSCULAR; INTRAVENOUS at 09:19

## 2018-05-21 RX ADMIN — DEXTROSE MONOHYDRATE 250 ML: 5 INJECTION, SOLUTION INTRAVENOUS at 09:50

## 2018-05-21 RX ADMIN — OXALIPLATIN 171 MG: 100 INJECTION, SOLUTION, CONCENTRATE INTRAVENOUS at 09:52

## 2018-05-21 ASSESSMENT — PAIN SCALES - GENERAL: PAINLEVEL: NO PAIN (0)

## 2018-05-21 NOTE — NURSING NOTE
Chief Complaint   Patient presents with     Port Draw     labs drawn via port by RN     /78 (BP Location: Right arm, Patient Position: Chair, Cuff Size: Adult Regular)  Pulse 67  Temp 98.5  F (36.9  C) (Oral)  Wt 86.6 kg (191 lb)  SpO2 98%  BMI 30.83 kg/m2    Vitals taken. Port accessed by RN. Labs collected and sent. Line flushed with NS & Heparin. Pt tolerated well. Pt checked in for next appointment.    Es Zaragoza RN

## 2018-05-21 NOTE — MR AVS SNAPSHOT
After Visit Summary   5/21/2018    Sarah Rajan    MRN: [id number]           Patient Information     Date Of Birth          1965        Visit Information        Provider Department      5/21/2018 8:00 AM UC 18 ATC; UC ONCOLOGY INFUSION [facility]        Today's Diagnoses     Malignant neoplasm of rectum (H)    -  1       Follow-ups after your visit        Your next 10 appointments already scheduled     May 23, 2018 10:00 AM CDT   Level 2 with ROOM 7 [facility] ([facility])    [facility]  [address]  [facility] [address]   [phone]            May 25, 2018  1:00 PM CDT   Level 2 with ROOM 4 [facility] ([facility])    [facility]  [address]  [facility] [address]   [phone]            May 29, 2018 12:00 PM CDT   Level 2 with ROOM 1 [facility] ([facility])    [facility]  [address]  [facility] [address]   [phone]            Jun 05, 2018  8:15 AM CDT   [facility] Draw with  [facility] DRAW   [facility] Lab Draw ([facility])    [address] [place] [address]  [address]  [facility] [address]   [phone]            Jun 05, 2018  8:40 AM CDT   (Arrive by 8:25 AM)   Return Visit with Cathleen Ramos PA-C   [facility] ([facility])    [address] [place] [address]  [address]  [facility] [address]   [phone]            Jun 05, 2018 10:00 AM CDT   Infusion 240 with UC ONCOLOGY INFUSION, UC 17 ATC   [facility] ([facility])    [address] [place] [address]  [address]  [facility] [address]   [phone]            Jun 07, 2018  1:00 PM CDT   (Arrive by 12:45 PM)   NEW WITH ROOM with Joann Fowler, [facility], UC 2 119 CONSULT RM   [facility]  Cancer Clinic ([facility])    [address]  [address]  [facility] [address] [phone]            Jun 19, 2018  4:45 PM CDT   MR ABDOMEN W/O & W CONTRAST with UCMR1   [facility] MRI ([facility])    [address]  1st Floor  [facility] [address] [phone]           Take your medicines as usual, unless your doctor tells you not to. Bring a list of your current medicines to your exam (including vitamins, minerals and over-the-counter drugs). Also bring the results of similar scans you may have had.    You may or may not receive IV contrast for this exam pending the discretion of the Radiologist.   Do not eat or drink for 6 hours prior to exam.  The MRI machine uses a strong magnet. Please wear clothes without metal (snaps, zippers). A sweatsuit works well, or we may give you a hospital gown.  Please remove any body piercings and hair extensions before you arrive. You will also remove watches, jewelry, hairpins, wallets, dentures, partial dental plates and hearing aids. You may wear contact lenses, and you may be able to wear your rings. We have a safe place to keep your personal items, but it is safer to leave them at home.  **IMPORTANT** THE INSTRUCTIONS BELOW ARE ONLY FOR THOSE PATIENTS WHO HAVE BEEN PRESCRIBED SEDATION OR GENERAL ANESTHESIA DURING THEIR MRI PROCEDURE:  IF YOUR DOCTOR PRESCRIBED ORAL SEDATION (take medicine to help you relax during your exam):   You must get the medicine from your doctor (oral medication) before you arrive. Bring the medicine to the exam. Do not take it at home. You ll be told when to take it upon arriving for your exam.   Arrive one hour early. Bring someone who can take you home after the test. Your medicine will make you sleepy. After the exam, you may not drive, take a bus or take a taxi by yourself.  IF YOUR DOCTOR PRESCRIBED IV SEDATION:   Arrive one hour early. Bring someone who  can take you home after the test. Your medicine will make you sleepy. After the exam, you may not drive, take a bus or take a taxi by yourself.   No eating 6 hours before your exam. You may have clear liquids up until 4 hours before your exam. (Clear liquids include water, clear tea, black coffee and fruit juice without pulp.)  IF YOUR DOCTOR PRESCRIBED ANESTHESIA (be asleep for your exam):   Arrive 1 1/2 hours early. Bring someone who can take you home after the test. You may not drive, take a bus or take a taxi by yourself.   No eating 8 hours before your exam. You may have clear liquids up until 4 hours before your exam. (Clear liquids include water, clear tea, black coffee and fruit juice without pulp.)   You will spend four to five hours in the recovery room.  If you have any questions, please contact your Imaging Department exam site.              Who to contact     If you have questions or need follow up information about today's clinic visit or your schedule please contact [facility] directly at [phone].  Normal or non-critical lab and imaging results will be communicated to you by All My Datahart, letter or phone within 4 business days after the clinic has received the results. If you do not hear from us within 7 days, please contact the clinic through My Point...Exactly or phone. If you have a critical or abnormal lab result, we will notify you by phone as soon as possible.  Submit refill requests through My Point...Exactly or call your pharmacy and they will forward the refill request to us. Please allow 3 business days for your refill to be completed.          Additional Information About Your Visit        My Point...Exactly Information     My Point...Exactly gives you secure access to your electronic health record. If you see a primary care provider, you can also send messages to your care team and make appointments. If you have questions, please call your primary care clinic.  If you do not have a primary care provider, please  call [phone] and they will assist you.        Care EveryWhere ID     This is your Care EveryWhere ID. This could be used by other organizations to access your [facility] medical records  [id number]         Blood Pressure from Last 3 Encounters:   05/21/18 120/78   05/12/18 132/90   05/07/18 139/78    Weight from Last 3 Encounters:   05/21/18 86.6 kg (191 lb)   05/12/18 87.5 kg (193 lb)   05/07/18 89 kg (196 lb 3.2 oz)              We Performed the Following     CBC with platelets differential     Comprehensive metabolic panel          Today's Medication Changes          These changes are accurate as of 5/21/18  1:00 PM.  If you have any questions, ask your nurse or doctor.               Start taking these medicines.        Dose/Directions    eszopiclone 1 MG Tabs tablet   Commonly known as:  LUNESTA   Used for:  Other insomnia   Started by:  Cathleen Ramos PA-C        Dose:  1-2 mg   Take 1-2 tablets (1-2 mg) by mouth At Bedtime   Quantity:  30 tablet   Refills:  1       Potassium Chloride ER 20 MEQ Tbcr   Used for:  Hypokalemia   Started by:  Cathleen Ramos PA-C        Dose:  20 mEq   Take 1 tablet (20 mEq) by mouth 2 times daily   Quantity:  10 tablet   Refills:  0            Where to get your medicines      These medications were sent to [facility] [place], [place] - [address] [facility] [address] [address]    Hours:  TRANSPLANT PHONE NUMBER [phone] Phone:  [phone]     Potassium Chloride ER 20 MEQ Tbcr         Some of these will need a paper prescription and others can be bought over the counter.  Ask your nurse if you have questions.     Bring a paper prescription for each of these medications     eszopiclone 1 MG Tabs tablet                Primary Care Provider Office Phone # Fax #    Kyra Mortensen [phone]       [address] [place] [id number]        Equal Access to Services     DEAN CHAKRABORTY AH:  Hadii aad ku hadpanterao Sopavanali, waaxda luqadaha, qaybta kaalmada yury, luz elena majoquinton ferrellford nidia. So [facility] [phone].    ATENCIÓN: Si emily quesada, tiene a membreno disposición servicios gratuitos de asistencia lingüística. Llame al [phone].    We comply with applicable federal civil rights laws and [place] laws. We do not discriminate on the basis of race, color, national origin, age, disability, sex, sexual orientation, or gender identity.            Thank you!     Thank you for choosing [facility]  for your care. Our goal is always to provide you with excellent care. Hearing back from our patients is one way we can continue to improve our services. Please take a few minutes to complete the written survey that you may receive in the mail after your visit with us. Thank you!             Your Updated Medication List - Protect others around you: Learn how to safely use, store and throw away your medicines at www.disposemymeds.org.          This list is accurate as of 5/21/18  1:00 PM.  Always use your most recent med list.                   Brand Name Dispense Instructions for use Diagnosis    amitriptyline 25 MG tablet    ELAVIL    30 tablet    Take 1 tablet (25 mg) by mouth At Bedtime    Insomnia, unspecified type, Malignant neoplasm of rectum (H), Liver lesion       calcium carbonate 500 MG chewable tablet    TUMS    150 tablet    Take 1-2 chew tab by mouth daily        eszopiclone 1 MG Tabs tablet    LUNESTA    30 tablet    Take 1-2 tablets (1-2 mg) by mouth At Bedtime    Other insomnia       fluorouracil      Administer 4,968 mg intravenously . Continuous infusion over 46-48 hr via ambulatory pump q14d Supplied by outside provider with pump.        FLUoxetine 20 MG capsule    PROzac    30 capsule    Take 1 capsule (20 mg) by mouth daily    Other depression       lidocaine-prilocaine cream    EMLA    30 g    Apply 45 minutes prior to procedure.    Malignant neoplasm of  rectum (H)       * LORazepam 0.5 MG tablet    ATIVAN    30 tablet    Take 1 tablet (0.5 mg) by mouth every 4 hours as needed (Anxiety, Nausea/Vomiting or Sleep)    Malignant neoplasm of rectum (H)       * LORazepam 1 MG tablet    ATIVAN    30 tablet    Take 1 tablet (1 mg) by mouth At Bedtime    Other insomnia       ondansetron 4 MG tablet    ZOFRAN    3 tablet    Take one tablet by mouth every 6 hours as needed for nausea when taking neomycin and flagyl    Rectal cancer (H)       ondansetron 8 MG ODT tab    ZOFRAN-ODT    60 tablet    Take 1 tablet (8 mg) by mouth every 8 hours as needed for nausea    Chemotherapy induced nausea and vomiting       Potassium Chloride ER 20 MEQ Tbcr     10 tablet    Take 1 tablet (20 mEq) by mouth 2 times daily    Hypokalemia       prochlorperazine 10 MG tablet    COMPAZINE    20 tablet    Take 1 tablet (10 mg) by mouth every 6 hours as needed for nausea or vomiting    Malignant neoplasm of rectum (H)       RANITIDINE HCL PO      Take 150 mg by mouth daily as needed for heartburn        TYLENOL PO      Take 1,000 mg by mouth At Bedtime        zolpidem 5 MG tablet    AMBIEN    30 tablet    Take 1 tablet (5 mg) by mouth nightly as needed for sleep    Other insomnia       * Notice:  This list has 2 medication(s) that are the same as other medications prescribed for you. Read the directions carefully, and ask your doctor or other care provider to review them with you.

## 2018-05-21 NOTE — LETTER
5/21/2018      RE: Sarah Rajan  [address] [facility] [address]       Oncology/Hematology Visit Note  May 21, 2018    Reason for Visit: follow up of [id number] moderately differentiated adenocarcinoma of the rectum     History of Present Illness: Sarah Rajan is a 52 year old female with  recently diagnosed locally advanced rectal cancer. She noticed BRBPR so screening colonoscopy on 1/9/2018 revealed 3 cm rectal mass and other polyps which were removed.  Pathology indicated moderately differentiated adenocarcinoma w/ intact MMR proteins.  CT staging scan showed no metastatic disease; some liver lesions which are thought to be benign per radiology report, T2N1M0 by pelvic MRI read at [facility]. When we initially reviewed her MRI we were not exactly sure whether that was lymph node involvement or not. We opted to repeat MRI which showed T4 N0 lesion.   We also did an MRI of the liver which showed 3 subcentimeter liver lesions which were too small to characterize and will need attention on follow-up. She is currently undergoing treatment with neoadjuvant 5-FU and oxaliplatin (FOLFOX), which she started on 2/26/18. The day after she received cycle 2, she developed fevers, chills, headache, and an itchy rash on her arms and legs, for which she was evaluated in the ED. She was sent home on Benadryl. Benadryl and dexamethasone doses were increased for cycle 3. She received cycle 4 on 4/10/18. Pelvic MRI on 4/19/18 showed improvement in the rectal mass. Abdominal MRI on 5/2/18 showed stable indeterminate liver lesions. Please see Dr. Mortensen's previous notes for further details on the patient's history. She comes in today for routine follow up prior to cycle 7 FOLFOX.     Interval History:  Patient reports that she has had insomnia again for the last 5 days.  She has not found relief from trazodone, Ambien, or Ativan.  She feels that the higher dose of Ativan just makes her feel more sleepy, but that does not actually  help her sleep.  This last cycle was the hardest for her.  She felt extremely fatigued for the first week after chemo.  She also had a poor appetite.  Her  feels that her mood is slightly improved since starting on the Prozac 2 weeks ago.  Right after her last cycle of chemo she had some tingling in her legs that was transient.  She had numbness and tingling in her hands and feet that was constant for the first week and is now intermittent.  She reports that her tongue felt burnt.  She did have some peeling of the skin on her hands that has since improved.  She denies other concerns.    Current Outpatient Prescriptions   Medication Sig Dispense Refill     Acetaminophen (TYLENOL PO) Take 1,000 mg by mouth At Bedtime       calcium carbonate (TUMS) 500 MG chewable tablet Take 1-2 chew tab by mouth daily  150 tablet      eszopiclone (LUNESTA) 1 MG TABS tablet Take 1-2 tablets (1-2 mg) by mouth At Bedtime 30 tablet 1     fluorouracil Administer 4,968 mg intravenously . Continuous infusion over 46-48 hr via ambulatory pump q14d Supplied by outside provider with pump.       FLUoxetine (PROZAC) 20 MG capsule Take 1 capsule (20 mg) by mouth daily 30 capsule 3     lidocaine-prilocaine (EMLA) cream Apply 45 minutes prior to procedure. 30 g 3     LORazepam (ATIVAN) 0.5 MG tablet Take 1 tablet (0.5 mg) by mouth every 4 hours as needed (Anxiety, Nausea/Vomiting or Sleep) 30 tablet 2     LORazepam (ATIVAN) 1 MG tablet Take 1 tablet (1 mg) by mouth At Bedtime 30 tablet 0     prochlorperazine (COMPAZINE) 10 MG tablet Take 1 tablet (10 mg) by mouth every 6 hours as needed for nausea or vomiting 20 tablet 1     RANITIDINE HCL PO Take 150 mg by mouth daily as needed for heartburn       amitriptyline (ELAVIL) 25 MG tablet Take 1 tablet (25 mg) by mouth At Bedtime (Patient not taking: Reported on 5/21/2018) 30 tablet 1     ondansetron (ZOFRAN) 4 MG tablet Take one tablet by mouth every 6 hours as needed for nausea when taking  "neomycin and flagyl (Patient not taking: Reported on 5/21/2018) 3 tablet 0     ondansetron (ZOFRAN-ODT) 8 MG ODT tab Take 1 tablet (8 mg) by mouth every 8 hours as needed for nausea (Patient not taking: Reported on 5/21/2018) 60 tablet 1     zolpidem (AMBIEN) 5 MG tablet Take 1 tablet (5 mg) by mouth nightly as needed for sleep (Patient not taking: Reported on 5/21/2018) 30 tablet 3     Physical Examination:  General: The patient is a pleasant female. She is tearful throughout the visit today. She is here today with her .   /78 (BP Location: Right arm, Patient Position: Chair, Cuff Size: Adult Regular)  Pulse 67  Temp 98.5  F (36.9  C) (Oral)  Ht 1.676 m (5' 5.98\")  Wt 86.6 kg (191 lb)  SpO2 98%  BMI 30.84 kg/m2  Wt Readings from Last 10 Encounters:   05/21/18 86.6 kg (191 lb)   05/12/18 87.5 kg (193 lb)   05/07/18 89 kg (196 lb 3.2 oz)   04/23/18 90.9 kg (200 lb 6.4 oz)   04/19/18 88.9 kg (196 lb)   04/16/18 88.9 kg (195 lb 14.4 oz)   04/16/18 89.2 kg (196 lb 11.2 oz)   04/14/18 92 kg (202 lb 13.2 oz)   04/10/18 92 kg (202 lb 14.4 oz)   03/26/18 92.3 kg (203 lb 6.4 oz)   HEENT: EOMI, PERRL. Sclerae are anicteric. Oral mucosa is pink and moist with no lesions or thrush.   Lymph: Neck is supple with no lymphadenopathy in the cervical or supraclavicular areas.   Heart: Regular rate and rhythm.   Lungs: Clear to auscultation bilaterally.   Abdomen: Bowel sounds present, soft, nontender with no palpable hepatosplenomegaly or masses.   Extremities: No lower extremity edema noted bilaterally.   Neuro: Cranial nerves II through XII are grossly intact.  Skin: No rashes, petechiae, or bruising noted on exposed skin.     Laboratory Data:   5/21/2018 07:00   Sodium 141   Potassium 3.2 (L)   Chloride 109   Carbon Dioxide 25   Urea Nitrogen 9   Creatinine 0.71   GFR Estimate 87   GFR Estimate If Black >90   Calcium 9.2   Anion Gap 7   Albumin 3.8   Protein Total 6.6 (L)   Bilirubin Total 0.7   Alkaline " Phosphatase 84    (H)   AST 65 (H)   Glucose 92   WBC 2.7 (L)   Hemoglobin 11.1 (L)   Hematocrit 31.8 (L)   Platelet Count 115 (L)   RBC Count 3.48 (L)   MCV 91   MCH 31.9   MCHC 34.9   RDW 17.1 (H)   Diff Method Automated Method   % Neutrophils 48.2   % Lymphocytes 35.3   % Monocytes 12.9   % Eosinophils 2.2   % Basophils 0.7   % Immature Granulocytes 0.7   Nucleated RBCs 0   Absolute Neutrophil 1.3 (L)   Absolute Lymphocytes 1.0   Absolute Monocytes 0.4   Absolute Eosinophils 0.1   Absolute Basophils 0.0   Abs Immature Granulocytes 0.0   Absolute Nucleated RBC 0.0     Assessment and Plan:  1. Locally advanced moderately differentiated adenocarcinoma of the rectum: [id number]. MSI stable. Started neoadjuvant FOLFOX on 2/26/18. She is tolerating treatment reasonably well with cold sensitivity and constipation. Imaging after 4 cycles showed improvement in the rectal lesion. She will continue with cycle 7 today. She will follow up with me prior to cycle 8. She will have a chest CT and abd/pelvis MRI after 8 cycles of chemotherapy and will see Dr. Mortensen to review. Plan for chemorads following neoadjuvant chemotherapy. She has previously met with Dr. Ford.     2. Depression and anxiety. She resumed fluoxetine 20 mg daily on 5/7/18. Her mood is very mildly improved. She will try Lunesta 1-2 mg qhs for sleep. She will meet with palliative care social work for counseling.     3. Constipation. Improved with MiraLax bid for the first few days following chemotherapy.     4. Elevated LFT's. Suspect secondary to chemotherapy. She has minimal Tylenol and rare alcohol use. Will continue to monitor. Improved from the last check in the ED.     5. Hypokalemia. Suspect related to poor po intake with depressed mood. She will take potassium chloride 20 mEq bid x 5 days. Will recheck at next visit.     6. Dehydration. Patient will receive 1L IV NS today, 5/23, 5/25, and 5/29.  She will receive IV fluids at [facility] [place] when possible.      Cathleen Ramos PA-C  [facility]  [address], [place] [address] [phone]

## 2018-05-21 NOTE — MR AVS SNAPSHOT
After Visit Summary   5/21/2018    Sarah Rajan    MRN: [id number]           Patient Information     Date Of Birth          1965        Visit Information        Provider Department      5/21/2018 7:00 AM Cathleen Ramos PA-C [facility]        Today's Diagnoses     Other insomnia    -  1    Hypokalemia           Follow-ups after your visit        Your next 10 appointments already scheduled     May 21, 2018  8:00 AM CDT   Infusion 240 with UC ONCOLOGY INFUSION, UC 18 ATC   [facility] ([facility])    [address] [place] [address]  [address]  [facility] [address]   [phone]            Jun 05, 2018  8:15 AM CDT   [facility] Lab Draw with [facility] LAB DRAW   [facility] Lab Draw ([facility])    [address] [place] [address]  [address]  [facility] [address]   [phone]            Jun 05, 2018  8:40 AM CDT   (Arrive by 8:25 AM)   Return Visit with Cathleen Ramos PA-C   [facility] [place] ([facility])    [address] [place] [address]  [address]  [facility] [address]   [phone]            Jun 05, 2018 10:00 AM CDT   Infusion 240 with UC ONCOLOGY INFUSION, UC 17 ATC   [facility] ([facility])    [address]  [address]  [facility] [address]   [phone]            Jun 19, 2018  4:45 PM CDT   MR ABDOMEN W/O & W CONTRAST with UC1   [facility] MRI ([facility])    [address]  1st Floor  [facility] [address]   [phone]           Take your medicines as usual, unless your doctor tells you not to. Bring a list of your current medicines to your exam (including vitamins, minerals and over-the-counter drugs). Also bring the results of similar scans you may have had.    You may or may not receive IV contrast for this exam pending the discretion of the Radiologist.    Do not eat or drink for 6 hours prior to exam.  The MRI machine uses a strong magnet. Please wear clothes without metal (snaps, zippers). A sweatsuit works well, or we may give you a hospital gown.  Please remove any body piercings and hair extensions before you arrive. You will also remove watches, jewelry, hairpins, wallets, dentures, partial dental plates and hearing aids. You may wear contact lenses, and you may be able to wear your rings. We have a safe place to keep your personal items, but it is safer to leave them at home.  **IMPORTANT** THE INSTRUCTIONS BELOW ARE ONLY FOR THOSE PATIENTS WHO HAVE BEEN PRESCRIBED SEDATION OR GENERAL ANESTHESIA DURING THEIR MRI PROCEDURE:  IF YOUR DOCTOR PRESCRIBED ORAL SEDATION (take medicine to help you relax during your exam):   You must get the medicine from your doctor (oral medication) before you arrive. Bring the medicine to the exam. Do not take it at home. You ll be told when to take it upon arriving for your exam.   Arrive one hour early. Bring someone who can take you home after the test. Your medicine will make you sleepy. After the exam, you may not drive, take a bus or take a taxi by yourself.  IF YOUR DOCTOR PRESCRIBED IV SEDATION:   Arrive one hour early. Bring someone who can take you home after the test. Your medicine will make you sleepy. After the exam, you may not drive, take a bus or take a taxi by yourself.   No eating 6 hours before your exam. You may have clear liquids up until 4 hours before your exam. (Clear liquids include water, clear tea, black coffee and fruit juice without pulp.)  IF YOUR DOCTOR PRESCRIBED ANESTHESIA (be asleep for your exam):   Arrive 1 1/2 hours early. Bring someone who can take you home after the test. You may not drive, take a bus or take a taxi by yourself.   No eating 8 hours before your exam. You may have clear liquids up until 4 hours before your exam. (Clear liquids include water, clear tea, black coffee and fruit juice  without pulp.)   You will spend four to five hours in the recovery room.  If you have any questions, please contact your Imaging Department exam site.            Jun 20, 2018  9:15 AM CDT   MR PELVIS W/O & W CONTRAST with [id number]   [facility] MRI ([facility])    [address] [address] [phone]           Take your medicines as usual, unless your doctor tells you not to. Bring a list of your current medicines to your exam (including vitamins, minerals and over-the-counter drugs).  You may or may not receive intravenous (IV) contrast for this exam pending the discretion of the Radiologist.  You do not need to do anything special to prepare.  The MRI machine uses a strong magnet. Please wear clothes without metal (snaps, zippers). A sweatsuit works well, or we may give you a hospital gown.  Please remove any body piercings and hair extensions before you arrive. You will also remove watches, jewelry, hairpins, wallets, dentures, partial dental plates and hearing aids. You may wear contact lenses, and you may be able to wear your rings. We have a safe place to keep your personal items, but it is safer to leave them at home.  **IMPORTANT** THE INSTRUCTIONS BELOW ARE ONLY FOR THOSE PATIENTS WHO HAVE BEEN PRESCRIBED SEDATION OR GENERAL ANESTHESIA DURING THEIR MRI PROCEDURE:  IF YOUR DOCTOR PRESCRIBED ORAL SEDATION (take medicine to help you relax during your exam):   You must get the medicine from your doctor (oral medication) before you arrive. Bring the medicine to the exam. Do not take it at home. You ll be told when to take it upon arriving for your exam.   Arrive one hour early. Bring someone who can take you home after the test. Your medicine will make you sleepy. After the exam, you may not drive, take a bus or take a taxi by yourself.  IF YOUR DOCTOR PRESCRIBED IV SEDATION:   Arrive one hour early. Bring someone who can take you home  after the test. Your medicine will make you sleepy. After the exam, you may not drive, take a bus or take a taxi by yourself.   No eating 6 hours before your exam. You may have clear liquids up until 4 hours before your exam. (Clear liquids include water, clear tea, black coffee and fruit juice without pulp.)  IF YOUR DOCTOR PRESCRIBED ANESTHESIA (be asleep for your exam):   Arrive 1 1/2 hours early. Bring someone who can take you home after the test. You may not drive, take a bus or take a taxi by yourself.   No eating 8 hours before your exam. You may have clear liquids up until 4 hours before your exam. (Clear liquids include water, clear tea, black coffee and fruit juice without pulp.)   You will spend four to five hours in the recovery room.  Please call the Imaging Department at your exam site with any questions.            Jun 20, 2018 10:40 AM CDT   CT CHEST W/O CONTRAST with UCCT2   [facility] CT ([facility])    [address]  1st Floor  [facility] [address] [phone]           Please bring any scans or X-rays taken at other hospitals, if similar tests were done. Also bring a list of your medicines, including vitamins, minerals and over-the-counter drugs. It is safest to leave personal items at home.  Be sure to tell your doctor:   If you have any allergies.   If there s any chance you are pregnant.   If you are breastfeeding.  You do not need to do anything special to prepare for this exam.  Please wear loose clothing, such as a sweat suit or jogging clothes. Avoid snaps, zippers and other metal. We may ask you to undress and put on a hospital gown.            Jun 21, 2018 12:00 PM CDT   [facility] Lab Draw with  [facility] LAB DRAW   [facility] Lab Draw ([facility])    [address]  [address]  [facility] [address] [phone]              Who to contact     If you have questions or need follow up information  "about today's clinic visit or your schedule please contact [facility] directly at [phone].  Normal or non-critical lab and imaging results will be communicated to you by Hortonworkshart, letter or phone within 4 business days after the clinic has received the results. If you do not hear from us within 7 days, please contact the clinic through PingThingst or phone. If you have a critical or abnormal lab result, we will notify you by phone as soon as possible.  Submit refill requests through [employer] or call your pharmacy and they will forward the refill request to us. Please allow 3 business days for your refill to be completed.          Additional Information About Your Visit        HortonworksharSentons Information     [employer] gives you secure access to your electronic health record. If you see a primary care provider, you can also send messages to your care team and make appointments. If you have questions, please call your primary care clinic.  If you do not have a primary care provider, please call [phone] and they will assist you.        Care EveryWhere ID     This is your Care EveryWhere ID. This could be used by other organizations to access your [place] medical records  [id number]        Your Vitals Were     Pulse Temperature Height Pulse Oximetry BMI (Body Mass Index)       67 98.5  F (36.9  C) (Oral) 1.676 m (5' 5.98\") 98% 30.84 kg/m2        Blood Pressure from Last 3 Encounters:   05/21/18 120/78   05/12/18 132/90   05/07/18 139/78    Weight from Last 3 Encounters:   05/21/18 86.6 kg (191 lb)   05/12/18 87.5 kg (193 lb)   05/07/18 89 kg (196 lb 3.2 oz)              Today, you had the following     No orders found for display         Today's Medication Changes          These changes are accurate as of 5/21/18  7:55 AM.  If you have any questions, ask your nurse or doctor.               Start taking these medicines.        Dose/Directions    eszopiclone 1 MG Tabs tablet   Commonly known as:  LUNESTA "   Used for:  Other insomnia   Started by:  Cathleen Ramos PA-C        Dose:  1-2 mg   Take 1-2 tablets (1-2 mg) by mouth At Bedtime   Quantity:  30 tablet   Refills:  1       Potassium Chloride ER 20 MEQ Tbcr   Used for:  Hypokalemia   Started by:  Cathleen Ramos PA-C        Dose:  20 mEq   Take 1 tablet (20 mEq) by mouth 2 times daily   Quantity:  10 tablet   Refills:  0            Where to get your medicines      These medications were sent to [place], [place] - [address]  [address] [address]    Hours:  TRANSPLANT PHONE NUMBER [phone] Phone:  [phone]     Potassium Chloride ER 20 MEQ Tbcr         Some of these will need a paper prescription and others can be bought over the counter.  Ask your nurse if you have questions.     Bring a paper prescription for each of these medications     eszopiclone 1 MG Tabs tablet                Primary Care Provider Office Phone # Fax #    Aasay Mortensen [phone]       [address] [place] [address]        Equal Access to Services     SAAD CHAKRABORTY AH: Hadii shannon li hadpanterao Sojustin, waaxda luqadaha, qaybta kaalmada adeegyada, luz elena jacob. So [facility] [phone].    ATENCIÓN: Si habla español, tiene a membreno disposición servicios gratuitos de asistencia lingüística. Llame al [phone].    We comply with applicable federal civil rights laws and [place] laws. We do not discriminate on the basis of race, color, national origin, age, disability, sex, sexual orientation, or gender identity.            Thank you!     Thank you for choosing [facility]  for your care. Our goal is always to provide you with excellent care. Hearing back from our patients is one way we can continue to improve our services. Please take a few minutes to complete the written survey that you may receive in the mail after your visit with  us. Thank you!             Your Updated Medication List - Protect others around you: Learn how to safely use, store and throw away your medicines at www.disposemymeds.org.          This list is accurate as of 5/21/18  7:55 AM.  Always use your most recent med list.                   Brand Name Dispense Instructions for use Diagnosis    amitriptyline 25 MG tablet    ELAVIL    30 tablet    Take 1 tablet (25 mg) by mouth At Bedtime    Insomnia, unspecified type, Malignant neoplasm of rectum (H), Liver lesion       calcium carbonate 500 MG chewable tablet    TUMS    150 tablet    Take 1-2 chew tab by mouth daily        eszopiclone 1 MG Tabs tablet    LUNESTA    30 tablet    Take 1-2 tablets (1-2 mg) by mouth At Bedtime    Other insomnia       fluorouracil      Administer 4,968 mg intravenously . Continuous infusion over 46-48 hr via ambulatory pump q14d Supplied by outside provider with pump.        FLUoxetine 20 MG capsule    PROzac    30 capsule    Take 1 capsule (20 mg) by mouth daily    Other depression       lidocaine-prilocaine cream    EMLA    30 g    Apply 45 minutes prior to procedure.    Malignant neoplasm of rectum (H)       * LORazepam 0.5 MG tablet    ATIVAN    30 tablet    Take 1 tablet (0.5 mg) by mouth every 4 hours as needed (Anxiety, Nausea/Vomiting or Sleep)    Malignant neoplasm of rectum (H)       * LORazepam 1 MG tablet    ATIVAN    30 tablet    Take 1 tablet (1 mg) by mouth At Bedtime    Other insomnia       ondansetron 4 MG tablet    ZOFRAN    3 tablet    Take one tablet by mouth every 6 hours as needed for nausea when taking neomycin and flagyl    Rectal cancer (H)       ondansetron 8 MG ODT tab    ZOFRAN-ODT    60 tablet    Take 1 tablet (8 mg) by mouth every 8 hours as needed for nausea    Chemotherapy induced nausea and vomiting       Potassium Chloride ER 20 MEQ Tbcr     10 tablet    Take 1 tablet (20 mEq) by mouth 2 times daily    Hypokalemia       prochlorperazine 10 MG tablet    COMPAZINE     20 tablet    Take 1 tablet (10 mg) by mouth every 6 hours as needed for nausea or vomiting    Malignant neoplasm of rectum (H)       RANITIDINE HCL PO      Take 150 mg by mouth daily as needed for heartburn        TYLENOL PO      Take 1,000 mg by mouth At Bedtime        zolpidem 5 MG tablet    AMBIEN    30 tablet    Take 1 tablet (5 mg) by mouth nightly as needed for sleep    Other insomnia       * Notice:  This list has 2 medication(s) that are the same as other medications prescribed for you. Read the directions carefully, and ask your doctor or other care provider to review them with you.

## 2018-05-21 NOTE — PROGRESS NOTES
Infusion Nursing Note:  Sarah Rajan presents today for Cycle 7 Oxaliplatin, Leucovorin, Fluorouracil bolus and pump connect, IVF    Patient seen by provider today: Yes: REX Meeks    Note: Pt developed a rash after first Folfox, so get premedicated with IV Benadryl. Discussed signs/symptoms of Oxaliplatin reaction today.  Pt has had issues with dehydration after last cycle; will receive IVF with pump d/c this week, IVF this Friday and next Tuesday.    Intravenous Access:  Implanted Port.    Treatment Conditions:  Lab Results   Component Value Date    HGB 11.1 05/21/2018     Lab Results   Component Value Date    WBC 2.7 05/21/2018      Lab Results   Component Value Date    ANEU 1.3 05/21/2018     Lab Results   Component Value Date     05/21/2018      Lab Results   Component Value Date     05/21/2018                   Lab Results   Component Value Date    POTASSIUM 3.2 05/21/2018           No results found for: MAG         Lab Results   Component Value Date    CR 0.71 05/21/2018                   Lab Results   Component Value Date    GEETA 9.2 05/21/2018                Lab Results   Component Value Date    BILITOTAL 0.7 05/21/2018           Lab Results   Component Value Date    ALBUMIN 3.8 05/21/2018                    Lab Results   Component Value Date     05/21/2018           Lab Results   Component Value Date    AST 65 05/21/2018       Results reviewed, labs MET treatment parameters, ok to proceed with treatment.    Post Infusion Assessment:  Patient tolerated infusion without incident.  Blood return noted pre and post infusion and prior to pump connect.  Site patent and intact, free from redness, edema or discomfort.  Fluorouracil pump connected at 1200 set to infuse over the next 46 hours.  All connections taped, open and double checked with Edin Miller RN.  Pt will have pump d/c at [facility] on 5/23 at 1000.    Discharge Plan:   Prescription refills given for Lunesta and Potassium  (picked up by pt at Discharge Pharmacy).  Copy of AVS reviewed with patient and/or family.  Patient will return 6/5 for next appointment.  Patient discharged in stable condition accompanied by: .  Departure Mode: Ambulatory.    Zulema West RN

## 2018-05-21 NOTE — NURSING NOTE
"Oncology Rooming Note    May 21, 2018 7:07 AM   Sarah Rajan is a 52 year old female who presents for:    Chief Complaint   Patient presents with     Port Draw     labs drawn via port by RN     Oncology Clinic Visit     F/U Rectal CA     Initial Vitals: /78 (BP Location: Right arm, Patient Position: Chair, Cuff Size: Adult Regular)  Pulse 67  Temp 98.5  F (36.9  C) (Oral)  Ht 1.676 m (5' 5.98\")  Wt 86.6 kg (191 lb)  SpO2 98%  BMI 30.84 kg/m2 Estimated body mass index is 30.84 kg/(m^2) as calculated from the following:    Height as of this encounter: 1.676 m (5' 5.98\").    Weight as of this encounter: 86.6 kg (191 lb). Body surface area is 2.01 meters squared.  No Pain (0) Comment: Data Unavailable   No LMP recorded. Patient is postmenopausal.  Allergies reviewed: Yes  Medications reviewed: Yes    Medications: MEDICATION REFILLS NEEDED TODAY. Provider was notified.  Pharmacy name entered into [facility]:    [facility] [id number] [place] [address] DR ALVAREZ AT [facility] OF [place], [place] - [address]  [id number] [place], [place] - [address] [place] [address] [address] AT [address]    Clinical concerns: Yes, Patient complains of feeling exhausted.She also complains of insomnia. Cathleen was notified.    10 minutes for nursing intake (face to face time)     DEYANIRA RODRIGUEZ LPN            "

## 2018-05-21 NOTE — PROGRESS NOTES
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 She noticed BRBPR so screening colonoscopy on 1/9/2018 revealed 3 cm rectal mass and other polyps which were removed.  Pathology indicated moderately differentiated adenocarcinoma w/ intact MMR proteins.  CT staging scan showed no metastatic disease; some liver lesions which are thought to be benign per radiology report, T2N1M0 by pelvic MRI read at [facility]. When we initially reviewed her MRI we were not exactly sure whether that was lymph node involvement or not. We opted to repeat MRI which showed T4 N0 lesion.   We also did an MRI of the liver which showed 3 subcentimeter liver lesions which were too small to characterize and will need attention on follow-up. She is currently undergoing treatment with neoadjuvant 5-FU and oxaliplatin (FOLFOX), which she started on 2/26/18. The day after she received cycle 2, she developed fevers, chills, headache, and an itchy rash on her arms and legs, for which she was evaluated in the ED. She was sent home on Benadryl. Benadryl and dexamethasone doses were increased for cycle 3. She received cycle 4 on 4/10/18. Pelvic MRI on 4/19/18 showed improvement in the rectal mass. Abdominal MRI on 5/2/18 showed stable indeterminate liver lesions. Please see Dr. Mortensen's previous notes for further details on the patient's history. She comes in today for routine follow up prior to cycle 7 FOLFOX.     Interval History:  Patient reports that she has had insomnia again for the last 5 days.  She has not found relief from trazodone, Ambien, or Ativan.  She feels that the higher dose of Ativan just makes her feel more sleepy, but that does not actually help her sleep.  This last cycle was the hardest for her.  She felt extremely  fatigued for the first week after chemo.  She also had a poor appetite.  Her  feels that her mood is slightly improved since starting on the Prozac 2 weeks ago.  Right after her last cycle of chemo she had some tingling in her legs that was transient.  She had numbness and tingling in her hands and feet that was constant for the first week and is now intermittent.  She reports that her tongue felt burnt.  She did have some peeling of the skin on her hands that has since improved.  She denies other concerns.    Current Outpatient Prescriptions   Medication Sig Dispense Refill     Acetaminophen (TYLENOL PO) Take 1,000 mg by mouth At Bedtime       calcium carbonate (TUMS) 500 MG chewable tablet Take 1-2 chew tab by mouth daily  150 tablet      eszopiclone (LUNESTA) 1 MG TABS tablet Take 1-2 tablets (1-2 mg) by mouth At Bedtime 30 tablet 1     fluorouracil Administer 4,968 mg intravenously . Continuous infusion over 46-48 hr via ambulatory pump q14d Supplied by outside provider with pump.       FLUoxetine (PROZAC) 20 MG capsule Take 1 capsule (20 mg) by mouth daily 30 capsule 3     lidocaine-prilocaine (EMLA) cream Apply 45 minutes prior to procedure. 30 g 3     LORazepam (ATIVAN) 0.5 MG tablet Take 1 tablet (0.5 mg) by mouth every 4 hours as needed (Anxiety, Nausea/Vomiting or Sleep) 30 tablet 2     LORazepam (ATIVAN) 1 MG tablet Take 1 tablet (1 mg) by mouth At Bedtime 30 tablet 0     prochlorperazine (COMPAZINE) 10 MG tablet Take 1 tablet (10 mg) by mouth every 6 hours as needed for nausea or vomiting 20 tablet 1     RANITIDINE HCL PO Take 150 mg by mouth daily as needed for heartburn       amitriptyline (ELAVIL) 25 MG tablet Take 1 tablet (25 mg) by mouth At Bedtime (Patient not taking: Reported on 5/21/2018) 30 tablet 1     ondansetron (ZOFRAN) 4 MG tablet Take one tablet by mouth every 6 hours as needed for nausea when taking neomycin and flagyl (Patient not taking: Reported on 5/21/2018) 3 tablet 0      "ondansetron (ZOFRAN-ODT) 8 MG ODT tab Take 1 tablet (8 mg) by mouth every 8 hours as needed for nausea (Patient not taking: Reported on 5/21/2018) 60 tablet 1     zolpidem (AMBIEN) 5 MG tablet Take 1 tablet (5 mg) by mouth nightly as needed for sleep (Patient not taking: Reported on 5/21/2018) 30 tablet 3     Physical Examination:  General: The patient is a pleasant female. She is tearful throughout the visit today. She is here today with her .   /78 (BP Location: Right arm, Patient Position: Chair, Cuff Size: Adult Regular)  Pulse 67  Temp 98.5  F (36.9  C) (Oral)  Ht 1.676 m (5' 5.98\")  Wt 86.6 kg (191 lb)  SpO2 98%  BMI 30.84 kg/m2  Wt Readings from Last 10 Encounters:   05/21/18 86.6 kg (191 lb)   05/12/18 87.5 kg (193 lb)   05/07/18 89 kg (196 lb 3.2 oz)   04/23/18 90.9 kg (200 lb 6.4 oz)   04/19/18 88.9 kg (196 lb)   04/16/18 88.9 kg (195 lb 14.4 oz)   04/16/18 89.2 kg (196 lb 11.2 oz)   04/14/18 92 kg (202 lb 13.2 oz)   04/10/18 92 kg (202 lb 14.4 oz)   03/26/18 92.3 kg (203 lb 6.4 oz)   HEENT: EOMI, PERRL. Sclerae are anicteric. Oral mucosa is pink and moist with no lesions or thrush.   Lymph: Neck is supple with no lymphadenopathy in the cervical or supraclavicular areas.   Heart: Regular rate and rhythm.   Lungs: Clear to auscultation bilaterally.   Abdomen: Bowel sounds present, soft, nontender with no palpable hepatosplenomegaly or masses.   Extremities: No lower extremity edema noted bilaterally.   Neuro: Cranial nerves II through XII are grossly intact.  Skin: No rashes, petechiae, or bruising noted on exposed skin.     Laboratory Data:   5/21/2018 07:00   Sodium 141   Potassium 3.2 (L)   Chloride 109   Carbon Dioxide 25   Urea Nitrogen 9   Creatinine 0.71   GFR Estimate 87   GFR Estimate If Black >90   Calcium 9.2   Anion Gap 7   Albumin 3.8   Protein Total 6.6 (L)   Bilirubin Total 0.7   Alkaline Phosphatase 84    (H)   AST 65 (H)   Glucose 92   WBC 2.7 (L)   Hemoglobin 11.1 " (L)   Hematocrit 31.8 (L)   Platelet Count 115 (L)   RBC Count 3.48 (L)   MCV 91   MCH 31.9   MCHC 34.9   RDW 17.1 (H)   Diff Method Automated Method   % Neutrophils 48.2   % Lymphocytes 35.3   % Monocytes 12.9   % Eosinophils 2.2   % Basophils 0.7   % Immature Granulocytes 0.7   Nucleated RBCs 0   Absolute Neutrophil 1.3 (L)   Absolute Lymphocytes 1.0   Absolute Monocytes 0.4   Absolute Eosinophils 0.1   Absolute Basophils 0.0   Abs Immature Granulocytes 0.0   Absolute Nucleated RBC 0.0     Assessment and Plan:  1. Locally advanced moderately differentiated adenocarcinoma of the rectum: [id number]. MSI stable. Started neoadjuvant FOLFOX on 2/26/18. She is tolerating treatment reasonably well with cold sensitivity and constipation. Imaging after 4 cycles showed improvement in the rectal lesion. She will continue with cycle 7 today. She will follow up with me prior to cycle 8. She will have a chest CT and abd/pelvis MRI after 8 cycles of chemotherapy and will see Dr. Mortensen to review. Plan for chemorads following neoadjuvant chemotherapy. She has previously met with Dr. Ford.     2. Depression and anxiety. She resumed fluoxetine 20 mg daily on 5/7/18. Her mood is very mildly improved. She will try Lunesta 1-2 mg qhs for sleep. She will meet with palliative care social work for counseling.     3. Constipation. Improved with MiraLax bid for the first few days following chemotherapy.     4. Elevated LFT's. Suspect secondary to chemotherapy. She has minimal Tylenol and rare alcohol use. Will continue to monitor. Improved from the last check in the ED.     5. Hypokalemia. Suspect related to poor po intake with depressed mood. She will take potassium chloride 20 mEq bid x 5 days. Will recheck at next visit.     6. Dehydration. Patient will receive 1L IV NS today, 5/23, 5/25, and 5/29.  She will receive IV fluids at [facility] when possible.     Cathleen Ramos PA-C  [facility]  [address] [place]  [address]  [phone]

## 2018-05-22 NOTE — PROGRESS NOTES
This is a recent snapshot of the patient's [facility] medical record.  For current drug dose and complete information and questions, call [phone] or In Basket pool, fv home infusion ([id number])  CSN Number:  [id number]

## 2018-05-23 ENCOUNTER — INFUSION THERAPY VISIT (OUTPATIENT)
Dept: INFUSION THERAPY | Facility: CLINIC | Age: 53
End: 2018-05-23
Attending: PHYSICIAN ASSISTANT
Payer: COMMERCIAL

## 2018-05-23 VITALS — TEMPERATURE: 98.3 F | SYSTOLIC BLOOD PRESSURE: 122 MMHG | HEART RATE: 60 BPM | DIASTOLIC BLOOD PRESSURE: 83 MMHG

## 2018-05-23 DIAGNOSIS — C20 MALIGNANT NEOPLASM OF RECTUM (H): Primary | ICD-10-CM

## 2018-05-23 PROCEDURE — 25000128 H RX IP 250 OP 636: Performed by: PHYSICIAN ASSISTANT

## 2018-05-23 PROCEDURE — 96360 HYDRATION IV INFUSION INIT: CPT

## 2018-05-23 RX ORDER — HEPARIN SODIUM (PORCINE) LOCK FLUSH IV SOLN 100 UNIT/ML 100 UNIT/ML
5 SOLUTION INTRAVENOUS
Status: DISCONTINUED | OUTPATIENT
Start: 2018-05-23 | End: 2018-05-23 | Stop reason: HOSPADM

## 2018-05-23 RX ADMIN — SODIUM CHLORIDE 1000 ML: 9 INJECTION, SOLUTION INTRAVENOUS at 10:24

## 2018-05-23 RX ADMIN — HEPARIN 5 ML: 100 SYRINGE at 11:27

## 2018-05-23 NOTE — MR AVS SNAPSHOT
After Visit Summary   5/23/2018    Sarah Rajan    MRN: [id number]           Patient Information     Date Of Birth          1965        Visit Information        Provider Department      5/23/2018 10:00 AM ROOM 7 [facility]        Today's Diagnoses     Malignant neoplasm of rectum (H)    -  1       Follow-ups after your visit        Your next 10 appointments already scheduled     May 25, 2018  1:00 PM CDT   Level 2 with ROOM 4 [facility] ([facility])    [facility]  [address]  [facility] [address]   [phone]            May 29, 2018 12:00 PM CDT   Level 2 with ROOM 1 [facility] ([facility])    n [facility]  [address]  [facility] [address]   [phone]            Jun 05, 2018  8:15 AM CDT   [facility] Lab Draw with [facility] LAB DRAW   [facility] ([facility]    [address] [place] [address]  [address]  [facility] [address]   [phone]            Jun 05, 2018  8:40 AM CDT   (Arrive by 8:25 AM)   Return Visit with Cathleen Ramos PA-C   [facility]    [address] [place] [address]  [address]  [facility] [address]   [phone]            Jun 05, 2018 10:00 AM CDT   Infusion 240 with UC ONCOLOGY INFUSION, UC 17 ATC   [facility]    [address] [place] [address]  [address]  [facility] [address]   [phone]            Jun 05, 2018 12:00 PM CDT   (Arrive by 11:45 AM)   RETURN WITH ROOM with SHANTHI Sanchez   [facility]    [address] [place] [address]  [address]  [facility] [address]   [phone]            Jun 19, 2018  4:45 PM CDT   MR ABDOMEN W/O & W CONTRAST with [id number]   [facility] ([facility])     [address] [id number] [phone]           Take your medicines as usual, unless your doctor tells you not to. Bring a list of your current medicines to your exam (including vitamins, minerals and over-the-counter drugs). Also bring the results of similar scans you may have had.    You may or may not receive IV contrast for this exam pending the discretion of the Radiologist.   Do not eat or drink for 6 hours prior to exam.  The MRI machine uses a strong magnet. Please wear clothes without metal (snaps, zippers). A sweatsuit works well, or we may give you a hospital gown.  Please remove any body piercings and hair extensions before you arrive. You will also remove watches, jewelry, hairpins, wallets, dentures, partial dental plates and hearing aids. You may wear contact lenses, and you may be able to wear your rings. We have a safe place to keep your personal items, but it is safer to leave them at home.  **IMPORTANT** THE INSTRUCTIONS BELOW ARE ONLY FOR THOSE PATIENTS WHO HAVE BEEN PRESCRIBED SEDATION OR GENERAL ANESTHESIA DURING THEIR MRI PROCEDURE:  IF YOUR DOCTOR PRESCRIBED ORAL SEDATION (take medicine to help you relax during your exam):   You must get the medicine from your doctor (oral medication) before you arrive. Bring the medicine to the exam. Do not take it at home. You ll be told when to take it upon arriving for your exam.   Arrive one hour early. Bring someone who can take you home after the test. Your medicine will make you sleepy. After the exam, you may not drive, take a bus or take a taxi by yourself.  IF YOUR DOCTOR PRESCRIBED IV SEDATION:   Arrive one hour early. Bring someone who can take you home after the test. Your medicine will make you sleepy. After the exam, you may not drive, take a bus or take a taxi by yourself.   No eating 6 hours before your exam. You may have clear liquids up until 4 hours before your exam. (Clear liquids include water, clear  tea, black coffee and fruit juice without pulp.)  IF YOUR DOCTOR PRESCRIBED ANESTHESIA (be asleep for your exam):   Arrive 1 1/2 hours early. Bring someone who can take you home after the test. You may not drive, take a bus or take a taxi by yourself.   No eating 8 hours before your exam. You may have clear liquids up until 4 hours before your exam. (Clear liquids include water, clear tea, black coffee and fruit juice without pulp.)   You will spend four to five hours in the recovery room.  If you have any questions, please contact your Imaging Department exam site.            Jun 20, 2018  9:15 AM CDT   MR PELVIS W/O & W CONTRAST with [id number]   [facility] ([facility])    [address] [address] [phone]           Take your medicines as usual, unless your doctor tells you not to. Bring a list of your current medicines to your exam (including vitamins, minerals and over-the-counter drugs).  You may or may not receive intravenous (IV) contrast for this exam pending the discretion of the Radiologist.  You do not need to do anything special to prepare.  The MRI machine uses a strong magnet. Please wear clothes without metal (snaps, zippers). A sweatsuit works well, or we may give you a hospital gown.  Please remove any body piercings and hair extensions before you arrive. You will also remove watches, jewelry, hairpins, wallets, dentures, partial dental plates and hearing aids. You may wear contact lenses, and you may be able to wear your rings. We have a safe place to keep your personal items, but it is safer to leave them at home.  **IMPORTANT** THE INSTRUCTIONS BELOW ARE ONLY FOR THOSE PATIENTS WHO HAVE BEEN PRESCRIBED SEDATION OR GENERAL ANESTHESIA DURING THEIR MRI PROCEDURE:  IF YOUR DOCTOR PRESCRIBED ORAL SEDATION (take medicine to help you relax during your exam):   You must get the medicine from your doctor (oral medication)  before you arrive. Bring the medicine to the exam. Do not take it at home. You ll be told when to take it upon arriving for your exam.   Arrive one hour early. Bring someone who can take you home after the test. Your medicine will make you sleepy. After the exam, you may not drive, take a bus or take a taxi by yourself.  IF YOUR DOCTOR PRESCRIBED IV SEDATION:   Arrive one hour early. Bring someone who can take you home after the test. Your medicine will make you sleepy. After the exam, you may not drive, take a bus or take a taxi by yourself.   No eating 6 hours before your exam. You may have clear liquids up until 4 hours before your exam. (Clear liquids include water, clear tea, black coffee and fruit juice without pulp.)  IF YOUR DOCTOR PRESCRIBED ANESTHESIA (be asleep for your exam):   Arrive 1 1/2 hours early. Bring someone who can take you home after the test. You may not drive, take a bus or take a taxi by yourself.   No eating 8 hours before your exam. You may have clear liquids up until 4 hours before your exam. (Clear liquids include water, clear tea, black coffee and fruit juice without pulp.)   You will spend four to five hours in the recovery room.  Please call the Imaging Department at your exam site with any questions.            Jun 20, 2018 10:40 AM CDT   CT CHEST W/O CONTRAST with UCCT2   [facility] ([facility])    [address] [address] [phone]           Please bring any scans or X-rays taken at other hospitals, if similar tests were done. Also bring a list of your medicines, including vitamins, minerals and over-the-counter drugs. It is safest to leave personal items at home.  Be sure to tell your doctor:   If you have any allergies.   If there s any chance you are pregnant.   If you are breastfeeding.  You do not need to do anything special to prepare for this exam.  Please wear loose clothing, such as a sweat  suit or jogging clothes. Avoid snaps, zippers and other metal. We may ask you to undress and put on a hospital gown.              Who to contact     If you have questions or need follow up information about today's clinic visit or your schedule please contact [facility] directly at [phone].  Normal or non-critical lab and imaging results will be communicated to you by MyChart, letter or phone within 4 business days after the clinic has received the results. If you do not hear from us within 7 days, please contact the clinic through [employer] or phone. If you have a critical or abnormal lab result, we will notify you by phone as soon as possible.  Submit refill requests through [employer] or call your pharmacy and they will forward the refill request to us. Please allow 3 business days for your refill to be completed.          Additional Information About Your Visit        [employer] Information     [employer] gives you secure access to your electronic health record. If you see a primary care provider, you can also send messages to your care team and make appointments. If you have questions, please call your primary care clinic.  If you do not have a primary care provider, please call [phone] and they will assist you.        Care EveryWhere ID     This is your Care EveryWhere ID. This could be used by other organizations to access your [place] [facility] medical records  [id number]        Your Vitals Were     Pulse Temperature                60 98.3  F (36.8  C) (Oral)           Blood Pressure from Last 3 Encounters:   05/23/18 122/83   05/21/18 120/78   05/12/18 132/90    Weight from Last 3 Encounters:   05/21/18 86.6 kg (191 lb)   05/12/18 87.5 kg (193 lb)   05/07/18 89 kg (196 lb 3.2 oz)              Today, you had the following     No orders found for display       Primary Care Provider Office Phone # Fax #    Kyra Mortensen [phone]       [address] [facility] [address]        Equal  Access to Services     [facility]: Hadii aad ku hadpanteramya Bacilioali, warashaadda luqreza, qaybta kadorisluz elena pham. So [facility] [phone].    ATENCIÓN: Si habla sangita, tiene a membreno disposición servicios gratuitos de asistencia lingüística. Llame al [phone].    We comply with applicable federal civil rights laws and [place] laws. We do not discriminate on the basis of race, color, national origin, age, disability, sex, sexual orientation, or gender identity.            Thank you!     Thank you for choosing [facility]  for your care. Our goal is always to provide you with excellent care. Hearing back from our patients is one way we can continue to improve our services. Please take a few minutes to complete the written survey that you may receive in the mail after your visit with us. Thank you!             Your Updated Medication List - Protect others around you: Learn how to safely use, store and throw away your medicines at www.disposemymeds.org.          This list is accurate as of 5/23/18 11:39 AM.  Always use your most recent med list.                   Brand Name Dispense Instructions for use Diagnosis    amitriptyline 25 MG tablet    ELAVIL    30 tablet    Take 1 tablet (25 mg) by mouth At Bedtime    Insomnia, unspecified type, Malignant neoplasm of rectum (H), Liver lesion       calcium carbonate 500 MG chewable tablet    TUMS    150 tablet    Take 1-2 chew tab by mouth daily        eszopiclone 1 MG Tabs tablet    LUNESTA    30 tablet    Take 1-2 tablets (1-2 mg) by mouth At Bedtime    Other insomnia       fluorouracil      Administer 4,968 mg intravenously . Continuous infusion over 46-48 hr via ambulatory pump q14d Supplied by outside provider with pump.        FLUoxetine 20 MG capsule    PROzac    30 capsule    Take 1 capsule (20 mg) by mouth daily    Other depression       lidocaine-prilocaine cream    EMLA    30 g    Apply 45 minutes prior to  procedure.    Malignant neoplasm of rectum (H)       * LORazepam 0.5 MG tablet    ATIVAN    30 tablet    Take 1 tablet (0.5 mg) by mouth every 4 hours as needed (Anxiety, Nausea/Vomiting or Sleep)    Malignant neoplasm of rectum (H)       * LORazepam 1 MG tablet    ATIVAN    30 tablet    Take 1 tablet (1 mg) by mouth At Bedtime    Other insomnia       ondansetron 4 MG tablet    ZOFRAN    3 tablet    Take one tablet by mouth every 6 hours as needed for nausea when taking neomycin and flagyl    Rectal cancer (H)       ondansetron 8 MG ODT tab    ZOFRAN-ODT    60 tablet    Take 1 tablet (8 mg) by mouth every 8 hours as needed for nausea    Chemotherapy induced nausea and vomiting       Potassium Chloride ER 20 MEQ Tbcr     10 tablet    Take 1 tablet (20 mEq) by mouth 2 times daily    Hypokalemia       prochlorperazine 10 MG tablet    COMPAZINE    20 tablet    Take 1 tablet (10 mg) by mouth every 6 hours as needed for nausea or vomiting    Malignant neoplasm of rectum (H)       RANITIDINE HCL PO      Take 150 mg by mouth daily as needed for heartburn        TYLENOL PO      Take 1,000 mg by mouth At Bedtime        zolpidem 5 MG tablet    AMBIEN    30 tablet    Take 1 tablet (5 mg) by mouth nightly as needed for sleep    Other insomnia       * Notice:  This list has 2 medication(s) that are the same as other medications prescribed for you. Read the directions carefully, and ask your doctor or other care provider to review them with you.

## 2018-05-24 ENCOUNTER — TELEPHONE (OUTPATIENT)
Dept: PALLIATIVE CARE | Facility: CLINIC | Age: 53
End: 2018-05-24

## 2018-05-24 ENCOUNTER — TELEPHONE (OUTPATIENT)
Dept: ONCOLOGY | Facility: CLINIC | Age: 53
End: 2018-05-24

## 2018-05-24 DIAGNOSIS — C20 MALIGNANT NEOPLASM OF RECTUM (H): Primary | ICD-10-CM

## 2018-05-24 RX ORDER — DEXAMETHASONE 4 MG/1
4 TABLET ORAL
Qty: 3 TABLET | Refills: 0 | Status: SHIPPED | OUTPATIENT
Start: 2018-05-24 | End: 2018-07-25

## 2018-05-24 NOTE — TELEPHONE ENCOUNTER
"Palliative Care Clinical Social Work Phone Contact    Data: Patient is known to me from phone conversation introducing Palliative Care Counseling Services and working with her to get an initial visit with myself or Lashaun Islas scheduled. She asked to change appt from June 7 with me to June 5, when Lashaun is available, during infusion. She has asked about in-home counseling, which we do not offer.    Message today was forwarded to me from Rena Orta RN, addressed to my colleague Lashaun MAKI who is out until May 29. Patient voicing intensification of her depressed mood and reduced sense of \"will to live\" along with difficult chemo side effects.  I also reviewed chart, and see that she spoke at length with Katherin, Triage RN in Oncology today. Katherin asked about thoughts of suicide which Sarah denied. In addition to behavioral suggestions such as taking a walk, Katherin helped aSrah to schedule with Troy Huff LP on May 29.  Of note, seeing Dr Huff would be instead of seeing Lashaun - But I think let's leave both on the calendar for now, to make sure the patient is able to connect with one of them ASAP. Since both are psychotherapists, doing a second initial mental health evaluation shortly after the first initial eval with Dr Huff is not recommended. Instead I would recommend follow up with Dr Huff if she does make it here to see him on May 29.  The reason for her appt with Lashaun being on June 5 is that it is during her infusion, and she has reported barriers to coming here additional times due to distance (30-40 minute drive).    Intervention: Left a message for Sarah expressing that I am sorry to hear about her increased depression symptoms, noting that I have reviewed Katherin's note and that I strongly support seeing Dr Huff on May 29 if she is able to get here for that, agreeing that if able to take a walk that may be helpful, and reviewing the number for [facility] [phone] "  counseling, as well as my phone number.    Response/Assessment: Sarah's depression symptoms and level of distress are concerning. I am glad she is reaching out for care and that Dr Huff is able to see her next week.    Plan: Remain available. Outreach as needed. Please feel free to keep me involved as needed as she connects with counseling services in person here.    SUZI Lockett, [facility]  Palliative Care Counseling Services  [facility]  Office Phone: [phone]  [phone] ([facility]) or [phone] ([facility])  [facility]  supports: [phone]

## 2018-05-24 NOTE — TELEPHONE ENCOUNTER
Per Dr. Mortensen: would like Sarah to be assessed by a provider tomorrow if possible and start on Dexamethasone 4mg once daily x 3 days. Placed a hold on Cathleen Ramos's schedule tomorrow.     Contacted Sarah regarding the above recommendations from Dr. Mortensen - no answer at cell or home phone - left detailed vm to call us back at the triage number [phone].

## 2018-05-24 NOTE — TELEPHONE ENCOUNTER
Sarah called back, rx sent to preferred pharmacy. Will see Troy on 5/29, unsure if she can make appt with Cathleen tomorrow, will call back tomorrow morning regarding

## 2018-05-24 NOTE — TELEPHONE ENCOUNTER
Patient called into the clinic very tearful. Said she had contacted a crisis line because she was feeling so depressed and hopeless. She denied feeling suicidal. She said the week after chemotherapy she struggles very much with feelings of hopelessness. She has nausea and decreased appetite. She finds even getting out of bed to be difficult. She has lost interest in social activities and cannot engage in regular exercise. She wonders if she'll ever be back to the way she used to be before cancer. She thinks her symptoms worsened about 2 weeks ago. Her parents have recently been ill and along with her illness she says she feels morbid. She was started on Prozac on 5/7/18 and says she takes this as prescribed, also takes Ativan PRN for anxiety. She feels like she has a good support system at home.     She is nauseous today and so far has taken Zofran and ativan. Takes Zofran Q 8 hr and Ativan PRN. Advised to try using the compazine as well to better control nausea. She is agreeable with this. So far she has only had a nutritional shake. Discussed with Sarah I can contact Dr. Mortensen today about medication that may improve her appetite - - Paged Dr. Mortensen - -     Regarding her depressed mood and feelings of hopelessness encouraged her to walk outside and enjoy the sunshine and get some fresh air as per Sarah she is having trouble with motivation but once she is walking this can be enjoyable for her. Discussed focusing on things she can do and activites she looks forward to. One of her goals is to enjoy this summer by the pool, and encouraged her to work toward this, with the help of both medication therapy and talk therapy this is possible. Offered to locate cancer support groups in her area, she is receptive to this. Contacted Lashaun Islas  patient has a future appointment with. Lashaun was out of the office and left a message to contact palliative social work fellow Mindi at [phone]. She did not  answer - left her a vm to contact the patient asap. Then called scheduling to get the patient an appointment with Troy Huff, he had an opening on 5/29 at 11am. Called patient back to verify this appointment time will work for her - she did not answer, left detailed vm for her to call us back at triage.

## 2018-05-25 ENCOUNTER — INFUSION THERAPY VISIT (OUTPATIENT)
Dept: INFUSION THERAPY | Facility: CLINIC | Age: 53
End: 2018-05-25
Attending: PHYSICIAN ASSISTANT
Payer: COMMERCIAL

## 2018-05-25 ENCOUNTER — TELEPHONE (OUTPATIENT)
Dept: ONCOLOGY | Facility: CLINIC | Age: 53
End: 2018-05-25

## 2018-05-25 VITALS — SYSTOLIC BLOOD PRESSURE: 119 MMHG | HEART RATE: 65 BPM | DIASTOLIC BLOOD PRESSURE: 75 MMHG | TEMPERATURE: 98.3 F

## 2018-05-25 DIAGNOSIS — C20 MALIGNANT NEOPLASM OF RECTUM (H): Primary | ICD-10-CM

## 2018-05-25 PROCEDURE — 25000128 H RX IP 250 OP 636: Performed by: PHYSICIAN ASSISTANT

## 2018-05-25 PROCEDURE — 96360 HYDRATION IV INFUSION INIT: CPT

## 2018-05-25 RX ORDER — HEPARIN SODIUM (PORCINE) LOCK FLUSH IV SOLN 100 UNIT/ML 100 UNIT/ML
5 SOLUTION INTRAVENOUS
Status: DISCONTINUED | OUTPATIENT
Start: 2018-05-25 | End: 2018-05-25 | Stop reason: HOSPADM

## 2018-05-25 RX ADMIN — HEPARIN 5 ML: 100 SYRINGE at 14:21

## 2018-05-25 RX ADMIN — SODIUM CHLORIDE 1000 ML: 9 INJECTION, SOLUTION INTRAVENOUS at 13:12

## 2018-05-25 NOTE — TELEPHONE ENCOUNTER
Tata Smith - she will not be coming in for an appointment with Cathleen today. She is worried about taking the dexamethasone 4mg as she's worried she will not be able to sleep if she takes it. Encouraged her to take it in the morning with breakfast and that she should be OK to sleep at night, if she experiences insomnia, she can stop taking it and we'll investigate other medications to try. She is agreeable with trying dexamethasone tomorrow morning.

## 2018-05-25 NOTE — MR AVS SNAPSHOT
After Visit Summary   5/25/2018    Sarah Rajan    MRN: [id number]           Patient Information     Date Of Birth          1965        Visit Information        Provider Department      5/25/2018 1:00 PM ROOM 4 [facility]        Today's Diagnoses     Malignant neoplasm of rectum (H)    -  1       Follow-ups after your visit        Your next 10 appointments already scheduled     May 26, 2018  8:00 AM CDT   Level 1 with ROOM 1 [facility] ([facility])    [address] [address]   [phone]            May 28, 2018  8:00 AM CDT   Level 1 with ROOM 1 [facility] ([facility])    [address] [address]   [phone]            May 29, 2018 11:00 AM CDT   (Arrive by 10:45 AM)   NEW WITH ROOM with Bj Huff PsyD, UC 2 116 CONSULT RM   [facility]    [address] [place] [address]  [address]  [facility] [address]   [phone]            May 29, 2018 12:00 PM CDT   Level 2 with ROOM 1 [facility] ([facility])    [facility]  [address]  [facility] [address]   [phone]            Jun 05, 2018  8:15 AM CDT   [facility] Lab Draw with  [facility] LAB DRAW   [facility] ([facility])    [address]  [address]  [facility] [address]   [phone]            Jun 05, 2018  8:40 AM CDT   (Arrive by 8:25 AM)   Return Visit with Cathleen Ramos PA-C   [facility]    [address]  [address]  [facility] [address]   [phone]            Jun 05, 2018 10:00 AM CDT   Infusion 240 with UC ONCOLOGY INFUSION, UC 17 ATC   [facility]    [address] [place] [address]  [address]  [facility]  [id number] [phone]            Jun 05, 2018 12:00 PM CDT   (Arrive by 11:45 AM)   [facility]    [address]  [address]  [facility] [id number] [phone]              Who to contact     If you have questions or need follow up information about today's clinic visit or your schedule please contact [facility] directly at [phone].  Normal or non-critical lab and imaging results will be communicated to you by Bubbleshart, letter or phone within 4 business days after the clinic has received the results. If you do not hear from us within 7 days, please contact the clinic through I.Systemst or phone. If you have a critical or abnormal lab result, we will notify you by phone as soon as possible.  Submit refill requests through Edevate or call your pharmacy and they will forward the refill request to us. Please allow 3 business days for your refill to be completed.          Additional Information About Your Visit        BubblesharThe Rounds Information     Edevate gives you secure access to your electronic health record. If you see a primary care provider, you can also send messages to your care team and make appointments. If you have questions, please call your primary care clinic.  If you do not have a primary care provider, please call [phone] and they will assist you.        Care EveryWhere ID     This is your Care EveryWhere ID. This could be used by other organizations to access your [place] medical records  [id number]        Your Vitals Were     Pulse Temperature                65 98.3  F (36.8  C) (Oral)           Blood Pressure from Last 3 Encounters:   05/25/18 119/75   05/23/18 122/83   05/21/18 120/78    Weight from Last 3 Encounters:   05/21/18 86.6 kg (191 lb)   05/12/18 87.5 kg (193 lb)   05/07/18 89 kg (196 lb 3.2 oz)              Today, you had the following     No orders found for  display       Primary Care Provider Office Phone # Fax #    Aasay ALVAREZ MD [place] [phone] [address] [address]        Equal Access to Services     DEAN CHAKRABORTY : Hadliban shannon li ellao Sopavanali, waaxda luqadaha, qaybta kaalmada adetess, luz elena carmichael laGarrisonalyssa jacob. So [facility] [phone].    ATENCIÓN: Si habla español, tiene a membreno disposición servicios gratuitos de asistencia lingüística. Llame al [phone].    We comply with applicable federal civil rights laws and [place] laws. We do not discriminate on the basis of race, color, national origin, age, disability, sex, sexual orientation, or gender identity.            Thank you!     Thank you for choosing [facility]  for your care. Our goal is always to provide you with excellent care. Hearing back from our patients is one way we can continue to improve our services. Please take a few minutes to complete the written survey that you may receive in the mail after your visit with us. Thank you!             Your Updated Medication List - Protect others around you: Learn how to safely use, store and throw away your medicines at www.disposemymeds.org.          This list is accurate as of 5/25/18  3:33 PM.  Always use your most recent med list.                   Brand Name Dispense Instructions for use Diagnosis    amitriptyline 25 MG tablet    ELAVIL    30 tablet    Take 1 tablet (25 mg) by mouth At Bedtime    Insomnia, unspecified type, Malignant neoplasm of rectum (H), Liver lesion       calcium carbonate 500 MG chewable tablet    TUMS    150 tablet    Take 1-2 chew tab by mouth daily        dexamethasone 4 MG tablet    DECADRON    3 tablet    Take 1 tablet (4 mg) by mouth daily (with breakfast)    Malignant neoplasm of rectum (H)       eszopiclone 1 MG Tabs tablet    LUNESTA    30 tablet    Take 1-2 tablets (1-2 mg) by mouth At Bedtime    Other insomnia       fluorouracil      Administer 4,968 mg  intravenously . Continuous infusion over 46-48 hr via ambulatory pump q14d Supplied by outside provider with pump.        FLUoxetine 20 MG capsule    PROzac    30 capsule    Take 1 capsule (20 mg) by mouth daily    Other depression       lidocaine-prilocaine cream    EMLA    30 g    Apply 45 minutes prior to procedure.    Malignant neoplasm of rectum (H)       * LORazepam 0.5 MG tablet    ATIVAN    30 tablet    Take 1 tablet (0.5 mg) by mouth every 4 hours as needed (Anxiety, Nausea/Vomiting or Sleep)    Malignant neoplasm of rectum (H)       * LORazepam 1 MG tablet    ATIVAN    30 tablet    Take 1 tablet (1 mg) by mouth At Bedtime    Other insomnia       ondansetron 4 MG tablet    ZOFRAN    3 tablet    Take one tablet by mouth every 6 hours as needed for nausea when taking neomycin and flagyl    Rectal cancer (H)       ondansetron 8 MG ODT tab    ZOFRAN-ODT    60 tablet    Take 1 tablet (8 mg) by mouth every 8 hours as needed for nausea    Chemotherapy induced nausea and vomiting       Potassium Chloride ER 20 MEQ Tbcr     10 tablet    Take 1 tablet (20 mEq) by mouth 2 times daily    Hypokalemia       prochlorperazine 10 MG tablet    COMPAZINE    20 tablet    Take 1 tablet (10 mg) by mouth every 6 hours as needed for nausea or vomiting    Malignant neoplasm of rectum (H)       RANITIDINE HCL PO      Take 150 mg by mouth daily as needed for heartburn        TYLENOL PO      Take 1,000 mg by mouth At Bedtime        zolpidem 5 MG tablet    AMBIEN    30 tablet    Take 1 tablet (5 mg) by mouth nightly as needed for sleep    Other insomnia       * Notice:  This list has 2 medication(s) that are the same as other medications prescribed for you. Read the directions carefully, and ask your doctor or other care provider to review them with you.

## 2018-05-25 NOTE — PROGRESS NOTES
Infusion Nursing Note:  Sarah Jolleyp presents today for IV Fluids.   Patient seen by provider today: No   present during visit today: Not Applicable.    Note: 1 liter of NS infused over 1 hour without complications. Port flushed per Tram protocol. Pt. To return tomorrow for another liter of IV fluids.    Intravenous Access:  No Intravenous access/labs at this visit.    Treatment Conditions:  Not Applicable.      Post Infusion Assessment:  Patient tolerated infusion without incident.  Blood return noted pre and post infusion.  Site patent and intact, free from redness, edema or discomfort.  No evidence of extravasations.    Discharge Plan:   Patient and/or family verbalized understanding of discharge instructions and all questions answered.  Patient discharged in stable condition accompanied by: .  Departure Mode: Ambulatory.    Mila Freed RN

## 2018-05-25 NOTE — TELEPHONE ENCOUNTER
Called patient regarding possible appointment with Cathleen Ramos, 4:40pm appt placed on hold. Did not hear from the patient this morning.     No answer -  Left detailed vm and asked her to call us back

## 2018-05-26 ENCOUNTER — INFUSION THERAPY VISIT (OUTPATIENT)
Dept: INFUSION THERAPY | Facility: CLINIC | Age: 53
End: 2018-05-26
Attending: PHYSICIAN ASSISTANT
Payer: COMMERCIAL

## 2018-05-26 VITALS — TEMPERATURE: 97.6 F | DIASTOLIC BLOOD PRESSURE: 71 MMHG | SYSTOLIC BLOOD PRESSURE: 111 MMHG | HEART RATE: 72 BPM

## 2018-05-26 DIAGNOSIS — C20 MALIGNANT NEOPLASM OF RECTUM (H): Primary | ICD-10-CM

## 2018-05-26 PROCEDURE — 25000128 H RX IP 250 OP 636: Performed by: PHYSICIAN ASSISTANT

## 2018-05-26 PROCEDURE — 96361 HYDRATE IV INFUSION ADD-ON: CPT

## 2018-05-26 PROCEDURE — 96365 THER/PROPH/DIAG IV INF INIT: CPT

## 2018-05-26 PROCEDURE — 96374 THER/PROPH/DIAG INJ IV PUSH: CPT

## 2018-05-26 RX ORDER — HEPARIN SODIUM (PORCINE) LOCK FLUSH IV SOLN 100 UNIT/ML 100 UNIT/ML
5 SOLUTION INTRAVENOUS
Status: DISCONTINUED | OUTPATIENT
Start: 2018-05-26 | End: 2018-05-26 | Stop reason: HOSPADM

## 2018-05-26 RX ORDER — ONDANSETRON 2 MG/ML
4 INJECTION INTRAMUSCULAR; INTRAVENOUS ONCE
Status: COMPLETED | OUTPATIENT
Start: 2018-05-26 | End: 2018-05-26

## 2018-05-26 RX ADMIN — HEPARIN 5 ML: 100 SYRINGE at 09:13

## 2018-05-26 RX ADMIN — SODIUM CHLORIDE 1000 ML: 9 INJECTION, SOLUTION INTRAVENOUS at 08:02

## 2018-05-26 RX ADMIN — ONDANSETRON 4 MG: 2 INJECTION INTRAMUSCULAR; INTRAVENOUS at 08:52

## 2018-05-26 NOTE — PROGRESS NOTES
Infusion Nursing Note:  Sarah Rajan presents today for IV fluids   Patient seen by provider today: No   present during visit today: Not Applicable.    Note: Pt c/o some nausea today but did not bring antinausea meds with her nor is there antinausea meds in pt's therapy plan.  Pt had Zofran orally last night at 7;00 pm but didn't take any yet today.  Did give pt Zofran 4 mg IV based on resident on-call recommendations as well as standing orders.       Intravenous Access:  Implanted Port.    Treatment Conditions:  Not Applicable.      Post Infusion Assessment:  Patient tolerated infusion without incident.  Blood return noted pre and post infusion.  Site patent and intact, free from redness, edema or discomfort.  No evidence of extravasations.  Access discontinued per protocol.    Discharge Plan:   Patient discharged in stable condition accompanied by: .  Departure Mode: Ambulatory.  Pt to return on 5/28/18 at 8:00 am for more IV fluids.     Swapna Kessler RN

## 2018-05-26 NOTE — MR AVS SNAPSHOT
After Visit Summary   5/26/2018    Sarah Rajan    MRN: [id number]           Patient Information     Date Of Birth          1965        Visit Information        Provider Department      5/26/2018 8:00 AM ROOM 1 [facility]        Today's Diagnoses     Malignant neoplasm of rectum (H)    -  1       Follow-ups after your visit        Your next 10 appointments already scheduled     May 28, 2018  8:00 AM CDT   Level 1 with ROOM 1 [facility] ([facility])    [address] [address]   [phone]            May 29, 2018 11:00 AM CDT   (Arrive by 10:45 AM)   NEW WITH ROOM with Bj Huff PsyD,  2 116 CONSULT RM   [facility] ([facility])    [address] [place] [address]  [address]  [facility] [place] [address]   [phone]            May 29, 2018 12:00 PM CDT   Level 2 with ROOM 1 [facility] ([facility])    [facility]  [address] [facility] [address]  [facility] [address]   [phone]            Jun 05, 2018  8:15 AM CDT   Masonic Lab Draw with UC MASONIC LAB DRAW   [facility] Lab Draw ([facility])    [address] [place] [address]  [address]  [facility] [place] [address]   [phone]            Jun 05, 2018  8:40 AM CDT   (Arrive by 8:25 AM)   Return Visit with Cathleen Ramos PA-C   [facility] ([facility])    [address] [place] [address]  [address]  [facility] [place] [address]   [phone]            Jun 05, 2018 10:00 AM CDT   Infusion 240 with UC ONCOLOGY INFUSION, UC 17 ATC   [facility] ([facility])    [address] [place] [address]  [address]  [facility] [place] [address]   [phone]            Jun 05, 2018 12:00 PM CDT   (Arrive by 11:45 AM)   RETURN WITH ROOM with SHANTHI Sanchez   [facility] (  [facility])    [address]  [address]  [facility] [address] [phone]            Jun 19, 2018  4:45 PM CDT   MR ABDOMEN W/O & W CONTRAST with UCMR1   [facility] MRI ([facility])    [address]  1st Floor  [facility] [address] [phone]           Take your medicines as usual, unless your doctor tells you not to. Bring a list of your current medicines to your exam (including vitamins, minerals and over-the-counter drugs). Also bring the results of similar scans you may have had.    You may or may not receive IV contrast for this exam pending the discretion of the Radiologist.   Do not eat or drink for 6 hours prior to exam.  The MRI machine uses a strong magnet. Please wear clothes without metal (snaps, zippers). A sweatsuit works well, or we may give you a hospital gown.  Please remove any body piercings and hair extensions before you arrive. You will also remove watches, jewelry, hairpins, wallets, dentures, partial dental plates and hearing aids. You may wear contact lenses, and you may be able to wear your rings. We have a safe place to keep your personal items, but it is safer to leave them at home.  **IMPORTANT** THE INSTRUCTIONS BELOW ARE ONLY FOR THOSE PATIENTS WHO HAVE BEEN PRESCRIBED SEDATION OR GENERAL ANESTHESIA DURING THEIR MRI PROCEDURE:  IF YOUR DOCTOR PRESCRIBED ORAL SEDATION (take medicine to help you relax during your exam):   You must get the medicine from your doctor (oral medication) before you arrive. Bring the medicine to the exam. Do not take it at home. You ll be told when to take it upon arriving for your exam.   Arrive one hour early. Bring someone who can take you home after the test. Your medicine will make you sleepy. After the exam, you may not drive, take a bus or take a taxi by yourself.  IF YOUR DOCTOR PRESCRIBED IV SEDATION:   Arrive one hour early. Bring someone who can take you home  after the test. Your medicine will make you sleepy. After the exam, you may not drive, take a bus or take a taxi by yourself.   No eating 6 hours before your exam. You may have clear liquids up until 4 hours before your exam. (Clear liquids include water, clear tea, black coffee and fruit juice without pulp.)  IF YOUR DOCTOR PRESCRIBED ANESTHESIA (be asleep for your exam):   Arrive 1 1/2 hours early. Bring someone who can take you home after the test. You may not drive, take a bus or take a taxi by yourself.   No eating 8 hours before your exam. You may have clear liquids up until 4 hours before your exam. (Clear liquids include water, clear tea, black coffee and fruit juice without pulp.)   You will spend four to five hours in the recovery room.  If you have any questions, please contact your Imaging Department exam site.              Who to contact     If you have questions or need follow up information about today's clinic visit or your schedule please contact [facility] directly at [phone].  Normal or non-critical lab and imaging results will be communicated to you by SeeToohart, letter or phone within 4 business days after the clinic has received the results. If you do not hear from us within 7 days, please contact the clinic through Ideabove or phone. If you have a critical or abnormal lab result, we will notify you by phone as soon as possible.  Submit refill requests through Ideabove or call your pharmacy and they will forward the refill request to us. Please allow 3 business days for your refill to be completed.          Additional Information About Your Visit        Ideabove Information     Ideabove gives you secure access to your electronic health record. If you see a primary care provider, you can also send messages to your care team and make appointments. If you have questions, please call your primary care clinic.  If you do not have a primary care provider, please call  [phone] and they will assist you.        Care EveryWhere ID     This is your Care EveryWhere ID. This could be used by other organizations to access your [place] medical records  [id number]        Your Vitals Were     Pulse Temperature                72 97.6  F (36.4  C) (Oral)           Blood Pressure from Last 3 Encounters:   05/26/18 111/71   05/25/18 119/75   05/23/18 122/83    Weight from Last 3 Encounters:   05/21/18 86.6 kg (191 lb)   05/12/18 87.5 kg (193 lb)   05/07/18 89 kg (196 lb 3.2 oz)              Today, you had the following     No orders found for display       Primary Care Provider Office Phone # Fax #    Kyra Mortensen [phone] [address] [facility] [address]        Equal Access to Services     [facility]: Hadii shannon li hadasho Soomaali, waaxda luqadaha, qaybta kaalmada adeegyada, luz elena yuan hayalyssa mtz . So [facility] [phone].    ATENCIÓN: Si habla español, tiene a membreno disposición servicios gratuitos de asistencia lingüística. Llame al [phone].    We comply with applicable federal civil rights laws and [place] laws. We do not discriminate on the basis of race, color, national origin, age, disability, sex, sexual orientation, or gender identity.            Thank you!     Thank you for choosing [facility]  for your care. Our goal is always to provide you with excellent care. Hearing back from our patients is one way we can continue to improve our services. Please take a few minutes to complete the written survey that you may receive in the mail after your visit with us. Thank you!             Your Updated Medication List - Protect others around you: Learn how to safely use, store and throw away your medicines at www.disposemymeds.org.          This list is accurate as of 5/26/18 12:00 PM.  Always use your most recent med list.                   Brand Name Dispense Instructions for use Diagnosis    amitriptyline  25 MG tablet    ELAVIL    30 tablet    Take 1 tablet (25 mg) by mouth At Bedtime    Insomnia, unspecified type, Malignant neoplasm of rectum (H), Liver lesion       calcium carbonate 500 MG chewable tablet    TUMS    150 tablet    Take 1-2 chew tab by mouth daily        dexamethasone 4 MG tablet    DECADRON    3 tablet    Take 1 tablet (4 mg) by mouth daily (with breakfast)    Malignant neoplasm of rectum (H)       eszopiclone 1 MG Tabs tablet    LUNESTA    30 tablet    Take 1-2 tablets (1-2 mg) by mouth At Bedtime    Other insomnia       fluorouracil      Administer 4,968 mg intravenously . Continuous infusion over 46-48 hr via ambulatory pump q14d Supplied by outside provider with pump.        FLUoxetine 20 MG capsule    PROzac    30 capsule    Take 1 capsule (20 mg) by mouth daily    Other depression       lidocaine-prilocaine cream    EMLA    30 g    Apply 45 minutes prior to procedure.    Malignant neoplasm of rectum (H)       * LORazepam 0.5 MG tablet    ATIVAN    30 tablet    Take 1 tablet (0.5 mg) by mouth every 4 hours as needed (Anxiety, Nausea/Vomiting or Sleep)    Malignant neoplasm of rectum (H)       * LORazepam 1 MG tablet    ATIVAN    30 tablet    Take 1 tablet (1 mg) by mouth At Bedtime    Other insomnia       ondansetron 4 MG tablet    ZOFRAN    3 tablet    Take one tablet by mouth every 6 hours as needed for nausea when taking neomycin and flagyl    Rectal cancer (H)       ondansetron 8 MG ODT tab    ZOFRAN-ODT    60 tablet    Take 1 tablet (8 mg) by mouth every 8 hours as needed for nausea    Chemotherapy induced nausea and vomiting       Potassium Chloride ER 20 MEQ Tbcr     10 tablet    Take 1 tablet (20 mEq) by mouth 2 times daily    Hypokalemia       prochlorperazine 10 MG tablet    COMPAZINE    20 tablet    Take 1 tablet (10 mg) by mouth every 6 hours as needed for nausea or vomiting    Malignant neoplasm of rectum (H)       RANITIDINE HCL PO      Take 150 mg by mouth daily as needed for  heartburn        TYLENOL PO      Take 1,000 mg by mouth At Bedtime        zolpidem 5 MG tablet    AMBIEN    30 tablet    Take 1 tablet (5 mg) by mouth nightly as needed for sleep    Other insomnia       * Notice:  This list has 2 medication(s) that are the same as other medications prescribed for you. Read the directions carefully, and ask your doctor or other care provider to review them with you.

## 2018-05-28 ENCOUNTER — INFUSION THERAPY VISIT (OUTPATIENT)
Dept: INFUSION THERAPY | Facility: CLINIC | Age: 53
End: 2018-05-28
Attending: INTERNAL MEDICINE
Payer: COMMERCIAL

## 2018-05-28 VITALS — SYSTOLIC BLOOD PRESSURE: 94 MMHG | DIASTOLIC BLOOD PRESSURE: 65 MMHG | HEART RATE: 69 BPM | TEMPERATURE: 97.5 F

## 2018-05-28 DIAGNOSIS — C20 MALIGNANT NEOPLASM OF RECTUM (H): Primary | ICD-10-CM

## 2018-05-28 PROCEDURE — 96365 THER/PROPH/DIAG IV INF INIT: CPT

## 2018-05-28 PROCEDURE — 25000128 H RX IP 250 OP 636: Performed by: PHYSICIAN ASSISTANT

## 2018-05-28 PROCEDURE — 96360 HYDRATION IV INFUSION INIT: CPT

## 2018-05-28 RX ORDER — HEPARIN SODIUM (PORCINE) LOCK FLUSH IV SOLN 100 UNIT/ML 100 UNIT/ML
5 SOLUTION INTRAVENOUS
Status: DISCONTINUED | OUTPATIENT
Start: 2018-05-28 | End: 2018-05-28 | Stop reason: HOSPADM

## 2018-05-28 RX ADMIN — HEPARIN 5 ML: 100 SYRINGE at 09:06

## 2018-05-28 RX ADMIN — SODIUM CHLORIDE 1000 ML: 9 INJECTION, SOLUTION INTRAVENOUS at 07:59

## 2018-05-28 NOTE — MR AVS SNAPSHOT
After Visit Summary   5/28/2018    Sarah Rajan    MRN: [id number]           Patient Information     Date Of Birth          1965        Visit Information        Provider Department      5/28/2018 8:00 AM ROOM 1 [facility]        Today's Diagnoses     Malignant neoplasm of rectum (H)    -  1       Follow-ups after your visit        Your next 10 appointments already scheduled     May 29, 2018 11:00 AM CDT   (Arrive by 10:45 AM)   NEW WITH ROOM with Bj Huff PsyD,  2 116 CONSULT RM   [facility] ([facility])    [address] [place] [address]  [address]  [facility] [address]   [phone]            May 29, 2018 12:00 PM CDT   Level 2 with ROOM 1 [facility] ([facility])    [facility]  [address] [facility] [address] [facility] [address]  [facility] [address]   [phone]            Jun 05, 2018  8:15 AM CDT   Masonic Lab Draw with UC MASALESSANDRA LAB DRAW   [facility] Lab Draw ([facility])    [address] [place] [address]  [address]  [facility] [address]   [phone]            Jun 05, 2018  8:40 AM CDT   (Arrive by 8:25 AM)   Return Visit with Cathleen Ramos PA-C   [facility] ([facility])    [address] [place] [address]  [address]  [facility] [address]   [phone]            Jun 05, 2018 10:00 AM CDT   Infusion 240 with  ONCOLOGY INFUSION, UC 17 ATC   [facility] ([facility])    [address] [place] [address]  [address]  [facility] [address]   [phone]            Jun 05, 2018 12:00 PM CDT   (Arrive by 11:45 AM)   RETURN WITH ROOM with SHANTHI Sanchez   [facility] ([facility])    [address] [place] [address] [address]  [address]  [facility] [address]   [phone]            Jun 19, 2018  4:45 PM CDT   MR ABDOMEN W/O & W CONTRAST with UCMR1    [facility] ([facility])    [address]  1st Floor  [facility] [address] [phone]           Take your medicines as usual, unless your doctor tells you not to. Bring a list of your current medicines to your exam (including vitamins, minerals and over-the-counter drugs). Also bring the results of similar scans you may have had.    You may or may not receive IV contrast for this exam pending the discretion of the Radiologist.   Do not eat or drink for 6 hours prior to exam.  The MRI machine uses a strong magnet. Please wear clothes without metal (snaps, zippers). A sweatsuit works well, or we may give you a hospital gown.  Please remove any body piercings and hair extensions before you arrive. You will also remove watches, jewelry, hairpins, wallets, dentures, partial dental plates and hearing aids. You may wear contact lenses, and you may be able to wear your rings. We have a safe place to keep your personal items, but it is safer to leave them at home.  **IMPORTANT** THE INSTRUCTIONS BELOW ARE ONLY FOR THOSE PATIENTS WHO HAVE BEEN PRESCRIBED SEDATION OR GENERAL ANESTHESIA DURING THEIR MRI PROCEDURE:  IF YOUR DOCTOR PRESCRIBED ORAL SEDATION (take medicine to help you relax during your exam):   You must get the medicine from your doctor (oral medication) before you arrive. Bring the medicine to the exam. Do not take it at home. You ll be told when to take it upon arriving for your exam.   Arrive one hour early. Bring someone who can take you home after the test. Your medicine will make you sleepy. After the exam, you may not drive, take a bus or take a taxi by yourself.  IF YOUR DOCTOR PRESCRIBED IV SEDATION:   Arrive one hour early. Bring someone who can take you home after the test. Your medicine will make you sleepy. After the exam, you may not drive, take a bus or take a taxi by yourself.   No eating 6 hours before your exam. You may have clear liquids  up until 4 hours before your exam. (Clear liquids include water, clear tea, black coffee and fruit juice without pulp.)  IF YOUR DOCTOR PRESCRIBED ANESTHESIA (be asleep for your exam):   Arrive 1 1/2 hours early. Bring someone who can take you home after the test. You may not drive, take a bus or take a taxi by yourself.   No eating 8 hours before your exam. You may have clear liquids up until 4 hours before your exam. (Clear liquids include water, clear tea, black coffee and fruit juice without pulp.)   You will spend four to five hours in the recovery room.  If you have any questions, please contact your Imaging Department exam site.            Jun 20, 2018  9:15 AM CDT   MR PELVIS W/O & W CONTRAST with [id number]   [facility] MRI ([facility])    [address] [place] [address] [phone]           Take your medicines as usual, unless your doctor tells you not to. Bring a list of your current medicines to your exam (including vitamins, minerals and over-the-counter drugs).  You may or may not receive intravenous (IV) contrast for this exam pending the discretion of the Radiologist.  You do not need to do anything special to prepare.  The MRI machine uses a strong magnet. Please wear clothes without metal (snaps, zippers). A sweatsuit works well, or we may give you a hospital gown.  Please remove any body piercings and hair extensions before you arrive. You will also remove watches, jewelry, hairpins, wallets, dentures, partial dental plates and hearing aids. You may wear contact lenses, and you may be able to wear your rings. We have a safe place to keep your personal items, but it is safer to leave them at home.  **IMPORTANT** THE INSTRUCTIONS BELOW ARE ONLY FOR THOSE PATIENTS WHO HAVE BEEN PRESCRIBED SEDATION OR GENERAL ANESTHESIA DURING THEIR MRI PROCEDURE:  IF YOUR DOCTOR PRESCRIBED ORAL SEDATION (take medicine to help you relax during your  exam):   You must get the medicine from your doctor (oral medication) before you arrive. Bring the medicine to the exam. Do not take it at home. You ll be told when to take it upon arriving for your exam.   Arrive one hour early. Bring someone who can take you home after the test. Your medicine will make you sleepy. After the exam, you may not drive, take a bus or take a taxi by yourself.  IF YOUR DOCTOR PRESCRIBED IV SEDATION:   Arrive one hour early. Bring someone who can take you home after the test. Your medicine will make you sleepy. After the exam, you may not drive, take a bus or take a taxi by yourself.   No eating 6 hours before your exam. You may have clear liquids up until 4 hours before your exam. (Clear liquids include water, clear tea, black coffee and fruit juice without pulp.)  IF YOUR DOCTOR PRESCRIBED ANESTHESIA (be asleep for your exam):   Arrive 1 1/2 hours early. Bring someone who can take you home after the test. You may not drive, take a bus or take a taxi by yourself.   No eating 8 hours before your exam. You may have clear liquids up until 4 hours before your exam. (Clear liquids include water, clear tea, black coffee and fruit juice without pulp.)   You will spend four to five hours in the recovery room.  Please call the Imaging Department at your exam site with any questions.              Who to contact     If you have questions or need follow up information about today's clinic visit or your schedule please contact [facility] SERVICES directly at [phone].  Normal or non-critical lab and imaging results will be communicated to you by MyChart, letter or phone within 4 business days after the clinic has received the results. If you do not hear from us within 7 days, please contact the clinic through Safe Communicationst or phone. If you have a critical or abnormal lab result, we will notify you by phone as soon as possible.  Submit refill requests through [employer] or call your pharmacy  and they will forward the refill request to us. Please allow 3 business days for your refill to be completed.          Additional Information About Your Visit        [employer] Information     [employer] gives you secure access to your electronic health record. If you see a primary care provider, you can also send messages to your care team and make appointments. If you have questions, please call your primary care clinic.  If you do not have a primary care provider, please call [phone] and they will assist you.        Care EveryWhere ID     This is your Care EveryWhere ID. This could be used by other organizations to access your [place] medical records  [id number]        Your Vitals Were     Pulse Temperature                69 97.5  F (36.4  C) (Oral)           Blood Pressure from Last 3 Encounters:   05/28/18 94/65   05/26/18 111/71   05/25/18 119/75    Weight from Last 3 Encounters:   05/21/18 86.6 kg (191 lb)   05/12/18 87.5 kg (193 lb)   05/07/18 89 kg (196 lb 3.2 oz)              Today, you had the following     No orders found for display       Primary Care Provider Office Phone # Fax #    Aazim K MD [place] [phone]       [address] [facility] [address]        Equal Access to Services     DEAN CHAKRABORTY : Hadii aad ku hadasho Soomaali, waaxda luqadaha, qaybta kaalmada adeegyada, waxay idiin haymaryurin shania ajcob. So [facility] [phone].    ATENCIÓN: Si habla español, tiene a membreno disposición servicios gratuitos de asistencia lingüística. Llame al [phone].    We comply with applicable federal civil rights laws and [place] laws. We do not discriminate on the basis of race, color, national origin, age, disability, sex, sexual orientation, or gender identity.            Thank you!     Thank you for choosing [facility] INFUSION SERVICES  for your care. Our goal is always to provide you with excellent care. Hearing back from our patients is one way we can continue to improve  our services. Please take a few minutes to complete the written survey that you may receive in the mail after your visit with us. Thank you!             Your Updated Medication List - Protect others around you: Learn how to safely use, store and throw away your medicines at www.disposemymeds.org.          This list is accurate as of 5/28/18 11:36 AM.  Always use your most recent med list.                   Brand Name Dispense Instructions for use Diagnosis    amitriptyline 25 MG tablet    ELAVIL    30 tablet    Take 1 tablet (25 mg) by mouth At Bedtime    Insomnia, unspecified type, Malignant neoplasm of rectum (H), Liver lesion       calcium carbonate 500 MG chewable tablet    TUMS    150 tablet    Take 1-2 chew tab by mouth daily        dexamethasone 4 MG tablet    DECADRON    3 tablet    Take 1 tablet (4 mg) by mouth daily (with breakfast)    Malignant neoplasm of rectum (H)       eszopiclone 1 MG Tabs tablet    LUNESTA    30 tablet    Take 1-2 tablets (1-2 mg) by mouth At Bedtime    Other insomnia       fluorouracil      Administer 4,968 mg intravenously . Continuous infusion over 46-48 hr via ambulatory pump q14d Supplied by outside provider with pump.        FLUoxetine 20 MG capsule    PROzac    30 capsule    Take 1 capsule (20 mg) by mouth daily    Other depression       lidocaine-prilocaine cream    EMLA    30 g    Apply 45 minutes prior to procedure.    Malignant neoplasm of rectum (H)       * LORazepam 0.5 MG tablet    ATIVAN    30 tablet    Take 1 tablet (0.5 mg) by mouth every 4 hours as needed (Anxiety, Nausea/Vomiting or Sleep)    Malignant neoplasm of rectum (H)       * LORazepam 1 MG tablet    ATIVAN    30 tablet    Take 1 tablet (1 mg) by mouth At Bedtime    Other insomnia       ondansetron 4 MG tablet    ZOFRAN    3 tablet    Take one tablet by mouth every 6 hours as needed for nausea when taking neomycin and flagyl    Rectal cancer (H)       ondansetron 8 MG ODT tab    ZOFRAN-ODT    60 tablet     Take 1 tablet (8 mg) by mouth every 8 hours as needed for nausea    Chemotherapy induced nausea and vomiting       Potassium Chloride ER 20 MEQ Tbcr     10 tablet    Take 1 tablet (20 mEq) by mouth 2 times daily    Hypokalemia       prochlorperazine 10 MG tablet    COMPAZINE    20 tablet    Take 1 tablet (10 mg) by mouth every 6 hours as needed for nausea or vomiting    Malignant neoplasm of rectum (H)       RANITIDINE HCL PO      Take 150 mg by mouth daily as needed for heartburn        TYLENOL PO      Take 1,000 mg by mouth At Bedtime        zolpidem 5 MG tablet    AMBIEN    30 tablet    Take 1 tablet (5 mg) by mouth nightly as needed for sleep    Other insomnia       * Notice:  This list has 2 medication(s) that are the same as other medications prescribed for you. Read the directions carefully, and ask your doctor or other care provider to review them with you.

## 2018-05-28 NOTE — PROGRESS NOTES
Infusion Nursing Note:  Sarah Jolleyp presents today for IV fluids.    Patient seen by provider today: No   present during visit today: Not Applicable.    Note: N/A.    Intravenous Access:  Implanted Port.    Treatment Conditions:  Not Applicable.      Post Infusion Assessment:  Patient tolerated infusion without incident.  Blood return noted pre and post infusion.  Site patent and intact, free from redness, edema or discomfort.  No evidence of extravasations.  Access discontinued per protocol.    Discharge Plan:   Patient discharged in stable condition accompanied by: .  Departure Mode: Ambulatory.    Swapna Kessler RN

## 2018-05-29 ENCOUNTER — OFFICE VISIT (OUTPATIENT)
Dept: ONCOLOGY | Facility: CLINIC | Age: 53
End: 2018-05-29
Attending: TRANSPLANT SURGERY
Payer: COMMERCIAL

## 2018-05-29 DIAGNOSIS — F32.89 OTHER DEPRESSION: Primary | ICD-10-CM

## 2018-05-29 DIAGNOSIS — R63.0 ANOREXIA: Primary | ICD-10-CM

## 2018-05-29 PROCEDURE — 40000114 ZZH STATISTIC NO CHARGE CLINIC VISIT

## 2018-05-29 PROCEDURE — 90791 PSYCH DIAGNOSTIC EVALUATION: CPT | Mod: ZP | Performed by: PSYCHOLOGIST

## 2018-05-29 RX ORDER — DRONABINOL 2.5 MG/1
2.5 CAPSULE ORAL
Qty: 60 CAPSULE | Refills: 0 | Status: SHIPPED | OUTPATIENT
Start: 2018-05-29 | End: 2018-07-25

## 2018-05-29 NOTE — LETTER
5/29/2018       RE: Sarah Rajan  [address] [facility] [address]     Dear Colleague,    Thank you for referring your patient, Sarah Rajan, to the [facility]. Please see a copy of my visit note below.    Confidential Summary of Oncology Psychology Initial Visit    Sarah Rajan is a 52 year old female who was referred by Dr. Mortensen for  Depression  .The patient was seen for an initial 45 minute evaluation on 5/29/2018. Her  attended the appointment too.     Presenting Concerns: Primary complaints included depressed mood, difficulty getting out of bed, fatigue, sadness, crying, lack of initiative, loss of interest in activities, loss of motivation for treatment plan, and social isolation. She is experiencing both emotional exhaustion and grief along with her depressed mood.     Mental Status/Interview:   Orientation: Sarah Rajan was alert, attentive, and oriented to time, place, and person  Affect: Affect was appropriate to the situation and showed normal range and stability.  Speech: Speech was clear with normal fluency, rate, tone, and volume.  Memory: Although not formally assessed, immediate, recent, and remote memory appeared to be intact.   Insight and Judgement: Sarah Rajan demonstrates adequate awareness of the issues discussed and was able to come to reasonable conclusions.  Thought: Thought patterns were coherent and logical. Hallucinations were denied and delusions were not evident.   Lethality: Sarah Rajan denied suicidal or homicidal ideation or intention.        Impression: She is in a state of emotional exhaustion due to her symptom burden. Her primary coping style is stoicism which is effective until it stops working. Stoicism can reach a point of being ineffective and that is where she currently is with her coping. We developed a plan to help her reduce her grief, increase her movement, and use her social network more efficiently. She and her  agreed  with the behavioral plan.     Diagnosis:   Encounter Diagnosis   Name Primary?     Other depression Yes     Recommendations/Plan:  1. Use the grief protocol, increase social support, and increase movement  2. Return for follow-up .     Thank you for this opportunity to participate in your care of this patient.    [email or web address], L.P.  Director, Oncology Supportive Care     Again, thank you for allowing me to participate in the care of your patient.      Sincerely,    Bj Huff PsyD

## 2018-05-29 NOTE — MR AVS SNAPSHOT
After Visit Summary   5/29/2018    Sarah Rajan    MRN: [id number]           Patient Information     Date Of Birth          1965        Visit Information        Provider Department      5/29/2018 11:00 AM Bj Huff PsyD;  2 116 CONSULT [facility]        Today's Diagnoses     Other depression    -  1       Follow-ups after your visit        Your next 10 appointments already scheduled     May 30, 2018  2:00 PM CDT   Level 2 with ROOM 3 [facility] ([facility])    [facility]  [address]  [facility] [place] [address]   [phone]            Jun 02, 2018 12:00 PM CDT   Infusion 120 with UC ONCOLOGY INFUSION, UC 15 ATC   [facility] ([facility])    [address] [place] [address]  [address]  [facility] [address]   [phone]            Jun 05, 2018  8:15 AM CDT   [facility] Draw with  [facility] LAB DRAW   [facility] Lab Draw ([facility])    [address] [place] [address]  [address]  [facility] [address]   [phone]            Jun 05, 2018  8:40 AM CDT   (Arrive by 8:25 AM)   Return Visit with Cathleen Ramos PA-C   [facility] ([facility]    [address] [place] [address]  [address]  [facility] [address]   [phone]            Jun 05, 2018 10:00 AM CDT   Infusion 240 with UC ONCOLOGY INFUSION, UC 17 ATC   [facility] ([facility]    [address] [place] [address]  [address]  [facility] [address]   [phone]            Jun 05, 2018 12:00 PM CDT   (Arrive by 11:45 AM)   RETURN WITH ROOM with SHANTHI Sanchez   [facility] ([facility])    [address] [place] [address]  [address]  [facility] [address]   [phone]            Jun 08, 2018  1:30 PM CDT   Level 2 with ROOM 1 [facility] ([facility])    [facility]  [address]  [facility] [address]   [phone]            Jun 19, 2018  4:45 PM CDT   MR ABDOMEN W/O & W CONTRAST with [facility] ([facility])    [address] [facility]  [address] [facility] [id number] [phone]           Take your medicines as usual, unless your doctor tells you not to. Bring a list of your current medicines to your exam (including vitamins, minerals and over-the-counter drugs). Also bring the results of similar scans you may have had.    You may or may not receive IV contrast for this exam pending the discretion of the Radiologist.   Do not eat or drink for 6 hours prior to exam.  The MRI machine uses a strong magnet. Please wear clothes without metal (snaps, zippers). A sweatsuit works well, or we may give you a hospital gown.  Please remove any body piercings and hair extensions before you arrive. You will also remove watches, jewelry, hairpins, wallets, dentures, partial dental plates and hearing aids. You may wear contact lenses, and you may be able to wear your rings. We have a safe place to keep your personal items, but it is safer to leave them at home.  **IMPORTANT** THE INSTRUCTIONS BELOW ARE ONLY FOR THOSE PATIENTS WHO HAVE BEEN PRESCRIBED SEDATION OR GENERAL ANESTHESIA DURING THEIR MRI PROCEDURE:  IF YOUR DOCTOR PRESCRIBED ORAL SEDATION (take medicine to help you relax during your exam):   You must get the medicine from your doctor (oral medication) before you arrive. Bring the medicine to the exam. Do not take it at home. You ll be told when to take it upon arriving for your exam.   Arrive one hour early. Bring someone who can take you home after the test. Your medicine will make you sleepy. After the exam, you may not drive, take a bus or take a taxi by yourself.  IF YOUR DOCTOR PRESCRIBED IV SEDATION:   Arrive one hour early. Bring someone who can take you  home after the test. Your medicine will make you sleepy. After the exam, you may not drive, take a bus or take a taxi by yourself.   No eating 6 hours before your exam. You may have clear liquids up until 4 hours before your exam. (Clear liquids include water, clear tea, black coffee and fruit juice without pulp.)  IF YOUR DOCTOR PRESCRIBED ANESTHESIA (be asleep for your exam):   Arrive 1 1/2 hours early. Bring someone who can take you home after the test. You may not drive, take a bus or take a taxi by yourself.   No eating 8 hours before your exam. You may have clear liquids up until 4 hours before your exam. (Clear liquids include water, clear tea, black coffee and fruit juice without pulp.)   You will spend four to five hours in the recovery room.  If you have any questions, please contact your Imaging Department exam site.              Who to contact     If you have questions or need follow up information about today's clinic visit or your schedule please contact [facility] directly at [phone].  Normal or non-critical lab and imaging results will be communicated to you by [email or web address], letter or phone within 4 business days after the clinic has received the results. If you do not hear from us within 7 days, please contact the clinic through [employer] or phone. If you have a critical or abnormal lab result, we will notify you by phone as soon as possible.  Submit refill requests through [employer] or call your pharmacy and they will forward the refill request to us. Please allow 3 business days for your refill to be completed.          Additional Information About Your Visit        Dat[employer]le Information     [employer] gives you secure access to your electronic health record. If you see a primary care provider, you can also send messages to your care team and make appointments. If you have questions, please call your primary care clinic.  If you do not have a primary care provider, please call  [phone] and they will assist you.        Care EveryWhere ID     This is your Care EveryWhere ID. This could be used by other organizations to access your [place] [facility] medical records  [id number]         Blood Pressure from Last 3 Encounters:   05/28/18 94/65   05/26/18 111/71   05/25/18 119/75    Weight from Last 3 Encounters:   05/21/18 86.6 kg (191 lb)   05/12/18 87.5 kg (193 lb)   05/07/18 89 kg (196 lb 3.2 oz)              Today, you had the following     No orders found for display         Today's Medication Changes          These changes are accurate as of 5/29/18 11:59 PM.  If you have any questions, ask your nurse or doctor.               Start taking these medicines.        Dose/Directions    dronabinol 2.5 MG capsule   Commonly known as:  MARINOL   Used for:  Anorexia   Started by:  Cathleen Ramos PA-C        Dose:  2.5 mg   Take 1 capsule (2.5 mg) by mouth 2 times daily (before meals)   Quantity:  60 capsule   Refills:  0            Where to get your medicines      Some of these will need a paper prescription and others can be bought over the counter.  Ask your nurse if you have questions.     Bring a paper prescription for each of these medications     dronabinol 2.5 MG capsule                Primary Care Provider Office Phone # Fax #    Aasay Mortensen [phone]       [address] [facility] [address]        Equal Access to Services     [facility] AH: Jorge Armendariz, wathom fletcher, qaybta kaalconnie cotton, luz elena gustafson adetimothy jacob. So [facility] [phone].    ATENCIÓN: Si habla español, tiene a membreno disposición servicios gratuitos de asistencia lingüística. Llame al [phone].    We comply with applicable federal civil rights laws and [place] laws. We do not discriminate on the basis of race, color, national origin, age, disability, sex, sexual orientation, or gender identity.            Thank you!     Thank you for choosing [facility]  for your care. Our goal is always to provide you with excellent care. Hearing back from our patients is one way we can continue to improve our services. Please take a few minutes to complete the written survey that you may receive in the mail after your visit with us. Thank you!             Your Updated Medication List - Protect others around you: Learn how to safely use, store and throw away your medicines at www.disposemymeds.org.          This list is accurate as of 5/29/18 11:59 PM.  Always use your most recent med list.                   Brand Name Dispense Instructions for use Diagnosis    amitriptyline 25 MG tablet    ELAVIL    30 tablet    Take 1 tablet (25 mg) by mouth At Bedtime    Insomnia, unspecified type, Malignant neoplasm of rectum (H), Liver lesion       calcium carbonate 500 MG chewable tablet    TUMS    150 tablet    Take 1-2 chew tab by mouth daily        dexamethasone 4 MG tablet    DECADRON    3 tablet    Take 1 tablet (4 mg) by mouth daily (with breakfast)    Malignant neoplasm of rectum (H)       dronabinol 2.5 MG capsule    MARINOL    60 capsule    Take 1 capsule (2.5 mg) by mouth 2 times daily (before meals)    Anorexia       eszopiclone 1 MG Tabs tablet    LUNESTA    30 tablet    Take 1-2 tablets (1-2 mg) by mouth At Bedtime    Other insomnia       fluorouracil      Administer 4,968 mg intravenously . Continuous infusion over 46-48 hr via ambulatory pump q14d Supplied by outside provider with pump.        FLUoxetine 20 MG capsule    PROzac    30 capsule    Take 1 capsule (20 mg) by mouth daily    Other depression       lidocaine-prilocaine cream    EMLA    30 g    Apply 45 minutes prior to procedure.    Malignant neoplasm of rectum (H)       * LORazepam 0.5 MG tablet    ATIVAN    30 tablet    Take 1 tablet (0.5 mg) by mouth every 4 hours as needed (Anxiety, Nausea/Vomiting or Sleep)    Malignant neoplasm of rectum (H)       * LORazepam 1 MG tablet    ATIVAN    30  tablet    Take 1 tablet (1 mg) by mouth At Bedtime    Other insomnia       ondansetron 4 MG tablet    ZOFRAN    3 tablet    Take one tablet by mouth every 6 hours as needed for nausea when taking neomycin and flagyl    Rectal cancer (H)       ondansetron 8 MG ODT tab    ZOFRAN-ODT    60 tablet    Take 1 tablet (8 mg) by mouth every 8 hours as needed for nausea    Chemotherapy induced nausea and vomiting       Potassium Chloride ER 20 MEQ Tbcr     10 tablet    Take 1 tablet (20 mEq) by mouth 2 times daily    Hypokalemia       prochlorperazine 10 MG tablet    COMPAZINE    20 tablet    Take 1 tablet (10 mg) by mouth every 6 hours as needed for nausea or vomiting    Malignant neoplasm of rectum (H)       RANITIDINE HCL PO      Take 150 mg by mouth daily as needed for heartburn        TYLENOL PO      Take 1,000 mg by mouth At Bedtime        zolpidem 5 MG tablet    AMBIEN    30 tablet    Take 1 tablet (5 mg) by mouth nightly as needed for sleep    Other insomnia       * Notice:  This list has 2 medication(s) that are the same as other medications prescribed for you. Read the directions carefully, and ask your doctor or other care provider to review them with you.

## 2018-05-30 ENCOUNTER — INFUSION THERAPY VISIT (OUTPATIENT)
Dept: INFUSION THERAPY | Facility: CLINIC | Age: 53
End: 2018-05-30
Attending: PHYSICIAN ASSISTANT
Payer: COMMERCIAL

## 2018-05-30 ENCOUNTER — CARE COORDINATION (OUTPATIENT)
Dept: ONCOLOGY | Facility: CLINIC | Age: 53
End: 2018-05-30

## 2018-05-30 VITALS — HEART RATE: 65 BPM | DIASTOLIC BLOOD PRESSURE: 74 MMHG | SYSTOLIC BLOOD PRESSURE: 111 MMHG | TEMPERATURE: 97.8 F

## 2018-05-30 DIAGNOSIS — C20 MALIGNANT NEOPLASM OF RECTUM (H): Primary | ICD-10-CM

## 2018-05-30 PROCEDURE — 96360 HYDRATION IV INFUSION INIT: CPT

## 2018-05-30 PROCEDURE — 25000128 H RX IP 250 OP 636: Performed by: PHYSICIAN ASSISTANT

## 2018-05-30 RX ORDER — HEPARIN SODIUM (PORCINE) LOCK FLUSH IV SOLN 100 UNIT/ML 100 UNIT/ML
500 SOLUTION INTRAVENOUS EVERY 8 HOURS
Status: DISCONTINUED | OUTPATIENT
Start: 2018-05-30 | End: 2018-05-30 | Stop reason: HOSPADM

## 2018-05-30 RX ADMIN — HEPARIN 500 UNITS: 100 SYRINGE at 15:15

## 2018-05-30 RX ADMIN — SODIUM CHLORIDE 1000 ML: 9 INJECTION, SOLUTION INTRAVENOUS at 14:14

## 2018-05-30 NOTE — PROGRESS NOTES
Confidential Summary of Oncology Psychology Initial Visit    Sarah Rajan is a 52 year old female who was referred by Dr. Mortensen for  Depression  .The patient was seen for an initial 45 minute evaluation on 5/29/2018. Her  attended the appointment too.     Presenting Concerns: Primary complaints included depressed mood, difficulty getting out of bed, fatigue, sadness, crying, lack of initiative, loss of interest in activities, loss of motivation for treatment plan, and social isolation. She is experiencing both emotional exhaustion and grief along with her depressed mood.     Mental Status/Interview:   Orientation: Sarah Rajan was alert, attentive, and oriented to time, place, and person  Affect: Affect was appropriate to the situation and showed normal range and stability.  Speech: Speech was clear with normal fluency, rate, tone, and volume.  Memory: Although not formally assessed, immediate, recent, and remote memory appeared to be intact.   Insight and Judgement: Sarah Raajn demonstrates adequate awareness of the issues discussed and was able to come to reasonable conclusions.  Thought: Thought patterns were coherent and logical. Hallucinations were denied and delusions were not evident.   Lethality: Sarah Rajan denied suicidal or homicidal ideation or intention.        Impression: She is in a state of emotional exhaustion due to her symptom burden. Her primary coping style is stoicism which is effective until it stops working. Stoicism can reach a point of being ineffective and that is where she currently is with her coping. We developed a plan to help her reduce her grief, increase her movement, and use her social network more efficiently. She and her  agreed with the behavioral plan.     Diagnosis:   Encounter Diagnosis   Name Primary?     Other depression Yes     Recommendations/Plan:  1. Use the grief protocol, increase social support, and increase movement  2. Return for follow-up .      Thank you for this opportunity to participate in your care of this patient.    Bj Huff Psy.D., L.P.  Director, Oncology Supportive Care

## 2018-05-30 NOTE — PROGRESS NOTES
Infusion Nursing Note:  Sarah Satinder presents today for IV fluids.    Patient seen by provider today: No   present during visit today: Not Applicable.    Note: N/A.    Intravenous Access:  Implanted Port.    Treatment Conditions:  Not Applicable.      Post Infusion Assessment:  Patient tolerated infusion without incident.  Blood return noted pre and post infusion.  Site patent and intact, free from redness, edema or discomfort.  No evidence of extravasations.  Access discontinued per protocol.    Discharge Plan:   Patient discharged in stable condition accompanied by: self.  Departure Mode: Ambulatory.    Swapna Kessler RN

## 2018-05-30 NOTE — PROGRESS NOTES
Per Galilea Smith in scheduling, pt needs IVF's on Friday 06/01/2018. Galilea was trying to get pt scheduled at [place], but asked if pt could have home infusion come out instead. Per [facility]:    ----- Message -----      From: Samira Henley      Sent: 5/30/2018   2:45 PM        To: Veronika House RN, Galilea Smith, *   Subject: RE: Benefits Check                               Hi Veronika,     Pt has met both her ded and oop so she is covered 100% and does have coverage for IV hydration.     Let me know if you have any other questions.     Thanks,   Apple     Attempted to reach pt. TC writer with her preference for fluids on 06/01. Await response.

## 2018-05-30 NOTE — MR AVS SNAPSHOT
After Visit Summary   5/30/2018    Sarah Rajan    MRN: [id number]           Patient Information     Date Of Birth          1965        Visit Information        Provider Department      5/30/2018 2:00 PM ROOM 3 [facility]        Today's Diagnoses     Malignant neoplasm of rectum (H)    -  1       Follow-ups after your visit        Your next 10 appointments already scheduled     Jun 02, 2018 12:00 PM CDT   Infusion 120 with UC ONCOLOGY INFUSION, UC 15 ATC   [facility]    [address] [place] [address]  [address]  [facility] [address]   [phone]            Jun 05, 2018  8:15 AM CDT   [facility]   [facility] ([facility])    [address] [place] [address]  [address]  [facility] [address]   [phone]            Jun 05, 2018  8:40 AM CDT   (Arrive by 8:25 AM)   Return Visit with Cathleen Ramos PA-C   [facility]    [address] [place] [address]  [address]  [facility] [address]   [phone]            Jun 05, 2018 10:00 AM CDT   Infusion 240 with UC ONCOLOGY INFUSION, UC 17 ATC   [facility]    [address] [place] [address]  [address]  [facility] [address]   [phone]            Jun 05, 2018 12:00 PM CDT   (Arrive by 11:45 AM)   RETURN WITH ROOM with SHANTHI Sanchez   [facility]    [address] [place] [address]  [address]  [facility] [address]   [phone]            Jun 08, 2018  1:30 PM CDT   Level 2 with ROOM 1 [facility] ([facility])    [facility]  [address]  [facility] [address]   [phone]            Jun 19, 2018  4:45 PM CDT   MR ABDOMEN W/O & W CONTRAST with [id number]   [facility] ([facility] [place])    [address]  [facility] [address] [phone]           Take your medicines as usual, unless your doctor tells you not to. Bring a list of your current medicines to your exam (including vitamins, minerals and over-the-counter drugs). Also bring the results of similar scans you may have had.    You may or may not receive IV contrast for this exam pending the discretion of the Radiologist.   Do not eat or drink for 6 hours prior to exam.  The MRI machine uses a strong magnet. Please wear clothes without metal (snaps, zippers). A sweatsuit works well, or we may give you a hospital gown.  Please remove any body piercings and hair extensions before you arrive. You will also remove watches, jewelry, hairpins, wallets, dentures, partial dental plates and hearing aids. You may wear contact lenses, and you may be able to wear your rings. We have a safe place to keep your personal items, but it is safer to leave them at home.  **IMPORTANT** THE INSTRUCTIONS BELOW ARE ONLY FOR THOSE PATIENTS WHO HAVE BEEN PRESCRIBED SEDATION OR GENERAL ANESTHESIA DURING THEIR MRI PROCEDURE:  IF YOUR DOCTOR PRESCRIBED ORAL SEDATION (take medicine to help you relax during your exam):   You must get the medicine from your doctor (oral medication) before you arrive. Bring the medicine to the exam. Do not take it at home. You ll be told when to take it upon arriving for your exam.   Arrive one hour early. Bring someone who can take you home after the test. Your medicine will make you sleepy. After the exam, you may not drive, take a bus or take a taxi by yourself.  IF YOUR DOCTOR PRESCRIBED IV SEDATION:   Arrive one hour early. Bring someone who can take you home after the test. Your medicine will make you sleepy. After the exam, you may not drive, take a bus or take a taxi by yourself.   No eating 6 hours before your exam. You may have clear liquids up until 4 hours before your exam. (Clear  liquids include water, clear tea, black coffee and fruit juice without pulp.)  IF YOUR DOCTOR PRESCRIBED ANESTHESIA (be asleep for your exam):   Arrive 1 1/2 hours early. Bring someone who can take you home after the test. You may not drive, take a bus or take a taxi by yourself.   No eating 8 hours before your exam. You may have clear liquids up until 4 hours before your exam. (Clear liquids include water, clear tea, black coffee and fruit juice without pulp.)   You will spend four to five hours in the recovery room.  If you have any questions, please contact your Imaging Department exam site.            Jun 20, 2018  9:15 AM CDT   MR PELVIS W/O & W CONTRAST with [id number]   [facility] ([facility] [place])    [address] [address] [phone]           Take your medicines as usual, unless your doctor tells you not to. Bring a list of your current medicines to your exam (including vitamins, minerals and over-the-counter drugs).  You may or may not receive intravenous (IV) contrast for this exam pending the discretion of the Radiologist.  You do not need to do anything special to prepare.  The MRI machine uses a strong magnet. Please wear clothes without metal (snaps, zippers). A sweatsuit works well, or we may give you a hospital gown.  Please remove any body piercings and hair extensions before you arrive. You will also remove watches, jewelry, hairpins, wallets, dentures, partial dental plates and hearing aids. You may wear contact lenses, and you may be able to wear your rings. We have a safe place to keep your personal items, but it is safer to leave them at home.  **IMPORTANT** THE INSTRUCTIONS BELOW ARE ONLY FOR THOSE PATIENTS WHO HAVE BEEN PRESCRIBED SEDATION OR GENERAL ANESTHESIA DURING THEIR MRI PROCEDURE:  IF YOUR DOCTOR PRESCRIBED ORAL SEDATION (take medicine to help you relax during your exam):   You must get the medicine from your  doctor (oral medication) before you arrive. Bring the medicine to the exam. Do not take it at home. You ll be told when to take it upon arriving for your exam.   Arrive one hour early. Bring someone who can take you home after the test. Your medicine will make you sleepy. After the exam, you may not drive, take a bus or take a taxi by yourself.  IF YOUR DOCTOR PRESCRIBED IV SEDATION:   Arrive one hour early. Bring someone who can take you home after the test. Your medicine will make you sleepy. After the exam, you may not drive, take a bus or take a taxi by yourself.   No eating 6 hours before your exam. You may have clear liquids up until 4 hours before your exam. (Clear liquids include water, clear tea, black coffee and fruit juice without pulp.)  IF YOUR DOCTOR PRESCRIBED ANESTHESIA (be asleep for your exam):   Arrive 1 1/2 hours early. Bring someone who can take you home after the test. You may not drive, take a bus or take a taxi by yourself.   No eating 8 hours before your exam. You may have clear liquids up until 4 hours before your exam. (Clear liquids include water, clear tea, black coffee and fruit juice without pulp.)   You will spend four to five hours in the recovery room.  Please call the Imaging Department at your exam site with any questions.              Who to contact     If you have questions or need follow up information about today's clinic visit or your schedule please contact [facility] directly at [phone].  Normal or non-critical lab and imaging results will be communicated to you by MyChart, letter or phone within 4 business days after the clinic has received the results. If you do not hear from us within 7 days, please contact the clinic through [employer] or phone. If you have a critical or abnormal lab result, we will notify you by phone as soon as possible.  Submit refill requests through [employer] or call your pharmacy and they will forward the refill request to us. Please  allow 3 business days for your refill to be completed.          Additional Information About Your Visit        MyChart Information     [employer] gives you secure access to your electronic health record. If you see a primary care provider, you can also send messages to your care team and make appointments. If you have questions, please call your primary care clinic.  If you do not have a primary care provider, please call [phone] and they will assist you.        Care EveryWhere ID     This is your Care EveryWhere ID. This could be used by other organizations to access your [facility] medical records  [id number]        Your Vitals Were     Pulse Temperature                65 97.8  F (36.6  C) (Oral)           Blood Pressure from Last 3 Encounters:   05/30/18 111/74   05/28/18 94/65   05/26/18 111/71    Weight from Last 3 Encounters:   05/21/18 86.6 kg (191 lb)   05/12/18 87.5 kg (193 lb)   05/07/18 89 kg (196 lb 3.2 oz)              Today, you had the following     No orders found for display       Primary Care Provider Office Phone # Fax #    Aazizachery Mortensen [phone]        [facility] [address]        Equal Access to Services     DEAN CHAKRABORTY : Hadii shannon jaramilloo Sojustin, waaxda lugodfreyadaha, qaybta kaalmada adeegyada, luz elena jacob. So [facility] [phone].    ATENCIÓN: Si habla español, tiene a membreno disposición servicios gratuitos de asistencia lingüística. Llame al [phone].    We comply with applicable federal civil rights laws and [place] laws. We do not discriminate on the basis of race, color, national origin, age, disability, sex, sexual orientation, or gender identity.            Thank you!     Thank you for choosing [facility]  for your care. Our goal is always to provide you with excellent care. Hearing back from our patients is one way we can continue to improve our services. Please take a few minutes to complete the written  survey that you may receive in the mail after your visit with us. Thank you!             Your Updated Medication List - Protect others around you: Learn how to safely use, store and throw away your medicines at www.disposemymeds.org.          This list is accurate as of 5/30/18  4:22 PM.  Always use your most recent med list.                   Brand Name Dispense Instructions for use Diagnosis    amitriptyline 25 MG tablet    ELAVIL    30 tablet    Take 1 tablet (25 mg) by mouth At Bedtime    Insomnia, unspecified type, Malignant neoplasm of rectum (H), Liver lesion       calcium carbonate 500 MG chewable tablet    TUMS    150 tablet    Take 1-2 chew tab by mouth daily        dexamethasone 4 MG tablet    DECADRON    3 tablet    Take 1 tablet (4 mg) by mouth daily (with breakfast)    Malignant neoplasm of rectum (H)       dronabinol 2.5 MG capsule    MARINOL    60 capsule    Take 1 capsule (2.5 mg) by mouth 2 times daily (before meals)    Anorexia       eszopiclone 1 MG Tabs tablet    LUNESTA    30 tablet    Take 1-2 tablets (1-2 mg) by mouth At Bedtime    Other insomnia       fluorouracil      Administer 4,968 mg intravenously . Continuous infusion over 46-48 hr via ambulatory pump q14d Supplied by outside provider with pump.        FLUoxetine 20 MG capsule    PROzac    30 capsule    Take 1 capsule (20 mg) by mouth daily    Other depression       lidocaine-prilocaine cream    EMLA    30 g    Apply 45 minutes prior to procedure.    Malignant neoplasm of rectum (H)       * LORazepam 0.5 MG tablet    ATIVAN    30 tablet    Take 1 tablet (0.5 mg) by mouth every 4 hours as needed (Anxiety, Nausea/Vomiting or Sleep)    Malignant neoplasm of rectum (H)       * LORazepam 1 MG tablet    ATIVAN    30 tablet    Take 1 tablet (1 mg) by mouth At Bedtime    Other insomnia       ondansetron 4 MG tablet    ZOFRAN    3 tablet    Take one tablet by mouth every 6 hours as needed for nausea when taking neomycin and flagyl    Rectal  cancer (H)       ondansetron 8 MG ODT tab    ZOFRAN-ODT    60 tablet    Take 1 tablet (8 mg) by mouth every 8 hours as needed for nausea    Chemotherapy induced nausea and vomiting       Potassium Chloride ER 20 MEQ Tbcr     10 tablet    Take 1 tablet (20 mEq) by mouth 2 times daily    Hypokalemia       prochlorperazine 10 MG tablet    COMPAZINE    20 tablet    Take 1 tablet (10 mg) by mouth every 6 hours as needed for nausea or vomiting    Malignant neoplasm of rectum (H)       RANITIDINE HCL PO      Take 150 mg by mouth daily as needed for heartburn        TYLENOL PO      Take 1,000 mg by mouth At Bedtime        zolpidem 5 MG tablet    AMBIEN    30 tablet    Take 1 tablet (5 mg) by mouth nightly as needed for sleep    Other insomnia       * Notice:  This list has 2 medication(s) that are the same as other medications prescribed for you. Read the directions carefully, and ask your doctor or other care provider to review them with you.

## 2018-06-01 ENCOUNTER — INFUSION THERAPY VISIT (OUTPATIENT)
Dept: INFUSION THERAPY | Facility: CLINIC | Age: 53
End: 2018-06-01
Attending: PHYSICIAN ASSISTANT
Payer: COMMERCIAL

## 2018-06-01 VITALS
RESPIRATION RATE: 16 BRPM | TEMPERATURE: 98.1 F | SYSTOLIC BLOOD PRESSURE: 113 MMHG | HEART RATE: 63 BPM | DIASTOLIC BLOOD PRESSURE: 73 MMHG

## 2018-06-01 DIAGNOSIS — C20 MALIGNANT NEOPLASM OF RECTUM (H): Primary | ICD-10-CM

## 2018-06-01 PROCEDURE — 96360 HYDRATION IV INFUSION INIT: CPT

## 2018-06-01 PROCEDURE — 25000128 H RX IP 250 OP 636: Performed by: PHYSICIAN ASSISTANT

## 2018-06-01 RX ORDER — HEPARIN SODIUM (PORCINE) LOCK FLUSH IV SOLN 100 UNIT/ML 100 UNIT/ML
500 SOLUTION INTRAVENOUS EVERY 8 HOURS
Status: DISCONTINUED | OUTPATIENT
Start: 2018-06-01 | End: 2018-06-01 | Stop reason: HOSPADM

## 2018-06-01 RX ADMIN — SODIUM CHLORIDE 1000 ML: 9 INJECTION, SOLUTION INTRAVENOUS at 13:04

## 2018-06-01 RX ADMIN — HEPARIN 500 UNITS: 100 SYRINGE at 14:26

## 2018-06-01 ASSESSMENT — PAIN SCALES - GENERAL: PAINLEVEL: NO PAIN (0)

## 2018-06-01 NOTE — PROGRESS NOTES
Infusion Nursing Note:  Sarah Satinder presents today for IVFs.    Patient seen by provider today: No   present during visit today: Not Applicable.    Note: N/A.    Intravenous Access:  Implanted Port.    Treatment Conditions:  Not Applicable.      Post Infusion Assessment:  Patient tolerated infusion without incident.  Blood return noted pre and post infusion.  Site patent and intact, free from redness, edema or discomfort.  No evidence of extravasations.  Access discontinued per protocol.    Discharge Plan:   Patient discharged in stable condition accompanied by: .  Departure Mode: Ambulatory.    Taqueria Maldonado RN

## 2018-06-01 NOTE — MR AVS SNAPSHOT
After Visit Summary   6/1/2018    Sarah Rajan    MRN: [id number]           Patient Information     Date Of Birth          1965        Visit Information        Provider Department      6/1/2018 1:00 PM ROOM 6 [facility]        Today's Diagnoses     Malignant neoplasm of rectum (H)    -  1       Follow-ups after your visit        Your next 10 appointments already scheduled     Jun 04, 2018  2:30 PM CDT   Level 2 with ROOM 8 [facility] ([facility])    [facility]  [address]  [facility] [address]   [phone]            Jun 05, 2018  8:15 AM CDT   [facility] DRAW   [facility] Draw ([facility])    [address] [place] [address]  [address]  [facility] [address]   [phone]            Jun 05, 2018  8:40 AM CDT   (Arrive by 8:25 AM)   Return Visit with Cathleen Ramos PA-C   [facility] ([facility])    [address] [place] [address]  [address]  [facility] [address]   [phone]            Jun 05, 2018 10:00 AM CDT   Infusion 240 with  ONCOLOGY INFUSION, UC 17 ATC   [facility] ([facility])    [address] [place] [address]  [address]  [facility] [address]   [phone]            Jun 05, 2018 12:00 PM CDT   (Arrive by 11:45 AM)   RETURN WITH ROOM with SHANTHI Sanchez   [facility] ([facility])    [address] [place] [address] [address]  [address]  [facility] [address]   [phone]            Jun 08, 2018  1:30 PM CDT   Level 2 with ROOM 1 [facility] ([facility])    [facility]  [address]  [facility] [address]   [phone]            Jun 19, 2018  4:45 PM CDT   MR ABDOMEN W/O & W CONTRAST with [id number]   [facility] [place] [facility] ([facility])     [address] [id number] [phone]           Take your medicines as usual, unless your doctor tells you not to. Bring a list of your current medicines to your exam (including vitamins, minerals and over-the-counter drugs). Also bring the results of similar scans you may have had.    You may or may not receive IV contrast for this exam pending the discretion of the Radiologist.   Do not eat or drink for 6 hours prior to exam.  The MRI machine uses a strong magnet. Please wear clothes without metal (snaps, zippers). A sweatsuit works well, or we may give you a hospital gown.  Please remove any body piercings and hair extensions before you arrive. You will also remove watches, jewelry, hairpins, wallets, dentures, partial dental plates and hearing aids. You may wear contact lenses, and you may be able to wear your rings. We have a safe place to keep your personal items, but it is safer to leave them at home.  **IMPORTANT** THE INSTRUCTIONS BELOW ARE ONLY FOR THOSE PATIENTS WHO HAVE BEEN PRESCRIBED SEDATION OR GENERAL ANESTHESIA DURING THEIR MRI PROCEDURE:  IF YOUR DOCTOR PRESCRIBED ORAL SEDATION (take medicine to help you relax during your exam):   You must get the medicine from your doctor (oral medication) before you arrive. Bring the medicine to the exam. Do not take it at home. You ll be told when to take it upon arriving for your exam.   Arrive one hour early. Bring someone who can take you home after the test. Your medicine will make you sleepy. After the exam, you may not drive, take a bus or take a taxi by yourself.  IF YOUR DOCTOR PRESCRIBED IV SEDATION:   Arrive one hour early. Bring someone who can take you home after the test. Your medicine will make you sleepy. After the exam, you may not drive, take a bus or take a taxi by yourself.   No eating 6 hours before your exam. You may have clear liquids up until 4 hours before your exam. (Clear liquids include water, clear  tea, black coffee and fruit juice without pulp.)  IF YOUR DOCTOR PRESCRIBED ANESTHESIA (be asleep for your exam):   Arrive 1 1/2 hours early. Bring someone who can take you home after the test. You may not drive, take a bus or take a taxi by yourself.   No eating 8 hours before your exam. You may have clear liquids up until 4 hours before your exam. (Clear liquids include water, clear tea, black coffee and fruit juice without pulp.)   You will spend four to five hours in the recovery room.  If you have any questions, please contact your Imaging Department exam site.            Jun 20, 2018  9:15 AM CDT   MR PELVIS W/O & W CONTRAST with [id number]   [facility] ([facility])    [address] [address] [phone]           Take your medicines as usual, unless your doctor tells you not to. Bring a list of your current medicines to your exam (including vitamins, minerals and over-the-counter drugs).  You may or may not receive intravenous (IV) contrast for this exam pending the discretion of the Radiologist.  You do not need to do anything special to prepare.  The MRI machine uses a strong magnet. Please wear clothes without metal (snaps, zippers). A sweatsuit works well, or we may give you a hospital gown.  Please remove any body piercings and hair extensions before you arrive. You will also remove watches, jewelry, hairpins, wallets, dentures, partial dental plates and hearing aids. You may wear contact lenses, and you may be able to wear your rings. We have a safe place to keep your personal items, but it is safer to leave them at home.  **IMPORTANT** THE INSTRUCTIONS BELOW ARE ONLY FOR THOSE PATIENTS WHO HAVE BEEN PRESCRIBED SEDATION OR GENERAL ANESTHESIA DURING THEIR MRI PROCEDURE:  IF YOUR DOCTOR PRESCRIBED ORAL SEDATION (take medicine to help you relax during your exam):   You must get the medicine from your doctor (oral medication)  before you arrive. Bring the medicine to the exam. Do not take it at home. You ll be told when to take it upon arriving for your exam.   Arrive one hour early. Bring someone who can take you home after the test. Your medicine will make you sleepy. After the exam, you may not drive, take a bus or take a taxi by yourself.  IF YOUR DOCTOR PRESCRIBED IV SEDATION:   Arrive one hour early. Bring someone who can take you home after the test. Your medicine will make you sleepy. After the exam, you may not drive, take a bus or take a taxi by yourself.   No eating 6 hours before your exam. You may have clear liquids up until 4 hours before your exam. (Clear liquids include water, clear tea, black coffee and fruit juice without pulp.)  IF YOUR DOCTOR PRESCRIBED ANESTHESIA (be asleep for your exam):   Arrive 1 1/2 hours early. Bring someone who can take you home after the test. You may not drive, take a bus or take a taxi by yourself.   No eating 8 hours before your exam. You may have clear liquids up until 4 hours before your exam. (Clear liquids include water, clear tea, black coffee and fruit juice without pulp.)   You will spend four to five hours in the recovery room.  Please call the Imaging Department at your exam site with any questions.            Jun 20, 2018 10:40 AM CDT   CT CHEST W/O CONTRAST with UCCT2   [facility] ([facility])    [address] [address] [phone]           Please bring any scans or X-rays taken at other hospitals, if similar tests were done. Also bring a list of your medicines, including vitamins, minerals and over-the-counter drugs. It is safest to leave personal items at home.  Be sure to tell your doctor:   If you have any allergies.   If there s any chance you are pregnant.   If you are breastfeeding.  You do not need to do anything special to prepare for this exam.  Please wear loose clothing, such as a sweat  suit or jogging clothes. Avoid snaps, zippers and other metal. We may ask you to undress and put on a hospital gown.              Who to contact     If you have questions or need follow up information about today's clinic visit or your schedule please contact [facility] directly at [phone].  Normal or non-critical lab and imaging results will be communicated to you by MyChart, letter or phone within 4 business days after the clinic has received the results. If you do not hear from us within 7 days, please contact the clinic through [employer] or phone. If you have a critical or abnormal lab result, we will notify you by phone as soon as possible.  Submit refill requests through [employer] or call your pharmacy and they will forward the refill request to us. Please allow 3 business days for your refill to be completed.          Additional Information About Your Visit        [facility] Information     [employer] gives you secure access to your electronic health record. If you see a primary care provider, you can also send messages to your care team and make appointments. If you have questions, please call your primary care clinic.  If you do not have a primary care provider, please call [phone] and they will assist you.        Care EveryWhere ID     This is your Care EveryWhere ID. This could be used by other organizations to access your [place] medical records  [id number]        Your Vitals Were     Pulse Temperature Respirations             63 98.1  F (36.7  C) (Tympanic) 16          Blood Pressure from Last 3 Encounters:   06/01/18 113/73   05/30/18 111/74   05/28/18 94/65    Weight from Last 3 Encounters:   05/21/18 86.6 kg (191 lb)   05/12/18 87.5 kg (193 lb)   05/07/18 89 kg (196 lb 3.2 oz)              Today, you had the following     No orders found for display       Primary Care Provider Office Phone # Fax #    Kyra Mortensen [phone] [address] [facility]  [address]        Equal Access to Services     [facility]: Hadii shannon li ellamya Bacilioali, warashaadda luqsitaha, qaybta kadorischato cotton, luz elena jacob. So [facility] [phone].    ATENCIÓN: Si habla español, tiene a membreno disposición servicios gratuitos de asistencia lingüística. Julietaame al [phone].    We comply with applicable federal civil rights laws and [place] laws. We do not discriminate on the basis of race, color, national origin, age, disability, sex, sexual orientation, or gender identity.            Thank you!     Thank you for choosing [facility]  for your care. Our goal is always to provide you with excellent care. Hearing back from our patients is one way we can continue to improve our services. Please take a few minutes to complete the written survey that you may receive in the mail after your visit with us. Thank you!             Your Updated Medication List - Protect others around you: Learn how to safely use, store and throw away your medicines at www.disposemymeds.org.          This list is accurate as of 6/1/18  2:47 PM.  Always use your most recent med list.                   Brand Name Dispense Instructions for use Diagnosis    amitriptyline 25 MG tablet    ELAVIL    30 tablet    Take 1 tablet (25 mg) by mouth At Bedtime    Insomnia, unspecified type, Malignant neoplasm of rectum (H), Liver lesion       calcium carbonate 500 MG chewable tablet    TUMS    150 tablet    Take 1-2 chew tab by mouth daily        dexamethasone 4 MG tablet    DECADRON    3 tablet    Take 1 tablet (4 mg) by mouth daily (with breakfast)    Malignant neoplasm of rectum (H)       dronabinol 2.5 MG capsule    MARINOL    60 capsule    Take 1 capsule (2.5 mg) by mouth 2 times daily (before meals)    Anorexia       eszopiclone 1 MG Tabs tablet    LUNESTA    30 tablet    Take 1-2 tablets (1-2 mg) by mouth At Bedtime    Other insomnia       fluorouracil      Administer 4,968 mg intravenously .  Continuous infusion over 46-48 hr via ambulatory pump q14d Supplied by outside provider with pump.        FLUoxetine 20 MG capsule    PROzac    30 capsule    Take 1 capsule (20 mg) by mouth daily    Other depression       lidocaine-prilocaine cream    EMLA    30 g    Apply 45 minutes prior to procedure.    Malignant neoplasm of rectum (H)       * LORazepam 0.5 MG tablet    ATIVAN    30 tablet    Take 1 tablet (0.5 mg) by mouth every 4 hours as needed (Anxiety, Nausea/Vomiting or Sleep)    Malignant neoplasm of rectum (H)       * LORazepam 1 MG tablet    ATIVAN    30 tablet    Take 1 tablet (1 mg) by mouth At Bedtime    Other insomnia       ondansetron 4 MG tablet    ZOFRAN    3 tablet    Take one tablet by mouth every 6 hours as needed for nausea when taking neomycin and flagyl    Rectal cancer (H)       ondansetron 8 MG ODT tab    ZOFRAN-ODT    60 tablet    Take 1 tablet (8 mg) by mouth every 8 hours as needed for nausea    Chemotherapy induced nausea and vomiting       Potassium Chloride ER 20 MEQ Tbcr     10 tablet    Take 1 tablet (20 mEq) by mouth 2 times daily    Hypokalemia       prochlorperazine 10 MG tablet    COMPAZINE    20 tablet    Take 1 tablet (10 mg) by mouth every 6 hours as needed for nausea or vomiting    Malignant neoplasm of rectum (H)       RANITIDINE HCL PO      Take 150 mg by mouth daily as needed for heartburn        TYLENOL PO      Take 1,000 mg by mouth At Bedtime        zolpidem 5 MG tablet    AMBIEN    30 tablet    Take 1 tablet (5 mg) by mouth nightly as needed for sleep    Other insomnia       * Notice:  This list has 2 medication(s) that are the same as other medications prescribed for you. Read the directions carefully, and ask your doctor or other care provider to review them with you.

## 2018-06-01 NOTE — PROGRESS NOTES
This is a recent snapshot of the patient's [facility] medical record.  For current drug dose and complete information and questions, call [phone] or In Basket pool, [facility]v home infusion ([id number])  CSN Number:  [id number]

## 2018-06-04 ENCOUNTER — INFUSION THERAPY VISIT (OUTPATIENT)
Dept: INFUSION THERAPY | Facility: CLINIC | Age: 53
End: 2018-06-04
Attending: PHYSICIAN ASSISTANT
Payer: COMMERCIAL

## 2018-06-04 VITALS — TEMPERATURE: 98.8 F | SYSTOLIC BLOOD PRESSURE: 110 MMHG | DIASTOLIC BLOOD PRESSURE: 71 MMHG | HEART RATE: 62 BPM

## 2018-06-04 DIAGNOSIS — C20 MALIGNANT NEOPLASM OF RECTUM (H): Primary | ICD-10-CM

## 2018-06-04 PROCEDURE — 25000128 H RX IP 250 OP 636: Performed by: PHYSICIAN ASSISTANT

## 2018-06-04 PROCEDURE — 96360 HYDRATION IV INFUSION INIT: CPT

## 2018-06-04 RX ORDER — HEPARIN SODIUM (PORCINE) LOCK FLUSH IV SOLN 100 UNIT/ML 100 UNIT/ML
500 SOLUTION INTRAVENOUS EVERY 8 HOURS
Status: DISCONTINUED | OUTPATIENT
Start: 2018-06-04 | End: 2018-06-04 | Stop reason: HOSPADM

## 2018-06-04 RX ADMIN — HEPARIN 500 UNITS: 100 SYRINGE at 15:45

## 2018-06-04 RX ADMIN — SODIUM CHLORIDE 1000 ML: 9 INJECTION, SOLUTION INTRAVENOUS at 14:40

## 2018-06-04 NOTE — PROGRESS NOTES
Infusion Nursing Note:  Sarah Rajan presents today for IVF's.    Patient seen by provider today: No   present during visit today: Not Applicable.    Note: N/A.    Intravenous Access:  Implanted Port.    Treatment Conditions:  Not Applicable.      Post Infusion Assessment:  Patient tolerated infusion without incident.  Blood return noted pre and post infusion.  Site patent and intact, free from redness, edema or discomfort.  No evidence of extravasations.  Access discontinued per protocol.    Discharge Plan:   Patient discharged in stable condition accompanied by: .  Departure Mode: Ambulatory.    Rody Murrell RN

## 2018-06-04 NOTE — MR AVS SNAPSHOT
After Visit Summary   6/4/2018    Sarah Rajan    MRN: [id number]           Patient Information     Date Of Birth          1965        Visit Information        Provider Department      6/4/2018 2:30 PM ROOM 8 [facility]        Today's Diagnoses     Malignant neoplasm of rectum (H)    -  1       Follow-ups after your visit        Your next 10 appointments already scheduled     Jun 05, 2018  8:15 AM CDT   [facility] Draw with [facility] LAB DRAW   [facility] Draw ([facility])    [address] [place] [address]  [address]  [facility] [address]   [phone]            Jun 05, 2018  8:40 AM CDT   (Arrive by 8:25 AM)   Return Visit with Cathleen Ramos PA-C   [facility] ([facility])    [address] [place] [address]  [address]  [facility] [address]   [phone]            Jun 05, 2018 10:00 AM CDT   Infusion 240 with UC ONCOLOGY INFUSION, UC 17 ATC   [facility] ([facility])    [address] [place] [address]  [address]  [facility] [address]   [phone]            Jun 05, 2018 12:00 PM CDT   (Arrive by 11:45 AM)   RETURN WITH ROOM with SHANTHI Sanchez   [facility] ([facility])    [address] [place] [address]  [address]  [facility] [address]   [phone]            Jun 07, 2018 12:00 PM CDT   Level O with ROOM 8 [facility] ([facility])    [facility]  [address]  [facility] [address]   [phone]            Jun 08, 2018  1:30 PM CDT   Level 2 with ROOM 1 [facility] ([facility])    n [facility]  [address]  [facility] [address]   [phone]            Jun 19, 2018  4:45 PM CDT   MR ABDOMEN W/O & W CONTRAST with UCMR1   [facility] MRI ([facility])     [address] [id number] [phone]           Take your medicines as usual, unless your doctor tells you not to. Bring a list of your current medicines to your exam (including vitamins, minerals and over-the-counter drugs). Also bring the results of similar scans you may have had.    You may or may not receive IV contrast for this exam pending the discretion of the Radiologist.   Do not eat or drink for 6 hours prior to exam.  The MRI machine uses a strong magnet. Please wear clothes without metal (snaps, zippers). A sweatsuit works well, or we may give you a hospital gown.  Please remove any body piercings and hair extensions before you arrive. You will also remove watches, jewelry, hairpins, wallets, dentures, partial dental plates and hearing aids. You may wear contact lenses, and you may be able to wear your rings. We have a safe place to keep your personal items, but it is safer to leave them at home.  **IMPORTANT** THE INSTRUCTIONS BELOW ARE ONLY FOR THOSE PATIENTS WHO HAVE BEEN PRESCRIBED SEDATION OR GENERAL ANESTHESIA DURING THEIR MRI PROCEDURE:  IF YOUR DOCTOR PRESCRIBED ORAL SEDATION (take medicine to help you relax during your exam):   You must get the medicine from your doctor (oral medication) before you arrive. Bring the medicine to the exam. Do not take it at home. You ll be told when to take it upon arriving for your exam.   Arrive one hour early. Bring someone who can take you home after the test. Your medicine will make you sleepy. After the exam, you may not drive, take a bus or take a taxi by yourself.  IF YOUR DOCTOR PRESCRIBED IV SEDATION:   Arrive one hour early. Bring someone who can take you home after the test. Your medicine will make you sleepy. After the exam, you may not drive, take a bus or take a taxi by yourself.   No eating 6 hours before your exam. You may have clear liquids up until 4 hours before your exam. (Clear liquids include water, clear  tea, black coffee and fruit juice without pulp.)  IF YOUR DOCTOR PRESCRIBED ANESTHESIA (be asleep for your exam):   Arrive 1 1/2 hours early. Bring someone who can take you home after the test. You may not drive, take a bus or take a taxi by yourself.   No eating 8 hours before your exam. You may have clear liquids up until 4 hours before your exam. (Clear liquids include water, clear tea, black coffee and fruit juice without pulp.)   You will spend four to five hours in the recovery room.  If you have any questions, please contact your Imaging Department exam site.            Jun 20, 2018  9:15 AM CDT   MR PELVIS W/O & W CONTRAST with [id number]   [facility] MRI ([facility])    [address] [address] [phone]           Take your medicines as usual, unless your doctor tells you not to. Bring a list of your current medicines to your exam (including vitamins, minerals and over-the-counter drugs).  You may or may not receive intravenous (IV) contrast for this exam pending the discretion of the Radiologist.  You do not need to do anything special to prepare.  The MRI machine uses a strong magnet. Please wear clothes without metal (snaps, zippers). A sweatsuit works well, or we may give you a hospital gown.  Please remove any body piercings and hair extensions before you arrive. You will also remove watches, jewelry, hairpins, wallets, dentures, partial dental plates and hearing aids. You may wear contact lenses, and you may be able to wear your rings. We have a safe place to keep your personal items, but it is safer to leave them at home.  **IMPORTANT** THE INSTRUCTIONS BELOW ARE ONLY FOR THOSE PATIENTS WHO HAVE BEEN PRESCRIBED SEDATION OR GENERAL ANESTHESIA DURING THEIR MRI PROCEDURE:  IF YOUR DOCTOR PRESCRIBED ORAL SEDATION (take medicine to help you relax during your exam):   You must get the medicine from your doctor (oral medication)  before you arrive. Bring the medicine to the exam. Do not take it at home. You ll be told when to take it upon arriving for your exam.   Arrive one hour early. Bring someone who can take you home after the test. Your medicine will make you sleepy. After the exam, you may not drive, take a bus or take a taxi by yourself.  IF YOUR DOCTOR PRESCRIBED IV SEDATION:   Arrive one hour early. Bring someone who can take you home after the test. Your medicine will make you sleepy. After the exam, you may not drive, take a bus or take a taxi by yourself.   No eating 6 hours before your exam. You may have clear liquids up until 4 hours before your exam. (Clear liquids include water, clear tea, black coffee and fruit juice without pulp.)  IF YOUR DOCTOR PRESCRIBED ANESTHESIA (be asleep for your exam):   Arrive 1 1/2 hours early. Bring someone who can take you home after the test. You may not drive, take a bus or take a taxi by yourself.   No eating 8 hours before your exam. You may have clear liquids up until 4 hours before your exam. (Clear liquids include water, clear tea, black coffee and fruit juice without pulp.)   You will spend four to five hours in the recovery room.  Please call the Imaging Department at your exam site with any questions.            Jun 20, 2018 10:40 AM CDT   CT CHEST W/O CONTRAST with UCCT2   [facility] ([facility])    [address] [address] [phone]           Please bring any scans or X-rays taken at other hospitals, if similar tests were done. Also bring a list of your medicines, including vitamins, minerals and over-the-counter drugs. It is safest to leave personal items at home.  Be sure to tell your doctor:   If you have any allergies.   If there s any chance you are pregnant.   If you are breastfeeding.  You do not need to do anything special to prepare for this exam.  Please wear loose clothing, such as a sweat  suit or jogging clothes. Avoid snaps, zippers and other metal. We may ask you to undress and put on a hospital gown.              Who to contact     If you have questions or need follow up information about today's clinic visit or your schedule please contact [facility] directly at [phone].  Normal or non-critical lab and imaging results will be communicated to you by MyChart, letter or phone within 4 business days after the clinic has received the results. If you do not hear from us within 7 days, please contact the clinic through [employer] or phone. If you have a critical or abnormal lab result, we will notify you by phone as soon as possible.  Submit refill requests through [employer] or call your pharmacy and they will forward the refill request to us. Please allow 3 business days for your refill to be completed.          Additional Information About Your Visit        [employer] Information     [employer] gives you secure access to your electronic health record. If you see a primary care provider, you can also send messages to your care team and make appointments. If you have questions, please call your primary care clinic.  If you do not have a primary care provider, please call [phone] and they will assist you.        Care EveryWhere ID     This is your Care EveryWhere ID. This could be used by other organizations to access your [facility] medical records  [id number]        Your Vitals Were     Pulse Temperature                62 98.8  F (37.1  C) (Tympanic)           Blood Pressure from Last 3 Encounters:   06/04/18 110/71   06/01/18 113/73   05/30/18 111/74    Weight from Last 3 Encounters:   05/21/18 86.6 kg (191 lb)   05/12/18 87.5 kg (193 lb)   05/07/18 89 kg (196 lb 3.2 oz)              Today, you had the following     No orders found for display       Primary Care Provider Office Phone # Fax #    Kyra Mortensen [phone] [address] [facility] [address]         Equal Access to Services     [facility] : Hadii aad ku hadpanteramya Sofiajustin, warashaadda luqadaha, qaybta kadorischato cotton, luz elena jacob. So [facility] [phone].    ATENCIÓN: Si habla sangita, tiene a membreno disposición servicios gratuitos de asistencia lingüística. Julietaame al [phone].    We comply with applicable federal civil rights laws and [place] laws. We do not discriminate on the basis of race, color, national origin, age, disability, sex, sexual orientation, or gender identity.            Thank you!     Thank you for choosing [facility]  for your care. Our goal is always to provide you with excellent care. Hearing back from our patients is one way we can continue to improve our services. Please take a few minutes to complete the written survey that you may receive in the mail after your visit with us. Thank you!             Your Updated Medication List - Protect others around you: Learn how to safely use, store and throw away your medicines at www.disposemymeds.org.          This list is accurate as of 6/4/18  4:29 PM.  Always use your most recent med list.                   Brand Name Dispense Instructions for use Diagnosis    amitriptyline 25 MG tablet    ELAVIL    30 tablet    Take 1 tablet (25 mg) by mouth At Bedtime    Insomnia, unspecified type, Malignant neoplasm of rectum (H), Liver lesion       calcium carbonate 500 MG chewable tablet    TUMS    150 tablet    Take 1-2 chew tab by mouth daily        dexamethasone 4 MG tablet    DECADRON    3 tablet    Take 1 tablet (4 mg) by mouth daily (with breakfast)    Malignant neoplasm of rectum (H)       dronabinol 2.5 MG capsule    MARINOL    60 capsule    Take 1 capsule (2.5 mg) by mouth 2 times daily (before meals)    Anorexia       eszopiclone 1 MG Tabs tablet    LUNESTA    30 tablet    Take 1-2 tablets (1-2 mg) by mouth At Bedtime    Other insomnia       fluorouracil      Administer 4,968 mg intravenously . Continuous  infusion over 46-48 hr via ambulatory pump q14d Supplied by outside provider with pump.        FLUoxetine 20 MG capsule    PROzac    30 capsule    Take 1 capsule (20 mg) by mouth daily    Other depression       lidocaine-prilocaine cream    EMLA    30 g    Apply 45 minutes prior to procedure.    Malignant neoplasm of rectum (H)       * LORazepam 0.5 MG tablet    ATIVAN    30 tablet    Take 1 tablet (0.5 mg) by mouth every 4 hours as needed (Anxiety, Nausea/Vomiting or Sleep)    Malignant neoplasm of rectum (H)       * LORazepam 1 MG tablet    ATIVAN    30 tablet    Take 1 tablet (1 mg) by mouth At Bedtime    Other insomnia       ondansetron 4 MG tablet    ZOFRAN    3 tablet    Take one tablet by mouth every 6 hours as needed for nausea when taking neomycin and flagyl    Rectal cancer (H)       ondansetron 8 MG ODT tab    ZOFRAN-ODT    60 tablet    Take 1 tablet (8 mg) by mouth every 8 hours as needed for nausea    Chemotherapy induced nausea and vomiting       Potassium Chloride ER 20 MEQ Tbcr     10 tablet    Take 1 tablet (20 mEq) by mouth 2 times daily    Hypokalemia       prochlorperazine 10 MG tablet    COMPAZINE    20 tablet    Take 1 tablet (10 mg) by mouth every 6 hours as needed for nausea or vomiting    Malignant neoplasm of rectum (H)       RANITIDINE HCL PO      Take 150 mg by mouth daily as needed for heartburn        TYLENOL PO      Take 1,000 mg by mouth At Bedtime        zolpidem 5 MG tablet    AMBIEN    30 tablet    Take 1 tablet (5 mg) by mouth nightly as needed for sleep    Other insomnia       * Notice:  This list has 2 medication(s) that are the same as other medications prescribed for you. Read the directions carefully, and ask your doctor or other care provider to review them with you.

## 2018-06-05 ENCOUNTER — ONCOLOGY VISIT (OUTPATIENT)
Dept: ONCOLOGY | Facility: CLINIC | Age: 53
End: 2018-06-05
Attending: INTERNAL MEDICINE
Payer: COMMERCIAL

## 2018-06-05 ENCOUNTER — MYC MEDICAL ADVICE (OUTPATIENT)
Dept: BEHAVIORAL HEALTH | Facility: CLINIC | Age: 53
End: 2018-06-05

## 2018-06-05 ENCOUNTER — APPOINTMENT (OUTPATIENT)
Dept: LAB | Facility: CLINIC | Age: 53
End: 2018-06-05
Attending: INTERNAL MEDICINE
Payer: COMMERCIAL

## 2018-06-05 ENCOUNTER — HOME INFUSION (PRE-WILLOW HOME INFUSION) (OUTPATIENT)
Dept: PHARMACY | Facility: CLINIC | Age: 53
End: 2018-06-05

## 2018-06-05 VITALS
RESPIRATION RATE: 18 BRPM | OXYGEN SATURATION: 100 % | BODY MASS INDEX: 30.05 KG/M2 | DIASTOLIC BLOOD PRESSURE: 79 MMHG | WEIGHT: 187 LBS | HEART RATE: 71 BPM | TEMPERATURE: 98.3 F | SYSTOLIC BLOOD PRESSURE: 115 MMHG | HEIGHT: 66 IN

## 2018-06-05 DIAGNOSIS — C20 MALIGNANT NEOPLASM OF RECTUM (H): Primary | ICD-10-CM

## 2018-06-05 DIAGNOSIS — F32.89 OTHER DEPRESSION: ICD-10-CM

## 2018-06-05 DIAGNOSIS — G47.09 OTHER INSOMNIA: ICD-10-CM

## 2018-06-05 DIAGNOSIS — F41.9 ANXIETY: ICD-10-CM

## 2018-06-05 LAB
ALBUMIN SERPL-MCNC: 3.8 G/DL (ref 3.4–5)
ALP SERPL-CCNC: 82 U/L (ref 40–150)
ALT SERPL W P-5'-P-CCNC: 102 U/L (ref 0–50)
ANION GAP SERPL CALCULATED.3IONS-SCNC: 7 MMOL/L (ref 3–14)
AST SERPL W P-5'-P-CCNC: 61 U/L (ref 0–45)
BASOPHILS # BLD AUTO: 0 10E9/L (ref 0–0.2)
BASOPHILS NFR BLD AUTO: 0.8 %
BILIRUB SERPL-MCNC: 0.7 MG/DL (ref 0.2–1.3)
BUN SERPL-MCNC: 9 MG/DL (ref 7–30)
CALCIUM SERPL-MCNC: 9 MG/DL (ref 8.5–10.1)
CHLORIDE SERPL-SCNC: 107 MMOL/L (ref 94–109)
CO2 SERPL-SCNC: 27 MMOL/L (ref 20–32)
CREAT SERPL-MCNC: 0.67 MG/DL (ref 0.52–1.04)
DIFFERENTIAL METHOD BLD: ABNORMAL
EOSINOPHIL # BLD AUTO: 0 10E9/L (ref 0–0.7)
EOSINOPHIL NFR BLD AUTO: 1.2 %
ERYTHROCYTE [DISTWIDTH] IN BLOOD BY AUTOMATED COUNT: 16.7 % (ref 10–15)
GFR SERPL CREATININE-BSD FRML MDRD: >90 ML/MIN/1.7M2
GLUCOSE SERPL-MCNC: 92 MG/DL (ref 70–99)
HCT VFR BLD AUTO: 33.5 % (ref 35–47)
HGB BLD-MCNC: 11.6 G/DL (ref 11.7–15.7)
IMM GRANULOCYTES # BLD: 0 10E9/L (ref 0–0.4)
IMM GRANULOCYTES NFR BLD: 0.4 %
LYMPHOCYTES # BLD AUTO: 0.7 10E9/L (ref 0.8–5.3)
LYMPHOCYTES NFR BLD AUTO: 26.1 %
MCH RBC QN AUTO: 32.8 PG (ref 26.5–33)
MCHC RBC AUTO-ENTMCNC: 34.6 G/DL (ref 31.5–36.5)
MCV RBC AUTO: 95 FL (ref 78–100)
MONOCYTES # BLD AUTO: 0.3 10E9/L (ref 0–1.3)
MONOCYTES NFR BLD AUTO: 13 %
NEUTROPHILS # BLD AUTO: 1.5 10E9/L (ref 1.6–8.3)
NEUTROPHILS NFR BLD AUTO: 58.5 %
NRBC # BLD AUTO: 0 10*3/UL
NRBC BLD AUTO-RTO: 0 /100
PLATELET # BLD AUTO: 94 10E9/L (ref 150–450)
POTASSIUM SERPL-SCNC: 3.7 MMOL/L (ref 3.4–5.3)
PROT SERPL-MCNC: 6.7 G/DL (ref 6.8–8.8)
RBC # BLD AUTO: 3.54 10E12/L (ref 3.8–5.2)
SODIUM SERPL-SCNC: 141 MMOL/L (ref 133–144)
WBC # BLD AUTO: 2.5 10E9/L (ref 4–11)

## 2018-06-05 PROCEDURE — 96368 THER/DIAG CONCURRENT INF: CPT

## 2018-06-05 PROCEDURE — G0463 HOSPITAL OUTPT CLINIC VISIT: HCPCS | Mod: ZF

## 2018-06-05 PROCEDURE — 99214 OFFICE O/P EST MOD 30 MIN: CPT | Mod: ZP | Performed by: PHYSICIAN ASSISTANT

## 2018-06-05 PROCEDURE — 96375 TX/PRO/DX INJ NEW DRUG ADDON: CPT

## 2018-06-05 PROCEDURE — 96415 CHEMO IV INFUSION ADDL HR: CPT

## 2018-06-05 PROCEDURE — 96411 CHEMO IV PUSH ADDL DRUG: CPT

## 2018-06-05 PROCEDURE — 85025 COMPLETE CBC W/AUTO DIFF WBC: CPT | Performed by: INTERNAL MEDICINE

## 2018-06-05 PROCEDURE — G0498 CHEMO EXTEND IV INFUS W/PUMP: HCPCS

## 2018-06-05 PROCEDURE — 96413 CHEMO IV INFUSION 1 HR: CPT

## 2018-06-05 PROCEDURE — 96367 TX/PROPH/DG ADDL SEQ IV INF: CPT

## 2018-06-05 PROCEDURE — 80053 COMPREHEN METABOLIC PANEL: CPT | Performed by: INTERNAL MEDICINE

## 2018-06-05 PROCEDURE — 25000128 H RX IP 250 OP 636: Mod: ZF | Performed by: PHYSICIAN ASSISTANT

## 2018-06-05 RX ORDER — ONDANSETRON 2 MG/ML
8 INJECTION INTRAMUSCULAR; INTRAVENOUS ONCE
Status: COMPLETED | OUTPATIENT
Start: 2018-06-05 | End: 2018-06-05

## 2018-06-05 RX ORDER — ALBUTEROL SULFATE 0.83 MG/ML
2.5 SOLUTION RESPIRATORY (INHALATION)
Status: CANCELLED | OUTPATIENT
Start: 2018-06-05

## 2018-06-05 RX ORDER — MEPERIDINE HYDROCHLORIDE 25 MG/ML
25 INJECTION INTRAMUSCULAR; INTRAVENOUS; SUBCUTANEOUS EVERY 30 MIN PRN
Status: CANCELLED | OUTPATIENT
Start: 2018-06-05

## 2018-06-05 RX ORDER — LORAZEPAM 0.5 MG/1
0.5 TABLET ORAL EVERY 4 HOURS PRN
Qty: 60 TABLET | Refills: 5 | Status: SHIPPED | OUTPATIENT
Start: 2018-06-05 | End: 2018-08-06

## 2018-06-05 RX ORDER — DIPHENHYDRAMINE HYDROCHLORIDE 50 MG/ML
50 INJECTION INTRAMUSCULAR; INTRAVENOUS
Status: CANCELLED
Start: 2018-06-05

## 2018-06-05 RX ORDER — FLUOROURACIL 50 MG/ML
400 INJECTION, SOLUTION INTRAVENOUS ONCE
Status: CANCELLED | OUTPATIENT
Start: 2018-06-05

## 2018-06-05 RX ORDER — POTASSIUM CHLORIDE 1500 MG/1
TABLET, EXTENDED RELEASE ORAL
COMMUNITY
Start: 2018-05-07 | End: 2018-07-25

## 2018-06-05 RX ORDER — EPINEPHRINE 0.3 MG/.3ML
0.3 INJECTION SUBCUTANEOUS EVERY 5 MIN PRN
Status: CANCELLED | OUTPATIENT
Start: 2018-06-05

## 2018-06-05 RX ORDER — METHYLPREDNISOLONE SODIUM SUCCINATE 125 MG/2ML
125 INJECTION, POWDER, LYOPHILIZED, FOR SOLUTION INTRAMUSCULAR; INTRAVENOUS
Status: CANCELLED
Start: 2018-06-05

## 2018-06-05 RX ORDER — LORAZEPAM 2 MG/ML
0.5 INJECTION INTRAMUSCULAR EVERY 4 HOURS PRN
Status: CANCELLED
Start: 2018-06-05

## 2018-06-05 RX ORDER — FLUOROURACIL 50 MG/ML
400 INJECTION, SOLUTION INTRAVENOUS ONCE
Status: COMPLETED | OUTPATIENT
Start: 2018-06-05 | End: 2018-06-05

## 2018-06-05 RX ORDER — FLUOXETINE 40 MG/1
40 CAPSULE ORAL DAILY
Qty: 30 CAPSULE | Refills: 3 | Status: SHIPPED | OUTPATIENT
Start: 2018-06-05 | End: 2018-08-12

## 2018-06-05 RX ORDER — LORAZEPAM 2 MG/ML
0.5 INJECTION INTRAMUSCULAR EVERY 4 HOURS PRN
Status: DISCONTINUED | OUTPATIENT
Start: 2018-06-05 | End: 2018-06-05 | Stop reason: HOSPADM

## 2018-06-05 RX ORDER — ONDANSETRON 2 MG/ML
8 INJECTION INTRAMUSCULAR; INTRAVENOUS ONCE
Status: CANCELLED
Start: 2018-06-05 | End: 2018-06-05

## 2018-06-05 RX ORDER — EPINEPHRINE 1 MG/ML
0.3 INJECTION, SOLUTION INTRAMUSCULAR; SUBCUTANEOUS EVERY 5 MIN PRN
Status: CANCELLED | OUTPATIENT
Start: 2018-06-05

## 2018-06-05 RX ORDER — ALBUTEROL SULFATE 90 UG/1
1-2 AEROSOL, METERED RESPIRATORY (INHALATION)
Status: CANCELLED
Start: 2018-06-05

## 2018-06-05 RX ORDER — DIAZEPAM 5 MG
TABLET ORAL
Qty: 1 TABLET | Refills: 0 | Status: SHIPPED | OUTPATIENT
Start: 2018-06-05 | End: 2018-07-25

## 2018-06-05 RX ORDER — SODIUM CHLORIDE 9 MG/ML
1000 INJECTION, SOLUTION INTRAVENOUS CONTINUOUS PRN
Status: CANCELLED
Start: 2018-06-05

## 2018-06-05 RX ORDER — HEPARIN SODIUM (PORCINE) LOCK FLUSH IV SOLN 100 UNIT/ML 100 UNIT/ML
5 SOLUTION INTRAVENOUS EVERY 8 HOURS PRN
Status: DISCONTINUED | OUTPATIENT
Start: 2018-06-05 | End: 2018-06-06 | Stop reason: HOSPADM

## 2018-06-05 RX ADMIN — FLUOROURACIL 805 MG: 50 INJECTION, SOLUTION INTRAVENOUS at 13:53

## 2018-06-05 RX ADMIN — DEXTROSE MONOHYDRATE 250 ML: 50 INJECTION, SOLUTION INTRAVENOUS at 11:41

## 2018-06-05 RX ADMIN — SODIUM CHLORIDE, PRESERVATIVE FREE 5 ML: 5 INJECTION INTRAVENOUS at 08:35

## 2018-06-05 RX ADMIN — ONDANSETRON 8 MG: 2 INJECTION INTRAMUSCULAR; INTRAVENOUS at 10:15

## 2018-06-05 RX ADMIN — DIPHENHYDRAMINE HYDROCHLORIDE 50 MG: 50 INJECTION INTRAMUSCULAR; INTRAVENOUS at 10:57

## 2018-06-05 RX ADMIN — SODIUM CHLORIDE 1000 ML: 9 INJECTION, SOLUTION INTRAVENOUS at 10:02

## 2018-06-05 RX ADMIN — OXALIPLATIN 171 MG: 100 INJECTION, SOLUTION, CONCENTRATE INTRAVENOUS at 11:42

## 2018-06-05 RX ADMIN — DEXAMETHASONE SODIUM PHOSPHATE: 10 INJECTION, SOLUTION INTRAMUSCULAR; INTRAVENOUS at 10:26

## 2018-06-05 RX ADMIN — LORAZEPAM 0.5 MG: 2 INJECTION INTRAMUSCULAR; INTRAVENOUS at 10:13

## 2018-06-05 RX ADMIN — LEUCOVORIN CALCIUM 700 MG: 350 INJECTION, POWDER, LYOPHILIZED, FOR SOLUTION INTRAMUSCULAR; INTRAVENOUS at 11:41

## 2018-06-05 ASSESSMENT — PAIN SCALES - GENERAL
PAINLEVEL: NO PAIN (0)
PAINLEVEL: NO PAIN (0)

## 2018-06-05 NOTE — MR AVS SNAPSHOT
After Visit Summary   6/5/2018    Sarah Rajan    MRN: [id number]           Patient Information     Date Of Birth          1965        Visit Information        Provider Department      6/5/2018 8:40 AM Cathleen Ramos PA-C [facility]        Today's Diagnoses     Malignant neoplasm of rectum (H)    -  1    Other insomnia        Other depression        Anxiety           Follow-ups after your visit        Your next 10 appointments already scheduled     Jun 07, 2018 12:00 PM CDT   Level O with ROOM 8 [facility] ([facility])    [facility]  [address]  [facility] [address]   [phone]            Jun 08, 2018  1:30 PM CDT   Level 2 with ROOM 1 [facility] ([facility])    [facility]  [address]  [facility] [address]   [phone]            Jun 11, 2018  3:00 PM CDT   Level 1 with ROOM 6 [facility] ([facility])    [facility]  [address]  [facility] [address]   [phone]            Jun 13, 2018  3:00 PM CDT   Level 1 with ROOM 4 [facility] ([facility])    [facility]  [address]  [facility] [address]   [phone]            Severino 15, 2018  3:00 PM CDT   Level 1 with ROOM 9 [facility] ([facility])    [facility]  [address]  [facility] [address]   [phone]            Jun 19, 2018  4:45 PM CDT   MR ABDOMEN W/O & W CONTRAST with [address] [facility] MRI ([facility] [place])    [address] [address] [phone]           Take your medicines as usual, unless your doctor tells you not to. Bring a list of your current medicines to your exam (including vitamins, minerals and  over-the-counter drugs). Also bring the results of similar scans you may have had.    You may or may not receive IV contrast for this exam pending the discretion of the Radiologist.   Do not eat or drink for 6 hours prior to exam.  The MRI machine uses a strong magnet. Please wear clothes without metal (snaps, zippers). A sweatsuit works well, or we may give you a hospital gown.  Please remove any body piercings and hair extensions before you arrive. You will also remove watches, jewelry, hairpins, wallets, dentures, partial dental plates and hearing aids. You may wear contact lenses, and you may be able to wear your rings. We have a safe place to keep your personal items, but it is safer to leave them at home.  **IMPORTANT** THE INSTRUCTIONS BELOW ARE ONLY FOR THOSE PATIENTS WHO HAVE BEEN PRESCRIBED SEDATION OR GENERAL ANESTHESIA DURING THEIR MRI PROCEDURE:  IF YOUR DOCTOR PRESCRIBED ORAL SEDATION (take medicine to help you relax during your exam):   You must get the medicine from your doctor (oral medication) before you arrive. Bring the medicine to the exam. Do not take it at home. You ll be told when to take it upon arriving for your exam.   Arrive one hour early. Bring someone who can take you home after the test. Your medicine will make you sleepy. After the exam, you may not drive, take a bus or take a taxi by yourself.  IF YOUR DOCTOR PRESCRIBED IV SEDATION:   Arrive one hour early. Bring someone who can take you home after the test. Your medicine will make you sleepy. After the exam, you may not drive, take a bus or take a taxi by yourself.   No eating 6 hours before your exam. You may have clear liquids up until 4 hours before your exam. (Clear liquids include water, clear tea, black coffee and fruit juice without pulp.)  IF YOUR DOCTOR PRESCRIBED ANESTHESIA (be asleep for your exam):   Arrive 1 1/2 hours early. Bring someone who can take you home after the test. You may not drive, take a bus or take a  taxi by yourself.   No eating 8 hours before your exam. You may have clear liquids up until 4 hours before your exam. (Clear liquids include water, clear tea, black coffee and fruit juice without pulp.)   You will spend four to five hours in the recovery room.  If you have any questions, please contact your Imaging Department exam site.            Jun 20, 2018  9:15 AM CDT   MR PELVIS W/O & W CONTRAST with [id number]   [facility] MRI ([facility] [place])    [address] [address] [phone]           Take your medicines as usual, unless your doctor tells you not to. Bring a list of your current medicines to your exam (including vitamins, minerals and over-the-counter drugs).  You may or may not receive intravenous (IV) contrast for this exam pending the discretion of the Radiologist.  You do not need to do anything special to prepare.  The MRI machine uses a strong magnet. Please wear clothes without metal (snaps, zippers). A sweatsuit works well, or we may give you a hospital gown.  Please remove any body piercings and hair extensions before you arrive. You will also remove watches, jewelry, hairpins, wallets, dentures, partial dental plates and hearing aids. You may wear contact lenses, and you may be able to wear your rings. We have a safe place to keep your personal items, but it is safer to leave them at home.  **IMPORTANT** THE INSTRUCTIONS BELOW ARE ONLY FOR THOSE PATIENTS WHO HAVE BEEN PRESCRIBED SEDATION OR GENERAL ANESTHESIA DURING THEIR MRI PROCEDURE:  IF YOUR DOCTOR PRESCRIBED ORAL SEDATION (take medicine to help you relax during your exam):   You must get the medicine from your doctor (oral medication) before you arrive. Bring the medicine to the exam. Do not take it at home. You ll be told when to take it upon arriving for your exam.   Arrive one hour early. Bring someone who can take you home after the test. Your medicine will make you  sleepy. After the exam, you may not drive, take a bus or take a taxi by yourself.  IF YOUR DOCTOR PRESCRIBED IV SEDATION:   Arrive one hour early. Bring someone who can take you home after the test. Your medicine will make you sleepy. After the exam, you may not drive, take a bus or take a taxi by yourself.   No eating 6 hours before your exam. You may have clear liquids up until 4 hours before your exam. (Clear liquids include water, clear tea, black coffee and fruit juice without pulp.)  IF YOUR DOCTOR PRESCRIBED ANESTHESIA (be asleep for your exam):   Arrive 1 1/2 hours early. Bring someone who can take you home after the test. You may not drive, take a bus or take a taxi by yourself.   No eating 8 hours before your exam. You may have clear liquids up until 4 hours before your exam. (Clear liquids include water, clear tea, black coffee and fruit juice without pulp.)   You will spend four to five hours in the recovery room.  Please call the Imaging Department at your exam site with any questions.            Jun 20, 2018 10:40 AM CDT   CT CHEST W/O CONTRAST with UCCT2   [facility] CT ([facility])    [address] [address] [phone]           Please bring any scans or X-rays taken at other hospitals, if similar tests were done. Also bring a list of your medicines, including vitamins, minerals and over-the-counter drugs. It is safest to leave personal items at home.  Be sure to tell your doctor:   If you have any allergies.   If there s any chance you are pregnant.   If you are breastfeeding.  You do not need to do anything special to prepare for this exam.  Please wear loose clothing, such as a sweat suit or jogging clothes. Avoid snaps, zippers and other metal. We may ask you to undress and put on a hospital gown.            Jun 21, 2018 12:30 PM CDT   (Arrive by 12:15 PM)   Return Visit with Kyra Mortensen MD   [facility]  Clinic ([facility])    [address]  [facility] [address] [phone]            Jun 21, 2018  1:00 PM CDT   Infusion 240 with UC ONCOLOGY INFUSION   [facility] ([facility])    [address]  [facility] [address] [phone]              Future tests that were ordered for you today     Open Future Orders        Priority Expected Expires Ordered    CBC with platelets differential Routine 6/21/2018 10/5/2018 6/5/2018    Comprehensive metabolic panel Routine 6/21/2018 10/5/2018 6/5/2018    Magnesium Routine 6/21/2018 10/5/2018 6/5/2018    CT Pelvis w contrast* Routine 6/22/2018 6/5/2019 6/5/2018            Who to contact     If you have questions or need follow up information about today's clinic visit or your schedule please contact [facility] directly at [phone].  Normal or non-critical lab and imaging results will be communicated to you by WORK[employer] WORK[employer]art, letter or phone within 4 business days after the clinic has received the results. If you do not hear from us within 7 days, please contact the clinic through [employer] or phone. If you have a critical or abnormal lab result, we will notify you by phone as soon as possible.  Submit refill requests through [employer] or call your pharmacy and they will forward the refill request to us. Please allow 3 business days for your refill to be completed.          Additional Information About Your Visit        WORKING OUT WORKSharZoosk Information     [employer] gives you secure access to your electronic health record. If you see a primary care provider, you can also send messages to your care team and make appointments. If you have questions, please call your primary care clinic.  If you do not have a primary care provider, please call [phone] and they will assist you.        Care EveryWhere ID     This is your Care EveryWhere ID. This could be used by  "other organizations to access your [facility] medical records  [id number]        Your Vitals Were     Pulse Temperature Respirations Height Pulse Oximetry BMI (Body Mass Index)    71 98.3  F (36.8  C) (Oral) 18 1.676 m (5' 5.98\") 100% 30.2 kg/m2       Blood Pressure from Last 3 Encounters:   06/05/18 115/79   06/04/18 110/71   06/01/18 113/73    Weight from Last 3 Encounters:   06/05/18 84.8 kg (187 lb)   05/21/18 86.6 kg (191 lb)   05/12/18 87.5 kg (193 lb)                 Today's Medication Changes          These changes are accurate as of 6/5/18 11:59 PM.  If you have any questions, ask your nurse or doctor.               Start taking these medicines.        Dose/Directions    diazepam 5 MG tablet   Commonly known as:  VALIUM   Used for:  Anxiety   Started by:  Cathleen Ramos PA-C        Take 1 hour prior to MRI.   Quantity:  1 tablet   Refills:  0         These medicines have changed or have updated prescriptions.        Dose/Directions    FLUoxetine 40 MG capsule   Commonly known as:  PROzac   This may have changed:    - medication strength  - how much to take   Used for:  Other depression   Changed by:  Cathleen Ramos PA-C        Dose:  40 mg   Take 1 capsule (40 mg) by mouth daily   Quantity:  30 capsule   Refills:  3            Where to get your medicines      These medications were sent to [address] [address]    Hours:  TRANSPLANT PHONE NUMBER [phone] Phone:  [phone]     FLUoxetine 40 MG capsule         Some of these will need a paper prescription and others can be bought over the counter.  Ask your nurse if you have questions.     Bring a paper prescription for each of these medications     diazepam 5 MG tablet    LORazepam 0.5 MG tablet                Primary Care Provider Office Phone # Fax #    Kyra Mortensen [phone]       [address] [place] [address]" SE  [facility] [address]        Equal Access to Services     SAAD CHAKRABORTY : Hadii aad ku hadpanteramya Sopavanali, waaxda luqadaha, qaybta kaalmada yury, luz elena jacob. So [facility] [phone].    ATENCIÓN: Si habla español, tiene a membreno disposición servicios gratuitos de asistencia lingüística. Julietaame al [phone].    We comply with applicable federal civil rights laws and [place] laws. We do not discriminate on the basis of race, color, national origin, age, disability, sex, sexual orientation, or gender identity.            Thank you!     Thank you for choosing [facility]  for your care. Our goal is always to provide you with excellent care. Hearing back from our patients is one way we can continue to improve our services. Please take a few minutes to complete the written survey that you may receive in the mail after your visit with us. Thank you!             Your Updated Medication List - Protect others around you: Learn how to safely use, store and throw away your medicines at www.disposemymeds.org.          This list is accurate as of 6/5/18 11:59 PM.  Always use your most recent med list.                   Brand Name Dispense Instructions for use Diagnosis    amitriptyline 25 MG tablet    ELAVIL    30 tablet    Take 1 tablet (25 mg) by mouth At Bedtime    Insomnia, unspecified type, Malignant neoplasm of rectum (H), Liver lesion       calcium carbonate 500 MG chewable tablet    TUMS    150 tablet    Take 1-2 chew tab by mouth daily        dexamethasone 4 MG tablet    DECADRON    3 tablet    Take 1 tablet (4 mg) by mouth daily (with breakfast)    Malignant neoplasm of rectum (H)       diazepam 5 MG tablet    VALIUM    1 tablet    Take 1 hour prior to MRI.    Anxiety       dronabinol 2.5 MG capsule    MARINOL    60 capsule    Take 1 capsule (2.5 mg) by mouth 2 times daily (before meals)    Anorexia       eszopiclone 1 MG Tabs tablet    LUNESTA    30 tablet    Take 1-2  tablets (1-2 mg) by mouth At Bedtime    Other insomnia       fluorouracil      Administer 4,968 mg intravenously . Continuous infusion over 46-48 hr via ambulatory pump q14d Supplied by outside provider with pump.        FLUoxetine 40 MG capsule    PROzac    30 capsule    Take 1 capsule (40 mg) by mouth daily    Other depression       lidocaine-prilocaine cream    EMLA    30 g    Apply 45 minutes prior to procedure.    Malignant neoplasm of rectum (H)       * LORazepam 1 MG tablet    ATIVAN    30 tablet    Take 1 tablet (1 mg) by mouth At Bedtime    Other insomnia       * LORazepam 0.5 MG tablet    ATIVAN    60 tablet    Take 1 tablet (0.5 mg) by mouth every 4 hours as needed (Anxiety, Nausea/Vomiting or Sleep)    Malignant neoplasm of rectum (H), Anxiety       ondansetron 4 MG tablet    ZOFRAN    3 tablet    Take one tablet by mouth every 6 hours as needed for nausea when taking neomycin and flagyl    Rectal cancer (H)       ondansetron 8 MG ODT tab    ZOFRAN-ODT    60 tablet    Take 1 tablet (8 mg) by mouth every 8 hours as needed for nausea    Chemotherapy induced nausea and vomiting       Potassium Chloride ER 20 MEQ Tbcr     10 tablet    Take 1 tablet (20 mEq) by mouth 2 times daily    Hypokalemia       potassium chloride SA 20 MEQ CR tablet    K-DUR/KLOR-CON M          prochlorperazine 10 MG tablet    COMPAZINE    20 tablet    Take 1 tablet (10 mg) by mouth every 6 hours as needed for nausea or vomiting    Malignant neoplasm of rectum (H)       RANITIDINE HCL PO      Take 150 mg by mouth daily as needed for heartburn        TYLENOL PO      Take 1,000 mg by mouth At Bedtime        zolpidem 5 MG tablet    AMBIEN    30 tablet    Take 1 tablet (5 mg) by mouth nightly as needed for sleep    Other insomnia       * Notice:  This list has 2 medication(s) that are the same as other medications prescribed for you. Read the directions carefully, and ask your doctor or other care provider to review them with you.

## 2018-06-05 NOTE — NURSING NOTE
"Oncology Rooming Note    June 5, 2018 8:57 AM   Sarah Rajan is a 52 year old female who presents for:    Chief Complaint   Patient presents with     Port Draw     Labs drawn via port by RN. Line flushed and hep locked. VS taken.     Oncology Clinic Visit     Return visit related to Rectal Cancer     Initial Vitals: /79 (BP Location: Right arm, Patient Position: Sitting, Cuff Size: Adult Regular)  Pulse 71  Temp 98.3  F (36.8  C) (Oral)  Resp 18  Ht 1.676 m (5' 5.98\")  Wt 84.8 kg (187 lb)  SpO2 100%  BMI 30.2 kg/m2 Estimated body mass index is 30.2 kg/(m^2) as calculated from the following:    Height as of this encounter: 1.676 m (5' 5.98\").    Weight as of this encounter: 84.8 kg (187 lb). Body surface area is 1.99 meters squared.  No Pain (0) Comment: Data Unavailable   No LMP recorded. Patient is postmenopausal.  Allergies reviewed: Yes  Medications reviewed: Yes    Medications: MEDICATION REFILLS NEEDED TODAY. Provider was notified.  Pharmacy name entered into [facility]:    [facility] [id number] [place] [address] DR ALVAREZ AT [facility] [place], [place] - [address] [place] [address] [phone]  [id number] [place], [place] - [address] [place] [address] E [facility] [address]    Clinical concerns: Refill needed. Provider was notified.    10 minutes for nursing intake (face to face time)     Trudi Oakes LPN            "

## 2018-06-05 NOTE — MR AVS SNAPSHOT
After Visit Summary   6/5/2018    Sarah Rajan    MRN: [id number]           Patient Information     Date Of Birth          1965        Visit Information        Provider Department      6/5/2018 10:00 AM UC 17 ATC;  ONCOLOGY INFUSION [facility]        Today's Diagnoses     Malignant neoplasm of rectum (H)    -  1      Care Instructions    Contact Numbers  [facility]: [phone]    After Hours:  [phone]  Triage: [phone]    Please call the [facility] Triage line if you experience a temperature greater than or equal to 100.5, shaking chills, have uncontrolled nausea, vomiting and/or diarrhea, dizziness, shortness of breath, chest pain, bleeding, unexplained bruising, or if you have any other new/concerning symptoms, questions or concerns.     If it is after hours, weekends, or holidays, please call the main hospital  at  [phone] and ask to speak to the Oncology doctor on call.     If you are having any concerning symptoms or wish to speak to a provider before your next infusion visit, please call your care coordinator or triage to notify them so we can adequately serve you.     If you need a refill on a narcotic prescription or other medication, please call triage before your infusion appointment.         June 2018 Sunday Monday Tuesday Wednesday Thursday Friday Saturday                            1     LEVEL 2    1:00 PM   (120 min.)   ROOM 6 [facility] Cancer Infusion 2       3     4     LEVEL 2    2:30 PM   (120 min.)   ROOM 8 [facility]fusion 5     [facility]    8:15 AM   (15 min.)   [facility] LAB DRAW   [facility] Lab Draw     [facility] RETURN    8:25 AM   (50 min.)   Cathleen Ramos PA-C [facility]     [facility] ONC INFUSION 240   10:00 AM   (240 min.)    [facility]   11:45 AM   (60 min.)   Lashaun Islas LICSW   [facility]  Cancer Clinic 6     7     LEVEL 0   12:00 PM   (30 min.)   ROOM 8 [facility] Cancer Infusion 8     LEVEL 2    1:30 PM   (120 min.)   ROOM 1 [facility] Cancer Infusion 9       10     11     LEVEL 1    3:00 PM   (60 min.)   ROOM 6 [facility] Cancer Infusion 12     13     LEVEL 1    3:00 PM   (60 min.)   ROOM 4 [facility] Cancer Infusion 14     15     LEVEL 1    3:00 PM   (60 min.)   ROOM 9 [facility] Cancer Infusion 16       17     18     19     MR ABDOMEN WWO    4:45 PM   (45 min.)   [id number]   [facility] MRI 20     MR PELVIS WWO    9:15 AM   (45 min.)   [id number]   [facility] MRI     CT CHEST WO   10:40 AM   (20 min.)   UCCT2   [facility] CT 21     [facility] LAB DRAW   12:00 PM   (15 min.)    [facility] LAB DRAW   [facility] Lab Draw     [facility] RETURN   12:15 PM   (30 min.)   Kyra Mortensen MD   [facility] ONC INFUSION 240    1:00 PM   (240 min.)    ONCOLOGY INFUSION   [facility] 22     23       24     25     26     27     28     29     30 July 2018 Sunday Monday Tuesday Wednesday Thursday Friday Saturday   1     2     3     4     5     6     7       8     9     10     11     12     13     14       15     16     17     18     19     20     21       22     23     24     25     26     27     28       29     30     31                                     Recent Results (from the past 24 hour(s))   CBC with platelets differential    Collection Time: 06/05/18  8:43 AM   Result Value Ref Range    WBC 2.5 (L) 4.0 - 11.0 10e9/L    RBC Count 3.54 (L) 3.8 - 5.2 10e12/L    Hemoglobin 11.6 (L) 11.7 - 15.7 g/dL    Hematocrit 33.5 (L) 35.0 - 47.0 %    MCV 95 78 - 100 fl    MCH 32.8 26.5 - 33.0 pg    MCHC 34.6 31.5 - 36.5 g/dL    RDW 16.7 (H) 10.0 - 15.0 %    Platelet Count 94 (L) 150 - 450 10e9/L    Diff Method Automated Method     % Neutrophils 58.5 %    % Lymphocytes 26.1 %    % Monocytes 13.0 %    % Eosinophils 1.2 %    %  Basophils 0.8 %    % Immature Granulocytes 0.4 %    Nucleated RBCs 0 0 /100    Absolute Neutrophil 1.5 (L) 1.6 - 8.3 10e9/L    Absolute Lymphocytes 0.7 (L) 0.8 - 5.3 10e9/L    Absolute Monocytes 0.3 0.0 - 1.3 10e9/L    Absolute Eosinophils 0.0 0.0 - 0.7 10e9/L    Absolute Basophils 0.0 0.0 - 0.2 10e9/L    Abs Immature Granulocytes 0.0 0 - 0.4 10e9/L    Absolute Nucleated RBC 0.0    Comprehensive metabolic panel    Collection Time: 06/05/18  8:43 AM   Result Value Ref Range    Sodium 141 133 - 144 mmol/L    Potassium 3.7 3.4 - 5.3 mmol/L    Chloride 107 94 - 109 mmol/L    Carbon Dioxide 27 20 - 32 mmol/L    Anion Gap 7 3 - 14 mmol/L    Glucose 92 70 - 99 mg/dL    Urea Nitrogen 9 7 - 30 mg/dL    Creatinine 0.67 0.52 - 1.04 mg/dL    GFR Estimate >90 >60 mL/min/1.7m2    GFR Estimate If Black >90 >60 mL/min/1.7m2    Calcium 9.0 8.5 - 10.1 mg/dL    Bilirubin Total 0.7 0.2 - 1.3 mg/dL    Albumin 3.8 3.4 - 5.0 g/dL    Protein Total 6.7 (L) 6.8 - 8.8 g/dL    Alkaline Phosphatase 82 40 - 150 U/L     (H) 0 - 50 U/L    AST 61 (H) 0 - 45 U/L                 Follow-ups after your visit        Your next 10 appointments already scheduled     Jun 07, 2018 12:00 PM CDT   Level O with ROOM 8 [facility] Cancer Infusion ([facility])    [facility]  [address]  [place] [address]   [phone]            Jun 08, 2018  1:30 PM CDT   Level 2 with ROOM 1 [facility] Infusion ([facility])    [facility]  [address]  [place] [address]   [phone]            Jun 11, 2018  3:00 PM CDT   Level 1 with ROOM 6 [facility]fusion ([facility])    [facility]  [address] [place] [address] [address]  [facility] [address]   [phone]            Jun 13, 2018  3:00 PM CDT   Level 1 with ROOM 4 [facility]fusion ([facility])    [facility] [address]  [facility] [address]   [phone]            Severino 15, 2018  3:00 PM CDT   Level 1 with ROOM 9 [facility] Cancer Infusion ([facility])    [facility]  [address]  [facility] [address]   [phone]            Jun 19, 2018  4:45 PM CDT   MR ABDOMEN W/O & W CONTRAST with [address] [facility] ([facility])    [address] [facility]  1st Floor  [facility] [address] [phone]           Take your medicines as usual, unless your doctor tells you not to. Bring a list of your current medicines to your exam (including vitamins, minerals and over-the-counter drugs). Also bring the results of similar scans you may have had.    You may or may not receive IV contrast for this exam pending the discretion of the Radiologist.   Do not eat or drink for 6 hours prior to exam.  The MRI machine uses a strong magnet. Please wear clothes without metal (snaps, zippers). A sweatsuit works well, or we may give you a hospital gown.  Please remove any body piercings and hair extensions before you arrive. You will also remove watches, jewelry, hairpins, wallets, dentures, partial dental plates and hearing aids. You may wear contact lenses, and you may be able to wear your rings. We have a safe place to keep your personal items, but it is safer to leave them at home.  **IMPORTANT** THE INSTRUCTIONS BELOW ARE ONLY FOR THOSE PATIENTS WHO HAVE BEEN PRESCRIBED SEDATION OR GENERAL ANESTHESIA DURING THEIR MRI PROCEDURE:  IF YOUR DOCTOR PRESCRIBED ORAL SEDATION (take medicine to help you relax during your exam):   You must get the medicine from your doctor (oral medication) before you arrive. Bring the medicine to the exam. Do not take it at home. You ll be told when to take it upon arriving for your exam.   Arrive one hour early. Bring someone who can take you home after the test. Your medicine will make you sleepy. After the exam, you  may not drive, take a bus or take a taxi by yourself.  IF YOUR DOCTOR PRESCRIBED IV SEDATION:   Arrive one hour early. Bring someone who can take you home after the test. Your medicine will make you sleepy. After the exam, you may not drive, take a bus or take a taxi by yourself.   No eating 6 hours before your exam. You may have clear liquids up until 4 hours before your exam. (Clear liquids include water, clear tea, black coffee and fruit juice without pulp.)  IF YOUR DOCTOR PRESCRIBED ANESTHESIA (be asleep for your exam):   Arrive 1 1/2 hours early. Bring someone who can take you home after the test. You may not drive, take a bus or take a taxi by yourself.   No eating 8 hours before your exam. You may have clear liquids up until 4 hours before your exam. (Clear liquids include water, clear tea, black coffee and fruit juice without pulp.)   You will spend four to five hours in the recovery room.  If you have any questions, please contact your Imaging Department exam site.            Jun 20, 2018  9:15 AM CDT   MR PELVIS W/O & W CONTRAST with [id number]   [facility] MRI ([facility])    [address] [address] [phone]           Take your medicines as usual, unless your doctor tells you not to. Bring a list of your current medicines to your exam (including vitamins, minerals and over-the-counter drugs).  You may or may not receive intravenous (IV) contrast for this exam pending the discretion of the Radiologist.  You do not need to do anything special to prepare.  The MRI machine uses a strong magnet. Please wear clothes without metal (snaps, zippers). A sweatsuit works well, or we may give you a hospital gown.  Please remove any body piercings and hair extensions before you arrive. You will also remove watches, jewelry, hairpins, wallets, dentures, partial dental plates and hearing aids. You may wear contact lenses, and you may be able to  wear your rings. We have a safe place to keep your personal items, but it is safer to leave them at home.  **IMPORTANT** THE INSTRUCTIONS BELOW ARE ONLY FOR THOSE PATIENTS WHO HAVE BEEN PRESCRIBED SEDATION OR GENERAL ANESTHESIA DURING THEIR MRI PROCEDURE:  IF YOUR DOCTOR PRESCRIBED ORAL SEDATION (take medicine to help you relax during your exam):   You must get the medicine from your doctor (oral medication) before you arrive. Bring the medicine to the exam. Do not take it at home. You ll be told when to take it upon arriving for your exam.   Arrive one hour early. Bring someone who can take you home after the test. Your medicine will make you sleepy. After the exam, you may not drive, take a bus or take a taxi by yourself.  IF YOUR DOCTOR PRESCRIBED IV SEDATION:   Arrive one hour early. Bring someone who can take you home after the test. Your medicine will make you sleepy. After the exam, you may not drive, take a bus or take a taxi by yourself.   No eating 6 hours before your exam. You may have clear liquids up until 4 hours before your exam. (Clear liquids include water, clear tea, black coffee and fruit juice without pulp.)  IF YOUR DOCTOR PRESCRIBED ANESTHESIA (be asleep for your exam):   Arrive 1 1/2 hours early. Bring someone who can take you home after the test. You may not drive, take a bus or take a taxi by yourself.   No eating 8 hours before your exam. You may have clear liquids up until 4 hours before your exam. (Clear liquids include water, clear tea, black coffee and fruit juice without pulp.)   You will spend four to five hours in the recovery room.  Please call the Imaging Department at your exam site with any questions.            Jun 20, 2018 10:40 AM CDT   CT CHEST W/O CONTRAST with UCCT2   [facility] CT ([facility])    [address] [facility]  1st [facility] [address] [phone]           Please bring any scans or X-rays taken at other  [facility], if similar tests were done. Also bring a list of your medicines, including vitamins, minerals and over-the-counter drugs. It is safest to leave personal items at home.  Be sure to tell your doctor:   If you have any allergies.   If there s any chance you are pregnant.   If you are breastfeeding.  You do not need to do anything special to prepare for this exam.  Please wear loose clothing, such as a sweat suit or jogging clothes. Avoid snaps, zippers and other metal. We may ask you to undress and put on a hospital gown.            Jun 21, 2018 12:30 PM CDT   (Arrive by 12:15 PM)   Return Visit with Kyra oMrtensen MD   [facility] ([facility])    [address] [place] [address]  [address]  [facility] [address] [phone]            Jun 21, 2018  1:00 PM CDT   Infusion 240 with UC ONCOLOGY INFUSION   [facility] ([facility])    [address] [place] [address]  [address]  [facility] [address] [phone]              Future tests that were ordered for you today     Open Future Orders        Priority Expected Expires Ordered    CT Pelvis w contrast* Routine 6/22/2018 6/5/2019 6/5/2018            Who to contact     If you have questions or need follow up information about today's clinic visit or your schedule please contact [facility] directly at [phone].  Normal or non-critical lab and imaging results will be communicated to you by MyChart, letter or phone within 4 business days after the clinic has received the results. If you do not hear from us within 7 days, please contact the clinic through MyChart or phone. If you have a critical or abnormal lab result, we will notify you by phone as soon as possible.  Submit refill requests through [employer] or call your pharmacy and they will forward the refill request to us. Please allow 3 business days for your refill to be completed.          Additional  Information About Your Visit        [employer] Information     "[employer] gives you secure access to your electronic health record. If you see a primary care provider, you can also send messages to your care team and make appointments. If you have questions, please call your primary care clinic.  If you do not have a primary care provider, please call [phone] and they will assist you.        Care EveryWhere ID     This is your Care EveryWhere ID. This could be used by other organizations to access your [facility] medical records  [id number]         Blood Pressure from Last 3 Encounters:   06/05/18 115/79   06/04/18 110/71   06/01/18 113/73    Weight from Last 3 Encounters:   06/05/18 84.8 kg (187 lb)   05/21/18 86.6 kg (191 lb)   05/12/18 87.5 kg (193 lb)              We Performed the Following     CBC with platelets differential     Comprehensive metabolic panel          Today's Medication Changes          These changes are accurate as of 6/5/18 12:57 PM.  If you have any questions, ask your nurse or doctor.               Start taking these medicines.        Dose/Directions    diazepam 5 MG tablet   Commonly known as:  VALIUM   Used for:  Anxiety   Started by:  Cathleen Ramos PA-C        Take 1 hour prior to MRI.   Quantity:  1 tablet   Refills:  0         These medicines have changed or have updated prescriptions.        Dose/Directions    FLUoxetine 40 MG capsule   Commonly known as:  PROzac   This may have changed:    - medication strength  - how much to take   Used for:  Other depression   Changed by:  Cathleen Ramos PA-C        Dose:  40 mg   Take 1 capsule (40 mg) by mouth daily   Quantity:  30 capsule   Refills:  3            Where to get your medicines      These medications were sent to [facility] [place], [place] - [address] [facility] [address]  [address] [address]    Hours:  TRANSPLANT PHONE NUMBER [phone] Phone:  [phone]      FLUoxetine 40 MG capsule         Some of these will need a paper prescription and others can be bought over the counter.  Ask your nurse if you have questions.     Bring a paper prescription for each of these medications     diazepam 5 MG tablet    LORazepam 0.5 MG tablet                Primary Care Provider Office Phone # Fax #    Kyra KIM Mortensen [phone]        [facility] [address]        Equal Access to Services     DEAN CHAKRABORTY : Hadii aad ku hadasho Soomaali, waaxda luqadaha, qaybta kaalmada adeegyada, waxay idiin hayaan adeeg amol laberna . So [facility] [phone].    ATENCIÓN: Si habla español, tiene a membreno disposición servicios gratuitos de asistencia lingüística. Llame al [phone].    We comply with applicable federal civil rights laws and [place] laws. We do not discriminate on the basis of race, color, national origin, age, disability, sex, sexual orientation, or gender identity.            Thank you!     Thank you for choosing [facility] CL[facility]IC  for your care. Our goal is always to provide you with excellent care. Hearing back from our patients is one way we can continue to improve our services. Please take a few minutes to complete the written survey that you may receive in the mail after your visit with us. Thank you!             Your Updated Medication List - Protect others around you: Learn how to safely use, store and throw away your medicines at www.disposemymeds.org.          This list is accurate as of 6/5/18 12:57 PM.  Always use your most recent med list.                   Brand Name Dispense Instructions for use Diagnosis    amitriptyline 25 MG tablet    ELAVIL    30 tablet    Take 1 tablet (25 mg) by mouth At Bedtime    Insomnia, unspecified type, Malignant neoplasm of rectum (H), Liver lesion       calcium carbonate 500 MG chewable tablet    TUMS    150 tablet    Take 1-2 chew tab by mouth daily        dexamethasone 4 MG tablet    DECADRON     3 tablet    Take 1 tablet (4 mg) by mouth daily (with breakfast)    Malignant neoplasm of rectum (H)       diazepam 5 MG tablet    VALIUM    1 tablet    Take 1 hour prior to MRI.    Anxiety       dronabinol 2.5 MG capsule    MARINOL    60 capsule    Take 1 capsule (2.5 mg) by mouth 2 times daily (before meals)    Anorexia       eszopiclone 1 MG Tabs tablet    LUNESTA    30 tablet    Take 1-2 tablets (1-2 mg) by mouth At Bedtime    Other insomnia       fluorouracil      Administer 4,968 mg intravenously . Continuous infusion over 46-48 hr via ambulatory pump q14d Supplied by outside provider with pump.        FLUoxetine 40 MG capsule    PROzac    30 capsule    Take 1 capsule (40 mg) by mouth daily    Other depression       lidocaine-prilocaine cream    EMLA    30 g    Apply 45 minutes prior to procedure.    Malignant neoplasm of rectum (H)       * LORazepam 1 MG tablet    ATIVAN    30 tablet    Take 1 tablet (1 mg) by mouth At Bedtime    Other insomnia       * LORazepam 0.5 MG tablet    ATIVAN    60 tablet    Take 1 tablet (0.5 mg) by mouth every 4 hours as needed (Anxiety, Nausea/Vomiting or Sleep)    Malignant neoplasm of rectum (H), Anxiety       ondansetron 4 MG tablet    ZOFRAN    3 tablet    Take one tablet by mouth every 6 hours as needed for nausea when taking neomycin and flagyl    Rectal cancer (H)       ondansetron 8 MG ODT tab    ZOFRAN-ODT    60 tablet    Take 1 tablet (8 mg) by mouth every 8 hours as needed for nausea    Chemotherapy induced nausea and vomiting       Potassium Chloride ER 20 MEQ Tbcr     10 tablet    Take 1 tablet (20 mEq) by mouth 2 times daily    Hypokalemia       potassium chloride SA 20 MEQ CR tablet    K-DUR/KLOR-CON M          prochlorperazine 10 MG tablet    COMPAZINE    20 tablet    Take 1 tablet (10 mg) by mouth every 6 hours as needed for nausea or vomiting    Malignant neoplasm of rectum (H)       RANITIDINE HCL PO      Take 150 mg by mouth daily as needed for heartburn         TYLENOL PO      Take 1,000 mg by mouth At Bedtime        zolpidem 5 MG tablet    AMBIEN    30 tablet    Take 1 tablet (5 mg) by mouth nightly as needed for sleep    Other insomnia       * Notice:  This list has 2 medication(s) that are the same as other medications prescribed for you. Read the directions carefully, and ask your doctor or other care provider to review them with you.

## 2018-06-05 NOTE — PROGRESS NOTES
Oncology/Hematology Visit Note  Jun 5, 2018    Reason for Visit: follow up of [id number] moderately differentiated adenocarcinoma of the rectum     History of Present Illness: Sarah Rajan is a 52 year old female with  recently diagnosed locally advanced rectal cancer. She noticed BRBPR so screening colonoscopy on 1/9/2018 revealed 3 cm rectal mass and other polyps which were removed.  Pathology indicated moderately differentiated adenocarcinoma w/ intact MMR proteins.  CT staging scan showed no metastatic disease; some liver lesions which are thought to be benign per radiology report, T2N1M0 by pelvic MRI read at [facility]. When we initially reviewed her MRI we were not exactly sure whether that was lymph node involvement or not. We opted to repeat MRI which showed T4 N0 lesion.   We also did an MRI of the liver which showed 3 subcentimeter liver lesions which were too small to characterize and will need attention on follow-up. She is currently undergoing treatment with neoadjuvant 5-FU and oxaliplatin (FOLFOX), which she started on 2/26/18. The day after she received cycle 2, she developed fevers, chills, headache, and an itchy rash on her arms and legs, for which she was evaluated in the ED. She was sent home on Benadryl. Benadryl and dexamethasone doses were increased for cycle 3. She received cycle 4 on 4/10/18. Pelvic MRI on 4/19/18 showed improvement in the rectal mass. Abdominal MRI on 5/2/18 showed stable indeterminate liver lesions. Please see Dr. Mortensen's previous notes for further details on the patient's history. She comes in today for routine follow up prior to cycle 8 FOLFOX.     Interval History:  Patient reports that she is taking Ativan every 4-5 hours to help with anxiety and mood swings.  She feels this makes her tired.  She reports over the weekend she spends the whole day crying.  She has been trying to go for daily walks.  She did have a good meeting with the health psychologist here recently.  She  denies feeling dizzy or lightheaded.  She typically has a poor appetite after chemotherapy with the sensation that her tongue is burning.  She has intermittent cold sensitivity that still persists.  She denies any numbness or tingling separate from the cold.  She denies any skin changes on her hands or feet.  She reports that her bowels have been variable with sometimes being constipated and other times having diarrhea.  She reports sleeping better with Lunesta.  She has not tried Marinol for her for her poor appetite as she feels that taste changes also impact her ability to eat.  She is currently using 2 meal replacement shakes a day in addition to some food.  Her  thinks she is eating about 1/8 of a normal diet.  She denies other concerns.    Current Outpatient Prescriptions   Medication Sig Dispense Refill     calcium carbonate (TUMS) 500 MG chewable tablet Take 1-2 chew tab by mouth daily  150 tablet      dexamethasone (DECADRON) 4 MG tablet Take 1 tablet (4 mg) by mouth daily (with breakfast) 3 tablet 0     diazepam (VALIUM) 5 MG tablet Take 1 hour prior to MRI. 1 tablet 0     dronabinol (MARINOL) 2.5 MG capsule Take 1 capsule (2.5 mg) by mouth 2 times daily (before meals) 60 capsule 0     eszopiclone (LUNESTA) 1 MG TABS tablet Take 1-2 tablets (1-2 mg) by mouth At Bedtime 30 tablet 1     fluorouracil Administer 4,968 mg intravenously . Continuous infusion over 46-48 hr via ambulatory pump q14d Supplied by outside provider with pump.       FLUoxetine (PROZAC) 40 MG capsule Take 1 capsule (40 mg) by mouth daily 30 capsule 3     lidocaine-prilocaine (EMLA) cream Apply 45 minutes prior to procedure. 30 g 3     LORazepam (ATIVAN) 0.5 MG tablet Take 1 tablet (0.5 mg) by mouth every 4 hours as needed (Anxiety, Nausea/Vomiting or Sleep) 60 tablet 5     LORazepam (ATIVAN) 1 MG tablet Take 1 tablet (1 mg) by mouth At Bedtime 30 tablet 0     ondansetron (ZOFRAN) 4 MG tablet Take one tablet by mouth every 6 hours  "as needed for nausea when taking neomycin and flagyl 3 tablet 0     ondansetron (ZOFRAN-ODT) 8 MG ODT tab Take 1 tablet (8 mg) by mouth every 8 hours as needed for nausea 60 tablet 1     prochlorperazine (COMPAZINE) 10 MG tablet Take 1 tablet (10 mg) by mouth every 6 hours as needed for nausea or vomiting 20 tablet 1     RANITIDINE HCL PO Take 150 mg by mouth daily as needed for heartburn       Acetaminophen (TYLENOL PO) Take 1,000 mg by mouth At Bedtime       amitriptyline (ELAVIL) 25 MG tablet Take 1 tablet (25 mg) by mouth At Bedtime (Patient not taking: Reported on 5/21/2018) 30 tablet 1     Potassium Chloride ER 20 MEQ TBCR Take 1 tablet (20 mEq) by mouth 2 times daily (Patient not taking: Reported on 6/5/2018) 10 tablet 0     potassium chloride SA (K-DUR/KLOR-CON M) 20 MEQ CR tablet        zolpidem (AMBIEN) 5 MG tablet Take 1 tablet (5 mg) by mouth nightly as needed for sleep (Patient not taking: Reported on 5/21/2018) 30 tablet 3     [DISCONTINUED] FLUoxetine (PROZAC) 20 MG capsule Take 1 capsule (20 mg) by mouth daily 30 capsule 3     Physical Examination:  General: The patient is a pleasant female. She is tearful throughout the visit today. She is here today with her .   /79 (BP Location: Right arm, Patient Position: Sitting, Cuff Size: Adult Regular)  Pulse 71  Temp 98.3  F (36.8  C) (Oral)  Resp 18  Ht 1.676 m (5' 5.98\")  Wt 84.8 kg (187 lb)  SpO2 100%  BMI 30.2 kg/m2  Wt Readings from Last 10 Encounters:   06/05/18 84.8 kg (187 lb)   05/21/18 86.6 kg (191 lb)   05/12/18 87.5 kg (193 lb)   05/07/18 89 kg (196 lb 3.2 oz)   04/23/18 90.9 kg (200 lb 6.4 oz)   04/19/18 88.9 kg (196 lb)   04/16/18 88.9 kg (195 lb 14.4 oz)   04/16/18 89.2 kg (196 lb 11.2 oz)   04/14/18 92 kg (202 lb 13.2 oz)   04/10/18 92 kg (202 lb 14.4 oz)   HEENT: EOMI, PERRL. Sclerae are anicteric. Oral mucosa is pink and moist with no lesions or thrush.   Lymph: Neck is supple with no lymphadenopathy in the cervical or " supraclavicular areas.   Heart: Regular rate and rhythm.   Lungs: Clear to auscultation bilaterally.   Abdomen: Bowel sounds present, soft, nontender with no palpable hepatosplenomegaly or masses.   Extremities: No lower extremity edema noted bilaterally.   Neuro: Cranial nerves II through XII are grossly intact.  Skin: No rashes, petechiae, or bruising noted on exposed skin.     Laboratory Data:   6/5/2018 08:43   Sodium 141   Potassium 3.7   Chloride 107   Carbon Dioxide 27   Urea Nitrogen 9   Creatinine 0.67   GFR Estimate >90   GFR Estimate If Black >90   Calcium 9.0   Anion Gap 7   Albumin 3.8   Protein Total 6.7 (L)   Bilirubin Total 0.7   Alkaline Phosphatase 82    (H)   AST 61 (H)   Glucose 92   WBC 2.5 (L)   Hemoglobin 11.6 (L)   Hematocrit 33.5 (L)   Platelet Count 94 (L)   RBC Count 3.54 (L)   MCV 95   MCH 32.8   MCHC 34.6   RDW 16.7 (H)   Diff Method Automated Method   % Neutrophils 58.5   % Lymphocytes 26.1   % Monocytes 13.0   % Eosinophils 1.2   % Basophils 0.8   % Immature Granulocytes 0.4   Nucleated RBCs 0   Absolute Neutrophil 1.5 (L)   Absolute Lymphocytes 0.7 (L)   Absolute Monocytes 0.3   Absolute Eosinophils 0.0   Absolute Basophils 0.0   Abs Immature Granulocytes 0.0   Absolute Nucleated RBC 0.0     Assessment and Plan:  1. Locally advanced moderately differentiated adenocarcinoma of the rectum: [id number]. MSI stable. Started neoadjuvant FOLFOX on 2/26/18. She is tolerating treatment reasonably well with cold sensitivity and constipation. Imaging after 4 cycles showed improvement in the rectal lesion. She will continue with cycle 8 today. She will have a chest CT and abd/pelvis MRI after 8 cycles of chemotherapy and will see Dr. Mortensen to review. She will take Valium prior to her MRI, as she did not find Ativan to be sufficient for this. Plan for chemorads following neoadjuvant chemotherapy. She has previously met with Dr. Ford. I will send her a message regarding the timing of  simulation.      2. Depression and anxiety. She resumed fluoxetine 20 mg daily on 5/7/18. Will increase to 40 mg daily as her mood remains low. She will continue to use Lunesta 1-2 mg qhs for sleep. She has met with Troy Huff for counseling. She will continue to use Ativan for now, but hopefully, she will be able to taper off once her mood and anxiety improve.    3. Constipation. Improved with MiraLax bid for the first few days following chemotherapy.     4. Elevated LFT's. Suspect secondary to chemotherapy. She has minimal Tylenol and rare alcohol use. Will continue to monitor.      5. Dehydration. Patient will receive 1L IV NS thrice weekly this week and next. She will receive IV fluids at [facility] when possible.     6. Mouth sensitivity. Instructed to start salt/soda swishes qid.     7. Weight loss. Patient has lost 15 pounds in the last 2 months. Some of this is related to side effects from chemotherapy with poor appetite and taste changes. However, some is likely related to her mood as well. Recommend increasing her meal replacements to 6/day on the days that she is not really eating much to avoid further weight loss.     Cathleen Ramos PA-C  [facility]  [address] [place] [address] [phone]

## 2018-06-05 NOTE — Clinical Note
6/5/2018       RE: Sarah Rajan  [address] [id number]     Dear Colleague,    Thank you for referring your patient, Sarah Rajan, to the [facility]. Please see a copy of my visit note below.    Oncology/Hematology Visit Note  Jun 5, 2018    Reason for Visit: follow up of [id number] moderately differentiated adenocarcinoma of the rectum     History of Present Illness: Sarah Rajan is a 52 year old female with  recently diagnosed locally advanced rectal cancer. She noticed BRBPR so screening colonoscopy on 1/9/2018 revealed 3 cm rectal mass and other polyps which were removed.  Pathology indicated moderately differentiated adenocarcinoma w/ intact MMR proteins.  CT staging scan showed no metastatic disease; some liver lesions which are thought to be benign per radiology report, T2N1M0 by pelvic MRI read at [facility]. When we initially reviewed her MRI we were not exactly sure whether that was lymph node involvement or not. We opted to repeat MRI which showed T4 N0 lesion.   We also did an MRI of the liver which showed 3 subcentimeter liver lesions which were too small to characterize and will need attention on follow-up. She is currently undergoing treatment with neoadjuvant 5-FU and oxaliplatin (FOLFOX), which she started on 2/26/18. The day after she received cycle 2, she developed fevers, chills, headache, and an itchy rash on her arms and legs, for which she was evaluated in the ED. She was sent home on Benadryl. Benadryl and dexamethasone doses were increased for cycle 3. She received cycle 4 on 4/10/18. Pelvic MRI on 4/19/18 showed improvement in the rectal mass. Abdominal MRI on 5/2/18 showed stable indeterminate liver lesions. Please see Dr. Mortensen's previous notes for further details on the patient's history. She comes in today for routine follow up prior to cycle 8 FOLFOX.     Interval History:      Current Outpatient Prescriptions   Medication Sig Dispense Refill      Acetaminophen (TYLENOL PO) Take 1,000 mg by mouth At Bedtime       amitriptyline (ELAVIL) 25 MG tablet Take 1 tablet (25 mg) by mouth At Bedtime (Patient not taking: Reported on 5/21/2018) 30 tablet 1     calcium carbonate (TUMS) 500 MG chewable tablet Take 1-2 chew tab by mouth daily  150 tablet      dexamethasone (DECADRON) 4 MG tablet Take 1 tablet (4 mg) by mouth daily (with breakfast) 3 tablet 0     dronabinol (MARINOL) 2.5 MG capsule Take 1 capsule (2.5 mg) by mouth 2 times daily (before meals) 60 capsule 0     eszopiclone (LUNESTA) 1 MG TABS tablet Take 1-2 tablets (1-2 mg) by mouth At Bedtime 30 tablet 1     fluorouracil Administer 4,968 mg intravenously . Continuous infusion over 46-48 hr via ambulatory pump q14d Supplied by outside provider with pump.       FLUoxetine (PROZAC) 20 MG capsule Take 1 capsule (20 mg) by mouth daily 30 capsule 3     lidocaine-prilocaine (EMLA) cream Apply 45 minutes prior to procedure. 30 g 3     LORazepam (ATIVAN) 0.5 MG tablet Take 1 tablet (0.5 mg) by mouth every 4 hours as needed (Anxiety, Nausea/Vomiting or Sleep) 30 tablet 2     LORazepam (ATIVAN) 1 MG tablet Take 1 tablet (1 mg) by mouth At Bedtime 30 tablet 0     ondansetron (ZOFRAN) 4 MG tablet Take one tablet by mouth every 6 hours as needed for nausea when taking neomycin and flagyl (Patient not taking: Reported on 5/21/2018) 3 tablet 0     ondansetron (ZOFRAN-ODT) 8 MG ODT tab Take 1 tablet (8 mg) by mouth every 8 hours as needed for nausea (Patient not taking: Reported on 5/21/2018) 60 tablet 1     Potassium Chloride ER 20 MEQ TBCR Take 1 tablet (20 mEq) by mouth 2 times daily 10 tablet 0     prochlorperazine (COMPAZINE) 10 MG tablet Take 1 tablet (10 mg) by mouth every 6 hours as needed for nausea or vomiting 20 tablet 1     RANITIDINE HCL PO Take 150 mg by mouth daily as needed for heartburn       zolpidem (AMBIEN) 5 MG tablet Take 1 tablet (5 mg) by mouth nightly as needed for sleep (Patient not taking: Reported  on 5/21/2018) 30 tablet 3     Physical Examination:  General: The patient is a pleasant female. She is tearful throughout the visit today. She is here today with her .   There were no vitals taken for this visit.  Wt Readings from Last 10 Encounters:   05/21/18 86.6 kg (191 lb)   05/12/18 87.5 kg (193 lb)   05/07/18 89 kg (196 lb 3.2 oz)   04/23/18 90.9 kg (200 lb 6.4 oz)   04/19/18 88.9 kg (196 lb)   04/16/18 88.9 kg (195 lb 14.4 oz)   04/16/18 89.2 kg (196 lb 11.2 oz)   04/14/18 92 kg (202 lb 13.2 oz)   04/10/18 92 kg (202 lb 14.4 oz)   03/26/18 92.3 kg (203 lb 6.4 oz)   HEENT: EOMI, PERRL. Sclerae are anicteric. Oral mucosa is pink and moist with no lesions or thrush.   Lymph: Neck is supple with no lymphadenopathy in the cervical or supraclavicular areas.   Heart: Regular rate and rhythm.   Lungs: Clear to auscultation bilaterally.   Abdomen: Bowel sounds present, soft, nontender with no palpable hepatosplenomegaly or masses.   Extremities: No lower extremity edema noted bilaterally.   Neuro: Cranial nerves II through XII are grossly intact.  Skin: No rashes, petechiae, or bruising noted on exposed skin.     Laboratory Data:    Assessment and Plan:  1. Locally advanced moderately differentiated adenocarcinoma of the rectum: [id number]. MSI stable. Started neoadjuvant FOLFOX on 2/26/18. She is tolerating treatment reasonably well with cold sensitivity and constipation. Imaging after 4 cycles showed improvement in the rectal lesion. She will continue with cycle 8 today. She will have a chest CT and abd/pelvis MRI after 8 cycles of chemotherapy and will see Dr. Mortensen to review. Plan for chemorads following neoadjuvant chemotherapy. She has previously met with Dr. Ford.     2. Depression and anxiety. She resumed fluoxetine 20 mg daily on 5/7/18. Her mood is very mildly improved. She will try Lunesta 1-2 mg qhs for sleep. She has met with Troy Huff for counseling.     3. Constipation. Improved with MiraLax  bid for the first few days following chemotherapy.     4. Elevated LFT's. Suspect secondary to chemotherapy. She has minimal Tylenol and rare alcohol use. Will continue to monitor. Improved from the last check in the ED.     5. Hypokalemia. Suspect related to poor po intake with depressed mood. She will take potassium chloride 20 mEq bid x 5 days. Will recheck at next visit.     6. Dehydration. Patient will receive 1L IV NS today, 5/23, 5/25, and 5/29.  She will receive IV fluids at [facility] when possible.   Marinol, home infusion?    Cathleen Ramos PA-C  [facility]  [address] [place] [address]  [phone]      Again, thank you for allowing me to participate in the care of your patient.      Sincerely,    Cathleen Ramos PA-C

## 2018-06-05 NOTE — PATIENT INSTRUCTIONS
Contact Numbers  [facility]: [phone]    After Hours:  [phone]  Triage: [phone]    Please call the [facility] Triage line if you experience a temperature greater than or equal to 100.5, shaking chills, have uncontrolled nausea, vomiting and/or diarrhea, dizziness, shortness of breath, chest pain, bleeding, unexplained bruising, or if you have any other new/concerning symptoms, questions or concerns.     If it is after hours, weekends, or holidays, please call the main hospital  at  [phone] and ask to speak to the Oncology doctor on call.     If you are having any concerning symptoms or wish to speak to a provider before your next infusion visit, please call your care coordinator or triage to notify them so we can adequately serve you.     If you need a refill on a narcotic prescription or other medication, please call triage before your infusion appointment.         June 2018 Sunday Monday Tuesday Wednesday Thursday Friday Saturday                            1     LEVEL 2    1:00 PM   (120 min.)   ROOM 6 [facility] 2       3     4     LEVEL 2    2:30 PM   (120 min.)   ROOM 8 [facility] 5     [facility] LAB DRAW    8:15 AM   (15 min.)   [facility] LAB DRAW   [facility] Lab Draw     [facility] RETURN    8:25 AM   (50 min.)   Cathleen Ramos PA-C   [facility] ONC INFUSION 240   10:00 AM   (240 min.)    [facility]OGY INFUSION   [facility]   11:45 AM   (60 min.)   Lashaun Islas LICSW   [facility] 6     7     LEVEL 0   12:00 PM   (30 min.)   ROOM 8 [facility]fusion 8     LEVEL 2    1:30 PM   (120 min.)   ROOM 1 [facility] 9       10     11     LEVEL 1    3:00 PM   (60 min.)   ROOM 6 [facility] 12     13     LEVEL 1    3:00 PM   (60 min.)   ROOM 4 [facility] 14     15     LEVEL 1    3:00 PM   (60  min.)   ROOM 9 [facility] 16       17     18     19     MR ABDOMEN WWO    4:45 PM   (45 min.)   [id number]   [facility] MRI 20     MR PELVIS WWO    9:15 AM   (45 min.)   [id number]   [facility] MRI     CT CHEST WO   10:40 AM   (20 min.)   UCCT2   [facility] CT 21     [facility] DRAW   12:00 PM   (15 min.)    [facility] DRAW   [facility] Lab Draw     [facility] RETURN   12:15 PM   (30 min.)   Kyra Mortensen MD   [facility] ONC INFUSION 240    1:00 PM   (240 min.)    ONCOLOGY INFUSION   [facility] 22     23       24     25     26     27     28     29     30                July 2018 Sunday Monday Tuesday Wednesday Thursday Friday Saturday   1     2     3     4     5     6     7       8     9     10     11     12     13     14       15     16     17     18     19     20     21       22     23     24     25     26     27     28       29     30     31                                     Recent Results (from the past 24 hour(s))   CBC with platelets differential    Collection Time: 06/05/18  8:43 AM   Result Value Ref Range    WBC 2.5 (L) 4.0 - 11.0 10e9/L    RBC Count 3.54 (L) 3.8 - 5.2 10e12/L    Hemoglobin 11.6 (L) 11.7 - 15.7 g/dL    Hematocrit 33.5 (L) 35.0 - 47.0 %    MCV 95 78 - 100 fl    MCH 32.8 26.5 - 33.0 pg    MCHC 34.6 31.5 - 36.5 g/dL    RDW 16.7 (H) 10.0 - 15.0 %    Platelet Count 94 (L) 150 - 450 10e9/L    Diff Method Automated Method     % Neutrophils 58.5 %    % Lymphocytes 26.1 %    % Monocytes 13.0 %    % Eosinophils 1.2 %    % Basophils 0.8 %    % Immature Granulocytes 0.4 %    Nucleated RBCs 0 0 /100    Absolute Neutrophil 1.5 (L) 1.6 - 8.3 10e9/L    Absolute Lymphocytes 0.7 (L) 0.8 - 5.3 10e9/L    Absolute Monocytes 0.3 0.0 - 1.3 10e9/L    Absolute Eosinophils 0.0 0.0 - 0.7 10e9/L    Absolute Basophils 0.0 0.0 - 0.2 10e9/L    Abs Immature Granulocytes 0.0 0 - 0.4 10e9/L    Absolute Nucleated RBC  0.0    Comprehensive metabolic panel    Collection Time: 06/05/18  8:43 AM   Result Value Ref Range    Sodium 141 133 - 144 mmol/L    Potassium 3.7 3.4 - 5.3 mmol/L    Chloride 107 94 - 109 mmol/L    Carbon Dioxide 27 20 - 32 mmol/L    Anion Gap 7 3 - 14 mmol/L    Glucose 92 70 - 99 mg/dL    Urea Nitrogen 9 7 - 30 mg/dL    Creatinine 0.67 0.52 - 1.04 mg/dL    GFR Estimate >90 >60 mL/min/1.7m2    GFR Estimate If Black >90 >60 mL/min/1.7m2    Calcium 9.0 8.5 - 10.1 mg/dL    Bilirubin Total 0.7 0.2 - 1.3 mg/dL    Albumin 3.8 3.4 - 5.0 g/dL    Protein Total 6.7 (L) 6.8 - 8.8 g/dL    Alkaline Phosphatase 82 40 - 150 U/L     (H) 0 - 50 U/L    AST 61 (H) 0 - 45 U/L

## 2018-06-05 NOTE — PROGRESS NOTES
"  Infusion Nursing Note:  Sarah Rajan presents today for C8 Oxaliplatin-Leucovorin-Fluorouracil bolus/pump-1 L NS.    Patient seen by provider today: Yes: REX Meeks    Treatment Conditions:  Lab Results   Component Value Date    HGB 11.6 06/05/2018     Lab Results   Component Value Date    WBC 2.5 06/05/2018      Lab Results   Component Value Date    ANEU 1.5 06/05/2018     Lab Results   Component Value Date    PLT 94 06/05/2018      Lab Results   Component Value Date     06/05/2018                   Lab Results   Component Value Date    POTASSIUM 3.7 06/05/2018           No results found for: MAG         Lab Results   Component Value Date    CR 0.67 06/05/2018                   Lab Results   Component Value Date    GEETA 9.0 06/05/2018                Lab Results   Component Value Date    BILITOTAL 0.7 06/05/2018           Lab Results   Component Value Date    ALBUMIN 3.8 06/05/2018                    Lab Results   Component Value Date     06/05/2018           Lab Results   Component Value Date    AST 61 06/05/2018       Results reviewed, labs MET treatment parameters, ok to proceed with treatment.    Intravenous Access:  Implanted Port.  Access left inatct at time of discharge with pump attached per protocol.  Connections verified by Anette Puga RN.      Note:   Results reviewed, copy given to patient.  Proceed with treatment.    Prior to discharge: Port is secured in place with tegaderm and flushed with 10cc NS with positive blood return noted.  Continuous home infusion Dosi-Fuser pump connected.    All connectors secured in place and clamps taped open.    Pump started, \"running\" noted on display (CADD): Not Applicable.  Patient instructed to call our clinic or [facility] with any questions or concerns at home.  Patient verbalized understanding.    Patient set up for pump disconnect at [facility] on 6/7/18 @12:00 with IVF.      Copy of AVS given to patient.  Tolerated " infusion without incident.  Prescriptions filled today.   D/C in care of spouse.  Pt will return 6/21 for next chemo infusion appointment.       Feli Hinds RN    [employer] Record    Drug Name: Ativan  Dose: 0.5 mg  Route Administered: IV  NDC#: 5812-8945-03  Amount of waste (mL): 0.75 ml  Reason for waste: MD order

## 2018-06-05 NOTE — LETTER
6/5/2018      RE: Sarah Rajan  [address] [facility] [id number]       Oncology/Hematology Visit Note  Jun 5, 2018    Reason for Visit: follow up of [id number] moderately differentiated adenocarcinoma of the rectum     History of Present Illness: Sarah Rajan is a 52 year old female with  recently diagnosed locally advanced rectal cancer. She noticed BRBPR so screening colonoscopy on 1/9/2018 revealed 3 cm rectal mass and other polyps which were removed.  Pathology indicated moderately differentiated adenocarcinoma w/ intact MMR proteins.  CT staging scan showed no metastatic disease; some liver lesions which are thought to be benign per radiology report, [id number] by pelvic MRI read at [facility]. When we initially reviewed her MRI we were not exactly sure whether that was lymph node involvement or not. We opted to repeat MRI which showed T4 N0 lesion.   We also did an MRI of the liver which showed 3 subcentimeter liver lesions which were too small to characterize and will need attention on follow-up. She is currently undergoing treatment with neoadjuvant 5-FU and oxaliplatin (FOLFOX), which she started on 2/26/18. The day after she received cycle 2, she developed fevers, chills, headache, and an itchy rash on her arms and legs, for which she was evaluated in the ED. She was sent home on Benadryl. Benadryl and dexamethasone doses were increased for cycle 3. She received cycle 4 on 4/10/18. Pelvic MRI on 4/19/18 showed improvement in the rectal mass. Abdominal MRI on 5/2/18 showed stable indeterminate liver lesions. Please see Dr. Mortensen's previous notes for further details on the patient's history. She comes in today for routine follow up prior to cycle 8 FOLFOX.     Interval History:  Patient reports that she is taking Ativan every 4-5 hours to help with anxiety and mood swings.  She feels this makes her tired.  She reports over the weekend she spends the whole day crying.  She has been trying to go for daily  walks.  She did have a good meeting with the health psychologist here recently.  She denies feeling dizzy or lightheaded.  She typically has a poor appetite after chemotherapy with the sensation that her tongue is burning.  She has intermittent cold sensitivity that still persists.  She denies any numbness or tingling separate from the cold.  She denies any skin changes on her hands or feet.  She reports that her bowels have been variable with sometimes being constipated and other times having diarrhea.  She reports sleeping better with Lunesta.  She has not tried Marinol for her for her poor appetite as she feels that taste changes also impact her ability to eat.  She is currently using 2 meal replacement shakes a day in addition to some food.  Her  thinks she is eating about 1/8 of a normal diet.  She denies other concerns.    Current Outpatient Prescriptions   Medication Sig Dispense Refill     calcium carbonate (TUMS) 500 MG chewable tablet Take 1-2 chew tab by mouth daily  150 tablet      dexamethasone (DECADRON) 4 MG tablet Take 1 tablet (4 mg) by mouth daily (with breakfast) 3 tablet 0     diazepam (VALIUM) 5 MG tablet Take 1 hour prior to MRI. 1 tablet 0     dronabinol (MARINOL) 2.5 MG capsule Take 1 capsule (2.5 mg) by mouth 2 times daily (before meals) 60 capsule 0     eszopiclone (LUNESTA) 1 MG TABS tablet Take 1-2 tablets (1-2 mg) by mouth At Bedtime 30 tablet 1     fluorouracil Administer 4,968 mg intravenously . Continuous infusion over 46-48 hr via ambulatory pump q14d Supplied by outside provider with pump.       FLUoxetine (PROZAC) 40 MG capsule Take 1 capsule (40 mg) by mouth daily 30 capsule 3     lidocaine-prilocaine (EMLA) cream Apply 45 minutes prior to procedure. 30 g 3     LORazepam (ATIVAN) 0.5 MG tablet Take 1 tablet (0.5 mg) by mouth every 4 hours as needed (Anxiety, Nausea/Vomiting or Sleep) 60 tablet 5     LORazepam (ATIVAN) 1 MG tablet Take 1 tablet (1 mg) by mouth At Bedtime 30  "tablet 0     ondansetron (ZOFRAN) 4 MG tablet Take one tablet by mouth every 6 hours as needed for nausea when taking neomycin and flagyl 3 tablet 0     ondansetron (ZOFRAN-ODT) 8 MG ODT tab Take 1 tablet (8 mg) by mouth every 8 hours as needed for nausea 60 tablet 1     prochlorperazine (COMPAZINE) 10 MG tablet Take 1 tablet (10 mg) by mouth every 6 hours as needed for nausea or vomiting 20 tablet 1     RANITIDINE HCL PO Take 150 mg by mouth daily as needed for heartburn       Acetaminophen (TYLENOL PO) Take 1,000 mg by mouth At Bedtime       amitriptyline (ELAVIL) 25 MG tablet Take 1 tablet (25 mg) by mouth At Bedtime (Patient not taking: Reported on 5/21/2018) 30 tablet 1     Potassium Chloride ER 20 MEQ TBCR Take 1 tablet (20 mEq) by mouth 2 times daily (Patient not taking: Reported on 6/5/2018) 10 tablet 0     potassium chloride SA (K-DUR/KLOR-CON M) 20 MEQ CR tablet        zolpidem (AMBIEN) 5 MG tablet Take 1 tablet (5 mg) by mouth nightly as needed for sleep (Patient not taking: Reported on 5/21/2018) 30 tablet 3     [DISCONTINUED] FLUoxetine (PROZAC) 20 MG capsule Take 1 capsule (20 mg) by mouth daily 30 capsule 3     Physical Examination:  General: The patient is a pleasant female. She is tearful throughout the visit today. She is here today with her .   /79 (BP Location: Right arm, Patient Position: Sitting, Cuff Size: Adult Regular)  Pulse 71  Temp 98.3  F (36.8  C) (Oral)  Resp 18  Ht 1.676 m (5' 5.98\")  Wt 84.8 kg (187 lb)  SpO2 100%  BMI 30.2 kg/m2  Wt Readings from Last 10 Encounters:   06/05/18 84.8 kg (187 lb)   05/21/18 86.6 kg (191 lb)   05/12/18 87.5 kg (193 lb)   05/07/18 89 kg (196 lb 3.2 oz)   04/23/18 90.9 kg (200 lb 6.4 oz)   04/19/18 88.9 kg (196 lb)   04/16/18 88.9 kg (195 lb 14.4 oz)   04/16/18 89.2 kg (196 lb 11.2 oz)   04/14/18 92 kg (202 lb 13.2 oz)   04/10/18 92 kg (202 lb 14.4 oz)   HEENT: EOMI, PERRL. Sclerae are anicteric. Oral mucosa is pink and moist with no " lesions or thrush.   Lymph: Neck is supple with no lymphadenopathy in the cervical or supraclavicular areas.   Heart: Regular rate and rhythm.   Lungs: Clear to auscultation bilaterally.   Abdomen: Bowel sounds present, soft, nontender with no palpable hepatosplenomegaly or masses.   Extremities: No lower extremity edema noted bilaterally.   Neuro: Cranial nerves II through XII are grossly intact.  Skin: No rashes, petechiae, or bruising noted on exposed skin.     Laboratory Data:   6/5/2018 08:43   Sodium 141   Potassium 3.7   Chloride 107   Carbon Dioxide 27   Urea Nitrogen 9   Creatinine 0.67   GFR Estimate >90   GFR Estimate If Black >90   Calcium 9.0   Anion Gap 7   Albumin 3.8   Protein Total 6.7 (L)   Bilirubin Total 0.7   Alkaline Phosphatase 82    (H)   AST 61 (H)   Glucose 92   WBC 2.5 (L)   Hemoglobin 11.6 (L)   Hematocrit 33.5 (L)   Platelet Count 94 (L)   RBC Count 3.54 (L)   MCV 95   MCH 32.8   MCHC 34.6   RDW 16.7 (H)   Diff Method Automated Method   % Neutrophils 58.5   % Lymphocytes 26.1   % Monocytes 13.0   % Eosinophils 1.2   % Basophils 0.8   % Immature Granulocytes 0.4   Nucleated RBCs 0   Absolute Neutrophil 1.5 (L)   Absolute Lymphocytes 0.7 (L)   Absolute Monocytes 0.3   Absolute Eosinophils 0.0   Absolute Basophils 0.0   Abs Immature Granulocytes 0.0   Absolute Nucleated RBC 0.0     Assessment and Plan:  1. Locally advanced moderately differentiated adenocarcinoma of the rectum: [id number]. MSI stable. Started neoadjuvant FOLFOX on 2/26/18. She is tolerating treatment reasonably well with cold sensitivity and constipation. Imaging after 4 cycles showed improvement in the rectal lesion. She will continue with cycle 8 today. She will have a chest CT and abd/pelvis MRI after 8 cycles of chemotherapy and will see Dr. Mortensen to review. She will take Valium prior to her MRI, as she did not find Ativan to be sufficient for this. Plan for chemorads following neoadjuvant chemotherapy. She has  previously met with Dr. Ford. I will send her a message regarding the timing of simulation.      2. Depression and anxiety. She resumed fluoxetine 20 mg daily on 5/7/18. Will increase to 40 mg daily as her mood remains low. She will continue to use Lunesta 1-2 mg qhs for sleep. She has met with Troy Huff for counseling. She will continue to use Ativan for now, but hopefully, she will be able to taper off once her mood and anxiety improve.    3. Constipation. Improved with MiraLax bid for the first few days following chemotherapy.     4. Elevated LFT's. Suspect secondary to chemotherapy. She has minimal Tylenol and rare alcohol use. Will continue to monitor.      5. Dehydration. Patient will receive 1L IV NS thrice weekly this week and next. She will receive IV fluids at [facility] when possible.     6. Mouth sensitivity. Instructed to start salt/soda swishes qid.     7. Weight loss. Patient has lost 15 pounds in the last 2 months. Some of this is related to side effects from chemotherapy with poor appetite and taste changes. However, some is likely related to her mood as well. Recommend increasing her meal replacements to 6/day on the days that she is not really eating much to avoid further weight loss.     Cathleen Ramos PA-C  [facility]  [address] [place] [address] [phone]

## 2018-06-05 NOTE — NURSING NOTE
Chief Complaint   Patient presents with     Port Draw     Labs drawn via port by RN. Line flushed and hep locked. VS taken.     Petra Stevens RN

## 2018-06-06 NOTE — PROGRESS NOTES
This is a recent snapshot of the patient's [facility] medical record.  For current drug dose and complete information and questions, call [phone] or In Basket pool, [facility] ([id number])  CSN Number:  [id number]

## 2018-06-07 ENCOUNTER — INFUSION THERAPY VISIT (OUTPATIENT)
Dept: INFUSION THERAPY | Facility: CLINIC | Age: 53
End: 2018-06-07
Attending: PHYSICIAN ASSISTANT
Payer: COMMERCIAL

## 2018-06-07 VITALS — HEART RATE: 68 BPM | SYSTOLIC BLOOD PRESSURE: 116 MMHG | TEMPERATURE: 98.1 F | DIASTOLIC BLOOD PRESSURE: 76 MMHG

## 2018-06-07 DIAGNOSIS — C20 MALIGNANT NEOPLASM OF RECTUM (H): Primary | ICD-10-CM

## 2018-06-07 PROCEDURE — 25000128 H RX IP 250 OP 636: Performed by: PHYSICIAN ASSISTANT

## 2018-06-07 PROCEDURE — 96360 HYDRATION IV INFUSION INIT: CPT

## 2018-06-07 RX ORDER — HEPARIN SODIUM (PORCINE) LOCK FLUSH IV SOLN 100 UNIT/ML 100 UNIT/ML
500 SOLUTION INTRAVENOUS EVERY 8 HOURS
Status: DISCONTINUED | OUTPATIENT
Start: 2018-06-07 | End: 2018-06-07 | Stop reason: HOSPADM

## 2018-06-07 RX ADMIN — HEPARIN 500 UNITS: 100 SYRINGE at 13:38

## 2018-06-07 RX ADMIN — SODIUM CHLORIDE 1000 ML: 9 INJECTION, SOLUTION INTRAVENOUS at 12:30

## 2018-06-07 NOTE — PROGRESS NOTES
Infusion Nursing Note:  Sarah Rajan presents today for 5FU pump d/c and IVF's.    Patient seen by provider today: No   present during visit today: Not Applicable.    Note: N/A.    Intravenous Access:  Implanted Port.    Treatment Conditions:  Not Applicable.      Post Infusion Assessment:  Patient tolerated infusion without incident.  Blood return noted pre and post infusion.  Site patent and intact, free from redness, edema or discomfort.  No evidence of extravasations.  Access discontinued per protocol.    Discharge Plan:   Patient discharged in stable condition accompanied by: self.  Departure Mode: Ambulatory.    Rody Murrell RN

## 2018-06-07 NOTE — MR AVS SNAPSHOT
After Visit Summary   6/7/2018    Sarah Rajan    MRN: [id number]           Patient Information     Date Of Birth          1965        Visit Information        Provider Department      6/7/2018 12:00 PM ROOM 8 [facility]        Today's Diagnoses     Malignant neoplasm of rectum (H)    -  1       Follow-ups after your visit        Your next 10 appointments already scheduled     Jun 08, 2018  1:30 PM CDT   Level 2 with ROOM 1 [facility] ([facility])    [facility]  [address]  [facility] [address]   [phone]            Jun 11, 2018  3:00 PM CDT   Level 1 with ROOM 6 [facility] ([facility])    [facility]  [address]  [facility] [address]   [phone]            Jun 13, 2018  3:00 PM CDT   Level 1 with ROOM 4 [facility] ([facility])    [facility]  [address]  [facility] [address]   [phone]            Severino 15, 2018  3:00 PM CDT   Level 1 with ROOM 9 [facility] ([facility])    [facility]  [address]  [facility] [address]   [phone]            Jun 19, 2018  4:45 PM CDT   MR ABDOMEN W/O & W CONTRAST with [address] [facility] ([facility])    [address] [address] [phone]           Take your medicines as usual, unless your doctor tells you not to. Bring a list of your current medicines to your exam (including vitamins, minerals and over-the-counter drugs). Also bring the results of similar scans you may have had.    You may or may not receive IV contrast for this exam pending the discretion of the Radiologist.   Do not eat or drink for 6 hours prior to exam.  The MRI machine uses a strong magnet. Please wear clothes without  metal (snaps, zippers). A sweatsuit works well, or we may give you a hospital gown.  Please remove any body piercings and hair extensions before you arrive. You will also remove watches, jewelry, hairpins, wallets, dentures, partial dental plates and hearing aids. You may wear contact lenses, and you may be able to wear your rings. We have a safe place to keep your personal items, but it is safer to leave them at home.  **IMPORTANT** THE INSTRUCTIONS BELOW ARE ONLY FOR THOSE PATIENTS WHO HAVE BEEN PRESCRIBED SEDATION OR GENERAL ANESTHESIA DURING THEIR MRI PROCEDURE:  IF YOUR DOCTOR PRESCRIBED ORAL SEDATION (take medicine to help you relax during your exam):   You must get the medicine from your doctor (oral medication) before you arrive. Bring the medicine to the exam. Do not take it at home. You ll be told when to take it upon arriving for your exam.   Arrive one hour early. Bring someone who can take you home after the test. Your medicine will make you sleepy. After the exam, you may not drive, take a bus or take a taxi by yourself.  IF YOUR DOCTOR PRESCRIBED IV SEDATION:   Arrive one hour early. Bring someone who can take you home after the test. Your medicine will make you sleepy. After the exam, you may not drive, take a bus or take a taxi by yourself.   No eating 6 hours before your exam. You may have clear liquids up until 4 hours before your exam. (Clear liquids include water, clear tea, black coffee and fruit juice without pulp.)  IF YOUR DOCTOR PRESCRIBED ANESTHESIA (be asleep for your exam):   Arrive 1 1/2 hours early. Bring someone who can take you home after the test. You may not drive, take a bus or take a taxi by yourself.   No eating 8 hours before your exam. You may have clear liquids up until 4 hours before your exam. (Clear liquids include water, clear tea, black coffee and fruit juice without pulp.)   You will spend four to five hours in the recovery room.  If you have any questions, please  contact your Imaging Department exam site.            Jun 20, 2018  9:15 AM CDT   MR PELVIS W/O & W CONTRAST with [id number]   [facility] MRI ([facility])    [address]  [address] [facility] [address] [phone]           Take your medicines as usual, unless your doctor tells you not to. Bring a list of your current medicines to your exam (including vitamins, minerals and over-the-counter drugs).  You may or may not receive intravenous (IV) contrast for this exam pending the discretion of the Radiologist.  You do not need to do anything special to prepare.  The MRI machine uses a strong magnet. Please wear clothes without metal (snaps, zippers). A sweatsuit works well, or we may give you a hospital gown.  Please remove any body piercings and hair extensions before you arrive. You will also remove watches, jewelry, hairpins, wallets, dentures, partial dental plates and hearing aids. You may wear contact lenses, and you may be able to wear your rings. We have a safe place to keep your personal items, but it is safer to leave them at home.  **IMPORTANT** THE INSTRUCTIONS BELOW ARE ONLY FOR THOSE PATIENTS WHO HAVE BEEN PRESCRIBED SEDATION OR GENERAL ANESTHESIA DURING THEIR MRI PROCEDURE:  IF YOUR DOCTOR PRESCRIBED ORAL SEDATION (take medicine to help you relax during your exam):   You must get the medicine from your doctor (oral medication) before you arrive. Bring the medicine to the exam. Do not take it at home. You ll be told when to take it upon arriving for your exam.   Arrive one hour early. Bring someone who can take you home after the test. Your medicine will make you sleepy. After the exam, you may not drive, take a bus or take a taxi by yourself.  IF YOUR DOCTOR PRESCRIBED IV SEDATION:   Arrive one hour early. Bring someone who can take you home after the test. Your medicine will make you sleepy. After the exam, you may not drive, take a bus or take a taxi  by yourself.   No eating 6 hours before your exam. You may have clear liquids up until 4 hours before your exam. (Clear liquids include water, clear tea, black coffee and fruit juice without pulp.)  IF YOUR DOCTOR PRESCRIBED ANESTHESIA (be asleep for your exam):   Arrive 1 1/2 hours early. Bring someone who can take you home after the test. You may not drive, take a bus or take a taxi by yourself.   No eating 8 hours before your exam. You may have clear liquids up until 4 hours before your exam. (Clear liquids include water, clear tea, black coffee and fruit juice without pulp.)   You will spend four to five hours in the recovery room.  Please call the Imaging Department at your exam site with any questions.            Jun 20, 2018 10:40 AM CDT   CT CHEST W/O CONTRAST with [id number]   [facility] CT ([facility])    [address]  1[address] Floor  [facility] [address] [phone]           Please bring any scans or X-rays taken at other hospitals, if similar tests were done. Also bring a list of your medicines, including vitamins, minerals and over-the-counter drugs. It is safest to leave personal items at home.  Be sure to tell your doctor:   If you have any allergies.   If there s any chance you are pregnant.   If you are breastfeeding.  You do not need to do anything special to prepare for this exam.  Please wear loose clothing, such as a sweat suit or jogging clothes. Avoid snaps, zippers and other metal. We may ask you to undress and put on a hospital gown.            Jun 21, 2018 12:00 PM CDT   [facility] Lab Draw with  [facility] LAB DRAW   [facility] Draw ([facility])    [address]  [address]  [facility] [address] [phone]            Jun 21, 2018 12:30 PM CDT   (Arrive by 12:15 PM)   Return Visit with Kyra Mortensen MD   [facility] ([facility])    [address] [place] [address]  [address]  [address]  [facility] [id number] [phone]            Jun 21, 2018  1:00 PM CDT   Infusion 240 with  ONCOLOGY INFUSION   [facility] ([facility])    [address]  [address]  [facility] [id number] [phone]              Who to contact     If you have questions or need follow up information about today's clinic visit or your schedule please contact [facility] directly at [phone].  Normal or non-critical lab and imaging results will be communicated to you by NEURONIXhart, letter or phone within 4 business days after the clinic has received the results. If you do not hear from us within 7 days, please contact the clinic through Shot & Shopt or phone. If you have a critical or abnormal lab result, we will notify you by phone as soon as possible.  Submit refill requests through SEElogix or call your pharmacy and they will forward the refill request to us. Please allow 3 business days for your refill to be completed.          Additional Information About Your Visit        NEURONIX[employer] Theatre Information     SEE[employer] gives you secure access to your electronic health record. If you see a primary care provider, you can also send messages to your care team and make appointments. If you have questions, please call your primary care clinic.  If you do not have a primary care provider, please call [phone] and they will assist you.        Care EveryWhere ID     This is your Care EveryWhere ID. This could be used by other organizations to access your [facility] medical records  [id number]        Your Vitals Were     Pulse Temperature                68 98.1  F (36.7  C) (Oral)           Blood Pressure from Last 3 Encounters:   06/07/18 116/76   06/05/18 115/79   06/04/18 110/71    Weight from Last 3 Encounters:   06/05/18 84.8 kg (187 lb)   05/21/18 86.6 kg (191 lb)   05/12/18 87.5 kg (193 lb)              Today, you had the following     No orders found for  display       Primary Care Provider Office Phone # Fax #    Aasay KIM Mortensen [phone] [address] [facility] [address]        Equal Access to Services     DEAN CHAKRABORTY : Hadliban shannon li ellao Sopavanali, waaxda luqadaha, qaybta kaalmada adetess, luz elena carmichael laGarrisonalyssa jacob. So [facility] [phone].    ATENCIÓN: Si habla español, tiene a membreno disposición servicios gratuitos de asistencia lingüística. Llame al [phone].    We comply with applicable federal civil rights laws and [place] laws. We do not discriminate on the basis of race, color, national origin, age, disability, sex, sexual orientation, or gender identity.            Thank you!     Thank you for choosing [facility]  for your care. Our goal is always to provide you with excellent care. Hearing back from our patients is one way we can continue to improve our services. Please take a few minutes to complete the written survey that you may receive in the mail after your visit with us. Thank you!             Your Updated Medication List - Protect others around you: Learn how to safely use, store and throw away your medicines at www.disposemymeds.org.          This list is accurate as of 6/7/18  2:37 PM.  Always use your most recent med list.                   Brand Name Dispense Instructions for use Diagnosis    amitriptyline 25 MG tablet    ELAVIL    30 tablet    Take 1 tablet (25 mg) by mouth At Bedtime    Insomnia, unspecified type, Malignant neoplasm of rectum (H), Liver lesion       calcium carbonate 500 MG chewable tablet    TUMS    150 tablet    Take 1-2 chew tab by mouth daily        dexamethasone 4 MG tablet    DECADRON    3 tablet    Take 1 tablet (4 mg) by mouth daily (with breakfast)    Malignant neoplasm of rectum (H)       diazepam 5 MG tablet    VALIUM    1 tablet    Take 1 hour prior to MRI.    Anxiety       dronabinol 2.5 MG capsule    MARINOL    60 capsule    Take 1 capsule (2.5 mg) by  mouth 2 times daily (before meals)    Anorexia       eszopiclone 1 MG Tabs tablet    LUNESTA    30 tablet    Take 1-2 tablets (1-2 mg) by mouth At Bedtime    Other insomnia       fluorouracil      Administer 4,968 mg intravenously . Continuous infusion over 46-48 hr via ambulatory pump q14d Supplied by outside provider with pump.        FLUoxetine 40 MG capsule    PROzac    30 capsule    Take 1 capsule (40 mg) by mouth daily    Other depression       lidocaine-prilocaine cream    EMLA    30 g    Apply 45 minutes prior to procedure.    Malignant neoplasm of rectum (H)       * LORazepam 1 MG tablet    ATIVAN    30 tablet    Take 1 tablet (1 mg) by mouth At Bedtime    Other insomnia       * LORazepam 0.5 MG tablet    ATIVAN    60 tablet    Take 1 tablet (0.5 mg) by mouth every 4 hours as needed (Anxiety, Nausea/Vomiting or Sleep)    Malignant neoplasm of rectum (H), Anxiety       ondansetron 4 MG tablet    ZOFRAN    3 tablet    Take one tablet by mouth every 6 hours as needed for nausea when taking neomycin and flagyl    Rectal cancer (H)       ondansetron 8 MG ODT tab    ZOFRAN-ODT    60 tablet    Take 1 tablet (8 mg) by mouth every 8 hours as needed for nausea    Chemotherapy induced nausea and vomiting       Potassium Chloride ER 20 MEQ Tbcr     10 tablet    Take 1 tablet (20 mEq) by mouth 2 times daily    Hypokalemia       potassium chloride SA 20 MEQ CR tablet    K-DUR/KLOR-CON M          prochlorperazine 10 MG tablet    COMPAZINE    20 tablet    Take 1 tablet (10 mg) by mouth every 6 hours as needed for nausea or vomiting    Malignant neoplasm of rectum (H)       RANITIDINE HCL PO      Take 150 mg by mouth daily as needed for heartburn        TYLENOL PO      Take 1,000 mg by mouth At Bedtime        zolpidem 5 MG tablet    AMBIEN    30 tablet    Take 1 tablet (5 mg) by mouth nightly as needed for sleep    Other insomnia       * Notice:  This list has 2 medication(s) that are the same as other medications  prescribed for you. Read the directions carefully, and ask your doctor or other care provider to review them with you.

## 2018-06-08 ENCOUNTER — INFUSION THERAPY VISIT (OUTPATIENT)
Dept: INFUSION THERAPY | Facility: CLINIC | Age: 53
End: 2018-06-08
Attending: PHYSICIAN ASSISTANT
Payer: COMMERCIAL

## 2018-06-08 VITALS
DIASTOLIC BLOOD PRESSURE: 67 MMHG | RESPIRATION RATE: 16 BRPM | HEART RATE: 68 BPM | TEMPERATURE: 98.6 F | SYSTOLIC BLOOD PRESSURE: 113 MMHG

## 2018-06-08 DIAGNOSIS — C20 MALIGNANT NEOPLASM OF RECTUM (H): Primary | ICD-10-CM

## 2018-06-08 PROCEDURE — 96360 HYDRATION IV INFUSION INIT: CPT

## 2018-06-08 PROCEDURE — 25000128 H RX IP 250 OP 636: Performed by: PHYSICIAN ASSISTANT

## 2018-06-08 RX ORDER — HEPARIN SODIUM (PORCINE) LOCK FLUSH IV SOLN 100 UNIT/ML 100 UNIT/ML
500 SOLUTION INTRAVENOUS EVERY 8 HOURS
Status: DISCONTINUED | OUTPATIENT
Start: 2018-06-08 | End: 2018-06-08 | Stop reason: HOSPADM

## 2018-06-08 RX ADMIN — HEPARIN 500 UNITS: 100 SYRINGE at 14:54

## 2018-06-08 RX ADMIN — SODIUM CHLORIDE 1000 ML: 9 INJECTION, SOLUTION INTRAVENOUS at 13:46

## 2018-06-08 NOTE — MR AVS SNAPSHOT
After Visit Summary   6/8/2018    Sarah Rajan    MRN: [id number]           Patient Information     Date Of Birth          1965        Visit Information        Provider Department      6/8/2018 1:30 PM ROOM 1 [facility]        Today's Diagnoses     Malignant neoplasm of rectum (H)    -  1       Follow-ups after your visit        Your next 10 appointments already scheduled     Jun 11, 2018  3:00 PM CDT   Level 1 with ROOM 6 [facility] ([facility])    [facility]  [address]  [facility] [address]   [phone]            Jun 13, 2018  3:00 PM CDT   Level 1 with ROOM 4 [facility] ([facility])    [facility]  [address]  [facility] [address]   [phone]            Severino 15, 2018  3:00 PM CDT   Level 1 with ROOM 9 [facility] ([facility])    [facility]  [address]  [facility] [address]   [phone]            Jun 19, 2018  4:45 PM CDT   MR ABDOMEN W/O & W CONTRAST with [address] [facility] MRI ([facility])    [address] [address] [phone]           Take your medicines as usual, unless your doctor tells you not to. Bring a list of your current medicines to your exam (including vitamins, minerals and over-the-counter drugs). Also bring the results of similar scans you may have had.    You may or may not receive IV contrast for this exam pending the discretion of the Radiologist.   Do not eat or drink for 6 hours prior to exam.  The MRI machine uses a strong magnet. Please wear clothes without metal (snaps, zippers). A sweatsuit works well, or we may give you a hospital gown.  Please remove any body piercings and hair extensions before you arrive. You will also remove watches, jewelry, hairpins, wallets,  dentures, partial dental plates and hearing aids. You may wear contact lenses, and you may be able to wear your rings. We have a safe place to keep your personal items, but it is safer to leave them at home.  **IMPORTANT** THE INSTRUCTIONS BELOW ARE ONLY FOR THOSE PATIENTS WHO HAVE BEEN PRESCRIBED SEDATION OR GENERAL ANESTHESIA DURING THEIR MRI PROCEDURE:  IF YOUR DOCTOR PRESCRIBED ORAL SEDATION (take medicine to help you relax during your exam):   You must get the medicine from your doctor (oral medication) before you arrive. Bring the medicine to the exam. Do not take it at home. You ll be told when to take it upon arriving for your exam.   Arrive one hour early. Bring someone who can take you home after the test. Your medicine will make you sleepy. After the exam, you may not drive, take a bus or take a taxi by yourself.  IF YOUR DOCTOR PRESCRIBED IV SEDATION:   Arrive one hour early. Bring someone who can take you home after the test. Your medicine will make you sleepy. After the exam, you may not drive, take a bus or take a taxi by yourself.   No eating 6 hours before your exam. You may have clear liquids up until 4 hours before your exam. (Clear liquids include water, clear tea, black coffee and fruit juice without pulp.)  IF YOUR DOCTOR PRESCRIBED ANESTHESIA (be asleep for your exam):   Arrive 1 1/2 hours early. Bring someone who can take you home after the test. You may not drive, take a bus or take a taxi by yourself.   No eating 8 hours before your exam. You may have clear liquids up until 4 hours before your exam. (Clear liquids include water, clear tea, black coffee and fruit juice without pulp.)   You will spend four to five hours in the recovery room.  If you have any questions, please contact your Imaging Department exam site.            Jun 20, 2018  9:15 AM CDT   MR PELVIS W/O & W CONTRAST with [id number]   [facility] ([facility])    [address]  [facility] [address] [phone]           Take your medicines as usual, unless your doctor tells you not to. Bring a list of your current medicines to your exam (including vitamins, minerals and over-the-counter drugs).  You may or may not receive intravenous (IV) contrast for this exam pending the discretion of the Radiologist.  You do not need to do anything special to prepare.  The MRI machine uses a strong magnet. Please wear clothes without metal (snaps, zippers). A sweatsuit works well, or we may give you a hospital gown.  Please remove any body piercings and hair extensions before you arrive. You will also remove watches, jewelry, hairpins, wallets, dentures, partial dental plates and hearing aids. You may wear contact lenses, and you may be able to wear your rings. We have a safe place to keep your personal items, but it is safer to leave them at home.  **IMPORTANT** THE INSTRUCTIONS BELOW ARE ONLY FOR THOSE PATIENTS WHO HAVE BEEN PRESCRIBED SEDATION OR GENERAL ANESTHESIA DURING THEIR MRI PROCEDURE:  IF YOUR DOCTOR PRESCRIBED ORAL SEDATION (take medicine to help you relax during your exam):   You must get the medicine from your doctor (oral medication) before you arrive. Bring the medicine to the exam. Do not take it at home. You ll be told when to take it upon arriving for your exam.   Arrive one hour early. Bring someone who can take you home after the test. Your medicine will make you sleepy. After the exam, you may not drive, take a bus or take a taxi by yourself.  IF YOUR DOCTOR PRESCRIBED IV SEDATION:   Arrive one hour early. Bring someone who can take you home after the test. Your medicine will make you sleepy. After the exam, you may not drive, take a bus or take a taxi by yourself.   No eating 6 hours before your exam. You may have clear liquids up until 4 hours before your exam. (Clear liquids include water, clear tea, black coffee and fruit juice without pulp.)  IF YOUR DOCTOR  PRESCRIBED ANESTHESIA (be asleep for your exam):   Arrive 1 1/2 hours early. Bring someone who can take you home after the test. You may not drive, take a bus or take a taxi by yourself.   No eating 8 hours before your exam. You may have clear liquids up until 4 hours before your exam. (Clear liquids include water, clear tea, black coffee and fruit juice without pulp.)   You will spend four to five hours in the recovery room.  Please call the Imaging Department at your exam site with any questions.            Jun 20, 2018 10:40 AM CDT   CT CHEST W/O CONTRAST with UCCT2   [facility] CT ([facility])    [address]  [address]  [facility] [id number] [phone]           Please bring any scans or X-rays taken at other hospitals, if similar tests were done. Also bring a list of your medicines, including vitamins, minerals and over-the-counter drugs. It is safest to leave personal items at home.  Be sure to tell your doctor:   If you have any allergies.   If there s any chance you are pregnant.   If you are breastfeeding.  You do not need to do anything special to prepare for this exam.  Please wear loose clothing, such as a sweat suit or jogging clothes. Avoid snaps, zippers and other metal. We may ask you to undress and put on a hospital gown.            Jun 21, 2018 12:00 PM CDT   [facility] Lab Draw with [facility] LAB DRAW   [facility] Lab Draw ([facility])    [address]  [address]  [facility] [address] [phone]            Jun 21, 2018 12:30 PM CDT   (Arrive by 12:15 PM)   Return Visit with Kyra Mortensen MD   [facility] ([facility])    [address] [place] [address]  [address]  [facility] [address]   [phone]            Jun 21, 2018  1:00 PM CDT   Infusion 240 with [facility] INFUSION, UC 10 ATC   [facility] ([facility])    [address]  [facility]  [address]  [facility] [address] [phone]              Who to contact     If you have questions or need follow up information about today's clinic visit or your schedule please contact [facility] directly at [phone].  Normal or non-critical lab and imaging results will be communicated to you by MyChart, letter or phone within 4 business days after the clinic has received the results. If you do not hear from us within 7 days, please contact the clinic through [employer] or phone. If you have a critical or abnormal lab result, we will notify you by phone as soon as possible.  Submit refill requests through [employer] or call your pharmacy and they will forward the refill request to us. Please allow 3 business days for your refill to be completed.          Additional Information About Your Visit        [employer] Information     [employer] gives you secure access to your electronic health record. If you see a primary care provider, you can also send messages to your care team and make appointments. If you have questions, please call your primary care clinic.  If you do not have a primary care provider, please call [phone] and they will assist you.        Care EveryWhere ID     This is your Care EveryWhere ID. This could be used by other organizations to access your [place] medical records  [id number]        Your Vitals Were     Pulse Temperature Respirations             68 98.6  F (37  C) 16          Blood Pressure from Last 3 Encounters:   06/08/18 113/67   06/07/18 116/76   06/05/18 115/79    Weight from Last 3 Encounters:   06/05/18 84.8 kg (187 lb)   05/21/18 86.6 kg (191 lb)   05/12/18 87.5 kg (193 lb)              Today, you had the following     No orders found for display       Primary Care Provider Office Phone # Fax #    Kyra Mortensen [phone] [address] [facility] [address]        Equal Access to Services     DEAN CHAKRABORTY AH: Jorge li  michael Armendariz, warashaadda luqadaha, qaybta kaalmada yury, luz elena gustafson jrtimothy shahriarkenton laanselmoford nidia. So [facility] [phone].    ATENCIÓN: Si magnusla sangita, tiene a membreno disposición servicios gratuitos de asistencia lingüística. Claudette al [phone].    We comply with applicable federal civil rights laws and [place] laws. We do not discriminate on the basis of race, color, national origin, age, disability, sex, sexual orientation, or gender identity.            Thank you!     Thank you for choosing [facility]  for your care. Our goal is always to provide you with excellent care. Hearing back from our patients is one way we can continue to improve our services. Please take a few minutes to complete the written survey that you may receive in the mail after your visit with us. Thank you!             Your Updated Medication List - Protect others around you: Learn how to safely use, store and throw away your medicines at www.disposemymeds.org.          This list is accurate as of 6/8/18  3:07 PM.  Always use your most recent med list.                   Brand Name Dispense Instructions for use Diagnosis    amitriptyline 25 MG tablet    ELAVIL    30 tablet    Take 1 tablet (25 mg) by mouth At Bedtime    Insomnia, unspecified type, Malignant neoplasm of rectum (H), Liver lesion       calcium carbonate 500 MG chewable tablet    TUMS    150 tablet    Take 1-2 chew tab by mouth daily        dexamethasone 4 MG tablet    DECADRON    3 tablet    Take 1 tablet (4 mg) by mouth daily (with breakfast)    Malignant neoplasm of rectum (H)       diazepam 5 MG tablet    VALIUM    1 tablet    Take 1 hour prior to MRI.    Anxiety       dronabinol 2.5 MG capsule    MARINOL    60 capsule    Take 1 capsule (2.5 mg) by mouth 2 times daily (before meals)    Anorexia       eszopiclone 1 MG Tabs tablet    LUNESTA    30 tablet    Take 1-2 tablets (1-2 mg) by mouth At Bedtime    Other insomnia       fluorouracil      Administer 4,968  mg intravenously . Continuous infusion over 46-48 hr via ambulatory pump q14d Supplied by outside provider with pump.        FLUoxetine 40 MG capsule    PROzac    30 capsule    Take 1 capsule (40 mg) by mouth daily    Other depression       lidocaine-prilocaine cream    EMLA    30 g    Apply 45 minutes prior to procedure.    Malignant neoplasm of rectum (H)       * LORazepam 1 MG tablet    ATIVAN    30 tablet    Take 1 tablet (1 mg) by mouth At Bedtime    Other insomnia       * LORazepam 0.5 MG tablet    ATIVAN    60 tablet    Take 1 tablet (0.5 mg) by mouth every 4 hours as needed (Anxiety, Nausea/Vomiting or Sleep)    Malignant neoplasm of rectum (H), Anxiety       ondansetron 4 MG tablet    ZOFRAN    3 tablet    Take one tablet by mouth every 6 hours as needed for nausea when taking neomycin and flagyl    Rectal cancer (H)       ondansetron 8 MG ODT tab    ZOFRAN-ODT    60 tablet    Take 1 tablet (8 mg) by mouth every 8 hours as needed for nausea    Chemotherapy induced nausea and vomiting       Potassium Chloride ER 20 MEQ Tbcr     10 tablet    Take 1 tablet (20 mEq) by mouth 2 times daily    Hypokalemia       potassium chloride SA 20 MEQ CR tablet    K-DUR/KLOR-CON M          prochlorperazine 10 MG tablet    COMPAZINE    20 tablet    Take 1 tablet (10 mg) by mouth every 6 hours as needed for nausea or vomiting    Malignant neoplasm of rectum (H)       RANITIDINE HCL PO      Take 150 mg by mouth daily as needed for heartburn        TYLENOL PO      Take 1,000 mg by mouth At Bedtime        zolpidem 5 MG tablet    AMBIEN    30 tablet    Take 1 tablet (5 mg) by mouth nightly as needed for sleep    Other insomnia       * Notice:  This list has 2 medication(s) that are the same as other medications prescribed for you. Read the directions carefully, and ask your doctor or other care provider to review them with you.

## 2018-06-08 NOTE — PROGRESS NOTES
Infusion Nursing Note:  Sarah Rajan presents today for IVF.    Patient seen by provider today: No   present during visit today: Not Applicable.    Note: N/A.    Intravenous Access:  Implanted Port.    Treatment Conditions:  Not Applicable.      Post Infusion Assessment:  Patient tolerated infusion without incident.    Discharge Plan:   Patient discharged in stable condition accompanied by: .    Michael Martinez RN

## 2018-06-11 ENCOUNTER — INFUSION THERAPY VISIT (OUTPATIENT)
Dept: INFUSION THERAPY | Facility: CLINIC | Age: 53
End: 2018-06-11
Attending: PHYSICIAN ASSISTANT
Payer: COMMERCIAL

## 2018-06-11 VITALS — SYSTOLIC BLOOD PRESSURE: 106 MMHG | DIASTOLIC BLOOD PRESSURE: 69 MMHG | HEART RATE: 70 BPM

## 2018-06-11 DIAGNOSIS — C20 MALIGNANT NEOPLASM OF RECTUM (H): Primary | ICD-10-CM

## 2018-06-11 PROCEDURE — 25000128 H RX IP 250 OP 636: Performed by: PHYSICIAN ASSISTANT

## 2018-06-11 PROCEDURE — 96360 HYDRATION IV INFUSION INIT: CPT

## 2018-06-11 RX ORDER — HEPARIN SODIUM (PORCINE) LOCK FLUSH IV SOLN 100 UNIT/ML 100 UNIT/ML
500 SOLUTION INTRAVENOUS EVERY 8 HOURS
Status: DISCONTINUED | OUTPATIENT
Start: 2018-06-11 | End: 2018-06-11 | Stop reason: HOSPADM

## 2018-06-11 RX ADMIN — HEPARIN 500 UNITS: 100 SYRINGE at 16:14

## 2018-06-11 RX ADMIN — SODIUM CHLORIDE 1000 ML: 9 INJECTION, SOLUTION INTRAVENOUS at 15:04

## 2018-06-11 NOTE — MR AVS SNAPSHOT
After Visit Summary   6/11/2018    Sarah Rajan    MRN: [id number]           Patient Information     Date Of Birth          1965        Visit Information        Provider Department      6/11/2018 3:00 PM ROOM 6 [facility]        Today's Diagnoses     Malignant neoplasm of rectum (H)    -  1       Follow-ups after your visit        Your next 10 appointments already scheduled     Jun 13, 2018  3:00 PM CDT   Level 1 with ROOM 4 [facility] ([facility])    [facility]  [address]  [facility] [address]   [phone]            Severino 15, 2018  3:00 PM CDT   Level 1 with ROOM 9 [facility] ([facility])    [facility]  [address]  [facility] [address]   [phone]            Jun 19, 2018  4:45 PM CDT   MR ABDOMEN W/O & W CONTRAST with [address] MRI ([facility])    [address] [address] [phone]           Take your medicines as usual, unless your doctor tells you not to. Bring a list of your current medicines to your exam (including vitamins, minerals and over-the-counter drugs). Also bring the results of similar scans you may have had.    You may or may not receive IV contrast for this exam pending the discretion of the Radiologist.   Do not eat or drink for 6 hours prior to exam.  The MRI machine uses a strong magnet. Please wear clothes without metal (snaps, zippers). A sweatsuit works well, or we may give you a hospital gown.  Please remove any body piercings and hair extensions before you arrive. You will also remove watches, jewelry, hairpins, wallets, dentures, partial dental plates and hearing aids. You may wear contact lenses, and you may be able to wear your rings. We have a safe place to keep your personal items, but it is safer to leave them at home.   **IMPORTANT** THE INSTRUCTIONS BELOW ARE ONLY FOR THOSE PATIENTS WHO HAVE BEEN PRESCRIBED SEDATION OR GENERAL ANESTHESIA DURING THEIR MRI PROCEDURE:  IF YOUR DOCTOR PRESCRIBED ORAL SEDATION (take medicine to help you relax during your exam):   You must get the medicine from your doctor (oral medication) before you arrive. Bring the medicine to the exam. Do not take it at home. You ll be told when to take it upon arriving for your exam.   Arrive one hour early. Bring someone who can take you home after the test. Your medicine will make you sleepy. After the exam, you may not drive, take a bus or take a taxi by yourself.  IF YOUR DOCTOR PRESCRIBED IV SEDATION:   Arrive one hour early. Bring someone who can take you home after the test. Your medicine will make you sleepy. After the exam, you may not drive, take a bus or take a taxi by yourself.   No eating 6 hours before your exam. You may have clear liquids up until 4 hours before your exam. (Clear liquids include water, clear tea, black coffee and fruit juice without pulp.)  IF YOUR DOCTOR PRESCRIBED ANESTHESIA (be asleep for your exam):   Arrive 1 1/2 hours early. Bring someone who can take you home after the test. You may not drive, take a bus or take a taxi by yourself.   No eating 8 hours before your exam. You may have clear liquids up until 4 hours before your exam. (Clear liquids include water, clear tea, black coffee and fruit juice without pulp.)   You will spend four to five hours in the recovery room.  If you have any questions, please contact your Imaging Department exam site.            Jun 20, 2018  9:15 AM CDT   MR PELVIS W/O & W CONTRAST with [id number]   [facility] MRI ([facility])    [address] [address] [phone]           Take your medicines as usual, unless your doctor tells you not to. Bring a list of your current medicines to your exam (including vitamins, minerals and  over-the-counter drugs).  You may or may not receive intravenous (IV) contrast for this exam pending the discretion of the Radiologist.  You do not need to do anything special to prepare.  The MRI machine uses a strong magnet. Please wear clothes without metal (snaps, zippers). A sweatsuit works well, or we may give you a hospital gown.  Please remove any body piercings and hair extensions before you arrive. You will also remove watches, jewelry, hairpins, wallets, dentures, partial dental plates and hearing aids. You may wear contact lenses, and you may be able to wear your rings. We have a safe place to keep your personal items, but it is safer to leave them at home.  **IMPORTANT** THE INSTRUCTIONS BELOW ARE ONLY FOR THOSE PATIENTS WHO HAVE BEEN PRESCRIBED SEDATION OR GENERAL ANESTHESIA DURING THEIR MRI PROCEDURE:  IF YOUR DOCTOR PRESCRIBED ORAL SEDATION (take medicine to help you relax during your exam):   You must get the medicine from your doctor (oral medication) before you arrive. Bring the medicine to the exam. Do not take it at home. You ll be told when to take it upon arriving for your exam.   Arrive one hour early. Bring someone who can take you home after the test. Your medicine will make you sleepy. After the exam, you may not drive, take a bus or take a taxi by yourself.  IF YOUR DOCTOR PRESCRIBED IV SEDATION:   Arrive one hour early. Bring someone who can take you home after the test. Your medicine will make you sleepy. After the exam, you may not drive, take a bus or take a taxi by yourself.   No eating 6 hours before your exam. You may have clear liquids up until 4 hours before your exam. (Clear liquids include water, clear tea, black coffee and fruit juice without pulp.)  IF YOUR DOCTOR PRESCRIBED ANESTHESIA (be asleep for your exam):   Arrive 1 1/2 hours early. Bring someone who can take you home after the test. You may not drive, take a bus or take a taxi by yourself.   No eating 8 hours before  your exam. You may have clear liquids up until 4 hours before your exam. (Clear liquids include water, clear tea, black coffee and fruit juice without pulp.)   You will spend four to five hours in the recovery room.  Please call the Imaging Department at your exam site with any questions.            Jun 20, 2018 10:40 AM CDT   CT CHEST W/O CONTRAST with [id number]   [facility] CT ([facility])    [address]  1st Floor  [facility] [address] [phone]           Please bring any scans or X-rays taken at other hospitals, if similar tests were done. Also bring a list of your medicines, including vitamins, minerals and over-the-counter drugs. It is safest to leave personal items at home.  Be sure to tell your doctor:   If you have any allergies.   If there s any chance you are pregnant.   If you are breastfeeding.  You do not need to do anything special to prepare for this exam.  Please wear loose clothing, such as a sweat suit or jogging clothes. Avoid snaps, zippers and other metal. We may ask you to undress and put on a hospital gown.            Jun 21, 2018 12:00 PM CDT   [facility] Lab Draw with  [facility] LAB DRAW   [facility] Lab Draw ([facility])    [address]  [address]  [facility] [address]   [phone]            Jun 21, 2018 12:30 PM CDT   (Arrive by 12:15 PM)   Return Visit with Kyra Mortensen MD   [facility] ([facility])    [address] [place] [address]  [address]  [facility] [address]   [phone]            Jun 21, 2018  1:00 PM CDT   Infusion 240 with  ONCOLOGY INFUSION, UC 10 ATC   [facility] ([facility])    [address] [place] [address]  [address]  [facility] [address]   [phone]            Jun 22, 2018  1:30 PM CDT   Return Visit with [facility] Rad Onc Nurse   Radiation Oncology Clinic ([facility])    [facility]  [facility]  1st Floor  [address] [facility] [address] [phone]              Who to contact     If you have questions or need follow up information about today's clinic visit or your schedule please contact [facility] directly at [phone].  Normal or non-critical lab and imaging results will be communicated to you by MyChart, letter or phone within 4 business days after the clinic has received the results. If you do not hear from us within 7 days, please contact the clinic through [employer] or phone. If you have a critical or abnormal lab result, we will notify you by phone as soon as possible.  Submit refill requests through [employer] or call your pharmacy and they will forward the refill request to us. Please allow 3 business days for your refill to be completed.          Additional Information About Your Visit        [employer] Information     [employer] gives you secure access to your electronic health record. If you see a primary care provider, you can also send messages to your care team and make appointments. If you have questions, please call your primary care clinic.  If you do not have a primary care provider, please call [phone] and they will assist you.        Care EveryWhere ID     This is your Care EveryWhere ID. This could be used by other organizations to access your [place] medical records  [id number]        Your Vitals Were     Pulse                   70            Blood Pressure from Last 3 Encounters:   06/11/18 106/69   06/08/18 113/67   06/07/18 116/76    Weight from Last 3 Encounters:   06/05/18 84.8 kg (187 lb)   05/21/18 86.6 kg (191 lb)   05/12/18 87.5 kg (193 lb)              Today, you had the following     No orders found for display       Primary Care Provider Office Phone # Fax #    Aasay Mortensen [phone]       [address] [place] [address]        Equal Access to Services     DEAN CHAKRABORTY AH: Jorge rodriguez  Sojustin, warashaadda luqadaha, qaybta kaalmada yury, luz elena jauregui shahriarkenton ferrellford nidia. So [facility] [phone].    ATENCIÓN: Si emily quesada, tiene a membreno disposición servicios gratuitos de asistencia lingüística. Claudette al [phone].    We comply with applicable federal civil rights laws and [place] laws. We do not discriminate on the basis of race, color, national origin, age, disability, sex, sexual orientation, or gender identity.            Thank you!     Thank you for choosing [facility]  for your care. Our goal is always to provide you with excellent care. Hearing back from our patients is one way we can continue to improve our services. Please take a few minutes to complete the written survey that you may receive in the mail after your visit with us. Thank you!             Your Updated Medication List - Protect others around you: Learn how to safely use, store and throw away your medicines at www.disposemymeds.org.          This list is accurate as of 6/11/18  4:23 PM.  Always use your most recent med list.                   Brand Name Dispense Instructions for use Diagnosis    amitriptyline 25 MG tablet    ELAVIL    30 tablet    Take 1 tablet (25 mg) by mouth At Bedtime    Insomnia, unspecified type, Malignant neoplasm of rectum (H), Liver lesion       calcium carbonate 500 MG chewable tablet    TUMS    150 tablet    Take 1-2 chew tab by mouth daily        dexamethasone 4 MG tablet    DECADRON    3 tablet    Take 1 tablet (4 mg) by mouth daily (with breakfast)    Malignant neoplasm of rectum (H)       diazepam 5 MG tablet    VALIUM    1 tablet    Take 1 hour prior to MRI.    Anxiety       dronabinol 2.5 MG capsule    MARINOL    60 capsule    Take 1 capsule (2.5 mg) by mouth 2 times daily (before meals)    Anorexia       eszopiclone 1 MG Tabs tablet    LUNESTA    30 tablet    Take 1-2 tablets (1-2 mg) by mouth At Bedtime    Other insomnia       fluorouracil      Administer 4,968 mg  intravenously . Continuous infusion over 46-48 hr via ambulatory pump q14d Supplied by outside provider with pump.        FLUoxetine 40 MG capsule    PROzac    30 capsule    Take 1 capsule (40 mg) by mouth daily    Other depression       lidocaine-prilocaine cream    EMLA    30 g    Apply 45 minutes prior to procedure.    Malignant neoplasm of rectum (H)       * LORazepam 1 MG tablet    ATIVAN    30 tablet    Take 1 tablet (1 mg) by mouth At Bedtime    Other insomnia       * LORazepam 0.5 MG tablet    ATIVAN    60 tablet    Take 1 tablet (0.5 mg) by mouth every 4 hours as needed (Anxiety, Nausea/Vomiting or Sleep)    Malignant neoplasm of rectum (H), Anxiety       ondansetron 4 MG tablet    ZOFRAN    3 tablet    Take one tablet by mouth every 6 hours as needed for nausea when taking neomycin and flagyl    Rectal cancer (H)       ondansetron 8 MG ODT tab    ZOFRAN-ODT    60 tablet    Take 1 tablet (8 mg) by mouth every 8 hours as needed for nausea    Chemotherapy induced nausea and vomiting       Potassium Chloride ER 20 MEQ Tbcr     10 tablet    Take 1 tablet (20 mEq) by mouth 2 times daily    Hypokalemia       potassium chloride SA 20 MEQ CR tablet    K-DUR/KLOR-CON M          prochlorperazine 10 MG tablet    COMPAZINE    20 tablet    Take 1 tablet (10 mg) by mouth every 6 hours as needed for nausea or vomiting    Malignant neoplasm of rectum (H)       RANITIDINE HCL PO      Take 150 mg by mouth daily as needed for heartburn        TYLENOL PO      Take 1,000 mg by mouth At Bedtime        zolpidem 5 MG tablet    AMBIEN    30 tablet    Take 1 tablet (5 mg) by mouth nightly as needed for sleep    Other insomnia       * Notice:  This list has 2 medication(s) that are the same as other medications prescribed for you. Read the directions carefully, and ask your doctor or other care provider to review them with you.

## 2018-06-11 NOTE — PROGRESS NOTES
Infusion Nursing Note:  Sarah Rajan presents today for IVF's.    Patient seen by provider today: No   present during visit today: Not Applicable.    Note: N/A.    Intravenous Access:  Implanted Port.    Treatment Conditions:  Not Applicable.      Post Infusion Assessment:  Patient tolerated infusion without incident.  Blood return noted pre and post infusion.  Site patent and intact, free from redness, edema or discomfort.  No evidence of extravasations.  Access discontinued per pt request. She is to return on Wednesday for more fluids.    Discharge Plan:   Patient discharged in stable condition accompanied by: self.  Departure Mode: Ambulatory.    Rody Murrell RN

## 2018-06-13 ENCOUNTER — INFUSION THERAPY VISIT (OUTPATIENT)
Dept: INFUSION THERAPY | Facility: CLINIC | Age: 53
End: 2018-06-13
Attending: PHYSICIAN ASSISTANT
Payer: COMMERCIAL

## 2018-06-13 VITALS
HEART RATE: 70 BPM | RESPIRATION RATE: 16 BRPM | DIASTOLIC BLOOD PRESSURE: 84 MMHG | SYSTOLIC BLOOD PRESSURE: 105 MMHG | TEMPERATURE: 98.4 F

## 2018-06-13 DIAGNOSIS — C20 MALIGNANT NEOPLASM OF RECTUM (H): Primary | ICD-10-CM

## 2018-06-13 PROCEDURE — 96360 HYDRATION IV INFUSION INIT: CPT

## 2018-06-13 PROCEDURE — 25000128 H RX IP 250 OP 636: Performed by: PHYSICIAN ASSISTANT

## 2018-06-13 RX ORDER — HEPARIN SODIUM (PORCINE) LOCK FLUSH IV SOLN 100 UNIT/ML 100 UNIT/ML
500 SOLUTION INTRAVENOUS EVERY 8 HOURS
Status: DISCONTINUED | OUTPATIENT
Start: 2018-06-13 | End: 2018-06-13 | Stop reason: HOSPADM

## 2018-06-13 RX ADMIN — SODIUM CHLORIDE 1000 ML: 9 INJECTION, SOLUTION INTRAVENOUS at 14:46

## 2018-06-13 RX ADMIN — HEPARIN 500 UNITS: 100 SYRINGE at 15:50

## 2018-06-13 NOTE — MR AVS SNAPSHOT
After Visit Summary   6/13/2018    Sarah Rajan    MRN: [id number]           Patient Information     Date Of Birth          1965        Visit Information        Provider Department      6/13/2018 3:00 PM ROOM 4 [facility]        Today's Diagnoses     Malignant neoplasm of rectum (H)    -  1       Follow-ups after your visit        Your next 10 appointments already scheduled     Severino 15, 2018  3:00 PM CDT   Level 1 with ROOM 9 [facility] ([facility])    [facility]  [address]  [facility] [address]   [phone]            Jun 19, 2018  4:45 PM CDT   (Arrive by 4:15 PM)   MR ABDOMEN W/O & W CONTRAST with [address] [facility] [place] [facility] ([facility])    [address] [address] [phone]           Take your medicines as usual, unless your doctor tells you not to. Bring a list of your current medicines to your exam (including vitamins, minerals and over-the-counter drugs). Also bring the results of similar scans you may have had.    You may or may not receive IV contrast for this exam pending the discretion of the Radiologist.   Do not eat or drink for 6 hours prior to exam.  The MRI machine uses a strong magnet. Please wear clothes without metal (snaps, zippers). A sweatsuit works well, or we may give you a hospital gown.  Please remove any body piercings and hair extensions before you arrive. You will also remove watches, jewelry, hairpins, wallets, dentures, partial dental plates and hearing aids. You may wear contact lenses, and you may be able to wear your rings. We have a safe place to keep your personal items, but it is safer to leave them at home.  **IMPORTANT** THE INSTRUCTIONS BELOW ARE ONLY FOR THOSE PATIENTS WHO HAVE BEEN PRESCRIBED SEDATION OR GENERAL ANESTHESIA DURING THEIR MRI PROCEDURE:  IF YOUR DOCTOR PRESCRIBED ORAL SEDATION (take  medicine to help you relax during your exam):   You must get the medicine from your doctor (oral medication) before you arrive. Bring the medicine to the exam. Do not take it at home. You ll be told when to take it upon arriving for your exam.   Arrive one hour early. Bring someone who can take you home after the test. Your medicine will make you sleepy. After the exam, you may not drive, take a bus or take a taxi by yourself.  IF YOUR DOCTOR PRESCRIBED IV SEDATION:   Arrive one hour early. Bring someone who can take you home after the test. Your medicine will make you sleepy. After the exam, you may not drive, take a bus or take a taxi by yourself.   No eating 6 hours before your exam. You may have clear liquids up until 4 hours before your exam. (Clear liquids include water, clear tea, black coffee and fruit juice without pulp.)  IF YOUR DOCTOR PRESCRIBED ANESTHESIA (be asleep for your exam):   Arrive 1 1/2 hours early. Bring someone who can take you home after the test. You may not drive, take a bus or take a taxi by yourself.   No eating 8 hours before your exam. You may have clear liquids up until 4 hours before your exam. (Clear liquids include water, clear tea, black coffee and fruit juice without pulp.)   You will spend four to five hours in the recovery room.  If you have any questions, please contact your Imaging Department exam site.            Jun 20, 2018  9:15 AM CDT   (Arrive by 8:45 AM)   MR PELVIS W/O & W CONTRAST with [id number]   [facility] ([facility])    [address] [address] [phone]           Take your medicines as usual, unless your doctor tells you not to. Bring a list of your current medicines to your exam (including vitamins, minerals and over-the-counter drugs).  You may or may not receive intravenous (IV) contrast for this exam pending the discretion of the Radiologist.  You do not need to do anything  special to prepare.  The MRI machine uses a strong magnet. Please wear clothes without metal (snaps, zippers). A sweatsuit works well, or we may give you a hospital gown.  Please remove any body piercings and hair extensions before you arrive. You will also remove watches, jewelry, hairpins, wallets, dentures, partial dental plates and hearing aids. You may wear contact lenses, and you may be able to wear your rings. We have a safe place to keep your personal items, but it is safer to leave them at home.  **IMPORTANT** THE INSTRUCTIONS BELOW ARE ONLY FOR THOSE PATIENTS WHO HAVE BEEN PRESCRIBED SEDATION OR GENERAL ANESTHESIA DURING THEIR MRI PROCEDURE:  IF YOUR DOCTOR PRESCRIBED ORAL SEDATION (take medicine to help you relax during your exam):   You must get the medicine from your doctor (oral medication) before you arrive. Bring the medicine to the exam. Do not take it at home. You ll be told when to take it upon arriving for your exam.   Arrive one hour early. Bring someone who can take you home after the test. Your medicine will make you sleepy. After the exam, you may not drive, take a bus or take a taxi by yourself.  IF YOUR DOCTOR PRESCRIBED IV SEDATION:   Arrive one hour early. Bring someone who can take you home after the test. Your medicine will make you sleepy. After the exam, you may not drive, take a bus or take a taxi by yourself.   No eating 6 hours before your exam. You may have clear liquids up until 4 hours before your exam. (Clear liquids include water, clear tea, black coffee and fruit juice without pulp.)  IF YOUR DOCTOR PRESCRIBED ANESTHESIA (be asleep for your exam):   Arrive 1 1/2 hours early. Bring someone who can take you home after the test. You may not drive, take a bus or take a taxi by yourself.   No eating 8 hours before your exam. You may have clear liquids up until 4 hours before your exam. (Clear liquids include water, clear tea, black coffee and fruit juice without pulp.)   You will  spend four to five hours in the recovery room.  Please call the Imaging Department at your exam site with any questions.            Jun 20, 2018 10:40 AM CDT   CT CHEST W/O CONTRAST with [id number]   [facility] [place] [facility] ([facility])    [address]  1st Floor  [facility] [address] [phone]           Please bring any scans or X-rays taken at other hospitals, if similar tests were done. Also bring a list of your medicines, including vitamins, minerals and over-the-counter drugs. It is safest to leave personal items at home.  Be sure to tell your doctor:   If you have any allergies.   If there s any chance you are pregnant.   If you are breastfeeding.  You do not need to do anything special to prepare for this exam.  Please wear loose clothing, such as a sweat suit or jogging clothes. Avoid snaps, zippers and other metal. We may ask you to undress and put on a hospital gown.            Jun 21, 2018 12:00 PM CDT   [facility] Lab Draw with UC [facility] LAB DRAW   [facility] Lab Draw ([facility])    [address]  [address]  [facility] [address] [phone]            Jun 21, 2018 12:30 PM CDT   (Arrive by 12:15 PM)   Return Visit with Kyra Mortensen MD   [facility] ([facility])    [address] [place] [address]  [address]  [facility] [address]   [phone]            Jun 21, 2018  1:00 PM CDT   Infusion 240 with  ONCOLOGY INFUSION, UC 10 ATC   [facility] ([facility])    [address] [place] [address]  [address]  [facility] [address]   [phone]            Jun 22, 2018  1:30 PM CDT   Return Visit with [facility] Rad Onc Nurse   Radiation Oncology Clinic ([facility])    [facility]  1st Floor  [address] [facility] [address]   [phone]            Jun 22, 2018  2:00 PM CDT   TCT/SIM Suite Visit with Mirian FOY  MD Logan   Radiation Oncology Clinic ([facility])    [facility]  1st Floor  [address] [facility] [id number] [phone]              Who to contact     If you have questions or need follow up information about today's clinic visit or your schedule please contact [facility] directly at [phone].  Normal or non-critical lab and imaging results will be communicated to you by MyChart, letter or phone within 4 business days after the clinic has received the results. If you do not hear from us within 7 days, please contact the clinic through [employer] or phone. If you have a critical or abnormal lab result, we will notify you by phone as soon as possible.  Submit refill requests through [employer] or call your pharmacy and they will forward the refill request to us. Please allow 3 business days for your refill to be completed.          Additional Information About Your Visit        [employer] Information     [employer] gives you secure access to your electronic health record. If you see a primary care provider, you can also send messages to your care team and make appointments. If you have questions, please call your primary care clinic.  If you do not have a primary care provider, please call [phone] and they will assist you.        Care EveryWhere ID     This is your Care EveryWhere ID. This could be used by other organizations to access your [facility] medical records  [id number]        Your Vitals Were     Pulse Temperature Respirations             70 98.4  F (36.9  C) (Tympanic) 16          Blood Pressure from Last 3 Encounters:   06/13/18 105/84   06/11/18 106/69   06/08/18 113/67    Weight from Last 3 Encounters:   06/05/18 84.8 kg (187 lb)   05/21/18 86.6 kg (191 lb)   05/12/18 87.5 kg (193 lb)              Today, you had the following     No orders found for display       Primary Care Provider Office Phone # Fax #    Kyra Mortensen [phone]  [phone]       [address] [facility] [address]        Equal Access to Services     DEAN CHAKRABORTY : Hadii aad ku hadpanteramya Sopavanali, waaxda luqadaha, qaybta kaalmada jrtess, waxraven lissy julianaford norristimothy ricotelly jacob. So [facility] [phone].    ATENCIÓN: Si habla español, tiene a membreno disposición servicios gratuitos de asistencia lingüística. Julietaame al [phone].    We comply with applicable federal civil rights laws and [place] laws. We do not discriminate on the basis of race, color, national origin, age, disability, sex, sexual orientation, or gender identity.            Thank you!     Thank you for choosing [facility]  for your care. Our goal is always to provide you with excellent care. Hearing back from our patients is one way we can continue to improve our services. Please take a few minutes to complete the written survey that you may receive in the mail after your visit with us. Thank you!             Your Updated Medication List - Protect others around you: Learn how to safely use, store and throw away your medicines at www.disposemymeds.org.          This list is accurate as of 6/13/18  3:56 PM.  Always use your most recent med list.                   Brand Name Dispense Instructions for use Diagnosis    amitriptyline 25 MG tablet    ELAVIL    30 tablet    Take 1 tablet (25 mg) by mouth At Bedtime    Insomnia, unspecified type, Malignant neoplasm of rectum (H), Liver lesion       calcium carbonate 500 MG chewable tablet    TUMS    150 tablet    Take 1-2 chew tab by mouth daily        dexamethasone 4 MG tablet    DECADRON    3 tablet    Take 1 tablet (4 mg) by mouth daily (with breakfast)    Malignant neoplasm of rectum (H)       diazepam 5 MG tablet    VALIUM    1 tablet    Take 1 hour prior to MRI.    Anxiety       dronabinol 2.5 MG capsule    MARINOL    60 capsule    Take 1 capsule (2.5 mg) by mouth 2 times daily (before meals)    Anorexia       eszopiclone 1 MG Tabs tablet    LUNESTA     30 tablet    Take 1-2 tablets (1-2 mg) by mouth At Bedtime    Other insomnia       fluorouracil      Administer 4,968 mg intravenously . Continuous infusion over 46-48 hr via ambulatory pump q14d Supplied by outside provider with pump.        FLUoxetine 40 MG capsule    PROzac    30 capsule    Take 1 capsule (40 mg) by mouth daily    Other depression       lidocaine-prilocaine cream    EMLA    30 g    Apply 45 minutes prior to procedure.    Malignant neoplasm of rectum (H)       * LORazepam 1 MG tablet    ATIVAN    30 tablet    Take 1 tablet (1 mg) by mouth At Bedtime    Other insomnia       * LORazepam 0.5 MG tablet    ATIVAN    60 tablet    Take 1 tablet (0.5 mg) by mouth every 4 hours as needed (Anxiety, Nausea/Vomiting or Sleep)    Malignant neoplasm of rectum (H), Anxiety       ondansetron 4 MG tablet    ZOFRAN    3 tablet    Take one tablet by mouth every 6 hours as needed for nausea when taking neomycin and flagyl    Rectal cancer (H)       ondansetron 8 MG ODT tab    ZOFRAN-ODT    60 tablet    Take 1 tablet (8 mg) by mouth every 8 hours as needed for nausea    Chemotherapy induced nausea and vomiting       Potassium Chloride ER 20 MEQ Tbcr     10 tablet    Take 1 tablet (20 mEq) by mouth 2 times daily    Hypokalemia       potassium chloride SA 20 MEQ CR tablet    K-DUR/KLOR-CON M          prochlorperazine 10 MG tablet    COMPAZINE    20 tablet    Take 1 tablet (10 mg) by mouth every 6 hours as needed for nausea or vomiting    Malignant neoplasm of rectum (H)       RANITIDINE HCL PO      Take 150 mg by mouth daily as needed for heartburn        TYLENOL PO      Take 1,000 mg by mouth At Bedtime        zolpidem 5 MG tablet    AMBIEN    30 tablet    Take 1 tablet (5 mg) by mouth nightly as needed for sleep    Other insomnia       * Notice:  This list has 2 medication(s) that are the same as other medications prescribed for you. Read the directions carefully, and ask your doctor or other care provider to  review them with you.

## 2018-06-13 NOTE — PROGRESS NOTES
Infusion Nursing Note:  Sarah Satinder presents today for IVFs.    Patient seen by provider today: No   present during visit today: Not Applicable.    Note: N/A.    Intravenous Access:  Implanted Port.    Treatment Conditions:  Not Applicable.      Post Infusion Assessment:  Patient tolerated infusion without incident.  Blood return noted pre and post infusion.  Site patent and intact, free from redness, edema or discomfort.  No evidence of extravasations.  Access discontinued per protocol.    Discharge Plan:   Patient discharged in stable condition accompanied by: self.  Departure Mode: Ambulatory.    Taqueria Maldonado RN

## 2018-06-15 ENCOUNTER — INFUSION THERAPY VISIT (OUTPATIENT)
Dept: INFUSION THERAPY | Facility: CLINIC | Age: 53
End: 2018-06-15
Attending: PHYSICIAN ASSISTANT
Payer: COMMERCIAL

## 2018-06-15 VITALS — SYSTOLIC BLOOD PRESSURE: 95 MMHG | HEART RATE: 78 BPM | TEMPERATURE: 97.6 F | DIASTOLIC BLOOD PRESSURE: 62 MMHG

## 2018-06-15 DIAGNOSIS — C20 MALIGNANT NEOPLASM OF RECTUM (H): Primary | ICD-10-CM

## 2018-06-15 PROCEDURE — 25000128 H RX IP 250 OP 636: Performed by: PHYSICIAN ASSISTANT

## 2018-06-15 PROCEDURE — 96360 HYDRATION IV INFUSION INIT: CPT

## 2018-06-15 RX ORDER — HEPARIN SODIUM (PORCINE) LOCK FLUSH IV SOLN 100 UNIT/ML 100 UNIT/ML
500 SOLUTION INTRAVENOUS EVERY 8 HOURS
Status: DISCONTINUED | OUTPATIENT
Start: 2018-06-15 | End: 2018-06-15 | Stop reason: HOSPADM

## 2018-06-15 RX ADMIN — SODIUM CHLORIDE 1000 ML: 9 INJECTION, SOLUTION INTRAVENOUS at 08:30

## 2018-06-15 RX ADMIN — HEPARIN 500 UNITS: 100 SYRINGE at 09:41

## 2018-06-15 NOTE — MR AVS SNAPSHOT
After Visit Summary   6/15/2018    Sarah Rajan    MRN: [id number]           Patient Information     Date Of Birth          1965        Visit Information        Provider Department      6/15/2018 3:00 PM ROOM 9 [facility]        Today's Diagnoses     Malignant neoplasm of rectum (H)    -  1       Follow-ups after your visit        Your next 10 appointments already scheduled     Severino 15, 2018  3:00 PM CDT   Level 1 with ROOM 9 [facility] ([facility])    [facility]  [address]  [facility] [address]   [phone]            Jun 19, 2018  4:45 PM CDT   (Arrive by 4:15 PM)   MR ABDOMEN W/O & W CONTRAST with [address] Imaging [place] MRI ([facility])    [address] [address] [phone]           Take your medicines as usual, unless your doctor tells you not to. Bring a list of your current medicines to your exam (including vitamins, minerals and over-the-counter drugs). Also bring the results of similar scans you may have had.    You may or may not receive IV contrast for this exam pending the discretion of the Radiologist.   Do not eat or drink for 6 hours prior to exam.  The MRI machine uses a strong magnet. Please wear clothes without metal (snaps, zippers). A sweatsuit works well, or we may give you a hospital gown.  Please remove any body piercings and hair extensions before you arrive. You will also remove watches, jewelry, hairpins, wallets, dentures, partial dental plates and hearing aids. You may wear contact lenses, and you may be able to wear your rings. We have a safe place to keep your personal items, but it is safer to leave them at home.  **IMPORTANT** THE INSTRUCTIONS BELOW ARE ONLY FOR THOSE PATIENTS WHO HAVE BEEN PRESCRIBED SEDATION OR GENERAL ANESTHESIA DURING THEIR MRI PROCEDURE:  IF YOUR DOCTOR PRESCRIBED ORAL SEDATION (take  medicine to help you relax during your exam):   You must get the medicine from your doctor (oral medication) before you arrive. Bring the medicine to the exam. Do not take it at home. You ll be told when to take it upon arriving for your exam.   Arrive one hour early. Bring someone who can take you home after the test. Your medicine will make you sleepy. After the exam, you may not drive, take a bus or take a taxi by yourself.  IF YOUR DOCTOR PRESCRIBED IV SEDATION:   Arrive one hour early. Bring someone who can take you home after the test. Your medicine will make you sleepy. After the exam, you may not drive, take a bus or take a taxi by yourself.   No eating 6 hours before your exam. You may have clear liquids up until 4 hours before your exam. (Clear liquids include water, clear tea, black coffee and fruit juice without pulp.)  IF YOUR DOCTOR PRESCRIBED ANESTHESIA (be asleep for your exam):   Arrive 1 1/2 hours early. Bring someone who can take you home after the test. You may not drive, take a bus or take a taxi by yourself.   No eating 8 hours before your exam. You may have clear liquids up until 4 hours before your exam. (Clear liquids include water, clear tea, black coffee and fruit juice without pulp.)   You will spend four to five hours in the recovery room.  If you have any questions, please contact your Imaging Department exam site.            Jun 20, 2018  9:15 AM CDT   (Arrive by 8:45 AM)   MR PELVIS W/O & W CONTRAST with [id number]   [facility] ([facility])    [address] [address] [phone]           Take your medicines as usual, unless your doctor tells you not to. Bring a list of your current medicines to your exam (including vitamins, minerals and over-the-counter drugs).  You may or may not receive intravenous (IV) contrast for this exam pending the discretion of the Radiologist.  You do not need to do anything  special to prepare.  The MRI machine uses a strong magnet. Please wear clothes without metal (snaps, zippers). A sweatsuit works well, or we may give you a hospital gown.  Please remove any body piercings and hair extensions before you arrive. You will also remove watches, jewelry, hairpins, wallets, dentures, partial dental plates and hearing aids. You may wear contact lenses, and you may be able to wear your rings. We have a safe place to keep your personal items, but it is safer to leave them at home.  **IMPORTANT** THE INSTRUCTIONS BELOW ARE ONLY FOR THOSE PATIENTS WHO HAVE BEEN PRESCRIBED SEDATION OR GENERAL ANESTHESIA DURING THEIR MRI PROCEDURE:  IF YOUR DOCTOR PRESCRIBED ORAL SEDATION (take medicine to help you relax during your exam):   You must get the medicine from your doctor (oral medication) before you arrive. Bring the medicine to the exam. Do not take it at home. You ll be told when to take it upon arriving for your exam.   Arrive one hour early. Bring someone who can take you home after the test. Your medicine will make you sleepy. After the exam, you may not drive, take a bus or take a taxi by yourself.  IF YOUR DOCTOR PRESCRIBED IV SEDATION:   Arrive one hour early. Bring someone who can take you home after the test. Your medicine will make you sleepy. After the exam, you may not drive, take a bus or take a taxi by yourself.   No eating 6 hours before your exam. You may have clear liquids up until 4 hours before your exam. (Clear liquids include water, clear tea, black coffee and fruit juice without pulp.)  IF YOUR DOCTOR PRESCRIBED ANESTHESIA (be asleep for your exam):   Arrive 1 1/2 hours early. Bring someone who can take you home after the test. You may not drive, take a bus or take a taxi by yourself.   No eating 8 hours before your exam. You may have clear liquids up until 4 hours before your exam. (Clear liquids include water, clear tea, black coffee and fruit juice without pulp.)   You will  spend four to five hours in the recovery room.  Please call the Imaging Department at your exam site with any questions.            Jun 20, 2018 10:40 AM CDT   CT CHEST W/O CONTRAST with [id number]   [facility] [place] [facility] ([facility])    [address] [facility]  1st Floor  [facility] [place] [address] [phone]           Please bring any scans or X-rays taken at other hospitals, if similar tests were done. Also bring a list of your medicines, including vitamins, minerals and over-the-counter drugs. It is safest to leave personal items at home.  Be sure to tell your doctor:   If you have any allergies.   If there s any chance you are pregnant.   If you are breastfeeding.  You do not need to do anything special to prepare for this exam.  Please wear loose clothing, such as a sweat suit or jogging clothes. Avoid snaps, zippers and other metal. We may ask you to undress and put on a hospital gown.            Jun 21, 2018 12:00 PM CDT   [facility] Lab Draw with [facility] LAB DRAW   [facility] Lab Draw ([facility])    [address] [facility]  [address]  [facility] [place] [address] [phone]            Jun 21, 2018 12:30 PM CDT   (Arrive by 12:15 PM)   Return Visit with Kyra Mortensen MD   [facility] ([facility])    [address] [place] [address]  [address]  [facility] [place] [address]   [phone]            Jun 21, 2018  1:00 PM CDT   Infusion 240 with  ONCOLOGY INFUSION, UC 10 ATC   [facility] ([facility])    [address] [place] [address]  [address]  [facility] [place] [address]   [phone]            Jun 22, 2018  1:30 PM CDT   Return Visit with [facility] Rad Onc Nurse   Radiation Oncology Clinic ([facility])    [facility]  1st Floor  [address] [facility] [address]   [phone]            Jun 22, 2018  2:00 PM CDT   TCT/SIM Suite Visit with Mirian FOY  MD Logan   Radiation Oncology Clinic ([facility])    [facility]  1st Floor  [address] [facility] [id number] [phone]              Who to contact     If you have questions or need follow up information about today's clinic visit or your schedule please contact [facility] CANCER [facility] directly at [phone].  Normal or non-critical lab and imaging results will be communicated to you by MyChart, letter or phone within 4 business days after the clinic has received the results. If you do not hear from us within 7 days, please contact the clinic through Zingayahart or phone. If you have a critical or abnormal lab result, we will notify you by phone as soon as possible.  Submit refill requests through [id number] or call your pharmacy and they will forward the refill request to us. Please allow 3 business days for your refill to be completed.          Additional Information About Your Visit        Zingayahart Information     1234ENTER gives you secure access to your electronic health record. If you see a primary care provider, you can also send messages to your care team and make appointments. If you have questions, please call your primary care clinic.  If you do not have a primary care provider, please call [phone] and they will assist you.        Care EveryWhere ID     This is your Care EveryWhere ID. This could be used by other organizations to access your [facility] medical records  [id number]        Your Vitals Were     Pulse Temperature                78 97.6  F (36.4  C) (Tympanic)           Blood Pressure from Last 3 Encounters:   06/15/18 95/62   06/13/18 105/84   06/11/18 106/69    Weight from Last 3 Encounters:   06/05/18 84.8 kg (187 lb)   05/21/18 86.6 kg (191 lb)   05/12/18 87.5 kg (193 lb)              Today, you had the following     No orders found for display       Primary Care Provider Office Phone # Fax #    Kyra Mortensen [phone]        [address] [facility] [address]        Equal Access to Services     DEAN CHAKRABORTY : Hadii aad ku hadpanteramya Sofiajustin, warashaadchato dejesussitaha, qaleannaben smartdorischato jaureguiallyssachato, luz elena bessraziatelly jacob. So [facility] [phone].    ATENCIÓN: Si habla español, tiene a membreno disposición servicios gratuitos de asistencia lingüística. Llame al [phone].    We comply with applicable federal civil rights laws and [place] laws. We do not discriminate on the basis of race, color, national origin, age, disability, sex, sexual orientation, or gender identity.            Thank you!     Thank you for choosing [facility]  for your care. Our goal is always to provide you with excellent care. Hearing back from our patients is one way we can continue to improve our services. Please take a few minutes to complete the written survey that you may receive in the mail after your visit with us. Thank you!             Your Updated Medication List - Protect others around you: Learn how to safely use, store and throw away your medicines at www.disposemymeds.org.          This list is accurate as of 6/15/18  9:45 AM.  Always use your most recent med list.                   Brand Name Dispense Instructions for use Diagnosis    amitriptyline 25 MG tablet    ELAVIL    30 tablet    Take 1 tablet (25 mg) by mouth At Bedtime    Insomnia, unspecified type, Malignant neoplasm of rectum (H), Liver lesion       calcium carbonate 500 MG chewable tablet    TUMS    150 tablet    Take 1-2 chew tab by mouth daily        dexamethasone 4 MG tablet    DECADRON    3 tablet    Take 1 tablet (4 mg) by mouth daily (with breakfast)    Malignant neoplasm of rectum (H)       diazepam 5 MG tablet    VALIUM    1 tablet    Take 1 hour prior to MRI.    Anxiety       dronabinol 2.5 MG capsule    MARINOL    60 capsule    Take 1 capsule (2.5 mg) by mouth 2 times daily (before meals)    Anorexia       eszopiclone 1 MG Tabs tablet    LUNESTA    30 tablet     Take 1-2 tablets (1-2 mg) by mouth At Bedtime    Other insomnia       fluorouracil      Administer 4,968 mg intravenously . Continuous infusion over 46-48 hr via ambulatory pump q14d Supplied by outside provider with pump.        FLUoxetine 40 MG capsule    PROzac    30 capsule    Take 1 capsule (40 mg) by mouth daily    Other depression       lidocaine-prilocaine cream    EMLA    30 g    Apply 45 minutes prior to procedure.    Malignant neoplasm of rectum (H)       * LORazepam 1 MG tablet    ATIVAN    30 tablet    Take 1 tablet (1 mg) by mouth At Bedtime    Other insomnia       * LORazepam 0.5 MG tablet    ATIVAN    60 tablet    Take 1 tablet (0.5 mg) by mouth every 4 hours as needed (Anxiety, Nausea/Vomiting or Sleep)    Malignant neoplasm of rectum (H), Anxiety       ondansetron 4 MG tablet    ZOFRAN    3 tablet    Take one tablet by mouth every 6 hours as needed for nausea when taking neomycin and flagyl    Rectal cancer (H)       ondansetron 8 MG ODT tab    ZOFRAN-ODT    60 tablet    Take 1 tablet (8 mg) by mouth every 8 hours as needed for nausea    Chemotherapy induced nausea and vomiting       Potassium Chloride ER 20 MEQ Tbcr     10 tablet    Take 1 tablet (20 mEq) by mouth 2 times daily    Hypokalemia       potassium chloride SA 20 MEQ CR tablet    K-DUR/KLOR-CON M          prochlorperazine 10 MG tablet    COMPAZINE    20 tablet    Take 1 tablet (10 mg) by mouth every 6 hours as needed for nausea or vomiting    Malignant neoplasm of rectum (H)       RANITIDINE HCL PO      Take 150 mg by mouth daily as needed for heartburn        TYLENOL PO      Take 1,000 mg by mouth At Bedtime        zolpidem 5 MG tablet    AMBIEN    30 tablet    Take 1 tablet (5 mg) by mouth nightly as needed for sleep    Other insomnia       * Notice:  This list has 2 medication(s) that are the same as other medications prescribed for you. Read the directions carefully, and ask your doctor or other care provider to review them with you.

## 2018-06-19 ENCOUNTER — RADIANT APPOINTMENT (OUTPATIENT)
Dept: MRI IMAGING | Facility: CLINIC | Age: 53
End: 2018-06-19
Attending: PHYSICIAN ASSISTANT
Payer: COMMERCIAL

## 2018-06-19 DIAGNOSIS — C20 MALIGNANT NEOPLASM OF RECTUM (H): ICD-10-CM

## 2018-06-19 RX ORDER — GADOBUTROL 604.72 MG/ML
7.5 INJECTION INTRAVENOUS ONCE
Status: COMPLETED | OUTPATIENT
Start: 2018-06-19 | End: 2018-06-19

## 2018-06-19 RX ADMIN — GADOBUTROL 7.5 ML: 604.72 INJECTION INTRAVENOUS at 16:47

## 2018-06-20 ENCOUNTER — RADIANT APPOINTMENT (OUTPATIENT)
Dept: CT IMAGING | Facility: CLINIC | Age: 53
End: 2018-06-20
Attending: PHYSICIAN ASSISTANT
Payer: COMMERCIAL

## 2018-06-20 ENCOUNTER — RADIANT APPOINTMENT (OUTPATIENT)
Dept: MRI IMAGING | Facility: CLINIC | Age: 53
End: 2018-06-20
Attending: PHYSICIAN ASSISTANT
Payer: COMMERCIAL

## 2018-06-20 DIAGNOSIS — C20 MALIGNANT NEOPLASM OF RECTUM (H): ICD-10-CM

## 2018-06-20 RX ORDER — HEPARIN SODIUM (PORCINE) LOCK FLUSH IV SOLN 100 UNIT/ML 100 UNIT/ML
5 SOLUTION INTRAVENOUS ONCE
Status: COMPLETED | OUTPATIENT
Start: 2018-06-20 | End: 2018-06-20

## 2018-06-20 RX ORDER — GADOBUTROL 604.72 MG/ML
7.5 INJECTION INTRAVENOUS ONCE
Status: COMPLETED | OUTPATIENT
Start: 2018-06-20 | End: 2018-06-20

## 2018-06-20 RX ADMIN — GADOBUTROL 7.5 ML: 604.72 INJECTION INTRAVENOUS at 09:22

## 2018-06-20 RX ADMIN — HEPARIN SODIUM (PORCINE) LOCK FLUSH IV SOLN 100 UNIT/ML 5 ML: 100 SOLUTION at 10:20

## 2018-06-20 NOTE — DISCHARGE INSTRUCTIONS
MRI Contrast Discharge Instructions    The IV contrast you received today will pass out of your body in your  urine. This will happen in the next 24 hours. You will not feel this process.  Your urine will not change color.    Drink at least 4 extra glasses of water or juice today (unless your doctor  has restricted your fluids). This reduces the stress on your kidneys.  You may take your regular medicines.    If you are on dialysis: It is best to have dialysis today.    If you have a reaction: Most reactions happen right away. If you have  any new symptoms after leaving the hospital (such as hives or swelling),  call your hospital at the correct number below. Or call your family doctor.  If you have breathing distress or wheezing, call 911.    Special instructions: ***    I have read and understand the above information.    Signature:______________________________________ Date:___________    Staff:__________________________________________ Date:___________     Time:__________    [facility] Departments:    ___[place]: [phone]  ___[facility]: [phone]  ___[place]: [phone] ___[facility]: [phone]  ___[facility]: [phone]  ___[facility]: [phone]  ___[phone]  ___[facility]: [phone]  ___[phone]

## 2018-06-21 ENCOUNTER — ONCOLOGY VISIT (OUTPATIENT)
Dept: ONCOLOGY | Facility: CLINIC | Age: 53
End: 2018-06-21
Attending: INTERNAL MEDICINE
Payer: COMMERCIAL

## 2018-06-21 ENCOUNTER — TELEPHONE (OUTPATIENT)
Dept: PHARMACY | Facility: CLINIC | Age: 53
End: 2018-06-21

## 2018-06-21 ENCOUNTER — APPOINTMENT (OUTPATIENT)
Dept: LAB | Facility: CLINIC | Age: 53
End: 2018-06-21
Attending: INTERNAL MEDICINE
Payer: COMMERCIAL

## 2018-06-21 VITALS
HEIGHT: 65 IN | DIASTOLIC BLOOD PRESSURE: 70 MMHG | OXYGEN SATURATION: 99 % | HEART RATE: 69 BPM | WEIGHT: 183 LBS | TEMPERATURE: 97.8 F | SYSTOLIC BLOOD PRESSURE: 96 MMHG | RESPIRATION RATE: 16 BRPM | BODY MASS INDEX: 30.49 KG/M2

## 2018-06-21 DIAGNOSIS — C20 MALIGNANT NEOPLASM OF RECTUM (H): Primary | ICD-10-CM

## 2018-06-21 PROCEDURE — G0463 HOSPITAL OUTPT CLINIC VISIT: HCPCS | Mod: ZF

## 2018-06-21 PROCEDURE — 99215 OFFICE O/P EST HI 40 MIN: CPT | Mod: ZP | Performed by: INTERNAL MEDICINE

## 2018-06-21 PROCEDURE — 25000128 H RX IP 250 OP 636: Mod: ZF | Performed by: INTERNAL MEDICINE

## 2018-06-21 PROCEDURE — 36591 DRAW BLOOD OFF VENOUS DEVICE: CPT

## 2018-06-21 RX ORDER — HEPARIN SODIUM (PORCINE) LOCK FLUSH IV SOLN 100 UNIT/ML 100 UNIT/ML
5 SOLUTION INTRAVENOUS ONCE
Status: COMPLETED | OUTPATIENT
Start: 2018-06-21 | End: 2018-06-21

## 2018-06-21 RX ADMIN — SODIUM CHLORIDE, PRESERVATIVE FREE 5 ML: 5 INJECTION INTRAVENOUS at 12:06

## 2018-06-21 ASSESSMENT — PAIN SCALES - GENERAL: PAINLEVEL: NO PAIN (0)

## 2018-06-21 NOTE — PROGRESS NOTES
Oncology outpatient note    Date of service: 6/21/2018     PC: Recently diagnosed rectal cancer. [id number] - MSI Intact    HPI:  Please see previous note for details. I have copied and updated from prior note.  She is a 52-year-old female noticed BRBPR so Screening colonoscopy on 1/9/2018 revealed 3cm rectal mass and other polyps which were removed.  Pathology indicated moderately differentiated adenocarcinoma w/ intact MMR proteins.  CT staging scan showed no metastatic disease; some liver lesions which are thought to be benign per radiology report, T2N1M0 by pelvic MRI read at [facility]. When we initially reviewed her MRI we will not exactly sure whether that was lymph node involvement or not. We opted to repeat MRI which showed T4 N0 lesion. There was up to take her to surgery as there was possibility of personally lesion without lymph node involvement but because of the findings of repeat MRI the surgery was canceled and she is coming back to discuss neoadjuvant treatment.  We also did an MRI of the liver which showed 3 subcentimeter liver lesions which were too small to characterize and will need attention on follow-up.  She started neoadjuvant 5-FU and oxaliplatin (FOLFOX), which she started on 2/26/18. The day after she received cycle 2, she developed fevers, chills, headache, and an itchy rash on her arms and legs, for which she was evaluated in the ED. She was sent home on Benadryl. Benadryl and dexamethasone doses were increased for cycle 3.   She tolerated that cycle well    C#4 was given on 4/10/18    She completed 8 cycles of FOLFOX on 6/5/18    Repeat scans show good response to treatment without evidence of metastatic disease. There is only a small signal consistent with residual tumor seen in the primary rectal cancer lesion.    Interval history  She comes in today and tells me that overall now she is doing well. Slowly her appetite is coming back. Taste is also coming back. Bowels are working well.  "Energy is improving. Her depression is under much better control but still she feels anxious and she takes on average 3-4 Ativan daily. She is sleeping better. Denies any fevers or infections. No new swellings. She still cannot drink COLD things but otherwise she does not have any tingling or numbness. Denies bleeding. Denies pain.       ECOG 1    ROS:  Rest of comprehensive ROS was unremarkable      I reviewed other hx in EPIC as below    PMH:  Depression  Restless leg syndrome  Dyslipidemia    Current Outpatient Prescriptions   Medication     Acetaminophen (TYLENOL PO)     amitriptyline (ELAVIL) 25 MG tablet     calcium carbonate (TUMS) 500 MG chewable tablet     dexamethasone (DECADRON) 4 MG tablet     diazepam (VALIUM) 5 MG tablet     dronabinol (MARINOL) 2.5 MG capsule     eszopiclone (LUNESTA) 1 MG TABS tablet     fluorouracil     FLUoxetine (PROZAC) 40 MG capsule     lidocaine-prilocaine (EMLA) cream     LORazepam (ATIVAN) 0.5 MG tablet     LORazepam (ATIVAN) 1 MG tablet     ondansetron (ZOFRAN) 4 MG tablet     ondansetron (ZOFRAN-ODT) 8 MG ODT tab     Potassium Chloride ER 20 MEQ TBCR     potassium chloride SA (K-DUR/KLOR-CON M) 20 MEQ CR tablet     prochlorperazine (COMPAZINE) 10 MG tablet     RANITIDINE HCL PO     zolpidem (AMBIEN) 5 MG tablet     No current facility-administered medications for this visit.          FHx  Aunt and maternal grandmother had breast cancer  Pateral grandmother  of colorectal cancer  Mother had ischemic colitis    SHx:  , two teenage children  EtOH: 1 drink daily, occasionally more on weekends  Tobacco: never smoker  She is a microbiologist     Allergies   Allergen Reactions     Inapsine [Droperidol] Other (See Comments)     Elevated blood pressure and increased heart rate       Exam:  BP 96/70 (BP Location: Right arm, Cuff Size: Adult Regular)  Pulse 69  Temp 97.8  F (36.6  C) (Oral)  Resp 16  Ht 1.651 m (5' 5\")  Wt 83 kg (183 lb)  SpO2 99%  BMI 30.45 " kg/m2  CONSTITUTIONAL: no acute distress  EYES: PERRLA, no palor or icterus.   ENT/MOUTH: no mouth lesions. Ears normal  CVS: s1s2 no m r g .   RESPIRATORY: clear to auscultation b/l  GI: soft non tender no hepatosplenomegaly  NEURO: AAOX3  Grossly non focal neuro exam  INTEGUMENT: no obvious rashes. Port-A-Cath site is clean  LYMPHATIC: no palpable cervical, supraclavicular, axillary LAD  MUSCULOSKELETAL: Unremarkable. No bony tenderness.   EXTREMITIES: no edema  PSYCH: Mentation, mood and affect are normal. Decision making capacity is intact        Labs.  Reviewed     Results for EDGAR MARTINEZ (MRN [id number]) as of 6/21/2018 13:13   Ref. Range 6/21/2018 12:15   Sodium Latest Ref Range: 133 - 144 mmol/L 141   Potassium Latest Ref Range: 3.4 - 5.3 mmol/L 3.9   Chloride Latest Ref Range: 94 - 109 mmol/L 108   Carbon Dioxide Latest Ref Range: 20 - 32 mmol/L 26   Urea Nitrogen Latest Ref Range: 7 - 30 mg/dL 12   Creatinine Latest Ref Range: 0.52 - 1.04 mg/dL 0.70   GFR Estimate Latest Ref Range: >60 mL/min/1.7m2 88   GFR Estimate If Black Latest Ref Range: >60 mL/min/1.7m2 >90   Calcium Latest Ref Range: 8.5 - 10.1 mg/dL 9.1   Anion Gap Latest Ref Range: 3 - 14 mmol/L 7   Magnesium Latest Ref Range: 1.6 - 2.3 mg/dL 2.4 (H)   Albumin Latest Ref Range: 3.4 - 5.0 g/dL 3.8   Protein Total Latest Ref Range: 6.8 - 8.8 g/dL 6.8   Bilirubin Total Latest Ref Range: 0.2 - 1.3 mg/dL 0.6   Alkaline Phosphatase Latest Ref Range: 40 - 150 U/L 89   ALT Latest Ref Range: 0 - 50 U/L 89 (H)   AST Latest Ref Range: 0 - 45 U/L 64 (H)   Glucose Latest Ref Range: 70 - 99 mg/dL 81   WBC Latest Ref Range: 4.0 - 11.0 10e9/L 2.3 (L)   Hemoglobin is  Latest Ref Range: 11.7 - 15.7 g/dL 11.5 (L)   Hematocrit Latest Ref Range: 35.0 - 47.0 % 33.9 (L)   Platelet Count Latest Ref Range: 150 - 450 10e9/L 87 (L)   RBC Count Latest Ref Range: 3.8 - 5.2 10e12/L 3.47 (L)   MCV Latest Ref Range: 78 - 100 fl 98   MCH Latest Ref Range: 26.5 - 33.0 pg 33.1  (H)   MCHC Latest Ref Range: 31.5 - 36.5 g/dL 33.9   RDW Latest Ref Range: 10.0 - 15.0 % 15.6 (H)     Imaging.  EXAMINATION: MR PELVIS W/O & W CONTRAST, 2018 10:19 AM      COMPARISON: 2018.     TECHNIQUE:  Images were acquired without and with intravenous contrast  through the pelvis. The following MR images were acquired without  contrast: multiplanar T1, multiplanar T2, axial diffusion-weighted and  axial apparent diffusion coefficient. T1-weighted images with fat  saturation were acquired at the following intervals relative to  intravenous contrast administration: pre-contrast, immediate post  contrast, 1 minute, 3 minutes and delayed.  Contrast dose: 7.5cc's  Gadavist     HISTORY: Follow-up rectal cancer ([id number]). Patient currently being  treated with neoadjuvant chemotherapy.     FINDINGS:     LOCATION/SIZE:  On prior exam dated 2018 there was a tumor identified in the  lower rectum. Previously this tumor measured 1.9 x 0.4 cm, and  currently it measures 1.4 x 0.4 cm. This is best seen on series series  5, image 24 series 6 images 28-35.     TREATMENT RESPONSE:   Approximately 10-20%% of the current mass demonstrates tumoral signal  intensity, with the remainder appearing to represent fibrosis.  This  corresponds to a tumour response grade of MR TRG-2.      EXTENT:  There is abnormal signal extending through the muscularis propria and  into the perirectal fat posteriorly and on the left, 5:00-6:00 o'clock  position with abutment of the left levator musculature. This abnormal  signal shows progressive delayed enhancement and is somewhat  hypointense relative to suspected residual viable tumor and is favored  to represent fibrosis, best seen on series 6 image 32.       LYMPH NODES:  No abnormal abdominal or pelvic lymph nodes.     VEINS:  There is no evidence of extramural venous extension. The visualized  venous structures are patent.      scar. Uterus is otherwise normal. No adnexal  mass.  Postoperative changes anterior abdominal wall. Urinary bladder is  decompressed. Nonspecific heterogeneous bone marrow signal alteration  no suspicious lesions in the bones.         IMPRESSION:   1. There has been a positive response to treatment, with a  corresponding tumor regression grade of mrTRG-2. While there has been  a decrease in the size of the partially circumferential posterior low  rectal tumorthere remains a small amount of residual viable tumor  signal. Abnormal signal intensity posteriorly continues to abut the  left side levator musculature, though this shows signal  characteristics and enhancement most suggestive of fibrosis rather  than residual viable tumor  2. No evidence for metastatic disease to pelvic lymph nodes.    EXAMINATION: Chest CT  6/20/2018 10:53 AM     CLINICAL HISTORY: follow up of rectal cancer; Malignant neoplasm of  rectum (H)     COMPARISON: 1/11/2018.     TECHNIQUE: CT imaging obtained through the chest without contrast.  Coronal and axial MIP reformatted images obtained.      FINDINGS:  Right-sided Port-A-Cath with tip at the lower SVC. Heart size is  normal. No pericardial effusion.  Normal thoracic vasculature. No  thoracic lymphadenopathy. Central tracheobronchial tree is patent.      There is a tiny granuloma in the right lower lobe and the posterior  left upper lobe. No pulmonary nodules. No pleural effusions or  consolidations.     Bones and soft tissues: No suspicious bone findings.      Partially imaged upper abdomen: Limited. Splenic granulomas.         IMPRESSION:   No evidence of metastatic disease in the chest.      MRI ABDOMEN 6/19/18     CLINICAL HISTORY: follow up of liver lesions; Malignant neoplasm of  rectum (H)     TECHNIQUE:  Images were acquired with and without intravenous contrast  through the abdomen. The following MR images were acquired: TrueFISP,  multiplanar T2 weighted, axial T1 in/out of phase, axial fat-saturated  T1, diffusion-weighted.  Multiplanar T1-weighted images with fat  saturation were before contrast administration and at multiple time  points following the administration of intravenous contrast.      Contrast dose: 7.5mL Gadavist     Comparison study: None     FINDINGS:     Liver: Noncirrhotic liver configuration. Hepatic steatosis.     Small liver lesions appear similar to previous exam. Largest is seen  on series 15 image 12 measuring 9 mm in segment 7. Smaller lesion on  series 15 image 12 measures 7 mm. Tiny probable cyst is seen  inferiorly on series 15 image 24. These lesions are stable in size and  currently demonstrate more markedly T2 hyperintensity, likely due to  decreased motion on the present images. There are less well seen on  the 3 minute images on the present study suggesting potentially  delayed filling.      Gallbladder: Decompressed and unremarkable.     Spleen: Normal.     Kidneys: Normal.     Adrenal glands: Normal.     Pancreas: Normal.     Bowel: Within normal limits. Previously described area of thickening  in the transverse colon has resolved.     Lymph nodes: No abnormally enlarged lymph nodes.     Blood vessels: Unremarkable.     Lung bases: Clear.     Bones and soft tissues: Within normal limits.     Mesentery and abdominal wall: Unremarkable.     Ascites: None.            IMPRESSION:    1. Tiny liver lesions are stable from previous exams and currently  demonstrate features more suggestive of benign hemangiomas or cysts.  These are strongly favored as benign although continued follow-up on  future imaging is reasonable.  2. Hepatic steatosis.    Pathology    FINAL DIAGNOSIS:    CASE FROM [place], [place] ([id number], OBTAINED 1/9/18):   A. COLON, TRANSVERSE AND DESCENDING, POLYPS, POLYPECTOMY:   - Serrated polyps including sessile serrated adenoma and hyperplastic   polyps, fragmented   - Negative for cytologic dysplasia     B. RECTUM, MASS, BIOPSY:   - Adenocarcinoma, moderately differentiated,  invasive   - Mismatch repair proteins are intact by immunohistochemistry     Assessment and Plan  [id number] moderately differentiated adenocarcinoma of the rectum - MSI-stable  She was started on neoadjuvant FOLFOX on 2/26/2018.  after 4 cycles she had good response to treatment. Liver lesions remain indeterminate.    we continue with the same chemotherapy and she received cycle #8 on 6/20/18.  A repeat his scans show response to treatment with only a small signal consistent with viable tumor in the primary rectal cancer. No surrounding lymph nodes suspicious. There is no evidence of metastatic disease.    She is going to see radiation oncology tomorrow and we discussed concurrent treatment with Xeloda and radiation. I discussed the rationale for schedule and potential side effects of Xeloda. I have asked pharmacy to give her formal teaching as well.  We will restart with radiation. She wants to start after the Fourth of July which I believe is reasonable.  About 6-8 weeks after completing chemoradiation we will repeat her scans.    Indeterminate liver lesion. These are stable and now they have characteristic consistent with benign hemangioma or cyst.    Cold sensitivity. This is due to oxaliplatin and at this time we will continue to observe. We will fortunately she does not have significant neuropathy. Even the cold sensitivity should get better with time.    Pancytopenia due to chemotherapy. This is expected and at this time should recover on its own with time.    Depression and anxiety. She will continue with fluoxetine and Ativan as needed. I strongly encouraged her to follow with psychologist and she agrees.     Insomnia. This is also better. Lunesta. She will continue to use it.     I strongly encouraged her to eat healthy and exercising regularly to recuperate and maintain general health      She will return 2 weeks after starting Xeloda for toxicity check.      I answered all of her and her 's  questions to their satisfaction and they're agreeable and comfortable with the plans     Kyra Mortensen

## 2018-06-21 NOTE — NURSING NOTE
"Chief Complaint   Patient presents with     Oncology Clinic Visit     Rectal Cancer     Port Draw     Labs drawn from port by RN. Line flushed with saline and heparin. Vs taken and pt checked in for appt     Port accessed with 20g 3/4\" gripper needle by RN, labs collected, line flushed with saline and heparin.  Vitals taken. Pt checked in for appointment(s).    Alicja Miranda RN  "

## 2018-06-21 NOTE — PATIENT INSTRUCTIONS
Follow with Rad Onc    We will start Xeloda with radiation    2 weeks after starting Xeloda, see Cathleen back in the clinic

## 2018-06-21 NOTE — NURSING NOTE
"Oncology Rooming Note    June 21, 2018 12:21 PM   Sarah Rajan is a 52 year old female who presents for:    Chief Complaint   Patient presents with     Oncology Clinic Visit     Rectal Cancer     Initial Vitals: BP 96/70 (BP Location: Right arm, Cuff Size: Adult Regular)  Pulse 69  Temp 97.8  F (36.6  C) (Oral)  Resp 16  Ht 1.651 m (5' 5\")  Wt 83 kg (183 lb)  SpO2 99%  BMI 30.45 kg/m2 Estimated body mass index is 30.45 kg/(m^2) as calculated from the following:    Height as of this encounter: 1.651 m (5' 5\").    Weight as of this encounter: 83 kg (183 lb). Body surface area is 1.95 meters squared.  No Pain (0) Comment: Data Unavailable   No LMP recorded. Patient is postmenopausal.  Allergies reviewed: Yes  Medications reviewed: Yes    Medications: Pt. States that she needs a refill of Lunesta.    Pharmacy name entered into [facility]:    [facility] [id number] [place] [address] DR ALVAREZ AT [facility] [place], [place] - [phone]  [id number] - [place], [place] - [facility]    Clinical concerns: No New Concerns    5 minutes for nursing intake (face to face time)     ANKUSH Patel      "

## 2018-06-21 NOTE — TELEPHONE ENCOUNTER
"Oral Chemotherapy Monitoring Program    Primary Oncologist: Dr. Kyra Mortensen  Primary Oncology Clinic: [facility]  Cancer Diagnosis: Recently diagnosed rectal cancer. [id number] - MSI Intact    Drug: Xeloda + radiation   Start Date: after 1 st in July- to be determined  Expected duration of therapy: until radiation is complete    Drug Interaction Assessment: No drug-drug interaction to date    Lab Monitoring Plan  Monitoring plan for the 5 days per week x 5-6 weeks with XRT:  CBC Q3 weeks  CMP Q3 weeks  Subjective/Objective:  Sarah Rajan is a 52 year old female seen in clinic for an initial visit for oral chemotherapy education.  (Xeloda + radiation)    No flowsheet data found.    Vitals:  BP:   BP Readings from Last 1 Encounters:   06/21/18 96/70     Wt Readings from Last 1 Encounters:   06/21/18 83 kg (183 lb)     Estimated body surface area is 1.95 meters squared as calculated from the following:    Height as of an earlier encounter on 6/21/18: 1.651 m (5' 5\").    Weight as of an earlier encounter on 6/21/18: 83 kg (183 lb).      Labs:  _  Result Component Current Result Ref Range   Sodium 141 (6/21/2018) 133 - 144 mmol/L     _  Result Component Current Result Ref Range   Potassium 3.9 (6/21/2018) 3.4 - 5.3 mmol/L     _  Result Component Current Result Ref Range   Calcium 9.1 (6/21/2018) 8.5 - 10.1 mg/dL     _  Result Component Current Result Ref Range   Magnesium 2.4 (H) (6/21/2018) 1.6 - 2.3 mg/dL     No results found for Phos within last 30 days.     _  Result Component Current Result Ref Range   Albumin 3.8 (6/21/2018) 3.4 - 5.0 g/dL     _  Result Component Current Result Ref Range   Urea Nitrogen 12 (6/21/2018) 7 - 30 mg/dL     _  Result Component Current Result Ref Range   Creatinine 0.70 (6/21/2018) 0.52 - 1.04 mg/dL       _  Result Component Current Result Ref Range   AST 64 (H) (6/21/2018) 0 - 45 U/L     _  Result Component Current Result Ref Range   ALT 89 (H) (6/21/2018) 0 - 50 U/L "     _  Result Component Current Result Ref Range   Bilirubin Total 0.6 (6/21/2018) 0.2 - 1.3 mg/dL       _  Result Component Current Result Ref Range   WBC 2.3 (L) (6/21/2018) 4.0 - 11.0 10e9/L     _  Result Component Current Result Ref Range   Hemoglobin 11.5 (L) (6/21/2018) 11.7 - 15.7 g/dL     _  Result Component Current Result Ref Range   Platelet Count 87 (L) (6/21/2018) 150 - 450 10e9/L     _  Result Component Current Result Ref Range   Absolute Neutrophil 1.5 (L) (6/5/2018) 1.6 - 8.3 10e9/L     Assessment:  Patient is appropriate to start therapy.    Plan:  Basic chemotherapy teaching was reviewed with the patient and the patient's caregiver including indication, start date of therapy, dose, administration, adverse effects, missed doses, food and drug interactions, monitoring, side effect management, office contact information, and safe handling. Written materials were provided and all questions answered.    Follow-Up:  1 week after the start of therapy- pt would like to start radiation after the 4th of July     Holland Sanchez, PharmD, BCOP  June 21, 2018

## 2018-06-21 NOTE — LETTER
6/21/2018       RE: Sarah Rajan  [address] [address]     Dear Colleague,    Thank you for referring your patient, Sarah Rajan, to the [facility]. Please see a copy of my visit note below.    Oncology outpatient note    Date of service: 6/21/2018     PC: Recently diagnosed rectal cancer. [id number] - MSI Intact    HPI:  Please see previous note for details. I have copied and updated from prior note.  She is a 52-year-old female noticed BRBPR so Screening colonoscopy on 1/9/2018 revealed 3cm rectal mass and other polyps which were removed.  Pathology indicated moderately differentiated adenocarcinoma w/ intact MMR proteins.  CT staging scan showed no metastatic disease; some liver lesions which are thought to be benign per radiology report, T2N1M0 by pelvic MRI read at [facility]. When we initially reviewed her MRI we will not exactly sure whether that was lymph node involvement or not. We opted to repeat MRI which showed T4 N0 lesion. There was up to take her to surgery as there was possibility of personally lesion without lymph node involvement but because of the findings of repeat MRI the surgery was canceled and she is coming back to discuss neoadjuvant treatment.  We also did an MRI of the liver which showed 3 subcentimeter liver lesions which were too small to characterize and will need attention on follow-up.  She started neoadjuvant 5-FU and oxaliplatin (FOLFOX), which she started on 2/26/18. The day after she received cycle 2, she developed fevers, chills, headache, and an itchy rash on her arms and legs, for which she was evaluated in the ED. She was sent home on Benadryl. Benadryl and dexamethasone doses were increased for cycle 3.   She tolerated that cycle well    C#4 was given on 4/10/18    She completed 8 cycles of FOLFOX on 6/5/18    Repeat scans show good response to treatment without evidence of metastatic disease. There is only a small signal consistent with  residual tumor seen in the primary rectal cancer lesion.    Interval history  She comes in today and tells me that overall now she is doing well. Slowly her appetite is coming back. Taste is also coming back. Bowels are working well. Energy is improving. Her depression is under much better control but still she feels anxious and she takes on average 3-4 Ativan daily. She is sleeping better. Denies any fevers or infections. No new swellings. She still cannot drink COLD things but otherwise she does not have any tingling or numbness. Denies bleeding. Denies pain.       ECOG 1    ROS:  Rest of comprehensive ROS was unremarkable      I reviewed other hx in EPIC as below    PMH:  Depression  Restless leg syndrome  Dyslipidemia    Current Outpatient Prescriptions   Medication     Acetaminophen (TYLENOL PO)     amitriptyline (ELAVIL) 25 MG tablet     calcium carbonate (TUMS) 500 MG chewable tablet     dexamethasone (DECADRON) 4 MG tablet     diazepam (VALIUM) 5 MG tablet     dronabinol (MARINOL) 2.5 MG capsule     eszopiclone (LUNESTA) 1 MG TABS tablet     fluorouracil     FLUoxetine (PROZAC) 40 MG capsule     lidocaine-prilocaine (EMLA) cream     LORazepam (ATIVAN) 0.5 MG tablet     LORazepam (ATIVAN) 1 MG tablet     ondansetron (ZOFRAN) 4 MG tablet     ondansetron (ZOFRAN-ODT) 8 MG ODT tab     Potassium Chloride ER 20 MEQ TBCR     potassium chloride SA (K-DUR/KLOR-CON M) 20 MEQ CR tablet     prochlorperazine (COMPAZINE) 10 MG tablet     RANITIDINE HCL PO     zolpidem (AMBIEN) 5 MG tablet     No current facility-administered medications for this visit.          FHx  Aunt and maternal grandmother had breast cancer  Pateral grandmother  of colorectal cancer  Mother had ischemic colitis    SHx:  , two teenage children  EtOH: 1 drink daily, occasionally more on weekends  Tobacco: never smoker  She is a microbiologist     Allergies   Allergen Reactions     Inapsine [Droperidol] Other (See Comments)     Elevated  "blood pressure and increased heart rate       Exam:  BP 96/70 (BP Location: Right arm, Cuff Size: Adult Regular)  Pulse 69  Temp 97.8  F (36.6  C) (Oral)  Resp 16  Ht 1.651 m (5' 5\")  Wt 83 kg (183 lb)  SpO2 99%  BMI 30.45 kg/m2  CONSTITUTIONAL: no acute distress  EYES: PERRLA, no palor or icterus.   ENT/MOUTH: no mouth lesions. Ears normal  CVS: s1s2 no m r g .   RESPIRATORY: clear to auscultation b/l  GI: soft non tender no hepatosplenomegaly  NEURO: AAOX3  Grossly non focal neuro exam  INTEGUMENT: no obvious rashes. Port-A-Cath site is clean  LYMPHATIC: no palpable cervical, supraclavicular, axillary LAD  MUSCULOSKELETAL: Unremarkable. No bony tenderness.   EXTREMITIES: no edema  PSYCH: Mentation, mood and affect are normal. Decision making capacity is intact        Labs.  Reviewed     Results for EDGAR MARTINEZ (MRN [id number]) as of 6/21/2018 13:13   Ref. Range 6/21/2018 12:15   Sodium Latest Ref Range: 133 - 144 mmol/L 141   Potassium Latest Ref Range: 3.4 - 5.3 mmol/L 3.9   Chloride Latest Ref Range: 94 - 109 mmol/L 108   Carbon Dioxide Latest Ref Range: 20 - 32 mmol/L 26   Urea Nitrogen Latest Ref Range: 7 - 30 mg/dL 12   Creatinine Latest Ref Range: 0.52 - 1.04 mg/dL 0.70   GFR Estimate Latest Ref Range: >60 mL/min/1.7m2 88   GFR Estimate If Black Latest Ref Range: >60 mL/min/1.7m2 >90   Calcium Latest Ref Range: 8.5 - 10.1 mg/dL 9.1   Anion Gap Latest Ref Range: 3 - 14 mmol/L 7   Magnesium Latest Ref Range: 1.6 - 2.3 mg/dL 2.4 (H)   Albumin Latest Ref Range: 3.4 - 5.0 g/dL 3.8   Protein Total Latest Ref Range: 6.8 - 8.8 g/dL 6.8   Bilirubin Total Latest Ref Range: 0.2 - 1.3 mg/dL 0.6   Alkaline Phosphatase Latest Ref Range: 40 - 150 U/L 89   ALT Latest Ref Range: 0 - 50 U/L 89 (H)   AST Latest Ref Range: 0 - 45 U/L 64 (H)   Glucose Latest Ref Range: 70 - 99 mg/dL 81   WBC Latest Ref Range: 4.0 - 11.0 10e9/L 2.3 (L)   Hemoglobin is  Latest Ref Range: 11.7 - 15.7 g/dL 11.5 (L)   Hematocrit Latest Ref " Range: 35.0 - 47.0 % 33.9 (L)   Platelet Count Latest Ref Range: 150 - 450 10e9/L 87 (L)   RBC Count Latest Ref Range: 3.8 - 5.2 10e12/L 3.47 (L)   MCV Latest Ref Range: 78 - 100 fl 98   MCH Latest Ref Range: 26.5 - 33.0 pg 33.1 (H)   MCHC Latest Ref Range: 31.5 - 36.5 g/dL 33.9   RDW Latest Ref Range: 10.0 - 15.0 % 15.6 (H)     Imaging.  EXAMINATION: MR PELVIS W/O & W CONTRAST, 6/20/2018 10:19 AM      COMPARISON: 6/19/2018.     TECHNIQUE:  Images were acquired without and with intravenous contrast  through the pelvis. The following MR images were acquired without  contrast: multiplanar T1, multiplanar T2, axial diffusion-weighted and  axial apparent diffusion coefficient. T1-weighted images with fat  saturation were acquired at the following intervals relative to  intravenous contrast administration: pre-contrast, immediate post  contrast, 1 minute, 3 minutes and delayed.  Contrast dose: 7.5cc's  Gadavist     HISTORY: Follow-up rectal cancer ([id number]). Patient currently being  treated with neoadjuvant chemotherapy.     FINDINGS:     LOCATION/SIZE:  On prior exam dated 4/19/2018 there was a tumor identified in the  lower rectum. Previously this tumor measured 1.9 x 0.4 cm, and  currently it measures 1.4 x 0.4 cm. This is best seen on series series  5, image 24 series 6 images 28-35.     TREATMENT RESPONSE:   Approximately 10-20%% of the current mass demonstrates tumoral signal  intensity, with the remainder appearing to represent fibrosis.  This  corresponds to a tumour response grade of MR TRG-2.      EXTENT:  There is abnormal signal extending through the muscularis propria and  into the perirectal fat posteriorly and on the left, 5:00-6:00 o'clock  position with abutment of the left levator musculature. This abnormal  signal shows progressive delayed enhancement and is somewhat  hypointense relative to suspected residual viable tumor and is favored  to represent fibrosis, best seen on series 6 image 32.        LYMPH NODES:  No abnormal abdominal or pelvic lymph nodes.     VEINS:  There is no evidence of extramural venous extension. The visualized  venous structures are patent.      scar. Uterus is otherwise normal. No adnexal mass.  Postoperative changes anterior abdominal wall. Urinary bladder is  decompressed. Nonspecific heterogeneous bone marrow signal alteration  no suspicious lesions in the bones.         IMPRESSION:   1. There has been a positive response to treatment, with a  corresponding tumor regression grade of mrTRG-2. While there has been  a decrease in the size of the partially circumferential posterior low  rectal tumorthere remains a small amount of residual viable tumor  signal. Abnormal signal intensity posteriorly continues to abut the  left side levator musculature, though this shows signal  characteristics and enhancement most suggestive of fibrosis rather  than residual viable tumor  2. No evidence for metastatic disease to pelvic lymph nodes.    EXAMINATION: Chest CT  2018 10:53 AM     CLINICAL HISTORY: follow up of rectal cancer; Malignant neoplasm of  rectum (H)     COMPARISON: 2018.     TECHNIQUE: CT imaging obtained through the chest without contrast.  Coronal and axial MIP reformatted images obtained.      FINDINGS:  Right-sided Port-A-Cath with tip at the lower SVC. Heart size is  normal. No pericardial effusion.  Normal thoracic vasculature. No  thoracic lymphadenopathy. Central tracheobronchial tree is patent.      There is a tiny granuloma in the right lower lobe and the posterior  left upper lobe. No pulmonary nodules. No pleural effusions or  consolidations.     Bones and soft tissues: No suspicious bone findings.      Partially imaged upper abdomen: Limited. Splenic granulomas.         IMPRESSION:   No evidence of metastatic disease in the chest.      MRI ABDOMEN 18     CLINICAL HISTORY: follow up of liver lesions; Malignant neoplasm of  rectum  (H)     TECHNIQUE:  Images were acquired with and without intravenous contrast  through the abdomen. The following MR images were acquired: TrueFISP,  multiplanar T2 weighted, axial T1 in/out of phase, axial fat-saturated  T1, diffusion-weighted. Multiplanar T1-weighted images with fat  saturation were before contrast administration and at multiple time  points following the administration of intravenous contrast.      Contrast dose: 7.5mL Gadavist     Comparison study: None     FINDINGS:     Liver: Noncirrhotic liver configuration. Hepatic steatosis.     Small liver lesions appear similar to previous exam. Largest is seen  on series 15 image 12 measuring 9 mm in segment 7. Smaller lesion on  series 15 image 12 measures 7 mm. Tiny probable cyst is seen  inferiorly on series 15 image 24. These lesions are stable in size and  currently demonstrate more markedly T2 hyperintensity, likely due to  decreased motion on the present images. There are less well seen on  the 3 minute images on the present study suggesting potentially  delayed filling.      Gallbladder: Decompressed and unremarkable.     Spleen: Normal.     Kidneys: Normal.     Adrenal glands: Normal.     Pancreas: Normal.     Bowel: Within normal limits. Previously described area of thickening  in the transverse colon has resolved.     Lymph nodes: No abnormally enlarged lymph nodes.     Blood vessels: Unremarkable.     Lung bases: Clear.     Bones and soft tissues: Within normal limits.     Mesentery and abdominal wall: Unremarkable.     Ascites: None.            IMPRESSION:    1. Tiny liver lesions are stable from previous exams and currently  demonstrate features more suggestive of benign hemangiomas or cysts.  These are strongly favored as benign although continued follow-up on  future imaging is reasonable.  2. Hepatic steatosis.    Pathology    FINAL DIAGNOSIS:    CASE FROM [place], [place] ([id number], OBTAINED 1/9/18):   A. COLON,  TRANSVERSE AND DESCENDING, POLYPS, POLYPECTOMY:   - Serrated polyps including sessile serrated adenoma and hyperplastic   polyps, fragmented   - Negative for cytologic dysplasia     B. RECTUM, MASS, BIOPSY:   - Adenocarcinoma, moderately differentiated, invasive   - Mismatch repair proteins are intact by immunohistochemistry     Assessment and Plan  [id number] moderately differentiated adenocarcinoma of the rectum - MSI-stable  She was started on neoadjuvant FOLFOX on 2/26/2018.  after 4 cycles she had good response to treatment. Liver lesions remain indeterminate.    we continue with the same chemotherapy and she received cycle #8 on 6/20/18.  A repeat his scans show response to treatment with only a small signal consistent with viable tumor in the primary rectal cancer. No surrounding lymph nodes suspicious. There is no evidence of metastatic disease.    She is going to see radiation oncology tomorrow and we discussed concurrent treatment with Xeloda and radiation. I discussed the rationale for schedule and potential side effects of Xeloda. I have asked pharmacy to give her formal teaching as well.  We will restart with radiation. She wants to start after the Fourth of July which I believe is reasonable.  About 6-8 weeks after completing chemoradiation we will repeat her scans.    Indeterminate liver lesion. These are stable and now they have characteristic consistent with benign hemangioma or cyst.    Cold sensitivity. This is due to oxaliplatin and at this time we will continue to observe. We will fortunately she does not have significant neuropathy. Even the cold sensitivity should get better with time.    Pancytopenia due to chemotherapy. This is expected and at this time should recover on its own with time.    Depression and anxiety. She will continue with fluoxetine and Ativan as needed. I strongly encouraged her to follow with psychologist and she agrees.     Insomnia. This is also better. Lunesta. She will  continue to use it.     I strongly encouraged her to eat healthy and exercising regularly to recuperate and maintain general health      She will return 2 weeks after starting Xeloda for toxicity check.      I answered all of her and her 's questions to their satisfaction and they're agreeable and comfortable with the plans     Kyra Mortensen

## 2018-06-21 NOTE — MR AVS SNAPSHOT
After Visit Summary   6/21/2018    Sarah Rajan    MRN: [id number]           Patient Information     Date Of Birth          1965        Visit Information        Provider Department      6/21/2018 12:30 PM Kyra Mortensen MD [facility]        Today's Diagnoses     Malignant neoplasm of rectum (H)    -  1      Care Instructions    Follow with Rad Onc    We will start Xeloda with radiation    2 weeks after starting Xeloda, see Cathleen back in the clinic              Follow-ups after your visit        Your next 10 appointments already scheduled     Jun 22, 2018  1:30 PM CDT   Return Visit with [facility] Rad Onc Nurse   Radiation Oncology Clinic ([facility])    [facility]  1st Floor  [address] [address]   [phone]            Jun 22, 2018  2:00 PM CDT   TCT/SIM Suite Visit with Mirian Ford MD   Radiation Oncology Clinic ([facility])    [facility]  1st Floor  [address] [address] [phone]            Jun 22, 2018  3:00 PM CDT   CT PELVIS BONE W CONTRAST with UUCT1   [facility], [place], [place] ([facility] [place])    [address] [place] [address] [address]   [phone]           Please bring any scans or X-rays taken at other hospitals, if similar tests were done. Also bring a list of your medicines, including vitamins, minerals and over-the-counter drugs. It is safest to leave personal items at home.  Be sure to tell your doctor:   If you have any allergies.   If there s any chance you are pregnant.   If you are breastfeeding.  How to prepare:   Do not eat or drink for 2 hours before your exam. If you need to take medicine, you may take it with small sips of water. (We may ask you to take liquid medicine as well.)   Please wear loose clothing, such as a sweat suit or jogging clothes. Avoid snaps, zippers and other metal.  We may ask you to undress and put on a hospital gown.  Please arrive 30 minutes early for your CT. Once in the department you might be asked to drink water 15-20 minutes prior to your exam.  If indicated you may be asked to drink an oral contrast in advance of your CT.  If this is the case, the imaging team will let you know or be in contact with you prior to your appointment  Patients over 70 or patients with diabetes or kidney problems:   If you haven t had a blood test (creatinine test) within the last 30 days, the Cardiologist/Radiologist may require you to get this test prior to your exam.  If you have diabetes:   Continue to take your metformin medication on the day of your exam  If you have any questions, please call the Imaging Department where you will have your exam.            Jul 25, 2018  1:40 PM CDT   (Arrive by 1:25 PM)   Return Visit with Cathleen Ramos PA-C   [facility] ([facility])    [address] [place] [address]  [address]  [facility] [address] [phone]              Who to contact     If you have questions or need follow up information about today's clinic visit or your schedule please contact [facility] directly at [phone].  Normal or non-critical lab and imaging results will be communicated to you by MyChart, letter or phone within 4 business days after the clinic has received the results. If you do not hear from us within 7 days, please contact the clinic through [employer] or phone. If you have a critical or abnormal lab result, we will notify you by phone as soon as possible.  Submit refill requests through [employer] or call your pharmacy and they will forward the refill request to us. Please allow 3 business days for your refill to be completed.          Additional Information About Your Visit        [employer] Information     [employer] gives you secure access to your electronic health record. If you see a primary care  "provider, you can also send messages to your care team and make appointments. If you have questions, please call your primary care clinic.  If you do not have a primary care provider, please call [phone] and they will assist you.        Care EveryWhere ID     This is your Care EveryWhere ID. This could be used by other organizations to access your [facility] medical records  [id number]        Your Vitals Were     Pulse Temperature Respirations Height Pulse Oximetry BMI (Body Mass Index)    69 97.8  F (36.6  C) (Oral) 16 1.651 m (5' 5\") 99% 30.45 kg/m2       Blood Pressure from Last 3 Encounters:   06/21/18 96/70   06/15/18 95/62   06/13/18 105/84    Weight from Last 3 Encounters:   06/21/18 83 kg (183 lb)   06/05/18 84.8 kg (187 lb)   05/21/18 86.6 kg (191 lb)              Today, you had the following     No orders found for display       Primary Care Provider Office Phone # Fax #    Aasay Mortensen [phone] [address] [address]        Equal Access to Services     [facility] Center[facility]ASON : Hadii aad ku hadasho Sopavanali, waaxda luqadaha, qaybta kaalmada adetimothyyada, luz elena jacob. So [facility] [phone].    ATENCIÓN: Si habla español, tiene a membreno disposición servicios gratuitos de asistencia lingüística. Ll[facility] [phone].    We comply with applicable federal civil rights laws and [place] laws. We do not discriminate on the basis of race, color, national origin, age, disability, sex, sexual orientation, or gender identity.            Thank you!     Thank you for choosing [facility]  for your care. Our goal is always to provide you with excellent care. Hearing back from our patients is one way we can continue to improve our services. Please take a few minutes to complete the written survey that you may receive in the mail after your visit with us. Thank you!             Your Updated Medication List - Protect others around you: Learn " how to safely use, store and throw away your medicines at www.disposemymeds.org.          This list is accurate as of 6/21/18  1:46 PM.  Always use your most recent med list.                   Brand Name Dispense Instructions for use Diagnosis    amitriptyline 25 MG tablet    ELAVIL    30 tablet    Take 1 tablet (25 mg) by mouth At Bedtime    Insomnia, unspecified type, Malignant neoplasm of rectum (H), Liver lesion       calcium carbonate 500 MG chewable tablet    TUMS    150 tablet    Take 1-2 chew tab by mouth daily        dexamethasone 4 MG tablet    DECADRON    3 tablet    Take 1 tablet (4 mg) by mouth daily (with breakfast)    Malignant neoplasm of rectum (H)       diazepam 5 MG tablet    VALIUM    1 tablet    Take 1 hour prior to MRI.    Anxiety       dronabinol 2.5 MG capsule    MARINOL    60 capsule    Take 1 capsule (2.5 mg) by mouth 2 times daily (before meals)    Anorexia       eszopiclone 1 MG Tabs tablet    LUNESTA    30 tablet    Take 1-2 tablets (1-2 mg) by mouth At Bedtime    Other insomnia       fluorouracil      Administer 4,968 mg intravenously . Continuous infusion over 46-48 hr via ambulatory pump q14d Supplied by outside provider with pump.        FLUoxetine 40 MG capsule    PROzac    30 capsule    Take 1 capsule (40 mg) by mouth daily    Other depression       lidocaine-prilocaine cream    EMLA    30 g    Apply 45 minutes prior to procedure.    Malignant neoplasm of rectum (H)       * LORazepam 1 MG tablet    ATIVAN    30 tablet    Take 1 tablet (1 mg) by mouth At Bedtime    Other insomnia       * LORazepam 0.5 MG tablet    ATIVAN    60 tablet    Take 1 tablet (0.5 mg) by mouth every 4 hours as needed (Anxiety, Nausea/Vomiting or Sleep)    Malignant neoplasm of rectum (H), Anxiety       ondansetron 4 MG tablet    ZOFRAN    3 tablet    Take one tablet by mouth every 6 hours as needed for nausea when taking neomycin and flagyl    Rectal cancer (H)       ondansetron 8 MG ODT tab    ZOFRAN-ODT     60 tablet    Take 1 tablet (8 mg) by mouth every 8 hours as needed for nausea    Chemotherapy induced nausea and vomiting       Potassium Chloride ER 20 MEQ Tbcr     10 tablet    Take 1 tablet (20 mEq) by mouth 2 times daily    Hypokalemia       potassium chloride SA 20 MEQ CR tablet    K-DUR/KLOR-CON M          prochlorperazine 10 MG tablet    COMPAZINE    20 tablet    Take 1 tablet (10 mg) by mouth every 6 hours as needed for nausea or vomiting    Malignant neoplasm of rectum (H)       RANITIDINE HCL PO      Take 150 mg by mouth daily as needed for heartburn        TYLENOL PO      Take 1,000 mg by mouth At Bedtime        zolpidem 5 MG tablet    AMBIEN    30 tablet    Take 1 tablet (5 mg) by mouth nightly as needed for sleep    Other insomnia       * Notice:  This list has 2 medication(s) that are the same as other medications prescribed for you. Read the directions carefully, and ask your doctor or other care provider to review them with you.

## 2018-06-22 ENCOUNTER — TELEPHONE (OUTPATIENT)
Dept: ONCOLOGY | Facility: CLINIC | Age: 53
End: 2018-06-22

## 2018-06-22 ENCOUNTER — ALLIED HEALTH/NURSE VISIT (OUTPATIENT)
Dept: RADIATION ONCOLOGY | Facility: CLINIC | Age: 53
End: 2018-06-22
Attending: RADIOLOGY
Payer: COMMERCIAL

## 2018-06-22 ENCOUNTER — HOSPITAL ENCOUNTER (OUTPATIENT)
Dept: CT IMAGING | Facility: CLINIC | Age: 53
Discharge: HOME | End: 2018-06-22
Attending: RADIOLOGY | Admitting: RADIOLOGY
Payer: COMMERCIAL

## 2018-06-22 VITALS
OXYGEN SATURATION: 98 % | BODY MASS INDEX: 30.12 KG/M2 | DIASTOLIC BLOOD PRESSURE: 68 MMHG | HEART RATE: 78 BPM | WEIGHT: 181 LBS | SYSTOLIC BLOOD PRESSURE: 101 MMHG

## 2018-06-22 DIAGNOSIS — C20 MALIGNANT NEOPLASM OF RECTUM (H): ICD-10-CM

## 2018-06-22 DIAGNOSIS — C20 MALIGNANT NEOPLASM OF RECTUM (H): Primary | ICD-10-CM

## 2018-06-22 PROCEDURE — 25000128 H RX IP 250 OP 636: Performed by: RADIOLOGY

## 2018-06-22 PROCEDURE — 77334 RADIATION TREATMENT AID(S): CPT | Performed by: RADIOLOGY

## 2018-06-22 PROCEDURE — 77470 SPECIAL RADIATION TREATMENT: CPT | Performed by: RADIOLOGY

## 2018-06-22 PROCEDURE — 25000128 H RX IP 250 OP 636: Performed by: STUDENT IN AN ORGANIZED HEALTH CARE EDUCATION/TRAINING PROGRAM

## 2018-06-22 PROCEDURE — 25000125 ZZHC RX 250: Performed by: RADIOLOGY

## 2018-06-22 PROCEDURE — 72193 CT PELVIS W/DYE: CPT

## 2018-06-22 RX ORDER — IOPAMIDOL 755 MG/ML
111 INJECTION, SOLUTION INTRAVASCULAR ONCE
Status: COMPLETED | OUTPATIENT
Start: 2018-06-22 | End: 2018-06-22

## 2018-06-22 RX ORDER — HEPARIN SODIUM (PORCINE) LOCK FLUSH IV SOLN 100 UNIT/ML 100 UNIT/ML
5 SOLUTION INTRAVENOUS ONCE
Status: COMPLETED | OUTPATIENT
Start: 2018-06-22 | End: 2018-06-22

## 2018-06-22 RX ADMIN — HEPARIN 5 ML: 100 SYRINGE at 15:26

## 2018-06-22 RX ADMIN — IOPAMIDOL 111 ML: 755 INJECTION, SOLUTION INTRAVENOUS at 15:16

## 2018-06-22 RX ADMIN — SODIUM CHLORIDE, PRESERVATIVE FREE 78 ML: 5 INJECTION INTRAVENOUS at 15:16

## 2018-06-22 ASSESSMENT — PAIN SCALES - GENERAL: PAINLEVEL: NO PAIN (0)

## 2018-06-22 NOTE — PROGRESS NOTES
A radiation therapy treatment planning simulation was performed.  Please see the [employer] record for documentation.    Mirian Ford MD  Radiation Oncology

## 2018-06-22 NOTE — PROGRESS NOTES
Radiation Therapy Patient Education    Person involved with teaching: Patient    Patient educational needs for self management of treatment-related side effects assessment completed.  [facility] Patient Ed tab contains Patient Learning Assessment    Education Materials Given  Radiation Therapy and You    Educational Topics Discussed  Side effects expected, Pain management, Skin care, Nutrition and weight loss and When to call MD/RN    Response To Teaching  Verbalizes understanding    GYN Only  Vaginal Dilator-given and educated: N/A    Referrals sent: None    Chemotherapy?  Yes: Notified medical oncology of start date 07/09

## 2018-06-22 NOTE — MR AVS SNAPSHOT
After Visit Summary   6/22/2018    Sarah Rajan    MRN: [id number]           Patient Information     Date Of Birth          1965        Visit Information        Provider Department      6/22/2018 2:00 PM Mirian Ford MD Radiation Oncology Clinic        Today's Diagnoses     Malignant neoplasm of rectum (H)    -  1       Follow-ups after your visit        Your next 10 appointments already scheduled     Jun 22, 2018  3:00 PM CDT   CT PELVIS BONE W CONTRAST with UUCT1   [facility], [place], [place] ([facility], [facility])    [address] [address] [phone]           Please bring any scans or X-rays taken at other hospitals, if similar tests were done. Also bring a list of your medicines, including vitamins, minerals and over-the-counter drugs. It is safest to leave personal items at home.  Be sure to tell your doctor:   If you have any allergies.   If there s any chance you are pregnant.   If you are breastfeeding.  How to prepare:   Do not eat or drink for 2 hours before your exam. If you need to take medicine, you may take it with small sips of water. (We may ask you to take liquid medicine as well.)   Please wear loose clothing, such as a sweat suit or jogging clothes. Avoid snaps, zippers and other metal. We may ask you to undress and put on a hospital gown.  Please arrive 30 minutes early for your CT. Once in the department you might be asked to drink water 15-20 minutes prior to your exam.  If indicated you may be asked to drink an oral contrast in advance of your CT.  If this is the case, the imaging team will let you know or be in contact with you prior to your appointment  Patients over 70 or patients with diabetes or kidney problems:   If you haven t had a blood test (creatinine test) within the last 30 days, the Cardiologist/Radiologist may require you to get this test prior to your exam.  If you have diabetes:   Continue to  take your metformin medication on the day of your exam  If you have any questions, please call the Imaging Department where you will have your exam.            Jul 25, 2018  1:40 PM CDT   (Arrive by 1:25 PM)   Return Visit with Cathleen Ramos PA-C   [facility] ([facility] [place])    [address]  [address]  [facility] [address] [phone]              Who to contact     Please call your clinic at [phone] to:    Ask questions about your health    Make or cancel appointments    Discuss your medicines    Learn about your test results    Speak to your doctor            Additional Information About Your Visit        [employer] Information     [employer] gives you secure access to your electronic health record. If you see a primary care provider, you can also send messages to your care team and make appointments. If you have questions, please call your primary care clinic.  If you do not have a primary care provider, please call [phone] and they will assist you.      [employer] is an electronic gateway that provides easy, online access to your medical records. With [employer], you can request a clinic appointment, read your test results, renew a prescription or communicate with your care team.     To access your existing account, please contact your [facility] or call [phone] for assistance.        Care EveryWhere ID     This is your Care EveryWhere ID. This could be used by other organizations to access your [place] medical records  [id number]         Blood Pressure from Last 3 Encounters:   06/22/18 101/68   06/21/18 96/70   06/15/18 95/62    Weight from Last 3 Encounters:   06/22/18 82.1 kg (181 lb)   06/21/18 83 kg (183 lb)   06/05/18 84.8 kg (187 lb)              Today, you had the following     No orders found for display       Primary Care Provider Office Phone # Fax #    Kyra Mortensen [phone]        [address] [address]        Equal Access to Services     DEAN CHAKRABORTY : Hadii aad ku hadpanteramya Bacilioali, warashaadda olegsitaha, qaleannata berrydorischato jaureguiallyssachato, waxraven lissy julianaford bessraziatelly jacob. So [facility] [phone].    ATENCIÓN: Si habla español, tiene a membreno disposición servicios gratuitos de asistencia lingüística. Llame al [phone].    We comply with applicable federal civil rights laws and [place] laws. We do not discriminate on the basis of race, color, national origin, age, disability, sex, sexual orientation, or gender identity.            Thank you!     Thank you for choosing [facility]  for your care. Our goal is always to provide you with excellent care. Hearing back from our patients is one way we can continue to improve our services. Please take a few minutes to complete the written survey that you may receive in the mail after your visit with us. Thank you!             Your Updated Medication List - Protect others around you: Learn how to safely use, store and throw away your medicines at www.disposemymeds.org.          This list is accurate as of 6/22/18  2:07 PM.  Always use your most recent med list.                   Brand Name Dispense Instructions for use Diagnosis    amitriptyline 25 MG tablet    ELAVIL    30 tablet    Take 1 tablet (25 mg) by mouth At Bedtime    Insomnia, unspecified type, Malignant neoplasm of rectum (H), Liver lesion       calcium carbonate 500 MG chewable tablet    TUMS    150 tablet    Take 1-2 chew tab by mouth daily        dexamethasone 4 MG tablet    DECADRON    3 tablet    Take 1 tablet (4 mg) by mouth daily (with breakfast)    Malignant neoplasm of rectum (H)       diazepam 5 MG tablet    VALIUM    1 tablet    Take 1 hour prior to MRI.    Anxiety       dronabinol 2.5 MG capsule    MARINOL    60 capsule    Take 1 capsule (2.5 mg) by mouth 2 times daily (before meals)    Anorexia       eszopiclone 1 MG Tabs tablet    LUNESTA    30 tablet     Take 1-2 tablets (1-2 mg) by mouth At Bedtime    Other insomnia       fluorouracil      Administer 4,968 mg intravenously . Continuous infusion over 46-48 hr via ambulatory pump q14d Supplied by outside provider with pump.        FLUoxetine 40 MG capsule    PROzac    30 capsule    Take 1 capsule (40 mg) by mouth daily    Other depression       lidocaine-prilocaine cream    EMLA    30 g    Apply 45 minutes prior to procedure.    Malignant neoplasm of rectum (H)       * LORazepam 1 MG tablet    ATIVAN    30 tablet    Take 1 tablet (1 mg) by mouth At Bedtime    Other insomnia       * LORazepam 0.5 MG tablet    ATIVAN    60 tablet    Take 1 tablet (0.5 mg) by mouth every 4 hours as needed (Anxiety, Nausea/Vomiting or Sleep)    Malignant neoplasm of rectum (H), Anxiety       ondansetron 4 MG tablet    ZOFRAN    3 tablet    Take one tablet by mouth every 6 hours as needed for nausea when taking neomycin and flagyl    Rectal cancer (H)       ondansetron 8 MG ODT tab    ZOFRAN-ODT    60 tablet    Take 1 tablet (8 mg) by mouth every 8 hours as needed for nausea    Chemotherapy induced nausea and vomiting       Potassium Chloride ER 20 MEQ Tbcr     10 tablet    Take 1 tablet (20 mEq) by mouth 2 times daily    Hypokalemia       potassium chloride SA 20 MEQ CR tablet    K-DUR/KLOR-CON M          prochlorperazine 10 MG tablet    COMPAZINE    20 tablet    Take 1 tablet (10 mg) by mouth every 6 hours as needed for nausea or vomiting    Malignant neoplasm of rectum (H)       RANITIDINE HCL PO      Take 150 mg by mouth daily as needed for heartburn        TYLENOL PO      Take 1,000 mg by mouth At Bedtime        zolpidem 5 MG tablet    AMBIEN    30 tablet    Take 1 tablet (5 mg) by mouth nightly as needed for sleep    Other insomnia       * Notice:  This list has 2 medication(s) that are the same as other medications prescribed for you. Read the directions carefully, and ask your doctor or other care provider to review them with  you.

## 2018-06-22 NOTE — TELEPHONE ENCOUNTER
Prior Authorization Approval    Authorization Effective Date: 6/21/2018  Authorization Expiration Date: 6/21/2019  Medication: Capecitabine-PA Approved  Approved Dose/Quantity: 168/28ds  Reference #: [id number]   Insurance Company: [employer] - Phone [phone] Fax [phone]  Expected CoPay: $0     CoPay Card Available: No   Foundation Assistance Needed:    Which Pharmacy is filling the prescription (Not needed for infusion/clinic administered): [facility] - [place], [place] - [address]  Pharmacy Notified: Yes  Patient Notified: Yes

## 2018-06-25 ENCOUNTER — HOME INFUSION (PRE-WILLOW HOME INFUSION) (OUTPATIENT)
Dept: PHARMACY | Facility: CLINIC | Age: 53
End: 2018-06-25

## 2018-06-25 DIAGNOSIS — C20 MALIGNANT NEOPLASM OF RECTUM (H): Primary | ICD-10-CM

## 2018-06-25 RX ORDER — PROCHLORPERAZINE MALEATE 10 MG
10 TABLET ORAL EVERY 6 HOURS PRN
Qty: 30 TABLET | Refills: 2 | Status: SHIPPED | OUTPATIENT
Start: 2018-06-25 | End: 2019-05-09

## 2018-06-25 RX ORDER — CAPECITABINE 500 MG/1
825 TABLET, FILM COATED ORAL 2 TIMES DAILY
Qty: 168 TABLET | Refills: 0 | Status: SHIPPED | OUTPATIENT
Start: 2018-06-25 | End: 2019-10-09

## 2018-07-05 ENCOUNTER — MYC REFILL (OUTPATIENT)
Dept: ONCOLOGY | Facility: CLINIC | Age: 53
End: 2018-07-05

## 2018-07-05 DIAGNOSIS — F32.89 OTHER DEPRESSION: ICD-10-CM

## 2018-07-05 DIAGNOSIS — F41.9 ANXIETY: ICD-10-CM

## 2018-07-05 DIAGNOSIS — C20 MALIGNANT NEOPLASM OF RECTUM (H): ICD-10-CM

## 2018-07-05 DIAGNOSIS — G47.09 OTHER INSOMNIA: ICD-10-CM

## 2018-07-05 RX ORDER — ESZOPICLONE 1 MG/1
1-2 TABLET, FILM COATED ORAL AT BEDTIME
Qty: 30 TABLET | Refills: 3
Start: 2018-07-05 | End: 2018-07-25

## 2018-07-05 RX ORDER — FLUOXETINE 40 MG/1
40 CAPSULE ORAL DAILY
Qty: 30 CAPSULE | Refills: 3 | Status: CANCELLED | OUTPATIENT
Start: 2018-07-05

## 2018-07-05 RX ORDER — LORAZEPAM 0.5 MG/1
0.5 TABLET ORAL EVERY 4 HOURS PRN
Qty: 60 TABLET | Refills: 5 | Status: CANCELLED | OUTPATIENT
Start: 2018-07-05

## 2018-07-05 NOTE — TELEPHONE ENCOUNTER
Per 6/21/18 visit: Insomnia. This is also better. Lunesta. She will continue to use it.   Per Dr. Mortensen ok to fill with 3 refills 1:41 PM

## 2018-07-09 ENCOUNTER — OFFICE VISIT (OUTPATIENT)
Dept: RADIATION ONCOLOGY | Facility: CLINIC | Age: 53
End: 2018-07-09
Attending: RADIOLOGY
Payer: COMMERCIAL

## 2018-07-09 VITALS — BODY MASS INDEX: 30.09 KG/M2 | WEIGHT: 180.8 LBS

## 2018-07-09 DIAGNOSIS — C20 MALIGNANT NEOPLASM OF RECTUM (H): Primary | ICD-10-CM

## 2018-07-09 PROCEDURE — 77386 ZZH IMRT TREATMENT DELIVERY, COMPLEX: CPT | Performed by: RADIOLOGY

## 2018-07-09 NOTE — PROGRESS NOTES
RADIATION ONCOLOGY WEEKLY ON TREATMENT VISIT   Encounter Date: 2018    Patient Name: Sarah Rajan  MRN: [id number]  : 1965     Disease and Stage: Rectal adenocarcinoma, [id number]  Treatment Site: Pelvis  Current Dose/Planned Total Dose: [180] / [5040] cGy  Daily Fraction Size: [180] cGy/day, [5] times/week  Concurrent Chemotherapy: Yes  Drug and Frequency: Xeloda    Treatment Summary:  Ms. Rajan is a 52 year old female with locally advanced adenocarcinoma of the rectum. She initially presented with a mass on screening colonoscopy, 5 cm in length, 2 cm from anal verge. Biopsy showed moderately differentiated adenocarcinoma. MRI showed anal sphincter involvement and equivocal nodes. She is undergoing total neoadjuvant therapy and completed 8 cycles FOLFOX prior to neoadjuvant CRT.     ED visits/Hospitalizations:  None    Missed Treatments:  None    Subjective: Ms. Rajan presents to clinic today for her weekly on-treatment visit. She tolerated her first fraction without issues. However, she informs us that she has not received her Xeloda yet.     ROS:   Constitutional  Pain (0-10): 0  Fatigue: None    GI  Nausea/vomiting: None    Nutrition  PEG: No  Diet: Full    Integumentary  Dermatitis: None    Objective:   Wt 82 kg (180 lb 12.8 oz)  BMI 30.09 kg/m2  Current weight: 82 kg  Last week's weight: N/A  Starting weight: 82 kg    General: alert, oriented, NAD  HEENT: NCAT  Cardiac: WWP  Respiratory: breathing comfortably on RA  Skin: No erythema    Treatment-related toxicities (CTCAE v5.0):  None    LAB  Lab Results   Component Value Date    WBC 2.3 (L) 2018    HGB 11.5 (L) 2018    HCT 33.9 (L) 2018    MCV 98 2018    PLT 87 (L) 2018     Lab Results   Component Value Date     2018    POTASSIUM 3.9 2018    CHLORIDE 108 2018    CO2 26 2018    GLC 81 2018     Assessment:    Ms. Rajan is a 52 year old female with a T4N0M0 rectal adenocarcinoma, s/p  induction FOLFOX, currently undergoing neoadjuvant CRT with Xeloda. She is tolerating radiotherapy at this time.    Plan:     Continue radiotherapy    Nursing staff will look into Xeloda Rx    Pain management:    N/A    Fluids/Electrolytes/Nutrition:    N/A    Dermatitis:    N/A    Mosaiq chart and setup information reviewed  IGRT images reviewed    Medication Review  Med list reviewed with patient?: Yes    The patient was seen and discussed with staff, Dr. Ford.    Francois Bean MD  Resident, PGY-4  Department of Radiation Oncology  [facility]  [phone]    Ms. Rajan was seen and examined by me. Note above by Dr. Bean was reviewed and edited by me and reflects our mutual findings and plan of care.  Mirian Ford MD  Department of Radiation Oncology  [facility]

## 2018-07-09 NOTE — LETTER
2018       RE: Sarah Rajan  [address] [address]     Dear Colleague,    Thank you for referring your patient, Sarah Rajan, to the [facility] ONCOL[facility]Y CLINIC. Please see a copy of my visit note below.    RADIATION ONCOLOGY WEEKLY ON TREATMENT VISIT   Encounter Date: 2018    Patient Name: Sarah Rajan  MRN: [id number]  : 1965     Disease and Stage: Rectal adenocarcinoma, [id number]  Treatment Site: Pelvis  Current Dose/Planned Total Dose: [180] / [5040] cGy  Daily Fraction Size: [180] cGy/day, [5] times/week  Concurrent Chemotherapy: Yes  Drug and Frequency: Xeloda    Treatment Summary:  Ms. Rajan is a 52 year old female with locally advanced adenocarcinoma of the rectum. She initially presented with a mass on screening colonoscopy, 5 cm in length, 2 cm from anal verge. Biopsy showed moderately differentiated adenocarcinoma. MRI showed anal sphincter involvement and equivocal nodes. She is undergoing total neoadjuvant therapy and completed 8 cycles FOLFOX prior to neoadjuvant CRT.     ED visits/Hospitalizations:  None    Missed Treatments:  None    Subjective: Ms. Rajan presents to clinic today for her weekly on-treatment visit. She tolerated her first fraction without issues. However, she informs us that she has not received her Xeloda yet.     ROS:   Constitutional  Pain (0-10): 0  Fatigue: None    GI  Nausea/vomiting: None    Nutrition  PEG: No  Diet: Full    Integumentary  Dermatitis: None    Objective:   Wt 82 kg (180 lb 12.8 oz)  BMI 30.09 kg/m2  Current weight: 82 kg  Last week's weight: N/A  Starting weight: 82 kg    General: alert, oriented, NAD  HEENT: NCAT  Cardiac: WWP  Respiratory: breathing comfortably on RA  Skin: No erythema    Treatment-related toxicities (CTCAE v5.0):  None    LAB  Lab Results   Component Value Date    WBC 2.3 (L) 2018    HGB 11.5 (L) 2018    HCT 33.9 (L) 2018    MCV 98 2018    PLT 87 (L) 2018     Lab Results    Component Value Date     06/21/2018    POTASSIUM 3.9 06/21/2018    CHLORIDE 108 06/21/2018    CO2 26 06/21/2018    GLC 81 06/21/2018     Assessment:    Ms. Rajan is a 52 year old female with a T4N0M0 rectal adenocarcinoma, s/p induction FOLFOX, currently undergoing neoadjuvant CRT with Xeloda. She is tolerating radiotherapy at this time.    Plan:     Continue radiotherapy    Nursing staff will look into Xeloda Rx    Pain management:    N/A    Fluids/Electrolytes/Nutrition:    N/A    Dermatitis:    N/A    Mosaiq chart and setup information reviewed  IGRT images reviewed    Medication Review  Med list reviewed with patient?: Yes    The patient was seen and discussed with staff, Dr. Ford.    Francois Bean MD  Resident, PGY-4  Department of Radiation Oncology  [facility]  [phone]    Ms. Rajan was seen and examined by me. Note above by Dr. Bean was reviewed and edited by me and reflects our mutual findings and plan of care.  Mirian Ford MD  Department of Radiation Oncology  [facility]

## 2018-07-09 NOTE — MR AVS SNAPSHOT
After Visit Summary   7/9/2018    Sarah Rajan    MRN: [id number]           Patient Information     Date Of Birth          1965        Visit Information        Provider Department      7/9/2018 10:30 AM Mirian Ford MD Radiation Oncology Clinic        Today's Diagnoses     Malignant neoplasm of rectum (H)    -  1       Follow-ups after your visit        Your next 10 appointments already scheduled     Jul 10, 2018 10:00 AM CDT   EXTERNAL RADIATION TREATMENT with UMP RAD ONC VARIAN   Radiation Oncology Clinic ([facility])    [facility]  1st Floor  [address] [address]   [phone]            Jul 11, 2018 10:00 AM CDT   EXTERNAL RADIATION TREATMENT with UMP RAD ONC VARIAN   Radiation Oncology Clinic ([facility])    [facility]  1st Floor  [address] [address]   [phone]            Jul 12, 2018 10:00 AM CDT   EXTERNAL RADIATION TREATMENT with UMP RAD ONC VARIAN   Radiation Oncology Clinic ([facility])    [facility]  1st Floor  [address] [address]   [phone]            Jul 13, 2018 10:00 AM CDT   EXTERNAL RADIATION TREATMENT with UMP RAD ONC VARIAN   Radiation Oncology Clinic ([facility])    [facility]  1st Floor  [address] [address]   [phone]            Jul 16, 2018 10:00 AM CDT   EXTERNAL RADIATION TREATMENT with UMP RAD ONC VARIAN   Radiation Oncology Clinic ([facility])    [facility]  1st Floor  [address] [address]   [phone]            Jul 16, 2018 10:30 AM CDT   ON TREATMENT VISIT with Vijay Grullon MD   Radiation Oncology Clinic ([facility])    [facility]  1st Floor  [address] [address]   [phone]            Jul 17,  2018 10:00 AM CDT   EXTERNAL RADIATION TREATMENT with UMP RAD ONC VARIAN   Radiation Oncology Clinic ([facility])    [facility]  1st Floor  [address] [address]   [phone]            Jul 18, 2018 10:00 AM CDT   EXTERNAL RADIATION TREATMENT with UMP RAD ONC VARIAN   Radiation Oncology Clinic ([facility])    [facility]  1st Floor  [address] [address]   [phone]            Jul 19, 2018 10:00 AM CDT   EXTERNAL RADIATION TREATMENT with UMP RAD ONC VARIAN   Radiation Oncology Clinic ([facility])    [facility]  1st Floor  [address] [address]   [phone]            Jul 20, 2018 10:00 AM CDT   EXTERNAL RADIATION TREATMENT with UMP RAD ONC VARIAN   Radiation Oncology Clinic ([facility])    [facility]  1st Floor  [address] [address]   [phone]              Who to contact     Please call your clinic at [phone] to:    Ask questions about your health    Make or cancel appointments    Discuss your medicines    Learn about your test results    Speak to your doctor            Additional Information About Your Visit        [employer] Information     [employer] gives you secure access to your electronic health record. If you see a primary care provider, you can also send messages to your care team and make appointments. If you have questions, please call your primary care clinic.  If you do not have a primary care provider, please call [phone] and they will assist you.      [employer] is an electronic gateway that provides easy, online access to your medical records. With [employer], you can request a clinic appointment, read your test results, renew a prescription or communicate with your care team.     To access your existing account, please contact your [facility]inic or call [phone] for assistance.        Care EveryWhere ID     This is your Care EveryWhere ID. This could be used by other organizations to access your [place] medical records  [id number]        Your Vitals Were     BMI (Body Mass Index)                   30.09 kg/m2            Blood Pressure from Last 3 Encounters:   06/22/18 101/68   06/21/18 96/70   06/15/18 95/62    Weight from Last 3 Encounters:   07/09/18 82 kg (180 lb 12.8 oz)   06/22/18 82.1 kg (181 lb)   06/21/18 83 kg (183 lb)              Today, you had the following     No orders found for display       Primary Care Provider Office Phone # Fax #    Kyra Mortensen [phone]       [address] [address]        Equal Access to Services     [facility] Hospital[facility]ASON : Hadii aad ku hadasho Sojustin, waaxda luqadaha, qaybta kaalmada adetimothyyada, luz elena mtz . So [place] [phone].    ATENCIÓN: Si habla español, tiene a membreno disposición servicios gratuitos de asistencia lingüística. Llame al [phone].    We comply with applicable federal civil rights laws and [place] laws. We do not discriminate on the basis of race, color, national origin, age, disability, sex, sexual orientation, or gender identity.            Thank you!     Thank you for choosing [facility]  for your care. Our goal is always to provide you with excellent care. Hearing back from our patients is one way we can continue to improve our services. Please take a few minutes to complete the written survey that you may receive in the mail after your visit with us. Thank you!             Your Updated Medication List - Protect others around you: Learn how to safely use, store and throw away your medicines at www.disposemymeds.org.          This list is accurate as of 7/9/18  1:52 PM.  Always use your most recent med list.                   Brand Name Dispense Instructions for use Diagnosis    amitriptyline 25 MG tablet     ELAVIL    30 tablet    Take 1 tablet (25 mg) by mouth At Bedtime    Insomnia, unspecified type, Malignant neoplasm of rectum (H), Liver lesion       calcium carbonate 500 MG chewable tablet    TUMS    150 tablet    Take 1-2 chew tab by mouth daily        capecitabine 500 MG tablet CHEMO    XELODA    168 tablet    Take 3 tablets (1,500 mg) by mouth 2 times daily for 56 doses Take Mon-Fri. Do NOT take on Sat-Sun. Take with water within 30 min after meal    Malignant neoplasm of rectum (H)       dexamethasone 4 MG tablet    DECADRON    3 tablet    Take 1 tablet (4 mg) by mouth daily (with breakfast)    Malignant neoplasm of rectum (H)       diazepam 5 MG tablet    VALIUM    1 tablet    Take 1 hour prior to MRI.    Anxiety       dronabinol 2.5 MG capsule    MARINOL    60 capsule    Take 1 capsule (2.5 mg) by mouth 2 times daily (before meals)    Anorexia       eszopiclone 1 MG Tabs tablet    LUNESTA    30 tablet    Take 1-2 tablets (1-2 mg) by mouth At Bedtime    Other insomnia       fluorouracil      Administer 4,968 mg intravenously . Continuous infusion over 46-48 hr via ambulatory pump q14d Supplied by outside provider with pump.        FLUoxetine 40 MG capsule    PROzac    30 capsule    Take 1 capsule (40 mg) by mouth daily    Other depression       lidocaine-prilocaine cream    EMLA    30 g    Apply 45 minutes prior to procedure.    Malignant neoplasm of rectum (H)       * LORazepam 1 MG tablet    ATIVAN    30 tablet    Take 1 tablet (1 mg) by mouth At Bedtime    Other insomnia       * LORazepam 0.5 MG tablet    ATIVAN    60 tablet    Take 1 tablet (0.5 mg) by mouth every 4 hours as needed (Anxiety, Nausea/Vomiting or Sleep)    Malignant neoplasm of rectum (H), Anxiety       ondansetron 4 MG tablet    ZOFRAN    3 tablet    Take one tablet by mouth every 6 hours as needed for nausea when taking neomycin and flagyl    Rectal cancer (H)       ondansetron 8 MG ODT tab    ZOFRAN-ODT    60 tablet    Take 1 tablet (8 mg)  by mouth every 8 hours as needed for nausea    Chemotherapy induced nausea and vomiting       Potassium Chloride ER 20 MEQ Tbcr     10 tablet    Take 1 tablet (20 mEq) by mouth 2 times daily    Hypokalemia       potassium chloride SA 20 MEQ CR tablet    K-DUR/KLOR-CON M          prochlorperazine 10 MG tablet    COMPAZINE    30 tablet    Take 1 tablet (10 mg) by mouth every 6 hours as needed (Nausea/Vomiting)    Malignant neoplasm of rectum (H)       RANITIDINE HCL PO      Take 150 mg by mouth daily as needed for heartburn        TYLENOL PO      Take 1,000 mg by mouth At Bedtime        zolpidem 5 MG tablet    AMBIEN    30 tablet    Take 1 tablet (5 mg) by mouth nightly as needed for sleep    Other insomnia       * Notice:  This list has 2 medication(s) that are the same as other medications prescribed for you. Read the directions carefully, and ask your doctor or other care provider to review them with you.

## 2018-07-10 ENCOUNTER — APPOINTMENT (OUTPATIENT)
Dept: RADIATION ONCOLOGY | Facility: CLINIC | Age: 53
End: 2018-07-10
Attending: RADIOLOGY
Payer: COMMERCIAL

## 2018-07-10 PROCEDURE — 77386 ZZH IMRT TREATMENT DELIVERY, COMPLEX: CPT | Performed by: RADIOLOGY

## 2018-07-11 ENCOUNTER — TELEPHONE (OUTPATIENT)
Dept: PHARMACY | Facility: CLINIC | Age: 53
End: 2018-07-11

## 2018-07-11 ENCOUNTER — APPOINTMENT (OUTPATIENT)
Dept: RADIATION ONCOLOGY | Facility: CLINIC | Age: 53
End: 2018-07-11
Attending: RADIOLOGY
Payer: COMMERCIAL

## 2018-07-11 PROCEDURE — 77386 ZZH IMRT TREATMENT DELIVERY, COMPLEX: CPT | Performed by: RADIOLOGY

## 2018-07-11 NOTE — ORAL ONC MGMT
Oral Chemotherapy Monitoring Program     Placed call to patient in follow up of Xeloda therapy.  Patient had contacted clinic as she had not received delivery of her medication yet.  Contacted [facility] who informed us the medication was delivered the afternoon of 7/10 and placed on her door step.  Left voicemail with this information for Sarah.     Ish Yo, PharmD.  Oral Chemotherapy Monitoring Program  [phone]

## 2018-07-12 ENCOUNTER — APPOINTMENT (OUTPATIENT)
Dept: RADIATION ONCOLOGY | Facility: CLINIC | Age: 53
End: 2018-07-12
Attending: RADIOLOGY
Payer: COMMERCIAL

## 2018-07-12 PROCEDURE — 77386 ZZH IMRT TREATMENT DELIVERY, COMPLEX: CPT | Performed by: RADIOLOGY

## 2018-07-13 ENCOUNTER — APPOINTMENT (OUTPATIENT)
Dept: RADIATION ONCOLOGY | Facility: CLINIC | Age: 53
End: 2018-07-13
Attending: RADIOLOGY
Payer: COMMERCIAL

## 2018-07-13 PROCEDURE — 77336 RADIATION PHYSICS CONSULT: CPT | Performed by: RADIOLOGY

## 2018-07-13 PROCEDURE — 77386 ZZH IMRT TREATMENT DELIVERY, COMPLEX: CPT | Performed by: RADIOLOGY

## 2018-07-16 ENCOUNTER — OFFICE VISIT (OUTPATIENT)
Dept: RADIATION ONCOLOGY | Facility: CLINIC | Age: 53
End: 2018-07-16
Attending: RADIOLOGY
Payer: COMMERCIAL

## 2018-07-16 VITALS — BODY MASS INDEX: 30.1 KG/M2 | WEIGHT: 180.9 LBS

## 2018-07-16 DIAGNOSIS — C20 MALIGNANT NEOPLASM OF RECTUM (H): Primary | ICD-10-CM

## 2018-07-16 PROCEDURE — 77386 ZZH IMRT TREATMENT DELIVERY, COMPLEX: CPT | Performed by: RADIOLOGY

## 2018-07-16 NOTE — PROGRESS NOTES
RADIATION ONCOLOGY WEEKLY ON TREATMENT VISIT   Encounter Date: 2018    Patient Name: Sarah Rajan  MRN: [id number]  : 1965     Disease and Stage: Rectal adenocarcinoma, [id number]  Treatment Site: Pelvis  Current Dose/Planned Total Dose: [1080] / [5040] cGy  Daily Fraction Size: [180] cGy/day, [5] times/week  Concurrent Chemotherapy: Yes  Drug and Frequency: Xeloda    Treatment Summary:  Ms. Rajan is a 52 year old female with locally advanced adenocarcinoma of the rectum. She initially presented with a mass on screening colonoscopy, 5 cm in length, 2 cm from anal verge. Biopsy showed moderately differentiated adenocarcinoma. MRI showed anal sphincter involvement and equivocal nodes. She is undergoing total neoadjuvant therapy and completed 8 cycles FOLFOX prior to neoadjuvant CRT.     ED visits/Hospitalizations:  None    Missed Treatments:  None    Subjective: Ms. Rajan presents to clinic today for her weekly on-treatment visit. She is currently tolerating RT well. 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 She is reporting constipation for the past few days and tried a suppository with a small BM following. She is planning on trying miralax later today.    ROS:   Constitutional  Pain (0-10): 0  Fatigue: None    GI  Nausea/vomiting: None    Nutrition  PEG: No  Diet: Full    Integumentary  Dermatitis: None    Objective:   Wt 82.1 kg (180 lb 14.4 oz)  BMI 30.1 kg/m2  Current weight: 82.1 kg  Last week's weight: 82 kg  Starting weight: 82 kg    General: alert, oriented, NAD  HEENT: NCAT  Cardiac: WWP  Respiratory: breathing comfortably on RA  Skin: No erythema    Treatment-related toxicities (CTCAE v5.0):  None    LAB  Lab Results   Component Value Date    WBC 2.3 (L) 2018    HGB 11.5 (L) 2018    HCT 33.9 (L) 2018    MCV 98 2018    PLT 87 (L) 2018     Lab Results   Component Value Date     2018    POTASSIUM 3.9 2018    CHLORIDE 108  06/21/2018    CO2 26 06/21/2018    GLC 81 06/21/2018     Assessment:    Ms. Rajan is a 52 year old female with a T4N0M0 rectal adenocarcinoma, s/p induction FOLFOX, currently undergoing neoadjuvant CRT with Xeloda. She is tolerating radiotherapy at this time.    Plan:     Continue radiotherapy    Pain management:    N/A    Fluids/Electrolytes/Nutrition:    Encouraged increased PO hydration given constipation    Dermatitis:    N/A    Mosaiq chart and setup information reviewed  IGRT images reviewed    Medication Review  Med list reviewed with patient?: Yes    The patient was seen and discussed with staff, Dr. Grullon.    Francois eBan MD  Resident, PGY-4  Department of Radiation Oncology  [facility]  [phone]        I saw and examined the patient with the resident.  I have reviewed and agree with the resident's note and plan of care.      Vijay Grullon MD

## 2018-07-16 NOTE — LETTER
2018       RE: Sarah Rajan  [address] [address]     Dear Colleague,    Thank you for referring your patient, Sarah Rajan, to the RADIATION ONCOLOGY CLINIC. Please see a copy of my visit note below.    RADIATION ONCOLOGY WEEKLY ON TREATMENT VISIT   Encounter Date: 2018    Patient Name: Sarah Rajan  MRN: [id number]  : 1965     Disease and Stage: Rectal adenocarcinoma, [id number]  Treatment Site: Pelvis  Current Dose/Planned Total Dose: [1080] / [5040] cGy  Daily Fraction Size: [180] cGy/day, [5] times/week  Concurrent Chemotherapy: Yes  Drug and Frequency: Xeloda    Treatment Summary:  Ms. Rajan is a 52 year old female with locally advanced adenocarcinoma of the rectum. She initially presented with a mass on screening colonoscopy, 5 cm in length, 2 cm from anal verge. Biopsy showed moderately differentiated adenocarcinoma. MRI showed anal sphincter involvement and equivocal nodes. She is undergoing total neoadjuvant therapy and completed 8 cycles FOLFOX prior to neoadjuvant CRT.     ED visits/Hospitalizations:  None    Missed Treatments:  None    Subjective: Ms. Rajan presents to clinic today for her weekly on-treatment visit. She is currently tolerating RT well. 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 She is reporting constipation for the past few days and tried a suppository with a small BM following. She is planning on trying miralax later today.    ROS:   Constitutional  Pain (0-10): 0  Fatigue: None    GI  Nausea/vomiting: None    Nutrition  PEG: No  Diet: Full    Integumentary  Dermatitis: None    Objective:   Wt 82.1 kg (180 lb 14.4 oz)  BMI 30.1 kg/m2  Current weight: 82.1 kg  Last week's weight: 82 kg  Starting weight: 82 kg    General: alert, oriented, NAD  HEENT: NCAT  Cardiac: WWP  Respiratory: breathing comfortably on RA  Skin: No erythema    Treatment-related toxicities (CTCAE v5.0):  None    LAB  Lab Results   Component Value Date     WBC 2.3 (L) 06/21/2018    HGB 11.5 (L) 06/21/2018    HCT 33.9 (L) 06/21/2018    MCV 98 06/21/2018    PLT 87 (L) 06/21/2018     Lab Results   Component Value Date     06/21/2018    POTASSIUM 3.9 06/21/2018    CHLORIDE 108 06/21/2018    CO2 26 06/21/2018    GLC 81 06/21/2018     Assessment:    Ms. Rajan is a 52 year old female with a T4N0M0 rectal adenocarcinoma, s/p induction FOLFOX, currently undergoing neoadjuvant CRT with Xeloda. She is tolerating radiotherapy at this time.    Plan:     Continue radiotherapy    Pain management:    N/A    Fluids/Electrolytes/Nutrition:    Encouraged increased PO hydration given constipation    Dermatitis:    N/A    Mosaiq chart and setup information reviewed  IGRT images reviewed    Medication Review  Med list reviewed with patient?: Yes    The patient was seen and discussed with staff, Dr. Grullon.    Francois Bean MD  Resident, PGY-4  Department of Radiation Oncology  [facility]  [phone]        I saw and examined the patient with the resident.  I have reviewed and agree with the resident's note and plan of care.      Vijay Grullon MD

## 2018-07-16 NOTE — MR AVS SNAPSHOT
After Visit Summary   7/16/2018    Sarah Rajan    MRN: [id number]           Patient Information     Date Of Birth          1965        Visit Information        Provider Department      7/16/2018 10:30 AM Vijay Grullon MD Radiation Oncology Clinic        Today's Diagnoses     Malignant neoplasm of rectum (H)    -  1       Follow-ups after your visit        Your next 10 appointments already scheduled     Jul 17, 2018 10:00 AM CDT   EXTERNAL RADIATION TREATMENT with UMP RAD ONC VARIAN   Radiation Oncology Clinic ([facility])    [facility]  1st Floor  [address] [address]   [phone]            Jul 18, 2018 10:00 AM CDT   EXTERNAL RADIATION TREATMENT with UMP RAD ONC VARIAN   Radiation Oncology Clinic ([facility])    [facility]  1st Floor  [address] [address]   [phone]            Jul 19, 2018 10:00 AM CDT   EXTERNAL RADIATION TREATMENT with UMP RAD ONC VARIAN   Radiation Oncology Clinic ([facility])    [facility]  1st Floor  [address] [address]   [phone]            Jul 20, 2018 10:00 AM CDT   EXTERNAL RADIATION TREATMENT with UMP RAD ONC VARIAN   Radiation Oncology Clinic ([facility])    [facility]  1st Floor  [address] [address]   [phone]            Jul 23, 2018 10:00 AM CDT   EXTERNAL RADIATION TREATMENT with UMP RAD ONC VARIAN   Radiation Oncology Clinic ([facility])    [facility]  1st Floor  [address] [address]   [phone]            Jul 23, 2018 10:30 AM CDT   ON TREATMENT VISIT with Mirian Ford MD   Radiation Oncology Clinic ([facility])    [facility]  1st Floor  [address] [address]   [phone]            Jul 24,  2018 10:00 AM CDT   EXTERNAL RADIATION TREATMENT with P RAD ONC VARIAN   Radiation Oncology Clinic ([facility])    [facility]  1st Floor  [address] [address]   [phone]            Jul 25, 2018 10:00 AM CDT   EXTERNAL RADIATION TREATMENT with UMP RAD ONC VARIAN   Radiation Oncology Clinic ([facility])    [facility]  1st Floor  [address] [address]   [phone]            Jul 25, 2018  1:40 PM CDT   (Arrive by 1:25 PM)   Return Visit with Cathleen Ramos PA-C   [facility] ([facility])    [address]  [address]  [facility] [address] [phone]            Jul 26, 2018 10:00 AM CDT   EXTERNAL RADIATION TREATMENT with [facility] RAD ONC VARIAN   Radiation Oncology Clinic ([facility])    [facility]  1st Floor  [address] [id number] [phone]              Who to contact     Please call your clinic at [phone] to:    Ask questions about your health    Make or cancel appointments    Discuss your medicines    Learn about your test results    Speak to your doctor            Additional Information About Your Visit        NowPublic Information     [employer]Public gives you secure access to your electronic health record. If you see a primary care provider, you can also send messages to your care team and make appointments. If you have questions, please call your primary care clinic.  If you do not have a primary care provider, please call [phone] and they will assist you.      [employer] is an electronic gateway that provides easy, online access to your medical records. With [employer]Public, you can request a clinic appointment, read your test results, renew a prescription or communicate with your care team.     To access your existing account, please contact your [facility]  Clinic or call [phone] for assistance.        Care EveryWhere ID     This is your Care EveryWhere ID. This could be used by other organizations to access your [place] medical records  [id number]        Your Vitals Were     BMI (Body Mass Index)                   30.1 kg/m2            Blood Pressure from Last 3 Encounters:   06/22/18 101/68   06/21/18 96/70   06/15/18 95/62    Weight from Last 3 Encounters:   07/16/18 82.1 kg (180 lb 14.4 oz)   07/09/18 82 kg (180 lb 12.8 oz)   06/22/18 82.1 kg (181 lb)              Today, you had the following     No orders found for display       Primary Care Provider Office Phone # Fax #    Kyra Mortensen [phone] [address] [address]        Equal Access to Services     [facility]: Hadii aad ku hadasho Sojustin, waaxda luqadaha, qaybta kaalmada adetimothyyada, luz elena mtz . So [facility] [phone].    ATENCIÓN: Si habla español, tiene a membreno disposición servicios gratuitos de asistencia lingüística. Julietaame al [phone].    We comply with applicable federal civil rights laws and [place] laws. We do not discriminate on the basis of race, color, national origin, age, disability, sex, sexual orientation, or gender identity.            Thank you!     Thank you for choosing RADIATION ONCOLOGY CLINIC  for your care. Our goal is always to provide you with excellent care. Hearing back from our patients is one way we can continue to improve our services. Please take a few minutes to complete the written survey that you may receive in the mail after your visit with us. Thank you!             Your Updated Medication List - Protect others around you: Learn how to safely use, store and throw away your medicines at www.disposemymeds.org.          This list is accurate as of 7/16/18  1:07 PM.  Always use your most recent med list.                   Brand Name Dispense Instructions for use Diagnosis    amitriptyline 25 MG  tablet    ELAVIL    30 tablet    Take 1 tablet (25 mg) by mouth At Bedtime    Insomnia, unspecified type, Malignant neoplasm of rectum (H), Liver lesion       calcium carbonate 500 MG chewable tablet    TUMS    150 tablet    Take 1-2 chew tab by mouth daily        capecitabine 500 MG tablet CHEMO    XELODA    168 tablet    Take 3 tablets (1,500 mg) by mouth 2 times daily for 56 doses Take Mon-Fri. Do NOT take on Sat-Sun. Take with water within 30 min after meal    Malignant neoplasm of rectum (H)       dexamethasone 4 MG tablet    DECADRON    3 tablet    Take 1 tablet (4 mg) by mouth daily (with breakfast)    Malignant neoplasm of rectum (H)       diazepam 5 MG tablet    VALIUM    1 tablet    Take 1 hour prior to MRI.    Anxiety       dronabinol 2.5 MG capsule    MARINOL    60 capsule    Take 1 capsule (2.5 mg) by mouth 2 times daily (before meals)    Anorexia       eszopiclone 1 MG Tabs tablet    LUNESTA    30 tablet    Take 1-2 tablets (1-2 mg) by mouth At Bedtime    Other insomnia       fluorouracil      Administer 4,968 mg intravenously . Continuous infusion over 46-48 hr via ambulatory pump q14d Supplied by outside provider with pump.        FLUoxetine 40 MG capsule    PROzac    30 capsule    Take 1 capsule (40 mg) by mouth daily    Other depression       lidocaine-prilocaine cream    EMLA    30 g    Apply 45 minutes prior to procedure.    Malignant neoplasm of rectum (H)       * LORazepam 1 MG tablet    ATIVAN    30 tablet    Take 1 tablet (1 mg) by mouth At Bedtime    Other insomnia       * LORazepam 0.5 MG tablet    ATIVAN    60 tablet    Take 1 tablet (0.5 mg) by mouth every 4 hours as needed (Anxiety, Nausea/Vomiting or Sleep)    Malignant neoplasm of rectum (H), Anxiety       ondansetron 4 MG tablet    ZOFRAN    3 tablet    Take one tablet by mouth every 6 hours as needed for nausea when taking neomycin and flagyl    Rectal cancer (H)       ondansetron 8 MG ODT tab    ZOFRAN-ODT    60 tablet    Take 1  tablet (8 mg) by mouth every 8 hours as needed for nausea    Chemotherapy induced nausea and vomiting       Potassium Chloride ER 20 MEQ Tbcr     10 tablet    Take 1 tablet (20 mEq) by mouth 2 times daily    Hypokalemia       potassium chloride SA 20 MEQ CR tablet    K-DUR/KLOR-CON M          prochlorperazine 10 MG tablet    COMPAZINE    30 tablet    Take 1 tablet (10 mg) by mouth every 6 hours as needed (Nausea/Vomiting)    Malignant neoplasm of rectum (H)       RANITIDINE HCL PO      Take 150 mg by mouth daily as needed for heartburn        TYLENOL PO      Take 1,000 mg by mouth At Bedtime        zolpidem 5 MG tablet    AMBIEN    30 tablet    Take 1 tablet (5 mg) by mouth nightly as needed for sleep    Other insomnia       * Notice:  This list has 2 medication(s) that are the same as other medications prescribed for you. Read the directions carefully, and ask your doctor or other care provider to review them with you.

## 2018-07-17 ENCOUNTER — CARE COORDINATION (OUTPATIENT)
Dept: ONCOLOGY | Facility: CLINIC | Age: 53
End: 2018-07-17

## 2018-07-17 ENCOUNTER — APPOINTMENT (OUTPATIENT)
Dept: RADIATION ONCOLOGY | Facility: CLINIC | Age: 53
End: 2018-07-17
Attending: RADIOLOGY
Payer: COMMERCIAL

## 2018-07-17 LAB
BASOPHILS # BLD AUTO: 0 10E9/L (ref 0–0.2)
BASOPHILS NFR BLD AUTO: 0.7 %
DIFFERENTIAL METHOD BLD: ABNORMAL
EOSINOPHIL # BLD AUTO: 0.1 10E9/L (ref 0–0.7)
EOSINOPHIL NFR BLD AUTO: 3 %
ERYTHROCYTE [DISTWIDTH] IN BLOOD BY AUTOMATED COUNT: 12.7 % (ref 10–15)
HCT VFR BLD AUTO: 35.2 % (ref 35–47)
HGB BLD-MCNC: 11.8 G/DL (ref 11.7–15.7)
IMM GRANULOCYTES # BLD: 0 10E9/L (ref 0–0.4)
IMM GRANULOCYTES NFR BLD: 0.4 %
LYMPHOCYTES # BLD AUTO: 0.5 10E9/L (ref 0.8–5.3)
LYMPHOCYTES NFR BLD AUTO: 18 %
MCH RBC QN AUTO: 32.3 PG (ref 26.5–33)
MCHC RBC AUTO-ENTMCNC: 33.5 G/DL (ref 31.5–36.5)
MCV RBC AUTO: 96 FL (ref 78–100)
MONOCYTES # BLD AUTO: 0.2 10E9/L (ref 0–1.3)
MONOCYTES NFR BLD AUTO: 7.1 %
NEUTROPHILS # BLD AUTO: 1.9 10E9/L (ref 1.6–8.3)
NEUTROPHILS NFR BLD AUTO: 70.8 %
NRBC # BLD AUTO: 0 10*3/UL
NRBC BLD AUTO-RTO: 0 /100
PLATELET # BLD AUTO: 124 10E9/L (ref 150–450)
RBC # BLD AUTO: 3.65 10E12/L (ref 3.8–5.2)
WBC # BLD AUTO: 2.7 10E9/L (ref 4–11)

## 2018-07-17 PROCEDURE — 85025 COMPLETE CBC W/AUTO DIFF WBC: CPT | Performed by: RADIOLOGY

## 2018-07-17 PROCEDURE — 77386 ZZH IMRT TREATMENT DELIVERY, COMPLEX: CPT | Performed by: RADIOLOGY

## 2018-07-17 NOTE — PROGRESS NOTES
Attempted to reach pt per Cathleen Ramos:    Cathleen Ramos PA-C Jankovich, Diana, RN; Kyra Mortensen MD                   I suspect the neuropathy is from previous oxaliplatin. I would be happy to see her on 7/18 with labs if she would like. I have one slot left. Otherwise, she could see Dr. Mortensen on 7/19. I would get a CBC and CMP. Thanks.   Cathleen            Previous Messages       ----- Message -----      From: Veronika House RN      Sent: 7/16/2018   3:32 PM        To: Cathleen Ramos PA-C, Kyra Mortensen MD     Hello!     Please see message below from rad onc. Thanks!     -Veronika     ----- Message -----      From: Ethel Mohamud RN      Sent: 7/16/2018   2:17 PM        To: Veronika House RN     Does patient need weekly labs? And she is having a lot of neuropathy with the Xeloda:(  Don't see a follow up for awelissa.   Ethel          No answer on mobile phone. TC clinic regarding symptoms and possibly setting up an appointment. Attempted to reach pt on home phone. Line busy. Will await response from message left on mobile.

## 2018-07-18 ENCOUNTER — APPOINTMENT (OUTPATIENT)
Dept: RADIATION ONCOLOGY | Facility: CLINIC | Age: 53
End: 2018-07-18
Attending: RADIOLOGY
Payer: COMMERCIAL

## 2018-07-18 PROCEDURE — 77386 ZZH IMRT TREATMENT DELIVERY, COMPLEX: CPT | Performed by: RADIOLOGY

## 2018-07-19 ENCOUNTER — APPOINTMENT (OUTPATIENT)
Dept: RADIATION ONCOLOGY | Facility: CLINIC | Age: 53
End: 2018-07-19
Attending: RADIOLOGY
Payer: COMMERCIAL

## 2018-07-19 PROCEDURE — 77386 ZZH IMRT TREATMENT DELIVERY, COMPLEX: CPT | Performed by: RADIOLOGY

## 2018-07-20 ENCOUNTER — APPOINTMENT (OUTPATIENT)
Dept: RADIATION ONCOLOGY | Facility: CLINIC | Age: 53
End: 2018-07-20
Attending: RADIOLOGY
Payer: COMMERCIAL

## 2018-07-20 ENCOUNTER — TELEPHONE (OUTPATIENT)
Dept: ONCOLOGY | Facility: CLINIC | Age: 53
End: 2018-07-20

## 2018-07-20 DIAGNOSIS — Z79.899 ENCOUNTER FOR LONG-TERM (CURRENT) USE OF MEDICATIONS: Primary | ICD-10-CM

## 2018-07-20 DIAGNOSIS — C20 RECTAL CANCER (H): ICD-10-CM

## 2018-07-20 PROCEDURE — 77336 RADIATION PHYSICS CONSULT: CPT | Performed by: RADIOLOGY

## 2018-07-20 PROCEDURE — 77386 ZZH IMRT TREATMENT DELIVERY, COMPLEX: CPT | Performed by: RADIOLOGY

## 2018-07-20 NOTE — TELEPHONE ENCOUNTER
Oral Chemotherapy Monitoring Program    Placed call to patient in follow up of Xeloda therapy.    Left message to please call back in follow up of therapy. No patient or drug names were mentioned.    Nolan Kaur  Pharmacy Intern  Oral Chemotherapy Monitoring Program  [facility]  [phone]

## 2018-07-23 ENCOUNTER — APPOINTMENT (OUTPATIENT)
Dept: RADIATION ONCOLOGY | Facility: CLINIC | Age: 53
End: 2018-07-23
Attending: RADIOLOGY
Payer: COMMERCIAL

## 2018-07-23 VITALS — WEIGHT: 182.3 LBS | BODY MASS INDEX: 30.34 KG/M2

## 2018-07-23 DIAGNOSIS — C20 RECTAL CANCER (H): ICD-10-CM

## 2018-07-23 DIAGNOSIS — Z79.899 ENCOUNTER FOR LONG-TERM (CURRENT) USE OF MEDICATIONS: ICD-10-CM

## 2018-07-23 DIAGNOSIS — K13.79 ORAL PAIN: Primary | ICD-10-CM

## 2018-07-23 PROCEDURE — 77386 ZZH IMRT TREATMENT DELIVERY, COMPLEX: CPT | Performed by: RADIOLOGY

## 2018-07-23 NOTE — PROGRESS NOTES
RADIATION ONCOLOGY WEEKLY ON TREATMENT VISIT   Encounter Date: 2018    Patient Name: Sarah Rajan  MRN: [id number]  : 1965     Disease and Stage: Rectal adenocarcinoma, [id number]  Treatment Site: Pelvis  Current Dose/Planned Total Dose: [1980] / [5040] cGy  Daily Fraction Size: [180] cGy/day, [5] times/week  Concurrent Chemotherapy: Yes  Drug and Frequency: Xeloda    Treatment Summary:  Ms. Rajan is a 52 year old female with locally advanced adenocarcinoma of the rectum. She initially presented with a mass on screening colonoscopy, 5 cm in length, 2 cm from anal verge. Biopsy showed moderately differentiated adenocarcinoma. MRI showed anal sphincter involvement and equivocal nodes. She is undergoing total neoadjuvant therapy and completed 8 cycles FOLFOX prior to neoadjuvant CRT.     ED visits/Hospitalizations:  None    Missed Treatments:  None    Subjective: Ms. Rajan presents to clinic today for her weekly on-treatment visit. She is currently tolerating RT well. She is having intermittent diarrhea and is taking lomotil as needed. Also starting to notice some diarrhea. The majority of her complaints today are related to her chemotherapy treatments including nausea and tongue pain. She states that she has gotten her nausea under fairly good control with medications but has noticed a decreased appetite which she attributes to the tongue pain. She has not noticed any mouth sores however.    ROS:   Constitutional  Pain (0-10): 0  Fatigue: None    GI  Nausea/vomiting: None    Nutrition  PEG: No  Diet: Full    Integumentary  Dermatitis: None    Objective:   Wt 82.7 kg (182 lb 4.8 oz)  BMI 30.34 kg/m2  Current weight: 82.7 kg  Last week's weight: 82.1 kg  Starting weight: 82 kg    General: alert, oriented, NAD  HEENT: NCAT, no oral mucositis  Cardiac: WWP  Respiratory: breathing comfortably on RA  Skin: No erythema    Treatment-related toxicities (CTCAE v5.0):  1. Fatigue: grade 1  2. Diarrhea: grade  1    LAB  Lab Results   Component Value Date    WBC 2.7 (L) 07/17/2018    HGB 11.8 07/17/2018    HCT 35.2 07/17/2018    MCV 96 07/17/2018     (L) 07/17/2018     Lab Results   Component Value Date     06/21/2018    POTASSIUM 3.9 06/21/2018    CHLORIDE 108 06/21/2018    CO2 26 06/21/2018    GLC 81 06/21/2018     Assessment:    Ms. Rajan is a 52 year old female with a T4N0M0 rectal adenocarcinoma, s/p induction FOLFOX, currently undergoing neoadjuvant CRT with Xeloda. She is tolerating radiotherapy at this time.    Plan:     Continue radiotherapy    Pain management:    Magic mouthwash ordered for oral pain    Fluids/Electrolytes/Nutrition:    Normal diet    Dermatitis:    N/A    Mosaiq chart and setup information reviewed  IGRT images reviewed    Medication Review  Med list reviewed with patient?: Yes    The patient was seen and discussed with staff, Dr. Ford.    Francois Bean MD  Resident, PGY-4  Department of Radiation Oncology  [facility]  [phone]      Ms. Rajan was seen and examined by me. Note above by Dr. Bean was reviewed and edited by me and reflects our mutual findings and plan of care.    Mirian Ford MD  Department of Radiation Oncology  [facility]

## 2018-07-23 NOTE — LETTER
2018       RE: Sarah Rajan  [address] [address]     Dear Colleague,    Thank you for referring your patient, Sarah Rajan, to the RADIATION ONCOLOGY CLINIC. Please see a copy of my visit note below.    RADIATION ONCOLOGY WEEKLY ON TREATMENT VISIT   Encounter Date: 2018    Patient Name: Sarah Rajan  MRN: [id number]  : 1965     Disease and Stage: Rectal adenocarcinoma, [id number]  Treatment Site: Pelvis  Current Dose/Planned Total Dose: [1980] / [5040] cGy  Daily Fraction Size: [180] cGy/day, [5] times/week  Concurrent Chemotherapy: Yes  Drug and Frequency: Xeloda    Treatment Summary:  Ms. Rajan is a 52 year old female with locally advanced adenocarcinoma of the rectum. She initially presented with a mass on screening colonoscopy, 5 cm in length, 2 cm from anal verge. Biopsy showed moderately differentiated adenocarcinoma. MRI showed anal sphincter involvement and equivocal nodes. She is undergoing total neoadjuvant therapy and completed 8 cycles FOLFOX prior to neoadjuvant CRT.     ED visits/Hospitalizations:  None    Missed Treatments:  None    Subjective: Ms. Rajan presents to clinic today for her weekly on-treatment visit. She is currently tolerating RT well. She is having intermittent diarrhea and is taking lomotil as needed. Also starting to notice some diarrhea. The majority of her complaints today are related to her chemotherapy treatments including nausea and tongue pain. She states that she has gotten her nausea under fairly good control with medications but has noticed a decreased appetite which she attributes to the tongue pain. She has not noticed any mouth sores however.    ROS:   Constitutional  Pain (0-10): 0  Fatigue: None    GI  Nausea/vomiting: None    Nutrition  PEG: No  Diet: Full    Integumentary  Dermatitis: None    Objective:   Wt 82.7 kg (182 lb 4.8 oz)  BMI 30.34 kg/m2  Current weight: 82.7 kg  Last week's weight: 82.1 kg  Starting weight: 82  kg    General: alert, oriented, NAD  HEENT: NCAT, no oral mucositis  Cardiac: WWP  Respiratory: breathing comfortably on RA  Skin: No erythema    Treatment-related toxicities (CTCAE v5.0):  1. Fatigue: grade 1  2. Diarrhea: grade 1    LAB  Lab Results   Component Value Date    WBC 2.7 (L) 07/17/2018    HGB 11.8 07/17/2018    HCT 35.2 07/17/2018    MCV 96 07/17/2018     (L) 07/17/2018     Lab Results   Component Value Date     06/21/2018    POTASSIUM 3.9 06/21/2018    CHLORIDE 108 06/21/2018    CO2 26 06/21/2018    GLC 81 06/21/2018     Assessment:    Ms. Rajan is a 52 year old female with a T4N0M0 rectal adenocarcinoma, s/p induction FOLFOX, currently undergoing neoadjuvant CRT with Xeloda. She is tolerating radiotherapy at this time.    Plan:     Continue radiotherapy    Pain management:    Magic mouthwash ordered for oral pain    Fluids/Electrolytes/Nutrition:    Normal diet    Dermatitis:    N/A    Mosaiq chart and setup information reviewed  IGRT images reviewed    Medication Review  Med list reviewed with patient?: Yes    The patient was seen and discussed with staff, Dr. Ford.    Francois Bean MD  Resident, PGY-4  Department of Radiation Oncology  [facility]  [phone]      Ms. Rajan was seen and examined by me. Note above by Dr. Bean was reviewed and edited by me and reflects our mutual findings and plan of care.    Mirian Ford MD  Department of Radiation Oncology  [facility]

## 2018-07-23 NOTE — MR AVS SNAPSHOT
After Visit Summary   7/23/2018    Sarah Rajan    MRN: [id number]           Patient Information     Date Of Birth          1965        Visit Information        Provider Department      7/23/2018 10:30 AM Mirian Ford MD Radiation Oncology [facility]         Follow-ups after your visit        Your next 10 appointments already scheduled     Jul 23, 2018 10:30 AM CDT   ON TREATMENT VISIT with Mirian Ford MD   Radiation Oncology Clinic ([facility])    [facility]  1st Floor  [address]  [facility] [address]   [phone]            Jul 24, 2018 10:00 AM CDT   EXTERNAL RADIATION TREATMENT with UMP RAD ONC VARIAN   Radiation Oncology Clinic ([facility])    [facility]  1st Floor  [address]  [facility] [address]   [phone]            Jul 25, 2018 12:45 PM CDT   EXTERNAL RADIATION TREATMENT with UMP RAD ONC VARIAN   Radiation Oncology Clinic ([facility])    [facility]  1st Floor  [address] [facility] [address] [address]   [phone]            Jul 25, 2018  1:15 PM CDT   [facility] Draw with  [facility] DRAW   [facility] Draw ([facility])    [address]  [address]  [facility] [address]   [phone]            Jul 25, 2018  1:40 PM CDT   (Arrive by 1:25 PM)   Return Visit with Cathleen Ramos PA-C   [facility] ([facility])    [address]  [address]  [facility] [address]   [phone]            Jul 26, 2018 10:00 AM CDT   EXTERNAL RADIATION TREATMENT with UMP RAD ONC VARIAN   Radiation Oncology Clinic ([facility])    [facility]  1st Floor  [address] [address]   [phone]            Jul 27, 2018 10:00 AM CDT   EXTERNAL RADIATION TREATMENT with UMP RAD ONC VARIAN   Radiation  Oncology Clinic ([facility])    [facility]  1st Floor  [address] [address]   [phone]            Jul 30, 2018 10:00 AM CDT   EXTERNAL RADIATION TREATMENT with [facility] RAD ONC VARIAN   Radiation Oncology Clinic ([facility])    [facility]  1st Floor  [address] [address]   [phone]            Jul 30, 2018 10:30 AM CDT   ON TREATMENT VISIT with Mirian Ford MD   Radiation Oncology Clinic ([facility])    [facility]  1st Floor  [address] [address]   [phone]            Jul 31, 2018 10:00 AM CDT   EXTERNAL RADIATION TREATMENT with [facility] RAD ONC VARIAN   Radiation Oncology Clinic ([facility])    [facility]  1st Floor  [address] [address]   [phone]              Who to contact     Please call your clinic at [phone] to:    Ask questions about your health    Make or cancel appointments    Discuss your medicines    Learn about your test results    Speak to your doctor            Additional Information About Your Visit        [employer] Information     [employer] gives you secure access to your electronic health record. If you see a primary care provider, you can also send messages to your care team and make appointments. If you have questions, please call your primary care clinic.  If you do not have a primary care provider, please call [phone] and they will assist you.      [employer] is an electronic gateway that provides easy, online access to your medical records. With [employer], you can request a clinic appointment, read your test results, renew a prescription or communicate with your care team.     To access your existing account, please contact your [facility] or call [phone] for assistance.        Care EveryWhere ID     This is your  Care EveryWhere ID. This could be used by other organizations to access your [place] medical records  [id number]         Blood Pressure from Last 3 Encounters:   06/22/18 101/68   06/21/18 96/70   06/15/18 95/62    Weight from Last 3 Encounters:   07/16/18 82.1 kg (180 lb 14.4 oz)   07/09/18 82 kg (180 lb 12.8 oz)   06/22/18 82.1 kg (181 lb)              Today, you had the following     No orders found for display       Primary Care Provider Office Phone # Fax #    Kyra ALVAREZ MD [place] [phone] [address] [facility] [address]        Equal Access to Services     DEAN CHAKRABORTY : Hadliban jaramilloo Marcello, warashaadda luqadaha, qaybta kaalmada adetimothyyada, luz elena jacob. So [facility] [phone].    ATENCIÓN: Si habla español, tiene a membreno disposición servicios gratuitos de asistencia lingüística. [facility] [phone].    We comply with applicable federal civil rights laws and [place] laws. We do not discriminate on the basis of race, color, national origin, age, disability, sex, sexual orientation, or gender identity.            Thank you!     Thank you for choosing RADIATION ONCOLOGY CLINIC  for your care. Our goal is always to provide you with excellent care. Hearing back from our patients is one way we can continue to improve our services. Please take a few minutes to complete the written survey that you may receive in the mail after your visit with us. Thank you!             Your Updated Medication List - Protect others around you: Learn how to safely use, store and throw away your medicines at www.disposemymeds.org.          This list is accurate as of 7/23/18 10:12 AM.  Always use your most recent med list.                   Brand Name Dispense Instructions for use Diagnosis    amitriptyline 25 MG tablet    ELAVIL    30 tablet    Take 1 tablet (25 mg) by mouth At Bedtime    Insomnia, unspecified type, Malignant neoplasm of rectum (H), Liver lesion       calcium  carbonate 500 MG chewable tablet    TUMS    150 tablet    Take 1-2 chew tab by mouth daily        capecitabine 500 MG tablet CHEMO    XELODA    168 tablet    Take 3 tablets (1,500 mg) by mouth 2 times daily for 56 doses Take Mon-Fri. Do NOT take on Sat-Sun. Take with water within 30 min after meal    Malignant neoplasm of rectum (H)       dexamethasone 4 MG tablet    DECADRON    3 tablet    Take 1 tablet (4 mg) by mouth daily (with breakfast)    Malignant neoplasm of rectum (H)       diazepam 5 MG tablet    VALIUM    1 tablet    Take 1 hour prior to MRI.    Anxiety       dronabinol 2.5 MG capsule    MARINOL    60 capsule    Take 1 capsule (2.5 mg) by mouth 2 times daily (before meals)    Anorexia       eszopiclone 1 MG Tabs tablet    LUNESTA    30 tablet    Take 1-2 tablets (1-2 mg) by mouth At Bedtime    Other insomnia       fluorouracil      Administer 4,968 mg intravenously . Continuous infusion over 46-48 hr via ambulatory pump q14d Supplied by outside provider with pump.        FLUoxetine 40 MG capsule    PROzac    30 capsule    Take 1 capsule (40 mg) by mouth daily    Other depression       lidocaine-prilocaine cream    EMLA    30 g    Apply 45 minutes prior to procedure.    Malignant neoplasm of rectum (H)       * LORazepam 1 MG tablet    ATIVAN    30 tablet    Take 1 tablet (1 mg) by mouth At Bedtime    Other insomnia       * LORazepam 0.5 MG tablet    ATIVAN    60 tablet    Take 1 tablet (0.5 mg) by mouth every 4 hours as needed (Anxiety, Nausea/Vomiting or Sleep)    Malignant neoplasm of rectum (H), Anxiety       ondansetron 4 MG tablet    ZOFRAN    3 tablet    Take one tablet by mouth every 6 hours as needed for nausea when taking neomycin and flagyl    Rectal cancer (H)       ondansetron 8 MG ODT tab    ZOFRAN-ODT    60 tablet    Take 1 tablet (8 mg) by mouth every 8 hours as needed for nausea    Chemotherapy induced nausea and vomiting       Potassium Chloride ER 20 MEQ Tbcr     10 tablet    Take 1  tablet (20 mEq) by mouth 2 times daily    Hypokalemia       potassium chloride SA 20 MEQ CR tablet    K-DUR/KLOR-CON M          prochlorperazine 10 MG tablet    COMPAZINE    30 tablet    Take 1 tablet (10 mg) by mouth every 6 hours as needed (Nausea/Vomiting)    Malignant neoplasm of rectum (H)       RANITIDINE HCL PO      Take 150 mg by mouth daily as needed for heartburn        TYLENOL PO      Take 1,000 mg by mouth At Bedtime        zolpidem 5 MG tablet    AMBIEN    30 tablet    Take 1 tablet (5 mg) by mouth nightly as needed for sleep    Other insomnia       * Notice:  This list has 2 medication(s) that are the same as other medications prescribed for you. Read the directions carefully, and ask your doctor or other care provider to review them with you.

## 2018-07-24 ENCOUNTER — APPOINTMENT (OUTPATIENT)
Dept: RADIATION ONCOLOGY | Facility: CLINIC | Age: 53
End: 2018-07-24
Attending: RADIOLOGY
Payer: COMMERCIAL

## 2018-07-24 LAB
ALBUMIN SERPL-MCNC: 3.9 G/DL (ref 3.4–5)
ALP SERPL-CCNC: 86 U/L (ref 40–150)
ALT SERPL W P-5'-P-CCNC: 34 U/L (ref 0–50)
ANION GAP SERPL CALCULATED.3IONS-SCNC: 7 MMOL/L (ref 3–14)
AST SERPL W P-5'-P-CCNC: 23 U/L (ref 0–45)
BASOPHILS # BLD AUTO: 0 10E9/L (ref 0–0.2)
BASOPHILS NFR BLD AUTO: 0.3 %
BILIRUB SERPL-MCNC: 0.4 MG/DL (ref 0.2–1.3)
BUN SERPL-MCNC: 11 MG/DL (ref 7–30)
CALCIUM SERPL-MCNC: 9 MG/DL (ref 8.5–10.1)
CHLORIDE SERPL-SCNC: 106 MMOL/L (ref 94–109)
CO2 SERPL-SCNC: 27 MMOL/L (ref 20–32)
CREAT SERPL-MCNC: 0.8 MG/DL (ref 0.52–1.04)
DIFFERENTIAL METHOD BLD: ABNORMAL
EOSINOPHIL # BLD AUTO: 0.2 10E9/L (ref 0–0.7)
EOSINOPHIL NFR BLD AUTO: 6.9 %
ERYTHROCYTE [DISTWIDTH] IN BLOOD BY AUTOMATED COUNT: 13 % (ref 10–15)
GFR SERPL CREATININE-BSD FRML MDRD: 76 ML/MIN/1.7M2
GLUCOSE SERPL-MCNC: 90 MG/DL (ref 70–99)
HCT VFR BLD AUTO: 35.8 % (ref 35–47)
HGB BLD-MCNC: 11.9 G/DL (ref 11.7–15.7)
IMM GRANULOCYTES # BLD: 0 10E9/L (ref 0–0.4)
IMM GRANULOCYTES NFR BLD: 0.3 %
LYMPHOCYTES # BLD AUTO: 0.5 10E9/L (ref 0.8–5.3)
LYMPHOCYTES NFR BLD AUTO: 15.8 %
MCH RBC QN AUTO: 32.2 PG (ref 26.5–33)
MCHC RBC AUTO-ENTMCNC: 33.2 G/DL (ref 31.5–36.5)
MCV RBC AUTO: 97 FL (ref 78–100)
MONOCYTES # BLD AUTO: 0.2 10E9/L (ref 0–1.3)
MONOCYTES NFR BLD AUTO: 6.9 %
NEUTROPHILS # BLD AUTO: 2 10E9/L (ref 1.6–8.3)
NEUTROPHILS NFR BLD AUTO: 69.8 %
NRBC # BLD AUTO: 0 10*3/UL
NRBC BLD AUTO-RTO: 0 /100
PLATELET # BLD AUTO: 122 10E9/L (ref 150–450)
POTASSIUM SERPL-SCNC: 3.6 MMOL/L (ref 3.4–5.3)
PROT SERPL-MCNC: 6.9 G/DL (ref 6.8–8.8)
RBC # BLD AUTO: 3.69 10E12/L (ref 3.8–5.2)
SODIUM SERPL-SCNC: 141 MMOL/L (ref 133–144)
WBC # BLD AUTO: 2.9 10E9/L (ref 4–11)

## 2018-07-24 PROCEDURE — 77386 ZZH IMRT TREATMENT DELIVERY, COMPLEX: CPT | Performed by: RADIOLOGY

## 2018-07-24 PROCEDURE — 85025 COMPLETE CBC W/AUTO DIFF WBC: CPT | Performed by: INTERNAL MEDICINE

## 2018-07-24 PROCEDURE — 80053 COMPREHEN METABOLIC PANEL: CPT | Performed by: INTERNAL MEDICINE

## 2018-07-25 ENCOUNTER — APPOINTMENT (OUTPATIENT)
Dept: RADIATION ONCOLOGY | Facility: CLINIC | Age: 53
End: 2018-07-25
Attending: RADIOLOGY
Payer: COMMERCIAL

## 2018-07-25 ENCOUNTER — ONCOLOGY VISIT (OUTPATIENT)
Dept: ONCOLOGY | Facility: CLINIC | Age: 53
End: 2018-07-25
Attending: INTERNAL MEDICINE
Payer: COMMERCIAL

## 2018-07-25 VITALS
HEIGHT: 65 IN | SYSTOLIC BLOOD PRESSURE: 90 MMHG | DIASTOLIC BLOOD PRESSURE: 54 MMHG | RESPIRATION RATE: 18 BRPM | OXYGEN SATURATION: 99 % | TEMPERATURE: 98.2 F | HEART RATE: 78 BPM | BODY MASS INDEX: 30.57 KG/M2 | WEIGHT: 183.5 LBS

## 2018-07-25 DIAGNOSIS — C20 MALIGNANT NEOPLASM OF RECTUM (H): Primary | ICD-10-CM

## 2018-07-25 DIAGNOSIS — R11.2 CHEMOTHERAPY INDUCED NAUSEA AND VOMITING: ICD-10-CM

## 2018-07-25 DIAGNOSIS — T45.1X5A CHEMOTHERAPY INDUCED NAUSEA AND VOMITING: ICD-10-CM

## 2018-07-25 DIAGNOSIS — G47.09 OTHER INSOMNIA: ICD-10-CM

## 2018-07-25 PROCEDURE — 99214 OFFICE O/P EST MOD 30 MIN: CPT | Mod: ZP | Performed by: PHYSICIAN ASSISTANT

## 2018-07-25 PROCEDURE — 77386 ZZH IMRT TREATMENT DELIVERY, COMPLEX: CPT | Performed by: RADIOLOGY

## 2018-07-25 PROCEDURE — G0463 HOSPITAL OUTPT CLINIC VISIT: HCPCS | Mod: ZF

## 2018-07-25 RX ORDER — ONDANSETRON 8 MG/1
8 TABLET, FILM COATED ORAL EVERY 8 HOURS PRN
Qty: 60 TABLET | Refills: 3 | Status: SHIPPED | OUTPATIENT
Start: 2018-07-25 | End: 2019-05-09

## 2018-07-25 RX ORDER — ESZOPICLONE 1 MG/1
1-2 TABLET, FILM COATED ORAL AT BEDTIME
Qty: 60 TABLET | Refills: 3 | Status: SHIPPED | OUTPATIENT
Start: 2018-07-25 | End: 2018-11-23

## 2018-07-25 RX ORDER — ONDANSETRON 8 MG/1
8 TABLET, ORALLY DISINTEGRATING ORAL EVERY 8 HOURS PRN
Qty: 60 TABLET | Refills: 3 | Status: CANCELLED | OUTPATIENT
Start: 2018-07-25

## 2018-07-25 ASSESSMENT — PAIN SCALES - GENERAL: PAINLEVEL: NO PAIN (0)

## 2018-07-25 NOTE — MR AVS SNAPSHOT
After Visit Summary   7/25/2018    Sarah Rajan    MRN: [id number]           Patient Information     Date Of Birth          1965        Visit Information        Provider Department      7/25/2018 1:40 PM Cathleen Ramos PA-C [facility]        Today's Diagnoses     Malignant neoplasm of rectum (H)    -  1    Other insomnia        Chemotherapy induced nausea and vomiting           Follow-ups after your visit        Your next 10 appointments already scheduled     Jul 26, 2018 10:00 AM CDT   EXTERNAL RADIATION TREATMENT with UMP RAD ONC VARIAN   Radiation Oncology Clinic ([facility])    [facility]  1st Floor  [address] [address]   [phone]            Jul 27, 2018 10:00 AM CDT   EXTERNAL RADIATION TREATMENT with UMP RAD ONC VARIAN   Radiation Oncology Clinic ([facility])    [facility]  1st Floor  [address] [facility] [address]   [phone]            Jul 30, 2018 10:00 AM CDT   EXTERNAL RADIATION TREATMENT with UMP RAD ONC VARIAN   Radiation Oncology Clinic ([facility])    [facility]  1st Floor  [address] [facility] [address]   [phone]            Jul 30, 2018 10:30 AM CDT   ON TREATMENT VISIT with Mirian Ford MD   Radiation Oncology Clinic ([facility])    [facility]  1st Floor  [address] [facility] [facility] [facility] [address]   [phone]            Jul 31, 2018 10:00 AM CDT   EXTERNAL RADIATION TREATMENT with UMP RAD ONC VARIAN   Radiation Oncology Clinic ([facility])    [facility]  1st Floor  [address] [address]   [phone]            Aug 01, 2018 10:00 AM CDT   EXTERNAL RADIATION TREATMENT with UMP RAD ONC VARIAN   Radiation Oncology Clinic ([facility])    [facility]  1st  Floor  [address] [address]   [phone]            Aug 02, 2018 10:00 AM CDT   EXTERNAL RADIATION TREATMENT with UMP RAD ONC VARIAN   Radiation Oncology Clinic ([facility])    [facility]  1st Floor  [address] [address]   [phone]            Aug 03, 2018 10:00 AM CDT   EXTERNAL RADIATION TREATMENT with UMP RAD ONC VARIAN   Radiation Oncology Clinic ([facility])    [facility]  1st Floor  [address] [address]   [phone]            Aug 06, 2018 10:00 AM CDT   EXTERNAL RADIATION TREATMENT with UMP RAD ONC VARIAN   Radiation Oncology Clinic ([facility])    [facility]  1st Floor  [address] [address]   [phone]            Aug 06, 2018 10:30 AM CDT   ON TREATMENT VISIT with Mirian Ford MD   Radiation Oncology Clinic ([facility])    [facility]  1st Floor  [address] [address]   [phone]              Who to contact     If you have questions or need follow up information about today's clinic visit or your schedule please contact [facility] directly at [phone].  Normal or non-critical lab and imaging results will be communicated to you by [employer], letter or phone within 4 business days after the clinic has received the results. If you do not hear from us within 7 days, please contact the clinic through [employer] or phone. If you have a critical or abnormal lab result, we will notify you by phone as soon as possible.  Submit refill requests through [email or web address] or call your pharmacy and they will forward the refill request to us. Please allow 3 business days for your refill to be completed.          Additional Information About Your Visit        [email or web address] Information     [email or web address] gives you secure access to your electronic health  "record. If you see a primary care provider, you can also send messages to your care team and make appointments. If you have questions, please call your primary care clinic.  If you do not have a primary care provider, please call [phone] and they will assist you.        Care EveryWhere ID     This is your Care EveryWhere ID. This could be used by other organizations to access your [place] medical records  [id number]        Your Vitals Were     Pulse Temperature Respirations Height Pulse Oximetry BMI (Body Mass Index)    78 98.2  F (36.8  C) (Oral) 18 1.651 m (5' 5\") 99% 30.54 kg/m2       Blood Pressure from Last 3 Encounters:   07/25/18 90/54   06/22/18 101/68   06/21/18 96/70    Weight from Last 3 Encounters:   07/25/18 83.2 kg (183 lb 8 oz)   07/23/18 82.7 kg (182 lb 4.8 oz)   07/16/18 82.1 kg (180 lb 14.4 oz)              Today, you had the following     No orders found for display         Today's Medication Changes          These changes are accurate as of 7/25/18  2:47 PM.  If you have any questions, ask your nurse or doctor.               Start taking these medicines.        Dose/Directions    ondansetron 8 MG tablet   Commonly known as:  ZOFRAN   Used for:  Chemotherapy induced nausea and vomiting   Started by:  Cathleen Ramos PA-C        Dose:  8 mg   Take 1 tablet (8 mg) by mouth every 8 hours as needed for nausea   Quantity:  60 tablet   Refills:  3         These medicines have changed or have updated prescriptions.        Dose/Directions    LORazepam 0.5 MG tablet   Commonly known as:  ATIVAN   This may have changed:  Another medication with the same name was removed. Continue taking this medication, and follow the directions you see here.   Used for:  Malignant neoplasm of rectum (H), Anxiety   Changed by:  Cathleen Ramos PA-C        Dose:  0.5 mg   Take 1 tablet (0.5 mg) by mouth every 4 hours as needed (Anxiety, Nausea/Vomiting or Sleep)   Quantity:  60 tablet   Refills:  5       "   Stop taking these medicines if you haven't already. Please contact your care team if you have questions.     amitriptyline 25 MG tablet   Commonly known as:  ELAVIL   Stopped by:  Cathleen Ramos PA-C           dexamethasone 4 MG tablet   Commonly known as:  DECADRON   Stopped by:  Cathleen Ramos PA-C           diazepam 5 MG tablet   Commonly known as:  VALIUM   Stopped by:  Cathleen Ramos PA-C           dronabinol 2.5 MG capsule   Commonly known as:  MARINOL   Stopped by:  Cathleen Ramos PA-C           fluorouracil   Stopped by:  Cathleen Ramos PA-C           ondansetron 8 MG ODT tab   Commonly known as:  ZOFRAN-ODT   Stopped by:  Cathleen Ramos PA-C           Potassium Chloride ER 20 MEQ Tbcr   Stopped by:  Cathleen Ramos PA-C           potassium chloride SA 20 MEQ CR tablet   Commonly known as:  K-DUR/KLOR-CON M   Stopped by:  Cathleen Ramos PA-C           zolpidem 5 MG tablet   Commonly known as:  AMBIEN   Stopped by:  Cathleen Ramos PA-C                Where to get your medicines      These medications were sent to [id number] - [place], [place] - [address] [place] [address] [address] [place] [address]     Phone:  [phone]     ondansetron 8 MG tablet         Some of these will need a paper prescription and others can be bought over the counter.  Ask your nurse if you have questions.     Bring a paper prescription for each of these medications     eszopiclone 1 MG Tabs tablet                Primary Care Provider Office Phone # Fax #    Kyra Mortensen [phone]       [address] [facility] [address]        Equal Access to Services     DEAN CHAKRABORTY : Jorge rodriguez Sojustin, warashaadda luqadaha, qaybta kaalmaluz elena pham. [facility] [phone].    ATENCIÓN: Si habla español, tiene a membreno disposición servicios gratuitos de asistencia  lingüística. Claudette al [phone].    We comply with applicable federal civil rights laws and [place] laws. We do not discriminate on the basis of race, color, national origin, age, disability, sex, sexual orientation, or gender identity.            Thank you!     Thank you for choosing [facility]  for your care. Our goal is always to provide you with excellent care. Hearing back from our patients is one way we can continue to improve our services. Please take a few minutes to complete the written survey that you may receive in the mail after your visit with us. Thank you!             Your Updated Medication List - Protect others around you: Learn how to safely use, store and throw away your medicines at www.disposemymeds.org.          This list is accurate as of 7/25/18  2:47 PM.  Always use your most recent med list.                   Brand Name Dispense Instructions for use Diagnosis    calcium carbonate 500 MG chewable tablet    TUMS    150 tablet    Take 1-2 chew tab by mouth daily        capecitabine 500 MG tablet CHEMO    XELODA    168 tablet    Take 3 tablets (1,500 mg) by mouth 2 times daily for 56 doses Take Mon-Fri. Do NOT take on Sat-Sun. Take with water within 30 min after meal    Malignant neoplasm of rectum (H)       eszopiclone 1 MG Tabs tablet    LUNESTA    60 tablet    Take 1-2 tablets (1-2 mg) by mouth At Bedtime    Other insomnia       FLUoxetine 40 MG capsule    PROzac    30 capsule    Take 1 capsule (40 mg) by mouth daily    Other depression       lidocaine-prilocaine cream    EMLA    30 g    Apply 45 minutes prior to procedure.    Malignant neoplasm of rectum (H)       LORazepam 0.5 MG tablet    ATIVAN    60 tablet    Take 1 tablet (0.5 mg) by mouth every 4 hours as needed (Anxiety, Nausea/Vomiting or Sleep)    Malignant neoplasm of rectum (H), Anxiety       magic mouthwash suspension    ENTER INGREDIENTS IN COMMENTS    240 mL    Swish, gargle, and spit one to two  teaspoonfuls every six hours as needed. May be swallowed if esophageal involvement.    Oral pain       ondansetron 8 MG tablet    ZOFRAN    60 tablet    Take 1 tablet (8 mg) by mouth every 8 hours as needed for nausea    Chemotherapy induced nausea and vomiting       prochlorperazine 10 MG tablet    COMPAZINE    30 tablet    Take 1 tablet (10 mg) by mouth every 6 hours as needed (Nausea/Vomiting)    Malignant neoplasm of rectum (H)       RANITIDINE HCL PO      Take 150 mg by mouth daily as needed for heartburn        TYLENOL PO      Take 1,000 mg by mouth At Bedtime

## 2018-07-25 NOTE — PROGRESS NOTES
Oncology/Hematology Visit Note  Jul 25, 2018    Reason for Visit: follow up of [id number] moderately differentiated adenocarcinoma of the rectum     History of Present Illness: Sarah Rajan is a 52 year old female with  recently diagnosed locally advanced rectal cancer. She noticed BRBPR so screening colonoscopy on 1/9/2018 revealed 3 cm rectal mass and other polyps which were removed.  Pathology indicated moderately differentiated adenocarcinoma w/ intact MMR proteins.  CT staging scan showed no metastatic disease; some liver lesions which are thought to be benign per radiology report, T2N1M0 by pelvic MRI read at [facility]. When we initially reviewed her MRI we were not exactly sure whether that was lymph node involvement or not. We opted to repeat MRI which showed T4 N0 lesion.   We also did an MRI of the liver which showed 3 subcentimeter liver lesions which were too small to characterize and will need attention on follow-up. She is currently undergoing treatment with neoadjuvant 5-FU and oxaliplatin (FOLFOX), which she started on 2/26/18. The day after she received cycle 2, she developed fevers, chills, headache, and an itchy rash on her arms and legs, for which she was evaluated in the ED. She was sent home on Benadryl. Benadryl and dexamethasone doses were increased for cycle 3. She received cycle 4 on 4/10/18. Pelvic MRI on 4/19/18 showed improvement in the rectal mass. Abdominal MRI on 5/2/18 showed stable indeterminate liver lesions, which on June 2018 imaging appeared more consistent with a benign hemangioma or cyst. She completed 8 cycles of FOLFOX, last on 6/20/18. Subsequent imaging showed response to treatment with only a small signal consistent with viable tumor in the primary rectal cancer with no surrounding lymph nodes suspicious, and no evidence of metastatic disease. She started on concurrent chemoradiation with Xeloda with radiation beginning on 7/9/18. There was a delay in her receiving Xeloda, so  she began that on 7/10/18.  Please see Dr. Mortensen's previous notes for further details on the patient's history. She comes in today for routine follow up.     Interval History:  Patient reports that she continues to have numbness in her hands and feet that have now spread a little bit further down onto her fingers and the bottoms of her feet.  She denies any difficulty with doing buttons or zippers, but she does have occasional times when she trips over her feet.  She denies any falls.  She denies any pain associated with the numbness.  She reports that the cold sensitivity has resolved.  She continues to have either poor taste or no taste which can make it difficult to eat, though she is still eating.  She does report that her tongue burns, though she denies any open mouth sores.  She did  Magic mouthwash today.  She has been using salt and soda swishes once a day.  She reports she has been doing okay getting in fluids.  She notes that her blood pressure was a little lower today but she denies feeling dizzy or lightheaded.  She is typically having between 6-7 bowel movements a day some of which are loose and some of them are of a normal consistency.  She is using Lomotil which is helping to slow down her stools.  She reports that she has less frequent stooling on the weekends.  She reports that her mood is doing much better.  She is sleeping fairly well with the use of Lunesta.  She takes 1 pill when she first goes to bed and then 1 when she wakes up in the middle of the night.  She does not feel groggy with this in the morning.  She continues to have some nausea associated with the chemotherapy and she is taking Zofran for this.  If she still has nausea then she does take Ativan after the Zofran.  She is typically taking about 2-3 tablets of Ativan per day.  She has been getting regular exercise with walking daily.  She denies other concerns.    Current Outpatient Prescriptions   Medication Sig Dispense  "Refill     eszopiclone (LUNESTA) 1 MG TABS tablet Take 1-2 tablets (1-2 mg) by mouth At Bedtime 60 tablet 3     FLUoxetine (PROZAC) 40 MG capsule Take 1 capsule (40 mg) by mouth daily 30 capsule 3     lidocaine-prilocaine (EMLA) cream Apply 45 minutes prior to procedure. 30 g 3     LORazepam (ATIVAN) 0.5 MG tablet Take 1 tablet (0.5 mg) by mouth every 4 hours as needed (Anxiety, Nausea/Vomiting or Sleep) 60 tablet 5     magic mouthwash (ENTER INGREDIENTS IN COMMENTS) suspension Swish, gargle, and spit one to two teaspoonfuls every six hours as needed. May be swallowed if esophageal involvement. 240 mL 1     ondansetron (ZOFRAN) 8 MG tablet Take 1 tablet (8 mg) by mouth every 8 hours as needed for nausea 60 tablet 3     prochlorperazine (COMPAZINE) 10 MG tablet Take 1 tablet (10 mg) by mouth every 6 hours as needed (Nausea/Vomiting) 30 tablet 2     Acetaminophen (TYLENOL PO) Take 1,000 mg by mouth At Bedtime       calcium carbonate (TUMS) 500 MG chewable tablet Take 1-2 chew tab by mouth daily  150 tablet      capecitabine (XELODA) 500 MG tablet CHEMO Take 3 tablets (1,500 mg) by mouth 2 times daily for 56 doses Take Mon-Fri. Do NOT take on Sat-Sun. Take with water within 30 min after meal 168 tablet 0     RANITIDINE HCL PO Take 150 mg by mouth daily as needed for heartburn       Physical Examination:  General: The patient is a pleasant female. She is in good spirits today. She is here today with her .   BP 90/54 (BP Location: Right arm, Patient Position: Sitting, Cuff Size: Adult Large)  Pulse 78  Temp 98.2  F (36.8  C) (Oral)  Resp 18  Ht 1.651 m (5' 5\")  Wt 83.2 kg (183 lb 8 oz)  SpO2 99%  BMI 30.54 kg/m2  Wt Readings from Last 10 Encounters:   07/25/18 83.2 kg (183 lb 8 oz)   07/23/18 82.7 kg (182 lb 4.8 oz)   07/16/18 82.1 kg (180 lb 14.4 oz)   07/09/18 82 kg (180 lb 12.8 oz)   06/22/18 82.1 kg (181 lb)   06/21/18 83 kg (183 lb)   06/05/18 84.8 kg (187 lb)   05/21/18 86.6 kg (191 lb)   05/12/18 " 87.5 kg (193 lb)   05/07/18 89 kg (196 lb 3.2 oz)   HEENT: EOMI, PERRL. Sclerae are anicteric. Oral mucosa is pink and moist with no lesions or thrush.   Lymph: Neck is supple with no lymphadenopathy in the cervical or supraclavicular areas.   Heart: Regular rate and rhythm.   Lungs: Clear to auscultation bilaterally.   Abdomen: Bowel sounds present, soft, nontender with no palpable hepatosplenomegaly or masses.   Extremities: No lower extremity edema noted bilaterally.   Neuro: Cranial nerves II through XII are grossly intact.  Skin: No rashes, petechiae, or bruising noted on exposed skin. No skin changes on hands. Soles of feet with mild skin peeling.    Laboratory Data:   7/24/2018 10:00   Sodium 141   Potassium 3.6   Chloride 106   Carbon Dioxide 27   Urea Nitrogen 11   Creatinine 0.80   GFR Estimate 76   GFR Estimate If Black >90   Calcium 9.0   Anion Gap 7   Albumin 3.9   Protein Total 6.9   Bilirubin Total 0.4   Alkaline Phosphatase 86   ALT 34   AST 23   Glucose 90   WBC 2.9 (L)   Hemoglobin 11.9   Hematocrit 35.8   Platelet Count 122 (L)   RBC Count 3.69 (L)   MCV 97   MCH 32.2   MCHC 33.2   RDW 13.0   Diff Method Automated Method   % Neutrophils 69.8   % Lymphocytes 15.8   % Monocytes 6.9   % Eosinophils 6.9   % Basophils 0.3   % Immature Granulocytes 0.3   Nucleated RBCs 0   Absolute Neutrophil 2.0   Absolute Lymphocytes 0.5 (L)   Absolute Monocytes 0.2   Absolute Eosinophils 0.2   Absolute Basophils 0.0   Abs Immature Granulocytes 0.0   Absolute Nucleated RBC 0.0     Assessment and Plan:  1. Locally advanced moderately differentiated adenocarcinoma of the rectum: [id number]. MSI stable. Started neoadjuvant FOLFOX on 2/26/18 and completed 8 cycles with good response to treatment. Her cold sensitivity has resolved. 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 She is now undergoing concurrent chemoradiation with  Xeloda, which she began on 7/9/18. She is tolerating this fairly well so far with fatigue, nausea, diarrhea, and tongue sensitivity. She will see me back on 8/15/18 when she is completing radiation. We will plan to repeat imaging 6-8 weeks after completing chemoradiation. Patient requested statistics regarding recurrence rates of rectal cancer with and without surgery in those that have a complete response after neoadjuvant chemotherapy and concurrent chemoradiation.     2. Depression and anxiety. She resumed fluoxetine 20 mg daily on 5/7/18 and increased to 40 mg daily on 6/5/18. Her mood is now much improved. She will continue to use Lunesta 1-2 mg qhs for sleep. She has met with Troy Huff for counseling.     3. GI  -Diarrhea. Treated induced. Managed with Lomotil.   -Elevated LFT's. Resolved now that she is off of oxaliplatin.   -Nausea. Chemotherapy induced. Managed with Zofran and Ativan.    4. Tongue pain. Secondary to Xeloda. No open areas. Continue salt/soda swishes, but increase to at least qid. Discuss okay to apply MMW just to tongue.     Cathleen Ramos PA-C  [facility]  [address] [place] [address] [phone]

## 2018-07-25 NOTE — NURSING NOTE
"Oncology Rooming Note    July 25, 2018 1:47 PM   Sarah Rajan is a 52 year old female who presents for:    Chief Complaint   Patient presents with     Oncology Clinic Visit     Return visit related to Rectal Cancer     Initial Vitals: BP 90/54 (BP Location: Right arm, Patient Position: Sitting, Cuff Size: Adult Large)  Pulse 78  Temp 98.2  F (36.8  C) (Oral)  Resp 18  Ht 1.651 m (5' 5\")  Wt 83.2 kg (183 lb 8 oz)  SpO2 99%  BMI 30.54 kg/m2 Estimated body mass index is 30.54 kg/(m^2) as calculated from the following:    Height as of this encounter: 1.651 m (5' 5\").    Weight as of this encounter: 83.2 kg (183 lb 8 oz). Body surface area is 1.95 meters squared.  No Pain (0) Comment: Data Unavailable   No LMP recorded. Patient is postmenopausal.  Allergies reviewed: Yes  Medications reviewed: Yes    Medications: MEDICATION REFILLS NEEDED TODAY. Provider was notified.  Pharmacy name entered into [facility]:    [facility] [address] [place] [address] DR ALVAREZ AT [facility] - [place], [place] - [phone]  [id number] - [place], [place] - [address] [address] [address] [address] & [facility] - [place], [place] - [address]    Clinical concerns: Refills needed today. Provider was notified.    10 minutes for nursing intake (face to face time)     Trudi Oakes LPN            "

## 2018-07-25 NOTE — LETTER
7/25/2018      RE: Sarah Rajan  [address] [address]       Oncology/Hematology Visit Note  Jul 25, 2018    Reason for Visit: follow up of [id number] moderately differentiated adenocarcinoma of the rectum     History of Present Illness: Sarah Rajan is a 52 year old female with  recently diagnosed locally advanced rectal cancer. She noticed BRBPR so screening colonoscopy on 1/9/2018 revealed 3 cm rectal mass and other polyps which were removed.  Pathology indicated moderately differentiated adenocarcinoma w/ intact MMR proteins.  CT staging scan showed no metastatic disease; some liver lesions which are thought to be benign per radiology report, T2N1M0 by pelvic MRI read at [facility]. When we initially reviewed her MRI we were not exactly sure whether that was lymph node involvement or not. We opted to repeat MRI which showed T4 N0 lesion.   We also did an MRI of the liver which showed 3 subcentimeter liver lesions which were too small to characterize and will need attention on follow-up. She is currently undergoing treatment with neoadjuvant 5-FU and oxaliplatin (FOLFOX), which she started on 2/26/18. The day after she received cycle 2, she developed fevers, chills, headache, and an itchy rash on her arms and legs, for which she was evaluated in the ED. She was sent home on Benadryl. Benadryl and dexamethasone doses were increased for cycle 3. She received cycle 4 on 4/10/18. Pelvic MRI on 4/19/18 showed improvement in the rectal mass. Abdominal MRI on 5/2/18 showed stable indeterminate liver lesions, which on June 2018 imaging appeared more consistent with a benign hemangioma or cyst. She completed 8 cycles of FOLFOX, last on 6/20/18. Subsequent imaging showed response to treatment with only a small signal consistent with viable tumor in the primary rectal cancer with no surrounding lymph nodes suspicious, and no evidence of metastatic disease. She started on concurrent chemoradiation with Xeloda  with radiation beginning on 7/9/18. There was a delay in her receiving Xeloda, so she began that on 7/10/18.  Please see Dr. Mortensen's previous notes for further details on the patient's history. She comes in today for routine follow up.     Interval History:  Patient reports that she continues to have numbness in her hands and feet that have now spread a little bit further down onto her fingers and the bottoms of her feet.  She denies any difficulty with doing buttons or zippers, but she does have occasional times when she trips over her feet.  She denies any falls.  She denies any pain associated with the numbness.  She reports that the cold sensitivity has resolved.  She continues to have either poor taste or no taste which can make it difficult to eat, though she is still eating.  She does report that her tongue burns, though she denies any open mouth sores.  She did  Magic mouthwash today.  She has been using salt and soda swishes once a day.  She reports she has been doing okay getting in fluids.  She notes that her blood pressure was a little lower today but she denies feeling dizzy or lightheaded.  She is typically having between 6-7 bowel movements a day some of which are loose and some of them are of a normal consistency.  She is using Lomotil which is helping to slow down her stools.  She reports that she has less frequent stooling on the weekends.  She reports that her mood is doing much better.  She is sleeping fairly well with the use of Lunesta.  She takes 1 pill when she first goes to bed and then 1 when she wakes up in the middle of the night.  She does not feel groggy with this in the morning.  She continues to have some nausea associated with the chemotherapy and she is taking Zofran for this.  If she still has nausea then she does take Ativan after the Zofran.  She is typically taking about 2-3 tablets of Ativan per day.  She has been getting regular exercise with walking daily.  She denies  "other concerns.    Current Outpatient Prescriptions   Medication Sig Dispense Refill     eszopiclone (LUNESTA) 1 MG TABS tablet Take 1-2 tablets (1-2 mg) by mouth At Bedtime 60 tablet 3     FLUoxetine (PROZAC) 40 MG capsule Take 1 capsule (40 mg) by mouth daily 30 capsule 3     lidocaine-prilocaine (EMLA) cream Apply 45 minutes prior to procedure. 30 g 3     LORazepam (ATIVAN) 0.5 MG tablet Take 1 tablet (0.5 mg) by mouth every 4 hours as needed (Anxiety, Nausea/Vomiting or Sleep) 60 tablet 5     magic mouthwash (ENTER INGREDIENTS IN COMMENTS) suspension Swish, gargle, and spit one to two teaspoonfuls every six hours as needed. May be swallowed if esophageal involvement. 240 mL 1     ondansetron (ZOFRAN) 8 MG tablet Take 1 tablet (8 mg) by mouth every 8 hours as needed for nausea 60 tablet 3     prochlorperazine (COMPAZINE) 10 MG tablet Take 1 tablet (10 mg) by mouth every 6 hours as needed (Nausea/Vomiting) 30 tablet 2     Acetaminophen (TYLENOL PO) Take 1,000 mg by mouth At Bedtime       calcium carbonate (TUMS) 500 MG chewable tablet Take 1-2 chew tab by mouth daily  150 tablet      capecitabine (XELODA) 500 MG tablet CHEMO Take 3 tablets (1,500 mg) by mouth 2 times daily for 56 doses Take Mon-Fri. Do NOT take on Sat-Sun. Take with water within 30 min after meal 168 tablet 0     RANITIDINE HCL PO Take 150 mg by mouth daily as needed for heartburn       Physical Examination:  General: The patient is a pleasant female. She is in good spirits today. She is here today with her .   BP 90/54 (BP Location: Right arm, Patient Position: Sitting, Cuff Size: Adult Large)  Pulse 78  Temp 98.2  F (36.8  C) (Oral)  Resp 18  Ht 1.651 m (5' 5\")  Wt 83.2 kg (183 lb 8 oz)  SpO2 99%  BMI 30.54 kg/m2  Wt Readings from Last 10 Encounters:   07/25/18 83.2 kg (183 lb 8 oz)   07/23/18 82.7 kg (182 lb 4.8 oz)   07/16/18 82.1 kg (180 lb 14.4 oz)   07/09/18 82 kg (180 lb 12.8 oz)   06/22/18 82.1 kg (181 lb)   06/21/18 83 kg " (183 lb)   06/05/18 84.8 kg (187 lb)   05/21/18 86.6 kg (191 lb)   05/12/18 87.5 kg (193 lb)   05/07/18 89 kg (196 lb 3.2 oz)   HEENT: EOMI, PERRL. Sclerae are anicteric. Oral mucosa is pink and moist with no lesions or thrush.   Lymph: Neck is supple with no lymphadenopathy in the cervical or supraclavicular areas.   Heart: Regular rate and rhythm.   Lungs: Clear to auscultation bilaterally.   Abdomen: Bowel sounds present, soft, nontender with no palpable hepatosplenomegaly or masses.   Extremities: No lower extremity edema noted bilaterally.   Neuro: Cranial nerves II through XII are grossly intact.  Skin: No rashes, petechiae, or bruising noted on exposed skin. No skin changes on hands. Soles of feet with mild skin peeling.    Laboratory Data:   7/24/2018 10:00   Sodium 141   Potassium 3.6   Chloride 106   Carbon Dioxide 27   Urea Nitrogen 11   Creatinine 0.80   GFR Estimate 76   GFR Estimate If Black >90   Calcium 9.0   Anion Gap 7   Albumin 3.9   Protein Total 6.9   Bilirubin Total 0.4   Alkaline Phosphatase 86   ALT 34   AST 23   Glucose 90   WBC 2.9 (L)   Hemoglobin 11.9   Hematocrit 35.8   Platelet Count 122 (L)   RBC Count 3.69 (L)   MCV 97   MCH 32.2   MCHC 33.2   RDW 13.0   Diff Method Automated Method   % Neutrophils 69.8   % Lymphocytes 15.8   % Monocytes 6.9   % Eosinophils 6.9   % Basophils 0.3   % Immature Granulocytes 0.3   Nucleated RBCs 0   Absolute Neutrophil 2.0   Absolute Lymphocytes 0.5 (L)   Absolute Monocytes 0.2   Absolute Eosinophils 0.2   Absolute Basophils 0.0   Abs Immature Granulocytes 0.0   Absolute Nucleated RBC 0.0     Assessment and Plan:  1. Locally advanced moderately differentiated adenocarcinoma of the rectum: [id number]. MSI stable. Started neoadjuvant FOLFOX on 2/26/18 and completed 8 cycles with good response to treatment. Her cold sensitivity has resolved. 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 She is now undergoing concurrent chemoradiation with Xeloda, which she began on 7/9/18. She is tolerating this fairly well so far with fatigue, nausea, diarrhea, and tongue sensitivity. She will see me back on 8/15/18 when she is completing radiation. We will plan to repeat imaging 6-8 weeks after completing chemoradiation. Patient requested statistics regarding recurrence rates of rectal cancer with and without surgery in those that have a complete response after neoadjuvant chemotherapy and concurrent chemoradiation.     2. Depression and anxiety. She resumed fluoxetine 20 mg daily on 5/7/18 and increased to 40 mg daily on 6/5/18. Her mood is now much improved. She will continue to use Lunesta 1-2 mg qhs for sleep. She has met with Troy Huff for counseling.     3. GI  -Diarrhea. Treated induced. Managed with Lomotil.   -Elevated LFT's. Resolved now that she is off of oxaliplatin.   -Nausea. Chemotherapy induced. Managed with Zofran and Ativan.    4. Tongue pain. Secondary to Xeloda. No open areas. Continue salt/soda swishes, but increase to at least qid. Discuss okay to apply MMW just to tongue.     Cathleen Ramos PA-C  [facility]  [address] [place] [address] [phone]      Cathleen Ramos PA-C

## 2018-07-26 ENCOUNTER — APPOINTMENT (OUTPATIENT)
Dept: RADIATION ONCOLOGY | Facility: CLINIC | Age: 53
End: 2018-07-26
Attending: RADIOLOGY
Payer: COMMERCIAL

## 2018-07-26 PROCEDURE — 77386 ZZH IMRT TREATMENT DELIVERY, COMPLEX: CPT | Performed by: RADIOLOGY

## 2018-07-27 ENCOUNTER — APPOINTMENT (OUTPATIENT)
Dept: RADIATION ONCOLOGY | Facility: CLINIC | Age: 53
End: 2018-07-27
Attending: RADIOLOGY
Payer: COMMERCIAL

## 2018-07-27 PROCEDURE — 77336 RADIATION PHYSICS CONSULT: CPT | Performed by: RADIOLOGY

## 2018-07-27 PROCEDURE — 77386 ZZH IMRT TREATMENT DELIVERY, COMPLEX: CPT | Performed by: RADIOLOGY

## 2018-07-30 ENCOUNTER — OFFICE VISIT (OUTPATIENT)
Dept: RADIATION ONCOLOGY | Facility: CLINIC | Age: 53
End: 2018-07-30
Attending: RADIOLOGY
Payer: COMMERCIAL

## 2018-07-30 VITALS — BODY MASS INDEX: 30.32 KG/M2 | WEIGHT: 182.2 LBS

## 2018-07-30 DIAGNOSIS — C20 MALIGNANT NEOPLASM OF RECTUM (H): Primary | ICD-10-CM

## 2018-07-30 PROCEDURE — 77386 ZZH IMRT TREATMENT DELIVERY, COMPLEX: CPT | Performed by: RADIOLOGY

## 2018-07-30 NOTE — PROGRESS NOTES
This is a recent snapshot of the patient's [facility] medical record.  For current drug dose and complete information and questions, call [phone] or In Basket pool, [facility] home infusion ([id number])  CSN Number:  [id number]

## 2018-07-30 NOTE — LETTER
2018       RE: Sarah Rajan  [address] [facility] [address]     Dear Colleague,    Thank you for referring your patient, Sarah Rajan, to the RADIATION ONCOLOGY CLINIC. Please see a copy of my visit note below.    RADIATION ONCOLOGY WEEKLY ON TREATMENT VISIT   Encounter Date: 2018    Patient Name: Sarah Rajan  MRN: [id number]  : 1965     Disease and Stage: Rectal adenocarcinoma, [id number]  Treatment Site: Pelvis  Current Dose/Planned Total Dose: [2880] / [5040] cGy  Daily Fraction Size: [180] cGy/day, [5] times/week  Concurrent Chemotherapy: Yes  Drug and Frequency: Xeloda    Treatment Summary:  Ms. Rajan is a 52 year old female with locally advanced adenocarcinoma of the rectum. She initially presented with a mass on screening colonoscopy, 5 cm in length, 2 cm from anal verge. Biopsy showed moderately differentiated adenocarcinoma. MRI showed anal sphincter involvement and equivocal nodes. She is undergoing total neoadjuvant therapy and completed 8 cycles FOLFOX prior to neoadjuvant CRT.     ED visits/Hospitalizations:  None    Missed Treatments:  None    Subjective: Ms. Rajan presents to clinic today for her weekly on-treatment visit. She is currently tolerating RT well. She is having intermittent diarrhea and is taking lomotil as needed. Also starting to notice some diarrhea. The majority of her complaints today are related to her chemotherapy treatments including nausea and tongue pain. She states that she has gotten her nausea under fairly good control with medications but has noticed a decreased appetite which she attributes to the tongue pain. She has not noticed any mouth sores however.  : She is tolerating treatment well and has no concerns.    ROS:     Nutrition Alteration  Diet Type: Patient's Preference    Skin  Skin Reaction: 0 - No changes  Skin Note: Proshield    CNS Alteration  CNS note: Neuropathy    ENT and Mouth Exam  Mucositis - Current: 0 - None       Gastrointestinal  Nausea: 1 - One to two episodes of nausea/24  Diarrhea: 2 - Three to five soft or liquid bowel movements per day    Pain Assessment  0-10 Pain Scale: 0    Objective:   Wt 82.6 kg (182 lb 3.2 oz)  BMI 30.32 kg/m2  Current weight: 82.6 kg  Last week's weight: 82.7 kg  Starting weight: 82 kg    General: alert, oriented, NAD  HEENT: NCAT, no oral mucositis  Cardiac: WWP  Respiratory: breathing comfortably on RA  Skin: No erythema    Treatment-related toxicities (CTCAE v5.0):  1. Fatigue: grade 1  2. Diarrhea: grade 1    LAB  Lab Results   Component Value Date    WBC 2.9 (L) 07/24/2018    HGB 11.9 07/24/2018    HCT 35.8 07/24/2018    MCV 97 07/24/2018     (L) 07/24/2018     Lab Results   Component Value Date     07/24/2018    POTASSIUM 3.6 07/24/2018    CHLORIDE 106 07/24/2018    CO2 27 07/24/2018    GLC 90 07/24/2018     Assessment:    Ms. Rajan is a 52 year old female with a T4N0M0 rectal adenocarcinoma, s/p induction FOLFOX, currently undergoing neoadjuvant CRT with Xeloda. She is tolerating radiotherapy at this time.    Plan:     Continue radiotherapy    Pain management:    Magic mouthwash ordered for oral pain    Fluids/Electrolytes/Nutrition:    Normal diet    Dermatitis:    N/A    Mosaiq chart and setup information reviewed  IGRT images reviewed    Medication Review  Med list reviewed with patient?: Yes    Mirian Ford MD  Department of Radiation Oncology  [facility]

## 2018-07-30 NOTE — PROGRESS NOTES
RADIATION ONCOLOGY WEEKLY ON TREATMENT VISIT   Encounter Date: 2018    Patient Name: Sarah Rajan  MRN: [id number]  : 1965     Disease and Stage: Rectal adenocarcinoma, [id number]  Treatment Site: Pelvis  Current Dose/Planned Total Dose: [2880] / [5040] cGy  Daily Fraction Size: [180] cGy/day, [5] times/week  Concurrent Chemotherapy: Yes  Drug and Frequency: Xeloda    Treatment Summary:  Ms. Rajan is a 52 year old female with locally advanced adenocarcinoma of the rectum. She initially presented with a mass on screening colonoscopy, 5 cm in length, 2 cm from anal verge. Biopsy showed moderately differentiated adenocarcinoma. MRI showed anal sphincter involvement and equivocal nodes. She is undergoing total neoadjuvant therapy and completed 8 cycles FOLFOX prior to neoadjuvant CRT.     ED visits/Hospitalizations:  None    Missed Treatments:  None    Subjective: Ms. Rajan presents to clinic today for her weekly on-treatment visit. She is currently tolerating RT well. She is having intermittent diarrhea and is taking lomotil as needed. Also starting to notice some diarrhea. The majority of her complaints today are related to her chemotherapy treatments including nausea and tongue pain. She states that she has gotten her nausea under fairly good control with medications but has noticed a decreased appetite which she attributes to the tongue pain. She has not noticed any mouth sores however.  : She is tolerating treatment well and has no concerns.    ROS:     Nutrition Alteration  Diet Type: Patient's Preference    Skin  Skin Reaction: 0 - No changes  Skin Note: Proshield    CNS Alteration  CNS note: Neuropathy    ENT and Mouth Exam  Mucositis - Current: 0 - None      Gastrointestinal  Nausea: 1 - One to two episodes of nausea/24  Diarrhea: 2 - Three to five soft or liquid bowel movements per day    Pain Assessment  0-10 Pain Scale: 0    Objective:   Wt 82.6 kg (182 lb 3.2 oz)  BMI 30.32 kg/m2  Current  weight: 82.6 kg  Last week's weight: 82.7 kg  Starting weight: 82 kg    General: alert, oriented, NAD  HEENT: NCAT, no oral mucositis  Cardiac: WWP  Respiratory: breathing comfortably on RA  Skin: No erythema    Treatment-related toxicities (CTCAE v5.0):  1. Fatigue: grade 1  2. Diarrhea: grade 1    LAB  Lab Results   Component Value Date    WBC 2.9 (L) 07/24/2018    HGB 11.9 07/24/2018    HCT 35.8 07/24/2018    MCV 97 07/24/2018     (L) 07/24/2018     Lab Results   Component Value Date     07/24/2018    POTASSIUM 3.6 07/24/2018    CHLORIDE 106 07/24/2018    CO2 27 07/24/2018    GLC 90 07/24/2018     Assessment:    Ms. Rajan is a 52 year old female with a T4N0M0 rectal adenocarcinoma, s/p induction FOLFOX, currently undergoing neoadjuvant CRT with Xeloda. She is tolerating radiotherapy at this time.    Plan:     Continue radiotherapy    Pain management:    Magic mouthwash ordered for oral pain    Fluids/Electrolytes/Nutrition:    Normal diet    Dermatitis:    N/A    Mosaiq chart and setup information reviewed  IGRT images reviewed    Medication Review  Med list reviewed with patient?: Yes    Mirian Ford MD  Department of Radiation Oncology  [facility]

## 2018-07-30 NOTE — MR AVS SNAPSHOT
After Visit Summary   7/30/2018    Sarah Rajan    MRN: [id number]           Patient Information     Date Of Birth          1965        Visit Information        Provider Department      7/30/2018 10:30 AM Mirian Ford MD Radiation Oncology Clinic        Today's Diagnoses     Malignant neoplasm of rectum (H)    -  1       Follow-ups after your visit        Your next 10 appointments already scheduled     Jul 31, 2018 10:00 AM CDT   EXTERNAL RADIATION TREATMENT with UMP RAD ONC VARIAN   Radiation Oncology Clinic ([facility])    [facility]  1st Floor  [address] [address]   [phone]            Aug 01, 2018 10:00 AM CDT   EXTERNAL RADIATION TREATMENT with UMP RAD ONC VARIAN   Radiation Oncology Clinic ([facility])    [facility]  1st Floor  [address] [address]   [phone]            Aug 02, 2018 10:00 AM CDT   EXTERNAL RADIATION TREATMENT with UMP RAD ONC VARIAN   Radiation Oncology Clinic ([facility])    [facility]  1st Floor  [address] [address]   [phone]            Aug 03, 2018 10:00 AM CDT   EXTERNAL RADIATION TREATMENT with UMP RAD ONC VARIAN   Radiation Oncology Clinic ([facility])    [facility]  1st Floor  [address] [address]   [phone]            Aug 06, 2018 10:00 AM CDT   EXTERNAL RADIATION TREATMENT with UMP RAD ONC VARIAN   Radiation Oncology Clinic ([facility])    [facility]  1st Floor  [address] [address]   [phone]            Aug 06, 2018 10:30 AM CDT   ON TREATMENT VISIT with Mirian Ford MD   Radiation Oncology Clinic ([facility])    [facility]  1st Floor  [address] [address]   [phone]             Aug 07, 2018 10:00 AM CDT   EXTERNAL RADIATION TREATMENT with UMP RAD ONC VARIAN   Radiation Oncology Clinic ([facility])    [facility]  1st Floor  [address] [address]   [phone]            Aug 08, 2018 10:00 AM CDT   EXTERNAL RADIATION TREATMENT with UMP RAD ONC VARIAN   Radiation Oncology Clinic ([facility])    [facility]  1st Floor  [address] [address]   [phone]            Aug 09, 2018 10:00 AM CDT   EXTERNAL RADIATION TREATMENT with UMP RAD ONC VARIAN   Radiation Oncology Clinic ([facility])    [facility]  1st Floor  [address] [address]   [phone]            Aug 10, 2018 10:00 AM CDT   EXTERNAL RADIATION TREATMENT with UMP RAD ONC VARIAN   Radiation Oncology Clinic ([facility])    [facility]  1st Floor  [address] [address]   [phone]              Who to contact     Please call your clinic at [phone] to:    Ask questions about your health    Make or cancel appointments    Discuss your medicines    Learn about your test results    Speak to your doctor            Additional Information About Your Visit        [employer] Information     [employer] gives you secure access to your electronic health record. If you see a primary care provider, you can also send messages to your care team and make appointments. If you have questions, please call your primary care clinic.  If you do not have a primary care provider, please call [phone] and they will assist you.      [employer] is an electronic gateway that provides easy, online access to your medical records. With [employer], you can request a clinic appointment, read your test results, renew a prescription or communicate with your care team.     To access your existing account, please contact your [facility] or call [phone] for assistance.        Care EveryWhere ID     This is your Care EveryWhere ID. This could be used by other organizations to access your [facility] medical records  [id number]        Your Vitals Were     BMI (Body Mass Index)                   30.32 kg/m2            Blood Pressure from Last 3 Encounters:   07/25/18 90/54   06/22/18 101/68   06/21/18 96/70    Weight from Last 3 Encounters:   07/30/18 82.6 kg (182 lb 3.2 oz)   07/25/18 83.2 kg (183 lb 8 oz)   07/23/18 82.7 kg (182 lb 4.8 oz)              Today, you had the following     No orders found for display       Primary Care Provider Office Phone # Fax #    Jimizachery KIM Mortensen [phone] [address] [address]        Equal Access to Services     [facility]: Hadii aad ku hadasho Sojustin, waaxda luqadaha, qaybta kaalmada adeegyada, luz elena mtz . So [facility] [phone].    ATENCIÓN: Si habla español, tiene a membreno disposición servicios gratuitos de asistencia lingüística. Llame al [phone].    We comply with applicable federal civil rights laws and [place] laws. We do not discriminate on the basis of race, color, national origin, age, disability, sex, sexual orientation, or gender identity.            Thank you!     Thank you for choosing RADIATION ONCOLOGY CLINIC  for your care. Our goal is always to provide you with excellent care. Hearing back from our patients is one way we can continue to improve our services. Please take a few minutes to complete the written survey that you may receive in the mail after your visit with us. Thank you!             Your Updated Medication List - Protect others around you: Learn how to safely use, store and throw away your medicines at www.disposemymeds.org.          This list is accurate as of 7/30/18  2:09 PM.  Always use your most recent med list.                   Brand Name Dispense Instructions for use Diagnosis     calcium carbonate 500 MG chewable tablet    TUMS    150 tablet    Take 1-2 chew tab by mouth daily        capecitabine 500 MG tablet CHEMO    XELODA    168 tablet    Take 3 tablets (1,500 mg) by mouth 2 times daily for 56 doses Take Mon-Fri. Do NOT take on Sat-Sun. Take with water within 30 min after meal    Malignant neoplasm of rectum (H)       eszopiclone 1 MG Tabs tablet    LUNESTA    60 tablet    Take 1-2 tablets (1-2 mg) by mouth At Bedtime    Other insomnia       FLUoxetine 40 MG capsule    PROzac    30 capsule    Take 1 capsule (40 mg) by mouth daily    Other depression       lidocaine-prilocaine cream    EMLA    30 g    Apply 45 minutes prior to procedure.    Malignant neoplasm of rectum (H)       LORazepam 0.5 MG tablet    ATIVAN    60 tablet    Take 1 tablet (0.5 mg) by mouth every 4 hours as needed (Anxiety, Nausea/Vomiting or Sleep)    Malignant neoplasm of rectum (H), Anxiety       magic mouthwash suspension    ENTER INGREDIENTS IN COMMENTS    240 mL    Swish, gargle, and spit one to two teaspoonfuls every six hours as needed. May be swallowed if esophageal involvement.    Oral pain       ondansetron 8 MG tablet    ZOFRAN    60 tablet    Take 1 tablet (8 mg) by mouth every 8 hours as needed for nausea    Chemotherapy induced nausea and vomiting       prochlorperazine 10 MG tablet    COMPAZINE    30 tablet    Take 1 tablet (10 mg) by mouth every 6 hours as needed (Nausea/Vomiting)    Malignant neoplasm of rectum (H)       RANITIDINE HCL PO      Take 150 mg by mouth daily as needed for heartburn        TYLENOL PO      Take 1,000 mg by mouth At Bedtime

## 2018-07-31 ENCOUNTER — TELEPHONE (OUTPATIENT)
Dept: ONCOLOGY | Facility: CLINIC | Age: 53
End: 2018-07-31

## 2018-07-31 ENCOUNTER — APPOINTMENT (OUTPATIENT)
Dept: RADIATION ONCOLOGY | Facility: CLINIC | Age: 53
End: 2018-07-31
Attending: RADIOLOGY
Payer: COMMERCIAL

## 2018-07-31 PROCEDURE — 77386 ZZH IMRT TREATMENT DELIVERY, COMPLEX: CPT | Performed by: RADIOLOGY

## 2018-07-31 NOTE — TELEPHONE ENCOUNTER
Oral Chemotherapy Monitoring Program    Placed call to patient in follow up of Xeloda therapy.    Left message to please call back in follow up of therapy. No patient or drug names were mentioned.    Anders Acuna  Pharmacy Intern  Oral Chemotherapy Monitoring Program  [facility]  [phone]

## 2018-08-01 ENCOUNTER — APPOINTMENT (OUTPATIENT)
Dept: RADIATION ONCOLOGY | Facility: CLINIC | Age: 53
End: 2018-08-01
Attending: RADIOLOGY
Payer: COMMERCIAL

## 2018-08-01 ENCOUNTER — TELEPHONE (OUTPATIENT)
Dept: ONCOLOGY | Facility: CLINIC | Age: 53
End: 2018-08-01

## 2018-08-01 PROCEDURE — 77386 ZZH IMRT TREATMENT DELIVERY, COMPLEX: CPT | Performed by: RADIOLOGY

## 2018-08-01 NOTE — TELEPHONE ENCOUNTER
Oral Chemotherapy Monitoring Program    Placed call to patient in follow up of Xeloda therapy.    Left message to please call back in follow up of therapy. No patient or drug names were mentioned.    Anders Acuna  Pharmacy Intern  Oral Chemotherapy Monitoring Program  [facility]  [phone]

## 2018-08-02 ENCOUNTER — APPOINTMENT (OUTPATIENT)
Dept: RADIATION ONCOLOGY | Facility: CLINIC | Age: 53
End: 2018-08-02
Attending: RADIOLOGY
Payer: COMMERCIAL

## 2018-08-02 DIAGNOSIS — M79.2 NEUROPATHIC PAIN: Primary | ICD-10-CM

## 2018-08-02 PROCEDURE — 77386 ZZH IMRT TREATMENT DELIVERY, COMPLEX: CPT | Performed by: RADIOLOGY

## 2018-08-02 RX ORDER — GABAPENTIN 300 MG/1
CAPSULE ORAL
Qty: 90 CAPSULE | Refills: 1 | Status: SHIPPED | OUTPATIENT
Start: 2018-08-02 | End: 2019-05-09

## 2018-08-03 ENCOUNTER — APPOINTMENT (OUTPATIENT)
Dept: RADIATION ONCOLOGY | Facility: CLINIC | Age: 53
End: 2018-08-03
Attending: RADIOLOGY
Payer: COMMERCIAL

## 2018-08-03 PROCEDURE — 77336 RADIATION PHYSICS CONSULT: CPT | Performed by: RADIOLOGY

## 2018-08-03 PROCEDURE — 77386 ZZH IMRT TREATMENT DELIVERY, COMPLEX: CPT | Performed by: RADIOLOGY

## 2018-08-06 ENCOUNTER — APPOINTMENT (OUTPATIENT)
Dept: RADIATION ONCOLOGY | Facility: CLINIC | Age: 53
End: 2018-08-06
Attending: RADIOLOGY
Payer: COMMERCIAL

## 2018-08-06 ENCOUNTER — TELEPHONE (OUTPATIENT)
Dept: ONCOLOGY | Facility: CLINIC | Age: 53
End: 2018-08-06

## 2018-08-06 VITALS — BODY MASS INDEX: 30.32 KG/M2 | WEIGHT: 182.2 LBS

## 2018-08-06 DIAGNOSIS — C20 MALIGNANT NEOPLASM OF RECTUM (H): ICD-10-CM

## 2018-08-06 DIAGNOSIS — F41.9 ANXIETY: ICD-10-CM

## 2018-08-06 PROCEDURE — 77386 ZZH IMRT TREATMENT DELIVERY, COMPLEX: CPT | Performed by: RADIOLOGY

## 2018-08-06 RX ORDER — LORAZEPAM 0.5 MG/1
0.5 TABLET ORAL EVERY 4 HOURS PRN
Qty: 60 TABLET | Refills: 5 | Status: SHIPPED | OUTPATIENT
Start: 2018-08-06 | End: 2019-10-09

## 2018-08-06 NOTE — MR AVS SNAPSHOT
After Visit Summary   8/6/2018    Sarah Rajan    MRN: [id number]           Patient Information     Date Of Birth          1965        Visit Information        Provider Department      8/6/2018 10:30 AM Mirian Ford MD Radiation Oncology Clinic        Today's Diagnoses     Malignant neoplasm of rectum (H)        Anxiety           Follow-ups after your visit        Your next 10 appointments already scheduled     Aug 08, 2018 10:00 AM CDT   EXTERNAL RADIATION TREATMENT with UMP RAD ONC VARIAN   Radiation Oncology Clinic ([facility])    [facility]  1st Floor  [address] [address]   [phone]            Aug 09, 2018 10:00 AM CDT   EXTERNAL RADIATION TREATMENT with UMP RAD ONC VARIAN   Radiation Oncology Clinic ([facility])    [facility]  1st Floor  [address] [address]   [phone]            Aug 10, 2018 10:00 AM CDT   EXTERNAL RADIATION TREATMENT with UMP RAD ONC VARIAN   Radiation Oncology Clinic ([facility])    [facility]  1st Floor  [address] [address]   [phone]            Aug 13, 2018 10:00 AM CDT   EXTERNAL RADIATION TREATMENT with UMP RAD ONC VARIAN   Radiation Oncology Clinic ([facility])    [facility]  1st Floor  [address] [address]   [phone]            Aug 13, 2018 10:30 AM CDT   ON TREATMENT VISIT with Mirian Ford MD   Radiation Oncology Clinic ([facility])    [facility]  1st Floor  [address] [address]   [phone]            Aug 14, 2018 10:00 AM CDT   EXTERNAL RADIATION TREATMENT with UMP RAD ONC VARIAN   Radiation Oncology Clinic ([facility])    [facility]  1st Floor  [address] [address]   [phone]             Aug 15, 2018  7:15 AM CDT   [facility] Lab Draw with  [facility] LAB DRAW   [facility] Lab Draw ([facility])    [address]  [address]  [place] [id number] [phone]            Aug 15, 2018  7:50 AM CDT   (Arrive by 7:35 AM)   Return Visit with Cathleen Ramos PA-C   [facility]    [address]  [address]  [place] [id number] [phone]            Aug 15, 2018 10:00 AM CDT   EXTERNAL RADIATION TREATMENT with [facility] RAD ONC VARIAN   Radiation Oncology Clinic ([facility])    [facility]  1st Floor  [address] [id number] [phone]              Who to contact     Please call your clinic at [phone] to:    Ask questions about your health    Make or cancel appointments    Discuss your medicines    Learn about your test results    Speak to your doctor            Additional Information About Your Visit        [employer] Information     [employer] gives you secure access to your electronic health record. If you see a primary care provider, you can also send messages to your care team and make appointments. If you have questions, please call your primary care clinic.  If you do not have a primary care provider, please call [phone] and they will assist you.      [employer] is an electronic gateway that provides easy, online access to your medical records. With [employer], you can request a clinic appointment, read your test results, renew a prescription or communicate with your care team.     To access your existing account, please contact your [facility] or call [phone] for assistance.        Care EveryWhere ID     This is your Care EveryWhere ID. This could be used by other organizations to access your [facility] medical records  [id number]        Your Vitals Were     BMI (Body Mass Index)                    30.32 kg/m2            Blood Pressure from Last 3 Encounters:   07/25/18 90/54   06/22/18 101/68   06/21/18 96/70    Weight from Last 3 Encounters:   08/06/18 82.6 kg (182 lb 3.2 oz)   07/30/18 82.6 kg (182 lb 3.2 oz)   07/25/18 83.2 kg (183 lb 8 oz)              Today, you had the following     No orders found for display         Where to get your medicines      Some of these will need a paper prescription and others can be bought over the counter.  Ask your nurse if you have questions.     Bring a paper prescription for each of these medications     LORazepam 0.5 MG tablet          Primary Care Provider Office Phone # Fax #    Aasay Mortensen [phone] [address] [address]        Equal Access to Services     [facility] : Jorge Armendariz, waaxchato fletcher, ritika kaalconnie cotton, luz elena mtz . So [facility] [phone].    ATENCIÓN: Si habla español, tiene a membreno disposición servicios gratuitos de asistencia lingüística. Llame al [phone].    We comply with applicable federal civil rights laws and [place] laws. We do not discriminate on the basis of race, color, national origin, age, disability, sex, sexual orientation, or gender identity.            Thank you!     Thank you for choosing RADIATION ONCOLOGY CLINIC  for your care. Our goal is always to provide you with excellent care. Hearing back from our patients is one way we can continue to improve our services. Please take a few minutes to complete the written survey that you may receive in the mail after your visit with us. Thank you!             Your Updated Medication List - Protect others around you: Learn how to safely use, store and throw away your medicines at www.disposemymeds.org.          This list is accurate as of 8/6/18 11:59 PM.  Always use your most recent med list.                   Brand Name Dispense Instructions for use Diagnosis    calcium carbonate 500  MG chewable tablet    TUMS    150 tablet    Take 1-2 chew tab by mouth daily        capecitabine 500 MG tablet CHEMO    XELODA    168 tablet    Take 3 tablets (1,500 mg) by mouth 2 times daily for 56 doses Take Mon-Fri. Do NOT take on Sat-Sun. Take with water within 30 min after meal    Malignant neoplasm of rectum (H)       eszopiclone 1 MG Tabs tablet    LUNESTA    60 tablet    Take 1-2 tablets (1-2 mg) by mouth At Bedtime    Other insomnia       FLUoxetine 40 MG capsule    PROzac    30 capsule    Take 1 capsule (40 mg) by mouth daily    Other depression       gabapentin 300 MG capsule    NEURONTIN    90 capsule    On day 1, take 300 mg at bedtime. Day 2, take 300 mg bid. Day 3 onward, take 300 mg tid.    Neuropathic pain       lidocaine-prilocaine cream    EMLA    30 g    Apply 45 minutes prior to procedure.    Malignant neoplasm of rectum (H)       LORazepam 0.5 MG tablet    ATIVAN    60 tablet    Take 1 tablet (0.5 mg) by mouth every 4 hours as needed (Anxiety, Nausea/Vomiting or Sleep)    Malignant neoplasm of rectum (H), Anxiety       magic mouthwash suspension    ENTER INGREDIENTS IN COMMENTS    240 mL    Swish, gargle, and spit one to two teaspoonfuls every six hours as needed. May be swallowed if esophageal involvement.    Oral pain       ondansetron 8 MG tablet    ZOFRAN    60 tablet    Take 1 tablet (8 mg) by mouth every 8 hours as needed for nausea    Chemotherapy induced nausea and vomiting       prochlorperazine 10 MG tablet    COMPAZINE    30 tablet    Take 1 tablet (10 mg) by mouth every 6 hours as needed (Nausea/Vomiting)    Malignant neoplasm of rectum (H)       RANITIDINE HCL PO      Take 150 mg by mouth daily as needed for heartburn        TYLENOL PO      Take 1,000 mg by mouth At Bedtime

## 2018-08-06 NOTE — LETTER
2018       RE: Sarah Rajan  [address] [address]     Dear Colleague,    Thank you for referring your patient, Sarah Rajan, to the RADIATION ONCOLOGY CLINIC. Please see a copy of my visit note below.    RADIATION ONCOLOGY WEEKLY ON TREATMENT VISIT   Encounter Date: 2018    Patient Name: Sarah Rajan  MRN: [id number]  : 1965     Disease and Stage: Rectal adenocarcinoma, [id number]  Treatment Site: Pelvis  Current Dose/Planned Total Dose: [3780] / [5040] cGy  Daily Fraction Size: [180] cGy/day, [5] times/week  Concurrent Chemotherapy: Yes  Drug and Frequency: Xeloda    Treatment Summary:  Ms. Rajan is a 52 year old female with locally advanced adenocarcinoma of the rectum. She initially presented with a mass on screening colonoscopy, 5 cm in length, 2 cm from anal verge. Biopsy showed moderately differentiated adenocarcinoma. MRI showed anal sphincter involvement and equivocal nodes. She is undergoing total neoadjuvant therapy and completed 8 cycles FOLFOX prior to neoadjuvant CRT.     ED visits/Hospitalizations:  None    Missed Treatments:  None    Subjective: Ms. Rajan presents to clinic today for her weekly on-treatment visit. She reports increased nausea and fatigue over the weekend. Feels that nausea is harder to control currently. Using zofran and ativan with some relief. Also has compazine available but has not tried this. Starting to notice some perianal discomfort with bowel movements. Using proshield and doing sitz baths with some relief. Occasional diarrhea, taking 1 imodium as needed. Reports feeling depressed about fatigue coming back which caused her significant distress when receiving induction FOLFOX. She says that she has been working with Cathleen Ramos for this and has seen our oncologic psychologist previously.     ROS:   Nutrition Alteration  Diet Type: Patient's Preference    Skin  Skin Reaction: 0 - No changes  Skin Note: Proshield    CNS Alteration  CNS  note: Neuropathy    ENT and Mouth Exam  Mucositis - Current: 0 - None      Gastrointestinal  Nausea: 2 - Oral intake decreased without weight loss, dehydration, or malnutrition  Diarrhea: 2 - Three to five soft or liquid bowel movements per day    Pain Assessment  0-10 Pain Scale: 0    Objective:   Wt 82.6 kg (182 lb 3.2 oz)  BMI 30.32 kg/m2  Current weight: 82.6 kg  Last week's weight: 82.6 kg  Starting weight: 82 kg    General: alert, oriented, NAD  HEENT: NCAT, no oral mucositis  Cardiac: WWP  Respiratory: breathing comfortably on RA  Skin: No erythema    Treatment-related toxicities (CTCAE v5.0):  1. Fatigue: grade 1  2. Diarrhea: grade 2  3. Nausea: grade 2    LAB  Lab Results   Component Value Date    WBC 2.9 (L) 07/24/2018    HGB 11.9 07/24/2018    HCT 35.8 07/24/2018    MCV 97 07/24/2018     (L) 07/24/2018     Lab Results   Component Value Date     07/24/2018    POTASSIUM 3.6 07/24/2018    CHLORIDE 106 07/24/2018    CO2 27 07/24/2018    GLC 90 07/24/2018     Assessment:    Ms. Rajan is a 52 year old female with a T4N0M0 rectal adenocarcinoma, s/p induction FOLFOX, currently undergoing neoadjuvant CRT with Xeloda. She is tolerating radiotherapy at this time.    Plan:     Continue radiotherapy    Start scheduled compazine for improved nausea control    Pain management:    Magic mouthwash ordered for oral pain    Proshield, sitz baths for perianal dermatitis    Continue tylenol and my add ibuprofen prn    Fluids/Electrolytes/Nutrition:    Normal diet    Dermatitis:    N/A    Mosaiq chart and setup information reviewed  IGRT images reviewed    Medication Review  Med list reviewed with patient?: Yes    The patient was seen and discussed with staff, Dr. Ford.    Francois Bean MD  Resident, PGY-4  Department of Radiation Oncology  [facility]  [phone]    Ms. Rajan was seen and examined by me. Note above by Dr. Bean was reviewed and edited by me and reflects our mutual  findings and plan of care.  Mirian Ford MD  Department of Radiation Oncology  [facility]

## 2018-08-06 NOTE — ORAL ONC MGMT
Oral Chemotherapy Monitoring Program     Placed call to patient in follow up of Xeloda/XRT therapy.     Left message to please call back in follow-up of therapy. No patient or drug names were mentioned.     Dino DayD  [facility] [place] [facility]  [phone]  August 6, 2018

## 2018-08-06 NOTE — PROGRESS NOTES
RADIATION ONCOLOGY WEEKLY ON TREATMENT VISIT   Encounter Date: 2018    Patient Name: Sarah Rajan  MRN: [id number]  : 1965     Disease and Stage: Rectal adenocarcinoma, [id number]  Treatment Site: Pelvis  Current Dose/Planned Total Dose: [3780] / [5040] cGy  Daily Fraction Size: [180] cGy/day, [5] times/week  Concurrent Chemotherapy: Yes  Drug and Frequency: Xeloda    Treatment Summary:  Ms. Rajan is a 52 year old female with locally advanced adenocarcinoma of the rectum. She initially presented with a mass on screening colonoscopy, 5 cm in length, 2 cm from anal verge. Biopsy showed moderately differentiated adenocarcinoma. MRI showed anal sphincter involvement and equivocal nodes. She is undergoing total neoadjuvant therapy and completed 8 cycles FOLFOX prior to neoadjuvant CRT.     ED visits/Hospitalizations:  None    Missed Treatments:  None    Subjective: Ms. Rajan presents to clinic today for her weekly on-treatment visit. She reports increased nausea and fatigue over the weekend. Feels that nausea is harder to control currently. Using zofran and ativan with some relief. Also has compazine available but has not tried this. Starting to notice some perianal discomfort with bowel movements. Using proshield and doing sitz baths with some relief. Occasional diarrhea, taking 1 imodium as needed. Reports feeling depressed about fatigue coming back which caused her significant distress when receiving induction FOLFOX. She says that she has been working with Cathleen Ramos for this and has seen our oncologic psychologist previously.     ROS:   Nutrition Alteration  Diet Type: Patient's Preference    Skin  Skin Reaction: 0 - No changes  Skin Note: Proshield    CNS Alteration  CNS note: Neuropathy    ENT and Mouth Exam  Mucositis - Current: 0 - None      Gastrointestinal  Nausea: 2 - Oral intake decreased without weight loss, dehydration, or malnutrition  Diarrhea: 2 - Three to five soft or liquid bowel  movements per day    Pain Assessment  0-10 Pain Scale: 0    Objective:   Wt 82.6 kg (182 lb 3.2 oz)  BMI 30.32 kg/m2  Current weight: 82.6 kg  Last week's weight: 82.6 kg  Starting weight: 82 kg    General: alert, oriented, NAD  HEENT: NCAT, no oral mucositis  Cardiac: WWP  Respiratory: breathing comfortably on RA  Skin: No erythema    Treatment-related toxicities (CTCAE v5.0):  1. Fatigue: grade 1  2. Diarrhea: grade 2  3. Nausea: grade 2    LAB  Lab Results   Component Value Date    WBC 2.9 (L) 07/24/2018    HGB 11.9 07/24/2018    HCT 35.8 07/24/2018    MCV 97 07/24/2018     (L) 07/24/2018     Lab Results   Component Value Date     07/24/2018    POTASSIUM 3.6 07/24/2018    CHLORIDE 106 07/24/2018    CO2 27 07/24/2018    GLC 90 07/24/2018     Assessment:    Ms. Rajan is a 52 year old female with a T4N0M0 rectal adenocarcinoma, s/p induction FOLFOX, currently undergoing neoadjuvant CRT with Xeloda. She is tolerating radiotherapy at this time.    Plan:     Continue radiotherapy    Start scheduled compazine for improved nausea control    Pain management:    Magic mouthwash ordered for oral pain    Proshield, sitz baths for perianal dermatitis    Continue tylenol and my add ibuprofen prn    Fluids/Electrolytes/Nutrition:    Normal diet    Dermatitis:    N/A    Mosaiq chart and setup information reviewed  IGRT images reviewed    Medication Review  Med list reviewed with patient?: Yes    The patient was seen and discussed with staff, Dr. Ford.    Francois Bean MD  Resident, PGY-4  Department of Radiation Oncology  [facility]  [phone]    Ms. Rajan was seen and examined by me. Note above by Dr. Bean was reviewed and edited by me and reflects our mutual findings and plan of care.  Mirian Ford MD  Department of Radiation Oncology  [facility]

## 2018-08-08 ENCOUNTER — APPOINTMENT (OUTPATIENT)
Dept: RADIATION ONCOLOGY | Facility: CLINIC | Age: 53
End: 2018-08-08
Attending: RADIOLOGY
Payer: COMMERCIAL

## 2018-08-08 ENCOUNTER — TELEPHONE (OUTPATIENT)
Dept: ONCOLOGY | Facility: CLINIC | Age: 53
End: 2018-08-08

## 2018-08-08 PROCEDURE — 77386 ZZH IMRT TREATMENT DELIVERY, COMPLEX: CPT | Performed by: RADIOLOGY

## 2018-08-08 NOTE — TELEPHONE ENCOUNTER
Oral Chemotherapy Monitoring Program (Left Voicemail)      Primary Oncologist: Dr. Mortensen  Primary Oncology Clinic: [facility]  Cancer Diagnosis: Rectal Cancer     Attempted to contact patient today for follow up regarding oral chemotherapy, Xeloda, for initial assessment. No answer. Left voicemail for patient to please call me back at [phone] when able. No patient or medication names were mentioned while leaving this message.    Lian Linares   Pharmacy Intern  [facility]   [phone]

## 2018-08-09 ENCOUNTER — APPOINTMENT (OUTPATIENT)
Dept: RADIATION ONCOLOGY | Facility: CLINIC | Age: 53
End: 2018-08-09
Attending: RADIOLOGY
Payer: COMMERCIAL

## 2018-08-09 PROCEDURE — 77386 ZZH IMRT TREATMENT DELIVERY, COMPLEX: CPT | Performed by: RADIOLOGY

## 2018-08-10 ENCOUNTER — APPOINTMENT (OUTPATIENT)
Dept: RADIATION ONCOLOGY | Facility: CLINIC | Age: 53
End: 2018-08-10
Attending: RADIOLOGY
Payer: COMMERCIAL

## 2018-08-10 PROCEDURE — 77386 ZZH IMRT TREATMENT DELIVERY, COMPLEX: CPT | Performed by: RADIOLOGY

## 2018-08-12 ENCOUNTER — MYC REFILL (OUTPATIENT)
Dept: ONCOLOGY | Facility: CLINIC | Age: 53
End: 2018-08-12

## 2018-08-12 DIAGNOSIS — F32.89 OTHER DEPRESSION: ICD-10-CM

## 2018-08-13 ENCOUNTER — APPOINTMENT (OUTPATIENT)
Dept: RADIATION ONCOLOGY | Facility: CLINIC | Age: 53
End: 2018-08-13
Attending: RADIOLOGY
Payer: COMMERCIAL

## 2018-08-13 VITALS — BODY MASS INDEX: 30.1 KG/M2 | WEIGHT: 180.9 LBS

## 2018-08-13 DIAGNOSIS — C20 MALIGNANT NEOPLASM OF RECTUM (H): Primary | ICD-10-CM

## 2018-08-13 PROCEDURE — 77386 ZZH IMRT TREATMENT DELIVERY, COMPLEX: CPT | Performed by: RADIOLOGY

## 2018-08-13 PROCEDURE — 77336 RADIATION PHYSICS CONSULT: CPT | Performed by: RADIOLOGY

## 2018-08-13 RX ORDER — FLUOXETINE 40 MG/1
40 CAPSULE ORAL DAILY
Qty: 30 CAPSULE | Refills: 3 | Status: SHIPPED | OUTPATIENT
Start: 2018-08-13 | End: 2019-10-09

## 2018-08-13 NOTE — PROGRESS NOTES
RADIATION ONCOLOGY WEEKLY ON TREATMENT VISIT   Encounter Date: 2018    Patient Name: Sarah Rajan  MRN: [id number]  : 1965     Disease and Stage: Rectal adenocarcinoma, [id number]  Treatment Site: Pelvis  Current Dose/Planned Total Dose: [4500] / [5040] cGy  Daily Fraction Size: [180] cGy/day, [5] times/week  Concurrent Chemotherapy: Yes  Drug and Frequency: Xeloda    Treatment Summary:  Ms. Rajan is a 52 year old female with locally advanced adenocarcinoma of the rectum. She initially presented with a mass on screening colonoscopy, 5 cm in length, 2 cm from anal verge. Biopsy showed moderately differentiated adenocarcinoma. MRI showed anal sphincter involvement and equivocal nodes. She is undergoing total neoadjuvant therapy and completed 8 cycles FOLFOX prior to neoadjuvant CRT.     ED visits/Hospitalizations:  None    Missed Treatments:  None    Subjective: Ms. Rajan presents to clinic today for her weekly on-treatment visit. She is doing a bit better this week. She had some improvement of her perianal irritation after starting sitz baths. She notes that the irritation correlates with diarrhea. Continues to have diarrhea and is taking about 1/2 imodium daily. Still reporting fatigue but nausea has been getting less frequent. Primary complaint is hand and foot pain since starting Xeloda.     ROS:   Nutrition Alteration  Diet Type: Patient's Preference    Skin  Skin Reaction: 0 - No changes  Skin Note: Proshield    CNS Alteration  CNS note: Neuropathy    ENT and Mouth Exam  Mucositis - Current: 0 - None      Gastrointestinal  Nausea: 1  Diarrhea: 2 - Three to five soft or liquid bowel movements per day    Pain Assessment  0-10 Pain Scale: 0    Objective:   Wt 82.1 kg (180 lb 14.4 oz)  BMI 30.1 kg/m2  Current weight: 82.1 kg  Last week's weight: 82.6 kg  Starting weight: 82 kg    General: alert, oriented, NAD  HEENT: NCAT, no oral mucositis  Cardiac: WWP  Respiratory: breathing comfortably on  RA  Skin: No erythema    Treatment-related toxicities (CTCAE v5.0):  1. Fatigue: grade 1  2. Diarrhea: grade 2  3. Nausea: grade 1    LAB  Lab Results   Component Value Date    WBC 2.9 (L) 07/24/2018    HGB 11.9 07/24/2018    HCT 35.8 07/24/2018    MCV 97 07/24/2018     (L) 07/24/2018     Lab Results   Component Value Date     07/24/2018    POTASSIUM 3.6 07/24/2018    CHLORIDE 106 07/24/2018    CO2 27 07/24/2018    GLC 90 07/24/2018     Assessment:    Ms. Rajan is a 52 year old female with a T4N0M0 rectal adenocarcinoma, s/p induction FOLFOX, currently undergoing neoadjuvant CRT with Xeloda. She is tolerating radiotherapy at this time.    Plan:     Continue radiotherapy    Continue scheduled compazine for nausea control    Start imodium 1/2 tab BID for diarrhea     Pain management:    Magic mouthwash for oral pain    Proshield, sitz baths for perianal dermatitis    Continue tylenol and my add ibuprofen prn    Fluids/Electrolytes/Nutrition:    Normal diet    Dermatitis:    N/A    Mosaiq chart and setup information reviewed  IGRT images reviewed    Medication Review  Med list reviewed with patient?: Yes    The patient was seen and discussed with staff, Dr. Ford.    Francois Bean MD  Resident, PGY-4  Department of Radiation Oncology  [facility]  [phone]      Ms. Rajan was seen and examined by me. Note above by Dr. Bean was reviewed and edited by me and reflects our mutual findings and plan of care.    Mirian Ford MD  Department of Radiation Oncology  [facility]

## 2018-08-13 NOTE — LETTER
2018       RE: Sarah Rajan  [address] [address]     Dear Colleague,    Thank you for referring your patient, Sarah Rajan, to the RADIATION ONCOLOGY CLINIC. Please see a copy of my visit note below.    RADIATION ONCOLOGY WEEKLY ON TREATMENT VISIT   Encounter Date: 2018    Patient Name: Sarah Rajan  MRN: [id number]  : 1965     Disease and Stage: Rectal adenocarcinoma, [id number]  Treatment Site: Pelvis  Current Dose/Planned Total Dose: [4500] / [5040] cGy  Daily Fraction Size: [180] cGy/day, [5] times/week  Concurrent Chemotherapy: Yes  Drug and Frequency: Xeloda    Treatment Summary:  Ms. Rajan is a 52 year old female with locally advanced adenocarcinoma of the rectum. She initially presented with a mass on screening colonoscopy, 5 cm in length, 2 cm from anal verge. Biopsy showed moderately differentiated adenocarcinoma. MRI showed anal sphincter involvement and equivocal nodes. She is undergoing total neoadjuvant therapy and completed 8 cycles FOLFOX prior to neoadjuvant CRT.     ED visits/Hospitalizations:  None    Missed Treatments:  None    Subjective: Ms. Rajan presents to clinic today for her weekly on-treatment visit. She is doing a bit better this week. She had some improvement of her perianal irritation after starting sitz baths. She notes that the irritation correlates with diarrhea. Continues to have diarrhea and is taking about 1/2 imodium daily. Still reporting fatigue but nausea has been getting less frequent. Primary complaint is hand and foot pain since starting Xeloda.     ROS:   Nutrition Alteration  Diet Type: Patient's Preference    Skin  Skin Reaction: 0 - No changes  Skin Note: Proshield    CNS Alteration  CNS note: Neuropathy    ENT and Mouth Exam  Mucositis - Current: 0 - None      Gastrointestinal  Nausea: 1  Diarrhea: 2 - Three to five soft or liquid bowel movements per day    Pain Assessment  0-10 Pain Scale: 0    Objective:   Wt 82.1 kg (180  lb 14.4 oz)  BMI 30.1 kg/m2  Current weight: 82.1 kg  Last week's weight: 82.6 kg  Starting weight: 82 kg    General: alert, oriented, NAD  HEENT: NCAT, no oral mucositis  Cardiac: WWP  Respiratory: breathing comfortably on RA  Skin: No erythema    Treatment-related toxicities (CTCAE v5.0):  1. Fatigue: grade 1  2. Diarrhea: grade 2  3. Nausea: grade 1    LAB  Lab Results   Component Value Date    WBC 2.9 (L) 07/24/2018    HGB 11.9 07/24/2018    HCT 35.8 07/24/2018    MCV 97 07/24/2018     (L) 07/24/2018     Lab Results   Component Value Date     07/24/2018    POTASSIUM 3.6 07/24/2018    CHLORIDE 106 07/24/2018    CO2 27 07/24/2018    GLC 90 07/24/2018     Assessment:    Ms. Rajan is a 52 year old female with a T4N0M0 rectal adenocarcinoma, s/p induction FOLFOX, currently undergoing neoadjuvant CRT with Xeloda. She is tolerating radiotherapy at this time.    Plan:     Continue radiotherapy    Continue scheduled compazine for nausea control    Start imodium 1/2 tab BID for diarrhea     Pain management:    Magic mouthwash for oral pain    Proshield, sitz baths for perianal dermatitis    Continue tylenol and my add ibuprofen prn    Fluids/Electrolytes/Nutrition:    Normal diet    Dermatitis:    N/A    Mosaiq chart and setup information reviewed  IGRT images reviewed    Medication Review  Med list reviewed with patient?: Yes    The patient was seen and discussed with staff, Dr. Ford.    Francois Bean MD  Resident, PGY-4  Department of Radiation Oncology  [facility]  [phone]      Ms. Rajan was seen and examined by me. Note above by Dr. Bean was reviewed and edited by me and reflects our mutual findings and plan of care.    Mirian Ford MD  Department of Radiation Oncology  [facility]

## 2018-08-13 NOTE — PATIENT INSTRUCTIONS
Continuing Management of the Effects of Radiation Treatment    The side effects of radiation therapy should gradually decrease in 2 to 3 weeks after you have finished radiation.  Some effects take longer to resolve.    Skin reactions:  Skin changes (such as redness or irritation) should begin to get better gradually.  Some people will have a permanent change in skin color.  Their skin may be more pink or  tan  than the untreated skin.  The skin may be thinner or more fragile than before treatment.  Continue to use a gentle moisturizing lotion for several months.  You should always protect the skin in the area that was treated by using sunscreen of spf 30 or higher.      Other skin care instructions:    Fatigue caused by radiation therapy will decrease and your energy will improve.    For concerns or questions call Department of Therapeutic Radiology [phone]

## 2018-08-13 NOTE — MR AVS SNAPSHOT
After Visit Summary   8/13/2018    Sarah Rajan    MRN: [id number]           Patient Information     Date Of Birth          1965        Visit Information        Provider Department      8/13/2018 10:30 AM Mirian Ford MD Radiation Oncology Clinic        Today's Diagnoses     Malignant neoplasm of rectum (H)    -  1      Care Instructions    Continuing Management of the Effects of Radiation Treatment    The side effects of radiation therapy should gradually decrease in 2 to 3 weeks after you have finished radiation.  Some effects take longer to resolve.    Skin reactions:  Skin changes (such as redness or irritation) should begin to get better gradually.  Some people will have a permanent change in skin color.  Their skin may be more pink or  tan  than the untreated skin.  The skin may be thinner or more fragile than before treatment.  Continue to use a gentle moisturizing lotion for several months.  You should always protect the skin in the area that was treated by using sunscreen of spf 30 or higher.      Other skin care instructions:    Fatigue caused by radiation therapy will decrease and your energy will improve.    For concerns or questions call Department of Therapeutic Radiology [phone]          Follow-ups after your visit        Follow-up notes from your care team     Return in about 4 weeks (around 9/10/2018).      Your next 10 appointments already scheduled     Aug 14, 2018 10:00 AM CDT   EXTERNAL RADIATION TREATMENT with [facility] RAD ONC VARIAN   Radiation Oncology Clinic ([facility])    [facility]  1st Floor  [address]  [place] [address] [phone]            Aug 15, 2018  7:15 AM CDT   [facility] Lab Draw with  [facility] DRAW   [facility] Lab Draw ([facility])    [address]  [address]  [place] [id number] [phone]            Aug 15, 2018  7:50 AM CDT   (Arrive by 7:35  AM)   Return Visit with Cathleen Ramos PA-C   [facility] ([facility])    [address]  [address]  [place] [id number] [phone]            Aug 15, 2018 10:00 AM CDT   EXTERNAL RADIATION TREATMENT with P RAD ONC VARIAN   Radiation Oncology Clinic ([facility])    [facility]  1st Floor  [address] [id number] [phone]            Aug 16, 2018 10:00 AM CDT   EXTERNAL RADIATION TREATMENT with P RAD ONC VARIAN   Radiation Oncology Clinic ([facility])    [facility]  1st Floor  [address] [id number] [phone]              Who to contact     Please call your clinic at [phone] to:    Ask questions about your health    Make or cancel appointments    Discuss your medicines    Learn about your test results    Speak to your doctor            Additional Information About Your Visit        [employer] Information     [employer] gives you secure access to your electronic health record. If you see a primary care provider, you can also send messages to your care team and make appointments. If you have questions, please call your primary care clinic.  If you do not have a primary care provider, please call [phone] and they will assist you.      [employer] is an electronic gateway that provides easy, online access to your medical records. With [employer], you can request a clinic appointment, read your test results, renew a prescription or communicate with your care team.     To access your existing account, please contact your [facility] or call [phone] for assistance.        Care EveryWhere ID     This is your Care EveryWhere ID. This could be used by other organizations to access your [facility] medical records  [id number]        Your Vitals Were     BMI (Body Mass Index)                   30.1 kg/m2             Blood Pressure from Last 3 Encounters:   07/25/18 90/54   06/22/18 101/68   06/21/18 96/70    Weight from Last 3 Encounters:   08/13/18 82.1 kg (180 lb 14.4 oz)   08/06/18 82.6 kg (182 lb 3.2 oz)   07/30/18 82.6 kg (182 lb 3.2 oz)              Today, you had the following     No orders found for display       Primary Care Provider Office Phone # Fax #    Kyra ALVAREZ MD [place] [phone] [address] [address]        Equal Access to Services     [facility]: Hadii shannon li hadasho Sojustin, waaxda luqadaha, qaybta kaalmada adetimothyyada, luz elena mtz . So [facility] [phone].    ATENCIÓN: Si habla español, tiene a membreno disposición servicios gratuitos de asistencia lingüística. Llame al [phone].    We comply with applicable federal civil rights laws and [place] laws. We do not discriminate on the basis of race, color, national origin, age, disability, sex, sexual orientation, or gender identity.            Thank you!     Thank you for choosing RADIATION ONCOLOGY CLINIC  for your care. Our goal is always to provide you with excellent care. Hearing back from our patients is one way we can continue to improve our services. Please take a few minutes to complete the written survey that you may receive in the mail after your visit with us. Thank you!             Your Updated Medication List - Protect others around you: Learn how to safely use, store and throw away your medicines at www.disposemymeds.org.          This list is accurate as of 8/13/18 10:43 AM.  Always use your most recent med list.                   Brand Name Dispense Instructions for use Diagnosis    calcium carbonate 500 MG chewable tablet    TUMS    150 tablet    Take 1-2 chew tab by mouth daily        capecitabine 500 MG tablet CHEMO    XELODA    168 tablet    Take 3 tablets (1,500 mg) by mouth 2 times daily for 56 doses Take Mon-Fri. Do NOT take on Sat-Sun. Take with water within 30 min after  meal    Malignant neoplasm of rectum (H)       eszopiclone 1 MG Tabs tablet    LUNESTA    60 tablet    Take 1-2 tablets (1-2 mg) by mouth At Bedtime    Other insomnia       FLUoxetine 40 MG capsule    PROzac    30 capsule    Take 1 capsule (40 mg) by mouth daily    Other depression       gabapentin 300 MG capsule    NEURONTIN    90 capsule    On day 1, take 300 mg at bedtime. Day 2, take 300 mg bid. Day 3 onward, take 300 mg tid.    Neuropathic pain       lidocaine-prilocaine cream    EMLA    30 g    Apply 45 minutes prior to procedure.    Malignant neoplasm of rectum (H)       LORazepam 0.5 MG tablet    ATIVAN    60 tablet    Take 1 tablet (0.5 mg) by mouth every 4 hours as needed (Anxiety, Nausea/Vomiting or Sleep)    Malignant neoplasm of rectum (H), Anxiety       magic mouthwash suspension    ENTER INGREDIENTS IN COMMENTS    240 mL    Swish, gargle, and spit one to two teaspoonfuls every six hours as needed. May be swallowed if esophageal involvement.    Oral pain       ondansetron 8 MG tablet    ZOFRAN    60 tablet    Take 1 tablet (8 mg) by mouth every 8 hours as needed for nausea    Chemotherapy induced nausea and vomiting       prochlorperazine 10 MG tablet    COMPAZINE    30 tablet    Take 1 tablet (10 mg) by mouth every 6 hours as needed (Nausea/Vomiting)    Malignant neoplasm of rectum (H)       RANITIDINE HCL PO      Take 150 mg by mouth daily as needed for heartburn        TYLENOL PO      Take 1,000 mg by mouth At Bedtime

## 2018-08-13 NOTE — TELEPHONE ENCOUNTER
[facility]:  Original authorizing provider: LISSA Rdz would like a refill of the following medications:  FLUoxetine (PROZAC) 40 MG capsule [Cathleen Ramos PA-C]    Preferred pharmacy: [id number] - [place], [place] - [address]    Comment:  can this be refilled to the [facility]'s pharmacy on [address] or do I need a written prescription? thank you, Sarah Rajan

## 2018-08-14 ENCOUNTER — APPOINTMENT (OUTPATIENT)
Dept: RADIATION ONCOLOGY | Facility: CLINIC | Age: 53
End: 2018-08-14
Attending: RADIOLOGY
Payer: COMMERCIAL

## 2018-08-14 PROCEDURE — 77386 ZZH IMRT TREATMENT DELIVERY, COMPLEX: CPT | Performed by: RADIOLOGY

## 2018-08-15 ENCOUNTER — ONCOLOGY VISIT (OUTPATIENT)
Dept: ONCOLOGY | Facility: CLINIC | Age: 53
End: 2018-08-15
Attending: PHYSICIAN ASSISTANT
Payer: COMMERCIAL

## 2018-08-15 ENCOUNTER — APPOINTMENT (OUTPATIENT)
Dept: RADIATION ONCOLOGY | Facility: CLINIC | Age: 53
End: 2018-08-15
Attending: RADIOLOGY
Payer: COMMERCIAL

## 2018-08-15 ENCOUNTER — APPOINTMENT (OUTPATIENT)
Dept: LAB | Facility: CLINIC | Age: 53
End: 2018-08-15
Attending: INTERNAL MEDICINE
Payer: COMMERCIAL

## 2018-08-15 VITALS
HEART RATE: 68 BPM | OXYGEN SATURATION: 98 % | BODY MASS INDEX: 30.02 KG/M2 | TEMPERATURE: 97.7 F | DIASTOLIC BLOOD PRESSURE: 73 MMHG | HEIGHT: 65 IN | WEIGHT: 180.2 LBS | RESPIRATION RATE: 16 BRPM | SYSTOLIC BLOOD PRESSURE: 115 MMHG

## 2018-08-15 DIAGNOSIS — E87.6 HYPOKALEMIA: ICD-10-CM

## 2018-08-15 DIAGNOSIS — Z79.899 ENCOUNTER FOR LONG-TERM (CURRENT) USE OF MEDICATIONS: ICD-10-CM

## 2018-08-15 DIAGNOSIS — C20 MALIGNANT NEOPLASM OF RECTUM (H): Primary | ICD-10-CM

## 2018-08-15 DIAGNOSIS — C20 RECTAL CANCER (H): ICD-10-CM

## 2018-08-15 LAB
ALBUMIN SERPL-MCNC: 3.7 G/DL (ref 3.4–5)
ALP SERPL-CCNC: 74 U/L (ref 40–150)
ALT SERPL W P-5'-P-CCNC: 39 U/L (ref 0–50)
ANION GAP SERPL CALCULATED.3IONS-SCNC: 8 MMOL/L (ref 3–14)
AST SERPL W P-5'-P-CCNC: 27 U/L (ref 0–45)
BASOPHILS # BLD AUTO: 0 10E9/L (ref 0–0.2)
BASOPHILS NFR BLD AUTO: 0.8 %
BILIRUB SERPL-MCNC: 0.7 MG/DL (ref 0.2–1.3)
BUN SERPL-MCNC: 12 MG/DL (ref 7–30)
CALCIUM SERPL-MCNC: 8.6 MG/DL (ref 8.5–10.1)
CHLORIDE SERPL-SCNC: 107 MMOL/L (ref 94–109)
CO2 SERPL-SCNC: 27 MMOL/L (ref 20–32)
CREAT SERPL-MCNC: 0.7 MG/DL (ref 0.52–1.04)
DIFFERENTIAL METHOD BLD: ABNORMAL
EOSINOPHIL # BLD AUTO: 0.1 10E9/L (ref 0–0.7)
EOSINOPHIL NFR BLD AUTO: 4.6 %
ERYTHROCYTE [DISTWIDTH] IN BLOOD BY AUTOMATED COUNT: 14.2 % (ref 10–15)
GFR SERPL CREATININE-BSD FRML MDRD: 88 ML/MIN/1.7M2
GLUCOSE SERPL-MCNC: 87 MG/DL (ref 70–99)
HCT VFR BLD AUTO: 33 % (ref 35–47)
HGB BLD-MCNC: 11.4 G/DL (ref 11.7–15.7)
IMM GRANULOCYTES # BLD: 0 10E9/L (ref 0–0.4)
IMM GRANULOCYTES NFR BLD: 0.4 %
LYMPHOCYTES # BLD AUTO: 0.2 10E9/L (ref 0.8–5.3)
LYMPHOCYTES NFR BLD AUTO: 10 %
MCH RBC QN AUTO: 33.1 PG (ref 26.5–33)
MCHC RBC AUTO-ENTMCNC: 34.5 G/DL (ref 31.5–36.5)
MCV RBC AUTO: 96 FL (ref 78–100)
MONOCYTES # BLD AUTO: 0.2 10E9/L (ref 0–1.3)
MONOCYTES NFR BLD AUTO: 8.3 %
NEUTROPHILS # BLD AUTO: 1.8 10E9/L (ref 1.6–8.3)
NEUTROPHILS NFR BLD AUTO: 75.9 %
NRBC # BLD AUTO: 0 10*3/UL
NRBC BLD AUTO-RTO: 0 /100
PLATELET # BLD AUTO: 124 10E9/L (ref 150–450)
POTASSIUM SERPL-SCNC: 3.2 MMOL/L (ref 3.4–5.3)
PROT SERPL-MCNC: 6.7 G/DL (ref 6.8–8.8)
RBC # BLD AUTO: 3.44 10E12/L (ref 3.8–5.2)
SODIUM SERPL-SCNC: 142 MMOL/L (ref 133–144)
WBC # BLD AUTO: 2.4 10E9/L (ref 4–11)

## 2018-08-15 PROCEDURE — 80053 COMPREHEN METABOLIC PANEL: CPT | Performed by: INTERNAL MEDICINE

## 2018-08-15 PROCEDURE — 99215 OFFICE O/P EST HI 40 MIN: CPT | Mod: ZP | Performed by: PHYSICIAN ASSISTANT

## 2018-08-15 PROCEDURE — 36591 DRAW BLOOD OFF VENOUS DEVICE: CPT

## 2018-08-15 PROCEDURE — G0463 HOSPITAL OUTPT CLINIC VISIT: HCPCS | Mod: ZF

## 2018-08-15 PROCEDURE — 85025 COMPLETE CBC W/AUTO DIFF WBC: CPT | Performed by: INTERNAL MEDICINE

## 2018-08-15 PROCEDURE — 25000128 H RX IP 250 OP 636: Mod: ZF | Performed by: PHYSICIAN ASSISTANT

## 2018-08-15 PROCEDURE — 77386 ZZH IMRT TREATMENT DELIVERY, COMPLEX: CPT | Performed by: RADIOLOGY

## 2018-08-15 RX ORDER — POTASSIUM CHLORIDE 750 MG/1
20 TABLET, EXTENDED RELEASE ORAL 2 TIMES DAILY
Qty: 16 TABLET | Refills: 0 | Status: SHIPPED | OUTPATIENT
Start: 2018-08-15 | End: 2018-08-19

## 2018-08-15 RX ORDER — HEPARIN SODIUM (PORCINE) LOCK FLUSH IV SOLN 100 UNIT/ML 100 UNIT/ML
5 SOLUTION INTRAVENOUS EVERY 8 HOURS
Status: DISCONTINUED | OUTPATIENT
Start: 2018-08-15 | End: 2018-08-15 | Stop reason: HOSPADM

## 2018-08-15 RX ADMIN — SODIUM CHLORIDE, PRESERVATIVE FREE 5 ML: 5 INJECTION INTRAVENOUS at 07:27

## 2018-08-15 ASSESSMENT — PAIN SCALES - GENERAL: PAINLEVEL: NO PAIN (0)

## 2018-08-15 NOTE — NURSING NOTE
"Oncology Rooming Note    August 15, 2018 7:56 AM   Sarah Rajan is a 52 year old female who presents for:    Chief Complaint   Patient presents with     Port Draw     accessed with Gripper needle, heparin locked, vitalschecked     Oncology Clinic Visit     Return: Rectal Ca     Initial Vitals: /73  Pulse 68  Temp 97.7  F (36.5  C)  Resp 16  Ht 1.651 m (5' 5\")  Wt 81.7 kg (180 lb 3.2 oz)  SpO2 98%  Breastfeeding? No  BMI 29.99 kg/m2 Estimated body mass index is 29.99 kg/(m^2) as calculated from the following:    Height as of this encounter: 1.651 m (5' 5\").    Weight as of this encounter: 81.7 kg (180 lb 3.2 oz). Body surface area is 1.94 meters squared.  No Pain (0) Comment: Data Unavailable   No LMP recorded. Patient is postmenopausal.  Allergies reviewed: Yes  Medications reviewed: Yes    Medications: Medication refills not needed today.  Pharmacy name entered into [facility]:    [facility] [id number] [place] [address] DR ALVAREZ AT [facility] - [place] [phone]  [facility] - [place], [place] - [address] [address] [address] [address] & [facility] - [place], [place] - [address]    Clinical concerns: no new concerns today Cathleen Ramos was notified.    10 minutes for nursing intake (face to face time)     Guille Bowman CMA              "

## 2018-08-15 NOTE — PATIENT INSTRUCTIONS
Eating a High-Potassium Diet  Your healthcare provider has told you to eat a high-potassium diet. This may be because you have low levels of potassium in your blood. Or it may be because you have high blood pressure. Potassium is found in many foods. These include dairy products, nuts, seeds, and beans. It s also found in many fruits and vegetables in high amounts.  Guidelines for a high-potassium diet    Eat fruits and vegetables in their fresh or raw form most often.    Check labels for ingredients that have potassium. This includes potassium chloride. Add these items to your diet.    Try salt substitutes. Many of these have potassium.    Avoid licorice. This includes licorice root and teas that have licorice. These can reduce potassium levels in your body.  Eat plenty of the following high-potassium foods:    Fruits. Good choices are apricots (canned and fresh), bananas, cantaloupe, honeydew melon, kiwi, nectarines, oranges, orange juice, and pears. Dried fruits include apricots, dates, figs, and prunes. Prune juice also has potassium.    Vegetables. Good choices are asparagus, avocado, artichoke, broccoli, bamboo shoots, beets, [place] sprouts, cabbage, celery, chard, okra, potatoes (white and sweet), pumpkin, rutabaga, spinach (cooked), squash, and tomatoes. Tomato sauce, tomato juice, and vegetable juice cocktail are also good choices.    Chicken, fish, clams, and crab    Milk, chocolate milk, buttermilk, and soy milk    Legumes. These include black-eyed peas, chickpeas, lentils, lima beans, navy beans, red kidney beans, soybeans, and split peas.    Nuts and seeds. Try almonds, [place] nuts, cashews, peanuts, peanut butter, pecans, pumpkin seeds, sunflower seeds, and walnuts.    Breads and cereals. These include bran and whole-grain products.    Others foods include chocolate, cocoa, coconut milk, and molasses  Date Last Reviewed: 11/1/2017 2000-2017 [employer]. [address] [place] [address],  [place] [address]. All rights reserved. This information is not intended as a substitute for professional medical care. Always follow your healthcare professional's instructions.

## 2018-08-15 NOTE — NURSING NOTE
Chief Complaint   Patient presents with     Port Draw     accessed with Gripper needle, heparin locked, vitalschecked

## 2018-08-15 NOTE — LETTER
8/15/2018      RE: Sarah Rajan  [address] [facility] [id number]       Oncology/Hematology Visit Note  Aug 15, 2018     Reason for Visit: follow up of [id number] moderately differentiated adenocarcinoma of the rectum     History of Present Illness: Sarah Rajan is a 52 year old female with  recently diagnosed locally advanced rectal cancer. She noticed BRBPR so screening colonoscopy on 1/9/2018 revealed 3 cm rectal mass and other polyps which were removed.  Pathology indicated moderately differentiated adenocarcinoma w/ intact MMR proteins.  CT staging scan showed no metastatic disease; some liver lesions which are thought to be benign per radiology report, [id number] by pelvic MRI read at [facility]. When we initially reviewed her MRI we were not exactly sure whether that was lymph node involvement or not. We opted to repeat MRI which showed T4 N0 lesion.   We also did an MRI of the liver which showed 3 subcentimeter liver lesions which were too small to characterize and will need attention on follow-up. She is currently undergoing treatment with neoadjuvant 5-FU and oxaliplatin (FOLFOX), which she started on 2/26/18. The day after she received cycle 2, she developed fevers, chills, headache, and an itchy rash on her arms and legs, for which she was evaluated in the ED. She was sent home on Benadryl. Benadryl and dexamethasone doses were increased for cycle 3. She received cycle 4 on 4/10/18. Pelvic MRI on 4/19/18 showed improvement in the rectal mass. Abdominal MRI on 5/2/18 showed stable indeterminate liver lesions, which on June 2018 imaging appeared more consistent with a benign hemangioma or cyst. She completed 8 cycles of FOLFOX, last on 6/20/18. Subsequent imaging showed response to treatment with only a small signal consistent with viable tumor in the primary rectal cancer with no surrounding lymph nodes suspicious, and no evidence of metastatic disease. She started on concurrent chemoradiation with  Xeloda with radiation beginning on 7/9/18. There was a delay in her receiving Xeloda, so she began that on 7/10/18.  Please see Dr. Mortensen's previous notes for further details on the patient's history. She comes in today for routine follow up.     Interval History:  Patient reports that she continues to have numbness and tingling in her fingers down to her knuckles and in her toes.  She finds it hard to write.  She denies any skin changes on her hands or feet and has been applying lotion regularly.  She has opted to not yet started on the gabapentin.  She has been going for daily walks.  She reports diarrhea managed with a half a tablet of Imodium.  She is applying Proshield 8-10 times per day and doing sitz baths once a day.  She is managing her pain with 400 mg of ibuprofen about once a week and taking Tylenol sometimes in the evenings.  She reports some nausea prior to her bowel movements.  She is managing nausea with Zofran, Ativan, and Compazine.  She denies any vomiting.  She continues to feel like she has a burning tongue.  She is doing salt and soda swishes about once a day.  She does report feeling tired and noticed that she was quite fatigued after just visiting with some friends at a party.  She did have more anxiety and has a decreased mood over the weekend as she is approaching completing her radiation.  She is looking forward to completing treatment.  She denies other concerns.      Current Outpatient Prescriptions   Medication Sig Dispense Refill     Acetaminophen (TYLENOL PO) Take 1,000 mg by mouth At Bedtime       calcium carbonate (TUMS) 500 MG chewable tablet Take 1-2 chew tab by mouth daily  150 tablet      eszopiclone (LUNESTA) 1 MG TABS tablet Take 1-2 tablets (1-2 mg) by mouth At Bedtime 60 tablet 3     FLUoxetine (PROZAC) 40 MG capsule Take 1 capsule (40 mg) by mouth daily 30 capsule 3     lidocaine-prilocaine (EMLA) cream Apply 45 minutes prior to procedure. 30 g 3     LORazepam (ATIVAN) 0.5  "MG tablet Take 1 tablet (0.5 mg) by mouth every 4 hours as needed (Anxiety, Nausea/Vomiting or Sleep) 60 tablet 5     ondansetron (ZOFRAN) 8 MG tablet Take 1 tablet (8 mg) by mouth every 8 hours as needed for nausea 60 tablet 3     potassium chloride SA (K-DUR/KLOR-CON M) 10 MEQ CR tablet Take 2 tablets (20 mEq) by mouth 2 times daily for 4 days 16 tablet 0     RANITIDINE HCL PO Take 150 mg by mouth daily as needed for heartburn       capecitabine (XELODA) 500 MG tablet CHEMO Take 3 tablets (1,500 mg) by mouth 2 times daily for 56 doses Take Mon-Fri. Do NOT take on Sat-Sun. Take with water within 30 min after meal 168 tablet 0     gabapentin (NEURONTIN) 300 MG capsule On day 1, take 300 mg at bedtime. Day 2, take 300 mg bid. Day 3 onward, take 300 mg tid. (Patient not taking: Reported on 8/15/2018) 90 capsule 1     magic mouthwash (ENTER INGREDIENTS IN COMMENTS) suspension Swish, gargle, and spit one to two teaspoonfuls every six hours as needed. May be swallowed if esophageal involvement. (Patient not taking: Reported on 8/15/2018) 240 mL 1     prochlorperazine (COMPAZINE) 10 MG tablet Take 1 tablet (10 mg) by mouth every 6 hours as needed (Nausea/Vomiting) (Patient not taking: Reported on 8/15/2018) 30 tablet 2     Physical Examination:  General: The patient is a pleasant female. She is in good spirits today. She is here today with her .   /73  Pulse 68  Temp 97.7  F (36.5  C)  Resp 16  Ht 1.651 m (5' 5\")  Wt 81.7 kg (180 lb 3.2 oz)  SpO2 98%  Breastfeeding? No  BMI 29.99 kg/m2  Wt Readings from Last 10 Encounters:   08/15/18 81.7 kg (180 lb 3.2 oz)   08/13/18 82.1 kg (180 lb 14.4 oz)   08/06/18 82.6 kg (182 lb 3.2 oz)   07/30/18 82.6 kg (182 lb 3.2 oz)   07/25/18 83.2 kg (183 lb 8 oz)   07/23/18 82.7 kg (182 lb 4.8 oz)   07/16/18 82.1 kg (180 lb 14.4 oz)   07/09/18 82 kg (180 lb 12.8 oz)   06/22/18 82.1 kg (181 lb)   06/21/18 83 kg (183 lb)   HEENT: EOMI, PERRL. Sclerae are anicteric. Oral " mucosa is pink and moist with no lesions or thrush.   Lymph: Neck is supple with no lymphadenopathy in the cervical or supraclavicular areas.   Heart: Regular rate and rhythm.   Lungs: Clear to auscultation bilaterally.   Abdomen: Bowel sounds present, soft, nontender with no palpable hepatosplenomegaly or masses.   Extremities: No lower extremity edema noted bilaterally.   Neuro: Cranial nerves II through XII are grossly intact.  Skin: No rashes, petechiae, or bruising noted on exposed skin. No skin changes on hands or feet.     Laboratory Data:   8/15/2018 07:27   Sodium 142   Potassium 3.2 (L)   Chloride 107   Carbon Dioxide 27   Urea Nitrogen 12   Creatinine 0.70   GFR Estimate 88   GFR Estimate If Black >90   Calcium 8.6   Anion Gap 8   Albumin 3.7   Protein Total 6.7 (L)   Bilirubin Total 0.7   Alkaline Phosphatase 74   ALT 39   AST 27   Glucose 87   WBC 2.4 (L)   Hemoglobin 11.4 (L)   Hematocrit 33.0 (L)   Platelet Count 124 (L)   RBC Count 3.44 (L)   MCV 96   MCH 33.1 (H)   MCHC 34.5   RDW 14.2   Diff Method Automated Method   % Neutrophils 75.9   % Lymphocytes 10.0   % Monocytes 8.3   % Eosinophils 4.6   % Basophils 0.8   % Immature Granulocytes 0.4   Nucleated RBCs 0   Absolute Neutrophil 1.8   Absolute Lymphocytes 0.2 (L)   Absolute Monocytes 0.2   Absolute Eosinophils 0.1   Absolute Basophils 0.0   Abs Immature Granulocytes 0.0   Absolute Nucleated RBC 0.0     Assessment and Plan:  1. Locally advanced moderately differentiated adenocarcinoma of the rectum: [id number]. MSI stable. Started neoadjuvant FOLFOX on 2/26/18 and completed 8 cycles with good response to treatment. Her cold sensitivity has resolved. 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 She is now undergoing concurrent chemoradiation with Xeloda, which she began on 7/9/18. She is tolerating this fairly well so far with fatigue, nausea, diarrhea, and  tongue sensitivity. She will complete radiation tomorrow, 8/16/18. We will plan to repeat imaging 6-8 weeks after completing chemoradiation and she will see Ilan Mortensen and Berta to review.     2. Depression and anxiety. She resumed fluoxetine 20 mg daily on 5/7/18 and increased to 40 mg daily on 6/5/18. Her mood is now generally much improved. She has had some increase in her depressive and anxiety symptoms as she approaches finishing treatment. She will continue to use Lunesta 1-2 mg qhs for sleep. She has previously met with Troy Huff for counseling.     3. GI  -Diarrhea. Treatment induced. Managed with Imodium prn.  -Nausea. Chemotherapy induced. Managed with Zofran and Ativan and occasional Compazine.  -Radiation dermatitis. Continue with Proshield and Sitz baths, per rad onc. Continue with prn ibuprofen and Tylenol.    4. Tongue pain. Secondary to Xeloda. No open areas. Continue salt/soda swishes, but increase to at least qid.  Recommend setting alarm on phone.     4. Peripheral neuropathy. Secondary to previous oxaliplatin, though also reported with Xeloda. She has opted to not yet start on gabapentin. Will continue to monitor for now.     5. Hypokalemia. Likely related to diarrhea. She will take potassium chloride 20 mEq bid x 4 days. She was also given a list of high potassium foods.     Cathleen Ramos PA-C  [facility]  [address], [place] [address] [phone]      Cathleen Ramos PA-C

## 2018-08-15 NOTE — MR AVS SNAPSHOT
After Visit Summary   8/15/2018    Sarah Rajan    MRN: [id number]           Patient Information     Date Of Birth          1965        Visit Information        Provider Department      8/15/2018 7:50 AM Cathleen Ramos PA-C [facility]        Today's Diagnoses     Malignant neoplasm of rectum (H)    -  1    Encounter for long-term (current) use of medications        Rectal cancer (H)        Hypokalemia          Care Instructions      Eating a High-Potassium Diet  Your healthcare provider has told you to eat a high-potassium diet. This may be because you have low levels of potassium in your blood. Or it may be because you have high blood pressure. Potassium is found in many foods. These include dairy products, nuts, seeds, and beans. It s also found in many fruits and vegetables in high amounts.  Guidelines for a high-potassium diet    Eat fruits and vegetables in their fresh or raw form most often.    Check labels for ingredients that have potassium. This includes potassium chloride. Add these items to your diet.    Try salt substitutes. Many of these have potassium.    Avoid licorice. This includes licorice root and teas that have licorice. These can reduce potassium levels in your body.  Eat plenty of the following high-potassium foods:    Fruits. Good choices are apricots (canned and fresh), bananas, cantaloupe, honeydew melon, kiwi, nectarines, oranges, orange juice, and pears. Dried fruits include apricots, dates, figs, and prunes. Prune juice also has potassium.    Vegetables. Good choices are asparagus, avocado, artichoke, broccoli, bamboo shoots, beets, [place] sprouts, cabbage, celery, chard, okra, potatoes (white and sweet), pumpkin, rutabaga, spinach (cooked), squash, and tomatoes. Tomato sauce, tomato juice, and vegetable juice cocktail are also good choices.    Chicken, fish, clams, and crab    Milk, chocolate milk, buttermilk, and soy milk    Legumes. These  include black-eyed peas, chickpeas, lentils, lima beans, navy beans, red kidney beans, soybeans, and split peas.    Nuts and seeds. Try almonds, Brazil nuts, cashews, peanuts, peanut butter, pecans, pumpkin seeds, sunflower seeds, and walnuts.    Breads and cereals. These include bran and whole-grain products.    Others foods include chocolate, cocoa, coconut milk, and molasses  Date Last Reviewed: 11/1/2017 2000-2017 [employer]. [address] [place] [address]. All rights reserved. This information is not intended as a substitute for professional medical care. Always follow your healthcare professional's instructions.                Follow-ups after your visit        Your next 10 appointments already scheduled     Sep 12, 2018  9:30 AM CDT   Return Visit with Mirian Ford MD   Radiation Oncology Clinic ([facility])    [facility]  1st Floor  [address] [id number] [phone]            Sep 21, 2018  7:00 AM CDT   MR ABDOMEN W/O & W CONTRAST with [address] [facility] ([facility])    [address]  1st [facility] [id number] [phone]           Take your medicines as usual, unless your doctor tells you not to. Bring a list of your current medicines to your exam (including vitamins, minerals and over-the-counter drugs). Also bring the results of similar scans you may have had.    You may or may not receive IV contrast for this exam pending the discretion of the Radiologist.   Do not eat or drink for 6 hours prior to exam.  The MRI machine uses a strong magnet. Please wear clothes without metal (snaps, zippers). A sweatsuit works well, or we may give you a hospital gown.  Please remove any body piercings and hair extensions before you arrive. You will also remove watches, jewelry, hairpins, wallets, dentures, partial dental plates and hearing aids. You may wear  contact lenses, and you may be able to wear your rings. We have a safe place to keep your personal items, but it is safer to leave them at home.  **IMPORTANT** THE INSTRUCTIONS BELOW ARE ONLY FOR THOSE PATIENTS WHO HAVE BEEN PRESCRIBED SEDATION OR GENERAL ANESTHESIA DURING THEIR MRI PROCEDURE:  IF YOUR DOCTOR PRESCRIBED ORAL SEDATION (take medicine to help you relax during your exam):   You must get the medicine from your doctor (oral medication) before you arrive. Bring the medicine to the exam. Do not take it at home. You ll be told when to take it upon arriving for your exam.   Arrive one hour early. Bring someone who can take you home after the test. Your medicine will make you sleepy. After the exam, you may not drive, take a bus or take a taxi by yourself.  IF YOUR DOCTOR PRESCRIBED IV SEDATION:   Arrive one hour early. Bring someone who can take you home after the test. Your medicine will make you sleepy. After the exam, you may not drive, take a bus or take a taxi by yourself.   No eating 6 hours before your exam. You may have clear liquids up until 4 hours before your exam. (Clear liquids include water, clear tea, black coffee and fruit juice without pulp.)  IF YOUR DOCTOR PRESCRIBED ANESTHESIA (be asleep for your exam):   Arrive 1 1/2 hours early. Bring someone who can take you home after the test. You may not drive, take a bus or take a taxi by yourself.   No eating 8 hours before your exam. You may have clear liquids up until 4 hours before your exam. (Clear liquids include water, clear tea, black coffee and fruit juice without pulp.)   You will spend four to five hours in the recovery room.  If you have any questions, please contact your Imaging Department exam site.            Sep 21, 2018  8:00 AM CDT   CT CHEST W/O CONTRAST with UCCT2   [facility] ([facility])    [address]  1st Floor  [facility] [address] [phone]           Please bring  any scans or X-rays taken at other hospitals, if similar tests were done. Also bring a list of your medicines, including vitamins, minerals and over-the-counter drugs. It is safest to leave personal items at home.  Be sure to tell your doctor:   If you have any allergies.   If there s any chance you are pregnant.   If you are breastfeeding.  You do not need to do anything special to prepare for this exam.  Please wear loose clothing, such as a sweat suit or jogging clothes. Avoid snaps, zippers and other metal. We may ask you to undress and put on a hospital gown.            Sep 24, 2018  4:15 PM CDT   Masonic Lab Draw with  MASONIC LAB DRAW   [facility] Masonic Lab Draw ([facility])    [address]  [address]  [facility] [address] [phone]            Sep 24, 2018  4:45 PM CDT   MR PELVIS BONE WO & W CONTRAST with [id number]   [facility] MRI ([facility])    [address]  1st Floor  [facility] [id number] [phone]           Take your medicines as usual, unless your doctor tells you not to. Bring a list of your current medicines to your exam (including vitamins, minerals and over-the-counter drugs).  You may or may not receive intravenous (IV) contrast for this exam pending the discretion of the Radiologist.  You do not need to do anything special to prepare.  The MRI machine uses a strong magnet. Please wear clothes without metal (snaps, zippers). A sweatsuit works well, or we may give you a hospital gown.  Please remove any body piercings and hair extensions before you arrive. You will also remove watches, jewelry, hairpins, wallets, dentures, partial dental plates and hearing aids. You may wear contact lenses, and you may be able to wear your rings. We have a safe place to keep your personal items, but it is safer to leave them at home.  **IMPORTANT** THE INSTRUCTIONS BELOW ARE ONLY FOR THOSE PATIENTS WHO HAVE BEEN  PRESCRIBED SEDATION OR GENERAL ANESTHESIA DURING THEIR MRI PROCEDURE:  IF YOUR DOCTOR PRESCRIBED ORAL SEDATION (take medicine to help you relax during your exam):   You must get the medicine from your doctor (oral medication) before you arrive. Bring the medicine to the exam. Do not take it at home. You ll be told when to take it upon arriving for your exam.   Arrive one hour early. Bring someone who can take you home after the test. Your medicine will make you sleepy. After the exam, you may not drive, take a bus or take a taxi by yourself.  IF YOUR DOCTOR PRESCRIBED IV SEDATION:   Arrive one hour early. Bring someone who can take you home after the test. Your medicine will make you sleepy. After the exam, you may not drive, take a bus or take a taxi by yourself.   No eating 6 hours before your exam. You may have clear liquids up until 4 hours before your exam. (Clear liquids include water, clear tea, black coffee and fruit juice without pulp.)  IF YOUR DOCTOR PRESCRIBED ANESTHESIA (be asleep for your exam):   Arrive 1 1/2 hours early. Bring someone who can take you home after the test. You may not drive, take a bus or take a taxi by yourself.   No eating 8 hours before your exam. You may have clear liquids up until 4 hours before your exam. (Clear liquids include water, clear tea, black coffee and fruit juice without pulp.)   You will spend four to five hours in the recovery room.  Please call the Imaging Department at your exam site with any questions.            Sep 25, 2018  3:30 PM CDT   (Arrive by 3:15 PM)   Return Visit with Óscar Hampton MD   [facility] ([facility])    [address] [place] [address]  4th Floor  [facility] [address]   [phone]            Sep 27, 2018  3:30 PM CDT   (Arrive by 3:15 PM)   Return Visit with Kyra Mortensen MD   [facility] ([facility])    [address] [place] [address]  [address]  [facility]  "[id number] [phone]              Who to contact     If you have questions or need follow up information about today's clinic visit or your schedule please contact [facility] directly at [phone].  Normal or non-critical lab and imaging results will be communicated to you by Bunchhart, letter or phone within 4 business days after the clinic has received the results. If you do not hear from us within 7 days, please contact the clinic through VoipSwitcht or phone. If you have a critical or abnormal lab result, we will notify you by phone as soon as possible.  Submit refill requests through [email or web address] or call your pharmacy and they will forward the refill request to us. Please allow 3 business days for your refill to be completed.          Additional Information About Your Visit        BunchharStumbleUpon Information     [email or web address] gives you secure access to your electronic health record. If you see a primary care provider, you can also send messages to your care team and make appointments. If you have questions, please call your primary care clinic.  If you do not have a primary care provider, please call [phone] and they will assist you.        Care EveryWhere ID     This is your Care EveryWhere ID. This could be used by other organizations to access your [place] medical records  [id number]        Your Vitals Were     Pulse Temperature Respirations Height Pulse Oximetry Breastfeeding?    68 97.7  F (36.5  C) 16 1.651 m (5' 5\") 98% No    BMI (Body Mass Index)                   29.99 kg/m2            Blood Pressure from Last 3 Encounters:   08/15/18 115/73   07/25/18 90/54   06/22/18 101/68    Weight from Last 3 Encounters:   08/15/18 81.7 kg (180 lb 3.2 oz)   08/13/18 82.1 kg (180 lb 14.4 oz)   08/06/18 82.6 kg (182 lb 3.2 oz)              We Performed the Following     *CBC with platelets differential     Comprehensive metabolic panel          Today's Medication Changes          These changes are " accurate as of 8/15/18 11:59 PM.  If you have any questions, ask your nurse or doctor.               Start taking these medicines.        Dose/Directions    potassium chloride SA 10 MEQ CR tablet   Commonly known as:  K-DUR/KLOR-CON M   Used for:  Hypokalemia   Started by:  Cathleen Ramos PA-C        Dose:  20 mEq   Take 2 tablets (20 mEq) by mouth 2 times daily for 4 days   Quantity:  16 tablet   Refills:  0            Where to get your medicines      These medications were sent to [id number] - [place], [place] - [address] [facility]  [address], [place] [address]     Phone:  [phone]     potassium chloride SA 10 MEQ CR tablet                Primary Care Provider Office Phone # Fax #    Kyra Mortensen [phone]       [address] [facility] [address]        Equal Access to Services     SAAD [facility] HospitalJASON : Hadii shannon li hadasho Soomaali, waaxda luqadaha, qaybta kaalmada adeegyada, waxraven kasperin hayalyssa mtz . So [place] [phone].    ATENCIÓN: Si magnusla español, tiene a membreno disposición servicios gratuitos de asistencia lingüística. Julietaame al [phone].    We comply with applicable federal civil rights laws and [place] laws. We do not discriminate on the basis of race, color, national origin, age, disability, sex, sexual orientation, or gender identity.            Thank you!     Thank you for choosing [facility]  for your care. Our goal is always to provide you with excellent care. Hearing back from our patients is one way we can continue to improve our services. Please take a few minutes to complete the written survey that you may receive in the mail after your visit with us. Thank you!             Your Updated Medication List - Protect others around you: Learn how to safely use, store and throw away your medicines at www.disposemymeds.org.          This list is accurate as of 8/15/18 11:59 PM.   Always use your most recent med list.                   Brand Name Dispense Instructions for use Diagnosis    calcium carbonate 500 MG chewable tablet    TUMS    150 tablet    Take 1-2 chew tab by mouth daily        capecitabine 500 MG tablet CHEMO    XELODA    168 tablet    Take 3 tablets (1,500 mg) by mouth 2 times daily for 56 doses Take Mon-Fri. Do NOT take on Sat-Sun. Take with water within 30 min after meal    Malignant neoplasm of rectum (H)       eszopiclone 1 MG Tabs tablet    LUNESTA    60 tablet    Take 1-2 tablets (1-2 mg) by mouth At Bedtime    Other insomnia       FLUoxetine 40 MG capsule    PROzac    30 capsule    Take 1 capsule (40 mg) by mouth daily    Other depression       gabapentin 300 MG capsule    NEURONTIN    90 capsule    On day 1, take 300 mg at bedtime. Day 2, take 300 mg bid. Day 3 onward, take 300 mg tid.    Neuropathic pain       lidocaine-prilocaine cream    EMLA    30 g    Apply 45 minutes prior to procedure.    Malignant neoplasm of rectum (H)       LORazepam 0.5 MG tablet    ATIVAN    60 tablet    Take 1 tablet (0.5 mg) by mouth every 4 hours as needed (Anxiety, Nausea/Vomiting or Sleep)    Malignant neoplasm of rectum (H), Anxiety       magic mouthwash suspension    ENTER INGREDIENTS IN COMMENTS    240 mL    Swish, gargle, and spit one to two teaspoonfuls every six hours as needed. May be swallowed if esophageal involvement.    Oral pain       ondansetron 8 MG tablet    ZOFRAN    60 tablet    Take 1 tablet (8 mg) by mouth every 8 hours as needed for nausea    Chemotherapy induced nausea and vomiting       potassium chloride SA 10 MEQ CR tablet    K-DUR/KLOR-CON M    16 tablet    Take 2 tablets (20 mEq) by mouth 2 times daily for 4 days    Hypokalemia       prochlorperazine 10 MG tablet    COMPAZINE    30 tablet    Take 1 tablet (10 mg) by mouth every 6 hours as needed (Nausea/Vomiting)    Malignant neoplasm of rectum (H)       RANITIDINE HCL PO      Take 150 mg by mouth daily as  needed for heartburn        TYLENOL PO      Take 1,000 mg by mouth At Bedtime

## 2018-08-16 ENCOUNTER — APPOINTMENT (OUTPATIENT)
Dept: RADIATION ONCOLOGY | Facility: CLINIC | Age: 53
End: 2018-08-16
Attending: RADIOLOGY
Payer: COMMERCIAL

## 2018-08-16 PROCEDURE — 77336 RADIATION PHYSICS CONSULT: CPT | Performed by: RADIOLOGY

## 2018-08-16 PROCEDURE — 77386 ZZH IMRT TREATMENT DELIVERY, COMPLEX: CPT | Performed by: RADIOLOGY

## 2018-08-17 DIAGNOSIS — C20 RECTAL CANCER (H): Primary | ICD-10-CM

## 2018-08-22 ENCOUNTER — ONCOLOGY VISIT (OUTPATIENT)
Dept: RADIATION ONCOLOGY | Facility: CLINIC | Age: 53
End: 2018-08-22

## 2018-08-22 NOTE — MR AVS SNAPSHOT
After Visit Summary   8/22/2018    Sarah Rajan    MRN: [id number]           Patient Information     Date Of Birth          1965        Visit Information        Provider Department      8/22/2018 10:00 PM Mirian Ford MD Radiation Oncology Clinic         Follow-ups after your visit        Your next 10 appointments already scheduled     Sep 12, 2018  9:30 AM CDT   Return Visit with Mirian Ford MD   Radiation Oncology Clinic ([facility])    [facility]  1st Floor  [address] [id number] [phone]              Who to contact     Please call your clinic at [phone] to:    Ask questions about your health    Make or cancel appointments    Discuss your medicines    Learn about your test results    Speak to your doctor            Additional Information About Your Visit        MyChart Information     CamPlex gives you secure access to your electronic health record. If you see a primary care provider, you can also send messages to your care team and make appointments. If you have questions, please call your primary care clinic.  If you do not have a primary care provider, please call [phone] and they will assist you.      CamPlex is an electronic gateway that provides easy, online access to your medical records. With CamPlex, you can request a clinic appointment, read your test results, renew a prescription or communicate with your care team.     To access your existing account, please contact your [facility] or call [phone] for assistance.        Care EveryWhere ID     This is your Care EveryWhere ID. This could be used by other organizations to access your [place] medical records  [id number]         Blood Pressure from Last 3 Encounters:   No data found for BP    Weight from Last 3 Encounters:   No data found for Wt              Today, you had the following     No orders found for  display       Primary Care Provider Office Phone # Fax #    Aasay ALVAREZ MD [place] [phone] [address] [facility] [address]        Equal Access to Services     DEAN CHAKRABORTY : Hadii shannon ku ellao Sopavanali, waaxda luqadaha, qaybta kaalmada adetess, luz elena carmichael laGarrisonalyssa jacob. So [facility] [phone].    ATENCIÓN: Si habla español, tiene a membreno disposición servicios gratuitos de asistencia lingüística. Llame al [phone].    We comply with applicable federal civil rights laws and [place] laws. We do not discriminate on the basis of race, color, national origin, age, disability, sex, sexual orientation, or gender identity.            Thank you!     Thank you for choosing RADIATION ONCOLOGY CLINIC  for your care. Our goal is always to provide you with excellent care. Hearing back from our patients is one way we can continue to improve our services. Please take a few minutes to complete the written survey that you may receive in the mail after your visit with us. Thank you!             Your Updated Medication List - Protect others around you: Learn how to safely use, store and throw away your medicines at www.disposemymeds.org.          This list is accurate as of 8/22/18 10:00 PM.  Always use your most recent med list.                   Brand Name Dispense Instructions for use Diagnosis    calcium carbonate 500 MG chewable tablet    TUMS    150 tablet    Take 1-2 chew tab by mouth daily        capecitabine 500 MG tablet CHEMO    XELODA    168 tablet    Take 3 tablets (1,500 mg) by mouth 2 times daily for 56 doses Take Mon-Fri. Do NOT take on Sat-Sun. Take with water within 30 min after meal    Malignant neoplasm of rectum (H)       eszopiclone 1 MG Tabs tablet    LUNESTA    60 tablet    Take 1-2 tablets (1-2 mg) by mouth At Bedtime    Other insomnia       FLUoxetine 40 MG capsule    PROzac    30 capsule    Take 1 capsule (40 mg) by mouth daily    Other depression       gabapentin  300 MG capsule    NEURONTIN    90 capsule    On day 1, take 300 mg at bedtime. Day 2, take 300 mg bid. Day 3 onward, take 300 mg tid.    Neuropathic pain       lidocaine-prilocaine cream    EMLA    30 g    Apply 45 minutes prior to procedure.    Malignant neoplasm of rectum (H)       LORazepam 0.5 MG tablet    ATIVAN    60 tablet    Take 1 tablet (0.5 mg) by mouth every 4 hours as needed (Anxiety, Nausea/Vomiting or Sleep)    Malignant neoplasm of rectum (H), Anxiety       magic mouthwash suspension    ENTER INGREDIENTS IN COMMENTS    240 mL    Swish, gargle, and spit one to two teaspoonfuls every six hours as needed. May be swallowed if esophageal involvement.    Oral pain       ondansetron 8 MG tablet    ZOFRAN    60 tablet    Take 1 tablet (8 mg) by mouth every 8 hours as needed for nausea    Chemotherapy induced nausea and vomiting       prochlorperazine 10 MG tablet    COMPAZINE    30 tablet    Take 1 tablet (10 mg) by mouth every 6 hours as needed (Nausea/Vomiting)    Malignant neoplasm of rectum (H)       RANITIDINE HCL PO      Take 150 mg by mouth daily as needed for heartburn        TYLENOL PO      Take 1,000 mg by mouth At Bedtime

## 2018-08-23 NOTE — PROCEDURES
RADIOTHERAPY TREATMENT SUMMARY          DATE OF REPORT: 2018    PATIENT: EDGAR MARTINEZ  MEDICAL RECORD NO: [id number]  : 1965    DIAGNOSIS: Rectal cancer, Adenocarcinoma, Clinical stage T4N0M0, C20 Malignant neoplasm of rectum  INTENT OF RADIOTHERAPY: Cure  CONCURRENT SYSTEMIC THERAPY: Xeloda                  TREATMENT SUMMARY:  Details of the treatments summarized below are found in records kept in the Department of Radiation Oncology at [facility].    Radiation Oncology - Course: 1  Treatment Site Dose Modality From To Days Fx.  Pelvis 4,500 cGy 06 X 7/09/2018 2018 35 25  Tumor Boost    540 cGy 06 X 8/14/2018 2018  2  3    Dose per Fraction: 180 cGy       Total Dose: 5,040 cGy        COMMENTS: Edgar Martinez is a 52 year old female with locally advanced adenocarcinoma of the rectum. She initially presented with a mass on screening colonoscopy, measuring 5 cm in length and located 2 cm from anal verge. Biopsy showed moderately differentiated adenocarcinoma. MRI showed anal sphincter involvement and equivocal nodes mesorectal nodes.  She is being treated with total neoadjuvant therapy and therefore completed 8 cycles induction FOLFOX prior to neoadjuvant chemoradiation. Following induction chemotherapy, she received pelvic radiation with concurrent Xeloda as described above. She completed treatment without any hospitalizations, ER visits, or unplanned treatment breaks. She tolerated therapy fairly well overall with only the expected acute toxicity including grade 1 fatigue, grade 2 diarrhea, and grade 1 nausea.     PAIN MANAGEMENT: Not applicable     FOLLOW UP PLAN:   1. Return to clinic in 4 to 6 weeks to monitor healing from acute side effects  2. Follow up with Medical Oncology with Dr. Mortensen in 6 weeks with imaging  3. Follow up with Colorectal surgery with Dr. Hampton in 6 weeks with imaging  4. MRI and CT scans at 6 weeks postradiation    Resident Physician: Francois Bean M.D.   Staff  Physician: Mirian Ford M.D.  Physicist: Holland Valencia, PhD    CC: MD Óscar Barrera MD

## 2018-09-12 ENCOUNTER — OFFICE VISIT (OUTPATIENT)
Dept: RADIATION ONCOLOGY | Facility: CLINIC | Age: 53
End: 2018-09-12
Attending: RADIOLOGY
Payer: COMMERCIAL

## 2018-09-12 VITALS — DIASTOLIC BLOOD PRESSURE: 72 MMHG | SYSTOLIC BLOOD PRESSURE: 115 MMHG | WEIGHT: 182 LBS | BODY MASS INDEX: 30.29 KG/M2

## 2018-09-12 DIAGNOSIS — C20 MALIGNANT NEOPLASM OF RECTUM (H): Primary | ICD-10-CM

## 2018-09-12 PROCEDURE — G0463 HOSPITAL OUTPT CLINIC VISIT: HCPCS | Performed by: RADIOLOGY

## 2018-09-12 NOTE — MR AVS SNAPSHOT
After Visit Summary   9/12/2018    Sarah Rajan    MRN: [id number]           Patient Information     Date Of Birth          1965        Visit Information        Provider Department      9/12/2018 9:30 AM Mirian Ford MD Rad[facility] Oncology Clinic        Today's Diagnoses     Malignant neoplasm of rectum (H)    -  1       Follow-ups after your visit        Your next 10 appointments already scheduled     Sep 21, 2018  7:00 AM CDT   MR ABDOMEN W/O & W CONTRAST with UC1   [facility] [place] [facility] ([facility] [place])    [address] [address] [phone]           Take your medicines as usual, unless your doctor tells you not to. Bring a list of your current medicines to your exam (including vitamins, minerals and over-the-counter drugs). Also bring the results of similar scans you may have had.    You may or may not receive IV contrast for this exam pending the discretion of the Radiologist.   Do not eat or drink for 6 hours prior to exam.  The MRI machine uses a strong magnet. Please wear clothes without metal (snaps, zippers). A sweatsuit works well, or we may give you a hospital gown.  Please remove any body piercings and hair extensions before you arrive. You will also remove watches, jewelry, hairpins, wallets, dentures, partial dental plates and hearing aids. You may wear contact lenses, and you may be able to wear your rings. We have a safe place to keep your personal items, but it is safer to leave them at home.  **IMPORTANT** THE INSTRUCTIONS BELOW ARE ONLY FOR THOSE PATIENTS WHO HAVE BEEN PRESCRIBED SEDATION OR GENERAL ANESTHESIA DURING THEIR MRI PROCEDURE:  IF YOUR DOCTOR PRESCRIBED ORAL SEDATION (take medicine to help you relax during your exam):   You must get the medicine from your doctor (oral medication) before you arrive. Bring the medicine to the exam. Do not take it at home. You ll be told when to take it upon  arriving for your exam.   Arrive one hour early. Bring someone who can take you home after the test. Your medicine will make you sleepy. After the exam, you may not drive, take a bus or take a taxi by yourself.  IF YOUR DOCTOR PRESCRIBED IV SEDATION:   Arrive one hour early. Bring someone who can take you home after the test. Your medicine will make you sleepy. After the exam, you may not drive, take a bus or take a taxi by yourself.   No eating 6 hours before your exam. You may have clear liquids up until 4 hours before your exam. (Clear liquids include water, clear tea, black coffee and fruit juice without pulp.)  IF YOUR DOCTOR PRESCRIBED ANESTHESIA (be asleep for your exam):   Arrive 1 1/2 hours early. Bring someone who can take you home after the test. You may not drive, take a bus or take a taxi by yourself.   No eating 8 hours before your exam. You may have clear liquids up until 4 hours before your exam. (Clear liquids include water, clear tea, black coffee and fruit juice without pulp.)   You will spend four to five hours in the recovery room.  If you have any questions, please contact your Imaging Department exam site.            Sep 21, 2018  8:00 AM CDT   CT CHEST W/O CONTRAST with UCCT2   [facility] ([facility])    [address] [address] [phone]           How do I prepare for my exam? (Food and drink instructions) No Food and Drink Restrictions.  How do I prepare for my exam? (Other instructions) You do not need to do anything special to prepare for this exam. For a sinus scan: Use your nose spray (nasal decongestant spray) as directed.  What should I wear: Please wear loose clothing, such as a sweat suit or jogging clothes. Avoid snaps, zippers and other metal. We may ask you to undress and put on a hospital gown.  How long does the exam take: Most scans take less than 20 minutes.  What should I bring: Please bring any  scans or X-rays taken at other hospitals, if similar tests were done. Also bring a list of your medicines, including vitamins, minerals and over-the-counter drugs. It is safest to leave personal items at home.  Do I need a : No  is needed.  What do I need to tell my doctor? Be sure to tell your doctor: * If you have any allergies. * If there s any chance you are pregnant. * If you are breastfeeding.  What should I do after the exam: No restrictions, You may resume normal activities.  What is this test: A CT (computed tomography) scan is a series of pictures that allows us to look inside your body. The scanner creates images of the body in cross sections, much like slices of bread. This helps us see any problems more clearly.  Who should I call with questions: If you have any questions, please call the Imaging Department where you will have your exam. Directions, parking instructions, and other information is available on our website, [email or web address].            Sep 24, 2018  4:15 PM CDT   [facility] Lab Draw with  MASONIC LAB DRAW   [facility] Draw ([facility])    [address]  [address]  [facility] [id number] [phone]            Sep 24, 2018  4:45 PM CDT   MR PELVIS BONE WO & W CONTRAST with [id number]   [facility] ([facility])    [address]  1st Floor  [place] [id number] [phone]           Take your medicines as usual, unless your doctor tells you not to. Bring a list of your current medicines to your exam (including vitamins, minerals and over-the-counter drugs).  You may or may not receive intravenous (IV) contrast for this exam pending the discretion of the Radiologist.  You do not need to do anything special to prepare.  The MRI machine uses a strong magnet. Please wear clothes without metal (snaps, zippers). A sweatsuit works well, or we may give you a hospital gown.  Please  remove any body piercings and hair extensions before you arrive. You will also remove watches, jewelry, hairpins, wallets, dentures, partial dental plates and hearing aids. You may wear contact lenses, and you may be able to wear your rings. We have a safe place to keep your personal items, but it is safer to leave them at home.  **IMPORTANT** THE INSTRUCTIONS BELOW ARE ONLY FOR THOSE PATIENTS WHO HAVE BEEN PRESCRIBED SEDATION OR GENERAL ANESTHESIA DURING THEIR MRI PROCEDURE:  IF YOUR DOCTOR PRESCRIBED ORAL SEDATION (take medicine to help you relax during your exam):   You must get the medicine from your doctor (oral medication) before you arrive. Bring the medicine to the exam. Do not take it at home. You ll be told when to take it upon arriving for your exam.   Arrive one hour early. Bring someone who can take you home after the test. Your medicine will make you sleepy. After the exam, you may not drive, take a bus or take a taxi by yourself.  IF YOUR DOCTOR PRESCRIBED IV SEDATION:   Arrive one hour early. Bring someone who can take you home after the test. Your medicine will make you sleepy. After the exam, you may not drive, take a bus or take a taxi by yourself.   No eating 6 hours before your exam. You may have clear liquids up until 4 hours before your exam. (Clear liquids include water, clear tea, black coffee and fruit juice without pulp.)  IF YOUR DOCTOR PRESCRIBED ANESTHESIA (be asleep for your exam):   Arrive 1 1/2 hours early. Bring someone who can take you home after the test. You may not drive, take a bus or take a taxi by yourself.   No eating 8 hours before your exam. You may have clear liquids up until 4 hours before your exam. (Clear liquids include water, clear tea, black coffee and fruit juice without pulp.)   You will spend four to five hours in the recovery room.  Please call the Imaging Department at your exam site with any questions.            Sep 25, 2018  3:30 PM CDT   (Arrive by 3:15 PM)    Return Visit with Óscar Hampton MD   [facility] ([facility])    [address]  4th Floor  [place] [id number] [phone]            Sep 27, 2018  3:30 PM CDT   (Arrive by 3:15 PM)   Return Visit with Kyra Mortensen MD   [facility] ([facility] [place])    [address]  [address]  [place] [id number] [phone]              Who to contact     Please call your clinic at [phone] to:    Ask questions about your health    Make or cancel appointments    Discuss your medicines    Learn about your test results    Speak to your doctor            Additional Information About Your Visit        [employer] Information     [employer] gives you secure access to your electronic health record. If you see a primary care provider, you can also send messages to your care team and make appointments. If you have questions, please call your primary care clinic.  If you do not have a primary care provider, please call [phone] and they will assist you.      [employer] is an electronic gateway that provides easy, online access to your medical records. With [employer], you can request a clinic appointment, read your test results, renew a prescription or communicate with your care team.     To access your existing account, please contact your [facility] or call [phone] for assistance.        Care EveryWhere ID     This is your Care EveryWhere ID. This could be used by other organizations to access your [place] medical records  [id number]        Your Vitals Were     BMI (Body Mass Index)                   30.29 kg/m2            Blood Pressure from Last 3 Encounters:   09/12/18 115/72   08/15/18 115/73   07/25/18 90/54    Weight from Last 3 Encounters:   09/12/18 82.6 kg (182 lb)   08/15/18 81.7 kg (180 lb 3.2 oz)   08/13/18 82.1 kg (180 lb 14.4 oz)              Today, you had the following      No orders found for display       Primary Care Provider Office Phone # Fax #    Aazim K MD [place] [phone]       [address] [address]        Equal Access to Services     DEAN CHAKRABORTY : Hadliban shannon li ellao Sopavanali, waaxda luqadaha, qaybta kaalmada adeegyada, luz elena carmichael bibi jacob. So [facility] [phone].    ATENCIÓN: Si habla español, tiene a membreno disposición servicios gratuitos de asistencia lingüística. Llame al [phone].    We comply with applicable federal civil rights laws and [place] laws. We do not discriminate on the basis of race, color, national origin, age, disability, sex, sexual orientation, or gender identity.            Thank you!     Thank you for choosing [facility]  for your care. Our goal is always to provide you with excellent care. Hearing back from our patients is one way we can continue to improve our services. Please take a few minutes to complete the written survey that you may receive in the mail after your visit with us. Thank you!             Your Updated Medication List - Protect others around you: Learn how to safely use, store and throw away your medicines at www.disposemymeds.org.          This list is accurate as of 9/12/18 11:59 PM.  Always use your most recent med list.                   Brand Name Dispense Instructions for use Diagnosis    calcium carbonate 500 MG chewable tablet    TUMS    150 tablet    Take 1-2 chew tab by mouth daily        capecitabine 500 MG tablet CHEMO    XELODA    168 tablet    Take 3 tablets (1,500 mg) by mouth 2 times daily for 56 doses Take Mon-Fri. Do NOT take on Sat-Sun. Take with water within 30 min after meal    Malignant neoplasm of rectum (H)       eszopiclone 1 MG Tabs tablet    LUNESTA    60 tablet    Take 1-2 tablets (1-2 mg) by mouth At Bedtime    Other insomnia       FLUoxetine 40 MG capsule    PROzac    30 capsule    Take 1 capsule (40 mg) by mouth daily    Other  depression       gabapentin 300 MG capsule    NEURONTIN    90 capsule    On day 1, take 300 mg at bedtime. Day 2, take 300 mg bid. Day 3 onward, take 300 mg tid.    Neuropathic pain       lidocaine-prilocaine cream    EMLA    30 g    Apply 45 minutes prior to procedure.    Malignant neoplasm of rectum (H)       LORazepam 0.5 MG tablet    ATIVAN    60 tablet    Take 1 tablet (0.5 mg) by mouth every 4 hours as needed (Anxiety, Nausea/Vomiting or Sleep)    Malignant neoplasm of rectum (H), Anxiety       magic mouthwash suspension    ENTER INGREDIENTS IN COMMENTS    240 mL    Swish, gargle, and spit one to two teaspoonfuls every six hours as needed. May be swallowed if esophageal involvement.    Oral pain       ondansetron 8 MG tablet    ZOFRAN    60 tablet    Take 1 tablet (8 mg) by mouth every 8 hours as needed for nausea    Chemotherapy induced nausea and vomiting       prochlorperazine 10 MG tablet    COMPAZINE    30 tablet    Take 1 tablet (10 mg) by mouth every 6 hours as needed (Nausea/Vomiting)    Malignant neoplasm of rectum (H)       RANITIDINE HCL PO      Take 150 mg by mouth daily as needed for heartburn        TYLENOL PO      Take 1,000 mg by mouth At Bedtime

## 2018-09-12 NOTE — PROGRESS NOTES
FOLLOW-UP VISIT    Patient Name: Sarah Rajan      : 1965     Age: 52 year old        ______________________________________________________________________________     Chief Complaint   Patient presents with     Cancer     Pt is here for a follow up:Pelvis 5040 cGy completed on 18     /72  Wt 82.6 kg (182 lb)  BMI 30.29 kg/m2       Pain  Denies    Labs  Other Labs: No    Imaging  None      Other Appointments:     MD Name: Dr Mortensen Appointment Date: 18   MD Name: Appointment Date:   MD Name: Appointment Date:   Other Appointment Notes:     Residual Radiation side effect: Neuropathy in fingers and toes, pain with defacation     Additional Instructions:     Nurse face-to-face time: Level 3:  10 min face to face time

## 2018-09-12 NOTE — LETTER
2018       RE: Sarah Rajan  [address] [facility] [address]     Dear Colleague,    Thank you for referring your patient, Sarah Rajan, to the [facility]Y [facility]. Please see a copy of my visit note below.    FOLLOW-UP VISIT    Patient Name: Sarah Rajan      : 1965     Age: 52 year old        ______________________________________________________________________________     Chief Complaint   Patient presents with     Cancer     Pt is here for a follow up:Pelvis 5040 cGy completed on 18     /72  Wt 82.6 kg (182 lb)  BMI 30.29 kg/m2       Pain  Denies    Labs  Other Labs: No    Imaging  None      Other Appointments:     MD Name: Dr Mortensen Appointment Date: 18   MD Name: Appointment Date:   MD Name: Appointment Date:   Other Appointment Notes:     Residual Radiation side effect: Neuropathy in fingers and toes, pain with defacation     Additional Instructions:     Nurse face-to-face time: Level 3:  10 min face to face time             Department of Radiation Oncology  [address] [address] [phone]       Radiation Oncology Follow-up Visit  Date of encounter: 2018    Sarah Rajan  MRN: [id number]   : 1965     DISEASE TREATED: Rectal cancer, Adenocarcinoma, Clinical stage T4N0M0, C20 Malignant neoplasm of rectum    RADIATION THERAPY DELIVERED: Pelvis - intensity modulated radiation therapy 5040 cGy in 28 fractions completed 2018    SYSTEMIC THERAPY: Concurrent Xeloda    INTERVAL SINCE COMPLETION OF RADIATION THERAPY: 1 month    SUBJECTIVE:   Sarah Rajan is a 52 year old female with locally advanced adenocarcinoma of the rectum. She initially presented with a mass on screening colonoscopy, measuring 5 cm in length and located 2 cm from anal verge. Biopsy showed moderately differentiated adenocarcinoma. MRI showed anal sphincter involvement and equivocal nodes mesorectal nodes.   She  received total neoadjuvant therapy with 8 cycles induction FOLFOX followed by neoadjuvant chemoradiation with concurrent Xeloda as described above. She completed treatment without any hospitalizations, ER visits, or unplanned treatment breaks. She tolerated therapy fairly well overall with only the expected acute toxicity including grade 1 fatigue, grade 2 diarrhea, and grade 1 nausea.  Since completion of therapy, she has done well.  Her energy has slowly returned and she reports intermittent diarrheal stools.  She reports increased perianal pain approximately 1-2 weeks following the completion of chemoradiation which is now slowly resolving.  She denies any urinary problems.  She is using the vaginal dilator every other day.    PHYSICAL EXAM:  Weight: 82.5 kg  BP: 115/72  Pain: 2/10 secondary to neuropathy in fingers and feet  Gen: Alert, in NAD  Eyes: PERRL, EOMI, sclera anicteric  HENT: Normocephalic atraumatic  Neck: Supple, full ROM, no LAD  Pulm: No wheezing, stridor or respiratory distress  CV: Well-perfused, no cyanosis, no pedal edema  Abdominal: Soft, nontender, nondistended, no hepatomegaly tenderness  Musculoskeletal: Normal bulk and tone   Skin: Normal color and turgor  Neurologic: A/Ox3, CN II-XII intact  Psychiatric: Appropriate mood and affect    LABS AND IMAGING:  Reviewed.    IMPRESSION AND PLAN:   Ms. Rajan is a 52 year old female with a clinical stage T4 N0 M0 adenocarcinoma of the rectum status post total neoadjuvant therapy who is recovering from the acute effects of treatment.  Although she has experienced escalation of perianal pain following the completion of treatment, the pain is now subsiding and she does not feel that she needs any intervention at this time aside from continuing to keep stools soft.  She is scheduled to see Dr. Hampton in 2 weeks following pelvic MRI scan, abdominal MRI scan and chest CT scan.  She was counseled to continue using her vaginal dilator every other day  for approximately 9-12 months after completion of radiation to minimize vaginal stenosis.  She will continue follow-up with colorectal surgery and medical oncology.  She was instructed to return to Radiation Oncology as needed.  We are happy to see her should she have any concerns or further questions.    Mirian Ford MD  Department of Radiation Oncology  [facility]

## 2018-09-14 DIAGNOSIS — F41.9 ANXIETY: Primary | ICD-10-CM

## 2018-09-18 NOTE — PROGRESS NOTES
Department of Radiation Oncology  [facility]  [address] [place] [address]  [phone]       Radiation Oncology Follow-up Visit  Date of encounter: 2018    Sarah Rajan  MRN: [id number]   : 1965     DISEASE TREATED: Rectal cancer, Adenocarcinoma, Clinical stage T4N0M0, C20 Malignant neoplasm of rectum    RADIATION THERAPY DELIVERED: Pelvis - intensity modulated radiation therapy 5040 cGy in 28 fractions completed 2018    SYSTEMIC THERAPY: Concurrent Xeloda    INTERVAL SINCE COMPLETION OF RADIATION THERAPY: 1 month    SUBJECTIVE:   Sarah Rajan is a 52 year old female with locally advanced adenocarcinoma of the rectum. She initially presented with a mass on screening colonoscopy, measuring 5 cm in length and located 2 cm from anal verge. Biopsy showed moderately differentiated adenocarcinoma. MRI showed anal sphincter involvement and equivocal nodes mesorectal nodes.  She received total neoadjuvant therapy with 8 cycles induction FOLFOX followed by neoadjuvant chemoradiation with concurrent Xeloda as described above. She completed treatment without any hospitalizations, ER visits, or unplanned treatment breaks. She tolerated therapy fairly well overall with only the expected acute toxicity including grade 1 fatigue, grade 2 diarrhea, and grade 1 nausea.  Since completion of therapy, she has done well.  Her energy has slowly returned and she reports intermittent diarrheal stools.  She reports increased perianal pain approximately 1-2 weeks following the completion of chemoradiation which is now slowly resolving.  She denies any urinary problems.  She is using the vaginal dilator every other day.    PHYSICAL EXAM:  Weight: 82.5 kg  BP: 115/72  Pain: 2/10 secondary to neuropathy in fingers and feet  Gen: Alert, in NAD  Eyes: PERRL, EOMI, sclera anicteric  HENT: Normocephalic atraumatic  Neck: Supple, full ROM, no LAD  Pulm: No wheezing,  stridor or respiratory distress  CV: Well-perfused, no cyanosis, no pedal edema  Abdominal: Soft, nontender, nondistended, no hepatomegaly tenderness  Musculoskeletal: Normal bulk and tone   Skin: Normal color and turgor  Neurologic: A/Ox3, CN II-XII intact  Psychiatric: Appropriate mood and affect    LABS AND IMAGING:  Reviewed.    IMPRESSION AND PLAN:   Ms. Rajan is a 52 year old female with a clinical stage T4 N0 M0 adenocarcinoma of the rectum status post total neoadjuvant therapy who is recovering from the acute effects of treatment.  Although she has experienced escalation of perianal pain following the completion of treatment, the pain is now subsiding and she does not feel that she needs any intervention at this time aside from continuing to keep stools soft.  She is scheduled to see Dr. Hampton in 2 weeks following pelvic MRI scan, abdominal MRI scan and chest CT scan.  She was counseled to continue using her vaginal dilator every other day for approximately 9-12 months after completion of radiation to minimize vaginal stenosis.  She will continue follow-up with colorectal surgery and medical oncology.  She was instructed to return to Radiation Oncology as needed.  We are happy to see her should she have any concerns or further questions.    Mirian Ford MD  Department of Radiation Oncology  [facility]

## 2018-09-21 ENCOUNTER — RADIANT APPOINTMENT (OUTPATIENT)
Dept: CT IMAGING | Facility: CLINIC | Age: 53
End: 2018-09-21
Attending: PHYSICIAN ASSISTANT
Payer: COMMERCIAL

## 2018-09-21 ENCOUNTER — RADIANT APPOINTMENT (OUTPATIENT)
Dept: MRI IMAGING | Facility: CLINIC | Age: 53
End: 2018-09-21
Attending: PHYSICIAN ASSISTANT
Payer: COMMERCIAL

## 2018-09-21 DIAGNOSIS — C20 MALIGNANT NEOPLASM OF RECTUM (H): ICD-10-CM

## 2018-09-21 NOTE — DISCHARGE INSTRUCTIONS
MRI Contrast Discharge Instructions    The IV contrast you received today will pass out of your body in your  urine. This will happen in the next 24 hours. You will not feel this process.  Your urine will not change color.    Drink at least 4 extra glasses of water or juice today (unless your doctor  has restricted your fluids). This reduces the stress on your kidneys.  You may take your regular medicines.    If you are on dialysis: It is best to have dialysis today.    If you have a reaction: Most reactions happen right away. If you have  any new symptoms after leaving the hospital (such as hives or swelling),  call your hospital at the correct number below. Or call your family doctor.  If you have breathing distress or wheezing, call 911.    Special instructions: ***    I have read and understand the above information.    Signature:______________________________________ Date:___________    Staff:__________________________________________ Date:___________     Time:__________    [place] [facility] Departments:    ___[place]: [phone]  ___[facility]: [phone]  ___[place]: [phone] ___[facility]: [phone]  ___[facility]: [phone]  ___[facility]: [phone]  ___[phone]  ___[facility]: [phone]  ___[phone]

## 2018-09-23 ENCOUNTER — MYC MEDICAL ADVICE (OUTPATIENT)
Dept: SURGERY | Facility: CLINIC | Age: 53
End: 2018-09-23

## 2018-09-24 ENCOUNTER — RADIANT APPOINTMENT (OUTPATIENT)
Dept: MRI IMAGING | Facility: CLINIC | Age: 53
End: 2018-09-24
Attending: PHYSICIAN ASSISTANT
Payer: COMMERCIAL

## 2018-09-24 DIAGNOSIS — C20 MALIGNANT NEOPLASM OF RECTUM (H): ICD-10-CM

## 2018-09-24 DIAGNOSIS — Z79.899 ENCOUNTER FOR LONG-TERM (CURRENT) USE OF MEDICATIONS: ICD-10-CM

## 2018-09-24 DIAGNOSIS — C20 RECTAL CANCER (H): ICD-10-CM

## 2018-09-24 LAB
ALBUMIN SERPL-MCNC: 3.8 G/DL (ref 3.4–5)
ALP SERPL-CCNC: 89 U/L (ref 40–150)
ALT SERPL W P-5'-P-CCNC: 27 U/L (ref 0–50)
ANION GAP SERPL CALCULATED.3IONS-SCNC: 7 MMOL/L (ref 3–14)
AST SERPL W P-5'-P-CCNC: 21 U/L (ref 0–45)
BASOPHILS # BLD AUTO: 0 10E9/L (ref 0–0.2)
BASOPHILS NFR BLD AUTO: 0.7 %
BILIRUB SERPL-MCNC: 0.3 MG/DL (ref 0.2–1.3)
BUN SERPL-MCNC: 14 MG/DL (ref 7–30)
CALCIUM SERPL-MCNC: 8.5 MG/DL (ref 8.5–10.1)
CHLORIDE SERPL-SCNC: 105 MMOL/L (ref 94–109)
CO2 SERPL-SCNC: 26 MMOL/L (ref 20–32)
CREAT SERPL-MCNC: 0.72 MG/DL (ref 0.52–1.04)
DIFFERENTIAL METHOD BLD: ABNORMAL
EOSINOPHIL # BLD AUTO: 0.4 10E9/L (ref 0–0.7)
EOSINOPHIL NFR BLD AUTO: 10 %
ERYTHROCYTE [DISTWIDTH] IN BLOOD BY AUTOMATED COUNT: 12.8 % (ref 10–15)
GFR SERPL CREATININE-BSD FRML MDRD: 86 ML/MIN/1.7M2
GLUCOSE SERPL-MCNC: 95 MG/DL (ref 70–99)
HCT VFR BLD AUTO: 35.1 % (ref 35–47)
HGB BLD-MCNC: 12 G/DL (ref 11.7–15.7)
IMM GRANULOCYTES # BLD: 0 10E9/L (ref 0–0.4)
IMM GRANULOCYTES NFR BLD: 0.5 %
LYMPHOCYTES # BLD AUTO: 0.5 10E9/L (ref 0.8–5.3)
LYMPHOCYTES NFR BLD AUTO: 11.5 %
MCH RBC QN AUTO: 32.4 PG (ref 26.5–33)
MCHC RBC AUTO-ENTMCNC: 34.2 G/DL (ref 31.5–36.5)
MCV RBC AUTO: 95 FL (ref 78–100)
MONOCYTES # BLD AUTO: 0.3 10E9/L (ref 0–1.3)
MONOCYTES NFR BLD AUTO: 6.7 %
NEUTROPHILS # BLD AUTO: 3 10E9/L (ref 1.6–8.3)
NEUTROPHILS NFR BLD AUTO: 70.6 %
NRBC # BLD AUTO: 0 10*3/UL
NRBC BLD AUTO-RTO: 0 /100
PLATELET # BLD AUTO: 170 10E9/L (ref 150–450)
POTASSIUM SERPL-SCNC: 3.5 MMOL/L (ref 3.4–5.3)
PROT SERPL-MCNC: 7 G/DL (ref 6.8–8.8)
RBC # BLD AUTO: 3.7 10E12/L (ref 3.8–5.2)
SODIUM SERPL-SCNC: 138 MMOL/L (ref 133–144)
WBC # BLD AUTO: 4.2 10E9/L (ref 4–11)

## 2018-09-24 PROCEDURE — 25000128 H RX IP 250 OP 636: Performed by: INTERNAL MEDICINE

## 2018-09-24 PROCEDURE — 85025 COMPLETE CBC W/AUTO DIFF WBC: CPT | Performed by: INTERNAL MEDICINE

## 2018-09-24 PROCEDURE — 80053 COMPREHEN METABOLIC PANEL: CPT | Performed by: INTERNAL MEDICINE

## 2018-09-24 RX ORDER — GADOBUTROL 604.72 MG/ML
7.5 INJECTION INTRAVENOUS ONCE
Status: COMPLETED | OUTPATIENT
Start: 2018-09-24 | End: 2018-09-24

## 2018-09-24 RX ORDER — HEPARIN SODIUM (PORCINE) LOCK FLUSH IV SOLN 100 UNIT/ML 100 UNIT/ML
5 SOLUTION INTRAVENOUS EVERY 8 HOURS PRN
Status: DISCONTINUED | OUTPATIENT
Start: 2018-09-24 | End: 2018-10-02 | Stop reason: HOSPADM

## 2018-09-24 RX ADMIN — Medication 5 ML: at 15:49

## 2018-09-24 RX ADMIN — GADOBUTROL 7.5 ML: 604.72 INJECTION INTRAVENOUS at 16:49

## 2018-09-24 NOTE — TELEPHONE ENCOUNTER
Patient was called in regard to the below message. Patient was curious if her scan was canceled because she told a technician on Friday that she needed the MR 3T rectal scan and she was scheduled for the MRI pelvis bone. Patient was informed the order states MRI muscular tissue rectal protocol so she is to come to this scan appointment. Patient states understanding of this appointment date, time, and location.

## 2018-09-24 NOTE — DISCHARGE INSTRUCTIONS

## 2018-09-24 NOTE — NURSING NOTE
Chief Complaint   Patient presents with     Blood Draw     Port accessed by RN. No blood return, line flushed and hep locked. Labs drawn via PIV placed by RN. Patient became lightheaded and dizzy. Placed supine and given cold cloth. /68. 120/85 on departure. Ready for MRI w/contrast. Lab only appointment.     Petra Stevens RN

## 2018-09-25 ENCOUNTER — OFFICE VISIT (OUTPATIENT)
Dept: SURGERY | Facility: CLINIC | Age: 53
End: 2018-09-25
Payer: COMMERCIAL

## 2018-09-25 VITALS
TEMPERATURE: 98.3 F | SYSTOLIC BLOOD PRESSURE: 123 MMHG | HEIGHT: 65 IN | WEIGHT: 180.1 LBS | BODY MASS INDEX: 30.01 KG/M2 | OXYGEN SATURATION: 99 % | DIASTOLIC BLOOD PRESSURE: 82 MMHG | HEART RATE: 85 BPM

## 2018-09-25 DIAGNOSIS — C20 MALIGNANT NEOPLASM OF RECTUM (H): Primary | ICD-10-CM

## 2018-09-25 LAB — RADIOLOGIST FLAGS: NORMAL

## 2018-09-25 ASSESSMENT — PAIN SCALES - GENERAL: PAINLEVEL: NO PAIN (0)

## 2018-09-25 NOTE — PROGRESS NOTES
Colon and Rectal Surgery Clinic Note      RE: Sarah Satinder  : 1965  JIMMY: 2018    52 year old with locally advanced rectal cancer who presents for follow-up.    She was initially seen in 2018 by Dr Hampton with a moderately differentiated, invasive adenocarcinoma with intact mismatch repair 4-5 cm from the anal verge. She was staged at that time and an MRI that was initially read as a T1-T2 lesion, then possibly a T2N1 lesion. CT A/P was significant for small liver lesions too small to characterize but not concerning for malignancy and CEA was 1.1. She was discussed at tumor board the pelvic MRI was felt to be poor quality so she underwent a repeat MRI pelvis on  and her tumor was staged as T4N0 due to abutment of the levators. Abdominal MRI to evaluate the liver lesions was performed on  which was again found to have multiple sub-centimeter lesions that were difficult to characterize. She was then referred to medical and radiation oncology for consultation and underwent 8 cycles of FOLFOX completed on 18 and chemoradiation with XELODA on 8/15/18. 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     She currently feels well. Has no abdominal complaints and her energy is improving after radiation.     Recent Imaging:  MRI Pelvis 18:  1. There has been a positive to treatment, with a corresponding tumor  regression grade of mrTRG-2. As seen previously, there is soft tissue  extending through the muscularis propria into the perirectal fat  posteriorly, abutting the levator musculature in the midline and on  the left. The soft tissues demonstrates MRI characteristics most  compatible with fibrosis rather than viable tumor.  2. No suspicious lymphadenopathy.  3. Stable indeterminate 1.9 cm lesion in the left intertrochanteric  line, which was mildly sclerotic on prior pelvic CT. Differential  includes benign (e.g. fibrous dysplasia) and  "malignant (e.g.  metastasis) etiologies. Recommend continued attention on follow-up  imaging. Alternatively, dedicated MRI may be considered.    MRI Abdomen 9/21/18:  1. Stable subcentimeter liver lesions in hepatic segment 7 with  contrast enhancement pattern most likely suggestive of benign  hemangiomas or liver cysts. No convincing liver metastatic foci.  Continued attention on follow-up studies would be beneficial.  2. Hepatic steatosis.    CT Chest 9/21/18:  1. No evidence of metastatic disease in the chest.  2. Sequelae of prior granulomatous disease.  3. Layering biliary sludge      Physical examination:  Examination was chaperoned by Dr. Hampton    Physical Exam   Constitutional: She is oriented to person, place, and time and well-developed, well-nourished, and in no distress. No distress.   HENT:   Head: Normocephalic and atraumatic.   Eyes: Conjunctivae are normal. Pupils are equal, round, and reactive to light.   Neck: Normal range of motion. No thyromegaly present.   Cardiovascular: Normal rate and regular rhythm.    Pulmonary/Chest: Effort normal. No respiratory distress.   Abdominal: Soft. She exhibits no distension. There is no tenderness.   Musculoskeletal: She exhibits no edema.   Neurological: She is oriented to person, place, and time.   Skin: Skin is warm and dry.   Psychiatric: Mood, memory, affect and judgment normal.     Flexible Sigmoidoscopy:   Prior to the start of the procedure, informed consent was obtained. A timeout was performed confirming the patient and procedure. Digital rectal exam was performed which revealed no palpable lesions. The sigmoidoscope was then introduced into the rectum and advanced to the sigmoid colon. No lesions were found in the sigmoid colon or proximal rectum. No definite scar could be appreciated on straight view or retroflexion..      Vitals: /82  Pulse 85  Temp 98.3  F (36.8  C) (Oral)  Ht 5' 5\"  Wt 180 lb 1.6 oz  SpO2 99%  BMI 29.97 kg/m2  BMI= " Body mass index is 29.97 kg/(m^2).    Laboratory data:    Recent Labs   Lab Test  18   1619   18   0830   WBC  4.2   < >   --    HGB  12.0   < >   --    PLT  170   < >   --    CR  0.72   < >   --    ALBUMIN  3.8   < >   --    BILITOTAL  0.3   < >   --    ALKPHOS  89   < >   --    ALT  27   < >   --    AST  21   < >   --    INR   --    --   0.93    < > = values in this interval not displayed.       Assessment/plan:    52 year old woman with locally advanced rectal cancer s/p total neoadjuvant therapy with FOLFOX (8 cycles completed 2018) and chemoradiation with Xeloda (5040 Gy, 28 treatments, completed 8/15/18) who presents to discuss surgical resection.    Discussed with patient that with current MRI findings she would require an APR. However, there is no detectable tumor today on exam or scope so the patient could be a candidate for watch and wait.  Will review her case at Tumor Board including MRI findings prior to making a final decision.       For details of past medical history, surgical history, family history, medications, allergies, and review of systems, please see details below.    Medical history:  Past Medical History:   Diagnosis Date     Depression      GERD (gastroesophageal reflux disease)      History of smoking      Insomnia      Obesity      Rectal cancer (H)      Restless leg syndrome      Seasonal affective disorder (H)        Surgical history:  Past Surgical History:   Procedure Laterality Date      SECTION      x2     COLONOSCOPY       INSERT PORT VASCULAR ACCESS Right 2018    Procedure: INSERT PORT VASCULAR ACCESS;  central venous Chest Port Placement;  Surgeon: Gaurav Yates PA-C;  Location:  OR     [facility]         Family history:  Family History   Problem Relation Age of Onset     Hyperlipidemia Mother      Hypertension Mother      GASTROINTESTINAL DISEASE Mother      ischemic colitis     Coronary Artery Disease Mother      stents x 3     Anesthesia  Reaction Mother      hypotension, PONV     Hyperlipidemia Father      Hypertension Father      Breast Cancer Maternal Grandmother      Coronary Artery Disease Maternal Grandfather      Myocardial Infarction Maternal Grandfather      Colon Cancer Paternal Grandmother      Breast Cancer Maternal Aunt      Breast Cancer Paternal Aunt      Coronary Artery Disease Paternal Grandfather      Cerebrovascular Disease Paternal Grandfather      Family History Negative Brother      Coronary Artery Disease Maternal Uncle        Medications:  Current Outpatient Prescriptions   Medication Sig Dispense Refill     Acetaminophen (TYLENOL PO) Take 1,000 mg by mouth At Bedtime       calcium carbonate (TUMS) 500 MG chewable tablet Take 1-2 chew tab by mouth daily  150 tablet      eszopiclone (LUNESTA) 1 MG TABS tablet Take 1-2 tablets (1-2 mg) by mouth At Bedtime 60 tablet 3     FLUoxetine (PROZAC) 20 MG capsule Take 1 capsule (20 mg) by mouth daily 90 capsule 3     lidocaine-prilocaine (EMLA) cream Apply 45 minutes prior to procedure. 30 g 3     RANITIDINE HCL PO Take 150 mg by mouth daily as needed for heartburn       capecitabine (XELODA) 500 MG tablet CHEMO Take 3 tablets (1,500 mg) by mouth 2 times daily for 56 doses Take Mon-Fri. Do NOT take on Sat-Sun. Take with water within 30 min after meal 168 tablet 0     FLUoxetine (PROZAC) 40 MG capsule Take 1 capsule (40 mg) by mouth daily (Patient not taking: Reported on 9/25/2018) 30 capsule 3     gabapentin (NEURONTIN) 300 MG capsule On day 1, take 300 mg at bedtime. Day 2, take 300 mg bid. Day 3 onward, take 300 mg tid. (Patient not taking: Reported on 8/15/2018) 90 capsule 1     LORazepam (ATIVAN) 0.5 MG tablet Take 1 tablet (0.5 mg) by mouth every 4 hours as needed (Anxiety, Nausea/Vomiting or Sleep) (Patient not taking: Reported on 9/25/2018) 60 tablet 5     magic mouthwash (ENTER INGREDIENTS IN COMMENTS) suspension Swish, gargle, and spit one to two teaspoonfuls every six hours as  needed. May be swallowed if esophageal involvement. (Patient not taking: Reported on 8/15/2018) 240 mL 1     ondansetron (ZOFRAN) 8 MG tablet Take 1 tablet (8 mg) by mouth every 8 hours as needed for nausea (Patient not taking: Reported on 9/25/2018) 60 tablet 3     prochlorperazine (COMPAZINE) 10 MG tablet Take 1 tablet (10 mg) by mouth every 6 hours as needed (Nausea/Vomiting) (Patient not taking: Reported on 8/15/2018) 30 tablet 2       Allergies:  The patientis allergic to inapsine [droperidol].    Social history:  Social History   Substance Use Topics     Smoking status: Former Smoker     Packs/day: 0.10     Years: 8.00     Types: Cigarettes     Quit date: 2/19/1986     Smokeless tobacco: Never Used     Alcohol use 4.2 oz/week     0 Standard drinks or equivalent, 7 Glasses of wine per week     Marital status: .    Review of Systems:  There are no exam notes on file for this visit.       Hanane Horan MD   Colon and Rectal Surgery Fellow  9/25/2018 at 3:50 PM      I saw and examined the patient, led the discussion and edited the resident note.  I agree with the assessment and plan as outlined.  I personally reviewed the MRI imaging, which raises a question of residual tumor versus fibrotic response.  I am impressed with the complete disappearance of any tumor to palpation or visualization and suspect this is likely a clinical complete response.  If this proves to be the case, the patient would be a candidate for the watch and wait approach, which we discussed in considerable detail, including the risks and benefits of this approach with respect to possible tumor recurrence and curability.  Sarah was seen today with her  Cody, who participated fully in this discussion.  I plan to present her case at tumor board.  My inclination is to repeat an exam and pelvic MRI in 2 months to be certain that this is in fact a clinical complete response that has not had enough time to fully fibrosis on her  current scan.    I answered all of Sarah and Cody's questions to their stated satisfaction.  They expressed her understanding and agreement with the proposed plan.    Total time 30 minutes.  Greater than half the time was spent counseling.        Óscar Hampton MD   Professor and Chief  Division of Colon and Rectal Surgery  [facility]      Referring Provider:  Referred Self, MD  No address on file     Primary Care Provider:  Kyra Mortensen

## 2018-09-25 NOTE — MR AVS SNAPSHOT
After Visit Summary   9/25/2018    Sarah Rajan    MRN: [id number]           Patient Information     Date Of Birth          1965        Visit Information        Provider Department      9/25/2018 3:30 PM Óscar Hampton MD [facility] Colon and Rectal Surgery        Today's Diagnoses     Malignant neoplasm of rectum (H)    -  1       Follow-ups after your visit        Your next 10 appointments already scheduled     Sep 27, 2018  3:30 PM CDT   (Arrive by 3:15 PM)   Return Visit with Kyra Mortensen MD   [facility] ([facility])    [address] [place] [address]  [address] [place] [id number] [phone]              Who to contact     Please call your clinic at [phone] to:    Ask questions about your health    Make or cancel appointments    Discuss your medicines    Learn about your test results    Speak to your doctor            Additional Information About Your Visit        MyChart Information     [facility] gives you secure access to your electronic health record. If you see a primary care provider, you can also send messages to your care team and make appointments. If you have questions, please call your primary care clinic.  If you do not have a primary care provider, please call [phone] and they will assist you.      [facility] is an electronic gateway that provides easy, online access to your medical records. With [facility], you can request a clinic appointment, read your test results, renew a prescription or communicate with your care team.     To access your existing account, please contact your [facility] or call [phone] for assistance.        Care EveryWhere ID     This is your Care EveryWhere ID. This could be used by other organizations to access your [facility] medical records  [id number]        Your Vitals Were     Pulse Temperature Height Pulse Oximetry BMI (Body Mass Index)       85 98.3  F (36.8  C)  "(Oral) 5' 5\" 99% 29.97 kg/m2        Blood Pressure from Last 3 Encounters:   09/25/18 123/82   09/12/18 115/72   08/15/18 115/73    Weight from Last 3 Encounters:   09/25/18 180 lb 1.6 oz   09/12/18 182 lb   08/15/18 180 lb 3.2 oz              We Performed the Following     FLEX SIGMOIDOSCOPY W BIOPSY     Surgical pathology exam        Primary Care Provider Office Phone # Fax #    Kyra KIM Mortensen [phone]       [address] [facility] [address]        Equal Access to Services     [facility]: Hadii aad ku hadasho Soomaali, waaxda luqadaha, qaybta kaalmada adeegyada, luz elena mtz . So [facility] [phone].    ATENCIÓN: Si habla español, tiene a membreno disposición servicios gratuitos de asistencia lingüística. [facility] [phone].    We comply with applicable federal civil rights laws and [place] laws. We do not discriminate on the basis of race, color, national origin, age, disability, sex, sexual orientation, or gender identity.            Thank you!     Thank you for choosing [facility] COLON AND RECTAL SURGERY  for your care. Our goal is always to provide you with excellent care. Hearing back from our patients is one way we can continue to improve our services. Please take a few minutes to complete the written survey that you may receive in the mail after your visit with us. Thank you!             Your Updated Medication List - Protect others around you: Learn how to safely use, store and throw away your medicines at www.disposemymeds.org.          This list is accurate as of 9/25/18 11:59 PM.  Always use your most recent med list.                   Brand Name Dispense Instructions for use Diagnosis    calcium carbonate 500 MG chewable tablet    TUMS    150 tablet    Take 1-2 chew tab by mouth daily        capecitabine 500 MG tablet CHEMO    XELODA    168 tablet    Take 3 tablets (1,500 mg) by mouth 2 times daily for 56 doses Take Mon-Fri. Do NOT take on Sat-Sun. " Take with water within 30 min after meal    Malignant neoplasm of rectum (H)       eszopiclone 1 MG Tabs tablet    LUNESTA    60 tablet    Take 1-2 tablets (1-2 mg) by mouth At Bedtime    Other insomnia       * FLUoxetine 40 MG capsule    PROzac    30 capsule    Take 1 capsule (40 mg) by mouth daily    Other depression       * FLUoxetine 20 MG capsule    PROzac    90 capsule    Take 1 capsule (20 mg) by mouth daily    Anxiety       gabapentin 300 MG capsule    NEURONTIN    90 capsule    On day 1, take 300 mg at bedtime. Day 2, take 300 mg bid. Day 3 onward, take 300 mg tid.    Neuropathic pain       lidocaine-prilocaine cream    EMLA    30 g    Apply 45 minutes prior to procedure.    Malignant neoplasm of rectum (H)       LORazepam 0.5 MG tablet    ATIVAN    60 tablet    Take 1 tablet (0.5 mg) by mouth every 4 hours as needed (Anxiety, Nausea/Vomiting or Sleep)    Malignant neoplasm of rectum (H), Anxiety       magic mouthwash suspension    ENTER INGREDIENTS IN COMMENTS    240 mL    Swish, gargle, and spit one to two teaspoonfuls every six hours as needed. May be swallowed if esophageal involvement.    Oral pain       ondansetron 8 MG tablet    ZOFRAN    60 tablet    Take 1 tablet (8 mg) by mouth every 8 hours as needed for nausea    Chemotherapy induced nausea and vomiting       prochlorperazine 10 MG tablet    COMPAZINE    30 tablet    Take 1 tablet (10 mg) by mouth every 6 hours as needed (Nausea/Vomiting)    Malignant neoplasm of rectum (H)       RANITIDINE HCL PO      Take 150 mg by mouth daily as needed for heartburn        TYLENOL PO      Take 1,000 mg by mouth At Bedtime        * Notice:  This list has 2 medication(s) that are the same as other medications prescribed for you. Read the directions carefully, and ask your doctor or other care provider to review them with you.

## 2018-09-25 NOTE — LETTER
2018       RE: Sarah Rajan  [address] [facility] [address]     Dear Colleague,    Thank you for referring your patient, Sarah Rajan, to the [facility] COLON AND RECTAL SURGERY at [facility]. Please see a copy of my visit note below.    Colon and Rectal Surgery Clinic Note      RE: Sarah Rajan  : 1965  JIMMY: 2018    52 year old with locally advanced rectal cancer who presents for follow-up.    She was initially seen in 2018 by Dr Hampton with a moderately differentiated, invasive adenocarcinoma with intact mismatch repair 4-5 cm from the anal verge. She was staged at that time and an MRI that was initially read as a T1-T2 lesion, then possibly a T2N1 lesion. CT A/P was significant for small liver lesions too small to characterize but not concerning for malignancy and CEA was 1.1. She was discussed at tumor board the pelvic MRI was felt to be poor quality so she underwent a repeat MRI pelvis on  and her tumor was staged as T4N0 due to abutment of the levators. Abdominal MRI to evaluate the liver lesions was performed on  which was again found to have multiple sub-centimeter lesions that were difficult to characterize. She was then referred to medical and radiation oncology for consultation and underwent 8 cycles of FOLFOX completed on 18 and chemoradiation with XELODA on 8/15/18. 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     She currently feels well. Has no abdominal complaints and her energy is improving after radiation.     Recent Imaging:  MRI Pelvis 18:  1. There has been a positive to treatment, with a corresponding tumor  regression grade of mrTRG-2. As seen previously, there is soft tissue  extending through the muscularis propria into the perirectal fat  posteriorly, abutting the levator musculature in the midline and on  the left. The soft tissues demonstrates MRI  characteristics most  compatible with fibrosis rather than viable tumor.  2. No suspicious lymphadenopathy.  3. Stable indeterminate 1.9 cm lesion in the left intertrochanteric  line, which was mildly sclerotic on prior pelvic CT. Differential  includes benign (e.g. fibrous dysplasia) and malignant (e.g.  metastasis) etiologies. Recommend continued attention on follow-up  imaging. Alternatively, dedicated MRI may be considered.    MRI Abdomen 9/21/18:  1. Stable subcentimeter liver lesions in hepatic segment 7 with  contrast enhancement pattern most likely suggestive of benign  hemangiomas or liver cysts. No convincing liver metastatic foci.  Continued attention on follow-up studies would be beneficial.  2. Hepatic steatosis.    CT Chest 9/21/18:  1. No evidence of metastatic disease in the chest.  2. Sequelae of prior granulomatous disease.  3. Layering biliary sludge      Physical examination:  Examination was chaperoned by Dr. Hampton    Physical Exam   Constitutional: She is oriented to person, place, and time and well-developed, well-nourished, and in no distress. No distress.   HENT:   Head: Normocephalic and atraumatic.   Eyes: Conjunctivae are normal. Pupils are equal, round, and reactive to light.   Neck: Normal range of motion. No thyromegaly present.   Cardiovascular: Normal rate and regular rhythm.    Pulmonary/Chest: Effort normal. No respiratory distress.   Abdominal: Soft. She exhibits no distension. There is no tenderness.   Musculoskeletal: She exhibits no edema.   Neurological: She is oriented to person, place, and time.   Skin: Skin is warm and dry.   Psychiatric: Mood, memory, affect and judgment normal.     Flexible Sigmoidoscopy:   Prior to the start of the procedure, informed consent was obtained. A timeout was performed confirming the patient and procedure. Digital rectal exam was performed which revealed no palpable lesions. The sigmoidoscope was then introduced into the rectum and advanced  "to the sigmoid colon. No lesions were found in the sigmoid colon or proximal rectum. No definite scar could be appreciated on straight view or retroflexion..      Vitals: /82  Pulse 85  Temp 98.3  F (36.8  C) (Oral)  Ht 5' 5\"  Wt 180 lb 1.6 oz  SpO2 99%  BMI 29.97 kg/m2  BMI= Body mass index is 29.97 kg/(m^2).    Laboratory data:    Recent Labs   Lab Test  18   1619   18   0830   WBC  4.2   < >   --    HGB  12.0   < >   --    PLT  170   < >   --    CR  0.72   < >   --    ALBUMIN  3.8   < >   --    BILITOTAL  0.3   < >   --    ALKPHOS  89   < >   --    ALT  27   < >   --    AST  21   < >   --    INR   --    --   0.93    < > = values in this interval not displayed.       Assessment/plan:    52 year old woman with locally advanced rectal cancer s/p total neoadjuvant therapy with FOLFOX (8 cycles completed 2018) and chemoradiation with Xeloda (5040 Gy, 28 treatments, completed 8/15/18) who presents to discuss surgical resection.    Discussed with patient that with current MRI findings she would require an APR. However, there is no detectable tumor today on exam or scope so the patient could be a candidate for watch and wait.  Will review her case at Tumor Board including MRI findings prior to making a final decision.       For details of past medical history, surgical history, family history, medications, allergies, and review of systems, please see details below.    Medical history:  Past Medical History:   Diagnosis Date     Depression      GERD (gastroesophageal reflux disease)      History of smoking      Insomnia      Obesity      Rectal cancer (H)      Restless leg syndrome      Seasonal affective disorder (H)        Surgical history:  Past Surgical History:   Procedure Laterality Date      SECTION      x2     COLONOSCOPY       INSERT PORT VASCULAR ACCESS Right 2018    Procedure: INSERT PORT VASCULAR ACCESS;  central venous Chest Port Placement;  Surgeon: Gaurav Yates " LISSA Ware;  Location:  OR     [facility]         Family history:  Family History   Problem Relation Age of Onset     Hyperlipidemia Mother      Hypertension Mother      GASTROINTESTINAL DISEASE Mother      ischemic colitis     Coronary Artery Disease Mother      stents x 3     Anesthesia Reaction Mother      hypotension, PONV     Hyperlipidemia Father      Hypertension Father      Breast Cancer Maternal Grandmother      Coronary Artery Disease Maternal Grandfather      Myocardial Infarction Maternal Grandfather      Colon Cancer Paternal Grandmother      Breast Cancer Maternal Aunt      Breast Cancer Paternal Aunt      Coronary Artery Disease Paternal Grandfather      Cerebrovascular Disease Paternal Grandfather      Family History Negative Brother      Coronary Artery Disease Maternal Uncle        Medications:  Current Outpatient Prescriptions   Medication Sig Dispense Refill     Acetaminophen (TYLENOL PO) Take 1,000 mg by mouth At Bedtime       calcium carbonate (TUMS) 500 MG chewable tablet Take 1-2 chew tab by mouth daily  150 tablet      eszopiclone (LUNESTA) 1 MG TABS tablet Take 1-2 tablets (1-2 mg) by mouth At Bedtime 60 tablet 3     FLUoxetine (PROZAC) 20 MG capsule Take 1 capsule (20 mg) by mouth daily 90 capsule 3     lidocaine-prilocaine (EMLA) cream Apply 45 minutes prior to procedure. 30 g 3     RANITIDINE HCL PO Take 150 mg by mouth daily as needed for heartburn       capecitabine (XELODA) 500 MG tablet CHEMO Take 3 tablets (1,500 mg) by mouth 2 times daily for 56 doses Take Mon-Fri. Do NOT take on Sat-Sun. Take with water within 30 min after meal 168 tablet 0     FLUoxetine (PROZAC) 40 MG capsule Take 1 capsule (40 mg) by mouth daily (Patient not taking: Reported on 9/25/2018) 30 capsule 3     gabapentin (NEURONTIN) 300 MG capsule On day 1, take 300 mg at bedtime. Day 2, take 300 mg bid. Day 3 onward, take 300 mg tid. (Patient not taking: Reported on 8/15/2018) 90 capsule 1     LORazepam (ATIVAN) 0.5  MG tablet Take 1 tablet (0.5 mg) by mouth every 4 hours as needed (Anxiety, Nausea/Vomiting or Sleep) (Patient not taking: Reported on 9/25/2018) 60 tablet 5     magic mouthwash (ENTER INGREDIENTS IN COMMENTS) suspension Swish, gargle, and spit one to two teaspoonfuls every six hours as needed. May be swallowed if esophageal involvement. (Patient not taking: Reported on 8/15/2018) 240 mL 1     ondansetron (ZOFRAN) 8 MG tablet Take 1 tablet (8 mg) by mouth every 8 hours as needed for nausea (Patient not taking: Reported on 9/25/2018) 60 tablet 3     prochlorperazine (COMPAZINE) 10 MG tablet Take 1 tablet (10 mg) by mouth every 6 hours as needed (Nausea/Vomiting) (Patient not taking: Reported on 8/15/2018) 30 tablet 2       Allergies:  The patientis allergic to inapsine [droperidol].    Social history:  Social History   Substance Use Topics     Smoking status: Former Smoker     Packs/day: 0.10     Years: 8.00     Types: Cigarettes     Quit date: 2/19/1986     Smokeless tobacco: Never Used     Alcohol use 4.2 oz/week     0 Standard drinks or equivalent, 7 Glasses of wine per week     Marital status: .    Review of Systems:  There are no exam notes on file for this visit.       Hanane Horan MD   Colon and Rectal Surgery Fellow  9/25/2018 at 3:50 PM      I saw and examined the patient, led the discussion and edited the resident note.  I agree with the assessment and plan as outlined.  I personally reviewed the MRI imaging, which raises a question of residual tumor versus fibrotic response.  I am impressed with the complete disappearance of any tumor to palpation or visualization and suspect this is likely a clinical complete response.  If this proves to be the case, the patient would be a candidate for the watch and wait approach, which we discussed in considerable detail, including the risks and benefits of this approach with respect to possible tumor recurrence and curability.  Sarah was seen today with  her  Cody, who participated fully in this discussion.  I plan to present her case at tumor board.  My inclination is to repeat an exam and pelvic MRI in 2 months to be certain that this is in fact a clinical complete response that has not had enough time to fully fibrosis on her current scan.    I answered all of Sarah and Cody's questions to their stated satisfaction.  They expressed her understanding and agreement with the proposed plan.    Total time 30 minutes.  Greater than half the time was spent counseling.        Óscar Hampton MD   Professor and Chief  Division of Colon and Rectal Surgery  [facility]      Referring Provider:  Referred Self, MD  No address on file     Primary Care Provider:  Kyra Mortensen

## 2018-09-27 ENCOUNTER — ONCOLOGY VISIT (OUTPATIENT)
Dept: ONCOLOGY | Facility: CLINIC | Age: 53
End: 2018-09-27
Attending: INTERNAL MEDICINE
Payer: COMMERCIAL

## 2018-09-27 VITALS
BODY MASS INDEX: 29.99 KG/M2 | HEIGHT: 65 IN | SYSTOLIC BLOOD PRESSURE: 98 MMHG | RESPIRATION RATE: 14 BRPM | OXYGEN SATURATION: 99 % | WEIGHT: 180 LBS | HEART RATE: 76 BPM | DIASTOLIC BLOOD PRESSURE: 72 MMHG | TEMPERATURE: 98.3 F

## 2018-09-27 DIAGNOSIS — C20 MALIGNANT NEOPLASM OF RECTUM (H): ICD-10-CM

## 2018-09-27 DIAGNOSIS — M89.9 BONE LESION: Primary | ICD-10-CM

## 2018-09-27 DIAGNOSIS — M25.552 HIP PAIN, LEFT: ICD-10-CM

## 2018-09-27 LAB — COPATH REPORT: NORMAL

## 2018-09-27 PROCEDURE — G0463 HOSPITAL OUTPT CLINIC VISIT: HCPCS | Mod: ZF

## 2018-09-27 PROCEDURE — 99215 OFFICE O/P EST HI 40 MIN: CPT | Mod: ZP | Performed by: INTERNAL MEDICINE

## 2018-09-27 ASSESSMENT — PAIN SCALES - GENERAL: PAINLEVEL: NO PAIN (0)

## 2018-09-27 NOTE — MR AVS SNAPSHOT
After Visit Summary   9/27/2018    Sarah Rajan    MRN: [id number]           Patient Information     Date Of Birth          1965        Visit Information        Provider Department      9/27/2018 3:30 PM Kyra Mortensen MD [facility]        Today's Diagnoses     Bone lesion    -  1    Hip pain, left        Malignant neoplasm of rectum (H)          Care Instructions    Port flushes every 4-5 weeks    Schedule MRI left hip    We will discuss your case in tumor board    We will determine follow up accordingly              Follow-ups after your visit        Your next 10 appointments already scheduled     Oct 08, 2018  9:00 AM CDT   MR HIP LEFT W/O & W CONTRAST with UUMR2   [facility], [place], MRI ([facility])    [address] [address] [address] [phone]           How do I prepare for my exam? (Food and drink instructions) **If you will be receiving sedation or general anesthesia, please see special notes below.**  How do I prepare for my exam? (Other instructions) Take your medicines as usual, unless your doctor tells you not to. You may or may not receive intravenous (IV) contrast for this exam pending the discretion of the Radiologist.  You do not need to do anything special to prepare.  **If you will be receiving sedation or general anesthesia, please see special notes below.**  What should I wear: The MRI machine uses a strong magnet. Please wear clothes without metal (snaps, zippers). A sweatsuit works well, or we may give you a hospital gown. Please remove any body piercings and hair extensions before you arrive. You will also remove watches, jewelry, hairpins, wallets, dentures, partial dental plates and hearing aids. You may wear contact lenses, and you may be able to wear your rings. We have a safe place to keep your personal items, but it is safer to leave them at home.  How long does the exam take: Most  tests take 30 to 60 minutes.  HOWEVER, IF YOUR DOCTOR PRESCRIBES ANESTHESIA please plan on spending four to five hours in the recovery room.  What should I bring:  Bring a list of your current medicines to your exam (including vitamins, minerals and over-the-counter drugs).  Do I need a :  **If you will be receiving sedation or general anesthesia, please see special notes below.**  What should I do after the exam: No Restrictions, You may resume normal activities.  What is this test: MRI (magnetic resonance imaging) uses a strong magnet and radio waves to look inside the body. An MRA (magnetic resonance angiogram) does the same thing, but it lets us look at your blood vessels. A computer turns the radio waves into pictures showing cross sections of the body, much like slices of bread. This helps us see any problems more clearly. You may receive fluid (called  contrast ) before or during your scan. The fluid helps us see the pictures better. We give the fluid through an IV (small needle in your arm).  Who should I call with questions:  Please call the Imaging Department at your exam site with any questions. Directions, parking instructions, and other information is available on our website, [email or web address].  How do I prepare if I m having sedation or anesthesia? **IMPORTANT** THE INSTRUCTIONS BELOW ARE ONLY FOR THOSE PATIENTS WHO HAVE BEEN TOLD THEY WILL RECEIVE SEDATION OR GENERAL ANESTHESIA DURING THEIR MRI PROCEDURE:  IF YOU WILL RECEIVE SEDATION (take medicine to help you relax during your exam): You must get the medicine from your doctor before you arrive. Bring the medicine to the exam. Do not take it at home. Arrive one hour early. Bring someone who can take you home after the test. Your medicine will make you sleepy. After the exam, you may not drive, take a bus or take a taxi by yourself. No eating 8 hours before your exam. You may have clear liquids up until 4 hours before your exam. (Clear  liquids include water, clear tea, black coffee and fruit juice without pulp.)  IF YOU WILL RECEIVE ANESTHESIA (be asleep for your exam): Arrive 1 1/2 hours early. Bring someone who can take you home after the test. You may not drive, take a bus or take a taxi by yourself. No eating 8 hours before your exam. You may have clear liquids up until 4 hours before your exam. (Clear liquids include water, clear tea, black coffee and fruit juice without pulp.)              Future tests that were ordered for you today     Open Future Orders        Priority Expected Expires Ordered    MR Hip Left w/o & w Contrast Routine  9/27/2019 9/27/2018            Who to contact     If you have questions or need follow up information about today's clinic visit or your schedule please contact [facility] directly at [phone].  Normal or non-critical lab and imaging results will be communicated to you by [employer], letter or phone within 4 business days after the clinic has received the results. If you do not hear from us within 7 days, please contact the clinic through [employer] or phone. If you have a critical or abnormal lab result, we will notify you by phone as soon as possible.  Submit refill requests through [employer] or call your pharmacy and they will forward the refill request to us. Please allow 3 business days for your refill to be completed.          Additional Information About Your Visit        [employer] Information     [employer] gives you secure access to your electronic health record. If you see a primary care provider, you can also send messages to your care team and make appointments. If you have questions, please call your primary care clinic.  If you do not have a primary care provider, please call [phone] and they will assist you.        Care EveryWhere ID     This is your Care EveryWhere ID. This could be used by other organizations to access your [place] medical records  [id number]        Your  "Vitals Were     Pulse Temperature Respirations Height Pulse Oximetry BMI (Body Mass Index)    76 98.3  F (36.8  C) (Oral) 14 1.651 m (5' 5\") 99% 29.95 kg/m2       Blood Pressure from Last 3 Encounters:   09/27/18 98/72   09/25/18 123/82   09/12/18 115/72    Weight from Last 3 Encounters:   09/27/18 81.6 kg (180 lb)   09/25/18 81.7 kg (180 lb 1.6 oz)   09/12/18 82.6 kg (182 lb)               Primary Care Provider Office Phone # Fax #    Kyra KIM Mortensen [phone]       [address] [address]        Equal Access to Services     DEAN CHAKRABORTY : Jorge jaramilloo Sojustin, waaxda luqadaha, qaybta kaalmada adeegyada, luz elena jacob. So [facility] [phone].    ATENCIÓN: Si habla español, tiene a membreno disposición servicios gratuitos de asistencia lingüística. Llame al [phone].    We comply with applicable federal civil rights laws and [place] laws. We do not discriminate on the basis of race, color, national origin, age, disability, sex, sexual orientation, or gender identity.            Thank you!     Thank you for choosing [facility]  for your care. Our goal is always to provide you with excellent care. Hearing back from our patients is one way we can continue to improve our services. Please take a few minutes to complete the written survey that you may receive in the mail after your visit with us. Thank you!             Your Updated Medication List - Protect others around you: Learn how to safely use, store and throw away your medicines at www.disposemymeds.org.          This list is accurate as of 9/27/18  4:34 PM.  Always use your most recent med list.                   Brand Name Dispense Instructions for use Diagnosis    calcium carbonate 500 MG chewable tablet    TUMS    150 tablet    Take 1-2 chew tab by mouth daily        capecitabine 500 MG tablet CHEMO    XELODA    168 tablet    Take 3 tablets (1,500 mg) by mouth 2 times daily " for 56 doses Take Mon-Fri. Do NOT take on Sat-Sun. Take with water within 30 min after meal    Malignant neoplasm of rectum (H)       eszopiclone 1 MG Tabs tablet    LUNESTA    60 tablet    Take 1-2 tablets (1-2 mg) by mouth At Bedtime    Other insomnia       * FLUoxetine 40 MG capsule    PROzac    30 capsule    Take 1 capsule (40 mg) by mouth daily    Other depression       * FLUoxetine 20 MG capsule    PROzac    90 capsule    Take 1 capsule (20 mg) by mouth daily    Anxiety       gabapentin 300 MG capsule    NEURONTIN    90 capsule    On day 1, take 300 mg at bedtime. Day 2, take 300 mg bid. Day 3 onward, take 300 mg tid.    Neuropathic pain       lidocaine-prilocaine cream    EMLA    30 g    Apply 45 minutes prior to procedure.    Malignant neoplasm of rectum (H)       LORazepam 0.5 MG tablet    ATIVAN    60 tablet    Take 1 tablet (0.5 mg) by mouth every 4 hours as needed (Anxiety, Nausea/Vomiting or Sleep)    Malignant neoplasm of rectum (H), Anxiety       magic mouthwash suspension    ENTER INGREDIENTS IN COMMENTS    240 mL    Swish, gargle, and spit one to two teaspoonfuls every six hours as needed. May be swallowed if esophageal involvement.    Oral pain       ondansetron 8 MG tablet    ZOFRAN    60 tablet    Take 1 tablet (8 mg) by mouth every 8 hours as needed for nausea    Chemotherapy induced nausea and vomiting       prochlorperazine 10 MG tablet    COMPAZINE    30 tablet    Take 1 tablet (10 mg) by mouth every 6 hours as needed (Nausea/Vomiting)    Malignant neoplasm of rectum (H)       RANITIDINE HCL PO      Take 150 mg by mouth daily as needed for heartburn        TYLENOL PO      Take 1,000 mg by mouth At Bedtime        * Notice:  This list has 2 medication(s) that are the same as other medications prescribed for you. Read the directions carefully, and ask your doctor or other care provider to review them with you.

## 2018-09-27 NOTE — PROGRESS NOTES
Oncology outpatient note    Date of service: 9/27/2018     PC: Rectal cancer. [id number] - [facility]SI Intact    HPI:  Please see previous note for details. I have copied and updated from prior note.  She is a 52-year-old female noticed BRBPR so Screening colonoscopy on 1/9/2018 revealed 3cm rectal mass and other polyps which were removed.  Pathology indicated moderately differentiated adenocarcinoma w/ intact MMR proteins.  CT staging scan showed no metastatic disease; some liver lesions which are thought to be benign per radiology report, T2N1M0 by pelvic MRI read at [facility]. When we initially reviewed her MRI we will not exactly sure whether that was lymph node involvement or not. We opted to repeat MRI which showed T4 N0 lesion. There was up to take her to surgery as there was possibility of personally lesion without lymph node involvement but because of the findings of repeat MRI the surgery was canceled and she is coming back to discuss neoadjuvant treatment.  We also did an MRI of the liver which showed 3 subcentimeter liver lesions which were too small to characterize and will need attention on follow-up.  She started neoadjuvant 5-FU and oxaliplatin (FOLFOX), which she started on 2/26/18. The day after she received cycle 2, she developed fevers, chills, headache, and an itchy rash on her arms and legs, for which she was evaluated in the ED. She was sent home on Benadryl. Benadryl and dexamethasone doses were increased for cycle 3.   She tolerated that cycle well    C#4 was given on 4/10/18    She completed 8 cycles of FOLFOX on 6/5/18    Repeat scans show good response to treatment without evidence of metastatic disease. There is only a small signal consistent with residual tumor seen in the primary rectal cancer lesion.    She started on concurrent chemoradiation with Xeloda with radiation beginning on 7/9/18. There was a delay in her receiving Xeloda, so she began that on 7/10/18. She completed it on  18    Repeat scans show great response to treatment with almost completely fibrotic signal.  There is an indeterminate left intertrochanteric lesion which is stable.  Liver lesion is consistent with benign hemangioma vs cyst      Interval history  She is doing well and recovering from the treatments.  She still has neuropathy involving her fingers and feet which manifests as tingling and numbness with occasional pain.  Occasionally she has back pain.  No nausea or vomiting.  Bowel movements are much better.  No bleeding.  Energy is improving although she feels a little tired.  No infections.  No new swellings.  No trouble breathing.  Anxiety is much better.        ECOG 1    ROS:  Otherwise a comprehensive review of the system was performed and essentially it was unremarkable.      I reviewed other hx in EPIC as below    PMH:  Depression  Restless leg syndrome  Dyslipidemia    Current Outpatient Prescriptions   Medication     Acetaminophen (TYLENOL PO)     calcium carbonate (TUMS) 500 MG chewable tablet     eszopiclone (LUNESTA) 1 MG TABS tablet     FLUoxetine (PROZAC) 20 MG capsule     lidocaine-prilocaine (EMLA) cream     RANITIDINE HCL PO     capecitabine (XELODA) 500 MG tablet CHEMO     FLUoxetine (PROZAC) 40 MG capsule     gabapentin (NEURONTIN) 300 MG capsule     LORazepam (ATIVAN) 0.5 MG tablet     magic mouthwash (ENTER INGREDIENTS IN COMMENTS) suspension     ondansetron (ZOFRAN) 8 MG tablet     prochlorperazine (COMPAZINE) 10 MG tablet     No current facility-administered medications for this visit.      Facility-Administered Medications Ordered in Other Visits   Medication     heparin 100 UNIT/ML injection 5 mL     sodium chloride (PF) 0.9% PF flush 20 mL         FHx  Aunt and maternal grandmother had breast cancer  Pateral grandmother  of colorectal cancer  Mother had ischemic colitis    SHx:  , two teenage children  EtOH: 1 drink daily, occasionally more on weekends  Tobacco: never  "smoker  She is a microbiologist     Allergies   Allergen Reactions     Inapsine [Droperidol] Other (See Comments)     Elevated blood pressure and increased heart rate       Exam:  BP 98/72 (BP Location: Right arm, Patient Position: Chair, Cuff Size: Adult Large)  Pulse 76  Temp 98.3  F (36.8  C) (Oral)  Resp 14  Ht 1.651 m (5' 5\")  Wt 81.6 kg (180 lb)  SpO2 99%  BMI 29.95 kg/m2  CONSTITUTIONAL: No apparent distress  EYES: PERRLA, without pallor or jaundice  ENT/MOUTH: Ears unremarkable. No oral lesions  CVS: s1s2 normal  RESPIRATORY: Chest is clear  GI: Abdomen is benign  NEURO: Alert and oriented ×3.  She has subjective numbness and tingling of the fingers and feet  INTEGUMENT: no concerning skin rashes.  Port site is clean  LYMPHATIC: no palpable lymphadenopathy  MUSCULOSKELETAL: Unremarkable. No bony tenderness.   EXTREMITIES: no pedal edema  PSYCH: Mentation, mood and affect are appropriate          Labs.  Reviewed   Results for EDGAR MARTINEZ (MRN [id number]) as of 9/27/2018 15:38   Ref. Range 9/24/2018 16:19   Sodium Latest Ref Range: 133 - 144 mmol/L 138   Potassium Latest Ref Range: 3.4 - 5.3 mmol/L 3.5   Chloride Latest Ref Range: 94 - 109 mmol/L 105   Carbon Dioxide Latest Ref Range: 20 - 32 mmol/L 26   Urea Nitrogen Latest Ref Range: 7 - 30 mg/dL 14   Creatinine Latest Ref Range: 0.52 - 1.04 mg/dL 0.72   GFR Estimate Latest Ref Range: >60 mL/min/1.7m2 86   GFR Estimate If Black Latest Ref Range: >60 mL/min/1.7m2 >90   Calcium Latest Ref Range: 8.5 - 10.1 mg/dL 8.5   Anion Gap Latest Ref Range: 3 - 14 mmol/L 7   Albumin Latest Ref Range: 3.4 - 5.0 g/dL 3.8   Protein Total Latest Ref Range: 6.8 - 8.8 g/dL 7.0   Bilirubin Total Latest Ref Range: 0.2 - 1.3 mg/dL 0.3   Alkaline Phosphatase Latest Ref Range: 40 - 150 U/L 89   ALT Latest Ref Range: 0 - 50 U/L 27   AST Latest Ref Range: 0 - 45 U/L 21   Glucose Latest Ref Range: 70 - 99 mg/dL 95   WBC Latest Ref Range: 4.0 - 11.0 10e9/L 4.2   Hemoglobin " Latest Ref Range: 11.7 - 15.7 g/dL 12.0   Hematocrit Latest Ref Range: 35.0 - 47.0 % 35.1   Platelet Count Latest Ref Range: 150 - 450 10e9/L 170   RBC Count Latest Ref Range: 3.8 - 5.2 10e12/L 3.70 (L)   MCV Latest Ref Range: 78 - 100 fl 95   MCH Latest Ref Range: 26.5 - 33.0 pg 32.4   MCHC Latest Ref Range: 31.5 - 36.5 g/dL 34.2   RDW Latest Ref Range: 10.0 - 15.0 % 12.8   Diff Method Unknown Automated Method   % Neutrophils Latest Units: % 70.6   % Lymphocytes Latest Units: % 11.5   % Monocytes Latest Units: % 6.7   % Eosinophils Latest Units: % 10.0   % Basophils Latest Units: % 0.7   % Immature Granulocytes Latest Units: % 0.5   Nucleated RBCs Latest Ref Range: 0 /100 0   Absolute Neutrophil Latest Ref Range: 1.6 - 8.3 10e9/L 3.0   Absolute Lymphocytes Latest Ref Range: 0.8 - 5.3 10e9/L 0.5 (L)   Absolute Monocytes Latest Ref Range: 0.0 - 1.3 10e9/L 0.3   Absolute Eosinophils Latest Ref Range: 0.0 - 0.7 10e9/L 0.4   Absolute Basophils Latest Ref Range: 0.0 - 0.2 10e9/L 0.0   Abs Immature Granulocytes Latest Ref Range: 0 - 0.4 10e9/L 0.0   Absolute Nucleated RBC Unknown 0.0         Imaging.    EXAMINATION: MR PELVIS MUSCULAR TISSUE WO & W CONTRAST,  9/24/2018 5:21 PM      COMPARISON: MRI 2/8/2018, 6/20/2018     TECHNIQUE:  Images were acquired without and with intravenous contrast  through the pelvis. The following MR images were acquired without  contrast: multiplanar T1, multiplanar T2, axial diffusion-weighted and  axial apparent diffusion coefficient. T1-weighted images with fat  saturation were acquired at the following intervals relative to  intravenous contrast administration: pre-contrast, immediate post  contrast, 1 minute, 3 minutes and delayed.  Contrast dose: 7.5mL  Gadavist     HISTORY: follow up rectal cancer; Malignant neoplasm of rectum (H)     FINDINGS:     LOCATION/SIZE:  On prior exam dated 6/20/2018 there was a tumor identified in the low  rectum. Previously this tumor measured 1.4 cm, and  currently it  measures 1.6 cm. This is best seen on series 6 image 25.     TREATMENT RESPONSE:   Approximately 20% of the current mass demonstrates tumour signal  intensity, with the remainder appearing to represent fibrosis.  This  corresponds to a tumour response grade of mrTRG-2 .      EXTENT:  The soft tissue extends through the muscularis propria posteriorly,  best seen on series 8 image 24. The soft tissue is relatively T2  hypointense compared to the suspected viable tumor and demonstrates  gradual enhancement, most likely representing fibrosis. The soft  tissue abuts the levator ani muscles in the midline and to the left  (series 7 image 28).     CIRCUMFERENTIAL RESECTION MARGIN (CRM):  In terms of the resection margin, the T2 hypointense soft tissue  extends to the levator musculature in the midline and to the left.      LYMPH NODES: No suspicious lymph node. There are scattered tiny lymph  nodes which have decreased in size, for example a 2.5 mm presacral  lymph node (series 6, image 22) which previously measured 5 mm.     VEINS:  There is not evidence of extramural venous extension.     MISCELLANEOUS FINDINGS: In the left intertrochanteric line, there is  an unchanged 1.9 cm T2 hypointense lesion which demonstrates  relatively homogeneous enhancement (series 5 image 34 and series 19  image 49). Additional small patchy areas of enhancement throughout the  pelvis. There is a 10 mm ovoid enhancing focus in the posterior  uterine fundus myometrium (series 22 image 27). Surgical changes of  prior  section. No free pelvic fluid.         IMPRESSION:   1. There has been a positive to treatment, with a corresponding tumor  regression grade of mrTRG-2. As seen previously, there is soft tissue  extending through the muscularis propria into the perirectal fat  posteriorly, abutting the levator musculature in the midline and on  the left. The soft tissues demonstrates MRI characteristics most  compatible with  fibrosis rather than viable tumor.  2. No suspicious lymphadenopathy.  3. Stable indeterminate 1.9 cm lesion in the left intertrochanteric  line, which was mildly sclerotic on prior pelvic CT. Differential  includes benign (e.g. fibrous dysplasia) and malignant (e.g.  metastasis) etiologies. Recommend continued attention on follow-up  imaging. Alternatively, dedicated MRI may be considered.      EXAMINATION: Chest CT  without contrast 9/21/18     CLINICAL HISTORY: Follow-up rectal cancer. Rule out metastases.     COMPARISON: CT 6/20/2018 and 1/11/2018..     TECHNIQUE: CT imaging obtained through the chest without contrast.  Coronal and axial MIP reformatted images obtained.      FINDINGS:  Central airways are clear. No focal consolidation, pleural effusion,  or pneumothorax. Minimal dependent atelectasis. Punctate calcified  granulomas in the right lower lobe and left upper lobe. No suspicious  pulmonary nodules.     Right chest wall Port-A-Cath with tip at the mid SVC. Heart size is  normal without pericardial effusion. Ascending aorta and main  pulmonary trunk are normal caliber. Minimal coronary artery  calcifications. No enlarged thoracic lymph nodes. Calcified subcarinal  and right hilar lymph nodes.     Bones and soft tissues: No acute fracture or suspicious bone findings.        Partially imaged upper abdomen: Layering biliary sludge. Calcified  hepatic and splenic granulomata. Visualized upper abdomen is limited  on this noncontrast exam..         IMPRESSION:   1. No evidence of metastatic disease in the chest.  2. Sequelae of prior granulomatous disease.  3. Layering biliary sludge.    MRI ABDOMEN 9/21/18     CLINICAL HISTORY:  Follow up rectal cancer.     TECHNIQUE:  Images were acquired with and without intravenous contrast  through the abdomen. The following MR images were acquired: TrueFISP,  multiplanar T2 weighted, axial T1 in/out of phase, axial fat-saturated  T1, diffusion-weighted. Multiplanar  T1-weighted images with fat  saturation were before contrast administration and at multiple time  points following the administration of intravenous contrast. Contrast  dose: 18mL Eovist     FINDINGS:     Comparison study: MRI 6/19/2018.     Liver: Non-cirrhotic configuration off the liver parenchyma. Tiny  liver cyst in hepatic segment 6 with bright T2 signal and no  enhancement. No significant hepatic steatosis or iron deposition. No  arterially enhancing liver lesions. Subcentimeter hypoenhancing liver  lesions in hepatic segment 7 with no hepatobiliary contrast agent  uptake and suggestion of delayed filling, grossly stable from the  prior study. No convincing new or enlarging liver lesions. Patent  hepatic vasculature.     Gallbladder: Unremarkable.     Spleen: Not enlarged. No focal splenic lesions.     Kidneys: Rotation anomaly of the right kidney. No suspicious renal  lesions. No hydronephrosis.     Adrenal glands: Unremarkable.     Pancreas: Preserved increased precontrast T1 signal. No focal lesions.  Main pancreatic duct is not dilated. No pancreas divisum.     Bowel: Partially visualized loops of bowel are unremarkable.     Lymph nodes: Subcentimeter retroperitoneal and mesenteric lymph nodes,  not enlarged by size criteria.     Blood vessels: Patent abdominal vasculature. No abdominal aortic  aneurysm.     Lung bases: Mild bilateral lower lobes dependent atelectasis. Mild  right middle lobe atelectasis.     Bones and soft tissues: Mild degenerative changes of the spine with  scattered Schmorl nodes. No aggressive bone lesions.     Mesentery and abdominal wall: Unremarkable. Tiny fat-containing  umbilical hernia.     Ascites: No ascites.         IMPRESSION: In this patient with history of rectal cancer:  1. Stable subcentimeter liver lesions in hepatic segment 7 with  contrast enhancement pattern most likely suggestive of benign  hemangiomas or liver cysts. No convincing liver metastatic foci.  Continued  attention on follow-up studies would be beneficial.  2. Hepatic steatosis.    EXAMINATION: MR PELVIS W/O & W CONTRAST, 2018 10:19 AM      COMPARISON: 2018.     TECHNIQUE:  Images were acquired without and with intravenous contrast  through the pelvis. The following MR images were acquired without  contrast: multiplanar T1, multiplanar T2, axial diffusion-weighted and  axial apparent diffusion coefficient. T1-weighted images with fat  saturation were acquired at the following intervals relative to  intravenous contrast administration: pre-contrast, immediate post  contrast, 1 minute, 3 minutes and delayed.  Contrast dose: 7.5cc's  Gadavist     HISTORY: Follow-up rectal cancer ([id number]). Patient currently being  treated with neoadjuvant chemotherapy.     FINDINGS:     LOCATION/SIZE:  On prior exam dated 2018 there was a tumor identified in the  lower rectum. Previously this tumor measured 1.9 x 0.4 cm, and  currently it measures 1.4 x 0.4 cm. This is best seen on series series  5, image 24 series 6 images 28-35.     TREATMENT RESPONSE:   Approximately 10-20%% of the current mass demonstrates tumoral signal  intensity, with the remainder appearing to represent fibrosis.  This  corresponds to a tumour response grade of MR TRG-2.      EXTENT:  There is abnormal signal extending through the muscularis propria and  into the perirectal fat posteriorly and on the left, 5:00-6:00 o'clock  position with abutment of the left levator musculature. This abnormal  signal shows progressive delayed enhancement and is somewhat  hypointense relative to suspected residual viable tumor and is favored  to represent fibrosis, best seen on series 6 image 32.       LYMPH NODES:  No abnormal abdominal or pelvic lymph nodes.     VEINS:  There is no evidence of extramural venous extension. The visualized  venous structures are patent.      scar. Uterus is otherwise normal. No adnexal mass.  Postoperative changes anterior  abdominal wall. Urinary bladder is  decompressed. Nonspecific heterogeneous bone marrow signal alteration  no suspicious lesions in the bones.         IMPRESSION:   1. There has been a positive response to treatment, with a  corresponding tumor regression grade of mrTRG-2. While there has been  a decrease in the size of the partially circumferential posterior low  rectal tumorthere remains a small amount of residual viable tumor  signal. Abnormal signal intensity posteriorly continues to abut the  left side levator musculature, though this shows signal  characteristics and enhancement most suggestive of fibrosis rather  than residual viable tumor  2. No evidence for metastatic disease to pelvic lymph nodes.    EXAMINATION: Chest CT  6/20/2018 10:53 AM     CLINICAL HISTORY: follow up of rectal cancer; Malignant neoplasm of  rectum (H)     COMPARISON: 1/11/2018.     TECHNIQUE: CT imaging obtained through the chest without contrast.  Coronal and axial MIP reformatted images obtained.      FINDINGS:  Right-sided Port-A-Cath with tip at the lower SVC. Heart size is  normal. No pericardial effusion.  Normal thoracic vasculature. No  thoracic lymphadenopathy. Central tracheobronchial tree is patent.      There is a tiny granuloma in the right lower lobe and the posterior  left upper lobe. No pulmonary nodules. No pleural effusions or  consolidations.     Bones and soft tissues: No suspicious bone findings.      Partially imaged upper abdomen: Limited. Splenic granulomas.         IMPRESSION:   No evidence of metastatic disease in the chest.      MRI ABDOMEN 6/19/18     CLINICAL HISTORY: follow up of liver lesions; Malignant neoplasm of  rectum (H)     TECHNIQUE:  Images were acquired with and without intravenous contrast  through the abdomen. The following MR images were acquired: TrueFISP,  multiplanar T2 weighted, axial T1 in/out of phase, axial fat-saturated  T1, diffusion-weighted. Multiplanar T1-weighted images with  fat  saturation were before contrast administration and at multiple time  points following the administration of intravenous contrast.      Contrast dose: 7.5mL Gadavist     Comparison study: None     FINDINGS:     Liver: Noncirrhotic liver configuration. Hepatic steatosis.     Small liver lesions appear similar to previous exam. Largest is seen  on series 15 image 12 measuring 9 mm in segment 7. Smaller lesion on  series 15 image 12 measures 7 mm. Tiny probable cyst is seen  inferiorly on series 15 image 24. These lesions are stable in size and  currently demonstrate more markedly T2 hyperintensity, likely due to  decreased motion on the present images. There are less well seen on  the 3 minute images on the present study suggesting potentially  delayed filling.      Gallbladder: Decompressed and unremarkable.     Spleen: Normal.     Kidneys: Normal.     Adrenal glands: Normal.     Pancreas: Normal.     Bowel: Within normal limits. Previously described area of thickening  in the transverse colon has resolved.     Lymph nodes: No abnormally enlarged lymph nodes.     Blood vessels: Unremarkable.     Lung bases: Clear.     Bones and soft tissues: Within normal limits.     Mesentery and abdominal wall: Unremarkable.     Ascites: None.            IMPRESSION:    1. Tiny liver lesions are stable from previous exams and currently  demonstrate features more suggestive of benign hemangiomas or cysts.  These are strongly favored as benign although continued follow-up on  future imaging is reasonable.  2. Hepatic steatosis.    Pathology    FINAL DIAGNOSIS:    CASE FROM [place], [place] ([id number], OBTAINED 1/9/18):   A. COLON, TRANSVERSE AND DESCENDING, POLYPS, POLYPECTOMY:   - Serrated polyps including sessile serrated adenoma and hyperplastic   polyps, fragmented   - Negative for cytologic dysplasia     B. RECTUM, MASS, BIOPSY:   - Adenocarcinoma, moderately differentiated, invasive   - Mismatch repair proteins  are intact by immunohistochemistry     Assessment and Plan  [id number] moderately differentiated adenocarcinoma of the rectum - MSI-stable  She was started on neoadjuvant FOLFOX on 2/26/2018.  after 4 cycles she had good response to treatment. Liver lesions remain indeterminate.    we continue with the same chemotherapy and she received cycle #8 on 6/20/18.  Repeat scans show response to treatment with only a small signal consistent with viable tumor in the primary rectal cancer. No surrounding lymph nodes suspicious. There is no evidence of metastatic disease.    She started on concurrent chemoradiation with Xeloda with radiation beginning on 7/9/18. There was a delay in her receiving Xeloda, so she began that on 7/10/18. She completed it on 8/16/18    Repeat scans show great response to treatment with almost completely fibrotic signal.  There is an indeterminate left intertrochanteric lesion which is stable.    She was seen by Dr Hampton and her case will be discussed in the Colorectal conference before deciding on watchful waiting vs surgery  We discussed the situation in greater detail today.  She was concerned today because she tells me that she thought that if she would achieve a great response with chemotherapy and radiation then she might be a candidate for surgery which would not require colostomy but she was told yesterday that surgery would entail a permanent colostomy.  She wanted me to discuss with Dr. Hampton again and I will send him a message to confirm that.    Indeterminate liver lesion. This likely is benign hemangioma or cyst. It is stable     Left intertrochanteric lesion- indeterminate and asymptomatic. I recommend checking MRI left femur    Neuropathy-she has tingling and numbness of fingers and feet.  She takes vitamin B12 for it.  Previously she tried gabapentin but did not like it.  Occasionally she has to take ibuprofen at night.  We discussed that with time it should improve but if it does not  then she can try acupuncture.  She agrees with this.    Depression and anxiety.  Continue fluoxetine 20 mg daily.  She is coping better.      Port-A-Cath.  She need port flushes every 4-5 weeks.    As mentioned above we will discuss her case at the tumor board and check MRI of the pelvis and then we will determine the follow-up accordingly.    All of her and her 's questions were answered to their satisfaction.  They are agreeable and comfortable with the plan.        Kyra Mortensen

## 2018-09-27 NOTE — PATIENT INSTRUCTIONS
Port flushes every 4-5 weeks    Schedule MRI left hip    We will discuss your case in tumor board    We will determine follow up accordingly

## 2018-09-27 NOTE — LETTER
9/27/2018       RE: Sarah Rajan  [address] [facility] [address]     Dear Colleague,    Thank you for referring your patient, Sarah Rajan, to the [facility]. Please see a copy of my visit note below.    Oncology outpatient note    Date of service: 9/27/2018     PC: Rectal cancer. [id number] - MSI Intact    HPI:  Please see previous note for details. I have copied and updated from prior note.  She is a 52-year-old female noticed BRBPR so Screening colonoscopy on 1/9/2018 revealed 3cm rectal mass and other polyps which were removed.  Pathology indicated moderately differentiated adenocarcinoma w/ intact MMR proteins.  CT staging scan showed no metastatic disease; some liver lesions which are thought to be benign per radiology report, T2N1M0 by pelvic MRI read at [facility]. When we initially reviewed her MRI we will not exactly sure whether that was lymph node involvement or not. We opted to repeat MRI which showed T4 N0 lesion. There was up to take her to surgery as there was possibility of personally lesion without lymph node involvement but because of the findings of repeat MRI the surgery was canceled and she is coming back to discuss neoadjuvant treatment.  We also did an MRI of the liver which showed 3 subcentimeter liver lesions which were too small to characterize and will need attention on follow-up.  She started neoadjuvant 5-FU and oxaliplatin (FOLFOX), which she started on 2/26/18. The day after she received cycle 2, she developed fevers, chills, headache, and an itchy rash on her arms and legs, for which she was evaluated in the ED. She was sent home on Benadryl. Benadryl and dexamethasone doses were increased for cycle 3.   She tolerated that cycle well    C#4 was given on 4/10/18    She completed 8 cycles of FOLFOX on 6/5/18    Repeat scans show good response to treatment without evidence of metastatic disease. There is only a small signal consistent with residual tumor seen  in the primary rectal cancer lesion.    She started on concurrent chemoradiation with Xeloda with radiation beginning on 7/9/18. There was a delay in her receiving Xeloda, so she began that on 7/10/18. She completed it on 8/16/18    Repeat scans show great response to treatment with almost completely fibrotic signal.  There is an indeterminate left intertrochanteric lesion which is stable.  Liver lesion is consistent with benign hemangioma vs cyst      Interval history  She is doing well and recovering from the treatments.  She still has neuropathy involving her fingers and feet which manifests as tingling and numbness with occasional pain.  Occasionally she has back pain.  No nausea or vomiting.  Bowel movements are much better.  No bleeding.  Energy is improving although she feels a little tired.  No infections.  No new swellings.  No trouble breathing.  Anxiety is much better.        ECOG 1    ROS:  Otherwise a comprehensive review of the system was performed and essentially it was unremarkable.      I reviewed other hx in EPIC as below    PMH:  Depression  Restless leg syndrome  Dyslipidemia    Current Outpatient Prescriptions   Medication     Acetaminophen (TYLENOL PO)     calcium carbonate (TUMS) 500 MG chewable tablet     eszopiclone (LUNESTA) 1 MG TABS tablet     FLUoxetine (PROZAC) 20 MG capsule     lidocaine-prilocaine (EMLA) cream     RANITIDINE HCL PO     capecitabine (XELODA) 500 MG tablet CHEMO     FLUoxetine (PROZAC) 40 MG capsule     gabapentin (NEURONTIN) 300 MG capsule     LORazepam (ATIVAN) 0.5 MG tablet     magic mouthwash (ENTER INGREDIENTS IN COMMENTS) suspension     ondansetron (ZOFRAN) 8 MG tablet     prochlorperazine (COMPAZINE) 10 MG tablet     No current facility-administered medications for this visit.      Facility-Administered Medications Ordered in Other Visits   Medication     heparin 100 UNIT/ML injection 5 mL     sodium chloride (PF) 0.9% PF flush 20 mL         FHx  Aunt and  "maternal grandmother had breast cancer  Pateral grandmother  of colorectal cancer  Mother had ischemic colitis    SHx:  , two teenage children  EtOH: 1 drink daily, occasionally more on weekends  Tobacco: never smoker  She is a microbiologist     Allergies   Allergen Reactions     Inapsine [Droperidol] Other (See Comments)     Elevated blood pressure and increased heart rate       Exam:  BP 98/72 (BP Location: Right arm, Patient Position: Chair, Cuff Size: Adult Large)  Pulse 76  Temp 98.3  F (36.8  C) (Oral)  Resp 14  Ht 1.651 m (5' 5\")  Wt 81.6 kg (180 lb)  SpO2 99%  BMI 29.95 kg/m2  CONSTITUTIONAL: No apparent distress  EYES: PERRLA, without pallor or jaundice  ENT/MOUTH: Ears unremarkable. No oral lesions  CVS: s1s2 normal  RESPIRATORY: Chest is clear  GI: Abdomen is benign  NEURO: Alert and oriented ×3.  She has subjective numbness and tingling of the fingers and feet  INTEGUMENT: no concerning skin rashes.  Port site is clean  LYMPHATIC: no palpable lymphadenopathy  MUSCULOSKELETAL: Unremarkable. No bony tenderness.   EXTREMITIES: no pedal edema  PSYCH: Mentation, mood and affect are appropriate          Labs.  Reviewed   Results for EDGAR MARTINEZ (MRN [id number]) as of 2018 15:38   Ref. Range 2018 16:19   Sodium Latest Ref Range: 133 - 144 mmol/L 138   Potassium Latest Ref Range: 3.4 - 5.3 mmol/L 3.5   Chloride Latest Ref Range: 94 - 109 mmol/L 105   Carbon Dioxide Latest Ref Range: 20 - 32 mmol/L 26   Urea Nitrogen Latest Ref Range: 7 - 30 mg/dL 14   Creatinine Latest Ref Range: 0.52 - 1.04 mg/dL 0.72   GFR Estimate Latest Ref Range: >60 mL/min/1.7m2 86   GFR Estimate If Black Latest Ref Range: >60 mL/min/1.7m2 >90   Calcium Latest Ref Range: 8.5 - 10.1 mg/dL 8.5   Anion Gap Latest Ref Range: 3 - 14 mmol/L 7   Albumin Latest Ref Range: 3.4 - 5.0 g/dL 3.8   Protein Total Latest Ref Range: 6.8 - 8.8 g/dL 7.0   Bilirubin Total Latest Ref Range: 0.2 - 1.3 mg/dL 0.3   Alkaline " Phosphatase Latest Ref Range: 40 - 150 U/L 89   ALT Latest Ref Range: 0 - 50 U/L 27   AST Latest Ref Range: 0 - 45 U/L 21   Glucose Latest Ref Range: 70 - 99 mg/dL 95   WBC Latest Ref Range: 4.0 - 11.0 10e9/L 4.2   Hemoglobin Latest Ref Range: 11.7 - 15.7 g/dL 12.0   Hematocrit Latest Ref Range: 35.0 - 47.0 % 35.1   Platelet Count Latest Ref Range: 150 - 450 10e9/L 170   RBC Count Latest Ref Range: 3.8 - 5.2 10e12/L 3.70 (L)   MCV Latest Ref Range: 78 - 100 fl 95   MCH Latest Ref Range: 26.5 - 33.0 pg 32.4   MCHC Latest Ref Range: 31.5 - 36.5 g/dL 34.2   RDW Latest Ref Range: 10.0 - 15.0 % 12.8   Diff Method Unknown Automated Method   % Neutrophils Latest Units: % 70.6   % Lymphocytes Latest Units: % 11.5   % Monocytes Latest Units: % 6.7   % Eosinophils Latest Units: % 10.0   % Basophils Latest Units: % 0.7   % Immature Granulocytes Latest Units: % 0.5   Nucleated RBCs Latest Ref Range: 0 /100 0   Absolute Neutrophil Latest Ref Range: 1.6 - 8.3 10e9/L 3.0   Absolute Lymphocytes Latest Ref Range: 0.8 - 5.3 10e9/L 0.5 (L)   Absolute Monocytes Latest Ref Range: 0.0 - 1.3 10e9/L 0.3   Absolute Eosinophils Latest Ref Range: 0.0 - 0.7 10e9/L 0.4   Absolute Basophils Latest Ref Range: 0.0 - 0.2 10e9/L 0.0   Abs Immature Granulocytes Latest Ref Range: 0 - 0.4 10e9/L 0.0   Absolute Nucleated RBC Unknown 0.0         Imaging.    EXAMINATION: MR PELVIS MUSCULAR TISSUE WO & W CONTRAST,  9/24/2018 5:21 PM      COMPARISON: MRI 2/8/2018, 6/20/2018     TECHNIQUE:  Images were acquired without and with intravenous contrast  through the pelvis. The following MR images were acquired without  contrast: multiplanar T1, multiplanar T2, axial diffusion-weighted and  axial apparent diffusion coefficient. T1-weighted images with fat  saturation were acquired at the following intervals relative to  intravenous contrast administration: pre-contrast, immediate post  contrast, 1 minute, 3 minutes and delayed.  Contrast dose:  7.5mL  Gadavist     HISTORY: follow up rectal cancer; Malignant neoplasm of rectum (H)     FINDINGS:     LOCATION/SIZE:  On prior exam dated 2018 there was a tumor identified in the low  rectum. Previously this tumor measured 1.4 cm, and currently it  measures 1.6 cm. This is best seen on series 6 image 25.     TREATMENT RESPONSE:   Approximately 20% of the current mass demonstrates tumour signal  intensity, with the remainder appearing to represent fibrosis.  This  corresponds to a tumour response grade of mrTRG-2 .      EXTENT:  The soft tissue extends through the muscularis propria posteriorly,  best seen on series 8 image 24. The soft tissue is relatively T2  hypointense compared to the suspected viable tumor and demonstrates  gradual enhancement, most likely representing fibrosis. The soft  tissue abuts the levator ani muscles in the midline and to the left  (series 7 image 28).     CIRCUMFERENTIAL RESECTION MARGIN (CRM):  In terms of the resection margin, the T2 hypointense soft tissue  extends to the levator musculature in the midline and to the left.      LYMPH NODES: No suspicious lymph node. There are scattered tiny lymph  nodes which have decreased in size, for example a 2.5 mm presacral  lymph node (series 6, image 22) which previously measured 5 mm.     VEINS:  There is not evidence of extramural venous extension.     MISCELLANEOUS FINDINGS: In the left intertrochanteric line, there is  an unchanged 1.9 cm T2 hypointense lesion which demonstrates  relatively homogeneous enhancement (series 5 image 34 and series 19  image 49). Additional small patchy areas of enhancement throughout the  pelvis. There is a 10 mm ovoid enhancing focus in the posterior  uterine fundus myometrium (series 22 image 27). Surgical changes of  prior  section. No free pelvic fluid.         IMPRESSION:   1. There has been a positive to treatment, with a corresponding tumor  regression grade of mrTRG-2. As seen  previously, there is soft tissue  extending through the muscularis propria into the perirectal fat  posteriorly, abutting the levator musculature in the midline and on  the left. The soft tissues demonstrates MRI characteristics most  compatible with fibrosis rather than viable tumor.  2. No suspicious lymphadenopathy.  3. Stable indeterminate 1.9 cm lesion in the left intertrochanteric  line, which was mildly sclerotic on prior pelvic CT. Differential  includes benign (e.g. fibrous dysplasia) and malignant (e.g.  metastasis) etiologies. Recommend continued attention on follow-up  imaging. Alternatively, dedicated MRI may be considered.      EXAMINATION: Chest CT  without contrast 9/21/18     CLINICAL HISTORY: Follow-up rectal cancer. Rule out metastases.     COMPARISON: CT 6/20/2018 and 1/11/2018..     TECHNIQUE: CT imaging obtained through the chest without contrast.  Coronal and axial MIP reformatted images obtained.      FINDINGS:  Central airways are clear. No focal consolidation, pleural effusion,  or pneumothorax. Minimal dependent atelectasis. Punctate calcified  granulomas in the right lower lobe and left upper lobe. No suspicious  pulmonary nodules.     Right chest wall Port-A-Cath with tip at the mid SVC. Heart size is  normal without pericardial effusion. Ascending aorta and main  pulmonary trunk are normal caliber. Minimal coronary artery  calcifications. No enlarged thoracic lymph nodes. Calcified subcarinal  and right hilar lymph nodes.     Bones and soft tissues: No acute fracture or suspicious bone findings.        Partially imaged upper abdomen: Layering biliary sludge. Calcified  hepatic and splenic granulomata. Visualized upper abdomen is limited  on this noncontrast exam..         IMPRESSION:   1. No evidence of metastatic disease in the chest.  2. Sequelae of prior granulomatous disease.  3. Layering biliary sludge.    MRI ABDOMEN 9/21/18     CLINICAL HISTORY:  Follow up rectal  cancer.     TECHNIQUE:  Images were acquired with and without intravenous contrast  through the abdomen. The following MR images were acquired: TrueFISP,  multiplanar T2 weighted, axial T1 in/out of phase, axial fat-saturated  T1, diffusion-weighted. Multiplanar T1-weighted images with fat  saturation were before contrast administration and at multiple time  points following the administration of intravenous contrast. Contrast  dose: 18mL Eovist     FINDINGS:     Comparison study: MRI 6/19/2018.     Liver: Non-cirrhotic configuration off the liver parenchyma. Tiny  liver cyst in hepatic segment 6 with bright T2 signal and no  enhancement. No significant hepatic steatosis or iron deposition. No  arterially enhancing liver lesions. Subcentimeter hypoenhancing liver  lesions in hepatic segment 7 with no hepatobiliary contrast agent  uptake and suggestion of delayed filling, grossly stable from the  prior study. No convincing new or enlarging liver lesions. Patent  hepatic vasculature.     Gallbladder: Unremarkable.     Spleen: Not enlarged. No focal splenic lesions.     Kidneys: Rotation anomaly of the right kidney. No suspicious renal  lesions. No hydronephrosis.     Adrenal glands: Unremarkable.     Pancreas: Preserved increased precontrast T1 signal. No focal lesions.  Main pancreatic duct is not dilated. No pancreas divisum.     Bowel: Partially visualized loops of bowel are unremarkable.     Lymph nodes: Subcentimeter retroperitoneal and mesenteric lymph nodes,  not enlarged by size criteria.     Blood vessels: Patent abdominal vasculature. No abdominal aortic  aneurysm.     Lung bases: Mild bilateral lower lobes dependent atelectasis. Mild  right middle lobe atelectasis.     Bones and soft tissues: Mild degenerative changes of the spine with  scattered Schmorl nodes. No aggressive bone lesions.     Mesentery and abdominal wall: Unremarkable. Tiny fat-containing  umbilical hernia.     Ascites: No  ascites.         IMPRESSION: In this patient with history of rectal cancer:  1. Stable subcentimeter liver lesions in hepatic segment 7 with  contrast enhancement pattern most likely suggestive of benign  hemangiomas or liver cysts. No convincing liver metastatic foci.  Continued attention on follow-up studies would be beneficial.  2. Hepatic steatosis.    EXAMINATION: MR PELVIS W/O & W CONTRAST, 6/20/2018 10:19 AM      COMPARISON: 6/19/2018.     TECHNIQUE:  Images were acquired without and with intravenous contrast  through the pelvis. The following MR images were acquired without  contrast: multiplanar T1, multiplanar T2, axial diffusion-weighted and  axial apparent diffusion coefficient. T1-weighted images with fat  saturation were acquired at the following intervals relative to  intravenous contrast administration: pre-contrast, immediate post  contrast, 1 minute, 3 minutes and delayed.  Contrast dose: 7.5cc's  Gadavist     HISTORY: Follow-up rectal cancer ([id number]). Patient currently being  treated with neoadjuvant chemotherapy.     FINDINGS:     LOCATION/SIZE:  On prior exam dated 4/19/2018 there was a tumor identified in the  lower rectum. Previously this tumor measured 1.9 x 0.4 cm, and  currently it measures 1.4 x 0.4 cm. This is best seen on series series  5, image 24 series 6 images 28-35.     TREATMENT RESPONSE:   Approximately 10-20%% of the current mass demonstrates tumoral signal  intensity, with the remainder appearing to represent fibrosis.  This  corresponds to a tumour response grade of MR TRG-2.      EXTENT:  There is abnormal signal extending through the muscularis propria and  into the perirectal fat posteriorly and on the left, 5:00-6:00 o'clock  position with abutment of the left levator musculature. This abnormal  signal shows progressive delayed enhancement and is somewhat  hypointense relative to suspected residual viable tumor and is favored  to represent fibrosis, best seen on series 6  image 32.       LYMPH NODES:  No abnormal abdominal or pelvic lymph nodes.     VEINS:  There is no evidence of extramural venous extension. The visualized  venous structures are patent.      scar. Uterus is otherwise normal. No adnexal mass.  Postoperative changes anterior abdominal wall. Urinary bladder is  decompressed. Nonspecific heterogeneous bone marrow signal alteration  no suspicious lesions in the bones.         IMPRESSION:   1. There has been a positive response to treatment, with a  corresponding tumor regression grade of mrTRG-2. While there has been  a decrease in the size of the partially circumferential posterior low  rectal tumorthere remains a small amount of residual viable tumor  signal. Abnormal signal intensity posteriorly continues to abut the  left side levator musculature, though this shows signal  characteristics and enhancement most suggestive of fibrosis rather  than residual viable tumor  2. No evidence for metastatic disease to pelvic lymph nodes.    EXAMINATION: Chest CT  2018 10:53 AM     CLINICAL HISTORY: follow up of rectal cancer; Malignant neoplasm of  rectum (H)     COMPARISON: 2018.     TECHNIQUE: CT imaging obtained through the chest without contrast.  Coronal and axial MIP reformatted images obtained.      FINDINGS:  Right-sided Port-A-Cath with tip at the lower SVC. Heart size is  normal. No pericardial effusion.  Normal thoracic vasculature. No  thoracic lymphadenopathy. Central tracheobronchial tree is patent.      There is a tiny granuloma in the right lower lobe and the posterior  left upper lobe. No pulmonary nodules. No pleural effusions or  consolidations.     Bones and soft tissues: No suspicious bone findings.      Partially imaged upper abdomen: Limited. Splenic granulomas.         IMPRESSION:   No evidence of metastatic disease in the chest.      MRI ABDOMEN 18     CLINICAL HISTORY: follow up of liver lesions; Malignant neoplasm of  rectum  (H)     TECHNIQUE:  Images were acquired with and without intravenous contrast  through the abdomen. The following MR images were acquired: TrueFISP,  multiplanar T2 weighted, axial T1 in/out of phase, axial fat-saturated  T1, diffusion-weighted. Multiplanar T1-weighted images with fat  saturation were before contrast administration and at multiple time  points following the administration of intravenous contrast.      Contrast dose: 7.5mL Gadavist     Comparison study: None     FINDINGS:     Liver: Noncirrhotic liver configuration. Hepatic steatosis.     Small liver lesions appear similar to previous exam. Largest is seen  on series 15 image 12 measuring 9 mm in segment 7. Smaller lesion on  series 15 image 12 measures 7 mm. Tiny probable cyst is seen  inferiorly on series 15 image 24. These lesions are stable in size and  currently demonstrate more markedly T2 hyperintensity, likely due to  decreased motion on the present images. There are less well seen on  the 3 minute images on the present study suggesting potentially  delayed filling.      Gallbladder: Decompressed and unremarkable.     Spleen: Normal.     Kidneys: Normal.     Adrenal glands: Normal.     Pancreas: Normal.     Bowel: Within normal limits. Previously described area of thickening  in the transverse colon has resolved.     Lymph nodes: No abnormally enlarged lymph nodes.     Blood vessels: Unremarkable.     Lung bases: Clear.     Bones and soft tissues: Within normal limits.     Mesentery and abdominal wall: Unremarkable.     Ascites: None.            IMPRESSION:    1. Tiny liver lesions are stable from previous exams and currently  demonstrate features more suggestive of benign hemangiomas or cysts.  These are strongly favored as benign although continued follow-up on  future imaging is reasonable.  2. Hepatic steatosis.    Pathology    FINAL DIAGNOSIS:    CASE FROM [place], [place] ([id number], OBTAINED 1/9/18):   A. COLON,  TRANSVERSE AND DESCENDING, POLYPS, POLYPECTOMY:   - Serrated polyps including sessile serrated adenoma and hyperplastic   polyps, fragmented   - Negative for cytologic dysplasia     B. RECTUM, MASS, BIOPSY:   - Adenocarcinoma, moderately differentiated, invasive   - Mismatch repair proteins are intact by immunohistochemistry     Assessment and Plan  [id number] moderately differentiated adenocarcinoma of the rectum - MSI-stable  She was started on neoadjuvant FOLFOX on 2/26/2018.  after 4 cycles she had good response to treatment. Liver lesions remain indeterminate.    we continue with the same chemotherapy and she received cycle #8 on 6/20/18.  Repeat scans show response to treatment with only a small signal consistent with viable tumor in the primary rectal cancer. No surrounding lymph nodes suspicious. There is no evidence of metastatic disease.    She started on concurrent chemoradiation with Xeloda with radiation beginning on 7/9/18. There was a delay in her receiving Xeloda, so she began that on 7/10/18. She completed it on 8/16/18    Repeat scans show great response to treatment with almost completely fibrotic signal.  There is an indeterminate left intertrochanteric lesion which is stable.    She was seen by Dr Hampton and her case will be discussed in the Colorectal conference before deciding on watchful waiting vs surgery  We discussed the situation in greater detail today.  She was concerned today because she tells me that she thought that if she would achieve a great response with chemotherapy and radiation then she might be a candidate for surgery which would not require colostomy but she was told yesterday that surgery would entail a permanent colostomy.  She wanted me to discuss with Dr. Hampton again and I will send him a message to confirm that.    Indeterminate liver lesion. This likely is benign hemangioma or cyst. It is stable     Left intertrochanteric lesion- indeterminate and asymptomatic. I  recommend checking MRI left femur    Neuropathy-she has tingling and numbness of fingers and feet.  She takes vitamin B12 for it.  Previously she tried gabapentin but did not like it.  Occasionally she has to take ibuprofen at night.  We discussed that with time it should improve but if it does not then she can try acupuncture.  She agrees with this.    Depression and anxiety.  Continue fluoxetine 20 mg daily.  She is coping better.      Port-A-Cath.  She need port flushes every 4-5 weeks.    As mentioned above we will discuss her case at the tumor board and check MRI of the pelvis and then we will determine the follow-up accordingly.    All of her and her 's questions were answered to their satisfaction.  They are agreeable and comfortable with the plan.      Again, thank you for allowing me to participate in the care of your patient.      Sincerely,    Kyra Mortensen MD

## 2018-09-27 NOTE — NURSING NOTE
"Oncology Rooming Note    September 27, 2018 3:38 PM   Sarah Rajan is a 52 year old female who presents for:    Chief Complaint   Patient presents with     Oncology Clinic Visit     [facility] RETURN- RECTAL CA     Initial Vitals: BP 98/72 (BP Location: Right arm, Patient Position: Chair, Cuff Size: Adult Large)  Pulse 76  Temp 98.3  F (36.8  C) (Oral)  Resp 14  Ht 1.651 m (5' 5\")  Wt 81.6 kg (180 lb)  SpO2 99%  BMI 29.95 kg/m2 Estimated body mass index is 29.95 kg/(m^2) as calculated from the following:    Height as of this encounter: 1.651 m (5' 5\").    Weight as of this encounter: 81.6 kg (180 lb). Body surface area is 1.93 meters squared.  No Pain (0) Comment: Data Unavailable   No LMP recorded. Patient is postmenopausal.  Allergies reviewed: Yes  Medications reviewed: Yes    Medications: Medication refills not needed today.  Pharmacy name entered into [facility]:    [facility] [id number] [place] [address] DR ALVAREZ AT [facility] [place], [place] - [address] [facility] [phone]  [id number] [place], [place] - [facility] [address] [address] [address] & [facility] - [place], [place] - [address] [facility]    Clinical concerns: No new concerns. [place] was notified.    10 minutes for nursing intake (face to face time)     Anish Pak LPN            "

## 2018-10-03 ENCOUNTER — TELEPHONE (OUTPATIENT)
Dept: SURGERY | Facility: CLINIC | Age: 53
End: 2018-10-03

## 2018-10-03 NOTE — TELEPHONE ENCOUNTER
Patient was called by Dr. Hampton after her case was discussed at our Colon and Rectal Tumor board. It was unanimously decided that the patient proceed with the Watch and Wait protocol per the protocol below.      Follow up per Watch and Wait Protocol:   Completed CRT: 8/15/2018    Flexible sigmoidoscopy     Every 2 months for 6 months: Until 2/2019    Every 3 months for 3 years: Until 2021    Every 6 months for 5 years: Until 2023    Every year for 10 years: Until 2028      3T MRI of pelvis    4 months after CRT: 12/2018    Every 6 months for 2 years: Until 2020      CT CAP    Every year for 5 years: Until 2023      Colonoscopies every year for 5 years: Until 2023      CEA at every clinic and endoscopic visit

## 2018-10-08 ENCOUNTER — HOSPITAL ENCOUNTER (OUTPATIENT)
Dept: MRI IMAGING | Facility: CLINIC | Age: 53
Discharge: HOME | End: 2018-10-08
Attending: INTERNAL MEDICINE | Admitting: INTERNAL MEDICINE
Payer: COMMERCIAL

## 2018-10-08 DIAGNOSIS — M25.552 HIP PAIN, LEFT: ICD-10-CM

## 2018-10-08 DIAGNOSIS — M89.9 BONE LESION: ICD-10-CM

## 2018-10-08 LAB — RADIOLOGIST FLAGS: NORMAL

## 2018-10-08 PROCEDURE — 73723 MRI JOINT LWR EXTR W/O&W/DYE: CPT | Mod: LT

## 2018-10-08 PROCEDURE — A9585 GADOBUTROL INJECTION: HCPCS | Performed by: INTERNAL MEDICINE

## 2018-10-08 PROCEDURE — 25500064 ZZH RX 255 OP 636: Performed by: INTERNAL MEDICINE

## 2018-10-08 RX ORDER — GADOBUTROL 604.72 MG/ML
7.5 INJECTION INTRAVENOUS ONCE
Status: COMPLETED | OUTPATIENT
Start: 2018-10-08 | End: 2018-10-08

## 2018-10-08 RX ADMIN — GADOBUTROL 7.5 ML: 604.72 INJECTION INTRAVENOUS at 10:28

## 2018-10-10 ENCOUNTER — CARE COORDINATION (OUTPATIENT)
Dept: ONCOLOGY | Facility: CLINIC | Age: 53
End: 2018-10-10

## 2018-10-10 DIAGNOSIS — C20 MALIGNANT NEOPLASM OF RECTUM (H): Primary | ICD-10-CM

## 2018-10-10 NOTE — PROGRESS NOTES
Spoke with Sarah to discuss her recent MRI results and plan going forward (MRI and Dr Mortensen in 3 months).  Answered many questions including the difference in timing between follow ups with Dr Hampton and Dr Mortensen.  Reviewed the Watch and Wait Protocol and sent a copy to her via [employer].  She requested to have several labs drawn at her port flush tomorrow, the labs requested are for her PCP. She will contact that office for orders and request they are drawn at the time of the flush.  Encouraged her to call with any additional questions or concerns.

## 2018-10-11 DIAGNOSIS — C20 RECTAL CANCER (H): ICD-10-CM

## 2018-10-11 DIAGNOSIS — Z79.899 ENCOUNTER FOR LONG-TERM (CURRENT) USE OF MEDICATIONS: ICD-10-CM

## 2018-10-11 LAB
ALBUMIN SERPL-MCNC: 3.6 G/DL (ref 3.4–5)
ALP SERPL-CCNC: 83 U/L (ref 40–150)
ALT SERPL W P-5'-P-CCNC: 34 U/L (ref 0–50)
ANION GAP SERPL CALCULATED.3IONS-SCNC: 7 MMOL/L (ref 3–14)
AST SERPL W P-5'-P-CCNC: 20 U/L (ref 0–45)
BASOPHILS # BLD AUTO: 0 10E9/L (ref 0–0.2)
BASOPHILS NFR BLD AUTO: 0.7 %
BILIRUB SERPL-MCNC: 0.5 MG/DL (ref 0.2–1.3)
BUN SERPL-MCNC: 20 MG/DL (ref 7–30)
CALCIUM SERPL-MCNC: 8.4 MG/DL (ref 8.5–10.1)
CHLORIDE SERPL-SCNC: 106 MMOL/L (ref 94–109)
CO2 SERPL-SCNC: 25 MMOL/L (ref 20–32)
CREAT SERPL-MCNC: 0.65 MG/DL (ref 0.52–1.04)
DIFFERENTIAL METHOD BLD: ABNORMAL
EOSINOPHIL # BLD AUTO: 0.2 10E9/L (ref 0–0.7)
EOSINOPHIL NFR BLD AUTO: 7.3 %
ERYTHROCYTE [DISTWIDTH] IN BLOOD BY AUTOMATED COUNT: 12.6 % (ref 10–15)
GFR SERPL CREATININE-BSD FRML MDRD: >90 ML/MIN/1.7M2
GLUCOSE SERPL-MCNC: 84 MG/DL (ref 70–99)
HCT VFR BLD AUTO: 36.2 % (ref 35–47)
HGB BLD-MCNC: 12.1 G/DL (ref 11.7–15.7)
IMM GRANULOCYTES # BLD: 0 10E9/L (ref 0–0.4)
IMM GRANULOCYTES NFR BLD: 0.3 %
LYMPHOCYTES # BLD AUTO: 0.4 10E9/L (ref 0.8–5.3)
LYMPHOCYTES NFR BLD AUTO: 12.5 %
MCH RBC QN AUTO: 31.8 PG (ref 26.5–33)
MCHC RBC AUTO-ENTMCNC: 33.4 G/DL (ref 31.5–36.5)
MCV RBC AUTO: 95 FL (ref 78–100)
MONOCYTES # BLD AUTO: 0.2 10E9/L (ref 0–1.3)
MONOCYTES NFR BLD AUTO: 8 %
NEUTROPHILS # BLD AUTO: 2.1 10E9/L (ref 1.6–8.3)
NEUTROPHILS NFR BLD AUTO: 71.2 %
NRBC # BLD AUTO: 0 10*3/UL
NRBC BLD AUTO-RTO: 0 /100
PLATELET # BLD AUTO: 171 10E9/L (ref 150–450)
POTASSIUM SERPL-SCNC: 3.8 MMOL/L (ref 3.4–5.3)
PROT SERPL-MCNC: 6.8 G/DL (ref 6.8–8.8)
RBC # BLD AUTO: 3.8 10E12/L (ref 3.8–5.2)
SODIUM SERPL-SCNC: 138 MMOL/L (ref 133–144)
WBC # BLD AUTO: 2.9 10E9/L (ref 4–11)

## 2018-10-11 PROCEDURE — 85025 COMPLETE CBC W/AUTO DIFF WBC: CPT | Performed by: INTERNAL MEDICINE

## 2018-10-11 PROCEDURE — 80053 COMPREHEN METABOLIC PANEL: CPT | Performed by: INTERNAL MEDICINE

## 2018-10-11 PROCEDURE — 25000128 H RX IP 250 OP 636: Performed by: INTERNAL MEDICINE

## 2018-10-11 RX ORDER — HEPARIN SODIUM (PORCINE) LOCK FLUSH IV SOLN 100 UNIT/ML 100 UNIT/ML
5 SOLUTION INTRAVENOUS EVERY 8 HOURS
Status: DISCONTINUED | OUTPATIENT
Start: 2018-10-11 | End: 2018-10-19 | Stop reason: HOSPADM

## 2018-10-11 RX ADMIN — Medication 5 ML: at 08:48

## 2018-10-11 RX ADMIN — ALTEPLASE 2 MG: 2.2 INJECTION, POWDER, LYOPHILIZED, FOR SOLUTION INTRAVENOUS at 10:50

## 2018-10-11 RX ADMIN — ALTEPLASE 2 MG: 2.2 INJECTION, POWDER, LYOPHILIZED, FOR SOLUTION INTRAVENOUS at 11:00

## 2018-10-11 NOTE — NURSING NOTE
Chief Complaint   Patient presents with     Blood Draw     Labs drawn via  by RN. Lab appt only.     Port Draw     Line flushed twice with TPA . Fluids are pushed in fine but line did not result blood return. Line hep locked.      Pily Hanson RN

## 2018-10-11 NOTE — NURSING NOTE
Chief Complaint   Patient presents with     Blood Draw     Labs drawn via  by RN. Lab appt only     Pily Hanson RN

## 2018-11-12 ENCOUNTER — TELEPHONE (OUTPATIENT)
Dept: SURGERY | Facility: CLINIC | Age: 53
End: 2018-11-12

## 2018-11-12 NOTE — TELEPHONE ENCOUNTER
Patient was scheduled by call center at the same time this message was sent.  Called patient to see if she would like to schedule an additional appointment.  Patient states she wanted to schedule her flex sig, but the  told her there was no order placed.  Informed patient that an order is not required for flex sig.  Patient states she is due at the end of November, but also wants to know when she is due for imaging.  Informed patient I will discuss with RN regarding her specific watch and wait protocol, and follow up accordingly.  Patient verbalized understanding.

## 2018-11-12 NOTE — TELEPHONE ENCOUNTER
[facility] Call Center    Phone Message    May a detailed message be left on voicemail: yes    Reason for Call: Other: Patient is calling to schedule her Flexible  Sigmoidoscopy.  Please follow up with the patient to schedule this appointment. Thank you.  Action Taken: Message routed to:  [facility] ([facility] Colon and Rectal

## 2018-11-15 ENCOUNTER — APPOINTMENT (OUTPATIENT)
Dept: LAB | Facility: CLINIC | Age: 53
End: 2018-11-15
Attending: INTERNAL MEDICINE
Payer: COMMERCIAL

## 2018-11-15 ENCOUNTER — ONCOLOGY VISIT (OUTPATIENT)
Dept: ONCOLOGY | Facility: CLINIC | Age: 53
End: 2018-11-15
Attending: INTERNAL MEDICINE
Payer: COMMERCIAL

## 2018-11-15 VITALS
SYSTOLIC BLOOD PRESSURE: 109 MMHG | TEMPERATURE: 98 F | HEART RATE: 70 BPM | BODY MASS INDEX: 31.04 KG/M2 | OXYGEN SATURATION: 98 % | WEIGHT: 186.5 LBS | DIASTOLIC BLOOD PRESSURE: 72 MMHG | RESPIRATION RATE: 16 BRPM

## 2018-11-15 DIAGNOSIS — C20 MALIGNANT NEOPLASM OF RECTUM (H): Primary | ICD-10-CM

## 2018-11-15 DIAGNOSIS — M89.9 LESION OF LEFT FEMUR: ICD-10-CM

## 2018-11-15 PROCEDURE — G0463 HOSPITAL OUTPT CLINIC VISIT: HCPCS | Mod: ZF

## 2018-11-15 PROCEDURE — 36591 DRAW BLOOD OFF VENOUS DEVICE: CPT

## 2018-11-15 PROCEDURE — 99214 OFFICE O/P EST MOD 30 MIN: CPT | Mod: ZP | Performed by: INTERNAL MEDICINE

## 2018-11-15 PROCEDURE — 25000128 H RX IP 250 OP 636: Mod: ZF | Performed by: INTERNAL MEDICINE

## 2018-11-15 RX ORDER — DICLOFENAC SODIUM 75 MG/1
TABLET, DELAYED RELEASE ORAL
Refills: 2 | COMMUNITY
Start: 2018-11-02 | End: 2020-12-15

## 2018-11-15 RX ORDER — HEPARIN SODIUM (PORCINE) LOCK FLUSH IV SOLN 100 UNIT/ML 100 UNIT/ML
5 SOLUTION INTRAVENOUS ONCE
Status: COMPLETED | OUTPATIENT
Start: 2018-11-15 | End: 2018-11-15

## 2018-11-15 RX ADMIN — Medication 5 ML: at 13:29

## 2018-11-15 ASSESSMENT — PAIN SCALES - GENERAL: PAINLEVEL: NO PAIN (0)

## 2018-11-15 NOTE — PATIENT INSTRUCTIONS
Schedule MRI pelvis and MRI left hip around 12/7/18    Follow with Dr Hampton    See me back in about 2 months

## 2018-11-15 NOTE — NURSING NOTE
"Oncology Rooming Note    November 15, 2018 1:46 PM   Sarah Rajan is a 53 year old female who presents for:    Chief Complaint   Patient presents with     Port Draw     Labs drawn from port by RN. Line flushed with saline and heparin. Vs taken and pt checked in for appt. Called infusion to cancel TPA appt.     Oncology Clinic Visit     [facility] RETURN- RECTAL CA     Initial Vitals: /72 (BP Location: Right arm, Cuff Size: Adult Regular)  Pulse 70  Temp 98  F (36.7  C) (Oral)  Resp 16  Wt 84.6 kg (186 lb 8 oz)  SpO2 98%  BMI 31.04 kg/m2 Estimated body mass index is 31.04 kg/(m^2) as calculated from the following:    Height as of 9/27/18: 1.651 m (5' 5\").    Weight as of this encounter: 84.6 kg (186 lb 8 oz). Body surface area is 1.97 meters squared.  No Pain (0) Comment: Data Unavailable   No LMP recorded. Patient is postmenopausal.  Allergies reviewed: Yes  Medications reviewed: Yes    Medications: Medication refills not needed today.  Pharmacy name entered into [facility]:    [facility] [id number] [place] [address] DR ALVAREZ AT [facility] [place], [place] [phone]  [id number] - [place], [place] - [address] [place] [address] E [facility] - [place], [place] - [address]    Clinical concerns: No new concerns. Festus was notified.    10 minutes for nursing intake (face to face time)     Anish Pak LPN            "

## 2018-11-15 NOTE — PROGRESS NOTES
Oncology outpatient note    Date of service: 11/15/2018     PC: Rectal cancer. [id number] - MSI Intact    HPI:  Please see previous note for details. I have copied and updated from prior note.  She is a 53-year-old female noticed BRBPR so Screening colonoscopy on 1/9/2018 revealed 3cm rectal mass and other polyps which were removed.  Pathology indicated moderately differentiated adenocarcinoma w/ intact MMR proteins.  CT staging scan showed no metastatic disease; some liver lesions which are thought to be benign per radiology report, T2N1M0 by pelvic MRI read at [facility]. When we initially reviewed her MRI we will not exactly sure whether that was lymph node involvement or not. We opted to repeat MRI which showed T4 N0 lesion. There was up to take her to surgery as there was possibility of personally lesion without lymph node involvement but because of the findings of repeat MRI the surgery was canceled and she is coming back to discuss neoadjuvant treatment.  We also did an MRI of the liver which showed 3 subcentimeter liver lesions which were too small to characterize and will need attention on follow-up.  She started neoadjuvant 5-FU and oxaliplatin (FOLFOX), which she started on 2/26/18. The day after she received cycle 2, she developed fevers, chills, headache, and an itchy rash on her arms and legs, for which she was evaluated in the ED. She was sent home on Benadryl. Benadryl and dexamethasone doses were increased for cycle 3.   She tolerated that cycle well    C#4 was given on 4/10/18    She completed 8 cycles of FOLFOX on 6/5/18    Repeat scans show good response to treatment without evidence of metastatic disease. There is only a small signal consistent with residual tumor seen in the primary rectal cancer lesion.    She started on concurrent chemoradiation with Xeloda with radiation beginning on 7/9/18. There was a delay in her receiving Xeloda, so she began that on 7/10/18. She completed it on  8/16/18    Repeat scans show great response to treatment with almost completely fibrotic signal.  There is an indeterminate left intertrochanteric lesion which is stable.  Liver lesion is consistent with benign hemangioma vs cyst    She was seen by Dr Hampton and the decision was made to keep her on watchful waiting.      Left intertrochanteric lesion-we did MRI of the left hip which also showed a nonspecific T1 hypointense lesion in the proximal left femur which has not changed since 6/20/2018.  We decided on repeating the left hip MRI in a few weeks.       Interval history  She is accompanied by her  today and tells me that overall she has been feeling really well.  She feels almost back to her baseline normal.  She denies any pain.  She has noticed frequent bowel movements which are soft but not diarrheal.  Denies bleeding.  No nausea vomiting.  Energy is good.  No infections.  She continues to have tingling and numbness in her feet and fingers.  She is planning to start acupuncture for this.  Overall her mood has been good and overall outlook is also much better.  Denies new swellings.  No new skin problems.  No shortness of breath.        ECOG 0    ROS:  A comprehensive ROS was otherwise neg    I reviewed other hx in EPIC as below    PMH:  Depression  Restless leg syndrome  Dyslipidemia    Current Outpatient Prescriptions   Medication     Acetaminophen (TYLENOL PO)     calcium carbonate (TUMS) 500 MG chewable tablet     capecitabine (XELODA) 500 MG tablet CHEMO     eszopiclone (LUNESTA) 1 MG TABS tablet     FLUoxetine (PROZAC) 20 MG capsule     FLUoxetine (PROZAC) 40 MG capsule     gabapentin (NEURONTIN) 300 MG capsule     lidocaine-prilocaine (EMLA) cream     LORazepam (ATIVAN) 0.5 MG tablet     magic mouthwash (ENTER INGREDIENTS IN COMMENTS) suspension     ondansetron (ZOFRAN) 8 MG tablet     prochlorperazine (COMPAZINE) 10 MG tablet     RANITIDINE HCL PO     Current Facility-Administered Medications    Medication     sodium chloride (PF) 0.9% PF flush 20 mL         FHx  Aunt and maternal grandmother had breast cancer  Pateral grandmother  of colorectal cancer  Mother had ischemic colitis    SHx:  , two teenage children  EtOH: 1 drink daily, occasionally more on weekends  Tobacco: never smoker  She is a microbiologist     Allergies   Allergen Reactions     Inapsine [Droperidol] Other (See Comments)     Elevated blood pressure and increased heart rate       Exam:  /72 (BP Location: Right arm, Cuff Size: Adult Regular)  Pulse 70  Temp 98  F (36.7  C) (Oral)  Resp 16  Wt 84.6 kg (186 lb 8 oz)  SpO2 98%  BMI 31.04 kg/m2  CONSTITUTIONAL: no acute distress  EYES: PERRLA, no palor or icterus.   ENT/MOUTH: no mouth lesions. Ears normal  CVS: s1s2 no m r g .   RESPIRATORY: clear to auscultation b/l  GI: soft non tender no hepatosplenomegaly  NEURO: AAOX3 she has a stable neuropathy of the fingers and feet  INTEGUMENT: no obvious rashes  LYMPHATIC: no palpable cervical, supraclavicular, axillary or inguinal LAD  MUSCULOSKELETAL: Unremarkable. No bony tenderness.   EXTREMITIES: no edema  PSYCH: Mentation, mood and affect are normal. Decision making capacity is intact        Labs.  Reviewed     Imaging.    MR left hip  without contrast 10/8/2018 9:57 AM     Techniques: Multiplanar multisequence imaging of the left hip was  obtained without  administration of intra-articular or intravenous  contrast using routing protocol.     History: Femur lesion     Additional History from EMR: [id number] moderately differentiated  adenocarcinoma of the rectum - MSI-stable  She was started on neoadjuvant FOLFOX on 2018.  after 4 cycles  she had good response to treatment. Liver lesions remain  indeterminate.      Comparison: MRI 2018, 2018     Findings:     Osseous structures  Osseous structures:   Proximal left femur adjacent to the lesser trochanter there is a  lesion which is hypointense on T1-weighted  imaging, similar to the  normal adjacent musculature. There is subtle associated increased T2  signal. There is some fat signal interspersed throughout this area.  Subtle enhancement. There is no extension into the cortex or the soft  tissue. It measures 16 x 20 x 17 mm. On the CT from 1/11/2018 there  was minimal increased density in this corresponding area. Compared to  MRI 6/20/2018, it is unchanged in size.     No fracture, stress reaction, avascular necrosis is seen.     Joint and periarticular soft tissue     Internal derangement of joints are not well assessed owing to chosen  field of view.  Prostatectomy joint demonstrates osteophytosis  consistent with degenerative changes. Tearing of anterior superior  labrum. Mild physiologic fluid in the hip joints.     Muscles and tendons  Muscles: Visualized muscles are unremarkable without evidence of  muscle strain, atrophy or mass.      Tendons: The visualized tendons are intact.     Nerves:  Visualized neurovascular bundles are intact.     Other Findings:  Nonspecific soft tissue edema overlying the greater trochanter.         Impression:  Nonspecific T1 hypointense lesion in the proximal left femur is  unchanged in size since 6/20/2018. Lesion is indeterminate and  underlying metastatic disease is difficult to exclude. Consider  correlation with PET scan and follow-up imaging.     [Consider Follow Up: Indeterminant lesion. PET scan may be helpful for  further evaluation as well as follow-up imaging.]  Is    EXAMINATION: MR PELVIS MUSCULAR TISSUE WO & W CONTRAST,  9/24/2018 5:21 PM      COMPARISON: MRI 2/8/2018, 6/20/2018     TECHNIQUE:  Images were acquired without and with intravenous contrast  through the pelvis. The following MR images were acquired without  contrast: multiplanar T1, multiplanar T2, axial diffusion-weighted and  axial apparent diffusion coefficient. T1-weighted images with fat  saturation were acquired at the following intervals relative  to  intravenous contrast administration: pre-contrast, immediate post  contrast, 1 minute, 3 minutes and delayed.  Contrast dose: 7.5mL  Gadavist     HISTORY: follow up rectal cancer; Malignant neoplasm of rectum (H)     FINDINGS:     LOCATION/SIZE:  On prior exam dated 2018 there was a tumor identified in the low  rectum. Previously this tumor measured 1.4 cm, and currently it  measures 1.6 cm. This is best seen on series 6 image 25.     TREATMENT RESPONSE:   Approximately 20% of the current mass demonstrates tumour signal  intensity, with the remainder appearing to represent fibrosis.  This  corresponds to a tumour response grade of mrTRG-2 .      EXTENT:  The soft tissue extends through the muscularis propria posteriorly,  best seen on series 8 image 24. The soft tissue is relatively T2  hypointense compared to the suspected viable tumor and demonstrates  gradual enhancement, most likely representing fibrosis. The soft  tissue abuts the levator ani muscles in the midline and to the left  (series 7 image 28).     CIRCUMFERENTIAL RESECTION MARGIN (CRM):  In terms of the resection margin, the T2 hypointense soft tissue  extends to the levator musculature in the midline and to the left.      LYMPH NODES: No suspicious lymph node. There are scattered tiny lymph  nodes which have decreased in size, for example a 2.5 mm presacral  lymph node (series 6, image 22) which previously measured 5 mm.     VEINS:  There is not evidence of extramural venous extension.     MISCELLANEOUS FINDINGS: In the left intertrochanteric line, there is  an unchanged 1.9 cm T2 hypointense lesion which demonstrates  relatively homogeneous enhancement (series 5 image 34 and series 19  image 49). Additional small patchy areas of enhancement throughout the  pelvis. There is a 10 mm ovoid enhancing focus in the posterior  uterine fundus myometrium (series 22 image 27). Surgical changes of  prior  section. No free pelvic  fluid.         IMPRESSION:   1. There has been a positive to treatment, with a corresponding tumor  regression grade of mrTRG-2. As seen previously, there is soft tissue  extending through the muscularis propria into the perirectal fat  posteriorly, abutting the levator musculature in the midline and on  the left. The soft tissues demonstrates MRI characteristics most  compatible with fibrosis rather than viable tumor.  2. No suspicious lymphadenopathy.  3. Stable indeterminate 1.9 cm lesion in the left intertrochanteric  line, which was mildly sclerotic on prior pelvic CT. Differential  includes benign (e.g. fibrous dysplasia) and malignant (e.g.  metastasis) etiologies. Recommend continued attention on follow-up  imaging. Alternatively, dedicated MRI may be considered.      EXAMINATION: Chest CT  without contrast 9/21/18     CLINICAL HISTORY: Follow-up rectal cancer. Rule out metastases.     COMPARISON: CT 6/20/2018 and 1/11/2018..     TECHNIQUE: CT imaging obtained through the chest without contrast.  Coronal and axial MIP reformatted images obtained.      FINDINGS:  Central airways are clear. No focal consolidation, pleural effusion,  or pneumothorax. Minimal dependent atelectasis. Punctate calcified  granulomas in the right lower lobe and left upper lobe. No suspicious  pulmonary nodules.     Right chest wall Port-A-Cath with tip at the mid SVC. Heart size is  normal without pericardial effusion. Ascending aorta and main  pulmonary trunk are normal caliber. Minimal coronary artery  calcifications. No enlarged thoracic lymph nodes. Calcified subcarinal  and right hilar lymph nodes.     Bones and soft tissues: No acute fracture or suspicious bone findings.        Partially imaged upper abdomen: Layering biliary sludge. Calcified  hepatic and splenic granulomata. Visualized upper abdomen is limited  on this noncontrast exam..         IMPRESSION:   1. No evidence of metastatic disease in the chest.  2. Sequelae of  prior granulomatous disease.  3. Layering biliary sludge.    MRI ABDOMEN 9/21/18     CLINICAL HISTORY:  Follow up rectal cancer.     TECHNIQUE:  Images were acquired with and without intravenous contrast  through the abdomen. The following MR images were acquired: TrueFISP,  multiplanar T2 weighted, axial T1 in/out of phase, axial fat-saturated  T1, diffusion-weighted. Multiplanar T1-weighted images with fat  saturation were before contrast administration and at multiple time  points following the administration of intravenous contrast. Contrast  dose: 18mL Eovist     FINDINGS:     Comparison study: MRI 6/19/2018.     Liver: Non-cirrhotic configuration off the liver parenchyma. Tiny  liver cyst in hepatic segment 6 with bright T2 signal and no  enhancement. No significant hepatic steatosis or iron deposition. No  arterially enhancing liver lesions. Subcentimeter hypoenhancing liver  lesions in hepatic segment 7 with no hepatobiliary contrast agent  uptake and suggestion of delayed filling, grossly stable from the  prior study. No convincing new or enlarging liver lesions. Patent  hepatic vasculature.     Gallbladder: Unremarkable.     Spleen: Not enlarged. No focal splenic lesions.     Kidneys: Rotation anomaly of the right kidney. No suspicious renal  lesions. No hydronephrosis.     Adrenal glands: Unremarkable.     Pancreas: Preserved increased precontrast T1 signal. No focal lesions.  Main pancreatic duct is not dilated. No pancreas divisum.     Bowel: Partially visualized loops of bowel are unremarkable.     Lymph nodes: Subcentimeter retroperitoneal and mesenteric lymph nodes,  not enlarged by size criteria.     Blood vessels: Patent abdominal vasculature. No abdominal aortic  aneurysm.     Lung bases: Mild bilateral lower lobes dependent atelectasis. Mild  right middle lobe atelectasis.     Bones and soft tissues: Mild degenerative changes of the spine with  scattered Schmorl nodes. No aggressive bone  lesions.     Mesentery and abdominal wall: Unremarkable. Tiny fat-containing  umbilical hernia.     Ascites: No ascites.         IMPRESSION: In this patient with history of rectal cancer:  1. Stable subcentimeter liver lesions in hepatic segment 7 with  contrast enhancement pattern most likely suggestive of benign  hemangiomas or liver cysts. No convincing liver metastatic foci.  Continued attention on follow-up studies would be beneficial.  2. Hepatic steatosis.    EXAMINATION: MR PELVIS W/O & W CONTRAST, 6/20/2018 10:19 AM      COMPARISON: 6/19/2018.     TECHNIQUE:  Images were acquired without and with intravenous contrast  through the pelvis. The following MR images were acquired without  contrast: multiplanar T1, multiplanar T2, axial diffusion-weighted and  axial apparent diffusion coefficient. T1-weighted images with fat  saturation were acquired at the following intervals relative to  intravenous contrast administration: pre-contrast, immediate post  contrast, 1 minute, 3 minutes and delayed.  Contrast dose: 7.5cc's  Gadavist     HISTORY: Follow-up rectal cancer ([id number]). Patient currently being  treated with neoadjuvant chemotherapy.     FINDINGS:     LOCATION/SIZE:  On prior exam dated 4/19/2018 there was a tumor identified in the  lower rectum. Previously this tumor measured 1.9 x 0.4 cm, and  currently it measures 1.4 x 0.4 cm. This is best seen on series series  5, image 24 series 6 images 28-35.     TREATMENT RESPONSE:   Approximately 10-20%% of the current mass demonstrates tumoral signal  intensity, with the remainder appearing to represent fibrosis.  This  corresponds to a tumour response grade of MR TRG-2.      EXTENT:  There is abnormal signal extending through the muscularis propria and  into the perirectal fat posteriorly and on the left, 5:00-6:00 o'clock  position with abutment of the left levator musculature. This abnormal  signal shows progressive delayed enhancement and is  somewhat  hypointense relative to suspected residual viable tumor and is favored  to represent fibrosis, best seen on series 6 image 32.       LYMPH NODES:  No abnormal abdominal or pelvic lymph nodes.     VEINS:  There is no evidence of extramural venous extension. The visualized  venous structures are patent.      scar. Uterus is otherwise normal. No adnexal mass.  Postoperative changes anterior abdominal wall. Urinary bladder is  decompressed. Nonspecific heterogeneous bone marrow signal alteration  no suspicious lesions in the bones.         IMPRESSION:   1. There has been a positive response to treatment, with a  corresponding tumor regression grade of mrTRG-2. While there has been  a decrease in the size of the partially circumferential posterior low  rectal tumorthere remains a small amount of residual viable tumor  signal. Abnormal signal intensity posteriorly continues to abut the  left side levator musculature, though this shows signal  characteristics and enhancement most suggestive of fibrosis rather  than residual viable tumor  2. No evidence for metastatic disease to pelvic lymph nodes.    EXAMINATION: Chest CT  2018 10:53 AM     CLINICAL HISTORY: follow up of rectal cancer; Malignant neoplasm of  rectum (H)     COMPARISON: 2018.     TECHNIQUE: CT imaging obtained through the chest without contrast.  Coronal and axial MIP reformatted images obtained.      FINDINGS:  Right-sided Port-A-Cath with tip at the lower SVC. Heart size is  normal. No pericardial effusion.  Normal thoracic vasculature. No  thoracic lymphadenopathy. Central tracheobronchial tree is patent.      There is a tiny granuloma in the right lower lobe and the posterior  left upper lobe. No pulmonary nodules. No pleural effusions or  consolidations.     Bones and soft tissues: No suspicious bone findings.      Partially imaged upper abdomen: Limited. Splenic granulomas.         IMPRESSION:   No evidence of metastatic  disease in the chest.      MRI ABDOMEN 6/19/18     CLINICAL HISTORY: follow up of liver lesions; Malignant neoplasm of  rectum (H)     TECHNIQUE:  Images were acquired with and without intravenous contrast  through the abdomen. The following MR images were acquired: TrueFISP,  multiplanar T2 weighted, axial T1 in/out of phase, axial fat-saturated  T1, diffusion-weighted. Multiplanar T1-weighted images with fat  saturation were before contrast administration and at multiple time  points following the administration of intravenous contrast.      Contrast dose: 7.5mL Gadavist     Comparison study: None     FINDINGS:     Liver: Noncirrhotic liver configuration. Hepatic steatosis.     Small liver lesions appear similar to previous exam. Largest is seen  on series 15 image 12 measuring 9 mm in segment 7. Smaller lesion on  series 15 image 12 measures 7 mm. Tiny probable cyst is seen  inferiorly on series 15 image 24. These lesions are stable in size and  currently demonstrate more markedly T2 hyperintensity, likely due to  decreased motion on the present images. There are less well seen on  the 3 minute images on the present study suggesting potentially  delayed filling.      Gallbladder: Decompressed and unremarkable.     Spleen: Normal.     Kidneys: Normal.     Adrenal glands: Normal.     Pancreas: Normal.     Bowel: Within normal limits. Previously described area of thickening  in the transverse colon has resolved.     Lymph nodes: No abnormally enlarged lymph nodes.     Blood vessels: Unremarkable.     Lung bases: Clear.     Bones and soft tissues: Within normal limits.     Mesentery and abdominal wall: Unremarkable.     Ascites: None.            IMPRESSION:    1. Tiny liver lesions are stable from previous exams and currently  demonstrate features more suggestive of benign hemangiomas or cysts.  These are strongly favored as benign although continued follow-up on  future imaging is reasonable.  2. Hepatic  steatosis.    Pathology    FINAL DIAGNOSIS:    CASE FROM [place], [place] ([id number], OBTAINED 1/9/18):   A. COLON, TRANSVERSE AND DESCENDING, POLYPS, POLYPECTOMY:   - Serrated polyps including sessile serrated adenoma and hyperplastic   polyps, fragmented   - Negative for cytologic dysplasia     B. RECTUM, MASS, BIOPSY:   - Adenocarcinoma, moderately differentiated, invasive   - Mismatch repair proteins are intact by immunohistochemistry     Assessment and Plan  [id number] moderately differentiated adenocarcinoma of the rectum - MSI-stable  She was started on neoadjuvant FOLFOX on 2/26/2018.  after 4 cycles she had good response to treatment. Liver lesions remain indeterminate.    we continued with the same chemotherapy and she received cycle #8 on 6/20/18.  Repeat scans show good response to treatment with only a small signal consistent with viable tumor in the primary rectal cancer. No surrounding lymph nodes suspicious. There is no evidence of metastatic disease.    She started on concurrent chemoradiation with Xeloda with radiation beginning on 7/9/18. There was a delay in her receiving Xeloda, so she began on 7/10/18. She completed it on 8/16/18    Repeat scans show great response to treatment with almost completely fibrotic signal.  There is an indeterminate left intertrochanteric lesion which is stable.    She was seen by Dr Hampton and the decision was made to keep her on watchful waiting.    I will plan to repeat MRI of the pelvis next month.  He will see Dr. Hampton for sigmoidoscopy next month.    Indeterminate liver lesion. This likely is benign hemangioma or cyst. It is stable     Left intertrochanteric lesion-we did MRI of the left hip which also showed a nonspecific T1 hypointense lesion in the proximal left femur which has not changed since 6/20/2018.  We decided on repeating the left hip MRI in a few weeks.        Neuropathy-she continues to have neuropathy of the fingers and feet.  I agree  with trying acupuncture.      Depression and anxiety.  Overall this has significantly improved. Continue fluoxetine 20 mg daily.      Port-A-Cath.   Continue port flushes every 4-5 weeks    I will see her back in 2 months    All of her and her 's questions were answered to their satisfaction.  They are agreeable and comfortable with the plan.        Kyra Mortensen

## 2018-11-15 NOTE — NURSING NOTE
Chief Complaint   Patient presents with     Port Draw     Labs drawn from port by RN. Line flushed with saline and heparin. Vs taken and pt checked in for appt. Called infusion to cancel TPA appt.     Labs drawn from port by RN. Line flushed with saline and heparin. Vs taken and pt checked in for appt. Called infusion to cancel TPA appt.    Alicja Decker RN

## 2018-11-15 NOTE — MR AVS SNAPSHOT
After Visit Summary   11/15/2018    Sarah Rajan    MRN: [id number]           Patient Information     Date Of Birth          1965        Visit Information        Provider Department      11/15/2018 2:00 PM Kyra Mortensen MD [facility]        Today's Diagnoses     Malignant neoplasm of rectum (H)    -  1    Lesion of left femur          Care Instructions    Schedule MRI pelvis and MRI left hip around 12/7/18    Follow with Dr Hampton    See me back in about 2 months              Follow-ups after your visit        Your next 10 appointments already scheduled     Nov 15, 2018  3:00 PM CST   Infusion 60 with UC ONCOLOGY INFUSION, UC 26 ATC   [facility] ([facility] [place])    [address]  [address]  [facility] [address] [phone]            Nov 26, 2018  1:00 PM CST   (Arrive by 12:45 PM)   Return Visit with Komal Clark MD   [facility] ([facility] [place])    [address]  [address]  [facility] [id number] [phone]            Dec 11, 2018 11:30 AM CST   (Arrive by 11:15 AM)   Return Visit with Óscar Hampton MD   [facility] ([facility] [place])    [address]  4th Floor  [facility] [address] [phone]              Future tests that were ordered for you today     Open Future Orders        Priority Expected Expires Ordered    MR Pelvis Musclular Tissue wo & w Contrast Routine 12/7/2018 11/15/2019 11/15/2018    MR Hip Left w/o & w Contrast Routine 12/7/2018 11/15/2019 11/15/2018            Who to contact     If you have questions or need follow up information about today's clinic visit or your schedule please contact [facility] directly at [phone].  Normal or non-critical lab and imaging results will be communicated to you by MyChart, letter or phone within 4 business days after the clinic has received  the results. If you do not hear from us within 7 days, please contact the clinic through [employer] or phone. If you have a critical or abnormal lab result, we will notify you by phone as soon as possible.  Submit refill requests through [employer] or call your pharmacy and they will forward the refill request to us. Please allow 3 business days for your refill to be completed.          Additional Information About Your Visit        [employer] Information     [employer] gives you secure access to your electronic health record. If you see a primary care provider, you can also send messages to your care team and make appointments. If you have questions, please call your primary care clinic.  If you do not have a primary care provider, please call [phone] and they will assist you.        Care EveryWhere ID     This is your Care EveryWhere ID. This could be used by other organizations to access your [place] medical records  [id number]        Your Vitals Were     Pulse Temperature Respirations Pulse Oximetry BMI (Body Mass Index)       70 98  F (36.7  C) (Oral) 16 98% 31.04 kg/m2        Blood Pressure from Last 3 Encounters:   11/15/18 109/72   09/27/18 98/72   09/25/18 123/82    Weight from Last 3 Encounters:   11/15/18 84.6 kg (186 lb 8 oz)   09/27/18 81.6 kg (180 lb)   09/25/18 81.7 kg (180 lb 1.6 oz)               Primary Care Provider Office Phone # Fax #    Aazim K MD [place] [phone]       [address] [facility] [address]        Equal Access to Services     DEAN CHAKRABORTY : Hadii aad ku hadasho Soomaali, waaxda luqadaha, qaybta kaalmada adeegyada, luz elena jacob. So [facility] [phone].    ATENCIÓN: Si habla español, tiene a membreno disposición servicios gratuitos de asistencia lingüística. Llame al [phone].    We comply with applicable federal civil rights laws and [place] laws. We do not discriminate on the basis of race, color, national origin, age, disability, sex,  sexual orientation, or gender identity.            Thank you!     Thank you for choosing [facility]  for your care. Our goal is always to provide you with excellent care. Hearing back from our patients is one way we can continue to improve our services. Please take a few minutes to complete the written survey that you may receive in the mail after your visit with us. Thank you!             Your Updated Medication List - Protect others around you: Learn how to safely use, store and throw away your medicines at www.disposemymeds.org.          This list is accurate as of 11/15/18  2:11 PM.  Always use your most recent med list.                   Brand Name Dispense Instructions for use Diagnosis    calcium carbonate 500 MG chewable tablet    TUMS    150 tablet    Take 1-2 chew tab by mouth daily        capecitabine 500 MG tablet CHEMO    XELODA    168 tablet    Take 3 tablets (1,500 mg) by mouth 2 times daily for 56 doses Take Mon-Fri. Do NOT take on Sat-Sun. Take with water within 30 min after meal    Malignant neoplasm of rectum (H)       diclofenac 75 MG EC tablet    VOLTAREN     TK 1 T PO BID        eszopiclone 1 MG Tabs tablet    LUNESTA    60 tablet    Take 1-2 tablets (1-2 mg) by mouth At Bedtime    Other insomnia       * FLUoxetine 40 MG capsule    PROzac    30 capsule    Take 1 capsule (40 mg) by mouth daily    Other depression       * FLUoxetine 20 MG capsule    PROzac    90 capsule    Take 1 capsule (20 mg) by mouth daily    Anxiety       gabapentin 300 MG capsule    NEURONTIN    90 capsule    On day 1, take 300 mg at bedtime. Day 2, take 300 mg bid. Day 3 onward, take 300 mg tid.    Neuropathic pain       lidocaine-prilocaine cream    EMLA    30 g    Apply 45 minutes prior to procedure.    Malignant neoplasm of rectum (H)       LORazepam 0.5 MG tablet    ATIVAN    60 tablet    Take 1 tablet (0.5 mg) by mouth every 4 hours as needed (Anxiety, Nausea/Vomiting or Sleep)    Malignant  neoplasm of rectum (H), Anxiety       magic mouthwash suspension    ENTER INGREDIENTS IN COMMENTS    240 mL    Swish, gargle, and spit one to two teaspoonfuls every six hours as needed. May be swallowed if esophageal involvement.    Oral pain       ondansetron 8 MG tablet    ZOFRAN    60 tablet    Take 1 tablet (8 mg) by mouth every 8 hours as needed for nausea    Chemotherapy induced nausea and vomiting       prochlorperazine 10 MG tablet    COMPAZINE    30 tablet    Take 1 tablet (10 mg) by mouth every 6 hours as needed (Nausea/Vomiting)    Malignant neoplasm of rectum (H)       RANITIDINE HCL PO      Take 150 mg by mouth daily as needed for heartburn        TYLENOL PO      Take 1,000 mg by mouth At Bedtime        * Notice:  This list has 2 medication(s) that are the same as other medications prescribed for you. Read the directions carefully, and ask your doctor or other care provider to review them with you.

## 2018-11-15 NOTE — LETTER
11/15/2018       RE: Sarah Rajan  [address] [facility] [address]     Dear Colleague,    Thank you for referring your patient, Sarah Rajan, to the [facility]. Please see a copy of my visit note below.    Oncology outpatient note    Date of service: 11/15/2018     PC: Rectal cancer. [id number] - MSI Intact    HPI:  Please see previous note for details. I have copied and updated from prior note.  She is a 53-year-old female noticed BRBPR so Screening colonoscopy on 1/9/2018 revealed 3cm rectal mass and other polyps which were removed.  Pathology indicated moderately differentiated adenocarcinoma w/ intact MMR proteins.  CT staging scan showed no metastatic disease; some liver lesions which are thought to be benign per radiology report, T2N1M0 by pelvic MRI read at [facility]. When we initially reviewed her MRI we will not exactly sure whether that was lymph node involvement or not. We opted to repeat MRI which showed T4 N0 lesion. There was up to take her to surgery as there was possibility of personally lesion without lymph node involvement but because of the findings of repeat MRI the surgery was canceled and she is coming back to discuss neoadjuvant treatment.  We also did an MRI of the liver which showed 3 subcentimeter liver lesions which were too small to characterize and will need attention on follow-up.  She started neoadjuvant 5-FU and oxaliplatin (FOLFOX), which she started on 2/26/18. The day after she received cycle 2, she developed fevers, chills, headache, and an itchy rash on her arms and legs, for which she was evaluated in the ED. She was sent home on Benadryl. Benadryl and dexamethasone doses were increased for cycle 3.   She tolerated that cycle well    C#4 was given on 4/10/18    She completed 8 cycles of FOLFOX on 6/5/18    Repeat scans show good response to treatment without evidence of metastatic disease. There is only a small signal consistent with residual tumor  seen in the primary rectal cancer lesion.    She started on concurrent chemoradiation with Xeloda with radiation beginning on 7/9/18. There was a delay in her receiving Xeloda, so she began that on 7/10/18. She completed it on 8/16/18    Repeat scans show great response to treatment with almost completely fibrotic signal.  There is an indeterminate left intertrochanteric lesion which is stable.  Liver lesion is consistent with benign hemangioma vs cyst    She was seen by Dr Hampton and the decision was made to keep her on watchful waiting.      Left intertrochanteric lesion-we did MRI of the left hip which also showed a nonspecific T1 hypointense lesion in the proximal left femur which has not changed since 6/20/2018.  We decided on repeating the left hip MRI in a few weeks.       Interval history  She is accompanied by her  today and tells me that overall she has been feeling really well.  She feels almost back to her baseline normal.  She denies any pain.  She has noticed frequent bowel movements which are soft but not diarrheal.  Denies bleeding.  No nausea vomiting.  Energy is good.  No infections.  She continues to have tingling and numbness in her feet and fingers.  She is planning to start acupuncture for this.  Overall her mood has been good and overall outlook is also much better.  Denies new swellings.  No new skin problems.  No shortness of breath.        ECOG 0    ROS:  A comprehensive ROS was otherwise neg    I reviewed other hx in EPIC as below    PMH:  Depression  Restless leg syndrome  Dyslipidemia    Current Outpatient Prescriptions   Medication     Acetaminophen (TYLENOL PO)     calcium carbonate (TUMS) 500 MG chewable tablet     capecitabine (XELODA) 500 MG tablet CHEMO     eszopiclone (LUNESTA) 1 MG TABS tablet     FLUoxetine (PROZAC) 20 MG capsule     FLUoxetine (PROZAC) 40 MG capsule     gabapentin (NEURONTIN) 300 MG capsule     lidocaine-prilocaine (EMLA) cream     LORazepam (ATIVAN) 0.5  MG tablet     magic mouthwash (ENTER INGREDIENTS IN COMMENTS) suspension     ondansetron (ZOFRAN) 8 MG tablet     prochlorperazine (COMPAZINE) 10 MG tablet     RANITIDINE HCL PO     Current Facility-Administered Medications   Medication     sodium chloride (PF) 0.9% PF flush 20 mL         FHx  Aunt and maternal grandmother had breast cancer  Pateral grandmother  of colorectal cancer  Mother had ischemic colitis    SHx:  , two teenage children  EtOH: 1 drink daily, occasionally more on weekends  Tobacco: never smoker  She is a microbiologist     Allergies   Allergen Reactions     Inapsine [Droperidol] Other (See Comments)     Elevated blood pressure and increased heart rate       Exam:  /72 (BP Location: Right arm, Cuff Size: Adult Regular)  Pulse 70  Temp 98  F (36.7  C) (Oral)  Resp 16  Wt 84.6 kg (186 lb 8 oz)  SpO2 98%  BMI 31.04 kg/m2  CONSTITUTIONAL: no acute distress  EYES: PERRLA, no palor or icterus.   ENT/MOUTH: no mouth lesions. Ears normal  CVS: s1s2 no m r g .   RESPIRATORY: clear to auscultation b/l  GI: soft non tender no hepatosplenomegaly  NEURO: AAOX3 she has a stable neuropathy of the fingers and feet  INTEGUMENT: no obvious rashes  LYMPHATIC: no palpable cervical, supraclavicular, axillary or inguinal LAD  MUSCULOSKELETAL: Unremarkable. No bony tenderness.   EXTREMITIES: no edema  PSYCH: Mentation, mood and affect are normal. Decision making capacity is intact        Labs.  Reviewed     Imaging.    MR left hip  without contrast 10/8/2018 9:57 AM     Techniques: Multiplanar multisequence imaging of the left hip was  obtained without  administration of intra-articular or intravenous  contrast using routing protocol.     History: Femur lesion     Additional History from EMR: [id number] moderately differentiated  adenocarcinoma of the rectum - MSI-stable  She was started on neoadjuvant FOLFOX on 2018.  after 4 cycles  she had good response to treatment. Liver lesions  remain  indeterminate.      Comparison: MRI 9/24/2018, 6/20/2018     Findings:     Osseous structures  Osseous structures:   Proximal left femur adjacent to the lesser trochanter there is a  lesion which is hypointense on T1-weighted imaging, similar to the  normal adjacent musculature. There is subtle associated increased T2  signal. There is some fat signal interspersed throughout this area.  Subtle enhancement. There is no extension into the cortex or the soft  tissue. It measures 16 x 20 x 17 mm. On the CT from 1/11/2018 there  was minimal increased density in this corresponding area. Compared to  MRI 6/20/2018, it is unchanged in size.     No fracture, stress reaction, avascular necrosis is seen.     Joint and periarticular soft tissue     Internal derangement of joints are not well assessed owing to chosen  field of view.  Prostatectomy joint demonstrates osteophytosis  consistent with degenerative changes. Tearing of anterior superior  labrum. Mild physiologic fluid in the hip joints.     Muscles and tendons  Muscles: Visualized muscles are unremarkable without evidence of  muscle strain, atrophy or mass.      Tendons: The visualized tendons are intact.     Nerves:  Visualized neurovascular bundles are intact.     Other Findings:  Nonspecific soft tissue edema overlying the greater trochanter.         Impression:  Nonspecific T1 hypointense lesion in the proximal left femur is  unchanged in size since 6/20/2018. Lesion is indeterminate and  underlying metastatic disease is difficult to exclude. Consider  correlation with PET scan and follow-up imaging.     [Consider Follow Up: Indeterminant lesion. PET scan may be helpful for  further evaluation as well as follow-up imaging.]  Is    EXAMINATION: MR PELVIS MUSCULAR TISSUE WO & W CONTRAST,  9/24/2018 5:21 PM      COMPARISON: MRI 2/8/2018, 6/20/2018     TECHNIQUE:  Images were acquired without and with intravenous contrast  through the pelvis. The following MR  images were acquired without  contrast: multiplanar T1, multiplanar T2, axial diffusion-weighted and  axial apparent diffusion coefficient. T1-weighted images with fat  saturation were acquired at the following intervals relative to  intravenous contrast administration: pre-contrast, immediate post  contrast, 1 minute, 3 minutes and delayed.  Contrast dose: 7.5mL  Gadavist     HISTORY: follow up rectal cancer; Malignant neoplasm of rectum (H)     FINDINGS:     LOCATION/SIZE:  On prior exam dated 6/20/2018 there was a tumor identified in the low  rectum. Previously this tumor measured 1.4 cm, and currently it  measures 1.6 cm. This is best seen on series 6 image 25.     TREATMENT RESPONSE:   Approximately 20% of the current mass demonstrates tumour signal  intensity, with the remainder appearing to represent fibrosis.  This  corresponds to a tumour response grade of mrTRG-2 .      EXTENT:  The soft tissue extends through the muscularis propria posteriorly,  best seen on series 8 image 24. The soft tissue is relatively T2  hypointense compared to the suspected viable tumor and demonstrates  gradual enhancement, most likely representing fibrosis. The soft  tissue abuts the levator ani muscles in the midline and to the left  (series 7 image 28).     CIRCUMFERENTIAL RESECTION MARGIN (CRM):  In terms of the resection margin, the T2 hypointense soft tissue  extends to the levator musculature in the midline and to the left.      LYMPH NODES: No suspicious lymph node. There are scattered tiny lymph  nodes which have decreased in size, for example a 2.5 mm presacral  lymph node (series 6, image 22) which previously measured 5 mm.     VEINS:  There is not evidence of extramural venous extension.     MISCELLANEOUS FINDINGS: In the left intertrochanteric line, there is  an unchanged 1.9 cm T2 hypointense lesion which demonstrates  relatively homogeneous enhancement (series 5 image 34 and series 19  image 49). Additional small  patchy areas of enhancement throughout the  pelvis. There is a 10 mm ovoid enhancing focus in the posterior  uterine fundus myometrium (series 22 image 27). Surgical changes of  prior  section. No free pelvic fluid.         IMPRESSION:   1. There has been a positive to treatment, with a corresponding tumor  regression grade of mrTRG-2. As seen previously, there is soft tissue  extending through the muscularis propria into the perirectal fat  posteriorly, abutting the levator musculature in the midline and on  the left. The soft tissues demonstrates MRI characteristics most  compatible with fibrosis rather than viable tumor.  2. No suspicious lymphadenopathy.  3. Stable indeterminate 1.9 cm lesion in the left intertrochanteric  line, which was mildly sclerotic on prior pelvic CT. Differential  includes benign (e.g. fibrous dysplasia) and malignant (e.g.  metastasis) etiologies. Recommend continued attention on follow-up  imaging. Alternatively, dedicated MRI may be considered.      EXAMINATION: Chest CT  without contrast 18     CLINICAL HISTORY: Follow-up rectal cancer. Rule out metastases.     COMPARISON: CT 2018 and 2018..     TECHNIQUE: CT imaging obtained through the chest without contrast.  Coronal and axial MIP reformatted images obtained.      FINDINGS:  Central airways are clear. No focal consolidation, pleural effusion,  or pneumothorax. Minimal dependent atelectasis. Punctate calcified  granulomas in the right lower lobe and left upper lobe. No suspicious  pulmonary nodules.     Right chest wall Port-A-Cath with tip at the mid SVC. Heart size is  normal without pericardial effusion. Ascending aorta and main  pulmonary trunk are normal caliber. Minimal coronary artery  calcifications. No enlarged thoracic lymph nodes. Calcified subcarinal  and right hilar lymph nodes.     Bones and soft tissues: No acute fracture or suspicious bone findings.        Partially imaged upper abdomen:  Layering biliary sludge. Calcified  hepatic and splenic granulomata. Visualized upper abdomen is limited  on this noncontrast exam..         IMPRESSION:   1. No evidence of metastatic disease in the chest.  2. Sequelae of prior granulomatous disease.  3. Layering biliary sludge.    MRI ABDOMEN 9/21/18     CLINICAL HISTORY:  Follow up rectal cancer.     TECHNIQUE:  Images were acquired with and without intravenous contrast  through the abdomen. The following MR images were acquired: TrueFISP,  multiplanar T2 weighted, axial T1 in/out of phase, axial fat-saturated  T1, diffusion-weighted. Multiplanar T1-weighted images with fat  saturation were before contrast administration and at multiple time  points following the administration of intravenous contrast. Contrast  dose: 18mL Eovist     FINDINGS:     Comparison study: MRI 6/19/2018.     Liver: Non-cirrhotic configuration off the liver parenchyma. Tiny  liver cyst in hepatic segment 6 with bright T2 signal and no  enhancement. No significant hepatic steatosis or iron deposition. No  arterially enhancing liver lesions. Subcentimeter hypoenhancing liver  lesions in hepatic segment 7 with no hepatobiliary contrast agent  uptake and suggestion of delayed filling, grossly stable from the  prior study. No convincing new or enlarging liver lesions. Patent  hepatic vasculature.     Gallbladder: Unremarkable.     Spleen: Not enlarged. No focal splenic lesions.     Kidneys: Rotation anomaly of the right kidney. No suspicious renal  lesions. No hydronephrosis.     Adrenal glands: Unremarkable.     Pancreas: Preserved increased precontrast T1 signal. No focal lesions.  Main pancreatic duct is not dilated. No pancreas divisum.     Bowel: Partially visualized loops of bowel are unremarkable.     Lymph nodes: Subcentimeter retroperitoneal and mesenteric lymph nodes,  not enlarged by size criteria.     Blood vessels: Patent abdominal vasculature. No abdominal  aortic  aneurysm.     Lung bases: Mild bilateral lower lobes dependent atelectasis. Mild  right middle lobe atelectasis.     Bones and soft tissues: Mild degenerative changes of the spine with  scattered Schmorl nodes. No aggressive bone lesions.     Mesentery and abdominal wall: Unremarkable. Tiny fat-containing  umbilical hernia.     Ascites: No ascites.         IMPRESSION: In this patient with history of rectal cancer:  1. Stable subcentimeter liver lesions in hepatic segment 7 with  contrast enhancement pattern most likely suggestive of benign  hemangiomas or liver cysts. No convincing liver metastatic foci.  Continued attention on follow-up studies would be beneficial.  2. Hepatic steatosis.    EXAMINATION: MR PELVIS W/O & W CONTRAST, 6/20/2018 10:19 AM      COMPARISON: 6/19/2018.     TECHNIQUE:  Images were acquired without and with intravenous contrast  through the pelvis. The following MR images were acquired without  contrast: multiplanar T1, multiplanar T2, axial diffusion-weighted and  axial apparent diffusion coefficient. T1-weighted images with fat  saturation were acquired at the following intervals relative to  intravenous contrast administration: pre-contrast, immediate post  contrast, 1 minute, 3 minutes and delayed.  Contrast dose: 7.5cc's  Gadavist     HISTORY: Follow-up rectal cancer ([id number]). Patient currently being  treated with neoadjuvant chemotherapy.     FINDINGS:     LOCATION/SIZE:  On prior exam dated 4/19/2018 there was a tumor identified in the  lower rectum. Previously this tumor measured 1.9 x 0.4 cm, and  currently it measures 1.4 x 0.4 cm. This is best seen on series series  5, image 24 series 6 images 28-35.     TREATMENT RESPONSE:   Approximately 10-20%% of the current mass demonstrates tumoral signal  intensity, with the remainder appearing to represent fibrosis.  This  corresponds to a tumour response grade of MR TRG-2.      EXTENT:  There is abnormal signal extending through  the muscularis propria and  into the perirectal fat posteriorly and on the left, 5:00-6:00 o'clock  position with abutment of the left levator musculature. This abnormal  signal shows progressive delayed enhancement and is somewhat  hypointense relative to suspected residual viable tumor and is favored  to represent fibrosis, best seen on series 6 image 32.       LYMPH NODES:  No abnormal abdominal or pelvic lymph nodes.     VEINS:  There is no evidence of extramural venous extension. The visualized  venous structures are patent.      scar. Uterus is otherwise normal. No adnexal mass.  Postoperative changes anterior abdominal wall. Urinary bladder is  decompressed. Nonspecific heterogeneous bone marrow signal alteration  no suspicious lesions in the bones.         IMPRESSION:   1. There has been a positive response to treatment, with a  corresponding tumor regression grade of mrTRG-2. While there has been  a decrease in the size of the partially circumferential posterior low  rectal tumorthere remains a small amount of residual viable tumor  signal. Abnormal signal intensity posteriorly continues to abut the  left side levator musculature, though this shows signal  characteristics and enhancement most suggestive of fibrosis rather  than residual viable tumor  2. No evidence for metastatic disease to pelvic lymph nodes.    EXAMINATION: Chest CT  2018 10:53 AM     CLINICAL HISTORY: follow up of rectal cancer; Malignant neoplasm of  rectum (H)     COMPARISON: 2018.     TECHNIQUE: CT imaging obtained through the chest without contrast.  Coronal and axial MIP reformatted images obtained.      FINDINGS:  Right-sided Port-A-Cath with tip at the lower SVC. Heart size is  normal. No pericardial effusion.  Normal thoracic vasculature. No  thoracic lymphadenopathy. Central tracheobronchial tree is patent.      There is a tiny granuloma in the right lower lobe and the posterior  left upper lobe. No pulmonary  nodules. No pleural effusions or  consolidations.     Bones and soft tissues: No suspicious bone findings.      Partially imaged upper abdomen: Limited. Splenic granulomas.         IMPRESSION:   No evidence of metastatic disease in the chest.      MRI ABDOMEN 6/19/18     CLINICAL HISTORY: follow up of liver lesions; Malignant neoplasm of  rectum (H)     TECHNIQUE:  Images were acquired with and without intravenous contrast  through the abdomen. The following MR images were acquired: TrueFISP,  multiplanar T2 weighted, axial T1 in/out of phase, axial fat-saturated  T1, diffusion-weighted. Multiplanar T1-weighted images with fat  saturation were before contrast administration and at multiple time  points following the administration of intravenous contrast.      Contrast dose: 7.5mL Gadavist     Comparison study: None     FINDINGS:     Liver: Noncirrhotic liver configuration. Hepatic steatosis.     Small liver lesions appear similar to previous exam. Largest is seen  on series 15 image 12 measuring 9 mm in segment 7. Smaller lesion on  series 15 image 12 measures 7 mm. Tiny probable cyst is seen  inferiorly on series 15 image 24. These lesions are stable in size and  currently demonstrate more markedly T2 hyperintensity, likely due to  decreased motion on the present images. There are less well seen on  the 3 minute images on the present study suggesting potentially  delayed filling.      Gallbladder: Decompressed and unremarkable.     Spleen: Normal.     Kidneys: Normal.     Adrenal glands: Normal.     Pancreas: Normal.     Bowel: Within normal limits. Previously described area of thickening  in the transverse colon has resolved.     Lymph nodes: No abnormally enlarged lymph nodes.     Blood vessels: Unremarkable.     Lung bases: Clear.     Bones and soft tissues: Within normal limits.     Mesentery and abdominal wall: Unremarkable.     Ascites: None.            IMPRESSION:    1. Tiny liver lesions are stable from  previous exams and currently  demonstrate features more suggestive of benign hemangiomas or cysts.  These are strongly favored as benign although continued follow-up on  future imaging is reasonable.  2. Hepatic steatosis.    Pathology    FINAL DIAGNOSIS:    CASE FROM [place], [place] ([id number], OBTAINED 1/9/18):   A. COLON, TRANSVERSE AND DESCENDING, POLYPS, POLYPECTOMY:   - Serrated polyps including sessile serrated adenoma and hyperplastic   polyps, fragmented   - Negative for cytologic dysplasia     B. RECTUM, MASS, BIOPSY:   - Adenocarcinoma, moderately differentiated, invasive   - Mismatch repair proteins are intact by immunohistochemistry     Assessment and Plan  [id number] moderately differentiated adenocarcinoma of the rectum - MSI-stable  She was started on neoadjuvant FOLFOX on 2/26/2018.  after 4 cycles she had good response to treatment. Liver lesions remain indeterminate.    we continued with the same chemotherapy and she received cycle #8 on 6/20/18.  Repeat scans show good response to treatment with only a small signal consistent with viable tumor in the primary rectal cancer. No surrounding lymph nodes suspicious. There is no evidence of metastatic disease.    She started on concurrent chemoradiation with Xeloda with radiation beginning on 7/9/18. There was a delay in her receiving Xeloda, so she began on 7/10/18. She completed it on 8/16/18    Repeat scans show great response to treatment with almost completely fibrotic signal.  There is an indeterminate left intertrochanteric lesion which is stable.    She was seen by Dr Hampton and the decision was made to keep her on watchful waiting.    I will plan to repeat MRI of the pelvis next month.  He will see Dr. Hampton for sigmoidoscopy next month.    Indeterminate liver lesion. This likely is benign hemangioma or cyst. It is stable     Left intertrochanteric lesion-we did MRI of the left hip which also showed a nonspecific T1 hypointense  lesion in the proximal left femur which has not changed since 6/20/2018.  We decided on repeating the left hip MRI in a few weeks.        Neuropathy-she continues to have neuropathy of the fingers and feet.  I agree with trying acupuncture.      Depression and anxiety.  Overall this has significantly improved. Continue fluoxetine 20 mg daily.      Port-A-Cath.   Continue port flushes every 4-5 weeks    I will see her back in 2 months    All of her and her 's questions were answered to their satisfaction.  They are agreeable and comfortable with the plan.        Kyra Mortensen

## 2018-11-19 NOTE — PROGRESS NOTES
ORAL CHEMOTHERAPY DISCONTINUATION       Primary Oncologist:  Dr. Mortensen  Primary Oncology Clinic: [facility]  Cancer Diagnosis:  Rectal Cancer  Therapy History:  She started on concurrent chemoradiation with Xeloda with radiation beginning on 7/9/18. There was a delay in her receiving Xeloda, so she began that on 7/10/18. She completed it on 8/16/18    Now going to do watchful waiting  Therapy Ended On:  8/16/2018  Reason For Discontinuation: therapy completed    Additional Notes:  Thank you for the opportunity to be a part in the care of this patient's oral chemotherapy. The oncology pharmacy will no longer be following this patient for oral chemotherapy. If there are any questions or the plan changes, feel free to contact us.    Lian Linares   Pharmacy Intern  [facility]   [phone]

## 2018-11-23 ENCOUNTER — TELEPHONE (OUTPATIENT)
Dept: ONCOLOGY | Facility: CLINIC | Age: 53
End: 2018-11-23

## 2018-11-23 DIAGNOSIS — G47.09 OTHER INSOMNIA: ICD-10-CM

## 2018-11-23 RX ORDER — ESZOPICLONE 1 MG/1
1-2 TABLET, FILM COATED ORAL AT BEDTIME
Qty: 60 TABLET | Refills: 1
Start: 2018-11-23 | End: 2019-10-09

## 2018-11-23 NOTE — TELEPHONE ENCOUNTER
----- Message from Cathleen Ramos PA-C sent at 11/23/2018  3:30 PM CST -----  Regarding: RE: Lunesta Refill  Fine to refill month supply with 1 refill. Then, recommend seeing PCP to discuss insomnia management, as she has completed her chemotherapy. Thanks!  Cathleen    ----- Message -----     From: Yamileth Rizvi RN     Sent: 11/23/2018   3:18 PM       To: Cathleen Ramos PA-C  Subject: Lunesta Refill                                   [facility], pt takes 1-2 tabs nightly. Last filled on 10/23/18 for #60 tablets, from written script 7/25/18 w/ 3 refills. If ok to fill will call in to pharmacy. Next follow-up is 1/24/19 with Dr. Mortensen.    Thank You,    Laurie Rizvi  [facility] RN  [phone]

## 2018-11-26 ENCOUNTER — OFFICE VISIT (OUTPATIENT)
Dept: CARDIOLOGY | Facility: CLINIC | Age: 53
End: 2018-11-26
Attending: INTERNAL MEDICINE
Payer: COMMERCIAL

## 2018-11-26 VITALS
BODY MASS INDEX: 30.82 KG/M2 | HEIGHT: 65 IN | SYSTOLIC BLOOD PRESSURE: 115 MMHG | DIASTOLIC BLOOD PRESSURE: 84 MMHG | HEART RATE: 71 BPM | OXYGEN SATURATION: 97 % | WEIGHT: 185 LBS

## 2018-11-26 DIAGNOSIS — C20 MALIGNANT NEOPLASM OF RECTUM (H): ICD-10-CM

## 2018-11-26 DIAGNOSIS — E78.01 FAMILIAL HYPERCHOLESTEROLEMIA: ICD-10-CM

## 2018-11-26 PROCEDURE — G0463 HOSPITAL OUTPT CLINIC VISIT: HCPCS | Mod: 25,ZF

## 2018-11-26 PROCEDURE — 93010 ELECTROCARDIOGRAM REPORT: CPT | Mod: ZP | Performed by: INTERNAL MEDICINE

## 2018-11-26 PROCEDURE — 93005 ELECTROCARDIOGRAM TRACING: CPT | Mod: ZF

## 2018-11-26 PROCEDURE — 99214 OFFICE O/P EST MOD 30 MIN: CPT | Mod: ZP | Performed by: INTERNAL MEDICINE

## 2018-11-26 RX ORDER — ATORVASTATIN CALCIUM 40 MG/1
40 TABLET, FILM COATED ORAL DAILY
Qty: 90 TABLET | Refills: 3 | Status: SHIPPED | OUTPATIENT
Start: 2018-11-26 | End: 2019-12-03

## 2018-11-26 ASSESSMENT — ENCOUNTER SYMPTOMS
EXERCISE INTOLERANCE: 0
PALPITATIONS: 1
SLEEP DISTURBANCES DUE TO BREATHING: 0
HYPERTENSION: 0
HYPOTENSION: 0
SYNCOPE: 0
LEG PAIN: 0
LIGHT-HEADEDNESS: 1
ORTHOPNEA: 0

## 2018-11-26 ASSESSMENT — PAIN SCALES - GENERAL: PAINLEVEL: NO PAIN (0)

## 2018-11-26 NOTE — NURSING NOTE
Chief Complaint   Patient presents with     Follow Up For     f/u per pt     Medications reviewed and vitals/ EKG performed.  Eufemia Berman CMA

## 2018-11-26 NOTE — PROGRESS NOTES
Labs: Patient was given results of the laboratory testing obtained today. Patient was instructed to return for the next laboratory testing in 3 months . Patient demonstrated understanding of this information and agreed to call with further questions or concerns.   Med Reconcile: Reviewed and verified all current medications with the patient. The updated medication list was printed and given to the patient.  Medication Change: Patient was educated regarding prescribed medication change, including discussion of the indication, administration, side effects, and when to report to MD or RN. Patient demonstrated understanding of this information and agreed to call with further questions or concerns.  Patient stated she understood all health information given and agreed to call with further questions or concerns.

## 2018-11-26 NOTE — LETTER
2018      RE: Sarah Rajan  [address] [address] [id number]       Dear Colleague,    Thank you for the opportunity to participate in the care of your patient, Sarah Rajan, at the [facility] at [facility]. Please see a copy of my visit note below.    Cardiology Clinic note    HPI: This is a 52 yo woman with a history of locally advanced adenocarcinoma of the rectum ([id number]) after presenting with BRBPR . She had a good response to neoadjuvant 5-FU and oxaliplatin (FOLFOX), which she started on 18. She has completed radiation beginning on 18. Liver lesions remain indeterminate. Repeat scans show good response to treatment with only a small signal consistent with viable tumor in the primary rectal cancer. No surrounding lymph nodes suspicious. There is no evidence of metastatic disease. No surgery planned.     She states that when she had her first colonoscopy she had screening labs that revealed high LDL and normal triglycerides. We held off on initiating statin therapy when she was seen in 2018 since she was still receiving chemotherapy and LFTs were abnormal. She does not smoke currently but quit 25 years ago about 2 packs a week for about 8 years.     Interval history  She endorses intermittent palpitations not related with exercise. Denies shortness of breath or chest pain. She underwent a lipid panel in October this year and was noted to have LDL of 198. She was given a prescription for Lipitor 20 mg/d and has not started it yet.      Family history  -MGF and PGF both  of heart attacks (MGF 63) PGF 84  - mother ischemic colitis  - mother, father, brother high cholesterol  - maternal aunt  suddenly at 72y.   - maternal uncle had CAD in his 50s, maternal aunts with CAD in 50-60s yr of age    PAST MEDICAL HISTORY:  Past Medical History:   Diagnosis Date     Depression      GERD (gastroesophageal reflux disease)      History of  smoking      Insomnia      Obesity      Rectal cancer (H)      Restless leg syndrome      Seasonal affective disorder (H)        CURRENT MEDICATIONS:  Current Outpatient Prescriptions   Medication Sig Dispense Refill     Acetaminophen (TYLENOL PO) Take 1,000 mg by mouth At Bedtime       calcium carbonate (TUMS) 500 MG chewable tablet Take 1-2 chew tab by mouth daily  150 tablet      diclofenac (VOLTAREN) 75 MG EC tablet TK 1 T PO BID  2     eszopiclone (LUNESTA) 1 MG TABS tablet Take 1-2 tablets (1-2 mg) by mouth At Bedtime 60 tablet 1     FLUoxetine (PROZAC) 20 MG capsule Take 1 capsule (20 mg) by mouth daily 90 capsule 3     lidocaine-prilocaine (EMLA) cream Apply 45 minutes prior to procedure. 30 g 3     RANITIDINE HCL PO Take 150 mg by mouth daily as needed for heartburn       capecitabine (XELODA) 500 MG tablet CHEMO Take 3 tablets (1,500 mg) by mouth 2 times daily for 56 doses Take Mon-Fri. Do NOT take on Sat-Sun. Take with water within 30 min after meal 168 tablet 0     FLUoxetine (PROZAC) 40 MG capsule Take 1 capsule (40 mg) by mouth daily (Patient not taking: Reported on 9/25/2018) 30 capsule 3     gabapentin (NEURONTIN) 300 MG capsule On day 1, take 300 mg at bedtime. Day 2, take 300 mg bid. Day 3 onward, take 300 mg tid. (Patient not taking: Reported on 8/15/2018) 90 capsule 1     LORazepam (ATIVAN) 0.5 MG tablet Take 1 tablet (0.5 mg) by mouth every 4 hours as needed (Anxiety, Nausea/Vomiting or Sleep) (Patient not taking: Reported on 9/25/2018) 60 tablet 5     magic mouthwash (ENTER INGREDIENTS IN COMMENTS) suspension Swish, gargle, and spit one to two teaspoonfuls every six hours as needed. May be swallowed if esophageal involvement. (Patient not taking: Reported on 8/15/2018) 240 mL 1     ondansetron (ZOFRAN) 8 MG tablet Take 1 tablet (8 mg) by mouth every 8 hours as needed for nausea (Patient not taking: Reported on 9/25/2018) 60 tablet 3     prochlorperazine (COMPAZINE) 10 MG tablet Take 1 tablet  (10 mg) by mouth every 6 hours as needed (Nausea/Vomiting) (Patient not taking: Reported on 8/15/2018) 30 tablet 2       PAST SURGICAL HISTORY:  Past Surgical History:   Procedure Laterality Date      SECTION      x2     COLONOSCOPY       INSERT PORT VASCULAR ACCESS Right 2018    Procedure: INSERT PORT VASCULAR ACCESS;  central venous Chest Port Placement;  Surgeon: Gaurav Yates PA-C;  Location: [facility] OR     LASIK         ALLERGIES     Allergies   Allergen Reactions     Inapsine [Droperidol] Other (See Comments)     Elevated blood pressure and increased heart rate       FAMILY HISTORY:  Family History   Problem Relation Age of Onset     Hyperlipidemia Mother      Hypertension Mother      GASTROINTESTINAL DISEASE Mother      ischemic colitis     Coronary Artery Disease Mother      stents x 3     Anesthesia Reaction Mother      hypotension, PONV     Hyperlipidemia Father      Hypertension Father      Breast Cancer Maternal Grandmother      Coronary Artery Disease Maternal Grandfather      Myocardial Infarction Maternal Grandfather      Colon Cancer Paternal Grandmother      Breast Cancer Maternal Aunt      Breast Cancer Paternal Aunt      Coronary Artery Disease Paternal Grandfather      Cerebrovascular Disease Paternal Grandfather      Family History Negative Brother      Coronary Artery Disease Maternal Uncle        SOCIAL HISTORY:  Social History     Social History     Marital status:      Spouse name: Cody Rajan     Number of children: 2     Years of education: N/A     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   Respiratory: No cough, hemoptysia  Cardiovascular: As per HPI  GI: see HPI  : No urinary frequency, dysuria, or hematuria  Integument: Negative  Psychiatric: Negative  Neuro: Negative  Endocrinology: Negative   Musculoskeletal: Negative    EXAM:  /84 (BP Location: Left arm, Patient Position: Chair, Cuff Size: Adult Large)  Pulse 71  Ht 1.651 m (5' 5\")  Wt 83.9 kg (185 lb)  SpO2 97%  BMI 30.79 kg/m2  In general, the patient is a pleasant female in no apparent distress.      HEENT: NC/AT.  PERRLA.  EOMI.  Sclerae white, not injected.    Neck: Carotids 2+ bilaterally without bruits.  No jugular venous distension.   Lymph: No cervical adenopathy. No thyromegaly.   Heart: RRR. Normal S1, S2. No murmur, rub, click, or gallop. There is no heave.    Lungs: Clear bilaterally.  No rhonchi, wheezes, rales.   GI: Soft, nontender, nondistended.   Extremities: No edema.  The pulses are 2+at the radial and DP bilaterally.  Neuro: grossly non focal.   Skin: no rashes.  Musculoskeletal: normal muscle strength, no acute arthritis, gait normal.      Labs:  LIPID RESULTS from [facility]        No results found for: CHOL, HDL, LDL, TRIG, CHOLHDLRATIO, NHDL    LIVER ENZYME RESULTS:  Lab Results   Component Value Date    AST 16 11/15/2018    ALT 23 11/15/2018       CBC RESULTS:  Lab Results   Component Value Date    WBC 3.3 (L) 11/15/2018    RBC 3.79 (L) 11/15/2018    HGB 12.1 11/15/2018    HCT 36.1 11/15/2018    MCV 95 11/15/2018    MCH 31.9 11/15/2018    MCHC 33.5 11/15/2018    RDW 12.2 11/15/2018     11/15/2018       BMP RESULTS:  Lab Results   Component Value Date     11/15/2018    POTASSIUM 3.7 11/15/2018    CHLORIDE 108 11/15/2018    CO2 26 11/15/2018    ANIONGAP 7 11/15/2018    GLC 98 11/15/2018    BUN 17 11/15/2018    CR 0.79 11/15/2018    " GFRESTIMATED 77 11/15/2018    GFRESTBLACK >90 11/15/2018    GEETA 8.2 (L) 11/15/2018        A1C RESULTS:  No results found for: A1C    INR RESULTS:  Lab Results   Component Value Date    INR 0.93 02/19/2018       Procedures:  ECG Normal sinus rhythm with some nonspecific TW changes          Echo 4/19/18  Global and regional left ventricular function is normal with an EF of 60-65%.  Global right ventricular function is normal.  Pulmonary artery systolic pressure is normal.  The inferior vena cava is normal.  No pericardial effusion is present.  Previous study not available for comparison.      Assessment and Plan:     Ms. Rajan is a 53 year old with     1. Locally advanced rectal cancer for which she is s/p neoadjuvant chemotherapy with Oxaliplatin, Leucovorin, Fluorouracil and radiation therapy and now in remission.   2. Familial hyperlipidemia,   3. Family history of premature coronary artery disease on her maternal side with her maternal uncle and aunt with heart disease in their 50s, mother had CAD in her 60s.      On exam today she is without any evidence of hypervolemia.  Her blood pressure and heart rate are normal.   Her EKG is notable for sinus rhythm and nonspecific ST-T wave changes. Echocardiogram in April 2018 notable for normal biventricular function.       In terms of primary prevention for coronary disease in light of her elevated lipids and the premature CAD in the family, her liver lesions are stable, LFTs are normal so it is a good time to initiate statin therapy, Lipitor 40 mg/d. She would also be a good candidate for aspirin therapy for primary prevention. In the meantime, she agrees to watch her diet and continue with any feasible daily exercise.    F/u in 12 months.   3 mo for lipid panel and AST/ALT    Labs: Patient was given results of the laboratory testing obtained today. Patient was instructed to return for the next laboratory testing in 3 months . Patient demonstrated understanding of  this information and agreed to call with further questions or concerns.   Med Reconcile: Reviewed and verified all current medications with the patient. The updated medication list was printed and given to the patient.  Medication Change: Patient was educated regarding prescribed medication change, including discussion of the indication, administration, side effects, and when to report to MD or RN. Patient demonstrated understanding of this information and agreed to call with further questions or concerns.    Patient stated she understood all health information given and agreed to call with further questions or concerns.      Komal Clark MD      CC  Patient Care Team:  Kyra Mortensen MD as PCP - General (Hematology & Oncology)  Kyra Mortensen MD as MD (Hematology & Oncology)  Yoselin Garner, JANE as Nurse Coordinator (Oncology)

## 2018-11-26 NOTE — PROGRESS NOTES
Cardiology Clinic note    HPI: This is a 52 yo woman with a history of locally advanced adenocarcinoma of the rectum ([id number]) after presenting with BRBPR . She had a good response to neoadjuvant 5-FU and oxaliplatin (FOLFOX), which she started on 18. She has completed radiation beginning on 18. Liver lesions remain indeterminate. Repeat scans show good response to treatment with only a small signal consistent with viable tumor in the primary rectal cancer. No surrounding lymph nodes suspicious. There is no evidence of metastatic disease. No surgery planned.     She states that when she had her first colonoscopy she had screening labs that revealed high LDL and normal triglycerides. We held off on initiating statin therapy when she was seen in 2018 since she was still receiving chemotherapy and LFTs were abnormal. She does not smoke currently but quit 25 years ago about 2 packs a week for about 8 years.     Interval history  She endorses intermittent palpitations not related with exercise. Denies shortness of breath or chest pain. She underwent a lipid panel in October this year and was noted to have LDL of 198. She was given a prescription for Lipitor 20 mg/d and has not started it yet.      Family history  -MGF and PGF both  of heart attacks (MGF 63) PGF 84  - mother ischemic colitis  - mother, father, brother high cholesterol  - maternal aunt  suddenly at 72y.   - maternal uncle had CAD in his 50s, maternal aunts with CAD in 50-60s yr of age    PAST MEDICAL HISTORY:  Past Medical History:   Diagnosis Date     Depression      GERD (gastroesophageal reflux disease)      History of smoking      Insomnia      Obesity      Rectal cancer (H)      Restless leg syndrome      Seasonal affective disorder (H)        CURRENT MEDICATIONS:  Current Outpatient Prescriptions   Medication Sig Dispense Refill     Acetaminophen (TYLENOL PO) Take 1,000 mg by mouth At Bedtime       calcium carbonate (TUMS) 500  MG chewable tablet Take 1-2 chew tab by mouth daily  150 tablet      diclofenac (VOLTAREN) 75 MG EC tablet TK 1 T PO BID  2     eszopiclone (LUNESTA) 1 MG TABS tablet Take 1-2 tablets (1-2 mg) by mouth At Bedtime 60 tablet 1     FLUoxetine (PROZAC) 20 MG capsule Take 1 capsule (20 mg) by mouth daily 90 capsule 3     lidocaine-prilocaine (EMLA) cream Apply 45 minutes prior to procedure. 30 g 3     RANITIDINE HCL PO Take 150 mg by mouth daily as needed for heartburn       capecitabine (XELODA) 500 MG tablet CHEMO Take 3 tablets (1,500 mg) by mouth 2 times daily for 56 doses Take Mon-Fri. Do NOT take on Sat-Sun. Take with water within 30 min after meal 168 tablet 0     FLUoxetine (PROZAC) 40 MG capsule Take 1 capsule (40 mg) by mouth daily (Patient not taking: Reported on 2018) 30 capsule 3     gabapentin (NEURONTIN) 300 MG capsule On day 1, take 300 mg at bedtime. Day 2, take 300 mg bid. Day 3 onward, take 300 mg tid. (Patient not taking: Reported on 8/15/2018) 90 capsule 1     LORazepam (ATIVAN) 0.5 MG tablet Take 1 tablet (0.5 mg) by mouth every 4 hours as needed (Anxiety, Nausea/Vomiting or Sleep) (Patient not taking: Reported on 2018) 60 tablet 5     magic mouthwash (ENTER INGREDIENTS IN COMMENTS) suspension Swish, gargle, and spit one to two teaspoonfuls every six hours as needed. May be swallowed if esophageal involvement. (Patient not taking: Reported on 8/15/2018) 240 mL 1     ondansetron (ZOFRAN) 8 MG tablet Take 1 tablet (8 mg) by mouth every 8 hours as needed for nausea (Patient not taking: Reported on 2018) 60 tablet 3     prochlorperazine (COMPAZINE) 10 MG tablet Take 1 tablet (10 mg) by mouth every 6 hours as needed (Nausea/Vomiting) (Patient not taking: Reported on 8/15/2018) 30 tablet 2       PAST SURGICAL HISTORY:  Past Surgical History:   Procedure Laterality Date      SECTION      x2     COLONOSCOPY       INSERT PORT VASCULAR ACCESS Right 2018    Procedure: INSERT PORT  VASCULAR ACCESS;  central venous Chest Port Placement;  Surgeon: Gaurav Yates PA-C;  Location: [facility] OR     LASIK         ALLERGIES     Allergies   Allergen Reactions     Inapsine [Droperidol] Other (See Comments)     Elevated blood pressure and increased heart rate       FAMILY HISTORY:  Family History   Problem Relation Age of Onset     Hyperlipidemia Mother      Hypertension Mother      GASTROINTESTINAL DISEASE Mother      ischemic colitis     Coronary Artery Disease Mother      stents x 3     Anesthesia Reaction Mother      hypotension, PONV     Hyperlipidemia Father      Hypertension Father      Breast Cancer Maternal Grandmother      Coronary Artery Disease Maternal Grandfather      Myocardial Infarction Maternal Grandfather      Colon Cancer Paternal Grandmother      Breast Cancer Maternal Aunt      Breast Cancer Paternal Aunt      Coronary Artery Disease Paternal Grandfather      Cerebrovascular Disease Paternal Grandfather      Family History Negative Brother      Coronary Artery Disease Maternal Uncle        SOCIAL HISTORY:  Social History     Social History     Marital status:      Spouse name: Cody Rajan     Number of children: 2     Years of education: N/A     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   Respiratory: No cough, hemoptysia  Cardiovascular: As per HPI  GI: see HPI  : No urinary frequency, dysuria, or hematuria  Integument: Negative  Psychiatric:  "Negative  Neuro: Negative  Endocrinology: Negative   Musculoskeletal: Negative    EXAM:  /84 (BP Location: Left arm, Patient Position: Chair, Cuff Size: Adult Large)  Pulse 71  Ht 1.651 m (5' 5\")  Wt 83.9 kg (185 lb)  SpO2 97%  BMI 30.79 kg/m2  In general, the patient is a pleasant female in no apparent distress.      HEENT: NC/AT.  PERRLA.  EOMI.  Sclerae white, not injected.    Neck: Carotids 2+ bilaterally without bruits.  No jugular venous distension.   Lymph: No cervical adenopathy. No thyromegaly.   Heart: RRR. Normal S1, S2. No murmur, rub, click, or gallop. There is no heave.    Lungs: Clear bilaterally.  No rhonchi, wheezes, rales.   GI: Soft, nontender, nondistended.   Extremities: No edema.  The pulses are 2+at the radial and DP bilaterally.  Neuro: grossly non focal.   Skin: no rashes.  Musculoskeletal: normal muscle strength, no acute arthritis, gait normal.      Labs:  LIPID RESULTS from [facility]        No results found for: CHOL, HDL, LDL, TRIG, CHOLHDLRATIO, NHDL    LIVER ENZYME RESULTS:  Lab Results   Component Value Date    AST 16 11/15/2018    ALT 23 11/15/2018       CBC RESULTS:  Lab Results   Component Value Date    WBC 3.3 (L) 11/15/2018    RBC 3.79 (L) 11/15/2018    HGB 12.1 11/15/2018    HCT 36.1 11/15/2018    MCV 95 11/15/2018    MCH 31.9 11/15/2018    MCHC 33.5 11/15/2018    RDW 12.2 11/15/2018     11/15/2018       BMP RESULTS:  Lab Results   Component Value Date     11/15/2018    POTASSIUM 3.7 11/15/2018    CHLORIDE 108 11/15/2018    CO2 26 11/15/2018    ANIONGAP 7 11/15/2018    GLC 98 11/15/2018    BUN 17 11/15/2018    CR 0.79 11/15/2018    GFRESTIMATED 77 11/15/2018    GFRESTBLACK >90 11/15/2018    GEETA 8.2 (L) 11/15/2018        A1C RESULTS:  No results found for: A1C    INR RESULTS:  Lab Results   Component Value Date    INR 0.93 02/19/2018       Procedures:  ECG Normal sinus rhythm with some nonspecific TW changes          Echo 4/19/18  Global and regional " left ventricular function is normal with an EF of 60-65%.  Global right ventricular function is normal.  Pulmonary artery systolic pressure is normal.  The inferior vena cava is normal.  No pericardial effusion is present.  Previous study not available for comparison.      Assessment and Plan:     Ms. Rajan is a 53 year old with     1. Locally advanced rectal cancer for which she is s/p neoadjuvant chemotherapy with Oxaliplatin, Leucovorin, Fluorouracil and radiation therapy and now in remission.   2. Familial hyperlipidemia,   3. Family history of premature coronary artery disease on her maternal side with her maternal uncle and aunt with heart disease in their 50s, mother had CAD in her 60s.      On exam today she is without any evidence of hypervolemia.  Her blood pressure and heart rate are normal.   Her EKG is notable for sinus rhythm and nonspecific ST-T wave changes. Echocardiogram in April 2018 notable for normal biventricular function.       In terms of primary prevention for coronary disease in light of her elevated lipids and the premature CAD in the family, her liver lesions are stable, LFTs are normal so it is a good time to initiate statin therapy, Lipitor 40 mg/d. She would also be a good candidate for aspirin therapy for primary prevention. In the meantime, she agrees to watch her diet and continue with any feasible daily exercise.    F/u in 12 months.   3 mo for lipid panel and AST/ALT     Komal Clark MD, MS  Staff Cardiologist   Pager: [phone]      CC  Patient Care Team:  Kyra Mortensen MD as PCP - General (Hematology & Oncology)  Kyra Mortensen MD as MD (Hematology & Oncology)  Yoselin Garner, JANE as Nurse Coordinator (Oncology)  SELF, REFERRED

## 2018-11-26 NOTE — MR AVS SNAPSHOT
After Visit Summary   11/26/2018    Sarah Rajan    MRN: [id number]           Patient Information     Date Of Birth          1965        Visit Information        Provider Department      11/26/2018 1:00 PM Komal Clark MD [facility]        Today's Diagnoses     Familial hypercholesterolemia        Malignant neoplasm of rectum (H)          Care Instructions    Patient Instructions:  Increase Lipitor to 40mg daily.  Schedule lab appointment for 3 months from now.  Follow up in one year in clinic with Dr. Clark.      It was a pleasure to see you in the cardiology clinic today.    We are encouraging the use of [employer] to communicate with your Healthcare Provider.  If you have any questions, call  Tanner Dean LPN, at [phone].  Press Option #1 for the [facility], and then press Option #3 for nursing.      If you have an urgent need after hours (8:00 am to 4:30 pm) please call [phone] and ask for the cardiology fellow on call.            Follow-ups after your visit        Additional Services     Follow-Up with Cardiologist                 Your next 10 appointments already scheduled     Dec 10, 2018  4:45 PM CST   MR PELVIS MUSCULAR TISSUE WO & W CONTRAST with [id number]   [facility] ([facility])    [address] [address] [phone]           How do I prepare for my exam? (Food and drink instructions) **If you will be receiving sedation or general anesthesia, please see special notes below.**  How do I prepare for my exam? (Other instructions) Take your medicines as usual, unless your doctor tells you not to. You may or may not receive intravenous (IV) contrast for this exam pending the discretion of the Radiologist.  You do not need to do anything special to prepare.  **If you will be receiving sedation or general anesthesia, please see special notes below.**  What  should I wear: The MRI machine uses a strong magnet. Please wear clothes without metal (snaps, zippers). A sweatsuit works well, or we may give you a hospital gown. Please remove any body piercings and hair extensions before you arrive. You will also remove watches, jewelry, hairpins, wallets, dentures, partial dental plates and hearing aids. You may wear contact lenses, and you may be able to wear your rings. We have a safe place to keep your personal items, but it is safer to leave them at home.  How long does the exam take: Most tests take 30 to 60 minutes.  HOWEVER, IF YOUR DOCTOR PRESCRIBES ANESTHESIA please plan on spending four to five hours in the recovery room.  What should I bring:  Bring a list of your current medicines to your exam (including vitamins, minerals and over-the-counter drugs).  Do I need a :  **If you will be receiving sedation or general anesthesia, please see special notes below.**  What should I do after the exam: No Restrictions, You may resume normal activities.  What is this test: MRI (magnetic resonance imaging) uses a strong magnet and radio waves to look inside the body. An MRA (magnetic resonance angiogram) does the same thing, but it lets us look at your blood vessels. A computer turns the radio waves into pictures showing cross sections of the body, much like slices of bread. This helps us see any problems more clearly. You may receive fluid (called  contrast ) before or during your scan. The fluid helps us see the pictures better. We give the fluid through an IV (small needle in your arm).  Who should I call with questions:  Please call the Imaging Department at your exam site with any questions. Directions, parking instructions, and other information is available on our website, [email or web address].  How do I prepare if I m having sedation or anesthesia? **IMPORTANT** THE INSTRUCTIONS BELOW ARE ONLY FOR THOSE PATIENTS WHO HAVE BEEN TOLD THEY WILL RECEIVE SEDATION OR  GENERAL ANESTHESIA DURING THEIR MRI PROCEDURE:  IF YOU WILL RECEIVE SEDATION (take medicine to help you relax during your exam): You must get the medicine from your doctor before you arrive. Bring the medicine to the exam. Do not take it at home. Arrive one hour early. Bring someone who can take you home after the test. Your medicine will make you sleepy. After the exam, you may not drive, take a bus or take a taxi by yourself. No eating 8 hours before your exam. You may have clear liquids up until 4 hours before your exam. (Clear liquids include water, clear tea, black coffee and fruit juice without pulp.)  IF YOU WILL RECEIVE ANESTHESIA (be asleep for your exam): Arrive 1 1/2 hours early. Bring someone who can take you home after the test. You may not drive, take a bus or take a taxi by yourself. No eating 8 hours before your exam. You may have clear liquids up until 4 hours before your exam. (Clear liquids include water, clear tea, black coffee and fruit juice without pulp.)            Dec 10, 2018  5:30 PM CST   MR HIP LEFT W/O & W CONTRAST with [id number]   [facility] ([facility])    [address] [address] [phone]           How do I prepare for my exam? (Food and drink instructions) **If you will be receiving sedation or general anesthesia, please see special notes below.**  How do I prepare for my exam? (Other instructions) Take your medicines as usual, unless your doctor tells you not to. You may or may not receive intravenous (IV) contrast for this exam pending the discretion of the Radiologist.  You do not need to do anything special to prepare.  **If you will be receiving sedation or general anesthesia, please see special notes below.**  What should I wear: The MRI machine uses a strong magnet. Please wear clothes without metal (snaps, zippers). A sweatsuit works well, or we may give you a hospital gown. Please remove any  body piercings and hair extensions before you arrive. You will also remove watches, jewelry, hairpins, wallets, dentures, partial dental plates and hearing aids. You may wear contact lenses, and you may be able to wear your rings. We have a safe place to keep your personal items, but it is safer to leave them at home.  How long does the exam take: Most tests take 30 to 60 minutes.  HOWEVER, IF YOUR DOCTOR PRESCRIBES ANESTHESIA please plan on spending four to five hours in the recovery room.  What should I bring:  Bring a list of your current medicines to your exam (including vitamins, minerals and over-the-counter drugs).  Do I need a :  **If you will be receiving sedation or general anesthesia, please see special notes below.**  What should I do after the exam: No Restrictions, You may resume normal activities.  What is this test: MRI (magnetic resonance imaging) uses a strong magnet and radio waves to look inside the body. An MRA (magnetic resonance angiogram) does the same thing, but it lets us look at your blood vessels. A computer turns the radio waves into pictures showing cross sections of the body, much like slices of bread. This helps us see any problems more clearly. You may receive fluid (called  contrast ) before or during your scan. The fluid helps us see the pictures better. We give the fluid through an IV (small needle in your arm).  Who should I call with questions:  Please call the Imaging Department at your exam site with any questions. Directions, parking instructions, and other information is available on our website, [email or web address].  How do I prepare if I m having sedation or anesthesia? **IMPORTANT** THE INSTRUCTIONS BELOW ARE ONLY FOR THOSE PATIENTS WHO HAVE BEEN TOLD THEY WILL RECEIVE SEDATION OR GENERAL ANESTHESIA DURING THEIR MRI PROCEDURE:  IF YOU WILL RECEIVE SEDATION (take medicine to help you relax during your exam): You must get the medicine from your doctor before you  arrive. Bring the medicine to the exam. Do not take it at home. Arrive one hour early. Bring someone who can take you home after the test. Your medicine will make you sleepy. After the exam, you may not drive, take a bus or take a taxi by yourself. No eating 8 hours before your exam. You may have clear liquids up until 4 hours before your exam. (Clear liquids include water, clear tea, black coffee and fruit juice without pulp.)  IF YOU WILL RECEIVE ANESTHESIA (be asleep for your exam): Arrive 1 1/2 hours early. Bring someone who can take you home after the test. You may not drive, take a bus or take a taxi by yourself. No eating 8 hours before your exam. You may have clear liquids up until 4 hours before your exam. (Clear liquids include water, clear tea, black coffee and fruit juice without pulp.)            Dec 11, 2018 11:00 AM CST   Masonic Lab Draw with  MASONIC LAB DRAW   [facility] Masonic Lab Draw ([facility])    [address] [place] [address]  [address]  [facility] [address]   [phone]            Dec 11, 2018 11:30 AM CST   (Arrive by 11:15 AM)   Return Visit with Óscar Hampton MD   [facility] Colon and Rectal Surgery ([facility])    [address] [place] [address]  4th Floor  [facility] [address]   [phone]            Jan 11, 2019  1:30 PM CST   Level O with ROOM 7 [facility] ([facility])    [facility]  [address]  [facility] [address]   [phone]            Jan 24, 2019 12:30 PM CST   Masonic Lab Draw with [facility] MASONIC LAB DRAW   [facility] Masonic Lab Draw ([facility])    [address] [place] [address]  [address]  [facility] [address]   [phone]            Jan 24, 2019  1:00 PM CST   (Arrive by 12:45 PM)   Return Visit with Kyra Mortensen MD   [facility] ([facility])    [address] [place] [address]  [address]  [facility]  [id number] [phone]              Future tests that were ordered for you today     Open Future Orders        Priority Expected Expires Ordered    Follow-Up with Cardiologist Routine 11/26/2019 11/27/2019 11/26/2018    ALT Routine 2/24/2019 11/26/2019 11/26/2018    AST Routine 2/24/2019 11/26/2019 11/26/2018    Lipid panel reflex to direct LDL Fasting Routine 2/24/2019 11/26/2019 11/26/2018            Who to contact     If you have questions or need follow up information about today's clinic visit or your schedule please contact [facility] directly at [phone].  Normal or non-critical lab and imaging results will be communicated to you by [employer], letter or phone within 4 business days after the clinic has received the results. If you do not hear from us within 7 days, please contact the clinic through [employer] or phone. If you have a critical or abnormal lab result, we will notify you by phone as soon as possible.  Submit refill requests through [employer] or call your pharmacy and they will forward the refill request to us. Please allow 3 business days for your refill to be completed.          Additional Information About Your Visit        [employer] Information     [employer] gives you secure access to your electronic health record. If you see a primary care provider, you can also send messages to your care team and make appointments. If you have questions, please call your primary care clinic.  If you do not have a primary care provider, please call [phone] and they will assist you.        Care EveryWhere ID     This is your Care EveryWhere ID. This could be used by other organizations to access your [facility] medical records  [id number]        Your Vitals Were     Pulse Height Pulse Oximetry BMI (Body Mass Index)          71 1.651 m (5' 5\") 97% 30.79 kg/m2         Blood Pressure from Last 3 Encounters:   11/26/18 115/84   11/15/18 109/72   09/27/18 98/72    Weight from Last 3 Encounters:   11/26/18 " 83.9 kg (185 lb)   11/15/18 84.6 kg (186 lb 8 oz)   09/27/18 81.6 kg (180 lb)              We Performed the Following     EKG 12-lead, tracing only (Same Day)     Follow-Up with Cardiologist          Today's Medication Changes          These changes are accurate as of 11/26/18  6:45 PM.  If you have any questions, ask your nurse or doctor.               Start taking these medicines.        Dose/Directions    atorvastatin 40 MG tablet   Commonly known as:  LIPITOR   Used for:  Malignant neoplasm of rectum (H), Familial hypercholesterolemia   Started by:  oKmal Clark MD        Dose:  40 mg   Take 1 tablet (40 mg) by mouth daily   Quantity:  90 tablet   Refills:  3            Where to get your medicines      These medications were sent to [id number] [place] [address] [place] AT [address] [place], [place] [address]     Phone:  [phone]     atorvastatin 40 MG tablet                Primary Care Provider Office Phone # Fax #    Aazim KIM Mortensen [phone]       [address] [facility] [address]        Equal Access to Services     [facility] AH: Hadii aad ku hadasho Soomaali, waaxda luqadaha, qaybta kaalmada adeegyada, luz elena jacob. So [facility] [phone].    ATENCIÓN: Si habla español, tiene a membreno disposición servicios gratuitos de asistencia lingüística. [facility] [phone].    We comply with applicable federal civil rights laws and [place] laws. We do not discriminate on the basis of race, color, national origin, age, disability, sex, sexual orientation, or gender identity.            Thank you!     Thank you for choosing [facility]  for your care. Our goal is always to provide you with excellent care. Hearing back from our patients is one way we can continue to improve our services. Please take a few minutes to complete the written survey that you may receive in the mail after  your visit with us. Thank you!             Your Updated Medication List - Protect others around you: Learn how to safely use, store and throw away your medicines at www.disposemymeds.org.          This list is accurate as of 11/26/18  6:45 PM.  Always use your most recent med list.                   Brand Name Dispense Instructions for use Diagnosis    atorvastatin 40 MG tablet    LIPITOR    90 tablet    Take 1 tablet (40 mg) by mouth daily    Malignant neoplasm of rectum (H), Familial hypercholesterolemia       calcium carbonate 500 MG chewable tablet    TUMS    150 tablet    Take 1-2 chew tab by mouth daily        capecitabine 500 MG tablet CHEMO    XELODA    168 tablet    Take 3 tablets (1,500 mg) by mouth 2 times daily for 56 doses Take Mon-Fri. Do NOT take on Sat-Sun. Take with water within 30 min after meal    Malignant neoplasm of rectum (H)       diclofenac 75 MG EC tablet    VOLTAREN     TK 1 T PO BID        eszopiclone 1 MG Tabs tablet    LUNESTA    60 tablet    Take 1-2 tablets (1-2 mg) by mouth At Bedtime    Other insomnia       * FLUoxetine 40 MG capsule    PROzac    30 capsule    Take 1 capsule (40 mg) by mouth daily    Other depression       * FLUoxetine 20 MG capsule    PROzac    90 capsule    Take 1 capsule (20 mg) by mouth daily    Anxiety       gabapentin 300 MG capsule    NEURONTIN    90 capsule    On day 1, take 300 mg at bedtime. Day 2, take 300 mg bid. Day 3 onward, take 300 mg tid.    Neuropathic pain       lidocaine-prilocaine cream    EMLA    30 g    Apply 45 minutes prior to procedure.    Malignant neoplasm of rectum (H)       LORazepam 0.5 MG tablet    ATIVAN    60 tablet    Take 1 tablet (0.5 mg) by mouth every 4 hours as needed (Anxiety, Nausea/Vomiting or Sleep)    Malignant neoplasm of rectum (H), Anxiety       magic mouthwash suspension    ENTER INGREDIENTS IN COMMENTS    240 mL    Swish, gargle, and spit one to two teaspoonfuls every six hours as needed. May be swallowed if  esophageal involvement.    Oral pain       ondansetron 8 MG tablet    ZOFRAN    60 tablet    Take 1 tablet (8 mg) by mouth every 8 hours as needed for nausea    Chemotherapy induced nausea and vomiting       prochlorperazine 10 MG tablet    COMPAZINE    30 tablet    Take 1 tablet (10 mg) by mouth every 6 hours as needed (Nausea/Vomiting)    Malignant neoplasm of rectum (H)       RANITIDINE HCL PO      Take 150 mg by mouth daily as needed for heartburn        TYLENOL PO      Take 1,000 mg by mouth At Bedtime        * Notice:  This list has 2 medication(s) that are the same as other medications prescribed for you. Read the directions carefully, and ask your doctor or other care provider to review them with you.

## 2018-11-26 NOTE — PATIENT INSTRUCTIONS
Patient Instructions:  Increase Lipitor to 40mg daily.  Schedule lab appointment for 3 months from now.  Follow up in one year in clinic with Dr. Clark.      It was a pleasure to see you in the cardiology clinic today.    We are encouraging the use of MyChart to communicate with your Healthcare Provider.  If you have any questions, call  Tanner Dean LPN, at [phone].  Press Option #1 for the [facility], and then press Option #3 for nursing.      If you have an urgent need after hours (8:00 am to 4:30 pm) please call [phone] and ask for the cardiology fellow on call.

## 2018-11-27 LAB — INTERPRETATION ECG - MUSE: NORMAL

## 2018-12-10 ENCOUNTER — ANCILLARY PROCEDURE (OUTPATIENT)
Dept: MRI IMAGING | Facility: CLINIC | Age: 53
End: 2018-12-10
Attending: INTERNAL MEDICINE
Payer: COMMERCIAL

## 2018-12-10 DIAGNOSIS — M89.9 LESION OF LEFT FEMUR: ICD-10-CM

## 2018-12-10 DIAGNOSIS — C20 MALIGNANT NEOPLASM OF RECTUM (H): ICD-10-CM

## 2018-12-10 RX ORDER — GADOBUTROL 604.72 MG/ML
7.5 INJECTION INTRAVENOUS ONCE
Status: COMPLETED | OUTPATIENT
Start: 2018-12-10 | End: 2018-12-10

## 2018-12-10 RX ADMIN — GADOBUTROL 7.5 ML: 604.72 INJECTION INTRAVENOUS at 17:39

## 2018-12-11 ENCOUNTER — OFFICE VISIT (OUTPATIENT)
Dept: SURGERY | Facility: CLINIC | Age: 53
End: 2018-12-11
Payer: COMMERCIAL

## 2018-12-11 VITALS
DIASTOLIC BLOOD PRESSURE: 97 MMHG | HEIGHT: 65 IN | BODY MASS INDEX: 31.31 KG/M2 | OXYGEN SATURATION: 98 % | SYSTOLIC BLOOD PRESSURE: 137 MMHG | HEART RATE: 68 BPM | WEIGHT: 187.9 LBS

## 2018-12-11 DIAGNOSIS — C20 MALIGNANT NEOPLASM OF RECTUM (H): Primary | ICD-10-CM

## 2018-12-11 DIAGNOSIS — Z79.899 ENCOUNTER FOR LONG-TERM (CURRENT) USE OF MEDICATIONS: ICD-10-CM

## 2018-12-11 DIAGNOSIS — C20 RECTAL CANCER (H): ICD-10-CM

## 2018-12-11 LAB
ALBUMIN SERPL-MCNC: 3.7 G/DL (ref 3.4–5)
ALP SERPL-CCNC: 81 U/L (ref 40–150)
ALT SERPL W P-5'-P-CCNC: 28 U/L (ref 0–50)
ANION GAP SERPL CALCULATED.3IONS-SCNC: 7 MMOL/L (ref 3–14)
AST SERPL W P-5'-P-CCNC: 17 U/L (ref 0–45)
BASOPHILS # BLD AUTO: 0 10E9/L (ref 0–0.2)
BASOPHILS NFR BLD AUTO: 0.7 %
BILIRUB SERPL-MCNC: 0.5 MG/DL (ref 0.2–1.3)
BUN SERPL-MCNC: 16 MG/DL (ref 7–30)
CALCIUM SERPL-MCNC: 8.4 MG/DL (ref 8.5–10.1)
CHLORIDE SERPL-SCNC: 105 MMOL/L (ref 94–109)
CO2 SERPL-SCNC: 27 MMOL/L (ref 20–32)
CREAT SERPL-MCNC: 0.67 MG/DL (ref 0.52–1.04)
DIFFERENTIAL METHOD BLD: ABNORMAL
EOSINOPHIL # BLD AUTO: 0.2 10E9/L (ref 0–0.7)
EOSINOPHIL NFR BLD AUTO: 5.1 %
ERYTHROCYTE [DISTWIDTH] IN BLOOD BY AUTOMATED COUNT: 12.3 % (ref 10–15)
GFR SERPL CREATININE-BSD FRML MDRD: >90 ML/MIN/1.7M2
GLUCOSE SERPL-MCNC: 86 MG/DL (ref 70–99)
HCT VFR BLD AUTO: 38.4 % (ref 35–47)
HGB BLD-MCNC: 13 G/DL (ref 11.7–15.7)
IMM GRANULOCYTES # BLD: 0 10E9/L (ref 0–0.4)
IMM GRANULOCYTES NFR BLD: 0.3 %
LYMPHOCYTES # BLD AUTO: 0.5 10E9/L (ref 0.8–5.3)
LYMPHOCYTES NFR BLD AUTO: 16.7 %
MCH RBC QN AUTO: 30.6 PG (ref 26.5–33)
MCHC RBC AUTO-ENTMCNC: 33.9 G/DL (ref 31.5–36.5)
MCV RBC AUTO: 90 FL (ref 78–100)
MONOCYTES # BLD AUTO: 0.2 10E9/L (ref 0–1.3)
MONOCYTES NFR BLD AUTO: 6.8 %
NEUTROPHILS # BLD AUTO: 2.1 10E9/L (ref 1.6–8.3)
NEUTROPHILS NFR BLD AUTO: 70.4 %
NRBC # BLD AUTO: 0 10*3/UL
NRBC BLD AUTO-RTO: 0 /100
PLATELET # BLD AUTO: 155 10E9/L (ref 150–450)
POTASSIUM SERPL-SCNC: 3.7 MMOL/L (ref 3.4–5.3)
PROT SERPL-MCNC: 6.7 G/DL (ref 6.8–8.8)
RBC # BLD AUTO: 4.25 10E12/L (ref 3.8–5.2)
SODIUM SERPL-SCNC: 139 MMOL/L (ref 133–144)
WBC # BLD AUTO: 2.9 10E9/L (ref 4–11)

## 2018-12-11 PROCEDURE — 85025 COMPLETE CBC W/AUTO DIFF WBC: CPT | Performed by: INTERNAL MEDICINE

## 2018-12-11 PROCEDURE — 25000128 H RX IP 250 OP 636: Performed by: INTERNAL MEDICINE

## 2018-12-11 PROCEDURE — 80053 COMPREHEN METABOLIC PANEL: CPT | Performed by: INTERNAL MEDICINE

## 2018-12-11 PROCEDURE — 36591 DRAW BLOOD OFF VENOUS DEVICE: CPT

## 2018-12-11 RX ORDER — HEPARIN SODIUM (PORCINE) LOCK FLUSH IV SOLN 100 UNIT/ML 100 UNIT/ML
5 SOLUTION INTRAVENOUS EVERY 8 HOURS PRN
Status: DISCONTINUED | OUTPATIENT
Start: 2018-12-11 | End: 2018-12-19 | Stop reason: HOSPADM

## 2018-12-11 RX ADMIN — SODIUM CHLORIDE, PRESERVATIVE FREE 5 ML: 5 INJECTION INTRAVENOUS at 11:37

## 2018-12-11 ASSESSMENT — MIFFLIN-ST. JEOR: SCORE: 1463.19

## 2018-12-11 ASSESSMENT — PAIN SCALES - GENERAL: PAINLEVEL: NO PAIN (0)

## 2018-12-11 NOTE — NURSING NOTE
"Chief Complaint   Patient presents with     Flexible Sigmoidoscopy     According to watch and wait protocol.       Vitals:    12/11/18 1216   BP: (!) 137/97   BP Location: Left arm   Patient Position: Sitting   Cuff Size: Adult Regular   Pulse: 68   SpO2: 98%   Weight: 187 lb 14.4 oz   Height: 5' 5\"       Body mass index is 31.27 kg/m .      Mili Carlin, EMT                      "

## 2018-12-11 NOTE — DISCHARGE INSTRUCTIONS
MRI Contrast Discharge Instructions    The IV contrast you received today will pass out of your body in your  urine. This will happen in the next 24 hours. You will not feel this process.  Your urine will not change color.    Drink at least 4 extra glasses of water or juice today (unless your doctor  has restricted your fluids). This reduces the stress on your kidneys.  You may take your regular medicines.    If you are on dialysis: It is best to have dialysis today.    If you have a reaction: Most reactions happen right away. If you have  any new symptoms after leaving the hospital (such as hives or swelling),  call your hospital at the correct number below. Or call your family doctor.  If you have breathing distress or wheezing, call 911.    Special instructions: ***    I have read and understand the above information.    Signature:______________________________________ Date:___________    Staff:__________________________________________ Date:___________     Time:__________    [facility] Departments:    ___[place]: [phone]  ___[facility]: [phone]  ___[facility]: [phone] ___[facility]: [phone]  ___[facility]: [phone]  ___[facility]: [phone]  ___[phone]  ___[facility]: [phone]  ___[phone]

## 2018-12-11 NOTE — DISCHARGE INSTRUCTIONS
MRI Contrast Discharge Instructions    The IV contrast you received today will pass out of your body in your  urine. This will happen in the next 24 hours. You will not feel this process.  Your urine will not change color.    Drink at least 4 extra glasses of water or juice today (unless your doctor  has restricted your fluids). This reduces the stress on your kidneys.  You may take your regular medicines.    If you are on dialysis: It is best to have dialysis today.    If you have a reaction: Most reactions happen right away. If you have  any new symptoms after leaving the hospital (such as hives or swelling),  call your hospital at the correct number below. Or call your family doctor.  If you have breathing distress or wheezing, call 911.    Special instructions: ***    I have read and understand the above information.    Signature:______________________________________ Date:___________    Staff:__________________________________________ Date:___________     Time:__________    [place] [facility] Departments:    ___[place]: [phone]  ___[facility]: [phone]  ___[place]: [phone] ___[facility]: [phone]  ___[facility]: [phone]  ___[facility]: [phone]  ___[phone]  ___[facility]: [phone]  ___[phone]

## 2018-12-11 NOTE — NURSING NOTE
Chief Complaint   Patient presents with     Port Draw     Labs drawn via port by RN in lab. Line flushed and hep locked. VS taken.     Port de-accessed by RN.    Petra Stevens RN

## 2018-12-11 NOTE — PROGRESS NOTES
Colon and Rectal Surgery Clinic Note      RE: Sarah Satinder  : 1965  JIMMY: 2018    Sarah presents today for follow up of her rectal cancer on Watch and Wait protocol.  She is seen today with her  Cody.  They very kindly brought me I collection of skin lotion from [employer] as of present.    HPI:     She was initially seen in 2018 with a moderately differentiated, invasive adenocarcinoma with intact mismatch repair 4-5 cm from the anal verge. She was staged at that time and an MRI that was initially read as a T1-T2 lesion, then possibly a T2N1 lesion. CT A/P was significant for small liver lesions too small to characterize but not concerning for malignancy and CEA was 1.1. She was discussed at tumor board the pelvic MRI was felt to be poor quality so she underwent a repeat MRI pelvis on  and her tumor was staged as T4N0 due to abutment of the levators. Abdominal MRI to evaluate the liver lesions was performed on  which was again found to have multiple sub-centimeter lesions that were difficult to characterize.     She underwent 8 cycles of FOLFOX completed on 18 and chemoradiation with XELODA on 8/15/18. 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     MRI of left hip showed a non specific T1 hypointense lesion in the proximal left femur and repeat MRI 12/10/18:    There is a T1 hypointense and subtly enhancing lesion in the   proximal left femur, stable since at least 2018.  Given this   patient's history of rectal cancer, metastatic disease cannot be excluded.    CT Chest 18 without evidence of metastatic disease     CEA 11/15/18 <0.5    3T MRI 12/10/18:   IMPRESSION:    1. The prior area of suspected residual tumor in the low rectum has   been replaced with fibrosis. However, there is new rectal wall edema   which limits the sensitivity for residual tumor detection,   corresponding tumor regression grade of mrTRG-2.  "   2. No suspicious lymph node.   3. Stable 2.0 cm enhancing lesion in the left femur adjacent to the   lesser trochanter. Please refer to same day dedicated hip MRI for   further characterization.   4. New small amount of presacral fluid, possibly treatment related   effects.    Interval History: Sarah has been doing well. She has some continued mucous with bowel movements, frequent bowel movements, and occasional diarrhea for which she uses lomotil.     Physical examination:  Examination was chaperoned by Amara Jackson NP.     Vitals: BP (!) 137/97 (BP Location: Left arm, Patient Position: Sitting, Cuff Size: Adult Regular)   Pulse 68   Ht 5' 5\"   Wt 187 lb 14.4 oz   SpO2 98%   BMI 31.27 kg/m    BMI= Body mass index is 31.27 kg/m .    Flexible sigmoidoscopy was performed. Two fleets enemas were given. Informed consent was obtained and time out was performed per protocol. Digital rectal exam with no palpable lesions. Scope was inserted to approximately 25 cm. Some mildly edematous tissue but no inflammation and no visible lesions. Retroflexion was performed and scar was visible in the posterior position at about 4 cm from the anal verge. No evidence of recurrent cancer. She tolerated this well.      Laboratory data:    Recent Labs   Lab Test 11/15/18  1333  02/19/18  0830   WBC 3.3*   < >  --    HGB 12.1   < >  --       < >  --    CR 0.79   < >  --    ALBUMIN 3.6   < >  --    BILITOTAL 0.3   < >  --    ALKPHOS 74   < >  --    ALT 23   < >  --    AST 16   < >  --    INR  --   --  0.93    < > = values in this interval not displayed.       Assessment/plan:  No evidence of recurrence on flexible sigmoidoscopy today. Well visualized scar. MRI shows some rectal wall edema, however, which is abnormal but non specific.  I will plan to review the case at the next tumor board.  I discussed with Sarah and Cody that the findings were abnormal and unexpected but were not typical of recurrent cancer. "  They would like to continue watching and waiting and I think this is a reasonable strategy given the findings.  We will plan a shorter interval MRI to be certain everything looks stable so we will  reevaluate this with repeat MRI in 2 months at the time of her repeat flexible sigmoidoscopy. Recommended trying a fiber supplement daily. Patient's questions were answered to her stated satisfaction and she is in agreement with this plan.    Follow up per Watch and Wait Protocol:   Completed CRT: 8/15/2018    Flexible sigmoidoscopy   ? Every 2 months for 6 months: Until 2019-Due 2019  ? Every 3 months for 3 years: Until   ? Every 6 months for 5 years: Until   ? Every year for 10 years: Until        3T MRI of pelvis  ? 4 months after CRT: 2018-Complete  ? Every 6 months for 2 years: Until -Plan to repeat in 2 months at the time of next flex (2019)       CT CAP  ? Every year for 5 years: Until -Due 2019       CEA at every clinic and endoscopic visit    Total face to face time was 15 minutes, outside the procedure time, which was an additional 10 minutes, >50% counseling.    For details of past medical history, surgical history, family history, medications, allergies, and review of systems, please see details below.    Medical history:  Past Medical History:   Diagnosis Date     Depression      GERD (gastroesophageal reflux disease)      History of smoking      Insomnia      Obesity      Rectal cancer (H)      Restless leg syndrome      Seasonal affective disorder (H)        Surgical history:  Past Surgical History:   Procedure Laterality Date      SECTION      x2     COLONOSCOPY       INSERT PORT VASCULAR ACCESS Right 2018    Procedure: INSERT PORT VASCULAR ACCESS;  central venous Chest Port Placement;  Surgeon: Gaurav Yates PA-C;  Location:  OR     [facility]         Family history:  Family History   Problem Relation Age of Onset     Hyperlipidemia Mother      Hypertension  Mother      Gastrointestinal Disease Mother         ischemic colitis     Coronary Artery Disease Mother         stents x 3     Anesthesia Reaction Mother         hypotension, PONV     Hyperlipidemia Father      Hypertension Father      Breast Cancer Maternal Grandmother      Coronary Artery Disease Maternal Grandfather      Myocardial Infarction Maternal Grandfather      Colon Cancer Paternal Grandmother      Breast Cancer Maternal Aunt      Breast Cancer Paternal Aunt      Coronary Artery Disease Paternal Grandfather      Cerebrovascular Disease Paternal Grandfather      Family History Negative Brother      Coronary Artery Disease Maternal Uncle        Medications:  Current Outpatient Medications   Medication Sig Dispense Refill     Acetaminophen (TYLENOL PO) Take 1,000 mg by mouth At Bedtime       atorvastatin (LIPITOR) 40 MG tablet Take 1 tablet (40 mg) by mouth daily 90 tablet 3     calcium carbonate (TUMS) 500 MG chewable tablet Take 1-2 chew tab by mouth daily  150 tablet      diclofenac (VOLTAREN) 75 MG EC tablet TK 1 T PO BID  2     eszopiclone (LUNESTA) 1 MG TABS tablet Take 1-2 tablets (1-2 mg) by mouth At Bedtime 60 tablet 1     FLUoxetine (PROZAC) 20 MG capsule Take 1 capsule (20 mg) by mouth daily 90 capsule 3     LORazepam (ATIVAN) 0.5 MG tablet Take 1 tablet (0.5 mg) by mouth every 4 hours as needed (Anxiety, Nausea/Vomiting or Sleep) 60 tablet 5     RANITIDINE HCL PO Take 150 mg by mouth daily as needed for heartburn       capecitabine (XELODA) 500 MG tablet CHEMO Take 3 tablets (1,500 mg) by mouth 2 times daily for 56 doses Take Mon-Fri. Do NOT take on Sat-Sun. Take with water within 30 min after meal 168 tablet 0     FLUoxetine (PROZAC) 40 MG capsule Take 1 capsule (40 mg) by mouth daily (Patient not taking: Reported on 9/25/2018) 30 capsule 3     gabapentin (NEURONTIN) 300 MG capsule On day 1, take 300 mg at bedtime. Day 2, take 300 mg bid. Day 3 onward, take 300 mg tid. (Patient not taking:  "Reported on 8/15/2018) 90 capsule 1     lidocaine-prilocaine (EMLA) cream Apply 45 minutes prior to procedure. (Patient not taking: Reported on 2018) 30 g 3     magic mouthwash (ENTER INGREDIENTS IN COMMENTS) suspension Swish, gargle, and spit one to two teaspoonfuls every six hours as needed. May be swallowed if esophageal involvement. (Patient not taking: Reported on 8/15/2018) 240 mL 1     ondansetron (ZOFRAN) 8 MG tablet Take 1 tablet (8 mg) by mouth every 8 hours as needed for nausea (Patient not taking: Reported on 2018) 60 tablet 3     prochlorperazine (COMPAZINE) 10 MG tablet Take 1 tablet (10 mg) by mouth every 6 hours as needed (Nausea/Vomiting) (Patient not taking: Reported on 8/15/2018) 30 tablet 2       Allergies:  The patientis allergic to inapsine [droperidol].    Social history:  Social History     Tobacco Use     Smoking status: Former Smoker     Packs/day: 0.10     Years: 8.00     Pack years: 0.80     Types: Cigarettes     Last attempt to quit: 1986     Years since quittin.8     Smokeless tobacco: Never Used   Substance Use Topics     Alcohol use: Yes     Alcohol/week: 4.2 oz     Types: 7 Glasses of wine per week     Marital status: .    Review of Systems:  Nursing Notes:   Mili Carlin CMA  2018 12:17 PM  Signed  Chief Complaint   Patient presents with     Flexible Sigmoidoscopy     According to watch and wait protocol.       Vitals:    18 1216   BP: (!) 137/97   BP Location: Left arm   Patient Position: Sitting   Cuff Size: Adult Regular   Pulse: 68   SpO2: 98%   Weight: 187 lb 14.4 oz   Height: 5' 5\"       Body mass index is 31.27 kg/m .      Mili Carlin, EMT                             Óscar Hampton MD   Professor and Chief  Division of Colon and Rectal Surgery  [facility]      Referring Provider:  Referred Self, MD  No address on file     Primary Care Provider:  Kyra Mortensen"

## 2018-12-11 NOTE — LETTER
2018       RE: Sarah Rajan  [address] [facility] [id number]     Dear Colleague,    Thank you for referring your patient, Sarah Rajan, to the [facility] COLON AND RECTAL SURGERY at [facility]. Please see a copy of my visit note below.    Colon and Rectal Surgery Clinic Note      RE: Sarah Rajan  : 1965  JIMMY: 2018    Sarah presents today for follow up of her rectal cancer on Watch and Wait protocol.  She is seen today with her  Cody.  They very kindly brought me I collection of skin lotion from [employer] as of present.    HPI:     She was initially seen in 2018 with a moderately differentiated, invasive adenocarcinoma with intact mismatch repair 4-5 cm from the anal verge. She was staged at that time and an MRI that was initially read as a T1-T2 lesion, then possibly a T2N1 lesion. CT A/P was significant for small liver lesions too small to characterize but not concerning for malignancy and CEA was 1.1. She was discussed at tumor board the pelvic MRI was felt to be poor quality so she underwent a repeat MRI pelvis on  and her tumor was staged as T4N0 due to abutment of the levators. Abdominal MRI to evaluate the liver lesions was performed on  which was again found to have multiple sub-centimeter lesions that were difficult to characterize.     She underwent 8 cycles of FOLFOX completed on 18 and chemoradiation with XELODA on 8/15/18. 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     MRI of left hip showed a non specific T1 hypointense lesion in the proximal left femur and repeat MRI 12/10/18:    There is a T1 hypointense and subtly enhancing lesion in the   proximal left femur, stable since at least 2018.  Given this   patient's history of rectal cancer, metastatic disease cannot be excluded.    CT Chest 18 without evidence of metastatic disease     CEA 11/15/18  "<0.5    3T MRI 12/10/18:   IMPRESSION:    1. The prior area of suspected residual tumor in the low rectum has   been replaced with fibrosis. However, there is new rectal wall edema   which limits the sensitivity for residual tumor detection,   corresponding tumor regression grade of mrTRG-2.    2. No suspicious lymph node.   3. Stable 2.0 cm enhancing lesion in the left femur adjacent to the   lesser trochanter. Please refer to same day dedicated hip MRI for   further characterization.   4. New small amount of presacral fluid, possibly treatment related   effects.    Interval History: Sarah has been doing well. She has some continued mucous with bowel movements, frequent bowel movements, and occasional diarrhea for which she uses lomotil.     Physical examination:  Examination was chaperoned by Amara Jackson NP.     Vitals: BP (!) 137/97 (BP Location: Left arm, Patient Position: Sitting, Cuff Size: Adult Regular)   Pulse 68   Ht 5' 5\"   Wt 187 lb 14.4 oz   SpO2 98%   BMI 31.27 kg/m     BMI= Body mass index is 31.27 kg/m .    Flexible sigmoidoscopy was performed. Two fleets enemas were given. Informed consent was obtained and time out was performed per protocol. Digital rectal exam with no palpable lesions. Scope was inserted to approximately 25 cm. Some mildly edematous tissue but no inflammation and no visible lesions. Retroflexion was performed and scar was visible in the posterior position at about 4 cm from the anal verge. No evidence of recurrent cancer. She tolerated this well.      Laboratory data:    Recent Labs   Lab Test 11/15/18  1333  02/19/18  0830   WBC 3.3*   < >  --    HGB 12.1   < >  --       < >  --    CR 0.79   < >  --    ALBUMIN 3.6   < >  --    BILITOTAL 0.3   < >  --    ALKPHOS 74   < >  --    ALT 23   < >  --    AST 16   < >  --    INR  --   --  0.93    < > = values in this interval not displayed.       Assessment/plan:  No evidence of recurrence on flexible " sigmoidoscopy today. Well visualized scar. MRI shows some rectal wall edema, however, which is abnormal but non specific.  I will plan to review the case at the next tumor board.  I discussed with Sarah and Cody that the findings were abnormal and unexpected but were not typical of recurrent cancer.  They would like to continue watching and waiting and I think this is a reasonable strategy given the findings.  We will plan a shorter interval MRI to be certain everything looks stable so we will  reevaluate this with repeat MRI in 2 months at the time of her repeat flexible sigmoidoscopy. Recommended trying a fiber supplement daily. Patient's questions were answered to her stated satisfaction and she is in agreement with this plan.    Follow up per Watch and Wait Protocol:   Completed CRT: 8/15/2018    Flexible sigmoidoscopy   ? Every 2 months for 6 months: Until 2019 -Due 2019  ? Every 3 months for 3 years: Until   ? Every 6 months for 5 years: Until   ? Every year for 10 years: Until        3T MRI of pelvis  ? 4 months after CRT: 2018-Complete  ? Every 6 months for 2 years: Until  -Plan to repeat in 2 months at the time of next flex (2019)       CT CAP  ? Every year for 5 years: Until -Due 2019       CEA at every clinic and endoscopic visit    Total face to face time was 15 minutes, outside the procedure time, which was an additional 10 minutes, >50% counseling.    For details of past medical history, surgical history, family history, medications, allergies, and review of systems, please see details below.    Medical history:  Past Medical History:   Diagnosis Date     Depression      GERD (gastroesophageal reflux disease)      History of smoking      Insomnia      Obesity      Rectal cancer (H)      Restless leg syndrome      Seasonal affective disorder (H)        Surgical history:  Past Surgical History:   Procedure Laterality Date      SECTION      x2     COLONOSCOPY        INSERT PORT VASCULAR ACCESS Right 2/19/2018    Procedure: INSERT PORT VASCULAR ACCESS;  central venous Chest Port Placement;  Surgeon: Gaurav Yates PA-C;  Location:  OR     [facility]         Family history:  Family History   Problem Relation Age of Onset     Hyperlipidemia Mother      Hypertension Mother      Gastrointestinal Disease Mother         ischemic colitis     Coronary Artery Disease Mother         stents x 3     Anesthesia Reaction Mother         hypotension, PONV     Hyperlipidemia Father      Hypertension Father      Breast Cancer Maternal Grandmother      Coronary Artery Disease Maternal Grandfather      Myocardial Infarction Maternal Grandfather      Colon Cancer Paternal Grandmother      Breast Cancer Maternal Aunt      Breast Cancer Paternal Aunt      Coronary Artery Disease Paternal Grandfather      Cerebrovascular Disease Paternal Grandfather      Family History Negative Brother      Coronary Artery Disease Maternal Uncle        Medications:  Current Outpatient Medications   Medication Sig Dispense Refill     Acetaminophen (TYLENOL PO) Take 1,000 mg by mouth At Bedtime       atorvastatin (LIPITOR) 40 MG tablet Take 1 tablet (40 mg) by mouth daily 90 tablet 3     calcium carbonate (TUMS) 500 MG chewable tablet Take 1-2 chew tab by mouth daily  150 tablet      diclofenac (VOLTAREN) 75 MG EC tablet TK 1 T PO BID  2     eszopiclone (LUNESTA) 1 MG TABS tablet Take 1-2 tablets (1-2 mg) by mouth At Bedtime 60 tablet 1     FLUoxetine (PROZAC) 20 MG capsule Take 1 capsule (20 mg) by mouth daily 90 capsule 3     LORazepam (ATIVAN) 0.5 MG tablet Take 1 tablet (0.5 mg) by mouth every 4 hours as needed (Anxiety, Nausea/Vomiting or Sleep) 60 tablet 5     RANITIDINE HCL PO Take 150 mg by mouth daily as needed for heartburn       capecitabine (XELODA) 500 MG tablet CHEMO Take 3 tablets (1,500 mg) by mouth 2 times daily for 56 doses Take Mon-Fri. Do NOT take on Sat-Sun. Take with water within 30 min after meal  "168 tablet 0     FLUoxetine (PROZAC) 40 MG capsule Take 1 capsule (40 mg) by mouth daily (Patient not taking: Reported on 2018) 30 capsule 3     gabapentin (NEURONTIN) 300 MG capsule On day 1, take 300 mg at bedtime. Day 2, take 300 mg bid. Day 3 onward, take 300 mg tid. (Patient not taking: Reported on 8/15/2018) 90 capsule 1     lidocaine-prilocaine (EMLA) cream Apply 45 minutes prior to procedure. (Patient not taking: Reported on 2018) 30 g 3     magic mouthwash (ENTER INGREDIENTS IN COMMENTS) suspension Swish, gargle, and spit one to two teaspoonfuls every six hours as needed. May be swallowed if esophageal involvement. (Patient not taking: Reported on 8/15/2018) 240 mL 1     ondansetron (ZOFRAN) 8 MG tablet Take 1 tablet (8 mg) by mouth every 8 hours as needed for nausea (Patient not taking: Reported on 2018) 60 tablet 3     prochlorperazine (COMPAZINE) 10 MG tablet Take 1 tablet (10 mg) by mouth every 6 hours as needed (Nausea/Vomiting) (Patient not taking: Reported on 8/15/2018) 30 tablet 2       Allergies:  The patientis allergic to inapsine [droperidol].    Social history:  Social History     Tobacco Use     Smoking status: Former Smoker     Packs/day: 0.10     Years: 8.00     Pack years: 0.80     Types: Cigarettes     Last attempt to quit: 1986     Years since quittin.8     Smokeless tobacco: Never Used   Substance Use Topics     Alcohol use: Yes     Alcohol/week: 4.2 oz     Types: 7 Glasses of wine per week     Marital status: .    Review of Systems:  Nursing Notes:   Mili Carlin CMA  2018 12:17 PM  Signed  Chief Complaint   Patient presents with     Flexible Sigmoidoscopy     According to watch and wait protocol.       Vitals:    18 1216   BP: (!) 137/97   BP Location: Left arm   Patient Position: Sitting   Cuff Size: Adult Regular   Pulse: 68   SpO2: 98%   Weight: 187 lb 14.4 oz   Height: 5' 5\"       Body mass index is 31.27 kg/m .      Mili" Tesfaye, EMT  Óscar Hampton MD   Professor and Chief  Division of Colon and Rectal Surgery  [facility]    Referring Provider:  Referred Self, MD  No address on file     Primary Care Provider:  Kyra Mortensen

## 2018-12-11 NOTE — PATIENT INSTRUCTIONS
Follow up per Watch and Wait Protocol:   Completed CRT: 8/15/2018    Flexible sigmoidoscopy   ? Every 2 months for 6 months: Until 2/2019-Due 2/2019  ? Every 3 months for 3 years: Until 2021  ? Every 6 months for 5 years: Until 2023  ? Every year for 10 years: Until 2028       3T MRI of pelvis  ? 4 months after CRT: 12/2018-Complete  ? Every 6 months for 2 years: Until 2020-Plan to repeat in 2 months at the time of next flex (2/2019)       CT CAP  ? Every year for 5 years: Until 2023-Due 9/2019       CEA at every clinic and endoscopic visit

## 2019-01-09 ENCOUNTER — INFUSION THERAPY VISIT (OUTPATIENT)
Dept: INFUSION THERAPY | Facility: CLINIC | Age: 54
End: 2019-01-09
Attending: INTERNAL MEDICINE
Payer: COMMERCIAL

## 2019-01-09 DIAGNOSIS — C20 MALIGNANT NEOPLASM OF RECTUM (H): Primary | ICD-10-CM

## 2019-01-09 PROCEDURE — 25000128 H RX IP 250 OP 636: Performed by: INTERNAL MEDICINE

## 2019-01-09 PROCEDURE — 96523 IRRIG DRUG DELIVERY DEVICE: CPT

## 2019-01-09 RX ORDER — HEPARIN SODIUM (PORCINE) LOCK FLUSH IV SOLN 100 UNIT/ML 100 UNIT/ML
5 SOLUTION INTRAVENOUS
Status: DISCONTINUED | OUTPATIENT
Start: 2019-01-09 | End: 2019-01-09 | Stop reason: HOSPADM

## 2019-01-09 RX ORDER — HEPARIN SODIUM (PORCINE) LOCK FLUSH IV SOLN 100 UNIT/ML 100 UNIT/ML
SOLUTION INTRAVENOUS
Status: DISCONTINUED
Start: 2019-01-09 | End: 2019-01-09 | Stop reason: HOSPADM

## 2019-01-09 RX ADMIN — Medication 5 ML: at 15:16

## 2019-01-09 NOTE — PROGRESS NOTES
Infusion Nursing Note:  Sarah Rajan presents today for a port flush.   Patient seen by provider today: No   present during visit today: Not Applicable.    Note: port flushed per [place] protocol. Pt. To return in 1 month for a port flush.    Intravenous Access:  No Intravenous access/labs at this visit.    Treatment Conditions:  Not Applicable.      Post Infusion Assessment:  Blood return noted pre and post infusion.  Site patent and intact, free from redness, edema or discomfort.  No evidence of extravasations.  Access discontinued per protocol.    Discharge Plan:   Patient and/or family verbalized understanding of discharge instructions and all questions answered.  Patient discharged in stable condition accompanied by: friend.  Departure Mode: Ambulatory.    Mila Freed RN

## 2019-01-24 ENCOUNTER — ONCOLOGY VISIT (OUTPATIENT)
Dept: ONCOLOGY | Facility: CLINIC | Age: 54
End: 2019-01-24
Attending: INTERNAL MEDICINE
Payer: COMMERCIAL

## 2019-01-24 ENCOUNTER — APPOINTMENT (OUTPATIENT)
Dept: LAB | Facility: CLINIC | Age: 54
End: 2019-01-24
Attending: INTERNAL MEDICINE
Payer: COMMERCIAL

## 2019-01-24 VITALS
DIASTOLIC BLOOD PRESSURE: 74 MMHG | BODY MASS INDEX: 32.38 KG/M2 | WEIGHT: 194.6 LBS | RESPIRATION RATE: 16 BRPM | OXYGEN SATURATION: 98 % | SYSTOLIC BLOOD PRESSURE: 106 MMHG | HEART RATE: 72 BPM | TEMPERATURE: 98.4 F

## 2019-01-24 DIAGNOSIS — C20 RECTAL CANCER (H): ICD-10-CM

## 2019-01-24 DIAGNOSIS — Z79.899 ENCOUNTER FOR LONG-TERM (CURRENT) USE OF MEDICATIONS: ICD-10-CM

## 2019-01-24 DIAGNOSIS — M25.561 ACUTE PAIN OF RIGHT KNEE: ICD-10-CM

## 2019-01-24 DIAGNOSIS — M21.852 DEFORMITY OF FEMUR, LEFT: Primary | ICD-10-CM

## 2019-01-24 LAB
ALBUMIN SERPL-MCNC: 3.7 G/DL (ref 3.4–5)
ALP SERPL-CCNC: 87 U/L (ref 40–150)
ALT SERPL W P-5'-P-CCNC: 23 U/L (ref 0–50)
ANION GAP SERPL CALCULATED.3IONS-SCNC: 6 MMOL/L (ref 3–14)
AST SERPL W P-5'-P-CCNC: 13 U/L (ref 0–45)
BASOPHILS # BLD AUTO: 0 10E9/L (ref 0–0.2)
BASOPHILS NFR BLD AUTO: 0.6 %
BILIRUB SERPL-MCNC: 0.3 MG/DL (ref 0.2–1.3)
BUN SERPL-MCNC: 16 MG/DL (ref 7–30)
CALCIUM SERPL-MCNC: 8.2 MG/DL (ref 8.5–10.1)
CHLORIDE SERPL-SCNC: 107 MMOL/L (ref 94–109)
CO2 SERPL-SCNC: 27 MMOL/L (ref 20–32)
CREAT SERPL-MCNC: 0.63 MG/DL (ref 0.52–1.04)
DIFFERENTIAL METHOD BLD: ABNORMAL
EOSINOPHIL # BLD AUTO: 0.1 10E9/L (ref 0–0.7)
EOSINOPHIL NFR BLD AUTO: 3.2 %
ERYTHROCYTE [DISTWIDTH] IN BLOOD BY AUTOMATED COUNT: 12.3 % (ref 10–15)
GFR SERPL CREATININE-BSD FRML MDRD: >90 ML/MIN/{1.73_M2}
GLUCOSE SERPL-MCNC: 76 MG/DL (ref 70–99)
HCT VFR BLD AUTO: 36.5 % (ref 35–47)
HGB BLD-MCNC: 12.7 G/DL (ref 11.7–15.7)
IMM GRANULOCYTES # BLD: 0 10E9/L (ref 0–0.4)
IMM GRANULOCYTES NFR BLD: 0.3 %
LYMPHOCYTES # BLD AUTO: 0.6 10E9/L (ref 0.8–5.3)
LYMPHOCYTES NFR BLD AUTO: 18.3 %
MCH RBC QN AUTO: 31.8 PG (ref 26.5–33)
MCHC RBC AUTO-ENTMCNC: 34.8 G/DL (ref 31.5–36.5)
MCV RBC AUTO: 91 FL (ref 78–100)
MONOCYTES # BLD AUTO: 0.3 10E9/L (ref 0–1.3)
MONOCYTES NFR BLD AUTO: 8.9 %
NEUTROPHILS # BLD AUTO: 2.4 10E9/L (ref 1.6–8.3)
NEUTROPHILS NFR BLD AUTO: 68.7 %
NRBC # BLD AUTO: 0 10*3/UL
NRBC BLD AUTO-RTO: 0 /100
PLATELET # BLD AUTO: 166 10E9/L (ref 150–450)
POTASSIUM SERPL-SCNC: 3.7 MMOL/L (ref 3.4–5.3)
PROT SERPL-MCNC: 6.8 G/DL (ref 6.8–8.8)
RBC # BLD AUTO: 4 10E12/L (ref 3.8–5.2)
SODIUM SERPL-SCNC: 140 MMOL/L (ref 133–144)
WBC # BLD AUTO: 3.5 10E9/L (ref 4–11)

## 2019-01-24 PROCEDURE — 85025 COMPLETE CBC W/AUTO DIFF WBC: CPT | Performed by: INTERNAL MEDICINE

## 2019-01-24 PROCEDURE — 36591 DRAW BLOOD OFF VENOUS DEVICE: CPT

## 2019-01-24 PROCEDURE — 80053 COMPREHEN METABOLIC PANEL: CPT | Performed by: INTERNAL MEDICINE

## 2019-01-24 PROCEDURE — G0463 HOSPITAL OUTPT CLINIC VISIT: HCPCS | Mod: ZF

## 2019-01-24 PROCEDURE — 99214 OFFICE O/P EST MOD 30 MIN: CPT | Mod: ZP | Performed by: INTERNAL MEDICINE

## 2019-01-24 RX ORDER — HEPARIN SODIUM (PORCINE) LOCK FLUSH IV SOLN 100 UNIT/ML 100 UNIT/ML
5 SOLUTION INTRAVENOUS DAILY PRN
Status: DISCONTINUED | OUTPATIENT
Start: 2019-01-24 | End: 2019-01-24 | Stop reason: HOSPADM

## 2019-01-24 ASSESSMENT — PAIN SCALES - GENERAL: PAINLEVEL: NO PAIN (0)

## 2019-01-24 NOTE — PROGRESS NOTES
Oncology outpatient note    Date of service: 1/24/2019      PC: Rectal cancer. [id number] - MSI Intact    HPI:  Please see previous note for details. I have copied and updated from prior note.  She is a 53-year-old female noticed BRBPR so Screening colonoscopy on 1/9/2018 revealed 3cm rectal mass and other polyps which were removed.  Pathology indicated moderately differentiated adenocarcinoma w/ intact MMR proteins.  CT staging scan showed no metastatic disease; some liver lesions which are thought to be benign per radiology report, T2N1M0 by pelvic MRI read at [facility]. When we initially reviewed her MRI we will not exactly sure whether that was lymph node involvement or not. We opted to repeat MRI which showed T4 N0 lesion. There was up to take her to surgery as there was possibility of personally lesion without lymph node involvement but because of the findings of repeat MRI the surgery was canceled and she is coming back to discuss neoadjuvant treatment.  We also did an MRI of the liver which showed 3 subcentimeter liver lesions which were too small to characterize and will need attention on follow-up.  She started neoadjuvant 5-FU and oxaliplatin (FOLFOX), which she started on 2/26/18. The day after she received cycle 2, she developed fevers, chills, headache, and an itchy rash on her arms and legs, for which she was evaluated in the ED. She was sent home on Benadryl. Benadryl and dexamethasone doses were increased for cycle 3.   She tolerated that cycle well    C#4 was given on 4/10/18    She completed 8 cycles of FOLFOX on 6/5/18    Repeat scans show good response to treatment without evidence of metastatic disease. There is only a small signal consistent with residual tumor seen in the primary rectal cancer lesion.    She started on concurrent chemoradiation with Xeloda with radiation beginning on 7/9/18. There was a delay in her receiving Xeloda, so she began that on 7/10/18. She completed it on  8/16/18    Repeat scans show great response to treatment with almost completely fibrotic signal.  There is an indeterminate left intertrochanteric lesion which is stable.  Liver lesion is consistent with benign hemangioma vs cyst    She was seen by Dr Hampton and the decision was made to keep her on watchful waiting.    Her MRI of the pelvis in December 2018 was stable but it showed rectal wall edema all the flexible sigmoidoscopy was negative.  Decision was made to do a short term follow-up MRI.      Left intertrochanteric lesion-we did MRI of the left hip which also showed a nonspecific T1 hypointense lesion in the proximal left femur which has not changed since 6/20/2018.  Repeat MRI in December 2018 was also stable.          Interval history  She is doing well.  Denies any nausea or vomiting.  No diarrhea constipation.  No bleeding.  No pain.  Energy is good.  She still has neuropathy and it is worse in the feet where she notices numbness from the heel all the way to the toes.  There is less numbness in the fingers.  Overall this is not hampering with her activities regularly.     she has tried acupuncture a few times but it has not significantly improved.  Over the last 1 month or so she has noticed some tenderness in the right knee when she bends and exerts pressure and not kneeling down position of the right knee.  There is no associated swelling or problems with the skin.  It usually does not bother her if she is not kneeling down.  Denies any hip pain.  No infections.  No trouble breathing.      ECOG 0    ROS:  A comprehensive ROS was otherwise neg    I reviewed other hx in EPIC as below    PMH:  Depression  Restless leg syndrome  Dyslipidemia    Current Outpatient Medications   Medication     Acetaminophen (TYLENOL PO)     atorvastatin (LIPITOR) 40 MG tablet     calcium carbonate (TUMS) 500 MG chewable tablet     capecitabine (XELODA) 500 MG tablet CHEMO     diclofenac (VOLTAREN) 75 MG EC tablet      eszopiclone (LUNESTA) 1 MG TABS tablet     FLUoxetine (PROZAC) 20 MG capsule     FLUoxetine (PROZAC) 40 MG capsule     gabapentin (NEURONTIN) 300 MG capsule     lidocaine-prilocaine (EMLA) cream     LORazepam (ATIVAN) 0.5 MG tablet     magic mouthwash (ENTER INGREDIENTS IN COMMENTS) suspension     ondansetron (ZOFRAN) 8 MG tablet     prochlorperazine (COMPAZINE) 10 MG tablet     RANITIDINE HCL PO     Current Facility-Administered Medications   Medication     heparin 100 UNIT/ML injection 5 mL         FHx  Aunt and maternal grandmother had breast cancer  Pateral grandmother  of colorectal cancer  Mother had ischemic colitis    SHx:  , two teenage children  EtOH: 1 drink daily, occasionally more on weekends  Tobacco: never smoker  She is a microbiologist     Allergies   Allergen Reactions     Inapsine [Droperidol] Other (See Comments)     Elevated blood pressure and increased heart rate       Exam:  /74 (BP Location: Right arm, Patient Position: Sitting, Cuff Size: Adult Regular)   Pulse 72   Temp 98.4  F (36.9  C) (Oral)   Resp 16   Wt 88.3 kg (194 lb 9.6 oz)   SpO2 98%   BMI 32.38 kg/m    CONSTITUTIONAL: No apparent distress  EYES: PERRLA, without pallor or jaundice  ENT/MOUTH: Ears unremarkable. No oral lesions  CVS: s1s2 normal  RESPIRATORY: Chest is clear  GI: Abdomen is benign  NEURO: Alert and oriented ×3  INTEGUMENT: no concerning skin rashes   LYMPHATIC: no palpable lymphadenopathy  MUSCULOSKELETAL: Unremarkable. No bony tenderness even in the right knee.  No tenderness in the hips.   EXTREMITIES: no pedal edema  PSYCH: Mentation, mood and affect are appropriate          Labs.  Reviewed  Results for EDGAR MARTINEZ (MRN [id number]) as of 2019 13:01   Ref. Range 2019 12:34   WBC Latest Ref Range: 4.0 - 11.0 10e9/L 3.5 (L)   Hemoglobin Latest Ref Range: 11.7 - 15.7 g/dL 12.7   Hematocrit Latest Ref Range: 35.0 - 47.0 % 36.5   Platelet Count Latest Ref Range: 150 - 450 10e9/L 166    RBC Count Latest Ref Range: 3.8 - 5.2 10e12/L 4.00   MCV Latest Ref Range: 78 - 100 fl 91   MCH Latest Ref Range: 26.5 - 33.0 pg 31.8   MCHC Latest Ref Range: 31.5 - 36.5 g/dL 34.8   RDW Latest Ref Range: 10.0 - 15.0 % 12.3   Diff Method Unknown Automated Method   % Neutrophils Latest Units: % 68.7   % Lymphocytes Latest Units: % 18.3   % Monocytes Latest Units: % 8.9   % Eosinophils Latest Units: % 3.2   % Basophils Latest Units: % 0.6   % Immature Granulocytes Latest Units: % 0.3   Nucleated RBCs Latest Ref Range: 0 /100 0   Absolute Neutrophil Latest Ref Range: 1.6 - 8.3 10e9/L 2.4   Absolute Lymphocytes Latest Ref Range: 0.8 - 5.3 10e9/L 0.6 (L)   Absolute Monocytes Latest Ref Range: 0.0 - 1.3 10e9/L 0.3      Results for EDGAR MARTINEZ (MRN [id number]) as of 1/24/2019 13:25   Ref. Range 1/24/2019 12:34   Sodium Latest Ref Range: 133 - 144 mmol/L 140   Potassium Latest Ref Range: 3.4 - 5.3 mmol/L 3.7   Chloride Latest Ref Range: 94 - 109 mmol/L 107   Carbon Dioxide Latest Ref Range: 20 - 32 mmol/L 27   Urea Nitrogen Latest Ref Range: 7 - 30 mg/dL 16   Creatinine Latest Ref Range: 0.52 - 1.04 mg/dL 0.63   GFR Estimate Latest Ref Range: >60 mL/min/1.73_m2 >90   GFR Estimate If Black Latest Ref Range: >60 mL/min/1.73_m2 >90   Calcium Latest Ref Range: 8.5 - 10.1 mg/dL 8.2 (L)   Anion Gap Latest Ref Range: 3 - 14 mmol/L 6   Albumin Latest Ref Range: 3.4 - 5.0 g/dL 3.7   Protein Total Latest Ref Range: 6.8 - 8.8 g/dL 6.8   Bilirubin Total Latest Ref Range: 0.2 - 1.3 mg/dL 0.3   Alkaline Phosphatase Latest Ref Range: 40 - 150 U/L 87   ALT Latest Ref Range: 0 - 50 U/L 23   AST Latest Ref Range: 0 - 45 U/L 13   Glucose Latest Ref Range: 70 - 99 mg/dL 76       Imaging.  EXAMINATION: MR PELVIS MUSCULAR TISSUE WO & W CONTRAST,  12/10/2018 6:19 PM      COMPARISON: MRI 9/24/2018     TECHNIQUE:  Images were acquired without and with intravenous contrast  through the pelvis. The following MR images were acquired  without  contrast: multiplanar T1, multiplanar T2, axial diffusion-weighted and  axial apparent diffusion coefficient. T1-weighted images with fat  saturation were acquired at the following intervals relative to  intravenous contrast administration: pre-contrast, immediate post  contrast, 1 minute, 3 minutes and delayed.  Contrast dose: 7.5mL  Gadavist, 1mg Glucagon     HISTORY: follow up rectal cancer; follow up rectal cancer; Malignant  neoplasm of rectum (H); Lesion of left femur     FINDINGS:     LOCATION/SIZE AND TREATMENT RESPONSE:  On prior exam dated 2018 there was a tumor identified in the  lower rectum. Previously this tumor measured 1.6 cm, and currently no  appreciable residual tumor signal is present. The prior suspicious  area of residual tumor is then replaced with T2 hypointense fibrosis,  with the fibrotic tissue extending to the levator ani in the midline  and on the left (series 6 image 29). However, compared to 2018,  there is significantly more wall edema (series 5 image 25). This  corresponds to a tumour response grade of mrTRG-2.      EXTENT:  The posttreatment fibrotic changes extending to the levator ani muscle  in the midline and on the left.      CIRCUMFERENTIAL RESECTION MARGIN (CRM):  In terms of the resection margin, the posttreatment fibrosis extends  to the levator ani musculature in the midline and on the left.     LYMPH NODES:  No suspicious lymph node.     VEINS:  There is not evidence of extramural venous extension.     MISCELLANEOUS FINDINGS:  Surgical changes of prior  section. There is a 9 mm T2  hypointense, homogeneously enhancing intramuscular fibroid in the  posterior uterine body. Small amount of presacral fluid which is new  from prior. Stable 2.0 cm T1 hypointense, homogeneously enhancing  lesion in the left femur adjacent to the lesser trochanter. Sigmoid  colon diverticulosis.                                                                       IMPRESSION:   1. The prior area of suspected residual tumor in the low rectum has  been replaced with fibrosis. However, there is new rectal wall edema  which limits the sensitivity for residual tumor detection,  corresponding tumor regression grade of mrTRG-2.   2. No suspicious lymph node.  3. Stable 2.0 cm enhancing lesion in the left femur adjacent to the  lesser trochanter. Please refer to same day dedicated hip MRI for  further characterization.  4. New small amount of presacral fluid, possibly treatment related  effects.    MR right hip without contrast 12/11/2018 7:53 AM     Techniques: Multiplanar multisequence imaging of the right  hip was  obtained without  administration of intra-articular or intravenous  contrast using routing protocol.     History: follow up for femur lesion- hx of rectal cancer; follow up  for femur lesion- hx of rectal cancer; Malignant neoplasm of rectum  (H); Lesion of left femur       Additional History from EMR: 53-year-old female with history of  locally advanced adenocarcinoma of the rectum, with lesion of the left  femur     Comparison: MR left hip 10/8/2018, CT 6/22/2018, MR pelvis 6/20/2018     Findings:     Osseous structures  Osseous structures: In the medial aspect of the proximal femoral  diaphysis, there is a 1.9 x 1.6 x 1.9 cm T1 hypointense lesion, which  demonstrates subtle enhancement on postcontrast images. The lesion is  stable in size and appearance since 6/20/2018.     No evidence of fracture of bony contusion.     Articular cartilage and labrum  Assessment limited on this non-arthrographic study due to relative  lack of joint distension.     Articular cartilage: No definite full-thickness cartilage loss.     Labrum: Increased signal in the anterior superior labrum, consistent  with tearing.     Ligament teres and transverse ligament of acetabulum: Intact.     Joint or bursal effusion     Joint effusion: A physiologic amount of joint fluid.     Bursal  effusion: Small amount of fluid in the trochanteric bursa.     Muscles and tendons  Muscles and tendons: Insertional tendinopathy of the gluteus medius  and gluteus minimus muscles at the greater trochanter. Tendinopathy of  the hamstring muscles at their origin at the ischial tuberosity. No  full-thickness tearing or tendon retraction. The rectus femoris,  iliopsoas and hip abductors are intact.     Nerves:  The visualized course of the sciatic nerve is unremarkable.     Other Findings:  None.                                                                      Impression:  1. There is a T1 hypointense, subtly enhancing lesion in the proximal  left femur intertrochanteric region, grossly stable since at least  6/20/2018.  Given this patient's history of rectal cancer, metastatic  disease cannot be excluded.  2. Increased signal in the anterior superior aspect of the labrum,  consistent with tearing  3. Insertional tendinopathy of the gluteus medius and gluteus minimus  tendons, and tendinopathy of the hamstring muscles. No full thickness  tearing or tendon retraction.    MR left hip  without contrast 10/8/2018 9:57 AM     Techniques: Multiplanar multisequence imaging of the left hip was  obtained without  administration of intra-articular or intravenous  contrast using routing protocol.     History: Femur lesion     Additional History from EMR: [id number] moderately differentiated  adenocarcinoma of the rectum - MSI-stable  She was started on neoadjuvant FOLFOX on 2/26/2018.  after 4 cycles  she had good response to treatment. Liver lesions remain  indeterminate.      Comparison: MRI 9/24/2018, 6/20/2018     Findings:     Osseous structures  Osseous structures:   Proximal left femur adjacent to the lesser trochanter there is a  lesion which is hypointense on T1-weighted imaging, similar to the  normal adjacent musculature. There is subtle associated increased T2  signal. There is some fat signal interspersed  throughout this area.  Subtle enhancement. There is no extension into the cortex or the soft  tissue. It measures 16 x 20 x 17 mm. On the CT from 1/11/2018 there  was minimal increased density in this corresponding area. Compared to  MRI 6/20/2018, it is unchanged in size.     No fracture, stress reaction, avascular necrosis is seen.     Joint and periarticular soft tissue     Internal derangement of joints are not well assessed owing to chosen  field of view.  Prostatectomy joint demonstrates osteophytosis  consistent with degenerative changes. Tearing of anterior superior  labrum. Mild physiologic fluid in the hip joints.     Muscles and tendons  Muscles: Visualized muscles are unremarkable without evidence of  muscle strain, atrophy or mass.      Tendons: The visualized tendons are intact.     Nerves:  Visualized neurovascular bundles are intact.     Other Findings:  Nonspecific soft tissue edema overlying the greater trochanter.         Impression:  Nonspecific T1 hypointense lesion in the proximal left femur is  unchanged in size since 6/20/2018. Lesion is indeterminate and  underlying metastatic disease is difficult to exclude. Consider  correlation with PET scan and follow-up imaging.     [Consider Follow Up: Indeterminant lesion. PET scan may be helpful for  further evaluation as well as follow-up imaging.]  Is    EXAMINATION: MR PELVIS MUSCULAR TISSUE WO & W CONTRAST,  9/24/2018 5:21 PM      COMPARISON: MRI 2/8/2018, 6/20/2018     TECHNIQUE:  Images were acquired without and with intravenous contrast  through the pelvis. The following MR images were acquired without  contrast: multiplanar T1, multiplanar T2, axial diffusion-weighted and  axial apparent diffusion coefficient. T1-weighted images with fat  saturation were acquired at the following intervals relative to  intravenous contrast administration: pre-contrast, immediate post  contrast, 1 minute, 3 minutes and delayed.  Contrast dose:  7.5mL  Gadavist     HISTORY: follow up rectal cancer; Malignant neoplasm of rectum (H)     FINDINGS:     LOCATION/SIZE:  On prior exam dated 2018 there was a tumor identified in the low  rectum. Previously this tumor measured 1.4 cm, and currently it  measures 1.6 cm. This is best seen on series 6 image 25.     TREATMENT RESPONSE:   Approximately 20% of the current mass demonstrates tumour signal  intensity, with the remainder appearing to represent fibrosis.  This  corresponds to a tumour response grade of mrTRG-2 .      EXTENT:  The soft tissue extends through the muscularis propria posteriorly,  best seen on series 8 image 24. The soft tissue is relatively T2  hypointense compared to the suspected viable tumor and demonstrates  gradual enhancement, most likely representing fibrosis. The soft  tissue abuts the levator ani muscles in the midline and to the left  (series 7 image 28).     CIRCUMFERENTIAL RESECTION MARGIN (CRM):  In terms of the resection margin, the T2 hypointense soft tissue  extends to the levator musculature in the midline and to the left.      LYMPH NODES: No suspicious lymph node. There are scattered tiny lymph  nodes which have decreased in size, for example a 2.5 mm presacral  lymph node (series 6, image 22) which previously measured 5 mm.     VEINS:  There is not evidence of extramural venous extension.     MISCELLANEOUS FINDINGS: In the left intertrochanteric line, there is  an unchanged 1.9 cm T2 hypointense lesion which demonstrates  relatively homogeneous enhancement (series 5 image 34 and series 19  image 49). Additional small patchy areas of enhancement throughout the  pelvis. There is a 10 mm ovoid enhancing focus in the posterior  uterine fundus myometrium (series 22 image 27). Surgical changes of  prior  section. No free pelvic fluid.         IMPRESSION:   1. There has been a positive to treatment, with a corresponding tumor  regression grade of mrTRG-2. As seen  previously, there is soft tissue  extending through the muscularis propria into the perirectal fat  posteriorly, abutting the levator musculature in the midline and on  the left. The soft tissues demonstrates MRI characteristics most  compatible with fibrosis rather than viable tumor.  2. No suspicious lymphadenopathy.  3. Stable indeterminate 1.9 cm lesion in the left intertrochanteric  line, which was mildly sclerotic on prior pelvic CT. Differential  includes benign (e.g. fibrous dysplasia) and malignant (e.g.  metastasis) etiologies. Recommend continued attention on follow-up  imaging. Alternatively, dedicated MRI may be considered.      EXAMINATION: Chest CT  without contrast 9/21/18     CLINICAL HISTORY: Follow-up rectal cancer. Rule out metastases.     COMPARISON: CT 6/20/2018 and 1/11/2018..     TECHNIQUE: CT imaging obtained through the chest without contrast.  Coronal and axial MIP reformatted images obtained.      FINDINGS:  Central airways are clear. No focal consolidation, pleural effusion,  or pneumothorax. Minimal dependent atelectasis. Punctate calcified  granulomas in the right lower lobe and left upper lobe. No suspicious  pulmonary nodules.     Right chest wall Port-A-Cath with tip at the mid SVC. Heart size is  normal without pericardial effusion. Ascending aorta and main  pulmonary trunk are normal caliber. Minimal coronary artery  calcifications. No enlarged thoracic lymph nodes. Calcified subcarinal  and right hilar lymph nodes.     Bones and soft tissues: No acute fracture or suspicious bone findings.        Partially imaged upper abdomen: Layering biliary sludge. Calcified  hepatic and splenic granulomata. Visualized upper abdomen is limited  on this noncontrast exam..         IMPRESSION:   1. No evidence of metastatic disease in the chest.  2. Sequelae of prior granulomatous disease.  3. Layering biliary sludge.    MRI ABDOMEN 9/21/18     CLINICAL HISTORY:  Follow up rectal  cancer.     TECHNIQUE:  Images were acquired with and without intravenous contrast  through the abdomen. The following MR images were acquired: TrueFISP,  multiplanar T2 weighted, axial T1 in/out of phase, axial fat-saturated  T1, diffusion-weighted. Multiplanar T1-weighted images with fat  saturation were before contrast administration and at multiple time  points following the administration of intravenous contrast. Contrast  dose: 18mL Eovist     FINDINGS:     Comparison study: MRI 6/19/2018.     Liver: Non-cirrhotic configuration off the liver parenchyma. Tiny  liver cyst in hepatic segment 6 with bright T2 signal and no  enhancement. No significant hepatic steatosis or iron deposition. No  arterially enhancing liver lesions. Subcentimeter hypoenhancing liver  lesions in hepatic segment 7 with no hepatobiliary contrast agent  uptake and suggestion of delayed filling, grossly stable from the  prior study. No convincing new or enlarging liver lesions. Patent  hepatic vasculature.     Gallbladder: Unremarkable.     Spleen: Not enlarged. No focal splenic lesions.     Kidneys: Rotation anomaly of the right kidney. No suspicious renal  lesions. No hydronephrosis.     Adrenal glands: Unremarkable.     Pancreas: Preserved increased precontrast T1 signal. No focal lesions.  Main pancreatic duct is not dilated. No pancreas divisum.     Bowel: Partially visualized loops of bowel are unremarkable.     Lymph nodes: Subcentimeter retroperitoneal and mesenteric lymph nodes,  not enlarged by size criteria.     Blood vessels: Patent abdominal vasculature. No abdominal aortic  aneurysm.     Lung bases: Mild bilateral lower lobes dependent atelectasis. Mild  right middle lobe atelectasis.     Bones and soft tissues: Mild degenerative changes of the spine with  scattered Schmorl nodes. No aggressive bone lesions.     Mesentery and abdominal wall: Unremarkable. Tiny fat-containing  umbilical hernia.     Ascites: No  ascites.         IMPRESSION: In this patient with history of rectal cancer:  1. Stable subcentimeter liver lesions in hepatic segment 7 with  contrast enhancement pattern most likely suggestive of benign  hemangiomas or liver cysts. No convincing liver metastatic foci.  Continued attention on follow-up studies would be beneficial.  2. Hepatic steatosis.    EXAMINATION: MR PELVIS W/O & W CONTRAST, 6/20/2018 10:19 AM      COMPARISON: 6/19/2018.     TECHNIQUE:  Images were acquired without and with intravenous contrast  through the pelvis. The following MR images were acquired without  contrast: multiplanar T1, multiplanar T2, axial diffusion-weighted and  axial apparent diffusion coefficient. T1-weighted images with fat  saturation were acquired at the following intervals relative to  intravenous contrast administration: pre-contrast, immediate post  contrast, 1 minute, 3 minutes and delayed.  Contrast dose: 7.5cc's  Gadavist     HISTORY: Follow-up rectal cancer ([id number]). Patient currently being  treated with neoadjuvant chemotherapy.     FINDINGS:     LOCATION/SIZE:  On prior exam dated 4/19/2018 there was a tumor identified in the  lower rectum. Previously this tumor measured 1.9 x 0.4 cm, and  currently it measures 1.4 x 0.4 cm. This is best seen on series series  5, image 24 series 6 images 28-35.     TREATMENT RESPONSE:   Approximately 10-20%% of the current mass demonstrates tumoral signal  intensity, with the remainder appearing to represent fibrosis.  This  corresponds to a tumour response grade of MR TRG-2.      EXTENT:  There is abnormal signal extending through the muscularis propria and  into the perirectal fat posteriorly and on the left, 5:00-6:00 o'clock  position with abutment of the left levator musculature. This abnormal  signal shows progressive delayed enhancement and is somewhat  hypointense relative to suspected residual viable tumor and is favored  to represent fibrosis, best seen on series 6  image 32.       LYMPH NODES:  No abnormal abdominal or pelvic lymph nodes.     VEINS:  There is no evidence of extramural venous extension. The visualized  venous structures are patent.      scar. Uterus is otherwise normal. No adnexal mass.  Postoperative changes anterior abdominal wall. Urinary bladder is  decompressed. Nonspecific heterogeneous bone marrow signal alteration  no suspicious lesions in the bones.         IMPRESSION:   1. There has been a positive response to treatment, with a  corresponding tumor regression grade of mrTRG-2. While there has been  a decrease in the size of the partially circumferential posterior low  rectal tumorthere remains a small amount of residual viable tumor  signal. Abnormal signal intensity posteriorly continues to abut the  left side levator musculature, though this shows signal  characteristics and enhancement most suggestive of fibrosis rather  than residual viable tumor  2. No evidence for metastatic disease to pelvic lymph nodes.    EXAMINATION: Chest CT  2018 10:53 AM     CLINICAL HISTORY: follow up of rectal cancer; Malignant neoplasm of  rectum (H)     COMPARISON: 2018.     TECHNIQUE: CT imaging obtained through the chest without contrast.  Coronal and axial MIP reformatted images obtained.      FINDINGS:  Right-sided Port-A-Cath with tip at the lower SVC. Heart size is  normal. No pericardial effusion.  Normal thoracic vasculature. No  thoracic lymphadenopathy. Central tracheobronchial tree is patent.      There is a tiny granuloma in the right lower lobe and the posterior  left upper lobe. No pulmonary nodules. No pleural effusions or  consolidations.     Bones and soft tissues: No suspicious bone findings.      Partially imaged upper abdomen: Limited. Splenic granulomas.         IMPRESSION:   No evidence of metastatic disease in the chest.      MRI ABDOMEN 18     CLINICAL HISTORY: follow up of liver lesions; Malignant neoplasm of  rectum  (H)     TECHNIQUE:  Images were acquired with and without intravenous contrast  through the abdomen. The following MR images were acquired: TrueFISP,  multiplanar T2 weighted, axial T1 in/out of phase, axial fat-saturated  T1, diffusion-weighted. Multiplanar T1-weighted images with fat  saturation were before contrast administration and at multiple time  points following the administration of intravenous contrast.      Contrast dose: 7.5mL Gadavist     Comparison study: None     FINDINGS:     Liver: Noncirrhotic liver configuration. Hepatic steatosis.     Small liver lesions appear similar to previous exam. Largest is seen  on series 15 image 12 measuring 9 mm in segment 7. Smaller lesion on  series 15 image 12 measures 7 mm. Tiny probable cyst is seen  inferiorly on series 15 image 24. These lesions are stable in size and  currently demonstrate more markedly T2 hyperintensity, likely due to  decreased motion on the present images. There are less well seen on  the 3 minute images on the present study suggesting potentially  delayed filling.      Gallbladder: Decompressed and unremarkable.     Spleen: Normal.     Kidneys: Normal.     Adrenal glands: Normal.     Pancreas: Normal.     Bowel: Within normal limits. Previously described area of thickening  in the transverse colon has resolved.     Lymph nodes: No abnormally enlarged lymph nodes.     Blood vessels: Unremarkable.     Lung bases: Clear.     Bones and soft tissues: Within normal limits.     Mesentery and abdominal wall: Unremarkable.     Ascites: None.            IMPRESSION:    1. Tiny liver lesions are stable from previous exams and currently  demonstrate features more suggestive of benign hemangiomas or cysts.  These are strongly favored as benign although continued follow-up on  future imaging is reasonable.  2. Hepatic steatosis.    Pathology    FINAL DIAGNOSIS:    CASE FROM [place], [place] ([id number], OBTAINED 1/9/18):   A. COLON,  TRANSVERSE AND DESCENDING, POLYPS, POLYPECTOMY:   - Serrated polyps including sessile serrated adenoma and hyperplastic   polyps, fragmented   - Negative for cytologic dysplasia     B. RECTUM, MASS, BIOPSY:   - Adenocarcinoma, moderately differentiated, invasive   - Mismatch repair proteins are intact by immunohistochemistry     Assessment and Plan  [id number] moderately differentiated adenocarcinoma of the rectum - MSI-stable  She was started on neoadjuvant FOLFOX on 2/26/2018.  after 4 cycles she had good response to treatment. Liver lesions remain indeterminate.    we continued with the same chemotherapy and she received cycle #8 on 6/20/18.  Repeat scans show good response to treatment with only a small signal consistent with viable tumor in the primary rectal cancer. No surrounding lymph nodes suspicious. There is no evidence of metastatic disease.    She started on concurrent chemoradiation with Xeloda with radiation beginning on 7/9/18. There was a delay in her receiving Xeloda, so she began on 7/10/18. She completed it on 8/16/18    Repeat scans show great response to treatment with almost completely fibrotic signal.  There is an indeterminate left intertrochanteric lesion which is stable.    She was seen by Dr Hampton and the decision was made to keep her on watchful waiting.      Last MRI in Dec 2018 was stable although rectal wall edema was seen. Flex sig was without evidence of recurrence of the disease. Will repeat MRI and flex sig next month.    I wanted to add CEA on the labs from today but it is not possible so she will need to have another lab draw.  We will see if she can get it done today otherwise we will do it on February 5 when she comes in for her MRI and follow-up with Dr. Dumont.  Asymptomatic.    Indeterminate liver lesion. This likely is benign hemangioma or cyst. It is stable. Repeat MRI next month.    Left intertrochanteric lesion- Asymptomatic. Recent MRI of the left hip on 12/1018 shows a  nonspecific T1 hypointense lesion in the proximal left femur which has not changed since 6/20/2018.  Repeat MRI in 2 months.     Right knee pain upon kneeling.  Her examination is unremarkable.  We will check a knee x-ray.    Neuropathy-neuropathy is mostly in the feet than in the fingers.  At this time we will continue to observe and hopefully with time it will get better.        Port-A-Cath.   Continue port flushes every 4-5 weeks    I will see her back in 2 months    All of her and her 's questions were answered to their satisfaction.  They are agreeable and comfortable with the plan.        Kyra Mortensen

## 2019-01-24 NOTE — NURSING NOTE
"Oncology Rooming Note    January 24, 2019 12:57 PM   Sarah Rajan is a 53 year old female who presents for:    Chief Complaint   Patient presents with     Port Draw     Labs drawn via Port by RN in lab. VS taken.      Oncology Clinic Visit     [facility] RETURN- RECTAL CA     Initial Vitals: /74 (BP Location: Right arm, Patient Position: Sitting, Cuff Size: Adult Regular)   Pulse 72   Temp 98.4  F (36.9  C) (Oral)   Resp 16   Wt 88.3 kg (194 lb 9.6 oz)   SpO2 98%   BMI 32.38 kg/m   Estimated body mass index is 32.38 kg/m  as calculated from the following:    Height as of 12/11/18: 1.651 m (5' 5\").    Weight as of this encounter: 88.3 kg (194 lb 9.6 oz). Body surface area is 2.01 meters squared.  No Pain (0) Comment: Data Unavailable   No LMP recorded. Patient is postmenopausal.  Allergies reviewed: Yes  Medications reviewed: Yes    Medications: MEDICATION REFILLS NEEDED TODAY. Provider was notified.  Pharmacy name entered into [facility]:    [facility] [id number] [place] [address] DR ALVAREZ AT [facility] [place] [phone]  [id number] - [place], [place] - [facility] [address] [address] [id number] & [facility] - [place], [place] - [address]    Clinical concerns: Refill on lidocaine cream and has 9/10 pain when kneeling on right knee, started about a month ago. Festus was notified.    10 minutes for nursing intake (face to face time)     Anish Pak LPN            "

## 2019-01-24 NOTE — NURSING NOTE
Chief Complaint   Patient presents with     Port Draw     Labs drawn via Port by RN in lab. VS taken.      Labs drawn via Port accessed using 20g gripper needle. . Line flushed and Heparin locked. Vital signs taken. Checked into next appointment.       Marianela Baker RN

## 2019-01-24 NOTE — LETTER
1/24/2019       RE: Sarah Rajan  [address] [facility] [id number]     Dear Colleague,    Thank you for referring your patient, Sarah Rajan, to the [facility]. Please see a copy of my visit note below.        Oncology outpatient note    Date of service: 1/24/2019      PC: Rectal cancer. [id number] - MSI Intact    HPI:  Please see previous note for details. I have copied and updated from prior note.  She is a 53-year-old female noticed BRBPR so Screening colonoscopy on 1/9/2018 revealed 3cm rectal mass and other polyps which were removed.  Pathology indicated moderately differentiated adenocarcinoma w/ intact MMR proteins.  CT staging scan showed no metastatic disease; some liver lesions which are thought to be benign per radiology report, T2N1M0 by pelvic MRI read at [facility]. When we initially reviewed her MRI we will not exactly sure whether that was lymph node involvement or not. We opted to repeat MRI which showed T4 N0 lesion. There was up to take her to surgery as there was possibility of personally lesion without lymph node involvement but because of the findings of repeat MRI the surgery was canceled and she is coming back to discuss neoadjuvant treatment.  We also did an MRI of the liver which showed 3 subcentimeter liver lesions which were too small to characterize and will need attention on follow-up.  She started neoadjuvant 5-FU and oxaliplatin (FOLFOX), which she started on 2/26/18. The day after she received cycle 2, she developed fevers, chills, headache, and an itchy rash on her arms and legs, for which she was evaluated in the ED. She was sent home on Benadryl. Benadryl and dexamethasone doses were increased for cycle 3.   She tolerated that cycle well    C#4 was given on 4/10/18    She completed 8 cycles of FOLFOX on 6/5/18    Repeat scans show good response to treatment without evidence of metastatic disease. There is only a small signal consistent with residual tumor  seen in the primary rectal cancer lesion.    She started on concurrent chemoradiation with Xeloda with radiation beginning on 7/9/18. There was a delay in her receiving Xeloda, so she began that on 7/10/18. She completed it on 8/16/18    Repeat scans show great response to treatment with almost completely fibrotic signal.  There is an indeterminate left intertrochanteric lesion which is stable.  Liver lesion is consistent with benign hemangioma vs cyst    She was seen by Dr Hampton and the decision was made to keep her on watchful waiting.    Her MRI of the pelvis in December 2018 was stable but it showed rectal wall edema all the flexible sigmoidoscopy was negative.  Decision was made to do a short term follow-up MRI.      Left intertrochanteric lesion-we did MRI of the left hip which also showed a nonspecific T1 hypointense lesion in the proximal left femur which has not changed since 6/20/2018.  Repeat MRI in December 2018 was also stable.          Interval history  She is doing well.  Denies any nausea or vomiting.  No diarrhea constipation.  No bleeding.  No pain.  Energy is good.  She still has neuropathy and it is worse in the feet where she notices numbness from the heel all the way to the toes.  There is less numbness in the fingers.  Overall this is not hampering with her activities regularly.     she has tried acupuncture a few times but it has not significantly improved.  Over the last 1 month or so she has noticed some tenderness in the right knee when she bends and exerts pressure and not kneeling down position of the right knee.  There is no associated swelling or problems with the skin.  It usually does not bother her if she is not kneeling down.  Denies any hip pain.  No infections.  No trouble breathing.      ECOG 0    ROS:  A comprehensive ROS was otherwise neg    I reviewed other hx in EPIC as below    PMH:  Depression  Restless leg syndrome  Dyslipidemia    Current Outpatient Medications    Medication     Acetaminophen (TYLENOL PO)     atorvastatin (LIPITOR) 40 MG tablet     calcium carbonate (TUMS) 500 MG chewable tablet     capecitabine (XELODA) 500 MG tablet CHEMO     diclofenac (VOLTAREN) 75 MG EC tablet     eszopiclone (LUNESTA) 1 MG TABS tablet     FLUoxetine (PROZAC) 20 MG capsule     FLUoxetine (PROZAC) 40 MG capsule     gabapentin (NEURONTIN) 300 MG capsule     lidocaine-prilocaine (EMLA) cream     LORazepam (ATIVAN) 0.5 MG tablet     magic mouthwash (ENTER INGREDIENTS IN COMMENTS) suspension     ondansetron (ZOFRAN) 8 MG tablet     prochlorperazine (COMPAZINE) 10 MG tablet     RANITIDINE HCL PO     Current Facility-Administered Medications   Medication     heparin 100 UNIT/ML injection 5 mL         FHx  Aunt and maternal grandmother had breast cancer  Pateral grandmother  of colorectal cancer  Mother had ischemic colitis    SHx:  , two teenage children  EtOH: 1 drink daily, occasionally more on weekends  Tobacco: never smoker  She is a microbiologist     Allergies   Allergen Reactions     Inapsine [Droperidol] Other (See Comments)     Elevated blood pressure and increased heart rate       Exam:  /74 (BP Location: Right arm, Patient Position: Sitting, Cuff Size: Adult Regular)   Pulse 72   Temp 98.4  F (36.9  C) (Oral)   Resp 16   Wt 88.3 kg (194 lb 9.6 oz)   SpO2 98%   BMI 32.38 kg/m     CONSTITUTIONAL: No apparent distress  EYES: PERRLA, without pallor or jaundice  ENT/MOUTH: Ears unremarkable. No oral lesions  CVS: s1s2 normal  RESPIRATORY: Chest is clear  GI: Abdomen is benign  NEURO: Alert and oriented ×3  INTEGUMENT: no concerning skin rashes   LYMPHATIC: no palpable lymphadenopathy  MUSCULOSKELETAL: Unremarkable. No bony tenderness even in the right knee.  No tenderness in the hips.   EXTREMITIES: no pedal edema  PSYCH: Mentation, mood and affect are appropriate          Labs.  Reviewed  Results for EDGAR MARTINEZ (MRN [id number]) as of 2019 13:01   Ref.  Range 1/24/2019 12:34   WBC Latest Ref Range: 4.0 - 11.0 10e9/L 3.5 (L)   Hemoglobin Latest Ref Range: 11.7 - 15.7 g/dL 12.7   Hematocrit Latest Ref Range: 35.0 - 47.0 % 36.5   Platelet Count Latest Ref Range: 150 - 450 10e9/L 166   RBC Count Latest Ref Range: 3.8 - 5.2 10e12/L 4.00   MCV Latest Ref Range: 78 - 100 fl 91   MCH Latest Ref Range: 26.5 - 33.0 pg 31.8   MCHC Latest Ref Range: 31.5 - 36.5 g/dL 34.8   RDW Latest Ref Range: 10.0 - 15.0 % 12.3   Diff Method Unknown Automated Method   % Neutrophils Latest Units: % 68.7   % Lymphocytes Latest Units: % 18.3   % Monocytes Latest Units: % 8.9   % Eosinophils Latest Units: % 3.2   % Basophils Latest Units: % 0.6   % Immature Granulocytes Latest Units: % 0.3   Nucleated RBCs Latest Ref Range: 0 /100 0   Absolute Neutrophil Latest Ref Range: 1.6 - 8.3 10e9/L 2.4   Absolute Lymphocytes Latest Ref Range: 0.8 - 5.3 10e9/L 0.6 (L)   Absolute Monocytes Latest Ref Range: 0.0 - 1.3 10e9/L 0.3      Results for EDGAR MARTINEZ (MRN [id number]) as of 1/24/2019 13:25   Ref. Range 1/24/2019 12:34   Sodium Latest Ref Range: 133 - 144 mmol/L 140   Potassium Latest Ref Range: 3.4 - 5.3 mmol/L 3.7   Chloride Latest Ref Range: 94 - 109 mmol/L 107   Carbon Dioxide Latest Ref Range: 20 - 32 mmol/L 27   Urea Nitrogen Latest Ref Range: 7 - 30 mg/dL 16   Creatinine Latest Ref Range: 0.52 - 1.04 mg/dL 0.63   GFR Estimate Latest Ref Range: >60 mL/min/1.73_m2 >90   GFR Estimate If Black Latest Ref Range: >60 mL/min/1.73_m2 >90   Calcium Latest Ref Range: 8.5 - 10.1 mg/dL 8.2 (L)   Anion Gap Latest Ref Range: 3 - 14 mmol/L 6   Albumin Latest Ref Range: 3.4 - 5.0 g/dL 3.7   Protein Total Latest Ref Range: 6.8 - 8.8 g/dL 6.8   Bilirubin Total Latest Ref Range: 0.2 - 1.3 mg/dL 0.3   Alkaline Phosphatase Latest Ref Range: 40 - 150 U/L 87   ALT Latest Ref Range: 0 - 50 U/L 23   AST Latest Ref Range: 0 - 45 U/L 13   Glucose Latest Ref Range: 70 - 99 mg/dL 76       Imaging.  EXAMINATION: MR PELVIS  MUSCULAR TISSUE WO & W CONTRAST,  12/10/2018 6:19 PM      COMPARISON: MRI 2018     TECHNIQUE:  Images were acquired without and with intravenous contrast  through the pelvis. The following MR images were acquired without  contrast: multiplanar T1, multiplanar T2, axial diffusion-weighted and  axial apparent diffusion coefficient. T1-weighted images with fat  saturation were acquired at the following intervals relative to  intravenous contrast administration: pre-contrast, immediate post  contrast, 1 minute, 3 minutes and delayed.  Contrast dose: 7.5mL  Gadavist, 1mg Glucagon     HISTORY: follow up rectal cancer; follow up rectal cancer; Malignant  neoplasm of rectum (H); Lesion of left femur     FINDINGS:     LOCATION/SIZE AND TREATMENT RESPONSE:  On prior exam dated 2018 there was a tumor identified in the  lower rectum. Previously this tumor measured 1.6 cm, and currently no  appreciable residual tumor signal is present. The prior suspicious  area of residual tumor is then replaced with T2 hypointense fibrosis,  with the fibrotic tissue extending to the levator ani in the midline  and on the left (series 6 image 29). However, compared to 2018,  there is significantly more wall edema (series 5 image 25). This  corresponds to a tumour response grade of mrTRG-2.      EXTENT:  The posttreatment fibrotic changes extending to the levator ani muscle  in the midline and on the left.      CIRCUMFERENTIAL RESECTION MARGIN (CRM):  In terms of the resection margin, the posttreatment fibrosis extends  to the levator ani musculature in the midline and on the left.     LYMPH NODES:  No suspicious lymph node.     VEINS:  There is not evidence of extramural venous extension.     MISCELLANEOUS FINDINGS:  Surgical changes of prior  section. There is a 9 mm T2  hypointense, homogeneously enhancing intramuscular fibroid in the  posterior uterine body. Small amount of presacral fluid which is new  from prior.  Stable 2.0 cm T1 hypointense, homogeneously enhancing  lesion in the left femur adjacent to the lesser trochanter. Sigmoid  colon diverticulosis.                                                                      IMPRESSION:   1. The prior area of suspected residual tumor in the low rectum has  been replaced with fibrosis. However, there is new rectal wall edema  which limits the sensitivity for residual tumor detection,  corresponding tumor regression grade of mrTRG-2.   2. No suspicious lymph node.  3. Stable 2.0 cm enhancing lesion in the left femur adjacent to the  lesser trochanter. Please refer to same day dedicated hip MRI for  further characterization.  4. New small amount of presacral fluid, possibly treatment related  effects.    MR right hip without contrast 12/11/2018 7:53 AM     Techniques: Multiplanar multisequence imaging of the right  hip was  obtained without  administration of intra-articular or intravenous  contrast using routing protocol.     History: follow up for femur lesion- hx of rectal cancer; follow up  for femur lesion- hx of rectal cancer; Malignant neoplasm of rectum  (H); Lesion of left femur       Additional History from EMR: 53-year-old female with history of  locally advanced adenocarcinoma of the rectum, with lesion of the left  femur     Comparison: MR left hip 10/8/2018, CT 6/22/2018, MR pelvis 6/20/2018     Findings:     Osseous structures  Osseous structures: In the medial aspect of the proximal femoral  diaphysis, there is a 1.9 x 1.6 x 1.9 cm T1 hypointense lesion, which  demonstrates subtle enhancement on postcontrast images. The lesion is  stable in size and appearance since 6/20/2018.     No evidence of fracture of bony contusion.     Articular cartilage and labrum  Assessment limited on this non-arthrographic study due to relative  lack of joint distension.     Articular cartilage: No definite full-thickness cartilage loss.     Labrum: Increased signal in the anterior  superior labrum, consistent  with tearing.     Ligament teres and transverse ligament of acetabulum: Intact.     Joint or bursal effusion     Joint effusion: A physiologic amount of joint fluid.     Bursal effusion: Small amount of fluid in the trochanteric bursa.     Muscles and tendons  Muscles and tendons: Insertional tendinopathy of the gluteus medius  and gluteus minimus muscles at the greater trochanter. Tendinopathy of  the hamstring muscles at their origin at the ischial tuberosity. No  full-thickness tearing or tendon retraction. The rectus femoris,  iliopsoas and hip abductors are intact.     Nerves:  The visualized course of the sciatic nerve is unremarkable.     Other Findings:  None.                                                                      Impression:  1. There is a T1 hypointense, subtly enhancing lesion in the proximal  left femur intertrochanteric region, grossly stable since at least  6/20/2018.  Given this patient's history of rectal cancer, metastatic  disease cannot be excluded.  2. Increased signal in the anterior superior aspect of the labrum,  consistent with tearing  3. Insertional tendinopathy of the gluteus medius and gluteus minimus  tendons, and tendinopathy of the hamstring muscles. No full thickness  tearing or tendon retraction.    MR left hip  without contrast 10/8/2018 9:57 AM     Techniques: Multiplanar multisequence imaging of the left hip was  obtained without  administration of intra-articular or intravenous  contrast using routing protocol.     History: Femur lesion     Additional History from EMR: [id number] moderately differentiated  adenocarcinoma of the rectum - MSI-stable  She was started on neoadjuvant FOLFOX on 2/26/2018.  after 4 cycles  she had good response to treatment. Liver lesions remain  indeterminate.      Comparison: MRI 9/24/2018, 6/20/2018     Findings:     Osseous structures  Osseous structures:   Proximal left femur adjacent to the lesser trochanter  there is a  lesion which is hypointense on T1-weighted imaging, similar to the  normal adjacent musculature. There is subtle associated increased T2  signal. There is some fat signal interspersed throughout this area.  Subtle enhancement. There is no extension into the cortex or the soft  tissue. It measures 16 x 20 x 17 mm. On the CT from 1/11/2018 there  was minimal increased density in this corresponding area. Compared to  MRI 6/20/2018, it is unchanged in size.     No fracture, stress reaction, avascular necrosis is seen.     Joint and periarticular soft tissue     Internal derangement of joints are not well assessed owing to chosen  field of view.  Prostatectomy joint demonstrates osteophytosis  consistent with degenerative changes. Tearing of anterior superior  labrum. Mild physiologic fluid in the hip joints.     Muscles and tendons  Muscles: Visualized muscles are unremarkable without evidence of  muscle strain, atrophy or mass.      Tendons: The visualized tendons are intact.     Nerves:  Visualized neurovascular bundles are intact.     Other Findings:  Nonspecific soft tissue edema overlying the greater trochanter.         Impression:  Nonspecific T1 hypointense lesion in the proximal left femur is  unchanged in size since 6/20/2018. Lesion is indeterminate and  underlying metastatic disease is difficult to exclude. Consider  correlation with PET scan and follow-up imaging.     [Consider Follow Up: Indeterminant lesion. PET scan may be helpful for  further evaluation as well as follow-up imaging.]  Is    EXAMINATION: MR PELVIS MUSCULAR TISSUE WO & W CONTRAST,  9/24/2018 5:21 PM      COMPARISON: MRI 2/8/2018, 6/20/2018     TECHNIQUE:  Images were acquired without and with intravenous contrast  through the pelvis. The following MR images were acquired without  contrast: multiplanar T1, multiplanar T2, axial diffusion-weighted and  axial apparent diffusion coefficient. T1-weighted images with fat  saturation  were acquired at the following intervals relative to  intravenous contrast administration: pre-contrast, immediate post  contrast, 1 minute, 3 minutes and delayed.  Contrast dose: 7.5mL  Gadavist     HISTORY: follow up rectal cancer; Malignant neoplasm of rectum (H)     FINDINGS:     LOCATION/SIZE:  On prior exam dated 6/20/2018 there was a tumor identified in the low  rectum. Previously this tumor measured 1.4 cm, and currently it  measures 1.6 cm. This is best seen on series 6 image 25.     TREATMENT RESPONSE:   Approximately 20% of the current mass demonstrates tumour signal  intensity, with the remainder appearing to represent fibrosis.  This  corresponds to a tumour response grade of mrTRG-2 .      EXTENT:  The soft tissue extends through the muscularis propria posteriorly,  best seen on series 8 image 24. The soft tissue is relatively T2  hypointense compared to the suspected viable tumor and demonstrates  gradual enhancement, most likely representing fibrosis. The soft  tissue abuts the levator ani muscles in the midline and to the left  (series 7 image 28).     CIRCUMFERENTIAL RESECTION MARGIN (CRM):  In terms of the resection margin, the T2 hypointense soft tissue  extends to the levator musculature in the midline and to the left.      LYMPH NODES: No suspicious lymph node. There are scattered tiny lymph  nodes which have decreased in size, for example a 2.5 mm presacral  lymph node (series 6, image 22) which previously measured 5 mm.     VEINS:  There is not evidence of extramural venous extension.     MISCELLANEOUS FINDINGS: In the left intertrochanteric line, there is  an unchanged 1.9 cm T2 hypointense lesion which demonstrates  relatively homogeneous enhancement (series 5 image 34 and series 19  image 49). Additional small patchy areas of enhancement throughout the  pelvis. There is a 10 mm ovoid enhancing focus in the posterior  uterine fundus myometrium (series 22 image 27). Surgical changes  of  prior  section. No free pelvic fluid.         IMPRESSION:   1. There has been a positive to treatment, with a corresponding tumor  regression grade of mrTRG-2. As seen previously, there is soft tissue  extending through the muscularis propria into the perirectal fat  posteriorly, abutting the levator musculature in the midline and on  the left. The soft tissues demonstrates MRI characteristics most  compatible with fibrosis rather than viable tumor.  2. No suspicious lymphadenopathy.  3. Stable indeterminate 1.9 cm lesion in the left intertrochanteric  line, which was mildly sclerotic on prior pelvic CT. Differential  includes benign (e.g. fibrous dysplasia) and malignant (e.g.  metastasis) etiologies. Recommend continued attention on follow-up  imaging. Alternatively, dedicated MRI may be considered.      EXAMINATION: Chest CT  without contrast 18     CLINICAL HISTORY: Follow-up rectal cancer. Rule out metastases.     COMPARISON: CT 2018 and 2018..     TECHNIQUE: CT imaging obtained through the chest without contrast.  Coronal and axial MIP reformatted images obtained.      FINDINGS:  Central airways are clear. No focal consolidation, pleural effusion,  or pneumothorax. Minimal dependent atelectasis. Punctate calcified  granulomas in the right lower lobe and left upper lobe. No suspicious  pulmonary nodules.     Right chest wall Port-A-Cath with tip at the mid SVC. Heart size is  normal without pericardial effusion. Ascending aorta and main  pulmonary trunk are normal caliber. Minimal coronary artery  calcifications. No enlarged thoracic lymph nodes. Calcified subcarinal  and right hilar lymph nodes.     Bones and soft tissues: No acute fracture or suspicious bone findings.        Partially imaged upper abdomen: Layering biliary sludge. Calcified  hepatic and splenic granulomata. Visualized upper abdomen is limited  on this noncontrast exam..         IMPRESSION:   1. No evidence of  metastatic disease in the chest.  2. Sequelae of prior granulomatous disease.  3. Layering biliary sludge.    MRI ABDOMEN 9/21/18     CLINICAL HISTORY:  Follow up rectal cancer.     TECHNIQUE:  Images were acquired with and without intravenous contrast  through the abdomen. The following MR images were acquired: TrueFISP,  multiplanar T2 weighted, axial T1 in/out of phase, axial fat-saturated  T1, diffusion-weighted. Multiplanar T1-weighted images with fat  saturation were before contrast administration and at multiple time  points following the administration of intravenous contrast. Contrast  dose: 18mL Eovist     FINDINGS:     Comparison study: MRI 6/19/2018.     Liver: Non-cirrhotic configuration off the liver parenchyma. Tiny  liver cyst in hepatic segment 6 with bright T2 signal and no  enhancement. No significant hepatic steatosis or iron deposition. No  arterially enhancing liver lesions. Subcentimeter hypoenhancing liver  lesions in hepatic segment 7 with no hepatobiliary contrast agent  uptake and suggestion of delayed filling, grossly stable from the  prior study. No convincing new or enlarging liver lesions. Patent  hepatic vasculature.     Gallbladder: Unremarkable.     Spleen: Not enlarged. No focal splenic lesions.     Kidneys: Rotation anomaly of the right kidney. No suspicious renal  lesions. No hydronephrosis.     Adrenal glands: Unremarkable.     Pancreas: Preserved increased precontrast T1 signal. No focal lesions.  Main pancreatic duct is not dilated. No pancreas divisum.     Bowel: Partially visualized loops of bowel are unremarkable.     Lymph nodes: Subcentimeter retroperitoneal and mesenteric lymph nodes,  not enlarged by size criteria.     Blood vessels: Patent abdominal vasculature. No abdominal aortic  aneurysm.     Lung bases: Mild bilateral lower lobes dependent atelectasis. Mild  right middle lobe atelectasis.     Bones and soft tissues: Mild degenerative changes of the spine  with  scattered Schmorl nodes. No aggressive bone lesions.     Mesentery and abdominal wall: Unremarkable. Tiny fat-containing  umbilical hernia.     Ascites: No ascites.         IMPRESSION: In this patient with history of rectal cancer:  1. Stable subcentimeter liver lesions in hepatic segment 7 with  contrast enhancement pattern most likely suggestive of benign  hemangiomas or liver cysts. No convincing liver metastatic foci.  Continued attention on follow-up studies would be beneficial.  2. Hepatic steatosis.    EXAMINATION: MR PELVIS W/O & W CONTRAST, 6/20/2018 10:19 AM      COMPARISON: 6/19/2018.     TECHNIQUE:  Images were acquired without and with intravenous contrast  through the pelvis. The following MR images were acquired without  contrast: multiplanar T1, multiplanar T2, axial diffusion-weighted and  axial apparent diffusion coefficient. T1-weighted images with fat  saturation were acquired at the following intervals relative to  intravenous contrast administration: pre-contrast, immediate post  contrast, 1 minute, 3 minutes and delayed.  Contrast dose: 7.5cc's  Gadavist     HISTORY: Follow-up rectal cancer ([id number]). Patient currently being  treated with neoadjuvant chemotherapy.     FINDINGS:     LOCATION/SIZE:  On prior exam dated 4/19/2018 there was a tumor identified in the  lower rectum. Previously this tumor measured 1.9 x 0.4 cm, and  currently it measures 1.4 x 0.4 cm. This is best seen on series series  5, image 24 series 6 images 28-35.     TREATMENT RESPONSE:   Approximately 10-20%% of the current mass demonstrates tumoral signal  intensity, with the remainder appearing to represent fibrosis.  This  corresponds to a tumour response grade of MR TRG-2.      EXTENT:  There is abnormal signal extending through the muscularis propria and  into the perirectal fat posteriorly and on the left, 5:00-6:00 o'clock  position with abutment of the left levator musculature. This abnormal  signal shows  progressive delayed enhancement and is somewhat  hypointense relative to suspected residual viable tumor and is favored  to represent fibrosis, best seen on series 6 image 32.       LYMPH NODES:  No abnormal abdominal or pelvic lymph nodes.     VEINS:  There is no evidence of extramural venous extension. The visualized  venous structures are patent.      scar. Uterus is otherwise normal. No adnexal mass.  Postoperative changes anterior abdominal wall. Urinary bladder is  decompressed. Nonspecific heterogeneous bone marrow signal alteration  no suspicious lesions in the bones.         IMPRESSION:   1. There has been a positive response to treatment, with a  corresponding tumor regression grade of mrTRG-2. While there has been  a decrease in the size of the partially circumferential posterior low  rectal tumorthere remains a small amount of residual viable tumor  signal. Abnormal signal intensity posteriorly continues to abut the  left side levator musculature, though this shows signal  characteristics and enhancement most suggestive of fibrosis rather  than residual viable tumor  2. No evidence for metastatic disease to pelvic lymph nodes.    EXAMINATION: Chest CT  2018 10:53 AM     CLINICAL HISTORY: follow up of rectal cancer; Malignant neoplasm of  rectum (H)     COMPARISON: 2018.     TECHNIQUE: CT imaging obtained through the chest without contrast.  Coronal and axial MIP reformatted images obtained.      FINDINGS:  Right-sided Port-A-Cath with tip at the lower SVC. Heart size is  normal. No pericardial effusion.  Normal thoracic vasculature. No  thoracic lymphadenopathy. Central tracheobronchial tree is patent.      There is a tiny granuloma in the right lower lobe and the posterior  left upper lobe. No pulmonary nodules. No pleural effusions or  consolidations.     Bones and soft tissues: No suspicious bone findings.      Partially imaged upper abdomen: Limited. Splenic  granulomas.         IMPRESSION:   No evidence of metastatic disease in the chest.      MRI ABDOMEN 6/19/18     CLINICAL HISTORY: follow up of liver lesions; Malignant neoplasm of  rectum (H)     TECHNIQUE:  Images were acquired with and without intravenous contrast  through the abdomen. The following MR images were acquired: TrueFISP,  multiplanar T2 weighted, axial T1 in/out of phase, axial fat-saturated  T1, diffusion-weighted. Multiplanar T1-weighted images with fat  saturation were before contrast administration and at multiple time  points following the administration of intravenous contrast.      Contrast dose: 7.5mL Gadavist     Comparison study: None     FINDINGS:     Liver: Noncirrhotic liver configuration. Hepatic steatosis.     Small liver lesions appear similar to previous exam. Largest is seen  on series 15 image 12 measuring 9 mm in segment 7. Smaller lesion on  series 15 image 12 measures 7 mm. Tiny probable cyst is seen  inferiorly on series 15 image 24. These lesions are stable in size and  currently demonstrate more markedly T2 hyperintensity, likely due to  decreased motion on the present images. There are less well seen on  the 3 minute images on the present study suggesting potentially  delayed filling.      Gallbladder: Decompressed and unremarkable.     Spleen: Normal.     Kidneys: Normal.     Adrenal glands: Normal.     Pancreas: Normal.     Bowel: Within normal limits. Previously described area of thickening  in the transverse colon has resolved.     Lymph nodes: No abnormally enlarged lymph nodes.     Blood vessels: Unremarkable.     Lung bases: Clear.     Bones and soft tissues: Within normal limits.     Mesentery and abdominal wall: Unremarkable.     Ascites: None.            IMPRESSION:    1. Tiny liver lesions are stable from previous exams and currently  demonstrate features more suggestive of benign hemangiomas or cysts.  These are strongly favored as benign although continued  follow-up on  future imaging is reasonable.  2. Hepatic steatosis.    Pathology    FINAL DIAGNOSIS:    CASE FROM [place], [place] ([id number], OBTAINED 1/9/18):   A. COLON, TRANSVERSE AND DESCENDING, POLYPS, POLYPECTOMY:   - Serrated polyps including sessile serrated adenoma and hyperplastic   polyps, fragmented   - Negative for cytologic dysplasia     B. RECTUM, MASS, BIOPSY:   - Adenocarcinoma, moderately differentiated, invasive   - Mismatch repair proteins are intact by immunohistochemistry     Assessment and Plan  [id number] moderately differentiated adenocarcinoma of the rectum - MSI-stable  She was started on neoadjuvant FOLFOX on 2/26/2018.  after 4 cycles she had good response to treatment. Liver lesions remain indeterminate.    we continued with the same chemotherapy and she received cycle #8 on 6/20/18.  Repeat scans show good response to treatment with only a small signal consistent with viable tumor in the primary rectal cancer. No surrounding lymph nodes suspicious. There is no evidence of metastatic disease.    She started on concurrent chemoradiation with Xeloda with radiation beginning on 7/9/18. There was a delay in her receiving Xeloda, so she began on 7/10/18. She completed it on 8/16/18    Repeat scans show great response to treatment with almost completely fibrotic signal.  There is an indeterminate left intertrochanteric lesion which is stable.    She was seen by Dr Hampton and the decision was made to keep her on watchful waiting.      Last MRI in Dec 2018 was stable although rectal wall edema was seen. Flex sig was without evidence of recurrence of the disease. Will repeat MRI and flex sig next month.    I wanted to add CEA on the labs from today but it is not possible so she will need to have another lab draw.  We will see if she can get it done today otherwise we will do it on February 5 when she comes in for her MRI and follow-up with Dr. Dumont.  Asymptomatic.    Indeterminate  liver lesion. This likely is benign hemangioma or cyst. It is stable. Repeat MRI next month.    Left intertrochanteric lesion- Asymptomatic. Recent MRI of the left hip on 12/1018 shows a nonspecific T1 hypointense lesion in the proximal left femur which has not changed since 6/20/2018.  Repeat MRI in 2 months.     Right knee pain upon kneeling.  Her examination is unremarkable.  We will check a knee x-ray.    Neuropathy-neuropathy is mostly in the feet than in the fingers.  At this time we will continue to observe and hopefully with time it will get better.        Port-A-Cath.   Continue port flushes every 4-5 weeks    I will see her back in 2 months    All of her and her 's questions were answered to their satisfaction.  They are agreeable and comfortable with the plan.        Kyra Mortensen

## 2019-01-24 NOTE — PATIENT INSTRUCTIONS
Please schedule right knee Xray    Schedule MRI Left hip around mid March    Follow with Dr Hampton    See me after the MRI of left hip in March

## 2019-02-04 NOTE — PROGRESS NOTES
Colon and Rectal Surgery Clinic Note      RE: Sarah Satinder  : 1965  JIMMY: 2019    Sarah presents today for follow up of her rectal cancer on Watch and Wait protocol.  She is seen today with her  Cody.      HPI:     She was initially seen in 2018 with a moderately differentiated, invasive adenocarcinoma with intact mismatch repair 4-5 cm from the anal verge. She was staged at that time and an MRI that was initially read as a T1-T2 lesion, then possibly a T2N1 lesion. CT A/P was significant for small liver lesions too small to characterize but not concerning for malignancy and CEA was 1.1. She was discussed at tumor board the pelvic MRI was felt to be poor quality so she underwent a repeat MRI pelvis on  and her tumor was staged as T4N0 due to abutment of the levators. Abdominal MRI to evaluate the liver lesions was performed on  which was again found to have multiple sub-centimeter lesions that were difficult to characterize.     She underwent 8 cycles of FOLFOX completed on 18 and chemoradiation with XELODA on 8/15/18. 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     MRI of left hip showed a non specific T1 hypointense lesion in the proximal left femur and repeat MRI 12/10/18:    There is a T1 hypointense and subtly enhancing lesion in the   proximal left femur, stable since at least 2018.  Given this   patient's history of rectal cancer, metastatic disease cannot be excluded.    CT Chest 18 without evidence of metastatic disease     CEA 11/15/18 <0.5    3T MRI 12/10/18:   IMPRESSION:    1. The prior area of suspected residual tumor in the low rectum has   been replaced with fibrosis. However, there is new rectal wall edema   which limits the sensitivity for residual tumor detection,   corresponding tumor regression grade of mrTRG-2.    2. No suspicious lymph node.   3. Stable 2.0 cm enhancing lesion in the left femur  "adjacent to the   lesser trochanter. Please refer to same day dedicated hip MRI for   further characterization.   4. New small amount of presacral fluid, possibly treatment related   effects.    3T MRI 2/5/2019    1. No appreciable residual tumor in the mid to low rectum,   corresponding to a tumor regression grade of mrTRG-1.     2. No suspicious pelvic lymphadenopathy.   3. Unchanged nonspecific left femoral intratrochanteric lesion,   favored benign given stability. Recommend continued attention on   follow-up.     Due for liver MRI to reevaluate liver lesions thought to be likely hemangioma or cyst    I last saw Sarah on 12/11/2018 with no evidence of recurrence on flexible sigmoidoscopy.    Last colonoscopy 1/9/2018     She last saw Dr. Mortensen with oncology on 1/24/2019    Interval History: Sarah has been doing well. She had some mild bleeding and a tearing sensation when she did her enema today but otherwise denies any anorectal complaints.    Physical examination:  Examination was chaperoned by Amara Jackson NP.     Vitals: /76 (BP Location: Left arm, Patient Position: Sitting, Cuff Size: Adult Regular)   Pulse 79   Ht 5' 5\"   Wt 192 lb 9.6 oz   SpO2 97%   BMI 32.05 kg/m    BMI= Body mass index is 32.05 kg/m .    Flexible sigmoidoscopy was performed. Two fleets enemas were given. Informed consent was obtained and time out was performed per protocol. Small cut in the right posterior anal opening. Digital rectal exam with no palpable lesions and with small faintly palpable linear scar in the left posterior position approximately 4 cm from the anal verge. Scope was inserted to approximately 25 cm. Retroflexion was performed and very faint scar was visible in the posterior position at about 3 cm from the anal verge. No evidence of recurrent cancer. She tolerated this well.      Laboratory data:    Recent Labs   Lab Test 11/15/18  1333  02/19/18  0830   WBC 3.3*   < >  --    HGB 12.1   < " >  --       < >  --    CR 0.79   < >  --    ALBUMIN 3.6   < >  --    BILITOTAL 0.3   < >  --    ALKPHOS 74   < >  --    ALT 23   < >  --    AST 16   < >  --    INR  --   --  0.93    < > = values in this interval not displayed.       Assessment/plan:  No evidence of recurrence on flexible sigmoidoscopy today. Very faint scar isualized. MRI with some continued edema but this has improved. Would like a repeat MRI in 3 months. She is due for a colonoscopy so will have her do this in 3 months rather than a clinic flexible sigmoidoscopy. Patient's questions were answered to her stated satisfaction and she is in agreement with this plan.    Follow up per Watch and Wait Protocol:   Completed CRT: 8/15/2018    Flexible sigmoidoscopy     Every 2 months for 6 months: Until 2019-COMPLETED TODAY    Every 3 months for 3 years: Until -2019 but will complete colonoscopy instead    Every 6 months for 5 years: Until     Every year for 10 years: Until       3T MRI of pelvis    6-8 weeks after CRT: COMPLETED    Every 4 months for 2 years: Until 2021-DUE 2019    Every 6 months for 2 years: Until 2023    Yearly for 2 years: Until       CT CAP    Every year for 6-8 years: Until 2019      Colonoscopy     Last 2018      CEA at every clinic or endoscopic visit    Total face to face time was 10 minutes, outside the procedure time, which was an additional 10 minutes, >50% counseling.    For details of past medical history, surgical history, family history, medications, allergies, and review of systems, please see details below.    Medical history:  Past Medical History:   Diagnosis Date     Depression      GERD (gastroesophageal reflux disease)      History of smoking      Insomnia      Obesity      Rectal cancer (H)      Restless leg syndrome      Seasonal affective disorder (H)        Surgical history:  Past Surgical History:   Procedure Laterality Date      SECTION      x2      COLONOSCOPY       INSERT PORT VASCULAR ACCESS Right 2/19/2018    Procedure: INSERT PORT VASCULAR ACCESS;  central venous Chest Port Placement;  Surgeon: Gaurav Yates PA-C;  Location:  OR     [facility]         Family history:  Family History   Problem Relation Age of Onset     Hyperlipidemia Mother      Hypertension Mother      Gastrointestinal Disease Mother         ischemic colitis     Coronary Artery Disease Mother         stents x 3     Anesthesia Reaction Mother         hypotension, PONV     Hyperlipidemia Father      Hypertension Father      Breast Cancer Maternal Grandmother      Coronary Artery Disease Maternal Grandfather      Myocardial Infarction Maternal Grandfather      Colon Cancer Paternal Grandmother      Breast Cancer Maternal Aunt      Breast Cancer Paternal Aunt      Coronary Artery Disease Paternal Grandfather      Cerebrovascular Disease Paternal Grandfather      Family History Negative Brother      Coronary Artery Disease Maternal Uncle        Medications:  Current Outpatient Medications   Medication Sig Dispense Refill     Acetaminophen (TYLENOL PO) Take 1,000 mg by mouth At Bedtime       atorvastatin (LIPITOR) 40 MG tablet Take 1 tablet (40 mg) by mouth daily 90 tablet 3     calcium carbonate (TUMS) 500 MG chewable tablet Take 1-2 chew tab by mouth daily  150 tablet      capecitabine (XELODA) 500 MG tablet CHEMO Take 3 tablets (1,500 mg) by mouth 2 times daily for 56 doses Take Mon-Fri. Do NOT take on Sat-Sun. Take with water within 30 min after meal 168 tablet 0     diclofenac (VOLTAREN) 75 MG EC tablet TK 1 T PO BID  2     eszopiclone (LUNESTA) 1 MG TABS tablet Take 1-2 tablets (1-2 mg) by mouth At Bedtime 60 tablet 1     FLUoxetine (PROZAC) 20 MG capsule Take 1 capsule (20 mg) by mouth daily 90 capsule 3     FLUoxetine (PROZAC) 40 MG capsule Take 1 capsule (40 mg) by mouth daily (Patient not taking: Reported on 9/25/2018) 30 capsule 3     gabapentin (NEURONTIN) 300 MG capsule On day 1,  "take 300 mg at bedtime. Day 2, take 300 mg bid. Day 3 onward, take 300 mg tid. (Patient not taking: Reported on 8/15/2018) 90 capsule 1     lidocaine-prilocaine (EMLA) cream Apply 45 minutes prior to procedure. 30 g 3     LORazepam (ATIVAN) 0.5 MG tablet Take 1 tablet (0.5 mg) by mouth every 4 hours as needed (Anxiety, Nausea/Vomiting or Sleep) (Patient not taking: Reported on 2019) 60 tablet 5     magic mouthwash (ENTER INGREDIENTS IN COMMENTS) suspension Swish, gargle, and spit one to two teaspoonfuls every six hours as needed. May be swallowed if esophageal involvement. (Patient not taking: Reported on 8/15/2018) 240 mL 1     ondansetron (ZOFRAN) 8 MG tablet Take 1 tablet (8 mg) by mouth every 8 hours as needed for nausea (Patient not taking: Reported on 2018) 60 tablet 3     prochlorperazine (COMPAZINE) 10 MG tablet Take 1 tablet (10 mg) by mouth every 6 hours as needed (Nausea/Vomiting) (Patient not taking: Reported on 8/15/2018) 30 tablet 2     RANITIDINE HCL PO Take 150 mg by mouth daily as needed for heartburn         Allergies:  The patientis allergic to inapsine [droperidol].    Social history:  Social History     Tobacco Use     Smoking status: Former Smoker     Packs/day: 0.10     Years: 8.00     Pack years: 0.80     Types: Cigarettes     Last attempt to quit: 1986     Years since quittin.9     Smokeless tobacco: Never Used   Substance Use Topics     Alcohol use: Yes     Alcohol/week: 4.2 oz     Types: 7 Glasses of wine per week     Marital status: .    Review of Systems:  Nursing Notes:   Mili Carlin, EMT  2019  9:51 AM  Signed  Chief Complaint   Patient presents with     Flexible Sigmoidoscopy     Per watch and wait protocol.       Vitals:    19 0948   BP: 131/76   BP Location: Left arm   Patient Position: Sitting   Cuff Size: Adult Regular   Pulse: 79   SpO2: 97%   Weight: 192 lb 9.6 oz   Height: 5' 5\"       Body mass index is 32.05 kg/m .      Mili " Tesfaye, EMT                             Óscar Hampton MD   Professor and Chief  Division of Colon and Rectal Surgery  [facility]      Referring Provider:  Referred Self, MD  No address on file     Primary Care Provider:  Kyra Mortensen

## 2019-02-05 ENCOUNTER — TELEPHONE (OUTPATIENT)
Dept: GASTROENTEROLOGY | Facility: CLINIC | Age: 54
End: 2019-02-05

## 2019-02-05 ENCOUNTER — ANCILLARY PROCEDURE (OUTPATIENT)
Dept: GENERAL RADIOLOGY | Facility: CLINIC | Age: 54
End: 2019-02-05
Payer: COMMERCIAL

## 2019-02-05 ENCOUNTER — OFFICE VISIT (OUTPATIENT)
Dept: SURGERY | Facility: CLINIC | Age: 54
End: 2019-02-05
Payer: COMMERCIAL

## 2019-02-05 ENCOUNTER — ANCILLARY PROCEDURE (OUTPATIENT)
Dept: MRI IMAGING | Facility: CLINIC | Age: 54
End: 2019-02-05
Attending: NURSE PRACTITIONER
Payer: COMMERCIAL

## 2019-02-05 VITALS
HEART RATE: 79 BPM | DIASTOLIC BLOOD PRESSURE: 76 MMHG | WEIGHT: 192.6 LBS | SYSTOLIC BLOOD PRESSURE: 131 MMHG | OXYGEN SATURATION: 97 % | BODY MASS INDEX: 32.09 KG/M2 | HEIGHT: 65 IN

## 2019-02-05 DIAGNOSIS — Z79.899 ENCOUNTER FOR LONG-TERM (CURRENT) USE OF MEDICATIONS: ICD-10-CM

## 2019-02-05 DIAGNOSIS — C20 MALIGNANT NEOPLASM OF RECTUM (H): ICD-10-CM

## 2019-02-05 DIAGNOSIS — C20 RECTAL CANCER (H): Primary | ICD-10-CM

## 2019-02-05 DIAGNOSIS — E78.01 FAMILIAL HYPERCHOLESTEROLEMIA: ICD-10-CM

## 2019-02-05 DIAGNOSIS — C20 RECTAL CANCER (H): ICD-10-CM

## 2019-02-05 DIAGNOSIS — M25.561 ACUTE PAIN OF RIGHT KNEE: ICD-10-CM

## 2019-02-05 LAB
ALBUMIN SERPL-MCNC: 3.5 G/DL (ref 3.4–5)
ALP SERPL-CCNC: 77 U/L (ref 40–150)
ALT SERPL W P-5'-P-CCNC: 21 U/L (ref 0–50)
ALT SERPL W P-5'-P-CCNC: 23 U/L (ref 0–50)
ANION GAP SERPL CALCULATED.3IONS-SCNC: 6 MMOL/L (ref 3–14)
AST SERPL W P-5'-P-CCNC: 12 U/L (ref 0–45)
AST SERPL W P-5'-P-CCNC: 15 U/L (ref 0–45)
BASOPHILS # BLD AUTO: 0 10E9/L (ref 0–0.2)
BASOPHILS NFR BLD AUTO: 0.9 %
BILIRUB SERPL-MCNC: 0.4 MG/DL (ref 0.2–1.3)
BUN SERPL-MCNC: 20 MG/DL (ref 7–30)
CALCIUM SERPL-MCNC: 8.2 MG/DL (ref 8.5–10.1)
CEA SERPL-MCNC: <0.5 UG/L (ref 0–2.5)
CHLORIDE SERPL-SCNC: 108 MMOL/L (ref 94–109)
CHOLEST SERPL-MCNC: 236 MG/DL
CO2 SERPL-SCNC: 27 MMOL/L (ref 20–32)
CREAT SERPL-MCNC: 0.7 MG/DL (ref 0.52–1.04)
DIFFERENTIAL METHOD BLD: ABNORMAL
EOSINOPHIL # BLD AUTO: 0.2 10E9/L (ref 0–0.7)
EOSINOPHIL NFR BLD AUTO: 5 %
ERYTHROCYTE [DISTWIDTH] IN BLOOD BY AUTOMATED COUNT: 12.3 % (ref 10–15)
GFR SERPL CREATININE-BSD FRML MDRD: >90 ML/MIN/{1.73_M2}
GLUCOSE SERPL-MCNC: 93 MG/DL (ref 70–99)
HCT VFR BLD AUTO: 38.1 % (ref 35–47)
HDLC SERPL-MCNC: 51 MG/DL
HGB BLD-MCNC: 12.4 G/DL (ref 11.7–15.7)
IMM GRANULOCYTES # BLD: 0 10E9/L (ref 0–0.4)
IMM GRANULOCYTES NFR BLD: 0.9 %
LDLC SERPL CALC-MCNC: 164 MG/DL
LYMPHOCYTES # BLD AUTO: 0.6 10E9/L (ref 0.8–5.3)
LYMPHOCYTES NFR BLD AUTO: 17 %
MCH RBC QN AUTO: 30.1 PG (ref 26.5–33)
MCHC RBC AUTO-ENTMCNC: 32.5 G/DL (ref 31.5–36.5)
MCV RBC AUTO: 93 FL (ref 78–100)
MONOCYTES # BLD AUTO: 0.3 10E9/L (ref 0–1.3)
MONOCYTES NFR BLD AUTO: 7.7 %
NEUTROPHILS # BLD AUTO: 2.2 10E9/L (ref 1.6–8.3)
NEUTROPHILS NFR BLD AUTO: 68.5 %
NONHDLC SERPL-MCNC: 185 MG/DL
NRBC # BLD AUTO: 0 10*3/UL
NRBC BLD AUTO-RTO: 0 /100
PLATELET # BLD AUTO: 161 10E9/L (ref 150–450)
POTASSIUM SERPL-SCNC: 3.9 MMOL/L (ref 3.4–5.3)
PROT SERPL-MCNC: 6.7 G/DL (ref 6.8–8.8)
RBC # BLD AUTO: 4.12 10E12/L (ref 3.8–5.2)
SODIUM SERPL-SCNC: 141 MMOL/L (ref 133–144)
TRIGL SERPL-MCNC: 103 MG/DL
WBC # BLD AUTO: 3.2 10E9/L (ref 4–11)

## 2019-02-05 PROCEDURE — 25000128 H RX IP 250 OP 636: Performed by: INTERNAL MEDICINE

## 2019-02-05 PROCEDURE — 80061 LIPID PANEL: CPT | Performed by: INTERNAL MEDICINE

## 2019-02-05 PROCEDURE — 84460 ALANINE AMINO (ALT) (SGPT): CPT | Mod: 91 | Performed by: INTERNAL MEDICINE

## 2019-02-05 PROCEDURE — 82378 CARCINOEMBRYONIC ANTIGEN: CPT | Performed by: INTERNAL MEDICINE

## 2019-02-05 PROCEDURE — 84450 TRANSFERASE (AST) (SGOT): CPT | Mod: 91 | Performed by: INTERNAL MEDICINE

## 2019-02-05 PROCEDURE — 80053 COMPREHEN METABOLIC PANEL: CPT | Performed by: INTERNAL MEDICINE

## 2019-02-05 PROCEDURE — 85025 COMPLETE CBC W/AUTO DIFF WBC: CPT | Performed by: INTERNAL MEDICINE

## 2019-02-05 RX ORDER — GADOBUTROL 604.72 MG/ML
7.5 INJECTION INTRAVENOUS ONCE
Status: COMPLETED | OUTPATIENT
Start: 2019-02-05 | End: 2019-02-05

## 2019-02-05 RX ORDER — HEPARIN SODIUM (PORCINE) LOCK FLUSH IV SOLN 100 UNIT/ML 100 UNIT/ML
5 SOLUTION INTRAVENOUS DAILY PRN
Status: DISCONTINUED | OUTPATIENT
Start: 2019-02-05 | End: 2019-02-13 | Stop reason: HOSPADM

## 2019-02-05 RX ORDER — HEPARIN SODIUM (PORCINE) LOCK FLUSH IV SOLN 100 UNIT/ML 100 UNIT/ML
5 SOLUTION INTRAVENOUS ONCE
Status: COMPLETED | OUTPATIENT
Start: 2019-02-05 | End: 2019-02-05

## 2019-02-05 RX ADMIN — HEPARIN SODIUM (PORCINE) LOCK FLUSH IV SOLN 100 UNIT/ML 5 ML: 100 SOLUTION at 07:11

## 2019-02-05 RX ADMIN — GADOBUTROL 7.5 ML: 604.72 INJECTION INTRAVENOUS at 07:54

## 2019-02-05 RX ADMIN — HEPARIN SODIUM (PORCINE) LOCK FLUSH IV SOLN 100 UNIT/ML 5 ML: 100 SOLUTION at 09:33

## 2019-02-05 ASSESSMENT — PAIN SCALES - GENERAL: PAINLEVEL: NO PAIN (0)

## 2019-02-05 ASSESSMENT — MIFFLIN-ST. JEOR: SCORE: 1484.51

## 2019-02-05 NOTE — NURSING NOTE
Chief Complaint   Patient presents with     Port Draw     Labs drawn via port by RN in lab.      Labs drawn via Port accessed using 20g flat needle. Line flushed and Heparin locked.     Marianela Baker RN

## 2019-02-05 NOTE — LETTER
2019       RE: Sarah Rajan  [address] [facility] [id number]     Dear Colleague,    Thank you for referring your patient, Sarah Rajan, to the [facility] COLON AND RECTAL SURGERY at [facility]. Please see a copy of my visit note below.    Colon and Rectal Surgery Clinic Note    RE: Sarah Rajan  : 1965  JIMMY: 2019    Sarah presents today for follow up of her rectal cancer on Watch and Wait protocol.  She is seen today with her  Cody.      HPI:     She was initially seen in 2018 with a moderately differentiated, invasive adenocarcinoma with intact mismatch repair 4-5 cm from the anal verge. She was staged at that time and an MRI that was initially read as a T1-T2 lesion, then possibly a T2N1 lesion. CT A/P was significant for small liver lesions too small to characterize but not concerning for malignancy and CEA was 1.1. She was discussed at tumor board the pelvic MRI was felt to be poor quality so she underwent a repeat MRI pelvis on  and her tumor was staged as T4N0 due to abutment of the levators. Abdominal MRI to evaluate the liver lesions was performed on  which was again found to have multiple sub-centimeter lesions that were difficult to characterize.     She underwent 8 cycles of FOLFOX completed on 18 and chemoradiation with XELODA on 8/15/18. 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     MRI of left hip showed a non specific T1 hypointense lesion in the proximal left femur and repeat MRI 12/10/18:    There is a T1 hypointense and subtly enhancing lesion in the   proximal left femur, stable since at least 2018.  Given this   patient's history of rectal cancer, metastatic disease cannot be excluded.    CT Chest 18 without evidence of metastatic disease     CEA 11/15/18 <0.5    3T MRI 12/10/18:   IMPRESSION:    1. The prior area of suspected residual tumor in  "the low rectum has   been replaced with fibrosis. However, there is new rectal wall edema   which limits the sensitivity for residual tumor detection,   corresponding tumor regression grade of mrTRG-2.    2. No suspicious lymph node.   3. Stable 2.0 cm enhancing lesion in the left femur adjacent to the   lesser trochanter. Please refer to same day dedicated hip MRI for   further characterization.   4. New small amount of presacral fluid, possibly treatment related   effects.    3T MRI 2/5/2019    1. No appreciable residual tumor in the mid to low rectum,   corresponding to a tumor regression grade of mrTRG-1.     2. No suspicious pelvic lymphadenopathy.   3. Unchanged nonspecific left femoral intratrochanteric lesion,   favored benign given stability. Recommend continued attention on   follow-up.     Due for liver MRI to reevaluate liver lesions thought to be likely hemangioma or cyst    I last saw Sarah on 12/11/2018 with no evidence of recurrence on flexible sigmoidoscopy.    Last colonoscopy 1/9/2018     She last saw Dr. Mortensen with oncology on 1/24/2019    Interval History: Sarah has been doing well. She had some mild bleeding and a tearing sensation when she did her enema today but otherwise denies any anorectal complaints.    Physical examination:  Examination was chaperoned by Amara Jackson NP.     Vitals: /76 (BP Location: Left arm, Patient Position: Sitting, Cuff Size: Adult Regular)   Pulse 79   Ht 5' 5\"   Wt 192 lb 9.6 oz   SpO2 97%   BMI 32.05 kg/m     BMI= Body mass index is 32.05 kg/m .    Flexible sigmoidoscopy was performed. Two fleets enemas were given. Informed consent was obtained and time out was performed per protocol. Small cut in the right posterior anal opening. Digital rectal exam with no palpable lesions and with small faintly palpable linear scar in the left posterior position approximately 4 cm from the anal verge. Scope was inserted to approximately 25 cm. " Retroflexion was performed and very faint scar was visible in the posterior position at about 3 cm from the anal verge. No evidence of recurrent cancer. She tolerated this well.      Laboratory data:    Recent Labs   Lab Test 11/15/18  1333  02/19/18  0830   WBC 3.3*   < >  --    HGB 12.1   < >  --       < >  --    CR 0.79   < >  --    ALBUMIN 3.6   < >  --    BILITOTAL 0.3   < >  --    ALKPHOS 74   < >  --    ALT 23   < >  --    AST 16   < >  --    INR  --   --  0.93    < > = values in this interval not displayed.       Assessment/plan:  No evidence of recurrence on flexible sigmoidoscopy today. Very faint scar isualized. MRI with some continued edema but this has improved. Would like a repeat MRI in 3 months. She is due for a colonoscopy so will have her do this in 3 months rather than a clinic flexible sigmoidoscopy. Patient's questions were answered to her stated satisfaction and she is in agreement with this plan.    Follow up per Watch and Wait Protocol:   Completed CRT: 8/15/2018    Flexible sigmoidoscopy     Every 2 months for 6 months: Until 2/2019-COMPLETED TODAY    Every 3 months for 3 years: Until 2021-DUE 5/2019 but will complete colonoscopy instead    Every 6 months for 5 years: Until 2023    Every year for 10 years: Until 2028      3T MRI of pelvis    6-8 weeks after CRT: COMPLETED    Every 4 months for 2 years: Until 8/2021-DUE 6/2019    Every 6 months for 2 years: Until 8/2023    Yearly for 2 years: Until 2025      CT CAP    Every year for 6-8 years: Until 2026-Due 9/2019      Colonoscopy     Last 1/9/2018      CEA at every clinic or endoscopic visit    Total face to face time was 10 minutes, outside the procedure time, which was an additional 10 minutes, >50% counseling.    For details of past medical history, surgical history, family history, medications, allergies, and review of systems, please see details below.    Medical history:  Past Medical History:   Diagnosis Date     Depression       GERD (gastroesophageal reflux disease)      History of smoking      Insomnia      Obesity      Rectal cancer (H)      Restless leg syndrome      Seasonal affective disorder (H)        Surgical history:  Past Surgical History:   Procedure Laterality Date      SECTION      x2     COLONOSCOPY       INSERT PORT VASCULAR ACCESS Right 2018    Procedure: INSERT PORT VASCULAR ACCESS;  central venous Chest Port Placement;  Surgeon: Gaurav Yates PA-C;  Location:  OR     [facility]         Family history:  Family History   Problem Relation Age of Onset     Hyperlipidemia Mother      Hypertension Mother      Gastrointestinal Disease Mother         ischemic colitis     Coronary Artery Disease Mother         stents x 3     Anesthesia Reaction Mother         hypotension, PONV     Hyperlipidemia Father      Hypertension Father      Breast Cancer Maternal Grandmother      Coronary Artery Disease Maternal Grandfather      Myocardial Infarction Maternal Grandfather      Colon Cancer Paternal Grandmother      Breast Cancer Maternal Aunt      Breast Cancer Paternal Aunt      Coronary Artery Disease Paternal Grandfather      Cerebrovascular Disease Paternal Grandfather      Family History Negative Brother      Coronary Artery Disease Maternal Uncle        Medications:  Current Outpatient Medications   Medication Sig Dispense Refill     Acetaminophen (TYLENOL PO) Take 1,000 mg by mouth At Bedtime       atorvastatin (LIPITOR) 40 MG tablet Take 1 tablet (40 mg) by mouth daily 90 tablet 3     calcium carbonate (TUMS) 500 MG chewable tablet Take 1-2 chew tab by mouth daily  150 tablet      capecitabine (XELODA) 500 MG tablet CHEMO Take 3 tablets (1,500 mg) by mouth 2 times daily for 56 doses Take Mon-Fri. Do NOT take on Sat-Sun. Take with water within 30 min after meal 168 tablet 0     diclofenac (VOLTAREN) 75 MG EC tablet TK 1 T PO BID  2     eszopiclone (LUNESTA) 1 MG TABS tablet Take 1-2 tablets (1-2 mg) by mouth At  Bedtime 60 tablet 1     FLUoxetine (PROZAC) 20 MG capsule Take 1 capsule (20 mg) by mouth daily 90 capsule 3     FLUoxetine (PROZAC) 40 MG capsule Take 1 capsule (40 mg) by mouth daily (Patient not taking: Reported on 2018) 30 capsule 3     gabapentin (NEURONTIN) 300 MG capsule On day 1, take 300 mg at bedtime. Day 2, take 300 mg bid. Day 3 onward, take 300 mg tid. (Patient not taking: Reported on 8/15/2018) 90 capsule 1     lidocaine-prilocaine (EMLA) cream Apply 45 minutes prior to procedure. 30 g 3     LORazepam (ATIVAN) 0.5 MG tablet Take 1 tablet (0.5 mg) by mouth every 4 hours as needed (Anxiety, Nausea/Vomiting or Sleep) (Patient not taking: Reported on 2019) 60 tablet 5     magic mouthwash (ENTER INGREDIENTS IN COMMENTS) suspension Swish, gargle, and spit one to two teaspoonfuls every six hours as needed. May be swallowed if esophageal involvement. (Patient not taking: Reported on 8/15/2018) 240 mL 1     ondansetron (ZOFRAN) 8 MG tablet Take 1 tablet (8 mg) by mouth every 8 hours as needed for nausea (Patient not taking: Reported on 2018) 60 tablet 3     prochlorperazine (COMPAZINE) 10 MG tablet Take 1 tablet (10 mg) by mouth every 6 hours as needed (Nausea/Vomiting) (Patient not taking: Reported on 8/15/2018) 30 tablet 2     RANITIDINE HCL PO Take 150 mg by mouth daily as needed for heartburn         Allergies:  The patientis allergic to inapsine [droperidol].    Social history:  Social History     Tobacco Use     Smoking status: Former Smoker     Packs/day: 0.10     Years: 8.00     Pack years: 0.80     Types: Cigarettes     Last attempt to quit: 1986     Years since quittin.9     Smokeless tobacco: Never Used   Substance Use Topics     Alcohol use: Yes     Alcohol/week: 4.2 oz     Types: 7 Glasses of wine per week     Marital status: .    Review of Systems:  Nursing Notes:   Mili Carlin, EMT  2019  9:51 AM  Signed  Chief Complaint   Patient presents with      "Flexible Sigmoidoscopy     Per watch and wait protocol.       Vitals:    02/05/19 0948   BP: 131/76   BP Location: Left arm   Patient Position: Sitting   Cuff Size: Adult Regular   Pulse: 79   SpO2: 97%   Weight: 192 lb 9.6 oz   Height: 5' 5\"       Body mass index is 32.05 kg/m .      Mili Carlin, EMT    Óscar Hampton MD   Professor and Chief  Division of Colon and Rectal Surgery  [facility]    Referring Provider:  Referred Self, MD  No address on file     Primary Care Provider:  Kyra Mortensen      "

## 2019-02-05 NOTE — NURSING NOTE
Chief Complaint   Patient presents with     Port Draw     labs drawn via port by rn.      Labs drawn via previously accessed port by rn in lab. Flushed with saline and heparin. Port de-accessed.   Maria De Jesus Beebe RN

## 2019-02-05 NOTE — NURSING NOTE
"Chief Complaint   Patient presents with     Flexible Sigmoidoscopy     Per watch and wait protocol.       Vitals:    02/05/19 0948   BP: 131/76   BP Location: Left arm   Patient Position: Sitting   Cuff Size: Adult Regular   Pulse: 79   SpO2: 97%   Weight: 192 lb 9.6 oz   Height: 5' 5\"       Body mass index is 32.05 kg/m .      Mili Carlin, EMT                      "

## 2019-02-05 NOTE — PATIENT INSTRUCTIONS
Follow up per Watch and Wait Protocol:   Completed CRT: 8/15/2018    Flexible sigmoidoscopy     Every 2 months for 6 months: Until 2/2019-COMPLETED TODAY    Every 3 months for 3 years: Until 2021-DUE 5/2019 but will complete colonoscopy instead    Every 6 months for 5 years: Until 2023    Every year for 10 years: Until 2028      3T MRI of pelvis    6-8 weeks after CRT: COMPLETED    Every 4 months for 2 years: Until 8/2021-DUE 5/2019-ONE MONTH EARLY    Every 6 months for 2 years: Until 8/2023    Yearly for 2 years: Until 8/2025      CT CAP    Every year for 6-8 years: Until 2026-Due 9/2019      Colonoscopy     Last 1/9/2018      CEA at every clinic or endoscopic visit

## 2019-02-06 ENCOUNTER — TELEPHONE (OUTPATIENT)
Dept: GASTROENTEROLOGY | Facility: CLINIC | Age: 54
End: 2019-02-06

## 2019-02-06 DIAGNOSIS — C20 MALIGNANT NEOPLASM OF RECTUM (H): Primary | ICD-10-CM

## 2019-02-07 ENCOUNTER — TELEPHONE (OUTPATIENT)
Dept: GASTROENTEROLOGY | Facility: CLINIC | Age: 54
End: 2019-02-07

## 2019-02-20 ENCOUNTER — ANCILLARY PROCEDURE (OUTPATIENT)
Dept: MRI IMAGING | Facility: CLINIC | Age: 54
End: 2019-02-20
Attending: COLON & RECTAL SURGERY
Payer: COMMERCIAL

## 2019-02-20 NOTE — DISCHARGE INSTRUCTIONS
MRI Contrast Discharge Instructions    The IV contrast you received today will pass out of your body in your  urine. This will happen in the next 24 hours. You will not feel this process.  Your urine will not change color.    Drink at least 4 extra glasses of water or juice today (unless your doctor  has restricted your fluids). This reduces the stress on your kidneys.  You may take your regular medicines.    If you are on dialysis: It is best to have dialysis today.    If you have a reaction: Most reactions happen right away. If you have  any new symptoms after leaving the hospital (such as hives or swelling),  call your hospital at the correct number below. Or call your family doctor.  If you have breathing distress or wheezing, call 911.    Special instructions: ***    I have read and understand the above information.    Signature:______________________________________ Date:___________    Staff:__________________________________________ Date:___________     Time:__________    [place] [facility] Departments:    ___[place]: [phone]  ___[facility]: [phone]  ___[place]: [phone] ___[facility]: [phone]  ___[facility]: [phone]  ___[facility]: [phone]  ___[phone]  ___[facility]: [phone]  ___[phone]

## 2019-02-26 ENCOUNTER — TELEPHONE (OUTPATIENT)
Dept: SURGERY | Facility: CLINIC | Age: 54
End: 2019-02-26

## 2019-02-26 NOTE — TELEPHONE ENCOUNTER
Pt was returning Amara Wise's call.  Pt is traveling for business right now and best time to reach her is tomorrow, 2/27/19, 5:30pm - [place] time.  Please have Amara follow up with pt then.  Thank you!

## 2019-02-27 ENCOUNTER — TELEPHONE (OUTPATIENT)
Dept: SURGERY | Facility: CLINIC | Age: 54
End: 2019-02-27

## 2019-03-18 DIAGNOSIS — C20 RECTAL CANCER (H): ICD-10-CM

## 2019-03-18 DIAGNOSIS — Z79.899 ENCOUNTER FOR LONG-TERM (CURRENT) USE OF MEDICATIONS: ICD-10-CM

## 2019-03-18 LAB
ALBUMIN SERPL-MCNC: 3.5 G/DL (ref 3.4–5)
ALP SERPL-CCNC: 84 U/L (ref 40–150)
ALT SERPL W P-5'-P-CCNC: 26 U/L (ref 0–50)
ANION GAP SERPL CALCULATED.3IONS-SCNC: 6 MMOL/L (ref 3–14)
AST SERPL W P-5'-P-CCNC: 15 U/L (ref 0–45)
BASOPHILS # BLD AUTO: 0 10E9/L (ref 0–0.2)
BASOPHILS NFR BLD AUTO: 0.7 %
BILIRUB SERPL-MCNC: 0.4 MG/DL (ref 0.2–1.3)
BUN SERPL-MCNC: 22 MG/DL (ref 7–30)
CALCIUM SERPL-MCNC: 8.5 MG/DL (ref 8.5–10.1)
CHLORIDE SERPL-SCNC: 107 MMOL/L (ref 94–109)
CO2 SERPL-SCNC: 26 MMOL/L (ref 20–32)
CREAT SERPL-MCNC: 0.61 MG/DL (ref 0.52–1.04)
DIFFERENTIAL METHOD BLD: ABNORMAL
EOSINOPHIL # BLD AUTO: 0.1 10E9/L (ref 0–0.7)
EOSINOPHIL NFR BLD AUTO: 2.8 %
ERYTHROCYTE [DISTWIDTH] IN BLOOD BY AUTOMATED COUNT: 12.9 % (ref 10–15)
GFR SERPL CREATININE-BSD FRML MDRD: >90 ML/MIN/{1.73_M2}
GLUCOSE SERPL-MCNC: 87 MG/DL (ref 70–99)
HCT VFR BLD AUTO: 38.7 % (ref 35–47)
HGB BLD-MCNC: 12.5 G/DL (ref 11.7–15.7)
IMM GRANULOCYTES # BLD: 0 10E9/L (ref 0–0.4)
IMM GRANULOCYTES NFR BLD: 0.7 %
LYMPHOCYTES # BLD AUTO: 0.5 10E9/L (ref 0.8–5.3)
LYMPHOCYTES NFR BLD AUTO: 16.4 %
MCH RBC QN AUTO: 30.6 PG (ref 26.5–33)
MCHC RBC AUTO-ENTMCNC: 32.3 G/DL (ref 31.5–36.5)
MCV RBC AUTO: 95 FL (ref 78–100)
MONOCYTES # BLD AUTO: 0.2 10E9/L (ref 0–1.3)
MONOCYTES NFR BLD AUTO: 8 %
NEUTROPHILS # BLD AUTO: 2 10E9/L (ref 1.6–8.3)
NEUTROPHILS NFR BLD AUTO: 71.4 %
NRBC # BLD AUTO: 0 10*3/UL
NRBC BLD AUTO-RTO: 0 /100
PLATELET # BLD AUTO: 158 10E9/L (ref 150–450)
POTASSIUM SERPL-SCNC: 3.9 MMOL/L (ref 3.4–5.3)
PROT SERPL-MCNC: 6.6 G/DL (ref 6.8–8.8)
RBC # BLD AUTO: 4.09 10E12/L (ref 3.8–5.2)
SODIUM SERPL-SCNC: 140 MMOL/L (ref 133–144)
WBC # BLD AUTO: 2.9 10E9/L (ref 4–11)

## 2019-03-18 PROCEDURE — 25000128 H RX IP 250 OP 636: Performed by: INTERNAL MEDICINE

## 2019-03-18 PROCEDURE — 85025 COMPLETE CBC W/AUTO DIFF WBC: CPT | Performed by: INTERNAL MEDICINE

## 2019-03-18 PROCEDURE — 80053 COMPREHEN METABOLIC PANEL: CPT | Performed by: INTERNAL MEDICINE

## 2019-03-18 RX ORDER — HEPARIN SODIUM (PORCINE) LOCK FLUSH IV SOLN 100 UNIT/ML 100 UNIT/ML
5 SOLUTION INTRAVENOUS EVERY 8 HOURS
Status: DISCONTINUED | OUTPATIENT
Start: 2019-03-18 | End: 2019-03-26 | Stop reason: HOSPADM

## 2019-03-18 RX ADMIN — HEPARIN SODIUM (PORCINE) LOCK FLUSH IV SOLN 100 UNIT/ML 5 ML: 100 SOLUTION at 09:18

## 2019-05-06 ENCOUNTER — TELEPHONE (OUTPATIENT)
Dept: GASTROENTEROLOGY | Facility: OUTPATIENT CENTER | Age: 54
End: 2019-05-06

## 2019-05-08 ENCOUNTER — TELEPHONE (OUTPATIENT)
Dept: GASTROENTEROLOGY | Facility: OUTPATIENT CENTER | Age: 54
End: 2019-05-08

## 2019-05-09 ENCOUNTER — TELEPHONE (OUTPATIENT)
Dept: GASTROENTEROLOGY | Facility: OUTPATIENT CENTER | Age: 54
End: 2019-05-09

## 2019-05-09 NOTE — TELEPHONE ENCOUNTER
Patient taking any blood thinners ? No     Heart disease ? Denies     Lung disease ?Denies       Sleep apnea ?Denies     Diabetic ?Denies     Kidney disease ?Denies     Electronic implanted medical devices ?Denies     Are you taking any narcotic pain medication ? Denies   What is your daily dosage ?    PTSD ? N/a     Prep instructions reviewed with patient ? Patient declined review.  policy, MAC sedation plan reviewed. Advised patient to have someone stay with her post exam    Pharmacy :    Indication for procedure :Rectal cancer (H) [C20]    Referring provider :Amara Betancourt APRN CNP     Arrival Time : 7:15 AM

## 2019-05-11 ENCOUNTER — MYC MEDICAL ADVICE (OUTPATIENT)
Dept: CARDIOLOGY | Facility: CLINIC | Age: 54
End: 2019-05-11

## 2019-05-11 DIAGNOSIS — E78.5 HYPERLIPIDEMIA LDL GOAL <70: Primary | ICD-10-CM

## 2019-05-13 ENCOUNTER — DOCUMENTATION ONLY (OUTPATIENT)
Dept: GASTROENTEROLOGY | Facility: OUTPATIENT CENTER | Age: 54
End: 2019-05-13
Payer: COMMERCIAL

## 2019-05-13 ENCOUNTER — TRANSFERRED RECORDS (OUTPATIENT)
Dept: HEALTH INFORMATION MANAGEMENT | Facility: CLINIC | Age: 54
End: 2019-05-13

## 2019-05-14 ENCOUNTER — ANCILLARY PROCEDURE (OUTPATIENT)
Dept: MRI IMAGING | Facility: CLINIC | Age: 54
End: 2019-05-14
Attending: INTERNAL MEDICINE
Payer: COMMERCIAL

## 2019-05-14 DIAGNOSIS — C20 RECTAL CANCER (H): ICD-10-CM

## 2019-05-14 DIAGNOSIS — C20 MALIGNANT NEOPLASM OF RECTUM (H): ICD-10-CM

## 2019-05-14 DIAGNOSIS — M21.852 DEFORMITY OF FEMUR, LEFT: ICD-10-CM

## 2019-05-14 DIAGNOSIS — E78.5 HYPERLIPIDEMIA LDL GOAL <70: ICD-10-CM

## 2019-05-14 DIAGNOSIS — Z79.899 ENCOUNTER FOR LONG-TERM (CURRENT) USE OF MEDICATIONS: ICD-10-CM

## 2019-05-14 LAB
ALBUMIN SERPL-MCNC: 3.7 G/DL (ref 3.4–5)
ALP SERPL-CCNC: 95 U/L (ref 40–150)
ALT SERPL W P-5'-P-CCNC: 32 U/L (ref 0–50)
ANION GAP SERPL CALCULATED.3IONS-SCNC: 8 MMOL/L (ref 3–14)
AST SERPL W P-5'-P-CCNC: 18 U/L (ref 0–45)
BASOPHILS # BLD AUTO: 0 10E9/L (ref 0–0.2)
BASOPHILS NFR BLD AUTO: 0.3 %
BILIRUB SERPL-MCNC: 0.6 MG/DL (ref 0.2–1.3)
BUN SERPL-MCNC: 11 MG/DL (ref 7–30)
CALCIUM SERPL-MCNC: 9 MG/DL (ref 8.5–10.1)
CEA SERPL-MCNC: <0.5 UG/L (ref 0–2.5)
CHLORIDE SERPL-SCNC: 107 MMOL/L (ref 94–109)
CHOLEST SERPL-MCNC: 160 MG/DL
CO2 SERPL-SCNC: 26 MMOL/L (ref 20–32)
CREAT SERPL-MCNC: 0.65 MG/DL (ref 0.52–1.04)
DIFFERENTIAL METHOD BLD: ABNORMAL
EOSINOPHIL # BLD AUTO: 0.1 10E9/L (ref 0–0.7)
EOSINOPHIL NFR BLD AUTO: 4.4 %
ERYTHROCYTE [DISTWIDTH] IN BLOOD BY AUTOMATED COUNT: 12.3 % (ref 10–15)
GFR SERPL CREATININE-BSD FRML MDRD: >90 ML/MIN/{1.73_M2}
GLUCOSE SERPL-MCNC: 93 MG/DL (ref 70–99)
HCT VFR BLD AUTO: 36.8 % (ref 35–47)
HDLC SERPL-MCNC: 60 MG/DL
HGB BLD-MCNC: 12.7 G/DL (ref 11.7–15.7)
IMM GRANULOCYTES # BLD: 0 10E9/L (ref 0–0.4)
IMM GRANULOCYTES NFR BLD: 0.3 %
LDLC SERPL CALC-MCNC: 85 MG/DL
LYMPHOCYTES # BLD AUTO: 0.6 10E9/L (ref 0.8–5.3)
LYMPHOCYTES NFR BLD AUTO: 21.1 %
MCH RBC QN AUTO: 31 PG (ref 26.5–33)
MCHC RBC AUTO-ENTMCNC: 34.5 G/DL (ref 31.5–36.5)
MCV RBC AUTO: 90 FL (ref 78–100)
MONOCYTES # BLD AUTO: 0.2 10E9/L (ref 0–1.3)
MONOCYTES NFR BLD AUTO: 8.2 %
NEUTROPHILS # BLD AUTO: 1.9 10E9/L (ref 1.6–8.3)
NEUTROPHILS NFR BLD AUTO: 65.7 %
NONHDLC SERPL-MCNC: 100 MG/DL
NRBC # BLD AUTO: 0 10*3/UL
NRBC BLD AUTO-RTO: 0 /100
PLATELET # BLD AUTO: 162 10E9/L (ref 150–450)
POTASSIUM SERPL-SCNC: 3.8 MMOL/L (ref 3.4–5.3)
PROT SERPL-MCNC: 6.8 G/DL (ref 6.8–8.8)
RBC # BLD AUTO: 4.1 10E12/L (ref 3.8–5.2)
SODIUM SERPL-SCNC: 142 MMOL/L (ref 133–144)
TRIGL SERPL-MCNC: 72 MG/DL
WBC # BLD AUTO: 2.9 10E9/L (ref 4–11)

## 2019-05-14 PROCEDURE — 25000128 H RX IP 250 OP 636: Performed by: INTERNAL MEDICINE

## 2019-05-14 PROCEDURE — 82378 CARCINOEMBRYONIC ANTIGEN: CPT | Performed by: INTERNAL MEDICINE

## 2019-05-14 PROCEDURE — 85025 COMPLETE CBC W/AUTO DIFF WBC: CPT | Performed by: INTERNAL MEDICINE

## 2019-05-14 PROCEDURE — 80061 LIPID PANEL: CPT | Performed by: INTERNAL MEDICINE

## 2019-05-14 PROCEDURE — 36591 DRAW BLOOD OFF VENOUS DEVICE: CPT

## 2019-05-14 PROCEDURE — 80053 COMPREHEN METABOLIC PANEL: CPT | Performed by: INTERNAL MEDICINE

## 2019-05-14 RX ORDER — GADOBUTROL 604.72 MG/ML
7.5 INJECTION INTRAVENOUS ONCE
Status: COMPLETED | OUTPATIENT
Start: 2019-05-14 | End: 2019-05-14

## 2019-05-14 RX ORDER — HEPARIN SODIUM (PORCINE) LOCK FLUSH IV SOLN 100 UNIT/ML 100 UNIT/ML
5 SOLUTION INTRAVENOUS EVERY 8 HOURS
Status: DISCONTINUED | OUTPATIENT
Start: 2019-05-14 | End: 2019-05-22 | Stop reason: HOSPADM

## 2019-05-14 RX ORDER — HEPARIN SODIUM (PORCINE) LOCK FLUSH IV SOLN 100 UNIT/ML 100 UNIT/ML
5 SOLUTION INTRAVENOUS ONCE
Status: COMPLETED | OUTPATIENT
Start: 2019-05-14 | End: 2019-05-14

## 2019-05-14 RX ADMIN — HEPARIN SODIUM (PORCINE) LOCK FLUSH IV SOLN 100 UNIT/ML 5 ML: 100 SOLUTION at 08:48

## 2019-05-14 RX ADMIN — GADOBUTROL 7.5 ML: 604.72 INJECTION INTRAVENOUS at 09:20

## 2019-05-14 RX ADMIN — HEPARIN SODIUM (PORCINE) LOCK FLUSH IV SOLN 100 UNIT/ML 5 ML: 100 SOLUTION at 10:25

## 2019-05-14 NOTE — DISCHARGE INSTRUCTIONS
MRI Contrast Discharge Instructions    The IV contrast you received today will pass out of your body in your  urine. This will happen in the next 24 hours. You will not feel this process.  Your urine will not change color.    Drink at least 4 extra glasses of water or juice today (unless your doctor  has restricted your fluids). This reduces the stress on your kidneys.  You may take your regular medicines.    If you are on dialysis: It is best to have dialysis today.    If you have a reaction: Most reactions happen right away. If you have  any new symptoms after leaving the hospital (such as hives or swelling),  call your hospital at the correct number below. Or call your family doctor.  If you have breathing distress or wheezing, call 911.    Special instructions: ***    I have read and understand the above information.    Signature:______________________________________ Date:___________    Staff:__________________________________________ Date:___________     Time:__________    [place] [facility] Departments:    ___[place]: [phone]  ___[facility]: [phone]  ___[place]: [phone] ___[facility]: [phone]  ___[facility]: [phone]  ___[facility]: [phone]  ___[phone]  ___[facility]: [phone]  ___[phone]

## 2019-05-14 NOTE — NURSING NOTE
Chief Complaint   Patient presents with     Port Draw     Labs drawn via PORT byRN in lab. Line flushed with saline ad heparin. Lab appt only.      Pily Hanson RN

## 2019-05-16 ENCOUNTER — ONCOLOGY VISIT (OUTPATIENT)
Dept: ONCOLOGY | Facility: CLINIC | Age: 54
End: 2019-05-16
Attending: INTERNAL MEDICINE
Payer: COMMERCIAL

## 2019-05-16 VITALS
OXYGEN SATURATION: 98 % | HEART RATE: 73 BPM | TEMPERATURE: 98.2 F | HEIGHT: 65 IN | BODY MASS INDEX: 33.01 KG/M2 | RESPIRATION RATE: 16 BRPM | WEIGHT: 198.1 LBS | SYSTOLIC BLOOD PRESSURE: 120 MMHG | DIASTOLIC BLOOD PRESSURE: 85 MMHG

## 2019-05-16 DIAGNOSIS — M21.852 DEFORMITY OF FEMUR, LEFT: ICD-10-CM

## 2019-05-16 DIAGNOSIS — C20 MALIGNANT NEOPLASM OF RECTUM (H): Primary | ICD-10-CM

## 2019-05-16 PROCEDURE — 99215 OFFICE O/P EST HI 40 MIN: CPT | Mod: ZP | Performed by: INTERNAL MEDICINE

## 2019-05-16 PROCEDURE — G0463 HOSPITAL OUTPT CLINIC VISIT: HCPCS | Mod: ZF

## 2019-05-16 RX ORDER — AMITRIPTYLINE HYDROCHLORIDE 10 MG/1
TABLET ORAL
COMMUNITY
Start: 2019-03-15 | End: 2020-12-15

## 2019-05-16 ASSESSMENT — MIFFLIN-ST. JEOR: SCORE: 1509.46

## 2019-05-16 ASSESSMENT — PAIN SCALES - GENERAL: PAINLEVEL: NO PAIN (0)

## 2019-05-16 NOTE — PATIENT INSTRUCTIONS
Schedule Pelvic MRI next few day    In 3 months, repeat Pelvic MRI, Hip MRI  CT Chest and labs- see me a few days after that    Follow with Dr Hampton    Needs port flush every 4-5 weeks

## 2019-05-16 NOTE — LETTER
5/16/2019        RE: Sarah Rajan  [address] [facility] [address]     Dear Colleague,    Thank you for referring your patient, Sarah Rajan, to the [facility]. Please see a copy of my visit note below.    Oncology outpatient note    Date of service: 5/16/2019       PC: Rectal cancer. [id number] - MSI Intact    HPI:  Please see previous note for details. I have copied and updated from prior note.  She is a 53-year-old female noticed BRBPR so Screening colonoscopy on 1/9/2018 revealed 3cm rectal mass and other polyps which were removed.  Pathology indicated moderately differentiated adenocarcinoma w/ intact MMR proteins.  CT staging scan showed no metastatic disease; some liver lesions which are thought to be benign per radiology report, T2N1M0 by pelvic MRI read at [facility]. When we initially reviewed her MRI we will not exactly sure whether that was lymph node involvement or not. We opted to repeat MRI which showed T4 N0 lesion. There was up to take her to surgery as there was possibility of personally lesion without lymph node involvement but because of the findings of repeat MRI the surgery was canceled and she is coming back to discuss neoadjuvant treatment.  We also did an MRI of the liver which showed 3 subcentimeter liver lesions which were too small to characterize and will need attention on follow-up.  She started neoadjuvant 5-FU and oxaliplatin (FOLFOX), which she started on 2/26/18. The day after she received cycle 2, she developed fevers, chills, headache, and an itchy rash on her arms and legs, for which she was evaluated in the ED. She was sent home on Benadryl. Benadryl and dexamethasone doses were increased for cycle 3.   She tolerated that cycle well    C#4 was given on 4/10/18    She completed 8 cycles of FOLFOX on 6/5/18    Repeat scans show good response to treatment without evidence of metastatic disease. There is only a small signal consistent with residual tumor  seen in the primary rectal cancer lesion.    She started on concurrent chemoradiation with Xeloda with radiation beginning on 7/9/18. There was a delay in her receiving Xeloda, so she began that on 7/10/18. She completed it on 8/16/18    Repeat scans show great response to treatment with almost completely fibrotic signal.  There is an indeterminate left intertrochanteric lesion which is stable.  Liver lesion is consistent with benign hemangioma vs cyst    She was seen by Dr Hampton and the decision was made to keep her on watchful waiting.    Her MRI of the pelvis in December 2018 was stable but it showed rectal wall edema all the flexible sigmoidoscopy was negative.  Decision was made to do a short term follow-up MRI.      Left intertrochanteric lesion-we did MRI of the left hip which also showed a nonspecific T1 hypointense lesion in the proximal left femur which has not changed since 6/20/2018.  Repeat MRI in December 2018 was also stable.      Pelvis MRI in Feb 2019 was stable    Left Hip MRI on 5/14/19 showed stability of the intertrochanteric region of the left hip.    Colonoscopy on 5/13/19 was without evidence of recurrence- Next due in 3 years.         Interval history  She comes in today accompanied by her  and tells me that she has been feeling good.  Denies any nausea vomiting diarrhea constipation or bleeding.  Energy is good.  No infections.  She thinks her right knee pain has significantly improved on its own.  She denies any pain in the left hip.  Denies any difficulty breathing.  She still has neuropathy involving the feet and she also feels some tingling in her fingers.  It     ECOG 0    ROS:  Rest of the comprehensive review of the system was essentially unremarkable.      I reviewed other hx in EPIC as below    PMH:  Depression  Restless leg syndrome  Dyslipidemia    Current Outpatient Medications   Medication     Acetaminophen (TYLENOL PO)     atorvastatin (LIPITOR) 40 MG tablet      "calcium carbonate (TUMS) 500 MG chewable tablet     capecitabine (XELODA) 500 MG tablet CHEMO     diclofenac (VOLTAREN) 75 MG EC tablet     eszopiclone (LUNESTA) 1 MG TABS tablet     FLUoxetine (PROZAC) 20 MG capsule     FLUoxetine (PROZAC) 40 MG capsule     lidocaine-prilocaine (EMLA) cream     LORazepam (ATIVAN) 0.5 MG tablet     magic mouthwash (ENTER INGREDIENTS IN COMMENTS) suspension     RANITIDINE HCL PO     No current facility-administered medications for this visit.      Facility-Administered Medications Ordered in Other Visits   Medication     heparin 100 UNIT/ML injection 5 mL     sodium chloride (PF) 0.9% PF flush 20 mL         FHx  Aunt and maternal grandmother had breast cancer  Pateral grandmother  of colorectal cancer  Mother had ischemic colitis    SHx:  , two teenage children  EtOH: 1 drink daily, occasionally more on weekends  Tobacco: never smoker  She is a microbiologist     Allergies   Allergen Reactions     Inapsine [Droperidol] Other (See Comments)     Elevated blood pressure and increased heart rate     Tegaderm Transparent Dressing (Informational Only) Itching     Pt had reaction to Tegaderm used for port dressing. Whole area was very red and itchy. Please use IV 3000 instead.        Exam:  /85 (BP Location: Right arm, Patient Position: Sitting, Cuff Size: Adult Regular)   Pulse 73   Temp 98.2  F (36.8  C) (Oral)   Resp 16   Ht 1.651 m (5' 5\")   Wt 89.9 kg (198 lb 1.6 oz)   SpO2 98%   BMI 32.97 kg/m      Wt Readings from Last 4 Encounters:   19 89.9 kg (198 lb 1.6 oz)   19 87.4 kg (192 lb 9.6 oz)   19 88.3 kg (194 lb 9.6 oz)   18 85.2 kg (187 lb 14.4 oz)       CONSTITUTIONAL: no acute distress  EYES: PERRLA, no palor or icterus.   ENT/MOUTH: no mouth lesions. Ears normal  CVS: s1s2 no m r g .   RESPIRATORY: clear to auscultation b/l  GI: soft non tender no hepatosplenomegaly  NEURO: AAOX3  Grossly non focal neuro exam apart from subjective " tingling of the fingers and bottom of the feet.  INTEGUMENT: no obvious rashes  LYMPHATIC: no palpable cervical, supraclavicular, axillary or inguinal LAD  MUSCULOSKELETAL: Unremarkable. No bony tenderness.  No swelling of the right knee or the left hip and no tenderness of these areas either.  EXTREMITIES: no edema  PSYCH: Mentation, mood and affect are normal. Decision making capacity is intact          Labs.  Reviewed  Results for EDGAR MARTINEZ (MRN [id number]) as of 5/16/2019 12:27   Ref. Range 5/14/2019 08:53   Sodium Latest Ref Range: 133 - 144 mmol/L 142   Potassium Latest Ref Range: 3.4 - 5.3 mmol/L 3.8   Chloride Latest Ref Range: 94 - 109 mmol/L 107   Carbon Dioxide Latest Ref Range: 20 - 32 mmol/L 26   Urea Nitrogen Latest Ref Range: 7 - 30 mg/dL 11   Creatinine Latest Ref Range: 0.52 - 1.04 mg/dL 0.65   GFR Estimate Latest Ref Range: >60 mL/min/1.73_m2 >90   GFR Estimate If Black Latest Ref Range: >60 mL/min/1.73_m2 >90   Calcium Latest Ref Range: 8.5 - 10.1 mg/dL 9.0   Anion Gap Latest Ref Range: 3 - 14 mmol/L 8   Albumin Latest Ref Range: 3.4 - 5.0 g/dL 3.7   Protein Total Latest Ref Range: 6.8 - 8.8 g/dL 6.8   Bilirubin Total Latest Ref Range: 0.2 - 1.3 mg/dL 0.6   Alkaline Phosphatase Latest Ref Range: 40 - 150 U/L 95   ALT Latest Ref Range: 0 - 50 U/L 32   AST Latest Ref Range: 0 - 45 U/L 18   Cholesterol Latest Ref Range: <200 mg/dL 160   HDL Cholesterol Latest Ref Range: >49 mg/dL 60   LDL Cholesterol Calculated Latest Ref Range: <100 mg/dL 85   Non HDL Cholesterol Latest Ref Range: <130 mg/dL 100   Triglycerides Latest Ref Range: <150 mg/dL 72   Glucose Latest Ref Range: 70 - 99 mg/dL 93   WBC Latest Ref Range: 4.0 - 11.0 10e9/L 2.9 (L)   Hemoglobin Latest Ref Range: 11.7 - 15.7 g/dL 12.7   Hematocrit Latest Ref Range: 35.0 - 47.0 % 36.8   Platelet Count Latest Ref Range: 150 - 450 10e9/L 162   RBC Count Latest Ref Range: 3.8 - 5.2 10e12/L 4.10   MCV Latest Ref Range: 78 - 100 fl 90   MCH Latest  Ref Range: 26.5 - 33.0 pg 31.0   MCHC Latest Ref Range: 31.5 - 36.5 g/dL 34.5   RDW Latest Ref Range: 10.0 - 15.0 % 12.3   Diff Method Unknown Automated Method   % Neutrophils Latest Units: % 65.7   % Lymphocytes Latest Units: % 21.1   % Monocytes Latest Units: % 8.2   % Eosinophils Latest Units: % 4.4   % Basophils Latest Units: % 0.3   % Immature Granulocytes Latest Units: % 0.3   Nucleated RBCs Latest Ref Range: 0 /100 0   Absolute Neutrophil Latest Ref Range: 1.6 - 8.3 10e9/L 1.9   Absolute Lymphocytes Latest Ref Range: 0.8 - 5.3 10e9/L 0.6 (L)   Absolute Monocytes Latest Ref Range: 0.0 - 1.3 10e9/L 0.2   Absolute Eosinophils Latest Ref Range: 0.0 - 0.7 10e9/L 0.1   Absolute Basophils Latest Ref Range: 0.0 - 0.2 10e9/L 0.0   Abs Immature Granulocytes Latest Ref Range: 0 - 0.4 10e9/L 0.0   Absolute Nucleated RBC Unknown 0.0   CEA Latest Ref Range: 0 - 2.5 ug/L <0.5         Imaging. Reviewed    Pathology    FINAL DIAGNOSIS:    CASE FROM [place], [place] ([phone], OBTAINED 1/9/18):   A. COLON, TRANSVERSE AND DESCENDING, POLYPS, POLYPECTOMY:   - Serrated polyps including sessile serrated adenoma and hyperplastic   polyps, fragmented   - Negative for cytologic dysplasia     B. RECTUM, MASS, BIOPSY:   - Adenocarcinoma, moderately differentiated, invasive   - Mismatch repair proteins are intact by immunohistochemistry     Assessment and Plan  [id number] moderately differentiated adenocarcinoma of the rectum - MSI-stable  She was started on neoadjuvant FOLFOX on 2/26/2018.  after 4 cycles she had good response to treatment. Liver lesions remain indeterminate.    we continued with the same chemotherapy and she received cycle #8 on 6/20/18.  Repeat scans show good response to treatment with only a small signal consistent with viable tumor in the primary rectal cancer. No surrounding lymph nodes suspicious. There is no evidence of metastatic disease.    She started on concurrent chemoradiation with Xeloda with  radiation beginning on 7/9/18. There was a delay in her receiving Xeloda, so she began on 7/10/18. She completed it on 8/16/18    Repeat scans show great response to treatment with almost completely fibrotic signal.  There is an indeterminate left intertrochanteric lesion which is stable.    She was seen by Dr Hampton and the decision was made to keep her on watchful waiting.      She does not have any evidence of recurrence of the disease currently.    Recent colonoscopy on 5/13/2019 was unremarkable.  She will be due for next colonoscopy in 3 years.  She should continue to have flexible sigmoidoscopy every 3 months through Dr. Hampton office.  MRI of the pelvis was stable on 2/5/2019.  We will schedule another MRI of the pelvis in the next few days.  CEA is also normal.  Going forward we will repeat MRI of the pelvis in 3 months and at that time I will also do CT of the chest.  As the recent MRI of the abdomen was unremarkable, I will not do CT of the abdomen in 3 months.    Indeterminate liver lesion. MRI 2/5/19 shows stable lesion, most likely c/w benign hemangioma or cyst.     Left intertrochanteric lesion- Asymptomatic.  Overall this has remained stable even on the most recent MRI on 5/14/2019 and since it is a stable since June 2018, this is most likely a benign finding but at this time I cannot be 100% sure.  I offered her to be seen by orthopedics but she is not interested because she has been asymptomatic and this lesion has been a stable.  I would recommend repeating MRI in 3 months.      Neuropathy-neuropathy is mostly tingling in the feet and also a little bit in the fingers.  I recommend trying acupuncture and she is going to look into it.      Leukopenia.  She has mild leukopenia but WBC differential is unremarkable.  Continue to observe.  Repeat labs in 3 months.      Right knee pain upon kneeling.  X-ray in February 2019 was unremarkable.  Overall her symptoms have significantly improved on their own.   Continue to observe.        Port-A-Cath.  She still has Port-A-Cath in.  She will need port flushes every 4 to 5 weeks.       Return to clinic in 3 months.    All of her and her 's questions were answered to their satisfaction.  They are agreeable and comfortable with the plan.          Kyra Mortensen MD

## 2019-05-16 NOTE — NURSING NOTE
"Oncology Rooming Note    May 16, 2019 12:28 PM   Sarah Rajan is a 53 year old female who presents for:    Chief Complaint   Patient presents with     RECHECK     ONC Rectal CA      Initial Vitals: /85 (BP Location: Right arm, Patient Position: Sitting, Cuff Size: Adult Regular)   Pulse 73   Temp 98.2  F (36.8  C) (Oral)   Resp 16   Ht 1.651 m (5' 5\")   Wt 89.9 kg (198 lb 1.6 oz)   SpO2 98%   BMI 32.97 kg/m   Estimated body mass index is 32.97 kg/m  as calculated from the following:    Height as of this encounter: 1.651 m (5' 5\").    Weight as of this encounter: 89.9 kg (198 lb 1.6 oz). Body surface area is 2.03 meters squared.  No Pain (0) Comment: Data Unavailable   No LMP recorded. Patient is postmenopausal.  Allergies reviewed: Yes  Medications reviewed: Yes    Medications: Medication refills not needed today.  Pharmacy name entered into [facility]:    [id number] - [place] [id number] AT [facility] OF [facility] [place], [place] [phone]  [id number] - [place], [place] - [address] [place] [phone] & [facility] - [place], [place] - [address]    Clinical concerns: none        Vaishali Rodney, GILDARDO              "

## 2019-05-16 NOTE — PROGRESS NOTES
Oncology outpatient note    Date of service: 5/16/2019       PC: Rectal cancer. [id number] - MSI Intact    HPI:  Please see previous note for details. I have copied and updated from prior note.  She is a 53-year-old female noticed BRBPR so Screening colonoscopy on 1/9/2018 revealed 3cm rectal mass and other polyps which were removed.  Pathology indicated moderately differentiated adenocarcinoma w/ intact MMR proteins.  CT staging scan showed no metastatic disease; some liver lesions which are thought to be benign per radiology report, T2N1M0 by pelvic MRI read at [facility]. When we initially reviewed her MRI we will not exactly sure whether that was lymph node involvement or not. We opted to repeat MRI which showed T4 N0 lesion. There was up to take her to surgery as there was possibility of personally lesion without lymph node involvement but because of the findings of repeat MRI the surgery was canceled and she is coming back to discuss neoadjuvant treatment.  We also did an MRI of the liver which showed 3 subcentimeter liver lesions which were too small to characterize and will need attention on follow-up.  She started neoadjuvant 5-FU and oxaliplatin (FOLFOX), which she started on 2/26/18. The day after she received cycle 2, she developed fevers, chills, headache, and an itchy rash on her arms and legs, for which she was evaluated in the ED. She was sent home on Benadryl. Benadryl and dexamethasone doses were increased for cycle 3.   She tolerated that cycle well    C#4 was given on 4/10/18    She completed 8 cycles of FOLFOX on 6/5/18    Repeat scans show good response to treatment without evidence of metastatic disease. There is only a small signal consistent with residual tumor seen in the primary rectal cancer lesion.    She started on concurrent chemoradiation with Xeloda with radiation beginning on 7/9/18. There was a delay in her receiving Xeloda, so she began that on 7/10/18. She completed it on  8/16/18    Repeat scans show great response to treatment with almost completely fibrotic signal.  There is an indeterminate left intertrochanteric lesion which is stable.  Liver lesion is consistent with benign hemangioma vs cyst    She was seen by Dr Hampton and the decision was made to keep her on watchful waiting.    Her MRI of the pelvis in December 2018 was stable but it showed rectal wall edema all the flexible sigmoidoscopy was negative.  Decision was made to do a short term follow-up MRI.      Left intertrochanteric lesion-we did MRI of the left hip which also showed a nonspecific T1 hypointense lesion in the proximal left femur which has not changed since 6/20/2018.  Repeat MRI in December 2018 was also stable.      Pelvis MRI in Feb 2019 was stable    Left Hip MRI on 5/14/19 showed stability of the intertrochanteric region of the left hip.    Colonoscopy on 5/13/19 was without evidence of recurrence- Next due in 3 years.         Interval history  She comes in today accompanied by her  and tells me that she has been feeling good.  Denies any nausea vomiting diarrhea constipation or bleeding.  Energy is good.  No infections.  She thinks her right knee pain has significantly improved on its own.  She denies any pain in the left hip.  Denies any difficulty breathing.  She still has neuropathy involving the feet and she also feels some tingling in her fingers.  It     ECOG 0    ROS:  Rest of the comprehensive review of the system was essentially unremarkable.      I reviewed other hx in EPIC as below    PMH:  Depression  Restless leg syndrome  Dyslipidemia    Current Outpatient Medications   Medication     Acetaminophen (TYLENOL PO)     atorvastatin (LIPITOR) 40 MG tablet     calcium carbonate (TUMS) 500 MG chewable tablet     capecitabine (XELODA) 500 MG tablet CHEMO     diclofenac (VOLTAREN) 75 MG EC tablet     eszopiclone (LUNESTA) 1 MG TABS tablet     FLUoxetine (PROZAC) 20 MG capsule     FLUoxetine  "(PROZAC) 40 MG capsule     lidocaine-prilocaine (EMLA) cream     LORazepam (ATIVAN) 0.5 MG tablet     magic mouthwash (ENTER INGREDIENTS IN COMMENTS) suspension     RANITIDINE HCL PO     No current facility-administered medications for this visit.      Facility-Administered Medications Ordered in Other Visits   Medication     heparin 100 UNIT/ML injection 5 mL     sodium chloride (PF) 0.9% PF flush 20 mL         FHx  Aunt and maternal grandmother had breast cancer  Pateral grandmother  of colorectal cancer  Mother had ischemic colitis    SHx:  , two teenage children  EtOH: 1 drink daily, occasionally more on weekends  Tobacco: never smoker  She is a microbiologist     Allergies   Allergen Reactions     Inapsine [Droperidol] Other (See Comments)     Elevated blood pressure and increased heart rate     Tegaderm Transparent Dressing (Informational Only) Itching     Pt had reaction to Tegaderm used for port dressing. Whole area was very red and itchy. Please use IV 3000 instead.        Exam:  /85 (BP Location: Right arm, Patient Position: Sitting, Cuff Size: Adult Regular)   Pulse 73   Temp 98.2  F (36.8  C) (Oral)   Resp 16   Ht 1.651 m (5' 5\")   Wt 89.9 kg (198 lb 1.6 oz)   SpO2 98%   BMI 32.97 kg/m     Wt Readings from Last 4 Encounters:   19 89.9 kg (198 lb 1.6 oz)   19 87.4 kg (192 lb 9.6 oz)   19 88.3 kg (194 lb 9.6 oz)   18 85.2 kg (187 lb 14.4 oz)       CONSTITUTIONAL: no acute distress  EYES: PERRLA, no palor or icterus.   ENT/MOUTH: no mouth lesions. Ears normal  CVS: s1s2 no m r g .   RESPIRATORY: clear to auscultation b/l  GI: soft non tender no hepatosplenomegaly  NEURO: AAOX3  Grossly non focal neuro exam apart from subjective tingling of the fingers and bottom of the feet.  INTEGUMENT: no obvious rashes  LYMPHATIC: no palpable cervical, supraclavicular, axillary or inguinal LAD  MUSCULOSKELETAL: Unremarkable. No bony tenderness.  No swelling of the right " knee or the left hip and no tenderness of these areas either.  EXTREMITIES: no edema  PSYCH: Mentation, mood and affect are normal. Decision making capacity is intact          Labs.  Reviewed  Results for EDGAR MARTINEZ (MRN [id number]) as of 5/16/2019 12:27   Ref. Range 5/14/2019 08:53   Sodium Latest Ref Range: 133 - 144 mmol/L 142   Potassium Latest Ref Range: 3.4 - 5.3 mmol/L 3.8   Chloride Latest Ref Range: 94 - 109 mmol/L 107   Carbon Dioxide Latest Ref Range: 20 - 32 mmol/L 26   Urea Nitrogen Latest Ref Range: 7 - 30 mg/dL 11   Creatinine Latest Ref Range: 0.52 - 1.04 mg/dL 0.65   GFR Estimate Latest Ref Range: >60 mL/min/1.73_m2 >90   GFR Estimate If Black Latest Ref Range: >60 mL/min/1.73_m2 >90   Calcium Latest Ref Range: 8.5 - 10.1 mg/dL 9.0   Anion Gap Latest Ref Range: 3 - 14 mmol/L 8   Albumin Latest Ref Range: 3.4 - 5.0 g/dL 3.7   Protein Total Latest Ref Range: 6.8 - 8.8 g/dL 6.8   Bilirubin Total Latest Ref Range: 0.2 - 1.3 mg/dL 0.6   Alkaline Phosphatase Latest Ref Range: 40 - 150 U/L 95   ALT Latest Ref Range: 0 - 50 U/L 32   AST Latest Ref Range: 0 - 45 U/L 18   Cholesterol Latest Ref Range: <200 mg/dL 160   HDL Cholesterol Latest Ref Range: >49 mg/dL 60   LDL Cholesterol Calculated Latest Ref Range: <100 mg/dL 85   Non HDL Cholesterol Latest Ref Range: <130 mg/dL 100   Triglycerides Latest Ref Range: <150 mg/dL 72   Glucose Latest Ref Range: 70 - 99 mg/dL 93   WBC Latest Ref Range: 4.0 - 11.0 10e9/L 2.9 (L)   Hemoglobin Latest Ref Range: 11.7 - 15.7 g/dL 12.7   Hematocrit Latest Ref Range: 35.0 - 47.0 % 36.8   Platelet Count Latest Ref Range: 150 - 450 10e9/L 162   RBC Count Latest Ref Range: 3.8 - 5.2 10e12/L 4.10   MCV Latest Ref Range: 78 - 100 fl 90   MCH Latest Ref Range: 26.5 - 33.0 pg 31.0   MCHC Latest Ref Range: 31.5 - 36.5 g/dL 34.5   RDW Latest Ref Range: 10.0 - 15.0 % 12.3   Diff Method Unknown Automated Method   % Neutrophils Latest Units: % 65.7   % Lymphocytes Latest Units: % 21.1    % Monocytes Latest Units: % 8.2   % Eosinophils Latest Units: % 4.4   % Basophils Latest Units: % 0.3   % Immature Granulocytes Latest Units: % 0.3   Nucleated RBCs Latest Ref Range: 0 /100 0   Absolute Neutrophil Latest Ref Range: 1.6 - 8.3 10e9/L 1.9   Absolute Lymphocytes Latest Ref Range: 0.8 - 5.3 10e9/L 0.6 (L)   Absolute Monocytes Latest Ref Range: 0.0 - 1.3 10e9/L 0.2   Absolute Eosinophils Latest Ref Range: 0.0 - 0.7 10e9/L 0.1   Absolute Basophils Latest Ref Range: 0.0 - 0.2 10e9/L 0.0   Abs Immature Granulocytes Latest Ref Range: 0 - 0.4 10e9/L 0.0   Absolute Nucleated RBC Unknown 0.0   CEA Latest Ref Range: 0 - 2.5 ug/L <0.5         Imaging. Reviewed    Pathology    FINAL DIAGNOSIS:    CASE FROM [place], [place] ([id number], OBTAINED 1/9/18):   A. COLON, TRANSVERSE AND DESCENDING, POLYPS, POLYPECTOMY:   - Serrated polyps including sessile serrated adenoma and hyperplastic   polyps, fragmented   - Negative for cytologic dysplasia     B. RECTUM, MASS, BIOPSY:   - Adenocarcinoma, moderately differentiated, invasive   - Mismatch repair proteins are intact by immunohistochemistry     Assessment and Plan  [id number] moderately differentiated adenocarcinoma of the rectum - MSI-stable  She was started on neoadjuvant FOLFOX on 2/26/2018.  after 4 cycles she had good response to treatment. Liver lesions remain indeterminate.    we continued with the same chemotherapy and she received cycle #8 on 6/20/18.  Repeat scans show good response to treatment with only a small signal consistent with viable tumor in the primary rectal cancer. No surrounding lymph nodes suspicious. There is no evidence of metastatic disease.    She started on concurrent chemoradiation with Xeloda with radiation beginning on 7/9/18. There was a delay in her receiving Xeloda, so she began on 7/10/18. She completed it on 8/16/18    Repeat scans show great response to treatment with almost completely fibrotic signal.  There is an  indeterminate left intertrochanteric lesion which is stable.    She was seen by Dr Hampton and the decision was made to keep her on watchful waiting.      She does not have any evidence of recurrence of the disease currently.    Recent colonoscopy on 5/13/2019 was unremarkable.  She will be due for next colonoscopy in 3 years.  She should continue to have flexible sigmoidoscopy every 3 months through Dr. Hampton office.  MRI of the pelvis was stable on 2/5/2019.  We will schedule another MRI of the pelvis in the next few days.  CEA is also normal.  Going forward we will repeat MRI of the pelvis in 3 months and at that time I will also do CT of the chest.  As the recent MRI of the abdomen was unremarkable, I will not do CT of the abdomen in 3 months.    Indeterminate liver lesion. MRI 2/5/19 shows stable lesion, most likely c/w benign hemangioma or cyst.     Left intertrochanteric lesion- Asymptomatic.  Overall this has remained stable even on the most recent MRI on 5/14/2019 and since it is a stable since June 2018, this is most likely a benign finding but at this time I cannot be 100% sure.  I offered her to be seen by orthopedics but she is not interested because she has been asymptomatic and this lesion has been a stable.  I would recommend repeating MRI in 3 months.      Neuropathy-neuropathy is mostly tingling in the feet and also a little bit in the fingers.  I recommend trying acupuncture and she is going to look into it.      Leukopenia.  She has mild leukopenia but WBC differential is unremarkable.  Continue to observe.  Repeat labs in 3 months.      Right knee pain upon kneeling.  X-ray in February 2019 was unremarkable.  Overall her symptoms have significantly improved on their own.  Continue to observe.        Port-A-Cath.  She still has Port-A-Cath in.  She will need port flushes every 4 to 5 weeks.       Return to clinic in 3 months.    All of her and her 's questions were answered to their  satisfaction.  They are agreeable and comfortable with the plan.        Kyra Mortensen

## 2019-05-22 ENCOUNTER — HOSPITAL ENCOUNTER (OUTPATIENT)
Dept: MRI IMAGING | Facility: CLINIC | Age: 54
Discharge: HOME | End: 2019-05-22
Attending: INTERNAL MEDICINE | Admitting: INTERNAL MEDICINE
Payer: COMMERCIAL

## 2019-05-22 DIAGNOSIS — C20 RECTAL CANCER (H): Primary | ICD-10-CM

## 2019-05-22 DIAGNOSIS — C20 MALIGNANT NEOPLASM OF RECTUM (H): ICD-10-CM

## 2019-05-22 PROCEDURE — A9585 GADOBUTROL INJECTION: HCPCS | Performed by: INTERNAL MEDICINE

## 2019-05-22 PROCEDURE — 25000128 H RX IP 250 OP 636: Performed by: INTERNAL MEDICINE

## 2019-05-22 PROCEDURE — 25500064 ZZH RX 255 OP 636: Performed by: INTERNAL MEDICINE

## 2019-05-22 PROCEDURE — 72197 MRI PELVIS W/O & W/DYE: CPT

## 2019-05-22 RX ORDER — GADOBUTROL 604.72 MG/ML
10 INJECTION INTRAVENOUS ONCE
Status: COMPLETED | OUTPATIENT
Start: 2019-05-22 | End: 2019-05-22

## 2019-05-22 RX ADMIN — Medication 5 ML: at 09:49

## 2019-05-22 RX ADMIN — GLUCAGON HYDROCHLORIDE 0.9 MG: 1 INJECTION, POWDER, FOR SOLUTION INTRAMUSCULAR; INTRAVENOUS; SUBCUTANEOUS at 08:39

## 2019-05-22 RX ADMIN — GADOBUTROL 10 ML: 604.72 INJECTION INTRAVENOUS at 08:24

## 2019-05-24 ENCOUNTER — PATIENT OUTREACH (OUTPATIENT)
Dept: ONCOLOGY | Facility: CLINIC | Age: 54
End: 2019-05-24

## 2019-05-24 NOTE — PROGRESS NOTES
LVM with MRI results and plan for f/u with imaging in 3 months.  Encouraged her to call with any additional questions or concerns.   Request for appointments sent to scheduling.

## 2019-06-18 DIAGNOSIS — Z79.899 ENCOUNTER FOR LONG-TERM (CURRENT) USE OF MEDICATIONS: ICD-10-CM

## 2019-06-18 DIAGNOSIS — C20 RECTAL CANCER (H): ICD-10-CM

## 2019-06-18 LAB
ALBUMIN SERPL-MCNC: 3.8 G/DL (ref 3.4–5)
ALP SERPL-CCNC: 97 U/L (ref 40–150)
ALT SERPL W P-5'-P-CCNC: 25 U/L (ref 0–50)
ANION GAP SERPL CALCULATED.3IONS-SCNC: 7 MMOL/L (ref 3–14)
AST SERPL W P-5'-P-CCNC: 15 U/L (ref 0–45)
BASOPHILS # BLD AUTO: 0 10E9/L (ref 0–0.2)
BASOPHILS NFR BLD AUTO: 0.6 %
BILIRUB SERPL-MCNC: 0.6 MG/DL (ref 0.2–1.3)
BUN SERPL-MCNC: 11 MG/DL (ref 7–30)
CALCIUM SERPL-MCNC: 8.8 MG/DL (ref 8.5–10.1)
CHLORIDE SERPL-SCNC: 104 MMOL/L (ref 94–109)
CO2 SERPL-SCNC: 27 MMOL/L (ref 20–32)
CREAT SERPL-MCNC: 0.48 MG/DL (ref 0.52–1.04)
DIFFERENTIAL METHOD BLD: ABNORMAL
EOSINOPHIL # BLD AUTO: 0.1 10E9/L (ref 0–0.7)
EOSINOPHIL NFR BLD AUTO: 3.2 %
ERYTHROCYTE [DISTWIDTH] IN BLOOD BY AUTOMATED COUNT: 12.7 % (ref 10–15)
GFR SERPL CREATININE-BSD FRML MDRD: >90 ML/MIN/{1.73_M2}
GLUCOSE SERPL-MCNC: 91 MG/DL (ref 70–99)
HCT VFR BLD AUTO: 37.2 % (ref 35–47)
HGB BLD-MCNC: 12.5 G/DL (ref 11.7–15.7)
IMM GRANULOCYTES # BLD: 0 10E9/L (ref 0–0.4)
IMM GRANULOCYTES NFR BLD: 0.6 %
LYMPHOCYTES # BLD AUTO: 0.7 10E9/L (ref 0.8–5.3)
LYMPHOCYTES NFR BLD AUTO: 20.1 %
MCH RBC QN AUTO: 30.7 PG (ref 26.5–33)
MCHC RBC AUTO-ENTMCNC: 33.6 G/DL (ref 31.5–36.5)
MCV RBC AUTO: 91 FL (ref 78–100)
MONOCYTES # BLD AUTO: 0.3 10E9/L (ref 0–1.3)
MONOCYTES NFR BLD AUTO: 8 %
NEUTROPHILS # BLD AUTO: 2.3 10E9/L (ref 1.6–8.3)
NEUTROPHILS NFR BLD AUTO: 67.5 %
NRBC # BLD AUTO: 0 10*3/UL
NRBC BLD AUTO-RTO: 0 /100
PLATELET # BLD AUTO: 178 10E9/L (ref 150–450)
POTASSIUM SERPL-SCNC: 3.7 MMOL/L (ref 3.4–5.3)
PROT SERPL-MCNC: 6.8 G/DL (ref 6.8–8.8)
RBC # BLD AUTO: 4.07 10E12/L (ref 3.8–5.2)
SODIUM SERPL-SCNC: 138 MMOL/L (ref 133–144)
WBC # BLD AUTO: 3.4 10E9/L (ref 4–11)

## 2019-06-18 PROCEDURE — 25000128 H RX IP 250 OP 636: Performed by: INTERNAL MEDICINE

## 2019-06-18 PROCEDURE — 80053 COMPREHEN METABOLIC PANEL: CPT | Performed by: INTERNAL MEDICINE

## 2019-06-18 PROCEDURE — 85025 COMPLETE CBC W/AUTO DIFF WBC: CPT | Performed by: INTERNAL MEDICINE

## 2019-06-18 RX ORDER — HEPARIN SODIUM (PORCINE) LOCK FLUSH IV SOLN 100 UNIT/ML 100 UNIT/ML
5 SOLUTION INTRAVENOUS EVERY 8 HOURS
Status: DISCONTINUED | OUTPATIENT
Start: 2019-06-18 | End: 2019-06-26 | Stop reason: HOSPADM

## 2019-06-18 RX ADMIN — HEPARIN 5 ML: 100 SYRINGE at 12:53

## 2019-06-18 NOTE — NURSING NOTE
Chief Complaint   Patient presents with     Port Draw     Right port accessed with a gripper needle, labs drawn and sent, flushed with saline and heparin, deaccessed, gauze dressing applied, no further appointments, no vitals needed.   Kaylah Rodríguez,RN

## 2019-08-01 PROCEDURE — 25000128 H RX IP 250 OP 636: Performed by: INTERNAL MEDICINE

## 2019-08-01 PROCEDURE — 96523 IRRIG DRUG DELIVERY DEVICE: CPT

## 2019-08-01 RX ORDER — HEPARIN SODIUM (PORCINE) LOCK FLUSH IV SOLN 100 UNIT/ML 100 UNIT/ML
5 SOLUTION INTRAVENOUS EVERY 8 HOURS
Status: DISCONTINUED | OUTPATIENT
Start: 2019-08-01 | End: 2019-08-09 | Stop reason: HOSPADM

## 2019-08-01 RX ADMIN — HEPARIN 5 ML: 100 SYRINGE at 10:52

## 2019-08-21 ENCOUNTER — ANCILLARY PROCEDURE (OUTPATIENT)
Dept: CT IMAGING | Facility: CLINIC | Age: 54
End: 2019-08-21
Attending: INTERNAL MEDICINE
Payer: COMMERCIAL

## 2019-08-21 ENCOUNTER — ANCILLARY PROCEDURE (OUTPATIENT)
Dept: MRI IMAGING | Facility: CLINIC | Age: 54
End: 2019-08-21
Attending: INTERNAL MEDICINE
Payer: COMMERCIAL

## 2019-08-21 DIAGNOSIS — M21.852 DEFORMITY OF FEMUR, LEFT: ICD-10-CM

## 2019-08-21 DIAGNOSIS — C20 MALIGNANT NEOPLASM OF RECTUM (H): ICD-10-CM

## 2019-08-21 LAB
ALBUMIN SERPL-MCNC: 3.8 G/DL (ref 3.4–5)
ALP SERPL-CCNC: 102 U/L (ref 40–150)
ALT SERPL W P-5'-P-CCNC: 31 U/L (ref 0–50)
ANION GAP SERPL CALCULATED.3IONS-SCNC: 6 MMOL/L (ref 3–14)
AST SERPL W P-5'-P-CCNC: 15 U/L (ref 0–45)
BASOPHILS # BLD AUTO: 0 10E9/L (ref 0–0.2)
BASOPHILS NFR BLD AUTO: 0.4 %
BILIRUB SERPL-MCNC: 0.5 MG/DL (ref 0.2–1.3)
BUN SERPL-MCNC: 13 MG/DL (ref 7–30)
CALCIUM SERPL-MCNC: 8.6 MG/DL (ref 8.5–10.1)
CEA SERPL-MCNC: <0.5 UG/L (ref 0–2.5)
CHLORIDE SERPL-SCNC: 107 MMOL/L (ref 94–109)
CO2 SERPL-SCNC: 26 MMOL/L (ref 20–32)
CREAT SERPL-MCNC: 0.66 MG/DL (ref 0.52–1.04)
DIFFERENTIAL METHOD BLD: ABNORMAL
EOSINOPHIL # BLD AUTO: 0.1 10E9/L (ref 0–0.7)
EOSINOPHIL NFR BLD AUTO: 2.4 %
ERYTHROCYTE [DISTWIDTH] IN BLOOD BY AUTOMATED COUNT: 12.4 % (ref 10–15)
GFR SERPL CREATININE-BSD FRML MDRD: >90 ML/MIN/{1.73_M2}
GLUCOSE SERPL-MCNC: 93 MG/DL (ref 70–99)
HCT VFR BLD AUTO: 39.1 % (ref 35–47)
HGB BLD-MCNC: 13.4 G/DL (ref 11.7–15.7)
IMM GRANULOCYTES # BLD: 0 10E9/L (ref 0–0.4)
IMM GRANULOCYTES NFR BLD: 0.6 %
LYMPHOCYTES # BLD AUTO: 0.6 10E9/L (ref 0.8–5.3)
LYMPHOCYTES NFR BLD AUTO: 11.4 %
MCH RBC QN AUTO: 31.1 PG (ref 26.5–33)
MCHC RBC AUTO-ENTMCNC: 34.3 G/DL (ref 31.5–36.5)
MCV RBC AUTO: 91 FL (ref 78–100)
MONOCYTES # BLD AUTO: 0.3 10E9/L (ref 0–1.3)
MONOCYTES NFR BLD AUTO: 5.7 %
NEUTROPHILS # BLD AUTO: 3.9 10E9/L (ref 1.6–8.3)
NEUTROPHILS NFR BLD AUTO: 79.5 %
NRBC # BLD AUTO: 0 10*3/UL
NRBC BLD AUTO-RTO: 0 /100
PLATELET # BLD AUTO: 163 10E9/L (ref 150–450)
POTASSIUM SERPL-SCNC: 3.7 MMOL/L (ref 3.4–5.3)
PROT SERPL-MCNC: 6.9 G/DL (ref 6.8–8.8)
RBC # BLD AUTO: 4.31 10E12/L (ref 3.8–5.2)
SODIUM SERPL-SCNC: 139 MMOL/L (ref 133–144)
WBC # BLD AUTO: 4.9 10E9/L (ref 4–11)

## 2019-08-21 PROCEDURE — 82378 CARCINOEMBRYONIC ANTIGEN: CPT | Performed by: INTERNAL MEDICINE

## 2019-08-21 PROCEDURE — 80053 COMPREHEN METABOLIC PANEL: CPT | Performed by: INTERNAL MEDICINE

## 2019-08-21 PROCEDURE — 85025 COMPLETE CBC W/AUTO DIFF WBC: CPT | Performed by: INTERNAL MEDICINE

## 2019-08-21 RX ORDER — GADOBUTROL 604.72 MG/ML
10 INJECTION INTRAVENOUS ONCE
Status: COMPLETED | OUTPATIENT
Start: 2019-08-21 | End: 2019-08-21

## 2019-08-21 RX ADMIN — GADOBUTROL 9 ML: 604.72 INJECTION INTRAVENOUS at 08:37

## 2019-08-21 NOTE — DISCHARGE INSTRUCTIONS
MRI Contrast Discharge Instructions    The IV contrast you received today will pass out of your body in your  urine. This will happen in the next 24 hours. You will not feel this process.  Your urine will not change color.    Drink at least 4 extra glasses of water or juice today (unless your doctor  has restricted your fluids). This reduces the stress on your kidneys.  You may take your regular medicines.    If you are on dialysis: It is best to have dialysis today.    If you have a reaction: Most reactions happen right away. If you have  any new symptoms after leaving the hospital (such as hives or swelling),  call your hospital at the correct number below. Or call your family doctor.  If you have breathing distress or wheezing, call 911.    Special instructions: ***    I have read and understand the above information.    Signature:______________________________________ Date:___________    Staff:__________________________________________ Date:___________     Time:__________    [place] [facility] Departments:    ___[place]: [phone]  ___[facility]: [phone]  ___[place]: [phone] ___[facility]: [phone]  ___[facility]: [phone]  ___[facility]: [phone]  ___[phone]  ___[facility]: [phone]  ___[phone]

## 2019-08-21 NOTE — NURSING NOTE
Chief Complaint   Patient presents with     Blood Draw     Labs drawn via  by RN in lab.     Labs collected from venipuncture by RN.     Angelina Salas RN

## 2019-09-05 ENCOUNTER — TELEPHONE (OUTPATIENT)
Dept: SURGERY | Facility: CLINIC | Age: 54
End: 2019-09-05

## 2019-09-05 ENCOUNTER — ONCOLOGY VISIT (OUTPATIENT)
Dept: ONCOLOGY | Facility: CLINIC | Age: 54
End: 2019-09-05
Attending: INTERNAL MEDICINE
Payer: COMMERCIAL

## 2019-09-05 VITALS
RESPIRATION RATE: 16 BRPM | TEMPERATURE: 98 F | DIASTOLIC BLOOD PRESSURE: 84 MMHG | WEIGHT: 206.3 LBS | HEART RATE: 85 BPM | SYSTOLIC BLOOD PRESSURE: 120 MMHG | OXYGEN SATURATION: 97 % | BODY MASS INDEX: 34.37 KG/M2 | HEIGHT: 65 IN

## 2019-09-05 DIAGNOSIS — C20 MALIGNANT NEOPLASM OF RECTUM (H): Primary | ICD-10-CM

## 2019-09-05 PROCEDURE — 99214 OFFICE O/P EST MOD 30 MIN: CPT | Mod: ZP | Performed by: INTERNAL MEDICINE

## 2019-09-05 PROCEDURE — G0463 HOSPITAL OUTPT CLINIC VISIT: HCPCS | Mod: ZF

## 2019-09-05 ASSESSMENT — PAIN SCALES - GENERAL: PAINLEVEL: NO PAIN (0)

## 2019-09-05 ASSESSMENT — MIFFLIN-ST. JEOR: SCORE: 1546.65

## 2019-09-05 NOTE — TELEPHONE ENCOUNTER
Left message for patient to call back to schedule flex sig appointment with Dr. Hampton. Left direct number for patient to call back.    Brett Solares LPN

## 2019-09-05 NOTE — NURSING NOTE
"Oncology Rooming Note    September 5, 2019 1:28 PM   Sarah Rajan is a 53 year old female who presents for:    Chief Complaint   Patient presents with     Oncology Clinic Visit     [facility] RETURN- RECTAL CA     Initial Vitals: /84 (BP Location: Right arm, Patient Position: Chair, Cuff Size: Adult Regular)   Pulse 85   Temp 98  F (36.7  C)   Resp 16   Ht 1.651 m (5' 5\")   Wt 93.6 kg (206 lb 4.8 oz)   SpO2 97%   BMI 34.33 kg/m   Estimated body mass index is 34.33 kg/m  as calculated from the following:    Height as of this encounter: 1.651 m (5' 5\").    Weight as of this encounter: 93.6 kg (206 lb 4.8 oz). Body surface area is 2.07 meters squared.  No Pain (0) Comment: Data Unavailable   No LMP recorded. Patient is postmenopausal.  Allergies reviewed: Yes  Medications reviewed: Yes    Medications: Medication refills not needed today.  Pharmacy name entered into [facility]:    [id number] - [place] [id number] AT [facility] [place], [place] [address]  [id number] - [place], [place] - [address] [place] [address] & [facility] - [place], [place] - [address]    Clinical concerns: No new concerns. Egan was notified.      Anish Pak LPN            "

## 2019-09-05 NOTE — PROGRESS NOTES
Oncology outpatient note    Date of service: 9/5/2019       PC: Rectal cancer. [id number] - MSI Intact    HPI:  Please see previous note for details. I have copied and updated from prior note.  She is a 53-year-old female noticed BRBPR so Screening colonoscopy on 1/9/2018 revealed 3cm rectal mass and other polyps which were removed.  Pathology indicated moderately differentiated adenocarcinoma w/ intact MMR proteins.  CT staging scan showed no metastatic disease; some liver lesions which are thought to be benign per radiology report, T2N1M0 by pelvic MRI read at [facility]. When we initially reviewed her MRI we will not exactly sure whether that was lymph node involvement or not. We opted to repeat MRI which showed T4 N0 lesion. There was up to take her to surgery as there was possibility of personally lesion without lymph node involvement but because of the findings of repeat MRI the surgery was canceled and she is coming back to discuss neoadjuvant treatment.  We also did an MRI of the liver which showed 3 subcentimeter liver lesions which were too small to characterize and will need attention on follow-up.  She started neoadjuvant 5-FU and oxaliplatin (FOLFOX), which she started on 2/26/18. The day after she received cycle 2, she developed fevers, chills, headache, and an itchy rash on her arms and legs, for which she was evaluated in the ED. She was sent home on Benadryl. Benadryl and dexamethasone doses were increased for cycle 3.   She tolerated that cycle well    C#4 was given on 4/10/18    She completed 8 cycles of FOLFOX on 6/5/18    Repeat scans show good response to treatment without evidence of metastatic disease. There is only a small signal consistent with residual tumor seen in the primary rectal cancer lesion.    She started on concurrent chemoradiation with Xeloda with radiation beginning on 7/9/18. There was a delay in her receiving Xeloda, so she began that on 7/10/18. She completed it on  8/16/18    Repeat scans show great response to treatment with almost completely fibrotic signal.  There is an indeterminate left intertrochanteric lesion which is stable.  Liver lesion is consistent with benign hemangioma vs cyst    She was seen by Dr Hampton and the decision was made to keep her on watchful waiting.    Her MRI of the pelvis in December 2018 was stable but it showed rectal wall edema all the flexible sigmoidoscopy was negative.  Decision was made to do a short term follow-up MRI.      Left intertrochanteric lesion-we did MRI of the left hip which also showed a nonspecific T1 hypointense lesion in the proximal left femur which has not changed since 6/20/2018.  Repeat MRI in December 2018 , 5/14/19 and 8/20/19 were also stable.      Pelvis MRI in Feb 2019 was stable.    Colonoscopy on 5/13/19 was without evidence of recurrence- Next due in 3 years.     Pelvis MRI on 8/21/19 continues to show complete response to treatment.      Interval history  She comes in today accompanied by her  and tells me that she has been doing well.  Denies any issues with bowel movement or bleeding.  Energy is good.  Denies any infections.  No shortness of breath.  Knee is feeling well.  She denies any hip pain.  She thinks neuropathy has improved in the hands and she has mild numbness there.  Neuropathy is a still present in the feet.  She has not tried acupuncture.  Denies any neuropathic pain.        ECOG 0    ROS:  A comprehensive ROS was otherwise neg      I reviewed other hx in EPIC as below    PMH:  Depression  Restless leg syndrome  Dyslipidemia    Current Outpatient Medications   Medication     Acetaminophen (TYLENOL PO)     amitriptyline (ELAVIL) 10 MG tablet     atorvastatin (LIPITOR) 40 MG tablet     calcium carbonate (TUMS) 500 MG chewable tablet     FLUoxetine (PROZAC) 20 MG capsule     lidocaine-prilocaine (EMLA) cream     RANITIDINE HCL PO     capecitabine (XELODA) 500 MG tablet CHEMO     diclofenac  "(VOLTAREN) 75 MG EC tablet     eszopiclone (LUNESTA) 1 MG TABS tablet     FLUoxetine (PROZAC) 40 MG capsule     LORazepam (ATIVAN) 0.5 MG tablet     magic mouthwash (ENTER INGREDIENTS IN COMMENTS) suspension     No current facility-administered medications for this visit.          FHx  Aunt and maternal grandmother had breast cancer  Pateral grandmother  of colorectal cancer  Mother had ischemic colitis    SHx:  , two teenage children  EtOH: 1 drink daily, occasionally more on weekends  Tobacco: never smoker  She is a microbiologist     Allergies   Allergen Reactions     Inapsine [Droperidol] Other (See Comments)     Elevated blood pressure and increased heart rate     Tegaderm Transparent Dressing (Informational Only) Itching     Pt had reaction to Tegaderm used for port dressing. Whole area was very red and itchy. Please use IV 3000 instead.        Exam:  /84 (BP Location: Right arm, Patient Position: Chair, Cuff Size: Adult Regular)   Pulse 85   Temp 98  F (36.7  C)   Resp 16   Ht 1.651 m (5' 5\")   Wt 93.6 kg (206 lb 4.8 oz)   SpO2 97%   BMI 34.33 kg/m     Wt Readings from Last 4 Encounters:   19 93.6 kg (206 lb 4.8 oz)   19 89.9 kg (198 lb 1.6 oz)   19 87.4 kg (192 lb 9.6 oz)   19 88.3 kg (194 lb 9.6 oz)       CONSTITUTIONAL: No apparent distress  EYES: PERRLA, without pallor or jaundice  ENT/MOUTH: Ears unremarkable. No oral lesions  CVS: s1s2 normal  RESPIRATORY: Chest is clear  GI: Abdomen is benign  NEURO: Alert and oriented ×3 apart from subjective neuropathy of the feet and mildly of the fingers.  INTEGUMENT: no concerning skin rashes.  Port site is clean.  LYMPHATIC: no palpable lymphadenopathy  MUSCULOSKELETAL: Unremarkable. No bony tenderness.   EXTREMITIES: no pedal edema  PSYCH: Mentation, mood and affect are appropriate          Labs.  Reviewed    Results for EDGAR MARTINEZ (MRN [id number]) as of 2019 12:53   Ref. Range 2019 10:27   Sodium " Latest Ref Range: 133 - 144 mmol/L 139   Potassium Latest Ref Range: 3.4 - 5.3 mmol/L 3.7   Chloride Latest Ref Range: 94 - 109 mmol/L 107   Carbon Dioxide Latest Ref Range: 20 - 32 mmol/L 26   Urea Nitrogen Latest Ref Range: 7 - 30 mg/dL 13   Creatinine Latest Ref Range: 0.52 - 1.04 mg/dL 0.66   GFR Estimate Latest Ref Range: >60 mL/min/1.73_m2 >90   GFR Estimate If Black Latest Ref Range: >60 mL/min/1.73_m2 >90   Calcium Latest Ref Range: 8.5 - 10.1 mg/dL 8.6   Anion Gap Latest Ref Range: 3 - 14 mmol/L 6   Albumin Latest Ref Range: 3.4 - 5.0 g/dL 3.8   Protein Total Latest Ref Range: 6.8 - 8.8 g/dL 6.9   Bilirubin Total Latest Ref Range: 0.2 - 1.3 mg/dL 0.5   Alkaline Phosphatase Latest Ref Range: 40 - 150 U/L 102   ALT Latest Ref Range: 0 - 50 U/L 31   AST Latest Ref Range: 0 - 45 U/L 15   Glucose Latest Ref Range: 70 - 99 mg/dL 93   WBC Latest Ref Range: 4.0 - 11.0 10e9/L 4.9   Hemoglobin Latest Ref Range: 11.7 - 15.7 g/dL 13.4   Hematocrit Latest Ref Range: 35.0 - 47.0 % 39.1   Platelet Count Latest Ref Range: 150 - 450 10e9/L 163   RBC Count Latest Ref Range: 3.8 - 5.2 10e12/L 4.31   MCV Latest Ref Range: 78 - 100 fl 91   MCH Latest Ref Range: 26.5 - 33.0 pg 31.1   MCHC Latest Ref Range: 31.5 - 36.5 g/dL 34.3   RDW Latest Ref Range: 10.0 - 15.0 % 12.4   Diff Method Unknown Automated Method   % Neutrophils Latest Units: % 79.5   % Lymphocytes Latest Units: % 11.4   % Monocytes Latest Units: % 5.7   % Eosinophils Latest Units: % 2.4   % Basophils Latest Units: % 0.4   % Immature Granulocytes Latest Units: % 0.6   Nucleated RBCs Latest Ref Range: 0 /100 0   Absolute Neutrophil Latest Ref Range: 1.6 - 8.3 10e9/L 3.9   Absolute Lymphocytes Latest Ref Range: 0.8 - 5.3 10e9/L 0.6 (L)   Absolute Monocytes Latest Ref Range: 0.0 - 1.3 10e9/L 0.3   Absolute Eosinophils Latest Ref Range: 0.0 - 0.7 10e9/L 0.1   Absolute Basophils Latest Ref Range: 0.0 - 0.2 10e9/L 0.0   Abs Immature Granulocytes Latest Ref Range: 0 - 0.4  10e9/L 0.0   Absolute Nucleated RBC Unknown 0.0   CEA Latest Ref Range: 0 - 2.5 ug/L <0.5         Imaging. Reviewed    Examination: CT CHEST W/O CONTRAST, 8/21/2019 8:34 AM      History: follow up for rectal cancer; Malignant neoplasm of rectum (H)     Comparison: 9/21/2018 and 6/20/2018     TECHNIQUE: Helical acquisition of CT images from the lung apices to  the kidneys without IV contrast. Coronal and axial MIP images were  reconstructed from the source data.     FINDINGS:      Lungs:  No pleural effusion or pneumothorax. No focal consolidation. Calcific  granuloma in the posterior left upper lobe. No new or enlarging  pulmonary nodule. The central tracheobronchial tree is clear.     Chest:   Normal heart size. No pericardial effusion. Normal caliber ascending  aorta and main pulmonary artery. Moderate coronary artery  calcifications. Calcified right hilar lymph nodes. No thoracic  lymphadenopathy. Right chest wall internal jugular Port-A-Cath tip at  the low SVC.     Upper abdomen: Limited evaluation of the upper abdomen. Calcified  granulomas in the spleen and liver.     Bones: No acute or aggressive osseus abnormality.                                                                      IMPRESSION:   1. No evidence of metastatic disease in the chest.  2. Sequela of prior granulomatous disease.       Exam: MRI of the left hip dated 8/21/2019.     COMPARISON: 5/14/2018.     CLINICAL HISTORY: History of rectal cancer, follow-up known lesion in  the left hip.     TECHNIQUE: Multiplanar, multisequence MR imaging of the left hip was  obtained using standard sequences in 3 orthogonal planes before and  after the uneventful administration of intravenous gadolinium  contrast.     FINDINGS:     Small amount of fluid in the left hip joint.     No marrow signal abnormalities to suggest fracture or osteonecrosis.     Redemonstration of known area of marrow infiltration with low T1 and  low T2 fat-sat signal within the  intertrochanteric region of the left  hip. No significant change in size or appearance of this lesion since  the comparison MRI measuring approximately 2.0 x 1.8 cm, coronal  series image 13.     No subchondral bone marrow edema at the hip joint to suggest  full-thickness cartilage loss. Unchanged tearing of the anterior  superior labrum likely chronic.     No lymphadenopathy. Sciatic nerve is unremarkable. The muscle bulk is  intact. No full-thickness tear or tendon retraction. Mild tendinosis  of the hamstring tendons with mild insertional tendinopathy of the  gluteus minimus and gluteus medius tendons.                                                                      IMPRESSION:  1. Stable appearance and size of the known bone marrow lesion within  the intertrochanteric region of the left hip measuring at least 2.0 x  1.8 cm. Differential still includes metastatic disease. Continued  follow-up recommended.  2. Unchanged tearing of the left hip anterior superior labrum.  3. No new lesions noted.    EXAMINATION: MR PELVIS (INTRAPELVIC ORGANS) WO&W CONTRAST,  8/21/2019 10:03 AM      COMPARISON: Rectal MRI 5/22/2019 and 2/8/2018     TECHNIQUE:  Images were acquired without and with intravenous contrast  through the pelvis. The following MR images were acquired without  contrast: multiplanar T1, multiplanar T2, axial diffusion-weighted and  axial apparent diffusion coefficient. T1-weighted images with fat  saturation were acquired at the following intervals relative to  intravenous contrast administration: pre-contrast, immediate post  contrast, 1 minute, 3 minutes and delayed.  Contrast dose: 9.0mL  Gadavist     HISTORY: follow up rectal cancer; Malignant neoplasm of rectum (H)     Additional history from the EMR:T4 N0 neoadjuvant 5-FU and oxaliplatin  (FOLFOX) started on February 2018: Concurrent chemoradiation with  Xeloda completed August 2018.Colonoscopy on 5/13/19 was without  evidence of  recurrence     FINDINGS:     LOCATION/SIZE: No suspicious enhancement, mass effect or T2  hyperintensity within the vicinity of treated tumor in the lower  rectum consistent with tumour response grade of mrTRG-1.      EXTENT:  The tumor does not clearly involve the anal sphincters or levator ani  muscle.      CIRCUMFERENTIAL RESECTION MARGIN (CRM):  In terms of the resection margin, no involvement of the mesorectal  fascia.     LYMPH NODES:  No suspicious pelvic lymph nodes.     VEINS:  No evidence of extramural venous extension.      MISCELLANEOUS FINDINGS:  Redemonstration of T1/T2 hypointense enhancing lesion in the left  femoral intertrochanteric region, better seen/described in the same  day hip MRI. Again, there is  section changes in the anterior  aspect of the lower uterus. Scattered colonic diverticulosis without  evidence of diverticulitis                                                                      IMPRESSION:   1. Stable MRI since 2019, again there is complete response to  treatment, with a corresponding tumor regression grade of mrTRG-1.    2. No suspicious pelvic lymphadenopathy.  3. Stable left femoral intertrochanteric lesion, better seen/described  in same-day hip MRI.        Pathology    FINAL DIAGNOSIS:    CASE FROM [place], [place] ([id number], OBTAINED 18):   A. COLON, TRANSVERSE AND DESCENDING, POLYPS, POLYPECTOMY:   - Serrated polyps including sessile serrated adenoma and hyperplastic   polyps, fragmented   - Negative for cytologic dysplasia     B. RECTUM, MASS, BIOPSY:   - Adenocarcinoma, moderately differentiated, invasive   - Mismatch repair proteins are intact by immunohistochemistry     Assessment and Plan  [id number] moderately differentiated adenocarcinoma of the rectum - MSI-stable  She was started on neoadjuvant FOLFOX on 2018.  after 4 cycles she had good response to treatment. Liver lesions remain indeterminate.    we continued with the same  chemotherapy and she received cycle #8 on 6/20/18.  Repeat scans show good response to treatment with only a small signal consistent with viable tumor in the primary rectal cancer. No surrounding lymph nodes suspicious. There is no evidence of metastatic disease.    She started on concurrent chemoradiation with Xeloda with radiation beginning on 7/9/18. There was a delay in her receiving Xeloda, so she began on 7/10/18. She completed it on 8/16/18    Repeat scans show great response to treatment with almost completely fibrotic signal.  There is an indeterminate left intertrochanteric lesion which is stable.    She was seen by Dr Hampton and the decision was made to keep her on watchful waiting.      She does not have any evidence of recurrence of the disease currently.    Discussion regarding surveillance:  Recent colonoscopy on 5/13/2019 was unremarkable.  She will be due for next colonoscopy in 3 years.    She should have flex sig every 3 months for first 3 years- She will contact Dr Hampton's office to arrange this.  ? Flex sig will be every 6 months for years 4-5 and then every year for next 5 years.     She will have 3T MRI Pelvis every 4 months for 2 years and and then every 6 months for years 3-4 and then every year for years 5-6.  We will plan to repeat Pelvic MRI in 4 months.    She should have annual CT CAP for 5-6 years.     Will check CEA at every clinic visit.        I also recommend follow-up with cancer survivorship clinic.    Indeterminate liver lesion. MRI 2/5/19 shows stable lesion, most likely c/w benign hemangioma or cyst.  At this time I do not plan to repeat MRI abdomen but when we will do CT scan, it will be monitored.    Left intertrochanteric lesion- Asymptomatic.  Overall this has remained stable even on the most recent MRI on 8/21/2019 and since it is a stable since June 2018, this is most likely a benign finding but at this time I cannot be 100% sure.    Will repeat MRI in 6 months or so.        Neuropathy-it has improved in the hands but she still has numbness in her feet.  I again recommended trying acupuncture.  I do not believe that she will benefit from medications because it manifests as numbness only.    These have improved.    Right knee pain upon kneeling.  X-ray in February 2019 was unremarkable.  This has improved.        Port-A-Cath.  She needs port flushes every 4 to 5 weeks.          Return to clinic in 4 months.    All of her and her 's questions were answered to their satisfaction.  They are agreeable and comfortable with the plan.        Kyra Mortensen

## 2019-09-05 NOTE — PATIENT INSTRUCTIONS
Follow with Dr Hampton    CEA every 3 months or clinic visit    Schedule MRI Pelvis in 4 months and see me a few days after that    Port flushes every 4-5 weeks    Refer to cancer survivorship

## 2019-09-05 NOTE — LETTER
9/5/2019       RE: Sarah Rajan  [address] [facility] [id number]     Dear Colleague,    Thank you for referring your patient, Sarah Rajan, to the [facility]. Please see a copy of my visit note below.    Oncology outpatient note    Date of service: 9/5/2019       PC: Rectal cancer. [id number] - MSI Intact    HPI:  Please see previous note for details. I have copied and updated from prior note.  She is a 53-year-old female noticed BRBPR so Screening colonoscopy on 1/9/2018 revealed 3cm rectal mass and other polyps which were removed.  Pathology indicated moderately differentiated adenocarcinoma w/ intact MMR proteins.  CT staging scan showed no metastatic disease; some liver lesions which are thought to be benign per radiology report, T2N1M0 by pelvic MRI read at [facility]. When we initially reviewed her MRI we will not exactly sure whether that was lymph node involvement or not. We opted to repeat MRI which showed T4 N0 lesion. There was up to take her to surgery as there was possibility of personally lesion without lymph node involvement but because of the findings of repeat MRI the surgery was canceled and she is coming back to discuss neoadjuvant treatment.  We also did an MRI of the liver which showed 3 subcentimeter liver lesions which were too small to characterize and will need attention on follow-up.  She started neoadjuvant 5-FU and oxaliplatin (FOLFOX), which she started on 2/26/18. The day after she received cycle 2, she developed fevers, chills, headache, and an itchy rash on her arms and legs, for which she was evaluated in the ED. She was sent home on Benadryl. Benadryl and dexamethasone doses were increased for cycle 3.   She tolerated that cycle well    C#4 was given on 4/10/18    She completed 8 cycles of FOLFOX on 6/5/18    Repeat scans show good response to treatment without evidence of metastatic disease. There is only a small signal consistent with residual tumor seen  in the primary rectal cancer lesion.    She started on concurrent chemoradiation with Xeloda with radiation beginning on 7/9/18. There was a delay in her receiving Xeloda, so she began that on 7/10/18. She completed it on 8/16/18    Repeat scans show great response to treatment with almost completely fibrotic signal.  There is an indeterminate left intertrochanteric lesion which is stable.  Liver lesion is consistent with benign hemangioma vs cyst    She was seen by Dr Hampton and the decision was made to keep her on watchful waiting.    Her MRI of the pelvis in December 2018 was stable but it showed rectal wall edema all the flexible sigmoidoscopy was negative.  Decision was made to do a short term follow-up MRI.      Left intertrochanteric lesion-we did MRI of the left hip which also showed a nonspecific T1 hypointense lesion in the proximal left femur which has not changed since 6/20/2018.  Repeat MRI in December 2018 , 5/14/19 and 8/20/19 were also stable.      Pelvis MRI in Feb 2019 was stable.    Colonoscopy on 5/13/19 was without evidence of recurrence- Next due in 3 years.     Pelvis MRI on 8/21/19 continues to show complete response to treatment.      Interval history  She comes in today accompanied by her  and tells me that she has been doing well.  Denies any issues with bowel movement or bleeding.  Energy is good.  Denies any infections.  No shortness of breath.  Knee is feeling well.  She denies any hip pain.  She thinks neuropathy has improved in the hands and she has mild numbness there.  Neuropathy is a still present in the feet.  She has not tried acupuncture.  Denies any neuropathic pain.        ECOG 0    ROS:  A comprehensive ROS was otherwise neg      I reviewed other hx in EPIC as below    PMH:  Depression  Restless leg syndrome  Dyslipidemia    Current Outpatient Medications   Medication     Acetaminophen (TYLENOL PO)     amitriptyline (ELAVIL) 10 MG tablet     atorvastatin (LIPITOR) 40  "MG tablet     calcium carbonate (TUMS) 500 MG chewable tablet     FLUoxetine (PROZAC) 20 MG capsule     lidocaine-prilocaine (EMLA) cream     RANITIDINE HCL PO     capecitabine (XELODA) 500 MG tablet CHEMO     diclofenac (VOLTAREN) 75 MG EC tablet     eszopiclone (LUNESTA) 1 MG TABS tablet     FLUoxetine (PROZAC) 40 MG capsule     LORazepam (ATIVAN) 0.5 MG tablet     magic mouthwash (ENTER INGREDIENTS IN COMMENTS) suspension     No current facility-administered medications for this visit.          FHx  Aunt and maternal grandmother had breast cancer  Pateral grandmother  of colorectal cancer  Mother had ischemic colitis    SHx:  , two teenage children  EtOH: 1 drink daily, occasionally more on weekends  Tobacco: never smoker  She is a microbiologist     Allergies   Allergen Reactions     Inapsine [Droperidol] Other (See Comments)     Elevated blood pressure and increased heart rate     Tegaderm Transparent Dressing (Informational Only) Itching     Pt had reaction to Tegaderm used for port dressing. Whole area was very red and itchy. Please use IV 3000 instead.        Exam:  /84 (BP Location: Right arm, Patient Position: Chair, Cuff Size: Adult Regular)   Pulse 85   Temp 98  F (36.7  C)   Resp 16   Ht 1.651 m (5' 5\")   Wt 93.6 kg (206 lb 4.8 oz)   SpO2 97%   BMI 34.33 kg/m      Wt Readings from Last 4 Encounters:   19 93.6 kg (206 lb 4.8 oz)   19 89.9 kg (198 lb 1.6 oz)   19 87.4 kg (192 lb 9.6 oz)   19 88.3 kg (194 lb 9.6 oz)       CONSTITUTIONAL: No apparent distress  EYES: PERRLA, without pallor or jaundice  ENT/MOUTH: Ears unremarkable. No oral lesions  CVS: s1s2 normal  RESPIRATORY: Chest is clear  GI: Abdomen is benign  NEURO: Alert and oriented ×3 apart from subjective neuropathy of the feet and mildly of the fingers.  INTEGUMENT: no concerning skin rashes.  Port site is clean.  LYMPHATIC: no palpable lymphadenopathy  MUSCULOSKELETAL: Unremarkable. No bony " tenderness.   EXTREMITIES: no pedal edema  PSYCH: Mentation, mood and affect are appropriate          Labs.  Reviewed    Results for EDGAR MARTINEZ (MRN [id number]) as of 9/5/2019 12:53   Ref. Range 8/21/2019 10:27   Sodium Latest Ref Range: 133 - 144 mmol/L 139   Potassium Latest Ref Range: 3.4 - 5.3 mmol/L 3.7   Chloride Latest Ref Range: 94 - 109 mmol/L 107   Carbon Dioxide Latest Ref Range: 20 - 32 mmol/L 26   Urea Nitrogen Latest Ref Range: 7 - 30 mg/dL 13   Creatinine Latest Ref Range: 0.52 - 1.04 mg/dL 0.66   GFR Estimate Latest Ref Range: >60 mL/min/1.73_m2 >90   GFR Estimate If Black Latest Ref Range: >60 mL/min/1.73_m2 >90   Calcium Latest Ref Range: 8.5 - 10.1 mg/dL 8.6   Anion Gap Latest Ref Range: 3 - 14 mmol/L 6   Albumin Latest Ref Range: 3.4 - 5.0 g/dL 3.8   Protein Total Latest Ref Range: 6.8 - 8.8 g/dL 6.9   Bilirubin Total Latest Ref Range: 0.2 - 1.3 mg/dL 0.5   Alkaline Phosphatase Latest Ref Range: 40 - 150 U/L 102   ALT Latest Ref Range: 0 - 50 U/L 31   AST Latest Ref Range: 0 - 45 U/L 15   Glucose Latest Ref Range: 70 - 99 mg/dL 93   WBC Latest Ref Range: 4.0 - 11.0 10e9/L 4.9   Hemoglobin Latest Ref Range: 11.7 - 15.7 g/dL 13.4   Hematocrit Latest Ref Range: 35.0 - 47.0 % 39.1   Platelet Count Latest Ref Range: 150 - 450 10e9/L 163   RBC Count Latest Ref Range: 3.8 - 5.2 10e12/L 4.31   MCV Latest Ref Range: 78 - 100 fl 91   MCH Latest Ref Range: 26.5 - 33.0 pg 31.1   MCHC Latest Ref Range: 31.5 - 36.5 g/dL 34.3   RDW Latest Ref Range: 10.0 - 15.0 % 12.4   Diff Method Unknown Automated Method   % Neutrophils Latest Units: % 79.5   % Lymphocytes Latest Units: % 11.4   % Monocytes Latest Units: % 5.7   % Eosinophils Latest Units: % 2.4   % Basophils Latest Units: % 0.4   % Immature Granulocytes Latest Units: % 0.6   Nucleated RBCs Latest Ref Range: 0 /100 0   Absolute Neutrophil Latest Ref Range: 1.6 - 8.3 10e9/L 3.9   Absolute Lymphocytes Latest Ref Range: 0.8 - 5.3 10e9/L 0.6 (L)   Absolute  Monocytes Latest Ref Range: 0.0 - 1.3 10e9/L 0.3   Absolute Eosinophils Latest Ref Range: 0.0 - 0.7 10e9/L 0.1   Absolute Basophils Latest Ref Range: 0.0 - 0.2 10e9/L 0.0   Abs Immature Granulocytes Latest Ref Range: 0 - 0.4 10e9/L 0.0   Absolute Nucleated RBC Unknown 0.0   CEA Latest Ref Range: 0 - 2.5 ug/L <0.5         Imaging. Reviewed    Examination: CT CHEST W/O CONTRAST, 8/21/2019 8:34 AM      History: follow up for rectal cancer; Malignant neoplasm of rectum (H)     Comparison: 9/21/2018 and 6/20/2018     TECHNIQUE: Helical acquisition of CT images from the lung apices to  the kidneys without IV contrast. Coronal and axial MIP images were  reconstructed from the source data.     FINDINGS:      Lungs:  No pleural effusion or pneumothorax. No focal consolidation. Calcific  granuloma in the posterior left upper lobe. No new or enlarging  pulmonary nodule. The central tracheobronchial tree is clear.     Chest:   Normal heart size. No pericardial effusion. Normal caliber ascending  aorta and main pulmonary artery. Moderate coronary artery  calcifications. Calcified right hilar lymph nodes. No thoracic  lymphadenopathy. Right chest wall internal jugular Port-A-Cath tip at  the low SVC.     Upper abdomen: Limited evaluation of the upper abdomen. Calcified  granulomas in the spleen and liver.     Bones: No acute or aggressive osseus abnormality.                                                                      IMPRESSION:   1. No evidence of metastatic disease in the chest.  2. Sequela of prior granulomatous disease.       Exam: MRI of the left hip dated 8/21/2019.     COMPARISON: 5/14/2018.     CLINICAL HISTORY: History of rectal cancer, follow-up known lesion in  the left hip.     TECHNIQUE: Multiplanar, multisequence MR imaging of the left hip was  obtained using standard sequences in 3 orthogonal planes before and  after the uneventful administration of intravenous gadolinium  contrast.     FINDINGS:     Small  amount of fluid in the left hip joint.     No marrow signal abnormalities to suggest fracture or osteonecrosis.     Redemonstration of known area of marrow infiltration with low T1 and  low T2 fat-sat signal within the intertrochanteric region of the left  hip. No significant change in size or appearance of this lesion since  the comparison MRI measuring approximately 2.0 x 1.8 cm, coronal  series image 13.     No subchondral bone marrow edema at the hip joint to suggest  full-thickness cartilage loss. Unchanged tearing of the anterior  superior labrum likely chronic.     No lymphadenopathy. Sciatic nerve is unremarkable. The muscle bulk is  intact. No full-thickness tear or tendon retraction. Mild tendinosis  of the hamstring tendons with mild insertional tendinopathy of the  gluteus minimus and gluteus medius tendons.                                                                      IMPRESSION:  1. Stable appearance and size of the known bone marrow lesion within  the intertrochanteric region of the left hip measuring at least 2.0 x  1.8 cm. Differential still includes metastatic disease. Continued  follow-up recommended.  2. Unchanged tearing of the left hip anterior superior labrum.  3. No new lesions noted.    EXAMINATION: MR PELVIS (INTRAPELVIC ORGANS) WO&W CONTRAST,  8/21/2019 10:03 AM      COMPARISON: Rectal MRI 5/22/2019 and 2/8/2018     TECHNIQUE:  Images were acquired without and with intravenous contrast  through the pelvis. The following MR images were acquired without  contrast: multiplanar T1, multiplanar T2, axial diffusion-weighted and  axial apparent diffusion coefficient. T1-weighted images with fat  saturation were acquired at the following intervals relative to  intravenous contrast administration: pre-contrast, immediate post  contrast, 1 minute, 3 minutes and delayed.  Contrast dose: 9.0mL  Gadavist     HISTORY: follow up rectal cancer; Malignant neoplasm of rectum (H)     Additional  history from the EMR:T4 N0 neoadjuvant 5-FU and oxaliplatin  (FOLFOX) started on 2018: Concurrent chemoradiation with  Xeloda completed 2018.Colonoscopy on 19 was without  evidence of recurrence     FINDINGS:     LOCATION/SIZE: No suspicious enhancement, mass effect or T2  hyperintensity within the vicinity of treated tumor in the lower  rectum consistent with tumour response grade of mrTRG-1.      EXTENT:  The tumor does not clearly involve the anal sphincters or levator ani  muscle.      CIRCUMFERENTIAL RESECTION MARGIN (CRM):  In terms of the resection margin, no involvement of the mesorectal  fascia.     LYMPH NODES:  No suspicious pelvic lymph nodes.     VEINS:  No evidence of extramural venous extension.      MISCELLANEOUS FINDINGS:  Redemonstration of T1/T2 hypointense enhancing lesion in the left  femoral intertrochanteric region, better seen/described in the same  day hip MRI. Again, there is  section changes in the anterior  aspect of the lower uterus. Scattered colonic diverticulosis without  evidence of diverticulitis                                                                      IMPRESSION:   1. Stable MRI since 2019, again there is complete response to  treatment, with a corresponding tumor regression grade of mrTRG-1.    2. No suspicious pelvic lymphadenopathy.  3. Stable left femoral intertrochanteric lesion, better seen/described  in same-day hip MRI.        Pathology    FINAL DIAGNOSIS:    CASE FROM [place], [place] ([id number], OBTAINED 18):   A. COLON, TRANSVERSE AND DESCENDING, POLYPS, POLYPECTOMY:   - Serrated polyps including sessile serrated adenoma and hyperplastic   polyps, fragmented   - Negative for cytologic dysplasia     B. RECTUM, MASS, BIOPSY:   - Adenocarcinoma, moderately differentiated, invasive   - Mismatch repair proteins are intact by immunohistochemistry     Assessment and Plan  [id number] moderately differentiated  adenocarcinoma of the rectum - MSI-stable  She was started on neoadjuvant FOLFOX on 2/26/2018.  after 4 cycles she had good response to treatment. Liver lesions remain indeterminate.    we continued with the same chemotherapy and she received cycle #8 on 6/20/18.  Repeat scans show good response to treatment with only a small signal consistent with viable tumor in the primary rectal cancer. No surrounding lymph nodes suspicious. There is no evidence of metastatic disease.    She started on concurrent chemoradiation with Xeloda with radiation beginning on 7/9/18. There was a delay in her receiving Xeloda, so she began on 7/10/18. She completed it on 8/16/18    Repeat scans show great response to treatment with almost completely fibrotic signal.  There is an indeterminate left intertrochanteric lesion which is stable.    She was seen by Dr Hampton and the decision was made to keep her on watchful waiting.      She does not have any evidence of recurrence of the disease currently.    Discussion regarding surveillance:  Recent colonoscopy on 5/13/2019 was unremarkable.  She will be due for next colonoscopy in 3 years.    She should have flex sig every 3 months for first 3 years- She will contact Dr Hampton's office to arrange this.  ? Flex sig will be every 6 months for years 4-5 and then every year for next 5 years.     She will have 3T MRI Pelvis every 4 months for 2 years and and then every 6 months for years 3-4 and then every year for years 5-6.  We will plan to repeat Pelvic MRI in 4 months.    She should have annual CT CAP for 5-6 years.     Will check CEA at every clinic visit.        I also recommend follow-up with cancer survivorship clinic.    Indeterminate liver lesion. MRI 2/5/19 shows stable lesion, most likely c/w benign hemangioma or cyst.  At this time I do not plan to repeat MRI abdomen but when we will do CT scan, it will be monitored.    Left intertrochanteric lesion- Asymptomatic.  Overall this has  remained stable even on the most recent MRI on 8/21/2019 and since it is a stable since June 2018, this is most likely a benign finding but at this time I cannot be 100% sure.    Will repeat MRI in 6 months or so.       Neuropathy-it has improved in the hands but she still has numbness in her feet.  I again recommended trying acupuncture.  I do not believe that she will benefit from medications because it manifests as numbness only.    These have improved.    Right knee pain upon kneeling.  X-ray in February 2019 was unremarkable.  This has improved.        Port-A-Cath.  She needs port flushes every 4 to 5 weeks.      Return to clinic in 4 months.    All of her and her 's questions were answered to their satisfaction.  They are agreeable and comfortable with the plan.    Kyra Mortensen

## 2019-09-06 ENCOUNTER — TELEPHONE (OUTPATIENT)
Dept: SURGERY | Facility: CLINIC | Age: 54
End: 2019-09-06

## 2019-09-06 NOTE — TELEPHONE ENCOUNTER
Left message for patient to call back to schedule appt with Dr. Hampton for Flex sig.    Brett Solares LPN

## 2019-09-09 ENCOUNTER — TELEPHONE (OUTPATIENT)
Dept: ONCOLOGY | Facility: CLINIC | Age: 54
End: 2019-09-09

## 2019-09-09 NOTE — TELEPHONE ENCOUNTER
ONCOLOGY INTAKE: Records Information      APPT INFORMATION:  Referring provider:  Dr Kyra Mortensen MD  Referring provider s clinic:  [facility]  Reason for visit/diagnosis: C20 (ICD-10-CM) - Malignant neoplasm of rectum (H)  Has patient been notified of appointment date and time?: No    RECORDS INFORMATION:  Were the records received with the referral (via [employer])? No, Internal      ADDITIONAL INFORMATION:  LVM  and Sent Letter

## 2019-09-17 DIAGNOSIS — F41.9 ANXIETY: ICD-10-CM

## 2019-10-02 ENCOUNTER — APPOINTMENT (OUTPATIENT)
Age: 54
Setting detail: DERMATOLOGY
End: 2019-10-02

## 2019-10-02 VITALS — HEIGHT: 55 IN | WEIGHT: 190 LBS | RESPIRATION RATE: 17 BRPM

## 2019-10-02 DIAGNOSIS — D22 MELANOCYTIC NEVI: ICD-10-CM

## 2019-10-02 DIAGNOSIS — L82.1 OTHER SEBORRHEIC KERATOSIS: ICD-10-CM

## 2019-10-02 PROBLEM — D22.39 MELANOCYTIC NEVI OF OTHER PARTS OF FACE: Status: ACTIVE | Noted: 2019-10-02

## 2019-10-02 PROCEDURE — OTHER COUNSELING: OTHER

## 2019-10-02 PROCEDURE — OTHER COSMETIC SHAVE REMOVAL (NO PATHOLOGY): OTHER

## 2019-10-02 PROCEDURE — 99213 OFFICE O/P EST LOW 20 MIN: CPT

## 2019-10-02 ASSESSMENT — LOCATION ZONE DERM
LOCATION ZONE: FACE
LOCATION ZONE: FACE
LOCATION ZONE: TRUNK

## 2019-10-02 ASSESSMENT — LOCATION SIMPLE DESCRIPTION DERM
LOCATION SIMPLE: CHIN
LOCATION SIMPLE: CHEST
LOCATION SIMPLE: CHIN

## 2019-10-02 ASSESSMENT — LOCATION DETAILED DESCRIPTION DERM
LOCATION DETAILED: RIGHT CHIN
LOCATION DETAILED: RIGHT MEDIAL SUPERIOR CHEST
LOCATION DETAILED: RIGHT CHIN

## 2019-10-02 NOTE — PROCEDURE: COSMETIC SHAVE REMOVAL (NO PATHOLOGY)
Render Post-Care Instructions In Note?: no
Consent: - Verbal and written consent was obtained, and risks were reviewed prior to procedure today. \\n- Risks discussed include but are not limited to scarring, infection, bleeding, scabbing, incomplete removal, nerve damage, pain, and allergy to anesthesia. \\n- All components of Universal Protocol/PAUSE Rule completed. \\n- Patient understands that this treatment is not medically necessary and therefore not billable to insurance. \\n- Patient understands that payment is due today before leaving clinic.\\n- Advised patient that it is our policy to recommend sending all specimen removed from the body to the pathologist to confirm that the lesion is indeed benign as clinically suspected. \\n- Discussed the anticipated cost of diagnostic pathology. \\n- Patient expressed understanding and made an informed decision today to decline diagnostic pathology.
Anesthesia Type: 2% lidocaine with epinephrine
Hemostasis: Electrocautery and Aluminum Chloride
X Size Of Lesion In Cm (Optional): 0
Wound Care: Petrolatum
Price (Use Numbers Only, No Special Characters Or $): 100
Post-Care Instructions: WOUND CARE:\\nDo NOT submerge wound in a bath, swimming pool, or hot tub for at least 1 week or for as long as there is an open wound. Gently cleanse the site daily with mild soap and water. Be careful NOT to allow a forceful stream of water to hit the biopsy site. After cleaning/showering, apply a thin layer of petrolatum ointment or Aquaphor in the wound followed by an adhesive bandage. Continue this process daily until healed. \\n\\nBLEEDING:\\nIf you develop persistent bleeding, apply firm and steady pressure over the dressing with gauze for 15 minutes. If bleeding persists, reapply pressure with an ice pack over the gauze for 15 minutes. NEVER APPLY ICE DIRECTLY TO THE SKIN. Do NOT peek under the gauze during these 15 minutes of pressure.  Call our office at [phone] if you cannot get the bleeding to stop. \\n\\nINFECTION:\\nSigns of infection may include increasing rather than decreasing pain (after the first few days), increasing redness/swelling/heat, draining pus, pink/red streaks around the wound, and fever or chills.  Please call our office immediately at [phone] if infection is suspected. It is normal to have some mild redness on or around the wound edges; this will lighten day by day and will become less tender.\\n\\nPAIN:\\nPain is usually minimal, but if needed you may take acetaminophen (Tylenol) every four hours as needed. Applying an ice pack over the dressing for 15-20 minutes every 2-3 hours will relieve swelling, lessen pain, and help minimize bruising. NEVER APPLY ICE DIRECTLY TO THE SKIN. Avoid ibuprofen (Advil, Motrin) and naproxen (Aleve) for the first 48 hours as these can increase bleeding.\\n\\nSWELLING AND BRUISING:\\nSwelling and bruising are common and temporary, usually lasting 1 - 2 weeks. It is more common in areas treated around the eyes, hands, and feet. In the days following the procedure, swelling and bruising can be minimized by keeping the affected areas elevated when possible, reducing salty foods, and applying ice packs over the dressing for 15-20 minutes every 2-3 hours. NEVER APPLY ICE DIRECTLY TO THE SKIN.\\n\\nITCHING:\\nItchiness on and around the wound is very common and can last several days to weeks after surgery. Mild itch is normal as the wound is healing. \\n\\nNERVE CHANGES:\\nNumbness is usually temporary, but it may last for several weeks to months. You may also experience sharp pains at the wound sight as it heals.  Mild pain is normal and will gradually improve with time.\\n \\nNO SMOKING:\\nSmoking will delay the healing process. If you smoke, we recommend trying to quit or at minimum reduce how much you smoke following a procedure.
Detail Level: Detailed

## 2019-10-04 NOTE — PROGRESS NOTES
Colon and Rectal Surgery Clinic Note      RE: Sarah Satinder  : 1965  JIMMY: 10/8/2019    Sarah presents today for follow up of her rectal cancer on Watch and Wait protocol.   She is seen today with her  Cody.      HPI:     She was initially seen in 2018 with a moderately differentiated, invasive adenocarcinoma with intact mismatch repair 4-5 cm from the anal verge. She was staged at that time and an MRI that was initially read as a T1-T2 lesion, then possibly a T2N1 lesion. CT A/P was significant for small liver lesions too small to characterize but not concerning for malignancy and CEA was 1.1. She was discussed at tumor board the pelvic MRI was felt to be poor quality so she underwent a repeat MRI pelvis on  and her tumor was staged as T4N0 due to abutment of the levators. Abdominal MRI to evaluate the liver lesions was performed on  which was again found to have multiple sub-centimeter lesions that were difficult to characterize.     She underwent 8 cycles of FOLFOX completed on 18 and chemoradiation with XELODA on 8/15/18. 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     MRI of left hip showed a non specific T1 hypointense lesion in the proximal left femur and repeat MRI 12/10/18:    There is a T1 hypointense and subtly enhancing lesion in the   proximal left femur, stable since at least 2018.  Given this   patient's history of rectal cancer, metastatic disease cannot be excluded.    CT Chest 19 without evidence of metastatic disease     CEA 11/15/18 <0.5    3T MRI 12/10/18 with TRG-2 and subsequent MRI 19 with TRG-1.    MR Pelvis 19:  1. Stable MRI since 2019, again there is complete response to  treatment, with a corresponding tumor regression grade of mrTRG-1.    2. No suspicious pelvic lymphadenopathy.  3. Stable left femoral intertrochanteric lesion, better seen/described  in same-day hip MRI.    I  "last saw Sarah on 12/11/2018 with no evidence of recurrence on flexible sigmoidoscopy.    Colonoscopy 5/13/19 without evidence of recurrence    She last saw Dr. Mortensen with oncology on 9/5/19 and he does not plan to repeat MR abdomen but plans to follow with annual CT CAP.    Interval History: Sarah has been doing well. She has been eating normally without any nausea or vomiting.  She is been having normal bowel movements.  No change in bowel pattern.  No blood in her stool.  Her weight has been stable to slightly increasing.  She is return to normal activity.    Physical examination:  Examination was chaperoned by Amara Jackson NP.     Vitals: /86 (BP Location: Left arm, Patient Position: Sitting, Cuff Size: Adult Regular)   Pulse 66   Temp 98.5  F (36.9  C) (Oral)   Ht 5' 5\"   Wt 207 lb 3.2 oz   SpO2 97%   BMI 34.48 kg/m    BMI= Body mass index is 34.48 kg/m .    Flexible sigmoidoscopy was performed. Two fleets enemas were given. Informed consent was obtained and time out was performed per protocol. Small cut in the right posterior anal opening. Digital rectal exam with no palpable lesions. Scope was inserted to approximately 25 cm. Retroflexion was performed and very faint scar was visible in the left lateral position at about 3 cm from the anal verge. No evidence of recurrent cancer. She tolerated this well.      Laboratory data:    Recent Labs   Lab Test 11/15/18  1333  02/19/18  0830   WBC 3.3*   < >  --    HGB 12.1   < >  --       < >  --    CR 0.79   < >  --    ALBUMIN 3.6   < >  --    BILITOTAL 0.3   < >  --    ALKPHOS 74   < >  --    ALT 23   < >  --    AST 16   < >  --    INR  --   --  0.93    < > = values in this interval not displayed.       Assessment/plan:  No evidence of recurrence on flexible sigmoidoscopy today. Very faint scar visualized. Surveillance per protocol below. Patient's questions were answered to her stated satisfaction and she is in agreement with this " plan.    Follow up per Watch and Wait Protocol:   Completed CRT: 8/15/2018    Flexible sigmoidoscopy     Every 2 months for 6 months: Until 2019-COMPLETED     Every 3 months for 3 years: Until -DUE 2019 (to return her to normal 3 mo interval)    Every 6 months for 5 years: Until     Every year for 10 years: Until       3T MRI of pelvis    6-8 weeks after CRT: COMPLETED    Every 4 months for 2 years: Until 2021-DUE 2019    Every 6 months for 2 years: Until 2023    Yearly for 2 years: Until       CT CAP    Every year for 6-8 years: Until -Due 2020     Colonoscopy     Last 19-Repeat 2022      CEA at every clinic or endoscopic visit    Total face to face time was 10 minutes, outside the procedure time, which was an additional 10 minutes, >50% counseling.    For details of past medical history, surgical history, family history, medications, allergies, and review of systems, please see details below.    Medical history:  Past Medical History:   Diagnosis Date     Depression      GERD (gastroesophageal reflux disease)      History of smoking      Insomnia      Obesity      Rectal cancer (H)      Restless leg syndrome      Seasonal affective disorder (H)        Surgical history:  Past Surgical History:   Procedure Laterality Date      SECTION      x2     COLONOSCOPY       INSERT PORT VASCULAR ACCESS Right 2018    Procedure: INSERT PORT VASCULAR ACCESS;  central venous Chest Port Placement;  Surgeon: Gaurav Yates PA-C;  Location: [facility] OR     LASIK         Family history:  Family History   Problem Relation Age of Onset     Hyperlipidemia Mother      Hypertension Mother      Gastrointestinal Disease Mother         ischemic colitis     Coronary Artery Disease Mother         stents x 3     Anesthesia Reaction Mother         hypotension, PONV     Hyperlipidemia Father      Hypertension Father      Breast Cancer Maternal Grandmother      Coronary Artery Disease Maternal  Grandfather      Myocardial Infarction Maternal Grandfather      Colon Cancer Paternal Grandmother      Breast Cancer Maternal Aunt      Breast Cancer Paternal Aunt      Coronary Artery Disease Paternal Grandfather      Cerebrovascular Disease Paternal Grandfather      Family History Negative Brother      Coronary Artery Disease Maternal Uncle        Medications:  Current Outpatient Medications   Medication Sig Dispense Refill     Acetaminophen (TYLENOL PO) Take 1,000 mg by mouth At Bedtime       amitriptyline (ELAVIL) 10 MG tablet TAKE 1 TO 2 TABLETS BY MOUTH DAILY AT BEDTIME       atorvastatin (LIPITOR) 40 MG tablet Take 1 tablet (40 mg) by mouth daily 90 tablet 3     calcium carbonate (TUMS) 500 MG chewable tablet Take 1-2 chew tab by mouth daily  150 tablet      capecitabine (XELODA) 500 MG tablet CHEMO Take 3 tablets (1,500 mg) by mouth 2 times daily for 56 doses Take Mon-Fri. Do NOT take on Sat-Sun. Take with water within 30 min after meal 168 tablet 0     diclofenac (VOLTAREN) 75 MG EC tablet TK 1 T PO BID  2     eszopiclone (LUNESTA) 1 MG TABS tablet Take 1-2 tablets (1-2 mg) by mouth At Bedtime (Patient not taking: Reported on 5/16/2019) 60 tablet 1     FLUoxetine (PROZAC) 20 MG capsule Take 1 capsule (20 mg) by mouth daily 90 capsule 3     FLUoxetine (PROZAC) 40 MG capsule Take 1 capsule (40 mg) by mouth daily (Patient not taking: Reported on 9/25/2018) 30 capsule 3     lidocaine-prilocaine (EMLA) cream Apply 45 minutes prior to procedure. 30 g 3     LORazepam (ATIVAN) 0.5 MG tablet Take 1 tablet (0.5 mg) by mouth every 4 hours as needed (Anxiety, Nausea/Vomiting or Sleep) (Patient not taking: Reported on 1/24/2019) 60 tablet 5     magic mouthwash (ENTER INGREDIENTS IN COMMENTS) suspension Swish, gargle, and spit one to two teaspoonfuls every six hours as needed. May be swallowed if esophageal involvement. (Patient not taking: Reported on 8/15/2018) 240 mL 1     RANITIDINE HCL PO Take 150 mg by mouth daily  as needed for heartburn         Allergies:  The patientis allergic to inapsine [droperidol] and tegaderm transparent dressing (informational only).    Social history:  Social History     Tobacco Use     Smoking status: Former Smoker     Packs/day: 0.10     Years: 8.00     Pack years: 0.80     Types: Cigarettes     Last attempt to quit: 1986     Years since quittin.6     Smokeless tobacco: Never Used   Substance Use Topics     Alcohol use: Yes     Alcohol/week: 7.0 standard drinks     Types: 7 Glasses of wine per week     Marital status: .    Review of Systems:  There are no exam notes on file for this visit.       Fernando Jeffries MD, PhD  Fellow, Colon and Rectal Surgery  Pager: [phone]  10/8/2019  11:28 AM    I saw and examined the patient, led the discussion and edited the resident note.  I agree with the assessment and plan as outlined.  Sarah has no evidence of tumor recurrence and we will continue with the watch and wait protocol as outlined above.    Total time 15 minutes, greater than half of which was counseling, plus an additional 10 minutes procedure time.    Óscar Hampton MD  Professor of Surgery        Referring Provider:  Referred Self, MD  No address on file     Primary Care Provider:  Kyra Mortensen

## 2019-10-08 ENCOUNTER — OFFICE VISIT (OUTPATIENT)
Dept: SURGERY | Facility: CLINIC | Age: 54
End: 2019-10-08
Payer: COMMERCIAL

## 2019-10-08 VITALS
SYSTOLIC BLOOD PRESSURE: 133 MMHG | DIASTOLIC BLOOD PRESSURE: 86 MMHG | WEIGHT: 207.2 LBS | HEIGHT: 65 IN | OXYGEN SATURATION: 97 % | TEMPERATURE: 98.5 F | HEART RATE: 66 BPM | BODY MASS INDEX: 34.52 KG/M2

## 2019-10-08 DIAGNOSIS — C20 RECTAL CANCER (H): Primary | ICD-10-CM

## 2019-10-08 ASSESSMENT — MIFFLIN-ST. JEOR: SCORE: 1550.73

## 2019-10-08 ASSESSMENT — PAIN SCALES - GENERAL: PAINLEVEL: NO PAIN (0)

## 2019-10-08 NOTE — NURSING NOTE
"Chief Complaint   Patient presents with     RECHECK     Visit for flexible sigmoidoscopy.       Vitals:    10/08/19 1055   BP: 133/86   BP Location: Left arm   Patient Position: Sitting   Cuff Size: Adult Regular   Pulse: 66   Temp: 98.5  F (36.9  C)   TempSrc: Oral   SpO2: 97%   Weight: 207 lb 3.2 oz   Height: 5' 5\"       Body mass index is 34.48 kg/m .        JOSSE REAVES, EMT    "

## 2019-10-08 NOTE — LETTER
10/8/2019       RE: Sarah Rajan  [address] [address]     Dear Colleague,    Thank you for referring your patient, Sarah Rajan, to the [facility] COLON AND RECTAL SURGERY at [facility]. Please see a copy of my visit note below.    Colon and Rectal Surgery Clinic Note    RE: Sarah Rajan  : 1965  JIMMY: 10/8/2019    Sarah presents today for follow up of her rectal cancer on Watch and Wait protocol.   She is seen today with her  Cody.      HPI:     She was initially seen in 2018 with a moderately differentiated, invasive adenocarcinoma with intact mismatch repair 4-5 cm from the anal verge. She was staged at that time and an MRI that was initially read as a T1-T2 lesion, then possibly a T2N1 lesion. CT A/P was significant for small liver lesions too small to characterize but not concerning for malignancy and CEA was 1.1. She was discussed at tumor board the pelvic MRI was felt to be poor quality so she underwent a repeat MRI pelvis on  and her tumor was staged as T4N0 due to abutment of the levators. Abdominal MRI to evaluate the liver lesions was performed on  which was again found to have multiple sub-centimeter lesions that were difficult to characterize.     She underwent 8 cycles of FOLFOX completed on 18 and chemoradiation with XELODA on 8/15/18. 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     MRI of left hip showed a non specific T1 hypointense lesion in the proximal left femur and repeat MRI 12/10/18:    There is a T1 hypointense and subtly enhancing lesion in the   proximal left femur, stable since at least 2018.  Given this   patient's history of rectal cancer, metastatic disease cannot be excluded.    CT Chest 19 without evidence of metastatic disease     CEA 11/15/18 <0.5    3T MRI 12/10/18 with TRG-2 and subsequent MRI 19 with TRG-1.    MR Pelvis  "8/21/19:  1. Stable MRI since 5/22/2019, again there is complete response to  treatment, with a corresponding tumor regression grade of mrTRG-1.    2. No suspicious pelvic lymphadenopathy.  3. Stable left femoral intertrochanteric lesion, better seen/described  in same-day hip MRI.    I last saw Sarah on 12/11/2018 with no evidence of recurrence on flexible sigmoidoscopy.    Colonoscopy 5/13/19 without evidence of recurrence    She last saw Dr. Mortensen with oncology on 9/5/19 and he does not plan to repeat MR abdomen but plans to follow with annual CT CAP.    Interval History: Sarah has been doing well. She has been eating normally without any nausea or vomiting.  She is been having normal bowel movements.  No change in bowel pattern.  No blood in her stool.  Her weight has been stable to slightly increasing.  She is return to normal activity.    Physical examination:  Examination was chaperoned by Amara Jackson NP.     Vitals: /86 (BP Location: Left arm, Patient Position: Sitting, Cuff Size: Adult Regular)   Pulse 66   Temp 98.5  F (36.9  C) (Oral)   Ht 5' 5\"   Wt 207 lb 3.2 oz   SpO2 97%   BMI 34.48 kg/m     BMI= Body mass index is 34.48 kg/m .    Flexible sigmoidoscopy was performed. Two fleets enemas were given. Informed consent was obtained and time out was performed per protocol. Small cut in the right posterior anal opening. Digital rectal exam with no palpable lesions. Scope was inserted to approximately 25 cm. Retroflexion was performed and very faint scar was visible in the left lateral position at about 3 cm from the anal verge. No evidence of recurrent cancer. She tolerated this well.      Laboratory data:    Recent Labs   Lab Test 11/15/18  1333  02/19/18  0830   WBC 3.3*   < >  --    HGB 12.1   < >  --       < >  --    CR 0.79   < >  --    ALBUMIN 3.6   < >  --    BILITOTAL 0.3   < >  --    ALKPHOS 74   < >  --    ALT 23   < >  --    AST 16   < >  --    INR  --   --  " 0.93    < > = values in this interval not displayed.       Assessment/plan:  No evidence of recurrence on flexible sigmoidoscopy today. Very faint scar visualized. Surveillance per protocol below. Patient's questions were answered to her stated satisfaction and she is in agreement with this plan.    Follow up per Watch and Wait Protocol:   Completed CRT: 8/15/2018    Flexible sigmoidoscopy     Every 2 months for 6 months: Until 2019-COMPLETED     Every 3 months for 3 years: Until -DUE 2019 (to return her to normal 3 mo interval)    Every 6 months for 5 years: Until     Every year for 10 years: Until       3T MRI of pelvis    6-8 weeks after CRT: COMPLETED    Every 4 months for 2 years: Until 2021-DUE 2019    Every 6 months for 2 years: Until 2023    Yearly for 2 years: Until       CT CAP    Every year for 6-8 years: Until -2020     Colonoscopy     Last 19-Repeat 2022      CEA at every clinic or endoscopic visit    Total face to face time was 10 minutes, outside the procedure time, which was an additional 10 minutes, >50% counseling.    For details of past medical history, surgical history, family history, medications, allergies, and review of systems, please see details below.    Medical history:  Past Medical History:   Diagnosis Date     Depression      GERD (gastroesophageal reflux disease)      History of smoking      Insomnia      Obesity      Rectal cancer (H)      Restless leg syndrome      Seasonal affective disorder (H)        Surgical history:  Past Surgical History:   Procedure Laterality Date      SECTION      x2     COLONOSCOPY       INSERT PORT VASCULAR ACCESS Right 2018    Procedure: INSERT PORT VASCULAR ACCESS;  central venous Chest Port Placement;  Surgeon: Gaurav Yates PA-C;  Location:  OR     [facility]         Family history:  Family History   Problem Relation Age of Onset     Hyperlipidemia Mother      Hypertension Mother       Gastrointestinal Disease Mother         ischemic colitis     Coronary Artery Disease Mother         stents x 3     Anesthesia Reaction Mother         hypotension, PONV     Hyperlipidemia Father      Hypertension Father      Breast Cancer Maternal Grandmother      Coronary Artery Disease Maternal Grandfather      Myocardial Infarction Maternal Grandfather      Colon Cancer Paternal Grandmother      Breast Cancer Maternal Aunt      Breast Cancer Paternal Aunt      Coronary Artery Disease Paternal Grandfather      Cerebrovascular Disease Paternal Grandfather      Family History Negative Brother      Coronary Artery Disease Maternal Uncle        Medications:  Current Outpatient Medications   Medication Sig Dispense Refill     Acetaminophen (TYLENOL PO) Take 1,000 mg by mouth At Bedtime       amitriptyline (ELAVIL) 10 MG tablet TAKE 1 TO 2 TABLETS BY MOUTH DAILY AT BEDTIME       atorvastatin (LIPITOR) 40 MG tablet Take 1 tablet (40 mg) by mouth daily 90 tablet 3     calcium carbonate (TUMS) 500 MG chewable tablet Take 1-2 chew tab by mouth daily  150 tablet      capecitabine (XELODA) 500 MG tablet CHEMO Take 3 tablets (1,500 mg) by mouth 2 times daily for 56 doses Take Mon-Fri. Do NOT take on Sat-Sun. Take with water within 30 min after meal 168 tablet 0     diclofenac (VOLTAREN) 75 MG EC tablet TK 1 T PO BID  2     eszopiclone (LUNESTA) 1 MG TABS tablet Take 1-2 tablets (1-2 mg) by mouth At Bedtime (Patient not taking: Reported on 5/16/2019) 60 tablet 1     FLUoxetine (PROZAC) 20 MG capsule Take 1 capsule (20 mg) by mouth daily 90 capsule 3     FLUoxetine (PROZAC) 40 MG capsule Take 1 capsule (40 mg) by mouth daily (Patient not taking: Reported on 9/25/2018) 30 capsule 3     lidocaine-prilocaine (EMLA) cream Apply 45 minutes prior to procedure. 30 g 3     LORazepam (ATIVAN) 0.5 MG tablet Take 1 tablet (0.5 mg) by mouth every 4 hours as needed (Anxiety, Nausea/Vomiting or Sleep) (Patient not taking: Reported on  2019) 60 tablet 5     magic mouthwash (ENTER INGREDIENTS IN COMMENTS) suspension Swish, gargle, and spit one to two teaspoonfuls every six hours as needed. May be swallowed if esophageal involvement. (Patient not taking: Reported on 8/15/2018) 240 mL 1     RANITIDINE HCL PO Take 150 mg by mouth daily as needed for heartburn         Allergies:  The patientis allergic to inapsine [droperidol] and tegaderm transparent dressing (informational only).    Social history:  Social History     Tobacco Use     Smoking status: Former Smoker     Packs/day: 0.10     Years: 8.00     Pack years: 0.80     Types: Cigarettes     Last attempt to quit: 1986     Years since quittin.6     Smokeless tobacco: Never Used   Substance Use Topics     Alcohol use: Yes     Alcohol/week: 7.0 standard drinks     Types: 7 Glasses of wine per week     Marital status: .    Review of Systems:  There are no exam notes on file for this visit.       Fernando eJffries MD, PhD  Fellow, Colon and Rectal Surgery  Pager: [phone]  10/8/2019  11:28 AM    I saw and examined the patient, led the discussion and edited the resident note.  I agree with the assessment and plan as outlined.  Sarah has no evidence of tumor recurrence and we will continue with the watch and wait protocol as outlined above.    Total time 15 minutes, greater than half of which was counseling, plus an additional 10 minutes procedure time.    Óscar Hampton MD  Professor of Surgery      Referring Provider:  Referred Self, MD  No address on file     Primary Care Provider:  Kyra Mortensen

## 2019-10-08 NOTE — PATIENT INSTRUCTIONS
Follow up:    Please call with any questions or concerns regarding your clinic visit today.    It is a pleasure being involved in your health care.    Contacts post-consultation depending on your need:    Radiology Appointments [phone]    Schedule Clinic Appointments [phone] # 1   M-F 7:30 - 5 pm    JANE Hernandez [phone]    Clinic Fax Number [phone]    Surgery Scheduling [phone]    My Chart is available 24 hours a day and is a secure way to access your records and communicate with your care team.  I strongly recommend signing up if you haven't already done so, if you are comfortable with computers.  If you would like to inquire about this or are having problems with My Chart access, you may call [phone] or go online at [email or web address].  Please allow at least 24 hours for a response and extra time on weekends and Holidays.      Follow up per Watch and Wait Protocol:   Completed CRT: 8/15/2018    Flexible sigmoidoscopy     Every 2 months for 6 months: Until 2/2019-COMPLETED     Every 3 months for 3 years: Until 2021-DUE 1/2020    Every 6 months for 5 years: Until 2023    Every year for 10 years: Until 2028      3T MRI of pelvis    6-8 weeks after CRT: COMPLETED    Every 4 months for 2 years: Until 8/2021-DUE 12/2019    Every 6 months for 2 years: Until 8/2023    Yearly for 2 years: Until 2025      CT CAP    Every year for 6-8 years: Until 2026-Due 8/2020     Colonoscopy     Last 5/13/19-Repeat 5/2022      CEA at every clinic or endoscopic visit

## 2019-10-09 ENCOUNTER — ONCOLOGY SURVIVORSHIP VISIT (OUTPATIENT)
Dept: ONCOLOGY | Facility: CLINIC | Age: 54
End: 2019-10-09
Attending: INTERNAL MEDICINE
Payer: COMMERCIAL

## 2019-10-09 ENCOUNTER — APPOINTMENT (OUTPATIENT)
Dept: LAB | Facility: CLINIC | Age: 54
End: 2019-10-09
Attending: INTERNAL MEDICINE
Payer: COMMERCIAL

## 2019-10-09 VITALS
HEART RATE: 76 BPM | BODY MASS INDEX: 34.45 KG/M2 | OXYGEN SATURATION: 99 % | SYSTOLIC BLOOD PRESSURE: 111 MMHG | TEMPERATURE: 98.8 F | DIASTOLIC BLOOD PRESSURE: 79 MMHG | HEIGHT: 65 IN | RESPIRATION RATE: 16 BRPM | WEIGHT: 206.8 LBS

## 2019-10-09 DIAGNOSIS — C20 MALIGNANT NEOPLASM OF RECTUM (H): Primary | ICD-10-CM

## 2019-10-09 DIAGNOSIS — C20 RECTAL CANCER (H): ICD-10-CM

## 2019-10-09 DIAGNOSIS — Z79.899 ENCOUNTER FOR LONG-TERM (CURRENT) USE OF MEDICATIONS: ICD-10-CM

## 2019-10-09 LAB
ALBUMIN SERPL-MCNC: 3.8 G/DL (ref 3.4–5)
ALP SERPL-CCNC: 100 U/L (ref 40–150)
ALT SERPL W P-5'-P-CCNC: 28 U/L (ref 0–50)
ANION GAP SERPL CALCULATED.3IONS-SCNC: 5 MMOL/L (ref 3–14)
AST SERPL W P-5'-P-CCNC: 16 U/L (ref 0–45)
BASOPHILS # BLD AUTO: 0 10E9/L (ref 0–0.2)
BASOPHILS NFR BLD AUTO: 0.8 %
BILIRUB SERPL-MCNC: 0.4 MG/DL (ref 0.2–1.3)
BUN SERPL-MCNC: 15 MG/DL (ref 7–30)
CALCIUM SERPL-MCNC: 9 MG/DL (ref 8.5–10.1)
CEA SERPL-MCNC: <0.5 UG/L (ref 0–2.5)
CHLORIDE SERPL-SCNC: 107 MMOL/L (ref 94–109)
CO2 SERPL-SCNC: 27 MMOL/L (ref 20–32)
CREAT SERPL-MCNC: 0.68 MG/DL (ref 0.52–1.04)
DIFFERENTIAL METHOD BLD: ABNORMAL
EOSINOPHIL # BLD AUTO: 0.1 10E9/L (ref 0–0.7)
EOSINOPHIL NFR BLD AUTO: 3.6 %
ERYTHROCYTE [DISTWIDTH] IN BLOOD BY AUTOMATED COUNT: 12.5 % (ref 10–15)
GFR SERPL CREATININE-BSD FRML MDRD: >90 ML/MIN/{1.73_M2}
GLUCOSE SERPL-MCNC: 81 MG/DL (ref 70–99)
HCT VFR BLD AUTO: 37.4 % (ref 35–47)
HGB BLD-MCNC: 12.8 G/DL (ref 11.7–15.7)
IMM GRANULOCYTES # BLD: 0 10E9/L (ref 0–0.4)
IMM GRANULOCYTES NFR BLD: 0.3 %
LYMPHOCYTES # BLD AUTO: 0.8 10E9/L (ref 0.8–5.3)
LYMPHOCYTES NFR BLD AUTO: 20.5 %
MCH RBC QN AUTO: 30.7 PG (ref 26.5–33)
MCHC RBC AUTO-ENTMCNC: 34.2 G/DL (ref 31.5–36.5)
MCV RBC AUTO: 90 FL (ref 78–100)
MONOCYTES # BLD AUTO: 0.3 10E9/L (ref 0–1.3)
MONOCYTES NFR BLD AUTO: 7.9 %
NEUTROPHILS # BLD AUTO: 2.5 10E9/L (ref 1.6–8.3)
NEUTROPHILS NFR BLD AUTO: 66.9 %
NRBC # BLD AUTO: 0 10*3/UL
NRBC BLD AUTO-RTO: 0 /100
PLATELET # BLD AUTO: 179 10E9/L (ref 150–450)
POTASSIUM SERPL-SCNC: 3.8 MMOL/L (ref 3.4–5.3)
PROT SERPL-MCNC: 6.9 G/DL (ref 6.8–8.8)
RBC # BLD AUTO: 4.17 10E12/L (ref 3.8–5.2)
SODIUM SERPL-SCNC: 140 MMOL/L (ref 133–144)
WBC # BLD AUTO: 3.7 10E9/L (ref 4–11)

## 2019-10-09 PROCEDURE — 25000128 H RX IP 250 OP 636: Mod: ZF | Performed by: PHYSICIAN ASSISTANT

## 2019-10-09 PROCEDURE — 36591 DRAW BLOOD OFF VENOUS DEVICE: CPT

## 2019-10-09 PROCEDURE — 99214 OFFICE O/P EST MOD 30 MIN: CPT | Mod: ZP | Performed by: PHYSICIAN ASSISTANT

## 2019-10-09 PROCEDURE — 85025 COMPLETE CBC W/AUTO DIFF WBC: CPT | Performed by: INTERNAL MEDICINE

## 2019-10-09 PROCEDURE — 80053 COMPREHEN METABOLIC PANEL: CPT | Performed by: INTERNAL MEDICINE

## 2019-10-09 PROCEDURE — G0463 HOSPITAL OUTPT CLINIC VISIT: HCPCS | Mod: ZF

## 2019-10-09 PROCEDURE — 82378 CARCINOEMBRYONIC ANTIGEN: CPT | Performed by: INTERNAL MEDICINE

## 2019-10-09 RX ORDER — HEPARIN SODIUM (PORCINE) LOCK FLUSH IV SOLN 100 UNIT/ML 100 UNIT/ML
5 SOLUTION INTRAVENOUS
Status: COMPLETED | OUTPATIENT
Start: 2019-10-09 | End: 2019-10-09

## 2019-10-09 RX ADMIN — HEPARIN 5 ML: 100 SYRINGE at 13:55

## 2019-10-09 ASSESSMENT — PAIN SCALES - GENERAL: PAINLEVEL: NO PAIN (0)

## 2019-10-09 ASSESSMENT — MIFFLIN-ST. JEOR: SCORE: 1548.92

## 2019-10-09 NOTE — NURSING NOTE
"Oncology Rooming Note    October 9, 2019 2:10 PM   Sarah Rajan is a 53 year old female who presents for:    Chief Complaint   Patient presents with     Port Draw     labs drawn from port by rn.  vs taken     Oncology Clinic Visit     Return related to Malignant neoplasm of rectum     Initial Vitals: /79 (BP Location: Right arm, Patient Position: Sitting, Cuff Size: Adult Large)   Pulse 76   Temp 98.8  F (37.1  C) (Oral)   Resp 16   Ht 1.651 m (5' 5\")   Wt 93.8 kg (206 lb 12.8 oz)   SpO2 99%   BMI 34.41 kg/m   Estimated body mass index is 34.41 kg/m  as calculated from the following:    Height as of this encounter: 1.651 m (5' 5\").    Weight as of this encounter: 93.8 kg (206 lb 12.8 oz). Body surface area is 2.07 meters squared.  No Pain (0) Comment: Data Unavailable   No LMP recorded. Patient is postmenopausal.  Allergies reviewed: Yes  Medications reviewed: Yes    Medications: Medication refills not needed today.  Pharmacy name entered into [facility]:    [facility][id number] - [place] [id number] [facility] [place] [phone]  [id number] - [place], [place] - [address] [facility] - [place], [place] - [address]    Clinical concerns: No new concerns. Provider was notified.      Trudi Oakes LPN            "

## 2019-10-09 NOTE — PROGRESS NOTES
"CANCER SURVIVORSHIP VISIT - END OF TREATMENT VISIT  Oct 9, 2019    REASON FOR VISIT: survivorship visit for history of rectal cancer    CANCER HISTORY AND TREATMENT:  She noticed BRBPR so she had a screening colonoscopy on 1/9/2018 which revealed a 3 cm rectal mass and other polyps which were removed.  Pathology indicated moderately differentiated adenocarcinoma w/ intact MMR proteins.  CT staging scan showed no metastatic disease; some liver lesions which are thought to be benign per radiology report, T2N1M0 by pelvic MRI read at [facility]. When we initially reviewed her MRI we will not exactly sure whether that was lymph node involvement or not. We opted to repeat MRI which showed T4 N0 lesion. She underwent neoadjuvant treatment with 8 cycles of FOLFOX, followed by concurrent chemoradiation with Xeloda, which she completed on 8/16/18.  Left intertrochanteric lesion-we did MRI of the left hip which also showed a nonspecific T1 hypointense lesion in the proximal left femur which has not changed since 6/20/2018.  Repeat MRI in December 2018 , 5/14/19 and 8/20/19 were also stable.  Imaging and colonoscopy/sigmoidoscopy since that time shows ongoing complete response.     INTERVAL HISTORY:   Patient reports that overall she has been doing well.  She reports that her mood is good.  She did have a tough time with her mood in the midst of chemotherapy, but this is much better.  She reports that she still has neuropathy in her fingers and the whole bottoms of her feet that has not changed recently.  She went to a couple of sessions of acupuncture intermittently, but has not gone back.  She notes that in the midst of treatment she did have \"chemo brain\".  She still does feel that she is less articulate and sometimes has difficulty with word finding.  She denies any issues with swelling in her groin or legs.  She does intermittently have difficulty with hemorrhoids and wonders what she should do about that.  She is currently " "drinking about 1 glass of wine 2-3 times per week.  She does go for some walks, but is otherwise not getting regular exercise.  She notes life is very busy with work and her children.  She denies any changes in her stools or blood in her stools. She denies other concerns.    Current Outpatient Medications   Medication Sig Dispense Refill     Acetaminophen (TYLENOL PO) Take 1,000 mg by mouth At Bedtime       amitriptyline (ELAVIL) 10 MG tablet TAKE 1 TO 2 TABLETS BY MOUTH DAILY AT BEDTIME       atorvastatin (LIPITOR) 40 MG tablet Take 1 tablet (40 mg) by mouth daily 90 tablet 3     calcium carbonate (TUMS) 500 MG chewable tablet Take 1-2 chew tab by mouth daily  150 tablet      diclofenac (VOLTAREN) 75 MG EC tablet TK 1 T PO BID  2     FLUoxetine (PROZAC) 20 MG capsule Take 1 capsule (20 mg) by mouth daily 90 capsule 3     lidocaine-prilocaine (EMLA) cream Apply 45 minutes prior to procedure. 30 g 3     RANITIDINE HCL PO Take 150 mg by mouth daily as needed for heartburn       Allergies   Allergen Reactions     Inapsine [Droperidol] Other (See Comments)     Elevated blood pressure and increased heart rate     Tegaderm Transparent Dressing (Informational Only) Itching     Pt had reaction to Tegaderm used for port dressing. Whole area was very red and itchy. Please use IV 3000 instead.      PHYSICAL EXAMINATION  General: The patient is a pleasant female in no acute distress.  /79 (BP Location: Right arm, Patient Position: Sitting, Cuff Size: Adult Large)   Pulse 76   Temp 98.8  F (37.1  C) (Oral)   Resp 16   Ht 1.651 m (5' 5\")   Wt 93.8 kg (206 lb 12.8 oz)   SpO2 99%   BMI 34.41 kg/m    Wt Readings from Last 10 Encounters:   10/09/19 93.8 kg (206 lb 12.8 oz)   10/08/19 94 kg (207 lb 3.2 oz)   09/05/19 93.6 kg (206 lb 4.8 oz)   05/16/19 89.9 kg (198 lb 1.6 oz)   02/05/19 87.4 kg (192 lb 9.6 oz)   01/24/19 88.3 kg (194 lb 9.6 oz)   12/11/18 85.2 kg (187 lb 14.4 oz)   11/26/18 83.9 kg (185 lb)   11/15/18 84.6 " kg (186 lb 8 oz)   09/27/18 81.6 kg (180 lb)   HEENT: EOMI, PERRL. Sclerae are anicteric. Oral mucosa is pink and moist with no lesions or thrush.   Lymph: Neck is supple with no lymphadenopathy in the cervical or supraclavicular areas.   Heart: Regular rate and rhythm.   Lungs: Clear to auscultation bilaterally.   Abdomen: Bowel sounds present, soft, nontender with no palpable hepatosplenomegaly or masses.   Extremities: No lower extremity edema noted bilaterally.   Neuro: Cranial nerves II through XII are grossly intact.  Skin: No rashes, petechiae, or bruising noted on exposed skin.    LABS:   10/9/2019 13:54   Sodium 140   Potassium 3.8   Chloride 107   Carbon Dioxide 27   Urea Nitrogen 15   Creatinine 0.68   GFR Estimate >90   GFR Estimate If Black >90   Calcium 9.0   Anion Gap 5   Albumin 3.8   Protein Total 6.9   Bilirubin Total 0.4   Alkaline Phosphatase 100   ALT 28   AST 16   Glucose 81   WBC 3.7 (L)   Hemoglobin 12.8   Hematocrit 37.4   Platelet Count 179   RBC Count 4.17   MCV 90   MCH 30.7   MCHC 34.2   RDW 12.5   Diff Method Automated Method   % Neutrophils 66.9   % Lymphocytes 20.5   % Monocytes 7.9   % Eosinophils 3.6   % Basophils 0.8   % Immature Granulocytes 0.3   Nucleated RBCs 0   Absolute Neutrophil 2.5   Absolute Lymphocytes 0.8   Absolute Monocytes 0.3   Absolute Eosinophils 0.1   Absolute Basophils 0.0   Abs Immature Granulocytes 0.0   Absolute Nucleated RBC 0.0     IMPRESSION/PLAN:  Sarah Rajan is a 53 year old female with a history of rectal cancer, here for cancer survivorship visit.    Cancer history.   Patient completed neoadjuvant chemotherapy with 8 cycles of FOLFOX, followed by concurrent chemoradiation with Xeloda with a complete response. She was given a copy of her treatment summary and this was reviewed with her in detail. She will continue to follow-up with the oncology team as scheduled, with plan for follow-up every 3- 6 months for the first 2 years, then every 6 months out  to 5 years. She will next have a pelvic MRI in December and will see Dr. Mortensen to review in January. She will have her next sigmoidoscopy in the end of December. Her next CT CAP will be due in August 2020. She will continue to have a CEA every 3 months. Next colonoscopy in due in May 2022.    Genetic testing.   She did not have genetic testing.    Peripheral neuropathy.   She did develop peripheral neuropathy which persists in her fingers and the bottoms of her feet. Encouraged her to consider more consistent acupuncture to see if she finds a benefit.     Coping.  Patient had difficulty with depression during the midst of her cancer treatment, but is doing well now. She remains on Prozac at 20 mg daily.  She will continue to work with her PCP on this.     Cognitive changes.   She did develop cognitive changes on treatment.  - Recommend regular exercise and cognitive rehabilitation exercises, including puzzles and things like Sudoku.     Risk of lymphedema.   Can occur at any time, though risk is lower for her as she did not undergo surgery, there is still a risk related to her history of radiation. She will contact the clinic if she notices any swelling in the legs, and will be given a referral to the lymphedema specialists.    Cancer screening.   She should undergo routine screening for women in her age group. She reports that she is uptodate on her mammogram and is following regularly with her PCP.    Healthy lifestyle.   She should maintain a healthy weight with a BMI between 20-25. Discussed getting more regular exercise and eating and diet high in fruits, vegetables, and whole grains, low in fats and red and processed meats.  She should exercise at least 150 minutes weekly at moderate intensity. Discussed weight bearing activities 1-2 times per week. Encouraged her that some exercise is better than none, even if not meeting the 150 minutes goal.  She should see the eye doctor every 1-2 years, and dentist every 6  months for cleanings. She reports she is up to date on this.  She should not use any tobacco. Patient denies any tobacco use.  She should minimize alcohol intake. If continuing to drink, should follow CDC recommendations for no more than 1 alcoholic drink/day.    Hemorrhoids  Recommend discussing with colorectal team. Recommend starting on a daily fiber supplement.     Cathleen Ramos PA-C  [facility]  [address] [place] [address] [id number] [phone]

## 2019-10-09 NOTE — LETTER
"10/9/2019      RE: Sarah Rajan  [address] [facility] [id number]       CANCER SURVIVORSHIP VISIT - END OF TREATMENT VISIT  Oct 9, 2019    REASON FOR VISIT: survivorship visit for history of rectal cancer    CANCER HISTORY AND TREATMENT:  She noticed BRBPR so she had a screening colonoscopy on 1/9/2018 which revealed a 3 cm rectal mass and other polyps which were removed.  Pathology indicated moderately differentiated adenocarcinoma w/ intact MMR proteins.  CT staging scan showed no metastatic disease; some liver lesions which are thought to be benign per radiology report, T2N1M0 by pelvic MRI read at [facility]. When we initially reviewed her MRI we will not exactly sure whether that was lymph node involvement or not. We opted to repeat MRI which showed T4 N0 lesion. She underwent neoadjuvant treatment with 8 cycles of FOLFOX, followed by concurrent chemoradiation with Xeloda, which she completed on 8/16/18.  Left intertrochanteric lesion-we did MRI of the left hip which also showed a nonspecific T1 hypointense lesion in the proximal left femur which has not changed since 6/20/2018.  Repeat MRI in December 2018 , 5/14/19 and 8/20/19 were also stable.  Imaging and colonoscopy/sigmoidoscopy since that time shows ongoing complete response.     INTERVAL HISTORY:   Patient reports that overall she has been doing well.  She reports that her mood is good.  She did have a tough time with her mood in the midst of chemotherapy, but this is much better.  She reports that she still has neuropathy in her fingers and the whole bottoms of her feet that has not changed recently.  She went to a couple of sessions of acupuncture intermittently, but has not gone back.  She notes that in the midst of treatment she did have \"chemo brain\".  She still does feel that she is less articulate and sometimes has difficulty with word finding.  She denies any issues with swelling in her groin or legs.  She does intermittently have " "difficulty with hemorrhoids and wonders what she should do about that.  She is currently drinking about 1 glass of wine 2-3 times per week.  She does go for some walks, but is otherwise not getting regular exercise.  She notes life is very busy with work and her children.  She denies any changes in her stools or blood in her stools. She denies other concerns.    Current Outpatient Medications   Medication Sig Dispense Refill     Acetaminophen (TYLENOL PO) Take 1,000 mg by mouth At Bedtime       amitriptyline (ELAVIL) 10 MG tablet TAKE 1 TO 2 TABLETS BY MOUTH DAILY AT BEDTIME       atorvastatin (LIPITOR) 40 MG tablet Take 1 tablet (40 mg) by mouth daily 90 tablet 3     calcium carbonate (TUMS) 500 MG chewable tablet Take 1-2 chew tab by mouth daily  150 tablet      diclofenac (VOLTAREN) 75 MG EC tablet TK 1 T PO BID  2     FLUoxetine (PROZAC) 20 MG capsule Take 1 capsule (20 mg) by mouth daily 90 capsule 3     lidocaine-prilocaine (EMLA) cream Apply 45 minutes prior to procedure. 30 g 3     RANITIDINE HCL PO Take 150 mg by mouth daily as needed for heartburn       Allergies   Allergen Reactions     Inapsine [Droperidol] Other (See Comments)     Elevated blood pressure and increased heart rate     Tegaderm Transparent Dressing (Informational Only) Itching     Pt had reaction to Tegaderm used for port dressing. Whole area was very red and itchy. Please use IV 3000 instead.      PHYSICAL EXAMINATION  General: The patient is a pleasant female in no acute distress.  /79 (BP Location: Right arm, Patient Position: Sitting, Cuff Size: Adult Large)   Pulse 76   Temp 98.8  F (37.1  C) (Oral)   Resp 16   Ht 1.651 m (5' 5\")   Wt 93.8 kg (206 lb 12.8 oz)   SpO2 99%   BMI 34.41 kg/m     Wt Readings from Last 10 Encounters:   10/09/19 93.8 kg (206 lb 12.8 oz)   10/08/19 94 kg (207 lb 3.2 oz)   09/05/19 93.6 kg (206 lb 4.8 oz)   05/16/19 89.9 kg (198 lb 1.6 oz)   02/05/19 87.4 kg (192 lb 9.6 oz)   01/24/19 88.3 kg (194 " lb 9.6 oz)   12/11/18 85.2 kg (187 lb 14.4 oz)   11/26/18 83.9 kg (185 lb)   11/15/18 84.6 kg (186 lb 8 oz)   09/27/18 81.6 kg (180 lb)   HEENT: EOMI, PERRL. Sclerae are anicteric. Oral mucosa is pink and moist with no lesions or thrush.   Lymph: Neck is supple with no lymphadenopathy in the cervical or supraclavicular areas.   Heart: Regular rate and rhythm.   Lungs: Clear to auscultation bilaterally.   Abdomen: Bowel sounds present, soft, nontender with no palpable hepatosplenomegaly or masses.   Extremities: No lower extremity edema noted bilaterally.   Neuro: Cranial nerves II through XII are grossly intact.  Skin: No rashes, petechiae, or bruising noted on exposed skin.    LABS:   10/9/2019 13:54   Sodium 140   Potassium 3.8   Chloride 107   Carbon Dioxide 27   Urea Nitrogen 15   Creatinine 0.68   GFR Estimate >90   GFR Estimate If Black >90   Calcium 9.0   Anion Gap 5   Albumin 3.8   Protein Total 6.9   Bilirubin Total 0.4   Alkaline Phosphatase 100   ALT 28   AST 16   Glucose 81   WBC 3.7 (L)   Hemoglobin 12.8   Hematocrit 37.4   Platelet Count 179   RBC Count 4.17   MCV 90   MCH 30.7   MCHC 34.2   RDW 12.5   Diff Method Automated Method   % Neutrophils 66.9   % Lymphocytes 20.5   % Monocytes 7.9   % Eosinophils 3.6   % Basophils 0.8   % Immature Granulocytes 0.3   Nucleated RBCs 0   Absolute Neutrophil 2.5   Absolute Lymphocytes 0.8   Absolute Monocytes 0.3   Absolute Eosinophils 0.1   Absolute Basophils 0.0   Abs Immature Granulocytes 0.0   Absolute Nucleated RBC 0.0     IMPRESSION/PLAN:  Sarah Rajan is a 53 year old female with a history of rectal cancer, here for cancer survivorship visit.    Cancer history.   Patient completed neoadjuvant chemotherapy with 8 cycles of FOLFOX, followed by concurrent chemoradiation with Xeloda with a complete response. She was given a copy of her treatment summary and this was reviewed with her in detail. She will continue to follow-up with the oncology team as scheduled,  with plan for follow-up every 3- 6 months for the first 2 years, then every 6 months out to 5 years. She will next have a pelvic MRI in December and will see Dr. Mortensen to review in January. She will have her next sigmoidoscopy in the end of December. Her next CT CAP will be due in August 2020. She will continue to have a CEA every 3 months. Next colonoscopy in due in May 2022.    Genetic testing.   She did not have genetic testing.    Peripheral neuropathy.   She did develop peripheral neuropathy which persists in her fingers and the bottoms of her feet. Encouraged her to consider more consistent acupuncture to see if she finds a benefit.     Coping.  Patient had difficulty with depression during the midst of her cancer treatment, but is doing well now. She remains on Prozac at 20 mg daily.  She will continue to work with her PCP on this.     Cognitive changes.   She did develop cognitive changes on treatment.  - Recommend regular exercise and cognitive rehabilitation exercises, including puzzles and things like Sudoku.     Risk of lymphedema.   Can occur at any time, though risk is lower for her as she did not undergo surgery, there is still a risk related to her history of radiation. She will contact the clinic if she notices any swelling in the legs, and will be given a referral to the lymphedema specialists.    Cancer screening.   She should undergo routine screening for women in her age group. She reports that she is uptodate on her mammogram and is following regularly with her PCP.    Healthy lifestyle.   She should maintain a healthy weight with a BMI between 20-25. Discussed getting more regular exercise and eating and diet high in fruits, vegetables, and whole grains, low in fats and red and processed meats.  She should exercise at least 150 minutes weekly at moderate intensity. Discussed weight bearing activities 1-2 times per week. Encouraged her that some exercise is better than none, even if not meeting  the 150 minutes goal.  She should see the eye doctor every 1-2 years, and dentist every 6 months for cleanings. She reports she is up to date on this.  She should not use any tobacco. Patient denies any tobacco use.  She should minimize alcohol intake. If continuing to drink, should follow CDC recommendations for no more than 1 alcoholic drink/day.    Hemorrhoids  Recommend discussing with colorectal team. Recommend starting on a daily fiber supplement.     Cathleen Ramos PA-C  [facility]  [address] [place], [place] [id number] [phone]      Cathleen Ramos PA-C

## 2019-10-09 NOTE — NURSING NOTE
"Chief Complaint   Patient presents with     Port Draw     labs drawn from port by rn.  vs taken     Port accessed with 20 gauge 3/4\" gripper needle and labs drawn by rn.  Port flushed with NS and heparin then de-accessed.  Pt tolerated well.  VS taken.  Pt checked in for next appt.    Joselyn Hughes RN      "

## 2019-11-14 PROCEDURE — 96523 IRRIG DRUG DELIVERY DEVICE: CPT

## 2019-11-14 PROCEDURE — 25000128 H RX IP 250 OP 636: Performed by: INTERNAL MEDICINE

## 2019-11-14 RX ORDER — HEPARIN SODIUM (PORCINE) LOCK FLUSH IV SOLN 100 UNIT/ML 100 UNIT/ML
5 SOLUTION INTRAVENOUS ONCE
Status: COMPLETED | OUTPATIENT
Start: 2019-11-14 | End: 2019-11-14

## 2019-11-14 RX ADMIN — HEPARIN 5 ML: 100 SYRINGE at 14:47

## 2019-11-14 NOTE — NURSING NOTE
Chief Complaint   Patient presents with     Port Flush     Port flushed by RN in lab.      Port accessed with 20g gripper needle by RN, labs collected, line flushed with saline and heparin.    Yeimy RIVERA RN PHN BSN  BMT/Oncology Lab

## 2019-12-03 DIAGNOSIS — E78.01 FAMILIAL HYPERCHOLESTEROLEMIA: ICD-10-CM

## 2019-12-03 DIAGNOSIS — C20 MALIGNANT NEOPLASM OF RECTUM (H): ICD-10-CM

## 2019-12-04 RX ORDER — ATORVASTATIN CALCIUM 40 MG/1
40 TABLET, FILM COATED ORAL DAILY
Qty: 90 TABLET | Refills: 0 | Status: SHIPPED | OUTPATIENT
Start: 2019-12-04 | End: 2020-07-30

## 2019-12-30 DIAGNOSIS — C20 MALIGNANT NEOPLASM OF RECTUM (H): Primary | ICD-10-CM

## 2020-01-08 ENCOUNTER — ANCILLARY PROCEDURE (OUTPATIENT)
Dept: MRI IMAGING | Facility: CLINIC | Age: 55
End: 2020-01-08
Attending: INTERNAL MEDICINE
Payer: COMMERCIAL

## 2020-01-08 DIAGNOSIS — C20 MALIGNANT NEOPLASM OF RECTUM (H): ICD-10-CM

## 2020-01-08 LAB
ALBUMIN SERPL-MCNC: 3.6 G/DL (ref 3.4–5)
ALP SERPL-CCNC: 94 U/L (ref 40–150)
ALT SERPL W P-5'-P-CCNC: 34 U/L (ref 0–50)
ANION GAP SERPL CALCULATED.3IONS-SCNC: 5 MMOL/L (ref 3–14)
AST SERPL W P-5'-P-CCNC: 15 U/L (ref 0–45)
BASOPHILS # BLD AUTO: 0 10E9/L (ref 0–0.2)
BASOPHILS NFR BLD AUTO: 0.6 %
BILIRUB SERPL-MCNC: 0.4 MG/DL (ref 0.2–1.3)
BUN SERPL-MCNC: 16 MG/DL (ref 7–30)
CALCIUM SERPL-MCNC: 8.8 MG/DL (ref 8.5–10.1)
CEA SERPL-MCNC: <0.5 UG/L (ref 0–2.5)
CHLORIDE SERPL-SCNC: 112 MMOL/L (ref 94–109)
CO2 SERPL-SCNC: 26 MMOL/L (ref 20–32)
CREAT SERPL-MCNC: 0.79 MG/DL (ref 0.52–1.04)
DIFFERENTIAL METHOD BLD: ABNORMAL
EOSINOPHIL # BLD AUTO: 0.1 10E9/L (ref 0–0.7)
EOSINOPHIL NFR BLD AUTO: 2.4 %
ERYTHROCYTE [DISTWIDTH] IN BLOOD BY AUTOMATED COUNT: 12.7 % (ref 10–15)
GFR SERPL CREATININE-BSD FRML MDRD: 86 ML/MIN/{1.73_M2}
GLUCOSE SERPL-MCNC: 70 MG/DL (ref 70–99)
HCT VFR BLD AUTO: 36.3 % (ref 35–47)
HGB BLD-MCNC: 12.6 G/DL (ref 11.7–15.7)
IMM GRANULOCYTES # BLD: 0.1 10E9/L (ref 0–0.4)
IMM GRANULOCYTES NFR BLD: 1.1 %
LYMPHOCYTES # BLD AUTO: 0.7 10E9/L (ref 0.8–5.3)
LYMPHOCYTES NFR BLD AUTO: 13.6 %
MCH RBC QN AUTO: 30.4 PG (ref 26.5–33)
MCHC RBC AUTO-ENTMCNC: 34.7 G/DL (ref 31.5–36.5)
MCV RBC AUTO: 88 FL (ref 78–100)
MONOCYTES # BLD AUTO: 0.5 10E9/L (ref 0–1.3)
MONOCYTES NFR BLD AUTO: 8.6 %
NEUTROPHILS # BLD AUTO: 3.9 10E9/L (ref 1.6–8.3)
NEUTROPHILS NFR BLD AUTO: 73.7 %
NRBC # BLD AUTO: 0 10*3/UL
NRBC BLD AUTO-RTO: 0 /100
PLATELET # BLD AUTO: 277 10E9/L (ref 150–450)
POTASSIUM SERPL-SCNC: 3.7 MMOL/L (ref 3.4–5.3)
PROT SERPL-MCNC: 6.8 G/DL (ref 6.8–8.8)
RBC # BLD AUTO: 4.14 10E12/L (ref 3.8–5.2)
SODIUM SERPL-SCNC: 142 MMOL/L (ref 133–144)
WBC # BLD AUTO: 5.4 10E9/L (ref 4–11)

## 2020-01-08 PROCEDURE — 80053 COMPREHEN METABOLIC PANEL: CPT | Performed by: INTERNAL MEDICINE

## 2020-01-08 PROCEDURE — 82378 CARCINOEMBRYONIC ANTIGEN: CPT | Performed by: INTERNAL MEDICINE

## 2020-01-08 PROCEDURE — 85025 COMPLETE CBC W/AUTO DIFF WBC: CPT | Performed by: INTERNAL MEDICINE

## 2020-01-08 PROCEDURE — 25000128 H RX IP 250 OP 636: Performed by: INTERNAL MEDICINE

## 2020-01-08 RX ORDER — HEPARIN SODIUM (PORCINE) LOCK FLUSH IV SOLN 100 UNIT/ML 100 UNIT/ML
5 SOLUTION INTRAVENOUS ONCE
Status: COMPLETED | OUTPATIENT
Start: 2020-01-08 | End: 2020-01-08

## 2020-01-08 RX ORDER — HEPARIN SODIUM (PORCINE) LOCK FLUSH IV SOLN 100 UNIT/ML 100 UNIT/ML
5 SOLUTION INTRAVENOUS EVERY 8 HOURS
Status: DISCONTINUED | OUTPATIENT
Start: 2020-01-08 | End: 2020-01-16 | Stop reason: HOSPADM

## 2020-01-08 RX ORDER — GADOBUTROL 604.72 MG/ML
10 INJECTION INTRAVENOUS ONCE
Status: COMPLETED | OUTPATIENT
Start: 2020-01-08 | End: 2020-01-08

## 2020-01-08 RX ADMIN — GADOBUTROL 10 ML: 604.72 INJECTION INTRAVENOUS at 17:20

## 2020-01-08 RX ADMIN — Medication 5 ML: at 16:46

## 2020-01-08 RX ADMIN — Medication 5 ML: at 17:58

## 2020-01-08 NOTE — NURSING NOTE
Chief Complaint   Patient presents with     Port Draw     Labs not drawn. PORT accessed but has no blood return. Pt didnt want to miss her MRI appt and left. She said she will come right back for the labs before 18:30 to get drawn.      Blood Draw     labs drawn by RN in lab via PIV already in place     There were no vitals taken for this visit.    Labs collected and sent via PIV. PIV saline locked and removed. Port Line flushed with Normal Saline, blood return noted & Heparin locked. Port de-accessed.  Pt tolerated well.      Es Zaragoza RN

## 2020-01-08 NOTE — NURSING NOTE
Chief Complaint   Patient presents with     Port Draw     Labs not drawn. PORT accessed but has no blood return. Pt didnt want to miss her MRI appt and left. She said she will come right back for the labs before 18:30 to get drawn.      Pily Hanson RN

## 2020-01-08 NOTE — DISCHARGE INSTRUCTIONS
MRI Contrast Discharge Instructions    The IV contrast you received today will pass out of your body in your  urine. This will happen in the next 24 hours. You will not feel this process.  Your urine will not change color.    Drink at least 4 extra glasses of water or juice today (unless your doctor  has restricted your fluids). This reduces the stress on your kidneys.  You may take your regular medicines.    If you are on dialysis: It is best to have dialysis today.    If you have a reaction: Most reactions happen right away. If you have  any new symptoms after leaving the hospital (such as hives or swelling),  call your hospital at the correct number below. Or call your family doctor.  If you have breathing distress or wheezing, call 911.    Special instructions: ***    I have read and understand the above information.    Signature:______________________________________ Date:___________    Staff:__________________________________________ Date:___________     Time:__________    [facility] Departments:    ___[place]: [phone]  ___[facility]: [phone]  ___[place]: [phone] ___[facility]: [phone]  ___[facility]: [phone]  ___[facility]: [phone]  ___[phone]  ___[facility]: [phone]  ___[phone]

## 2020-01-09 ENCOUNTER — ONCOLOGY VISIT (OUTPATIENT)
Dept: ONCOLOGY | Facility: CLINIC | Age: 55
End: 2020-01-09
Attending: INTERNAL MEDICINE
Payer: COMMERCIAL

## 2020-01-09 VITALS
WEIGHT: 209.3 LBS | SYSTOLIC BLOOD PRESSURE: 129 MMHG | TEMPERATURE: 98.3 F | BODY MASS INDEX: 34.87 KG/M2 | RESPIRATION RATE: 16 BRPM | HEIGHT: 65 IN | DIASTOLIC BLOOD PRESSURE: 87 MMHG | HEART RATE: 82 BPM | OXYGEN SATURATION: 98 %

## 2020-01-09 DIAGNOSIS — C20 MALIGNANT NEOPLASM OF RECTUM (H): Primary | ICD-10-CM

## 2020-01-09 PROCEDURE — 99214 OFFICE O/P EST MOD 30 MIN: CPT | Mod: ZP | Performed by: INTERNAL MEDICINE

## 2020-01-09 PROCEDURE — G0463 HOSPITAL OUTPT CLINIC VISIT: HCPCS | Mod: ZF

## 2020-01-09 ASSESSMENT — PAIN SCALES - GENERAL: PAINLEVEL: NO PAIN (0)

## 2020-01-09 ASSESSMENT — MIFFLIN-ST. JEOR: SCORE: 1555.26

## 2020-01-09 NOTE — PATIENT INSTRUCTIONS
Follow with Dr Hampton for flex sig    In 4 months, repeat CEA and MRI Pelvis and see me back after that    Port flushes every 4-5 weeks

## 2020-01-09 NOTE — LETTER
1/9/2020       RE: Sarah Rajan  [address] [facility] [address]     Dear Colleague,    Thank you for referring your patient, Sarah Rajan, to the [facility]. Please see a copy of my visit note below.    Oncology outpatient note    Date of service: 1/9/2020       PC: Rectal cancer. [id number] - MSI Intact    HPI:  Please see previous note for details. I have copied and updated from prior note.  She is a 53-year-old female noticed BRBPR so Screening colonoscopy on 1/9/2018 revealed 3cm rectal mass and other polyps which were removed.  Pathology indicated moderately differentiated adenocarcinoma w/ intact MMR proteins.  CT staging scan showed no metastatic disease; some liver lesions which are thought to be benign per radiology report, T2N1M0 by pelvic MRI read at [facility]. When we initially reviewed her MRI we will not exactly sure whether that was lymph node involvement or not. We opted to repeat MRI which showed T4 N0 lesion. There was up to take her to surgery as there was possibility of personally lesion without lymph node involvement but because of the findings of repeat MRI the surgery was canceled and she is coming back to discuss neoadjuvant treatment.  We also did an MRI of the liver which showed 3 subcentimeter liver lesions which were too small to characterize and will need attention on follow-up.    Baseline CEA 1.1 on 1/9/18.    She started neoadjuvant 5-FU and oxaliplatin (FOLFOX), which she started on 2/26/18. The day after she received cycle 2, she developed fevers, chills, headache, and an itchy rash on her arms and legs, for which she was evaluated in the ED. She was sent home on Benadryl. Benadryl and dexamethasone doses were increased for cycle 3.   She tolerated that cycle well    C#4 was given on 4/10/18    She completed 8 cycles of FOLFOX on 6/5/18    Repeat scans show good response to treatment without evidence of metastatic disease. There is only a small signal  consistent with residual tumor seen in the primary rectal cancer lesion.    She started on concurrent chemoradiation with Xeloda with radiation beginning on 7/9/18. There was a delay in her receiving Xeloda, so she began that on 7/10/18. She completed it on 8/16/18    Repeat scans show great response to treatment with almost completely fibrotic signal.  There is an indeterminate left intertrochanteric lesion which is stable.  Liver lesion is consistent with benign hemangioma vs cyst    She was seen by Dr Hampton and the decision was made to keep her on watchful waiting.    Her MRI of the pelvis in December 2018 was stable but it showed rectal wall edema all the flexible sigmoidoscopy was negative.  Decision was made to do a short term follow-up MRI.      Left intertrochanteric lesion-we did MRI of the left hip which also showed a nonspecific T1 hypointense lesion in the proximal left femur which has not changed since 6/20/2018.  Repeat MRI in December 2018 , 5/14/19 and 8/20/19 were also stable.      Pelvis MRI in Feb 2019 was stable.    Colonoscopy on 5/13/19 was without evidence of recurrence- Next due in 3 years.     Pelvis MRI on 1/8/2020 continues to show complete response to treatment.      Interval history  She comes in today accompanied by her  and she is feeling well.  Denies any nausea or vomiting.  No diarrhea or constipation.  Her bowel movements are soft.  Off-and-on her hemorrhoids cause itching and occasionally they bleed.  She has tried Preparation H which has helped.  She was supposed to get flexible sigmoidoscopy with Dr. Hampton but she needed to reschedule that because she had influenza B around end of December and now she has almost recovered from it apart from some residual cough.  No other infections.  No new swellings.  Denies any pain.  No hip pain.  She feels mild numbness at the bottom of her feet.  She does not have any significant neuropathy in her hands.  She thinks that the  "neuropathy in the feet is about the same as before.  She has not tried acupuncture.         ECOG 0    ROS:  Rest of the comprehensive review of the system was essentially unremarkable.      I reviewed other hx in EPIC as below    PMH:  Depression  Restless leg syndrome  Dyslipidemia    Current Outpatient Medications   Medication     amitriptyline (ELAVIL) 10 MG tablet     atorvastatin (LIPITOR) 40 MG tablet     Acetaminophen (TYLENOL PO)     calcium carbonate (TUMS) 500 MG chewable tablet     diclofenac (VOLTAREN) 75 MG EC tablet     FLUoxetine (PROZAC) 20 MG capsule     lidocaine-prilocaine (EMLA) cream     RANITIDINE HCL PO     No current facility-administered medications for this visit.      Facility-Administered Medications Ordered in Other Visits   Medication     heparin 100 UNIT/ML injection 5 mL     sodium chloride (PF) 0.9% PF flush 20 mL         FHx  Aunt and maternal grandmother had breast cancer  Pateral grandmother  of colorectal cancer  Mother had ischemic colitis    SHx:  , two teenage children  EtOH: 1 drink daily, occasionally more on weekends  Tobacco: never smoker  She is a microbiologist     Allergies   Allergen Reactions     Inapsine [Droperidol] Other (See Comments)     Elevated blood pressure and increased heart rate     Tegaderm Transparent Dressing (Informational Only) Itching     Pt had reaction to Tegaderm used for port dressing. Whole area was very red and itchy. Please use IV 3000 instead.        Exam:  /87   Pulse 82   Temp 98.3  F (36.8  C) (Oral)   Resp 16   Ht 1.651 m (5' 5\")   Wt 94.9 kg (209 lb 4.8 oz)   SpO2 98%   BMI 34.83 kg/m      Wt Readings from Last 4 Encounters:   20 94.9 kg (209 lb 4.8 oz)   10/09/19 93.8 kg (206 lb 12.8 oz)   10/08/19 94 kg (207 lb 3.2 oz)   19 93.6 kg (206 lb 4.8 oz)     CONSTITUTIONAL: no acute distress  EYES: PERRLA, no palor or icterus.   ENT/MOUTH: no mouth lesions. Ears normal  CVS: s1s2 no m r g .   RESPIRATORY: " clear to auscultation b/l  GI: soft non tender no hepatosplenomegaly  NEURO: AAOX3  Grossly non focal neuro exam  INTEGUMENT: no obvious rashes  LYMPHATIC: no palpable cervical, supraclavicular, axillary or inguinal LAD  MUSCULOSKELETAL: Unremarkable. No bony tenderness.   EXTREMITIES: no edema  PSYCH: Mentation, mood and affect are normal. Decision making capacity is intact          Labs.  Reviewed    Results for EDGAR MARTINEZ (MRN [id number]) as of 1/9/2020 13:58   Ref. Range 1/8/2020 17:51   Sodium Latest Ref Range: 133 - 144 mmol/L 142   Potassium Latest Ref Range: 3.4 - 5.3 mmol/L 3.7   Chloride Latest Ref Range: 94 - 109 mmol/L 112 (H)   Carbon Dioxide Latest Ref Range: 20 - 32 mmol/L 26   Urea Nitrogen Latest Ref Range: 7 - 30 mg/dL 16   Creatinine Latest Ref Range: 0.52 - 1.04 mg/dL 0.79   GFR Estimate Latest Ref Range: >60 mL/min/1.73_m2 86   GFR Estimate If Black Latest Ref Range: >60 mL/min/1.73_m2 >90   Calcium Latest Ref Range: 8.5 - 10.1 mg/dL 8.8   Anion Gap Latest Ref Range: 3 - 14 mmol/L 5   Albumin Latest Ref Range: 3.4 - 5.0 g/dL 3.6   Protein Total Latest Ref Range: 6.8 - 8.8 g/dL 6.8   Bilirubin Total Latest Ref Range: 0.2 - 1.3 mg/dL 0.4   Alkaline Phosphatase Latest Ref Range: 40 - 150 U/L 94   ALT Latest Ref Range: 0 - 50 U/L 34   AST Latest Ref Range: 0 - 45 U/L 15   Glucose Latest Ref Range: 70 - 99 mg/dL 70   WBC Latest Ref Range: 4.0 - 11.0 10e9/L 5.4   Hemoglobin Latest Ref Range: 11.7 - 15.7 g/dL 12.6   Hematocrit Latest Ref Range: 35.0 - 47.0 % 36.3   Platelet Count Latest Ref Range: 150 - 450 10e9/L 277   RBC Count Latest Ref Range: 3.8 - 5.2 10e12/L 4.14   MCV Latest Ref Range: 78 - 100 fl 88   MCH Latest Ref Range: 26.5 - 33.0 pg 30.4   MCHC Latest Ref Range: 31.5 - 36.5 g/dL 34.7   RDW Latest Ref Range: 10.0 - 15.0 % 12.7   Diff Method Unknown Automated Method   % Neutrophils Latest Units: % 73.7   % Lymphocytes Latest Units: % 13.6   % Monocytes Latest Units: % 8.6   % Eosinophils  Latest Units: % 2.4   % Basophils Latest Units: % 0.6   % Immature Granulocytes Latest Units: % 1.1   Nucleated RBCs Latest Ref Range: 0 /100 0   Absolute Neutrophil Latest Ref Range: 1.6 - 8.3 10e9/L 3.9   Absolute Lymphocytes Latest Ref Range: 0.8 - 5.3 10e9/L 0.7 (L)   Absolute Monocytes Latest Ref Range: 0.0 - 1.3 10e9/L 0.5   Absolute Eosinophils Latest Ref Range: 0.0 - 0.7 10e9/L 0.1   Absolute Basophils Latest Ref Range: 0.0 - 0.2 10e9/L 0.0   Abs Immature Granulocytes Latest Ref Range: 0 - 0.4 10e9/L 0.1   Absolute Nucleated RBC Unknown 0.0     Results for EDGAR MARTINEZ (MRN [id number]) as of 1/9/2020 13:58   Ref. Range 11/15/2018 13:33 2/5/2019 09:37 5/14/2019 08:53 8/21/2019 10:27 10/9/2019 13:54 1/8/2020 17:51   CEA Latest Ref Range: 0 - 2.5 ug/L <0.5 <0.5 <0.5 <0.5 <0.5 <0.5       EXAMINATION: MR PELVIS (INTRAPELVIC ORGANS) WO&W CONTRAST,  1/8/2020 5:41 PM      COMPARISON: 8/21/2019, 1/12/2018.     TECHNIQUE:  Images were acquired without and with intravenous contrast  through the pelvis. The following MR images were acquired without  contrast: multiplanar T1, multiplanar T2, axial diffusion-weighted and  axial apparent diffusion coefficient. T1-weighted images with fat  saturation were acquired at the following intervals relative to  intravenous contrast administration: pre-contrast, immediate post  contrast, 1 minute, 3 minutes and delayed.  Contrast dose: 10 mL  Gadavist     HISTORY: History of moderately differentiated adenocarcinoma of the  rectum status post neoadjuvant chemotherapy with 8 cycles of FOLFOX,  followed by concurrent chemoradiation with Xeloda with a complete  response     FINDINGS:     LOCATION/SIZE: No suspicious enhancement or mass effect within the  vicinity of the treated tumor in the lower posterior rectum. Stable  posttreatment changes.     TREATMENT RESPONSE:  Approximately 0% of the current mass demonstrates  tumour signal intensity, with the remainder appearing to  represent  fibrosis.  This corresponds to a tumour response grade of mrTRG-1.      EXTENT: The tumor does not clearly involve the anal sphincters or  levator ani muscle.      CIRCUMFERENTIAL RESECTION MARGIN (CRM): In terms of the resection  margin, there is no involvement of the mesorectal fascia.     LYMPH NODES: No suspicious pelvic lymph nodes.     VEINS: There is no evidence of extramural venous extension.      MISCELLANEOUS FINDINGS: Stable T1-T2 hypointense lesion in the left  femoral intertrochanteric region. Posttreatment changes of uterine   section.                                                                      IMPRESSION:   1. Since 2019, there is again complete response to treatment,  corresponding with tumor progression grade mrTRG-1.      2. No suspicious pelvic lymphadenopathy.     3. Stable T1-T2 hypointense lesion in the left femoral  intertrochanteric region. Recommend continued attention on follow-up  exams.       Assessment and Plan  [id number] moderately differentiated adenocarcinoma of the rectum - MSI-stable  She was started on neoadjuvant FOLFOX on 2018.  after 4 cycles she had good response to treatment. Liver lesions remain indeterminate.    we continued with the same chemotherapy and she received cycle #8 on 18.  Repeat scans show good response to treatment with only a small signal consistent with viable tumor in the primary rectal cancer. No surrounding lymph nodes suspicious. There is no evidence of metastatic disease.    She started on concurrent chemoradiation with Xeloda with radiation beginning on 18. There was a delay in her receiving Xeloda, so she began on 7/10/18. She completed it on 18    Repeat scans show great response to treatment with almost completely fibrotic signal.  There is an indeterminate left intertrochanteric lesion which is stable.    She was seen by Dr Hampton and the decision was made to keep her on watchful waiting.      She is  doing well. Repeat MRI and CEA are unremarkable. We will repeat Pelvic MRI in 4 months. Will check CEA each clinic visit although her baseline CEA was normal.  She will see Dr Hampton next month for repeat Flex sig.  Repeat CT CAP will be in Aug 2020.    She was seen in Cancer Survivorship clinic.    Surveillance per protocol:  Recent colonoscopy on 5/13/2019 was unremarkable.  She will be due for next colonoscopy in 3 years.          Flex sig every 3 months for first 3 years-   Flex sig will be every 6 months for years 4-5 and then every year for next 5 years.     She will have 3T MRI Pelvis every 4 months for 2 years and and then every 6 months for years 3-4 and then every year for years 5-6.  We will plan to repeat Pelvic MRI in 4 months.    She should have annual CT CAP for 5-6 years.     Will check CEA at every clinic visit.        Hemorrhoids.  She mentions that off-and-on she has had issues with hemorrhoids and occasional bleeding.  Recently they have been itching more.  She has tried Preparation H which has helped.   I did not see a mention of hemorrhoids on her previous scope. She will continue Preparation H and she will get flexible sigmoidoscopy next month with Dr. Hampton.    Indeterminate liver lesion. MRI 2/5/19 shows stable lesion, most likely c/w benign hemangioma or cyst.  At this time I do not plan to repeat MRI abdomen but when we will do CT scan, it will be monitored.    Left intertrochanteric lesion- Asymptomatic.  Dedicated Hip MRIs also show stable lesion. Most recent Pelvic MRI also shows it to be stable. This has remained stable since June 2018.  I think it will be reasonable to monitor it when we are doing Pelvic MRI and do Hip MRI only if it shows a change on Pelvic MRI. As mentioned above, we will repeat Pelvic MRI in 4 months.       Neuropathy-she has mild residual numbness of the bottom of the feet.  I again recommend that she try acupuncture.  She mentions that as things have settled  now, she will probably look into it.      Port-A-Cath.  She still has Port-A-Cath in place and this will need flushing every 4 to 5 weeks.        Return to clinic in 4 months with labs/MRI prior.    All of her and her 's questions were answered to their satisfaction.  They are agreeable and comfortable with the plan.      Again, thank you for allowing me to participate in the care of your patient.      Sincerely,    Kyra Motrensen MD

## 2020-01-09 NOTE — PROGRESS NOTES
Oncology outpatient note    Date of service: 1/9/2020       PC: Rectal cancer. [id number] - MSI Intact    HPI:  Please see previous note for details. I have copied and updated from prior note.  She is a 53-year-old female noticed BRBPR so Screening colonoscopy on 1/9/2018 revealed 3cm rectal mass and other polyps which were removed.  Pathology indicated moderately differentiated adenocarcinoma w/ intact MMR proteins.  CT staging scan showed no metastatic disease; some liver lesions which are thought to be benign per radiology report, T2N1M0 by pelvic MRI read at [facility]. When we initially reviewed her MRI we will not exactly sure whether that was lymph node involvement or not. We opted to repeat MRI which showed T4 N0 lesion. There was up to take her to surgery as there was possibility of personally lesion without lymph node involvement but because of the findings of repeat MRI the surgery was canceled and she is coming back to discuss neoadjuvant treatment.  We also did an MRI of the liver which showed 3 subcentimeter liver lesions which were too small to characterize and will need attention on follow-up.    Baseline CEA 1.1 on 1/9/18.    She started neoadjuvant 5-FU and oxaliplatin (FOLFOX), which she started on 2/26/18. The day after she received cycle 2, she developed fevers, chills, headache, and an itchy rash on her arms and legs, for which she was evaluated in the ED. She was sent home on Benadryl. Benadryl and dexamethasone doses were increased for cycle 3.   She tolerated that cycle well    C#4 was given on 4/10/18    She completed 8 cycles of FOLFOX on 6/5/18    Repeat scans show good response to treatment without evidence of metastatic disease. There is only a small signal consistent with residual tumor seen in the primary rectal cancer lesion.    She started on concurrent chemoradiation with Xeloda with radiation beginning on 7/9/18. There was a delay in her receiving Xeloda, so she began that on 7/10/18.  She completed it on 8/16/18    Repeat scans show great response to treatment with almost completely fibrotic signal.  There is an indeterminate left intertrochanteric lesion which is stable.  Liver lesion is consistent with benign hemangioma vs cyst    She was seen by Dr Hampton and the decision was made to keep her on watchful waiting.    Her MRI of the pelvis in December 2018 was stable but it showed rectal wall edema all the flexible sigmoidoscopy was negative.  Decision was made to do a short term follow-up MRI.      Left intertrochanteric lesion-we did MRI of the left hip which also showed a nonspecific T1 hypointense lesion in the proximal left femur which has not changed since 6/20/2018.  Repeat MRI in December 2018 , 5/14/19 and 8/20/19 were also stable.      Pelvis MRI in Feb 2019 was stable.    Colonoscopy on 5/13/19 was without evidence of recurrence- Next due in 3 years.     Pelvis MRI on 1/8/2020 continues to show complete response to treatment.      Interval history  She comes in today accompanied by her  and she is feeling well.  Denies any nausea or vomiting.  No diarrhea or constipation.  Her bowel movements are soft.  Off-and-on her hemorrhoids cause itching and occasionally they bleed.  She has tried Preparation H which has helped.  She was supposed to get flexible sigmoidoscopy with Dr. Hampton but she needed to reschedule that because she had influenza B around end of December and now she has almost recovered from it apart from some residual cough.  No other infections.  No new swellings.  Denies any pain.  No hip pain.  She feels mild numbness at the bottom of her feet.  She does not have any significant neuropathy in her hands.  She thinks that the neuropathy in the feet is about the same as before.  She has not tried acupuncture.         ECOG 0    ROS:  Rest of the comprehensive review of the system was essentially unremarkable.      I reviewed other hx in EPIC as  "below    PMH:  Depression  Restless leg syndrome  Dyslipidemia    Current Outpatient Medications   Medication     amitriptyline (ELAVIL) 10 MG tablet     atorvastatin (LIPITOR) 40 MG tablet     Acetaminophen (TYLENOL PO)     calcium carbonate (TUMS) 500 MG chewable tablet     diclofenac (VOLTAREN) 75 MG EC tablet     FLUoxetine (PROZAC) 20 MG capsule     lidocaine-prilocaine (EMLA) cream     RANITIDINE HCL PO     No current facility-administered medications for this visit.      Facility-Administered Medications Ordered in Other Visits   Medication     heparin 100 UNIT/ML injection 5 mL     sodium chloride (PF) 0.9% PF flush 20 mL         FHx  Aunt and maternal grandmother had breast cancer  Pateral grandmother  of colorectal cancer  Mother had ischemic colitis    SHx:  , two teenage children  EtOH: 1 drink daily, occasionally more on weekends  Tobacco: never smoker  She is a microbiologist     Allergies   Allergen Reactions     Inapsine [Droperidol] Other (See Comments)     Elevated blood pressure and increased heart rate     Tegaderm Transparent Dressing (Informational Only) Itching     Pt had reaction to Tegaderm used for port dressing. Whole area was very red and itchy. Please use IV 3000 instead.        Exam:  /87   Pulse 82   Temp 98.3  F (36.8  C) (Oral)   Resp 16   Ht 1.651 m (5' 5\")   Wt 94.9 kg (209 lb 4.8 oz)   SpO2 98%   BMI 34.83 kg/m     Wt Readings from Last 4 Encounters:   20 94.9 kg (209 lb 4.8 oz)   10/09/19 93.8 kg (206 lb 12.8 oz)   10/08/19 94 kg (207 lb 3.2 oz)   19 93.6 kg (206 lb 4.8 oz)     CONSTITUTIONAL: no acute distress  EYES: PERRLA, no palor or icterus.   ENT/MOUTH: no mouth lesions. Ears normal  CVS: s1s2 no m r g .   RESPIRATORY: clear to auscultation b/l  GI: soft non tender no hepatosplenomegaly  NEURO: AAOX3  Grossly non focal neuro exam  INTEGUMENT: no obvious rashes  LYMPHATIC: no palpable cervical, supraclavicular, axillary or inguinal " LAD  MUSCULOSKELETAL: Unremarkable. No bony tenderness.   EXTREMITIES: no edema  PSYCH: Mentation, mood and affect are normal. Decision making capacity is intact          Labs.  Reviewed    Results for EDGAR MARTINEZ (MRN [id number]) as of 1/9/2020 13:58   Ref. Range 1/8/2020 17:51   Sodium Latest Ref Range: 133 - 144 mmol/L 142   Potassium Latest Ref Range: 3.4 - 5.3 mmol/L 3.7   Chloride Latest Ref Range: 94 - 109 mmol/L 112 (H)   Carbon Dioxide Latest Ref Range: 20 - 32 mmol/L 26   Urea Nitrogen Latest Ref Range: 7 - 30 mg/dL 16   Creatinine Latest Ref Range: 0.52 - 1.04 mg/dL 0.79   GFR Estimate Latest Ref Range: >60 mL/min/1.73_m2 86   GFR Estimate If Black Latest Ref Range: >60 mL/min/1.73_m2 >90   Calcium Latest Ref Range: 8.5 - 10.1 mg/dL 8.8   Anion Gap Latest Ref Range: 3 - 14 mmol/L 5   Albumin Latest Ref Range: 3.4 - 5.0 g/dL 3.6   Protein Total Latest Ref Range: 6.8 - 8.8 g/dL 6.8   Bilirubin Total Latest Ref Range: 0.2 - 1.3 mg/dL 0.4   Alkaline Phosphatase Latest Ref Range: 40 - 150 U/L 94   ALT Latest Ref Range: 0 - 50 U/L 34   AST Latest Ref Range: 0 - 45 U/L 15   Glucose Latest Ref Range: 70 - 99 mg/dL 70   WBC Latest Ref Range: 4.0 - 11.0 10e9/L 5.4   Hemoglobin Latest Ref Range: 11.7 - 15.7 g/dL 12.6   Hematocrit Latest Ref Range: 35.0 - 47.0 % 36.3   Platelet Count Latest Ref Range: 150 - 450 10e9/L 277   RBC Count Latest Ref Range: 3.8 - 5.2 10e12/L 4.14   MCV Latest Ref Range: 78 - 100 fl 88   MCH Latest Ref Range: 26.5 - 33.0 pg 30.4   MCHC Latest Ref Range: 31.5 - 36.5 g/dL 34.7   RDW Latest Ref Range: 10.0 - 15.0 % 12.7   Diff Method Unknown Automated Method   % Neutrophils Latest Units: % 73.7   % Lymphocytes Latest Units: % 13.6   % Monocytes Latest Units: % 8.6   % Eosinophils Latest Units: % 2.4   % Basophils Latest Units: % 0.6   % Immature Granulocytes Latest Units: % 1.1   Nucleated RBCs Latest Ref Range: 0 /100 0   Absolute Neutrophil Latest Ref Range: 1.6 - 8.3 10e9/L 3.9   Absolute  Lymphocytes Latest Ref Range: 0.8 - 5.3 10e9/L 0.7 (L)   Absolute Monocytes Latest Ref Range: 0.0 - 1.3 10e9/L 0.5   Absolute Eosinophils Latest Ref Range: 0.0 - 0.7 10e9/L 0.1   Absolute Basophils Latest Ref Range: 0.0 - 0.2 10e9/L 0.0   Abs Immature Granulocytes Latest Ref Range: 0 - 0.4 10e9/L 0.1   Absolute Nucleated RBC Unknown 0.0     Results for EDGAR MARTINEZ (MRN [id number]) as of 1/9/2020 13:58   Ref. Range 11/15/2018 13:33 2/5/2019 09:37 5/14/2019 08:53 8/21/2019 10:27 10/9/2019 13:54 1/8/2020 17:51   CEA Latest Ref Range: 0 - 2.5 ug/L <0.5 <0.5 <0.5 <0.5 <0.5 <0.5       EXAMINATION: MR PELVIS (INTRAPELVIC ORGANS) WO&W CONTRAST,  1/8/2020 5:41 PM      COMPARISON: 8/21/2019, 1/12/2018.     TECHNIQUE:  Images were acquired without and with intravenous contrast  through the pelvis. The following MR images were acquired without  contrast: multiplanar T1, multiplanar T2, axial diffusion-weighted and  axial apparent diffusion coefficient. T1-weighted images with fat  saturation were acquired at the following intervals relative to  intravenous contrast administration: pre-contrast, immediate post  contrast, 1 minute, 3 minutes and delayed.  Contrast dose: 10 mL  Gadavist     HISTORY: History of moderately differentiated adenocarcinoma of the  rectum status post neoadjuvant chemotherapy with 8 cycles of FOLFOX,  followed by concurrent chemoradiation with Xeloda with a complete  response     FINDINGS:     LOCATION/SIZE: No suspicious enhancement or mass effect within the  vicinity of the treated tumor in the lower posterior rectum. Stable  posttreatment changes.     TREATMENT RESPONSE:  Approximately 0% of the current mass demonstrates  tumour signal intensity, with the remainder appearing to represent  fibrosis.  This corresponds to a tumour response grade of mrTRG-1.      EXTENT: The tumor does not clearly involve the anal sphincters or  levator ani muscle.      CIRCUMFERENTIAL RESECTION MARGIN (CRM): In terms of  the resection  margin, there is no involvement of the mesorectal fascia.     LYMPH NODES: No suspicious pelvic lymph nodes.     VEINS: There is no evidence of extramural venous extension.      MISCELLANEOUS FINDINGS: Stable T1-T2 hypointense lesion in the left  femoral intertrochanteric region. Posttreatment changes of uterine   section.                                                                      IMPRESSION:   1. Since 2019, there is again complete response to treatment,  corresponding with tumor progression grade mrTRG-1.      2. No suspicious pelvic lymphadenopathy.     3. Stable T1-T2 hypointense lesion in the left femoral  intertrochanteric region. Recommend continued attention on follow-up  exams.       Assessment and Plan  [id number] moderately differentiated adenocarcinoma of the rectum - MSI-stable  She was started on neoadjuvant FOLFOX on 2018.  after 4 cycles she had good response to treatment. Liver lesions remain indeterminate.    we continued with the same chemotherapy and she received cycle #8 on 18.  Repeat scans show good response to treatment with only a small signal consistent with viable tumor in the primary rectal cancer. No surrounding lymph nodes suspicious. There is no evidence of metastatic disease.    She started on concurrent chemoradiation with Xeloda with radiation beginning on 18. There was a delay in her receiving Xeloda, so she began on 7/10/18. She completed it on 18    Repeat scans show great response to treatment with almost completely fibrotic signal.  There is an indeterminate left intertrochanteric lesion which is stable.    She was seen by Dr Hampton and the decision was made to keep her on watchful waiting.      She is doing well. Repeat MRI and CEA are unremarkable. We will repeat Pelvic MRI in 4 months. Will check CEA each clinic visit although her baseline CEA was normal.  She will see Dr Hampton next month for repeat Flex sig.  Repeat CT  CAP will be in Aug 2020.    She was seen in Cancer Survivorship clinic.    Surveillance per protocol:  Recent colonoscopy on 5/13/2019 was unremarkable.  She will be due for next colonoscopy in 3 years.          Flex sig every 3 months for first 3 years-   Flex sig will be every 6 months for years 4-5 and then every year for next 5 years.     She will have 3T MRI Pelvis every 4 months for 2 years and and then every 6 months for years 3-4 and then every year for years 5-6.  We will plan to repeat Pelvic MRI in 4 months.    She should have annual CT CAP for 5-6 years.     Will check CEA at every clinic visit.        Hemorrhoids.  She mentions that off-and-on she has had issues with hemorrhoids and occasional bleeding.  Recently they have been itching more.  She has tried Preparation H which has helped.   I did not see a mention of hemorrhoids on her previous scope. She will continue Preparation H and she will get flexible sigmoidoscopy next month with Dr. Hampton.    Indeterminate liver lesion. MRI 2/5/19 shows stable lesion, most likely c/w benign hemangioma or cyst.  At this time I do not plan to repeat MRI abdomen but when we will do CT scan, it will be monitored.    Left intertrochanteric lesion- Asymptomatic.  Dedicated Hip MRIs also show stable lesion. Most recent Pelvic MRI also shows it to be stable. This has remained stable since June 2018.  I think it will be reasonable to monitor it when we are doing Pelvic MRI and do Hip MRI only if it shows a change on Pelvic MRI. As mentioned above, we will repeat Pelvic MRI in 4 months.       Neuropathy-she has mild residual numbness of the bottom of the feet.  I again recommend that she try acupuncture.  She mentions that as things have settled now, she will probably look into it.      Port-A-Cath.  She still has Port-A-Cath in place and this will need flushing every 4 to 5 weeks.        Return to clinic in 4 months with labs/MRI prior.    All of her and her 's  questions were answered to their satisfaction.  They are agreeable and comfortable with the plan.        Kyra Mortensen

## 2020-02-03 ENCOUNTER — DOCUMENTATION ONLY (OUTPATIENT)
Dept: CARE COORDINATION | Facility: CLINIC | Age: 55
End: 2020-02-03

## 2020-02-07 NOTE — PROGRESS NOTES
Colon and Rectal Surgery Clinic Note      RE: Sarah Satinder  : 1965  JIMMY: 2020    Sarah presents today for follow up of her rectal cancer on Watch and Wait protocol.   She is seen today with her  Cody.       HPI:     She was initially seen in 2018 with a moderately differentiated, invasive adenocarcinoma with intact mismatch repair 4-5 cm from the anal verge. She was staged at that time and an MRI that was initially read as a T1-T2 lesion, then possibly a T2N1 lesion. CT A/P was significant for small liver lesions too small to characterize but not concerning for malignancy and CEA was 1.1. She was discussed at tumor board the pelvic MRI was felt to be poor quality so she underwent a repeat MRI pelvis on  and her tumor was staged as T4N0 due to abutment of the levators. Abdominal MRI to evaluate the liver lesions was performed on  which was again found to have multiple sub-centimeter lesions that were difficult to characterize.     She underwent 8 cycles of FOLFOX completed on 18 and chemoradiation with XELODA on 8/15/18. 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     MRI of left hip showed a non specific T1 hypointense lesion in the proximal left femur and repeat MRI 12/10/18:               There is a T1 hypointense and subtly enhancing lesion in the              proximal left femur, stable since at least 2018.  Given this              patient's history of rectal cancer, metastatic disease cannot be excluded.    CT Chest 19 without evidence of metastatic disease       CEA 11/15/18 <0.5    3T MRI 12/10/18 with TRG-2 and subsequent MRI 19 with TRG-1.    MR Pelvis 2020:  1. Since 2019, there is again complete response to treatment,  corresponding with tumor progression grade mrTRG-1.   2. No suspicious pelvic lymphadenopathy.  3. Stable T1-T2 hypointense lesion in the left femoral  intertrochanteric  "region. Recommend continued attention on follow-up  exams.    I last saw Sarah on 10/8/19 with no evidence of recurrence on flexible sigmoidoscopy.    Colonoscopy last on 5/13/19 without evidence of recurrence    She last saw Dr. Mortensen with oncology on 1/9/2020 and he does not plan to repeat MR abdomen but plans to follow with annual CT CAP. He plans to monitor left intertrochanter lesion with Pelvic MRIs only.    Interval History: Sarah is doing well. She denies any anorectal complaints. She kindly brought our team [facility] samples today.    Physical examination:  Examination was chaperoned by Amara Jackson NP and Mili Carlin, EMT.     Vitals: BP (!) 143/99 (BP Location: Left arm, Patient Position: Sitting, Cuff Size: Adult Regular)   Pulse 78   Ht 5' 6\"   Wt 210 lb   SpO2 97%   BMI 33.89 kg/m    BMI= Body mass index is 33.89 kg/m .    Flexible sigmoidoscopy was performed. Two fleets enemas were given. Informed consent was obtained and time out was performed per protocol.  Digital rectal exam with no palpable lesions. Scope was inserted to approximately 30 cm. Retroflexion was performed and very faint scar was visible in the left anterolateral position at about 3 cm from the anal verge. No evidence of recurrent cancer. She tolerated this well.    Laboratory data:    Recent Labs   Lab Test 01/08/20  1751  02/19/18  0830   WBC 5.4   < >  --    HGB 12.6   < >  --       < >  --    CR 0.79   < >  --    ALBUMIN 3.6   < >  --    BILITOTAL 0.4   < >  --    ALKPHOS 94   < >  --    ALT 34   < >  --    AST 15   < >  --    INR  --   --  0.93    < > = values in this interval not displayed.       Assessment/plan:  No evidence of recurrence on flexible sigmoidoscopy today. Very faint scar visualized on retroflexion. Surveillance per protocol below. Patient's questions were answered to her stated satisfaction and she is in agreement with this plan.     Follow up per Watch and Wait Protocol: "   Completed CRT: 8/15/2018    Flexible sigmoidoscopy   ? Every 2 months for 6 months: Until 2019-COMPLETED   ? Every 3 months for 3 years: Until -DUE 2020  ? Every 6 months for 5 years: Until   ? Every year for 10 years: Until        3T MRI of pelvis  ? 6-8 weeks after CRT: COMPLETED  ? Every 4 months for 2 years: Until 2021-DUE 2020  ? Every 6 months for 2 years: Until 2023  ? Yearly for 2 years: Until        CT CAP  ? Every year for 6-8 years: Until -Due 2020     Colonoscopy   ? Last 19-Repeat 2022       CEA at every clinic or endoscopic visit      For details of past medical history, surgical history, family history, medications, allergies, and review of systems, please see details below.    Medical history:  Past Medical History:   Diagnosis Date     Depression      GERD (gastroesophageal reflux disease)      History of smoking      Insomnia      Obesity      Rectal cancer (H)      Restless leg syndrome      Seasonal affective disorder (H)        Surgical history:  Past Surgical History:   Procedure Laterality Date      SECTION      x2     COLONOSCOPY       INSERT PORT VASCULAR ACCESS Right 2018    Procedure: INSERT PORT VASCULAR ACCESS;  central venous Chest Port Placement;  Surgeon: Gaurav Yates PA-C;  Location: [facility] OR     LASIK         Family history:  Family History   Problem Relation Age of Onset     Hyperlipidemia Mother      Hypertension Mother      Gastrointestinal Disease Mother         ischemic colitis     Coronary Artery Disease Mother         stents x 3     Anesthesia Reaction Mother         hypotension, PONV     Hyperlipidemia Father      Hypertension Father      Breast Cancer Maternal Grandmother      Coronary Artery Disease Maternal Grandfather      Myocardial Infarction Maternal Grandfather      Colon Cancer Paternal Grandmother      Breast Cancer Maternal Aunt      Breast Cancer Paternal Aunt      Coronary Artery Disease Paternal  "Grandfather      Cerebrovascular Disease Paternal Grandfather      Family History Negative Brother      Coronary Artery Disease Maternal Uncle        Medications:  Current Outpatient Medications   Medication Sig Dispense Refill     Acetaminophen (TYLENOL PO) Take 1,000 mg by mouth At Bedtime       amitriptyline (ELAVIL) 10 MG tablet TAKE 1 TO 2 TABLETS BY MOUTH DAILY AT BEDTIME       atorvastatin (LIPITOR) 40 MG tablet Take 1 tablet (40 mg) by mouth daily 90 tablet 0     calcium carbonate (TUMS) 500 MG chewable tablet Take 1-2 chew tab by mouth daily  150 tablet      FLUoxetine (PROZAC) 20 MG capsule Take 1 capsule (20 mg) by mouth daily 90 capsule 3     lidocaine-prilocaine (EMLA) cream Apply 45 minutes prior to procedure. 30 g 3     RANITIDINE HCL PO Take 150 mg by mouth daily as needed for heartburn       diclofenac (VOLTAREN) 75 MG EC tablet TK 1 T PO BID  2       Allergies:  The patientis allergic to inapsine [droperidol] and tegaderm transparent dressing (informational only).    Social history:  Social History     Tobacco Use     Smoking status: Former Smoker     Packs/day: 0.10     Years: 8.00     Pack years: 0.80     Types: Cigarettes     Last attempt to quit: 1986     Years since quittin.0     Smokeless tobacco: Never Used   Substance Use Topics     Alcohol use: Yes     Alcohol/week: 7.0 standard drinks     Types: 7 Glasses of wine per week     Marital status: .    Review of Systems:  Nursing Notes:   Antoine Clulen LPN  2020  9:11 AM  Signed  Chief Complaint   Patient presents with     Flexible Sigmoidoscopy     Flex sig       Vitals:    20 0907   BP: (!) 143/99   BP Location: Left arm   Patient Position: Sitting   Cuff Size: Adult Regular   Pulse: 78   SpO2: 97%   Weight: 210 lb   Height: 5' 6\"       Body mass index is 33.89 kg/m .      Antoine Cullen LPN                           Total face to face time was 10 minutes, >50% counseling, plus an additional 10 minutes procedure " time.      Óscar Hampton MD   Professor and Chief  Division of Colon and Rectal Surgery  [facility]      Referring Provider:  Referred Self, MD  No address on file     Primary Care Provider:  Kyra Mortensen    This note was created using speech recognition software and may contain unintended word substitutions.

## 2020-02-11 ENCOUNTER — OFFICE VISIT (OUTPATIENT)
Dept: SURGERY | Facility: CLINIC | Age: 55
End: 2020-02-11
Payer: COMMERCIAL

## 2020-02-11 VITALS
HEART RATE: 78 BPM | WEIGHT: 210 LBS | HEIGHT: 66 IN | DIASTOLIC BLOOD PRESSURE: 99 MMHG | OXYGEN SATURATION: 97 % | BODY MASS INDEX: 33.75 KG/M2 | SYSTOLIC BLOOD PRESSURE: 143 MMHG

## 2020-02-11 DIAGNOSIS — C20 RECTAL CANCER (H): Primary | ICD-10-CM

## 2020-02-11 PROBLEM — E78.1 PURE HYPERGLYCERIDEMIA: Status: ACTIVE | Noted: 2018-01-10

## 2020-02-11 PROBLEM — Z12.4 CERVICAL CANCER SCREENING: Status: ACTIVE | Noted: 2018-10-23

## 2020-02-11 PROBLEM — N95.1 SYMPTOMATIC MENOPAUSAL OR FEMALE CLIMACTERIC STATES: Status: ACTIVE | Noted: 2020-02-11

## 2020-02-11 PROBLEM — E78.01 FAMILIAL HYPERCHOLESTEROLEMIA: Status: ACTIVE | Noted: 2018-10-16

## 2020-02-11 PROCEDURE — 25000128 H RX IP 250 OP 636: Performed by: INTERNAL MEDICINE

## 2020-02-11 PROCEDURE — 96523 IRRIG DRUG DELIVERY DEVICE: CPT

## 2020-02-11 RX ORDER — HEPARIN SODIUM (PORCINE) LOCK FLUSH IV SOLN 100 UNIT/ML 100 UNIT/ML
5 SOLUTION INTRAVENOUS
Status: COMPLETED | OUTPATIENT
Start: 2020-02-11 | End: 2020-02-11

## 2020-02-11 RX ADMIN — Medication 5 ML: at 08:53

## 2020-02-11 ASSESSMENT — MIFFLIN-ST. JEOR: SCORE: 1574.3

## 2020-02-11 ASSESSMENT — PAIN SCALES - GENERAL: PAINLEVEL: NO PAIN (0)

## 2020-02-11 NOTE — LETTER
2020       RE: Sarah Rajan  [address] [facility] [address]     Dear Colleague,    Thank you for referring your patient, Sarah Rajan, to the [facility] COLON AND RECTAL SURGERY at [facility]. Please see a copy of my visit note below.    Colon and Rectal Surgery Clinic Note    RE: Sarah Rajan  : 1965  JIMMY: 2020    Sarah presents today for follow up of her rectal cancer on Watch and Wait protocol.   She is seen today with her  Cody.       HPI:     She was initially seen in 2018 with a moderately differentiated, invasive adenocarcinoma with intact mismatch repair 4-5 cm from the anal verge. She was staged at that time and an MRI that was initially read as a T1-T2 lesion, then possibly a T2N1 lesion. CT A/P was significant for small liver lesions too small to characterize but not concerning for malignancy and CEA was 1.1. She was discussed at tumor board the pelvic MRI was felt to be poor quality so she underwent a repeat MRI pelvis on  and her tumor was staged as T4N0 due to abutment of the levators. Abdominal MRI to evaluate the liver lesions was performed on  which was again found to have multiple sub-centimeter lesions that were difficult to characterize.     She underwent 8 cycles of FOLFOX completed on 18 and chemoradiation with XELODA on 8/15/18. 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     MRI of left hip showed a non specific T1 hypointense lesion in the proximal left femur and repeat MRI 12/10/18:               There is a T1 hypointense and subtly enhancing lesion in the              proximal left femur, stable since at least 2018.  Given this              patient's history of rectal cancer, metastatic disease cannot be excluded.    CT Chest 19 without evidence of metastatic disease       CEA 11/15/18 <0.5    3T MRI 12/10/18 with TRG-2 and subsequent  "MRI 2/5/19 with TRG-1.    MR Pelvis 1/8/2020:  1. Since 8/21/2019, there is again complete response to treatment,  corresponding with tumor progression grade [id number]-1.   2. No suspicious pelvic lymphadenopathy.  3. Stable T1-T2 hypointense lesion in the left femoral  intertrochanteric region. Recommend continued attention on follow-up  exams.    I last saw Sarah on 10/8/19 with no evidence of recurrence on flexible sigmoidoscopy.    Colonoscopy last on 5/13/19 without evidence of recurrence    She last saw Dr. Mortensen with oncology on 1/9/2020 and he does not plan to repeat MR abdomen but plans to follow with annual CT CAP. He plans to monitor left intertrochanter lesion with Pelvic MRIs only.    Interval History: Sarah is doing well. She denies any anorectal complaints. She kindly brought our team [employer] samples today.    Physical examination:  Examination was chaperoned by Amara Jackson NP and Mili Carlin, EMT.     Vitals: BP (!) 143/99 (BP Location: Left arm, Patient Position: Sitting, Cuff Size: Adult Regular)   Pulse 78   Ht 5' 6\"   Wt 210 lb   SpO2 97%   BMI 33.89 kg/m     BMI= Body mass index is 33.89 kg/m .    Flexible sigmoidoscopy was performed. Two fleets enemas were given. Informed consent was obtained and time out was performed per protocol.  Digital rectal exam with no palpable lesions. Scope was inserted to approximately 30 cm. Retroflexion was performed and very faint scar was visible in the left  anterolateral position at about 3 cm from the anal verge. No evidence of recurrent cancer. She tolerated this well.    Laboratory data:    Recent Labs   Lab Test 01/08/20  1751  02/19/18  0830   WBC 5.4   < >  --    HGB 12.6   < >  --       < >  --    CR 0.79   < >  --    ALBUMIN 3.6   < >  --    BILITOTAL 0.4   < >  --    ALKPHOS 94   < >  --    ALT 34   < >  --    AST 15   < >  --    INR  --   --  0.93    < > = values in this interval not displayed.       Assessment/plan:  No " evidence of recurrence on flexible sigmoidoscopy today. Very faint scar visualized on retroflexion. Surveillance per protocol below. Patient's questions were answered to her stated satisfaction and she is in agreement with this plan.     Follow up per Watch and Wait Protocol:   Completed CRT: 8/15/2018    Flexible sigmoidoscopy   ? Every 2 months for 6 months: Until 2019-COMPLETED   ? Every 3 months for 3 years: Until -DUE  2020  ? Every 6 months for 5 years: Until   ? Every year for 10 years: Until        3T MRI of pelvis  ? 6-8 weeks after CRT: COMPLETED  ? Every 4 months for 2 years: Until 2021-DUE  2020  ? Every 6 months for 2 years: Until 2023  ? Yearly for 2 years: Until        CT CAP  ? Every year for 6-8 years: Until -Due 2020     Colonoscopy   ? Last 19-Repeat 2022       CEA at every clinic or endoscopic visit      For details of past medical history, surgical history, family history, medications, allergies, and review of systems, please see details below.    Medical history:  Past Medical History:   Diagnosis Date     Depression      GERD (gastroesophageal reflux disease)      History of smoking      Insomnia      Obesity      Rectal cancer (H)      Restless leg syndrome      Seasonal affective disorder (H)        Surgical history:  Past Surgical History:   Procedure Laterality Date      SECTION      x2     COLONOSCOPY       INSERT PORT VASCULAR ACCESS Right 2018    Procedure: INSERT PORT VASCULAR ACCESS;  central venous Chest Port Placement;  Surgeon: Gaurav Yates PA-C;  Location: [facility]     LASIK         Family history:  Family History   Problem Relation Age of Onset     Hyperlipidemia Mother      Hypertension Mother      Gastrointestinal Disease Mother         ischemic colitis     Coronary Artery Disease Mother         stents x 3     Anesthesia Reaction Mother         hypotension, PONV     Hyperlipidemia Father      Hypertension Father       Breast Cancer Maternal Grandmother      Coronary Artery Disease Maternal Grandfather      Myocardial Infarction Maternal Grandfather      Colon Cancer Paternal Grandmother      Breast Cancer Maternal Aunt      Breast Cancer Paternal Aunt      Coronary Artery Disease Paternal Grandfather      Cerebrovascular Disease Paternal Grandfather      Family History Negative Brother      Coronary Artery Disease Maternal Uncle        Medications:  Current Outpatient Medications   Medication Sig Dispense Refill     Acetaminophen (TYLENOL PO) Take 1,000 mg by mouth At Bedtime       amitriptyline (ELAVIL) 10 MG tablet TAKE 1 TO 2 TABLETS BY MOUTH DAILY AT BEDTIME       atorvastatin (LIPITOR) 40 MG tablet Take 1 tablet (40 mg) by mouth daily 90 tablet 0     calcium carbonate (TUMS) 500 MG chewable tablet Take 1-2 chew tab by mouth daily  150 tablet      FLUoxetine (PROZAC) 20 MG capsule Take 1 capsule (20 mg) by mouth daily 90 capsule 3     lidocaine-prilocaine (EMLA) cream Apply 45 minutes prior to procedure. 30 g 3     RANITIDINE HCL PO Take 150 mg by mouth daily as needed for heartburn       diclofenac (VOLTAREN) 75 MG EC tablet TK 1 T PO BID  2       Allergies:  The patientis allergic to inapsine [droperidol] and tegaderm transparent dressing (informational only).    Social history:  Social History     Tobacco Use     Smoking status: Former Smoker     Packs/day: 0.10     Years: 8.00     Pack years: 0.80     Types: Cigarettes     Last attempt to quit: 1986     Years since quittin.0     Smokeless tobacco: Never Used   Substance Use Topics     Alcohol use: Yes     Alcohol/week: 7.0 standard drinks     Types: 7 Glasses of wine per week     Marital status: .    Review of Systems:  Nursing Notes:   Antoine Cullen LPN  2020  9:11 AM  Signed  Chief Complaint   Patient presents with     Flexible Sigmoidoscopy     Flex sig       Vitals:    20 0907   BP: (!) 143/99   BP Location: Left arm   Patient Position:  "Sitting   Cuff Size: Adult Regular   Pulse: 78   SpO2: 97%   Weight: 210 lb   Height: 5' 6\"       Body mass index is 33.89 kg/m .      Antoine Cullen LPN       Total face to face time was  10 minutes, >50% counseling, plus an additional 10 minutes procedure time.      Óscar Hampton MD   Professor and Chief  Division of Colon and Rectal Surgery  [facility]    Referring Provider:  Referred Self, MD  No address on file     Primary Care Provider:  Kyra Mortensen    This note was created using speech recognition software and may contain unintended word substitutions.    "

## 2020-02-11 NOTE — NURSING NOTE
"Chief Complaint   Patient presents with     Lab Only     port flushed by rn.       Port accessed with 20 gauge 3/4\" gripper needle; good blood return noted, and port flushed with NS and heparin then port de-accessed.  Pt tolerated well.      Joselyn Hughes, RN    "

## 2020-02-11 NOTE — PATIENT INSTRUCTIONS
Follow up per Watch and Wait Protocol:   Completed CRT: 8/15/2018    Flexible sigmoidoscopy   ? Every 2 months for 6 months: Until 2/2019-COMPLETED   ? Every 3 months for 3 years: Until 2021-DUE 5/2020  ? Every 6 months for 5 years: Until 2023  ? Every year for 10 years: Until 2028       3T MRI of pelvis  ? 6-8 weeks after CRT: COMPLETED  ? Every 4 months for 2 years: Until 8/2021-DUE 5/2020  ? Every 6 months for 2 years: Until 8/2023  ? Yearly for 2 years: Until 2025       CT CAP  ? Every year for 6-8 years: Until 2026-Due 8/2020     Colonoscopy   ? Last 5/13/19-Repeat 5/2022       CEA at every clinic or endoscopic visit      Phone numbers:      Clinic Appointments [phone] # 1   M-F 7:30 - 5 pm      Surgery Scheduling [phone] (Please allow 3 business days for the surgery scheduler to contact you.)      Radiology Appointments [phone]    Please contact the Colon & Rectal Clinic by phone or [facility] with any questions or concerns regarding your clinic visit today.    JANE Vela [phone] or JANE Hernandez [phone]. Clinic Fax Number [phone].  My Chart is available 24 hours a day and is a secure way to access your records and communicate with your care team.  If you would like to inquire about this or are having problems with My Chart access, you may call [phone] or go online at [email or web address].     It is a pleasure being involved in your health care.

## 2020-02-11 NOTE — NURSING NOTE
"Chief Complaint   Patient presents with     Flexible Sigmoidoscopy     Flex sig       Vitals:    02/11/20 0907   BP: (!) 143/99   BP Location: Left arm   Patient Position: Sitting   Cuff Size: Adult Regular   Pulse: 78   SpO2: 97%   Weight: 210 lb   Height: 5' 6\"       Body mass index is 33.89 kg/m .      Antoine Cullen LPN                        "

## 2020-02-21 RX ORDER — ATORVASTATIN CALCIUM 20 MG/1
40 TABLET, FILM COATED ORAL
COMMUNITY
Start: 2018-10-16

## 2020-02-21 RX ORDER — BENZONATATE 100 MG/1
CAPSULE ORAL
COMMUNITY
Start: 2019-12-30 | End: 2020-12-15

## 2020-02-21 RX ORDER — OSELTAMIVIR PHOSPHATE 75 MG/1
CAPSULE ORAL
COMMUNITY
Start: 2019-12-30 | End: 2020-12-15

## 2020-02-21 RX ORDER — ATORVASTATIN CALCIUM 40 MG/1
TABLET, FILM COATED ORAL
COMMUNITY
Start: 2019-12-04 | End: 2020-12-15

## 2020-02-21 RX ORDER — BENZONATATE 100 MG/1
200 CAPSULE ORAL
COMMUNITY
Start: 2019-12-30 | End: 2020-12-15

## 2020-02-21 RX ORDER — AMITRIPTYLINE HYDROCHLORIDE 10 MG/1
TABLET ORAL
COMMUNITY
Start: 2019-12-03

## 2020-02-21 RX ORDER — INFLUENZA A VIRUS A/NEBRASKA/14/2019 (H1N1) ANTIGEN (MDCK CELL DERIVED, PROPIOLACTONE INACTIVATED), INFLUENZA A VIRUS A/DELAWARE/39/2019 (H3N2) ANTIGEN (MDCK CELL DERIVED, PROPIOLACTONE INACTIVATED), INFLUENZA B VIRUS B/SINGAPORE/INFTT-16-0610/2016 ANTIGEN (MDCK CELL DERIVED, PROPIOLACTONE INACTIVATED), INFLUENZA B VIRUS B/DARWIN/7/2019 ANTIGEN (MDCK CELL DERIVED, PROPIOLACTONE INACTIVATED) 15; 15; 15; 15 UG/.5ML; UG/.5ML; UG/.5ML; UG/.5ML
INJECTION, SUSPENSION INTRAMUSCULAR
Refills: 0 | COMMUNITY
Start: 2019-10-22 | End: 2020-12-15

## 2020-02-24 ENCOUNTER — HEALTH MAINTENANCE LETTER (OUTPATIENT)
Age: 55
End: 2020-02-24

## 2020-02-26 ENCOUNTER — COMMUNICATION - HEALTHEAST (OUTPATIENT)
Dept: FAMILY MEDICINE | Facility: CLINIC | Age: 55
End: 2020-02-26

## 2020-03-12 ENCOUNTER — COMMUNICATION - HEALTHEAST (OUTPATIENT)
Dept: SLEEP MEDICINE | Facility: CLINIC | Age: 55
End: 2020-03-12

## 2020-03-30 ENCOUNTER — TELEPHONE (OUTPATIENT)
Dept: SURGERY | Facility: CLINIC | Age: 55
End: 2020-03-30

## 2020-03-30 NOTE — TELEPHONE ENCOUNTER
[facility] Call Center    Phone Message    May a detailed message be left on voicemail: yes     Reason for Call: Other: Pt stated that she is needing to get a  Flexible Sigmoidoscopy. Sending message per guidelines. Please call back to schedule. Pt would preferable to get it scheduled for prior to 5/5/20, but she is OK with whatever will work.     Action Taken: Message routed to:  [facility] ([facility] colon and rectal    Travel Screening: Not Applicable

## 2020-03-30 NOTE — TELEPHONE ENCOUNTER
Patient was called to assess her symptoms in regard to the below message and to discuss why she is wanting to get in sooner. The direct phone number was left in a voicemail with a request for the patient to call back at her earliest convenience.     JANE Renee Care Coordinator  Colon & Rectal Surgery Clinic  Phone: [phone]

## 2020-04-03 ENCOUNTER — PATIENT OUTREACH (OUTPATIENT)
Dept: SURGERY | Facility: CLINIC | Age: 55
End: 2020-04-03

## 2020-04-03 NOTE — PROGRESS NOTES
Called patient to get her scheduled for her W&W flex sig with Dr. Hampton. Patient is scheduled to see Dr. Hampton on 5/5 at 2:00 pm. Verified appointment details and current Covid-19 precautions.

## 2020-04-09 RX ORDER — HEPARIN SODIUM (PORCINE) LOCK FLUSH IV SOLN 100 UNIT/ML 100 UNIT/ML
5 SOLUTION INTRAVENOUS DAILY PRN
Status: DISCONTINUED | OUTPATIENT
Start: 2020-04-09 | End: 2020-04-17 | Stop reason: HOSPADM

## 2020-04-13 NOTE — NURSING NOTE
Chief Complaint   Patient presents with     Port Draw     Labs drawn via port by RN in lab.      Port accessed with 20g gripper needle by RN, labs collected, line flushed with saline and heparin.

## 2020-05-04 NOTE — PROGRESS NOTES
Colon and Rectal Surgery Clinic Note      RE: Sarah Satinder  : 1965  JIMMY: 2020    Sarah presents today for follow up of her rectal cancer on Watch and Wait protocol.    She wants again very kindly brought the office staff and me gift packages from [employer].     HPI:     She was initially seen in 2018 with a moderately differentiated, invasive adenocarcinoma with intact mismatch repair 4-5 cm from the anal verge. She was staged at that time and an MRI that was initially read as a T1-T2 lesion, then possibly a T2N1 lesion. CT A/P was significant for small liver lesions too small to characterize but not concerning for malignancy and CEA was 1.1. She was discussed at tumor board the pelvic MRI was felt to be poor quality so she underwent a repeat MRI pelvis on  and her tumor was staged as T4N0 due to abutment of the levators. Abdominal MRI to evaluate the liver lesions was performed on  which was again found to have multiple sub-centimeter lesions that were difficult to characterize.     She underwent 8 cycles of FOLFOX completed on 18 and chemoradiation with XELODA on 8/15/18. 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     MRI of left hip showed a non specific T1 hypointense lesion in the proximal left femur and repeat MRI 12/10/18:               There is a T1 hypointense and subtly enhancing lesion in the              proximal left femur, stable since at least 2018.  Given this              patient's history of rectal cancer, metastatic disease cannot be excluded.    CT Chest 19 without evidence of metastatic disease       CEA 11/15/18 <0.5    3T MRI 12/10/18 with TRG-2 and subsequent MRI 19 with TRG-1.    I last saw Sarah on 2020 with no evidence of recurrence on flexible sigmoidoscopy.    Colonoscopy last on 19 without evidence of recurrence    She last saw Dr. Mortensen with oncology on 2020 and he does not  "plan to repeat MR abdomen but plans to follow with annual CT CAP. He plans to monitor left intertrochanter lesion with Pelvic MRIs only.         Interval History:  Sarah is doing well.  She traveled to [place] and [place] in January with her family and several weeks later was hospitalized with a febrile illness.  The main symptoms were enteric rather than respiratory.  She was in the hospital for several days.  She denies any anorectal complaints.       Physical examination:  Examination was chaperoned by Mili Carlin, EMT    Vitals: BP (!) 124/91 (BP Location: Left arm, Patient Position: Sitting, Cuff Size: Adult Regular)   Pulse 75   Temp 98  F (36.7  C) (Oral)   Ht 5' 5\"   Wt 205 lb 9.6 oz   SpO2 99%   BMI 34.21 kg/m    BMI= Body mass index is 34.21 kg/m .    Sarah looks well and healthy.  There is no palpable inguinal adenopathy.  Digital rectal examination reveals no masses.  Flexible sigmoidoscopy to 25 cm shows a very faint scar approximately 3 cm from the anal margin left lateral to the left anterolateral.  There is no evidence of tumor recurrence.  The scar is best visualized on retroflexion and is very hard to see definitively on straight view.    Laboratory data:    Recent Labs   Lab Test 01/08/20  1751  02/19/18  0830   WBC 5.4   < >  --    HGB 12.6   < >  --       < >  --    CR 0.79   < >  --    ALBUMIN 3.6   < >  --    BILITOTAL 0.4   < >  --    ALKPHOS 94   < >  --    ALT 34   < >  --    AST 15   < >  --    INR  --   --  0.93    < > = values in this interval not displayed.     MRI 5/5/2020: I see no evidence of tumor recurrence.  Official MRI read is still pending.      Assessment/plan: Sarah is doing very well now approximately 20 months following completion of total neoadjuvant therapy.  She had an unexplained febrile illness after a trip to [place] that may or may not have represented an atypical COVID 19 infection.  There is no evidence of tumor recurrence on examination.  MRI " looks un-concerning to me but official read is pending.  I congratulated Sarah on her ongoing progress and will plan to see her back in 3 months time for a flexible sigmoidoscopy.  Her repeat MRI is due 1 month later.      Follow up per Watch and Wait Protocol:   Completed CRT: 8/15/2018    Flexible sigmoidoscopy   ? Every 2 months for 6 months: Until 2019-COMPLETED   ? Every 3 months for 3 years: Until -DUE 2020  ? Every 6 months for 5 years: Until   ? Every year for 10 years: Until        3T MRI of pelvis  ? 6-8 weeks after CRT: COMPLETED  ? Every 4 months for 2 years: Until 2021-DUE 2020  ? Every 6 months for 2 years: Until 2023  ? Yearly for 2 years: Until        CT CAP  ? Every year for 6-8 years: Until -Due 2020     Colonoscopy   ? Last 19-Repeat 2022       CEA at every clinic or endoscopic visit    For details of past medical history, surgical history, family history, medications, allergies, and review of systems, please see details below.    Medical history:  Past Medical History:   Diagnosis Date     Depression      GERD (gastroesophageal reflux disease)      History of smoking      Insomnia      Obesity      Rectal cancer (H)      Restless leg syndrome      Seasonal affective disorder (H)        Surgical history:  Past Surgical History:   Procedure Laterality Date      SECTION      x2     COLONOSCOPY       INSERT PORT VASCULAR ACCESS Right 2018    Procedure: INSERT PORT VASCULAR ACCESS;  central venous Chest Port Placement;  Surgeon: Gaurav Yates PA-C;  Location: [facility] OR     LASIK         Family history:  Family History   Problem Relation Age of Onset     Hyperlipidemia Mother      Hypertension Mother      Gastrointestinal Disease Mother         ischemic colitis     Coronary Artery Disease Mother         stents x 3     Anesthesia Reaction Mother         hypotension, PONV     Hyperlipidemia Father      Hypertension Father      Breast Cancer Maternal  Grandmother      Coronary Artery Disease Maternal Grandfather      Myocardial Infarction Maternal Grandfather      Colon Cancer Paternal Grandmother      Breast Cancer Maternal Aunt      Breast Cancer Paternal Aunt      Coronary Artery Disease Paternal Grandfather      Cerebrovascular Disease Paternal Grandfather      Family History Negative Brother      Coronary Artery Disease Maternal Uncle        Medications:  Current Outpatient Medications   Medication Sig Dispense Refill     Acetaminophen (TYLENOL PO) Take 1,000 mg by mouth At Bedtime       amitriptyline (ELAVIL) 10 MG tablet TAKE 1 TO 2 TABLETS BY MOUTH DAILY AT BEDTIME       amitriptyline 10 MG PO tablet TAKE 1 TO 2 TABLETS BY MOUTH DAILY AT BEDTIME       atorvastatin 20 MG PO tablet Take 20 mg by mouth       atorvastatin 40 MG PO tablet TK 1 T PO ONCE D       benzonatate 100 MG PO capsule Take 200 mg by mouth       benzonatate 100 MG PO capsule        calcium carbonate (TUMS) 500 MG chewable tablet Take 1-2 chew tab by mouth daily  150 tablet      diclofenac (VOLTAREN) 75 MG EC tablet TK 1 T PO BID  2     FLUCELVAX QUADRIVALENT 0.5 ML IM RAFAEL ADM 0.5ML IM UTD  0     FLUoxetine (PROZAC) 20 MG capsule Take 1 capsule (20 mg) by mouth daily 90 capsule 3     FLUoxetine 20 MG PO capsule        lidocaine-prilocaine (EMLA) cream Apply 45 minutes prior to procedure. 30 g 3     oseltamivir 75 MG PO capsule        RANITIDINE HCL PO Take 150 mg by mouth daily as needed for heartburn       atorvastatin (LIPITOR) 40 MG tablet Take 1 tablet (40 mg) by mouth daily (Patient not taking: Reported on 2020) 90 tablet 0       Allergies:  The patientis allergic to inapsine [droperidol] and tegaderm transparent dressing (informational only).    Social history:  Social History     Tobacco Use     Smoking status: Former Smoker     Packs/day: 0.10     Years: 8.00     Pack years: 0.80     Types: Cigarettes     Last attempt to quit: 1986     Years since quittin.2      "Smokeless tobacco: Never Used   Substance Use Topics     Alcohol use: Yes     Alcohol/week: 7.0 standard drinks     Types: 7 Glasses of wine per week     Marital status: .    Review of Systems:  Nursing Notes:   Mili Carlin EMT  5/5/2020 12:48 PM  Signed  Chief Complaint   Patient presents with     Flexible Sigmoidoscopy     Per watch and wait protocol for rectal cancer.       Vitals:    05/05/20 1245   BP: (!) 124/91   BP Location: Left arm   Patient Position: Sitting   Cuff Size: Adult Regular   Pulse: 75   Temp: 98  F (36.7  C)   TempSrc: Oral   SpO2: 99%   Weight: 205 lb 9.6 oz   Height: 5' 5\"       Body mass index is 34.21 kg/m .      MARIBELL Mojica                         Total face to face time was 15 minutes, >50% counseling, plus an additional 10 minutes procedure time.      Óscar Hampton MD   Professor and Chief  Division of Colon and Rectal Surgery  [facility]      Referring Provider:  Referred Self, MD  No address on file     Primary Care Provider:  Kyra Mortensen    This note was created using speech recognition software and may contain unintended word substitutions.  "

## 2020-05-05 ENCOUNTER — OFFICE VISIT (OUTPATIENT)
Dept: SURGERY | Facility: CLINIC | Age: 55
End: 2020-05-05
Payer: COMMERCIAL

## 2020-05-05 ENCOUNTER — ANCILLARY PROCEDURE (OUTPATIENT)
Dept: MRI IMAGING | Facility: CLINIC | Age: 55
End: 2020-05-05
Attending: INTERNAL MEDICINE
Payer: COMMERCIAL

## 2020-05-05 VITALS
DIASTOLIC BLOOD PRESSURE: 91 MMHG | HEIGHT: 65 IN | WEIGHT: 205.6 LBS | HEART RATE: 75 BPM | TEMPERATURE: 98 F | BODY MASS INDEX: 34.26 KG/M2 | SYSTOLIC BLOOD PRESSURE: 124 MMHG | OXYGEN SATURATION: 99 %

## 2020-05-05 DIAGNOSIS — C20 MALIGNANT NEOPLASM OF RECTUM (H): ICD-10-CM

## 2020-05-05 DIAGNOSIS — C20 RECTAL CANCER (H): Primary | ICD-10-CM

## 2020-05-05 DIAGNOSIS — C20 RECTAL CANCER (H): ICD-10-CM

## 2020-05-05 LAB — CEA SERPL-MCNC: <0.5 UG/L (ref 0–2.5)

## 2020-05-05 PROCEDURE — 82378 CARCINOEMBRYONIC ANTIGEN: CPT | Performed by: INTERNAL MEDICINE

## 2020-05-05 RX ORDER — GADOBUTROL 604.72 MG/ML
10 INJECTION INTRAVENOUS ONCE
Status: COMPLETED | OUTPATIENT
Start: 2020-05-05 | End: 2020-05-05

## 2020-05-05 RX ORDER — HEPARIN SODIUM (PORCINE) LOCK FLUSH IV SOLN 100 UNIT/ML 100 UNIT/ML
5 SOLUTION INTRAVENOUS ONCE
Status: COMPLETED | OUTPATIENT
Start: 2020-05-05 | End: 2020-05-05

## 2020-05-05 RX ADMIN — GADOBUTROL 10 ML: 604.72 INJECTION INTRAVENOUS at 11:36

## 2020-05-05 RX ADMIN — HEPARIN SODIUM (PORCINE) LOCK FLUSH IV SOLN 100 UNIT/ML 5 ML: 100 SOLUTION at 11:57

## 2020-05-05 ASSESSMENT — PAIN SCALES - GENERAL: PAINLEVEL: NO PAIN (0)

## 2020-05-05 ASSESSMENT — MIFFLIN-ST. JEOR: SCORE: 1538.48

## 2020-05-05 NOTE — DISCHARGE INSTRUCTIONS
MRI Contrast Discharge Instructions    The IV contrast you received today will pass out of your body in your  urine. This will happen in the next 24 hours. You will not feel this process.  Your urine will not change color.    Drink at least 4 extra glasses of water or juice today (unless your doctor  has restricted your fluids). This reduces the stress on your kidneys.  You may take your regular medicines.    If you are on dialysis: It is best to have dialysis today.    If you have a reaction: Most reactions happen right away. If you have  any new symptoms after leaving the hospital (such as hives or swelling),  call your hospital at the correct number below. Or call your family doctor.  If you have breathing distress or wheezing, call 911.    Special instructions: ***    I have read and understand the above information.    Signature:______________________________________ Date:___________    Staff:__________________________________________ Date:___________     Time:__________    [place] [facility] Departments:    ___[place]: [phone]  ___[facility]: [phone]  ___[place]: [phone] ___[facility]: [phone]  ___[facility]: [phone]  ___[facility]: [phone]  ___[phone]  ___[facility]: [phone]  ___[phone]

## 2020-05-05 NOTE — NURSING NOTE
"Chief Complaint   Patient presents with     Flexible Sigmoidoscopy     Per watch and wait protocol for rectal cancer.       Vitals:    05/05/20 1245   BP: (!) 124/91   BP Location: Left arm   Patient Position: Sitting   Cuff Size: Adult Regular   Pulse: 75   Temp: 98  F (36.7  C)   TempSrc: Oral   SpO2: 99%   Weight: 205 lb 9.6 oz   Height: 5' 5\"       Body mass index is 34.21 kg/m .      Mili Carlin, EMT                      "

## 2020-05-05 NOTE — NURSING NOTE
Chief Complaint   Patient presents with     Port Draw     Labs drawn via port by RN in lab.      Labs drawn via Port accessed using 20g gripper needle. Line flushed and Heparin locked.     Marianela Baker RN

## 2020-05-05 NOTE — LETTER
2020       RE: Sarah Rajan  [address] [facility] [place] [address]     Dear Colleague,    Thank you for referring your patient, Sarah Rajan, to the [facility] COLON AND RECTAL SURGERY at [facility]. Please see a copy of my visit note below.    Colon and Rectal Surgery Clinic Note      RE: Sarah Rajan  : 1965  JIMMY: 2020    Sarah presents today for follow up of her rectal cancer on Watch and Wait protocol.    She wants again very kindly brought the office staff and me gift packages from [employer].     HPI:     She was initially seen in 2018 with a moderately differentiated, invasive adenocarcinoma with intact mismatch repair 4-5 cm from the anal verge. She was staged at that time and an MRI that was initially read as a T1-T2 lesion, then possibly a T2N1 lesion. CT A/P was significant for small liver lesions too small to characterize but not concerning for malignancy and CEA was 1.1. She was discussed at tumor board the pelvic MRI was felt to be poor quality so she underwent a repeat MRI pelvis on  and her tumor was staged as T4N0 due to abutment of the levators. Abdominal MRI to evaluate the liver lesions was performed on  which was again found to have multiple sub-centimeter lesions that were difficult to characterize.     She underwent 8 cycles of FOLFOX completed on 18 and chemoradiation with XELODA on 8/15/18. 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     MRI of left hip showed a non specific T1 hypointense lesion in the proximal left femur and repeat MRI 12/10/18:               There is a T1 hypointense and subtly enhancing lesion in the              proximal left femur, stable since at least 2018.  Given this              patient's history of rectal cancer, metastatic disease cannot be excluded.    CT Chest 19 without evidence of metastatic disease       CEA 11/15/18  "<0.5    3T MRI 12/10/18 with TRG-2 and subsequent MRI 2/5/19 with TRG-1.    I last saw Sarah on 2/11/2020 with no evidence of recurrence on flexible sigmoidoscopy.    Colonoscopy last on 5/13/19 without evidence of recurrence    She last saw Dr. Mortensen with oncology on 1/9/2020 and he does not plan to repeat MR abdomen but plans to follow with annual CT CAP. He plans to monitor left intertrochanter lesion with Pelvic MRIs only.         Interval History:  Sarah is doing well.  She traveled to [place] and [place] in January with her family and several weeks later was hospitalized with a febrile illness.  The main symptoms were enteric rather than respiratory.  She was in the hospital for several days.  She denies any anorectal complaints.       Physical examination:  Examination was chaperoned by MARIBELL Mojica    Vitals: BP (!) 124/91 (BP Location: Left arm, Patient Position: Sitting, Cuff Size: Adult Regular)   Pulse 75   Temp 98  F (36.7  C) (Oral)   Ht 5' 5\"   Wt 205 lb 9.6 oz   SpO2 99%   BMI 34.21 kg/m    BMI= Body mass index is 34.21 kg/m .    Sarah looks well and healthy.  There is no palpable inguinal adenopathy.  Digital rectal examination reveals no masses.  Flexible sigmoidoscopy to 25 cm shows a very faint scar approximately 3 cm from the anal margin left lateral to the left anterolateral.  There is no evidence of tumor recurrence.  The scar is best visualized on retroflexion and is very hard to see definitively on straight view.    Laboratory data:    Recent Labs   Lab Test 01/08/20  1751  02/19/18  0830   WBC 5.4   < >  --    HGB 12.6   < >  --       < >  --    CR 0.79   < >  --    ALBUMIN 3.6   < >  --    BILITOTAL 0.4   < >  --    ALKPHOS 94   < >  --    ALT 34   < >  --    AST 15   < >  --    INR  --   --  0.93    < > = values in this interval not displayed.     MRI 5/5/2020: I see no evidence of tumor recurrence.  Official MRI read is still pending.      Assessment/plan: Sarah " is doing very well now approximately 20 months following completion of total neoadjuvant therapy.  She had an unexplained febrile illness after a trip to [place] that may or may not have represented an atypical COVID 19 infection.  There is no evidence of tumor recurrence on examination.  MRI looks un-concerning to me but official read is pending.  I congratulated Sarah on her ongoing progress and will plan to see her back in 3 months time for a flexible sigmoidoscopy.  Her repeat MRI is due 1 month later.      Follow up per Watch and Wait Protocol:   Completed CRT: 8/15/2018    Flexible sigmoidoscopy   ? Every 2 months for 6 months: Until 2019-COMPLETED   ? Every 3 months for 3 years: Until -DUE 2020  ? Every 6 months for 5 years: Until   ? Every year for 10 years: Until        3T MRI of pelvis  ? 6-8 weeks after CRT: COMPLETED  ? Every 4 months for 2 years: Until 2021-DUE 2020  ? Every 6 months for 2 years: Until 2023  ? Yearly for 2 years: Until        CT CAP  ? Every year for 6-8 years: Until -Due 2020     Colonoscopy   ? Last 19-Repeat 2022       CEA at every clinic or endoscopic visit    For details of past medical history, surgical history, family history, medications, allergies, and review of systems, please see details below.    Medical history:  Past Medical History:   Diagnosis Date     Depression      GERD (gastroesophageal reflux disease)      History of smoking      Insomnia      Obesity      Rectal cancer (H)      Restless leg syndrome      Seasonal affective disorder (H)        Surgical history:  Past Surgical History:   Procedure Laterality Date      SECTION      x2     COLONOSCOPY       INSERT PORT VASCULAR ACCESS Right 2018    Procedure: INSERT PORT VASCULAR ACCESS;  central venous Chest Port Placement;  Surgeon: Gaurav Yates PA-C;  Location:  OR     [facility]         Family history:  Family History   Problem Relation Age of Onset      Hyperlipidemia Mother      Hypertension Mother      Gastrointestinal Disease Mother         ischemic colitis     Coronary Artery Disease Mother         stents x 3     Anesthesia Reaction Mother         hypotension, PONV     Hyperlipidemia Father      Hypertension Father      Breast Cancer Maternal Grandmother      Coronary Artery Disease Maternal Grandfather      Myocardial Infarction Maternal Grandfather      Colon Cancer Paternal Grandmother      Breast Cancer Maternal Aunt      Breast Cancer Paternal Aunt      Coronary Artery Disease Paternal Grandfather      Cerebrovascular Disease Paternal Grandfather      Family History Negative Brother      Coronary Artery Disease Maternal Uncle        Medications:  Current Outpatient Medications   Medication Sig Dispense Refill     Acetaminophen (TYLENOL PO) Take 1,000 mg by mouth At Bedtime       amitriptyline (ELAVIL) 10 MG tablet TAKE 1 TO 2 TABLETS BY MOUTH DAILY AT BEDTIME       amitriptyline 10 MG PO tablet TAKE 1 TO 2 TABLETS BY MOUTH DAILY AT BEDTIME       atorvastatin 20 MG PO tablet Take 20 mg by mouth       atorvastatin 40 MG PO tablet TK 1 T PO ONCE D       benzonatate 100 MG PO capsule Take 200 mg by mouth       benzonatate 100 MG PO capsule        calcium carbonate (TUMS) 500 MG chewable tablet Take 1-2 chew tab by mouth daily  150 tablet      diclofenac (VOLTAREN) 75 MG EC tablet TK 1 T PO BID  2     FLUCELVAX QUADRIVALENT 0.5 ML IM RAFAEL ADM 0.5ML IM UTD  0     FLUoxetine (PROZAC) 20 MG capsule Take 1 capsule (20 mg) by mouth daily 90 capsule 3     FLUoxetine 20 MG PO capsule        lidocaine-prilocaine (EMLA) cream Apply 45 minutes prior to procedure. 30 g 3     oseltamivir 75 MG PO capsule        RANITIDINE HCL PO Take 150 mg by mouth daily as needed for heartburn       atorvastatin (LIPITOR) 40 MG tablet Take 1 tablet (40 mg) by mouth daily (Patient not taking: Reported on 5/5/2020) 90 tablet 0       Allergies:  The patientis allergic to inapsine  "[droperidol] and tegaderm transparent dressing (informational only).    Social history:  Social History     Tobacco Use     Smoking status: Former Smoker     Packs/day: 0.10     Years: 8.00     Pack years: 0.80     Types: Cigarettes     Last attempt to quit: 1986     Years since quittin.2     Smokeless tobacco: Never Used   Substance Use Topics     Alcohol use: Yes     Alcohol/week: 7.0 standard drinks     Types: 7 Glasses of wine per week     Marital status: .    Review of Systems:  Nursing Notes:   Mili Carlin EMT  2020 12:48 PM  Signed  Chief Complaint   Patient presents with     Flexible Sigmoidoscopy     Per watch and wait protocol for rectal cancer.       Vitals:    20 1245   BP: (!) 124/91   BP Location: Left arm   Patient Position: Sitting   Cuff Size: Adult Regular   Pulse: 75   Temp: 98  F (36.7  C)   TempSrc: Oral   SpO2: 99%   Weight: 205 lb 9.6 oz   Height: 5' 5\"       Body mass index is 34.21 kg/m .      MARIBELL Mojica                         Total face to face time was 15 minutes, >50% counseling, plus an additional 10 minutes procedure time.      Óscar Hampton MD   Professor and Chief  Division of Colon and Rectal Surgery  [facility]      Referring Provider:  Referred Self, MD  No address on file     Primary Care Provider:  Kyra Mortensen    This note was created using speech recognition software and may contain unintended word substitutions.    Again, thank you for allowing me to participate in the care of your patient.      Sincerely,    Óscar Hampton MD      "

## 2020-05-05 NOTE — PATIENT INSTRUCTIONS
Follow up:  Follow up per Watch and Wait Protocol:   Completed CRT: 8/15/2018    Flexible sigmoidoscopy   ? Every 2 months for 6 months: Until 2/2019-COMPLETED   ? Every 3 months for 3 years: Until 2021-DUE 8/2020  ? Every 6 months for 5 years: Until 2023  ? Every year for 10 years: Until 2028       3T MRI of pelvis  ? 6-8 weeks after CRT: COMPLETED  ? Every 4 months for 2 years: Until 8/2021-DUE 9/2020  ? Every 6 months for 2 years: Until 8/2023  ? Yearly for 2 years: Until 2025       CT CAP  ? Every year for 6-8 years: Until 2026-Due 8/2020     Colonoscopy   ? Last 5/13/19-Repeat 5/2022       CEA at every clinic or endoscopic visit    Please call with any questions or concerns regarding your clinic visit today.    It is a pleasure being involved in your health care.    Contacts post-consultation depending on your need:    Radiology Appointments [phone]    Schedule Clinic Appointments [phone] # 1   M-F 7:30 - 5 pm    JANE Carlos [phone]    Clinic Fax Number [phone]    Surgery Scheduling [phone]    My Chart is available 24 hours a day and is a secure way to access your records and communicate with your care team.  I strongly recommend signing up if you haven't already done so, if you are comfortable with computers.  If you would like to inquire about this or are having problems with My Chart access, you may call [phone] or go online at [email or web address].  Please allow at least 24 hours for a response and extra time on weekends and Holidays.

## 2020-05-06 DIAGNOSIS — C20 RECTAL CANCER (H): Primary | ICD-10-CM

## 2020-05-07 ENCOUNTER — TELEPHONE (OUTPATIENT)
Dept: SURGERY | Facility: CLINIC | Age: 55
End: 2020-05-07

## 2020-05-07 ENCOUNTER — VIRTUAL VISIT (OUTPATIENT)
Dept: ONCOLOGY | Facility: CLINIC | Age: 55
End: 2020-05-07
Attending: INTERNAL MEDICINE
Payer: COMMERCIAL

## 2020-05-07 DIAGNOSIS — C20 RECTAL CANCER (H): Primary | ICD-10-CM

## 2020-05-07 PROCEDURE — 99214 OFFICE O/P EST MOD 30 MIN: CPT | Mod: 95 | Performed by: INTERNAL MEDICINE

## 2020-05-07 PROCEDURE — 40000114 ZZH STATISTIC NO CHARGE CLINIC VISIT

## 2020-05-07 NOTE — PATIENT INSTRUCTIONS
Schedule CT CAP in Aug    Schedule MRI in September    See me back a few days after MRI in September    Port flushes every 8 weeks

## 2020-05-07 NOTE — PROGRESS NOTES
Oncology outpatient note    Date of service: 5/7/2020       PC: Rectal cancer. [id number] - MSI Intact    HPI:  Please see previous note for details. I have copied and updated from prior note.  She is a 54-year-old female noticed BRBPR so Screening colonoscopy on 1/9/2018 revealed 3cm rectal mass and other polyps which were removed.  Pathology indicated moderately differentiated adenocarcinoma w/ intact MMR proteins.  CT staging scan showed no metastatic disease; some liver lesions which are thought to be benign per radiology report, T2N1M0 by pelvic MRI read at [facility]. When we initially reviewed her MRI we will not exactly sure whether that was lymph node involvement or not. We opted to repeat MRI which showed T4 N0 lesion. There was up to take her to surgery as there was possibility of personally lesion without lymph node involvement but because of the findings of repeat MRI the surgery was canceled and she is coming back to discuss neoadjuvant treatment.  We also did an MRI of the liver which showed 3 subcentimeter liver lesions which were too small to characterize and will need attention on follow-up.    Baseline CEA 1.1 on 1/9/18.    She started neoadjuvant 5-FU and oxaliplatin (FOLFOX), which she started on 2/26/18. The day after she received cycle 2, she developed fevers, chills, headache, and an itchy rash on her arms and legs, for which she was evaluated in the ED. She was sent home on Benadryl. Benadryl and dexamethasone doses were increased for cycle 3.   She tolerated that cycle well    C#4 was given on 4/10/18    She completed 8 cycles of FOLFOX on 6/5/18    Repeat scans show good response to treatment without evidence of metastatic disease. There is only a small signal consistent with residual tumor seen in the primary rectal cancer lesion.    She started on concurrent chemoradiation with Xeloda with radiation beginning on 7/9/18. There was a delay in her receiving Xeloda, so she began that on 7/10/18.  She completed it on 8/16/18    Repeat scans show great response to treatment with almost completely fibrotic signal.  There is an indeterminate left intertrochanteric lesion which is stable.  Liver lesion is consistent with benign hemangioma vs cyst    She was seen by Dr Hampton and the decision was made to keep her on watchful waiting.    Her MRI of the pelvis in December 2018 was stable but it showed rectal wall edema all the flexible sigmoidoscopy was negative.  Decision was made to do a short term follow-up MRI.      Left intertrochanteric lesion-we did MRI of the left hip which also showed a nonspecific T1 hypointense lesion in the proximal left femur which has not changed since 6/20/2018.  Repeat MRI in December 2018 , 5/14/19 and 8/20/19 were also stable.      Pelvis MRI in Feb 2019 was stable.    Colonoscopy on 5/13/19 was without evidence of recurrence- Next due in 3 years.     Pelvis MRI on 5/5/2020 continues to show complete response to treatment.    Flex sig on 5/5/2020 was also unremarkable.      Interval history    This is a video visit    She is doing very well.  In February she was admitted for nausea vomiting and diarrhea and high-grade fever.  She recovered after that.  She did not have any respiratory complaints as per her.  Since then she has felt her really well.  Denies any abdominal pain nausea or vomiting.  Bowel movements have been good.  Denies any bleeding.  No other infections.  She has persistent neuropathy especially of her feet.  No new swellings.  No dyspnea.  Energy is good.        ECOG 0    ROS:  A comprehensive ROS was otherwise neg        I reviewed other hx in EPIC as below    PMH:  Depression  Restless leg syndrome  Dyslipidemia    Current Outpatient Medications   Medication     Acetaminophen (TYLENOL PO)     amitriptyline (ELAVIL) 10 MG tablet     amitriptyline 10 MG PO tablet     atorvastatin (LIPITOR) 40 MG tablet     atorvastatin 20 MG PO tablet     atorvastatin 40 MG PO  tablet     benzonatate 100 MG PO capsule     benzonatate 100 MG PO capsule     calcium carbonate (TUMS) 500 MG chewable tablet     diclofenac (VOLTAREN) 75 MG EC tablet     FLUCELVAX QUADRIVALENT 0.5 ML IM RAFAEL     FLUoxetine (PROZAC) 20 MG capsule     FLUoxetine 20 MG PO capsule     lidocaine-prilocaine (EMLA) cream     oseltamivir 75 MG PO capsule     RANITIDINE HCL PO     No current facility-administered medications for this visit.          FHx  Aunt and maternal grandmother had breast cancer  Pateral grandmother  of colorectal cancer  Mother had ischemic colitis    SHx:  , two teenage children  EtOH: 1 drink daily, occasionally more on weekends  Tobacco: never smoker  She is a microbiologist     Allergies   Allergen Reactions     Inapsine [Droperidol] Other (See Comments)     Elevated blood pressure and increased heart rate     Tegaderm Transparent Dressing (Informational Only) Itching     Pt had reaction to Tegaderm used for port dressing. Whole area was very red and itchy. Please use IV 3000 instead.        Exam:  There were no vitals taken for this visit.   Wt Readings from Last 4 Encounters:   20 93.3 kg (205 lb 9.6 oz)   20 95.3 kg (210 lb)   20 94.9 kg (209 lb 4.8 oz)   10/09/19 93.8 kg (206 lb 12.8 oz)           Constitutional.  Does not seem to be in any acute distress.  Eyes.  No redness or discharge noted.  Respiratory.  Speaking in full sentences.  Breathing seems comfortable without any accessory use of muscles.    Skin.  Visualized his skin does not show any obvious rashes.  Neurological.  Alert and oriented x3.  Psychiatric.  Mood, mentation and affect are normal.  Decision making capacity is intact.      The rest of a comprehensive physical examination is deferred due to Public Health Emergency video visit restrictions.    Labs.  Reviewed      Assessment and Plan  [id number] moderately differentiated adenocarcinoma of the rectum - MSI-stable  She was started on  neoadjuvant FOLFOX on 2/26/2018.  after 4 cycles she had good response to treatment. Liver lesions remain indeterminate.    we continued with the same chemotherapy and she received cycle #8 on 6/20/18.  Repeat scans show good response to treatment with only a small signal consistent with viable tumor in the primary rectal cancer. No surrounding lymph nodes suspicious. There is no evidence of metastatic disease.    She started on concurrent chemoradiation with Xeloda with radiation beginning on 7/9/18. There was a delay in her receiving Xeloda, so she began on 7/10/18. She completed it on 8/16/18    Repeat scans show great response to treatment with almost completely fibrotic signal.  There is an indeterminate left intertrochanteric lesion which was stable.    She was seen by Dr Hampton and the decision was made to keep her on watchful waiting.      She continues to do well without any evidence of recurrence of the cancer.  Recent flexible sigmoidoscopy and MRI of the pelvis were unremarkable.  CEA was also <0.5.    She will have repeat flexible sigmoidoscopy in August 2020.  Repeat CT CAP will be in Aug 2020.  She will be due for repeat MRI of the pelvis in September 2020.  We will keep on checking CEA at each clinic visit.    She was seen in Cancer Survivorship clinic.    Surveillance per protocol:  Colonoscopy on 5/13/2019 was unremarkable.  She will be due for next colonoscopy in 3 years.          Flex sig every 3 months for first 3 years-   Flex sig will be every 6 months for years 4-5 and then every year for next 5 years.     She will have 3T MRI Pelvis every 4 months for 2 years and and then every 6 months for years 3-4 and then every year for years 5-6.      She should have annual CT CAP for 5-6 years.     Will check CEA at every clinic visit.       Left intertrochanteric lesion-  She is asymptomatic and this has remained stable.  We are now following it on routine pelvis MRI which is being done for rectal  cancer surveillance.  She will have repeat MRI in 4 months.       Neuropathy-  She has residual neuropathy of the bottom of the feet but overall it is mild and not hampering her activities.  In the past we had talked about trying acupuncture.        Port-A-Cath.  She will need port flushes every 8 weeks.      She had several questions regarding coronavirus which I addressed to her satisfaction.     We did not address the following today.    Indeterminate liver lesion. MRI 2/5/19 shows stable lesion, most likely c/w benign hemangioma or cyst.  At this time I do not plan to repeat MRI abdomen but when we will do CT scan, it will be monitored.      Return to clinic in 4 months with labs/MRI prior.    I answered all of her questions to her satisfaction.  She is agreeable and comfortable with the plan.      Kyra Mortensen    Video start time. 1240 PM    Video stop time.  12:52 PM.

## 2020-05-07 NOTE — PROGRESS NOTES
"  Sarah Rajan is a 54 year old female who is being evaluated via a billable video visit.      The patient has been notified of following:     \"This video visit will be conducted via a call between you and your physician/provider. We have found that certain health care needs can be provided without the need for an in-person physical exam.  This service lets us provide the care you need with a video conversation.  If a prescription is necessary we can send it directly to your pharmacy.  If lab work is needed we can place an order for that and you can then stop by our lab to have the test done at a later time.    Video visits are billed at different rates depending on your insurance coverage.  Please reach out to your insurance provider with any questions.    If during the course of the call the physician/provider feels a video visit is not appropriate, you will not be charged for this service.\"    Patient has given verbal consent for Video visit? Yes    How would you like to obtain your AVS? E-Mail (inform patient AVS not encrypted)    Patient would like the video invitation sent by: Send to e-mail at: [email or web address]    Will anyone else be joining your video visit? No       Secondary Video Option (Dox[employer]), send text message to:  [phone]    I have reviewed and updated the patient's allergies and medication list.    Concerns: Patient has no new concerns.      Refills: None      MARIBELL Cannon          Video-Visit Details    Type of service:  Video Visit    Video Start Time: 12:40 PM  Video End Time: 12:52 PM    Originating Location (pt. Location): Home    Distant Location (provider location):  [facility]     Platform used for Video Visit: Tori Mortensen MD        "

## 2020-05-13 ENCOUNTER — TELEPHONE (OUTPATIENT)
Dept: SURGERY | Facility: CLINIC | Age: 55
End: 2020-05-13

## 2020-05-17 ENCOUNTER — COMMUNICATION - HEALTHEAST (OUTPATIENT)
Dept: EMERGENCY MEDICINE | Facility: CLINIC | Age: 55
End: 2020-05-17

## 2020-05-17 ENCOUNTER — COMMUNICATION - HEALTHEAST (OUTPATIENT)
Dept: SCHEDULING | Facility: CLINIC | Age: 55
End: 2020-05-17

## 2020-05-17 ENCOUNTER — NURSE TRIAGE (OUTPATIENT)
Dept: NURSING | Facility: CLINIC | Age: 55
End: 2020-05-17

## 2020-05-17 NOTE — TELEPHONE ENCOUNTER
Sarah feels miserable this am.  I power washed a fence this morning and muscles got fatigued.  Now has a fever of 101.5 (orally)  and body aches.  Denies shortness of breath.  Denies cough.  FNA gave information also about [email or web address].      COVID 19 Nurse Triage Plan/Patient Instructions    Please be aware that novel coronavirus (COVID-19) may be circulating in the community. If you develop symptoms such as fever, cough, or SOB or if you have concerns about the presence of another infection including coronavirus (COVID-19), please contact your health care provider or visit [email or web address].     Disposition/Instructions    Patient to stay at home and follow home care protocol based instructions.     Thank you for limiting contact with others, wearing a simple mask to cover your cough, practice good hand hygiene habits and accessing our virtual services where possible to limit the spread of this virus.    For more information about COVID19 and options for caring for yourself at home, please visit the CDC website at https://www.cdc.gov/coronavirus/2019-ncov/about/steps-when-sick.html  For more options for care at [facility], please visit our website at [email or web address]    For more information, please use the [place] Department of Health COVID-19 Website: [email or web address]/coronavirus/index.html  [place] Department of Health ([facility]) COVID-19 Hotlines (Interpreters available):      Health questions: Phone Number: [phone] or 1-321.850.2573 and Hours: 7 a.m. to 7 p.m.    Schools and  questions: Phone Number: [phone] or 1-832.305.4114 and Hours 7 a.m. to 7 p.m.                    Additional Information    Negative: Shock suspected (e.g., cold/pale/clammy skin, too weak to stand, low BP, rapid pulse)    Negative: Difficult to awaken or acting confused (e.g., disoriented, slurred speech)    Negative: [1] Difficulty breathing AND [2] bluish lips,  tongue or face    Negative: New onset rash with multiple purple (or blood-colored) spots or dots    Negative: Sounds like a life-threatening emergency to the triager    Negative: [1] Headache AND [2] stiff neck (can't touch chin to chest)    Negative: Difficulty breathing    Negative: IV drug abuse    Negative: [1] Drinking very little AND [2] dehydration suspected (e.g., no urine > 12 hours, very dry mouth, very lightheaded)    Negative: Patient sounds very sick or weak to the triager  (Exception: mild weakness and hasn't taken fever medicine)    Negative: Fever > 104 F (40 C)    Negative: [1] Fever > 101 F (38.3 C) AND [2] age > 60    Negative: [1] Fever > 100.0 F (37.8 C) AND [2] bedridden (e.g., nursing home patient, CVA, chronic illness, recovering from surgery)    Negative: [1] Fever > 100.0 F (37.8 C) AND [2] indwelling urinary catheter (e.g., [place], [place])    Negative: [1] Fever > 100.0 F (37.8 C) AND [2] has port (portacath), central line, or PICC line    Negative: [1] Fever > 100.0 F (37.8 C) AND [2] diabetes mellitus or weak immune system (e.g., HIV positive, cancer chemo, splenectomy, organ transplant, chronic steroids)    Negative: [1] Fever > 100.0 F (37.8 C) AND [2] surgery in the last month    Negative: Transplant patient (e.g., kidney, liver, lung, heart)    Negative: Fever present > 3 days (72 hours)    Negative: [1] Fever > 100.0 F (37.8 C) AND [2] foreign travel to a developing country in the last month    Negative: [1] Intermittent fever > 100.0 F (37.8 C) AND [2] lasts > 3 weeks    [1] Fever AND [2] no signs of serious infection or localizing symptoms (all other triage questions negative)    Protocols used: FEVER-A-

## 2020-06-03 ENCOUNTER — PATIENT OUTREACH (OUTPATIENT)
Dept: ONCOLOGY | Facility: CLINIC | Age: 55
End: 2020-06-03

## 2020-06-03 DIAGNOSIS — C20 RECTAL CANCER (H): Primary | ICD-10-CM

## 2020-06-03 DIAGNOSIS — C20 MALIGNANT NEOPLASM OF RECTUM (H): ICD-10-CM

## 2020-06-30 PROCEDURE — 96523 IRRIG DRUG DELIVERY DEVICE: CPT

## 2020-06-30 PROCEDURE — 25000128 H RX IP 250 OP 636: Performed by: INTERNAL MEDICINE

## 2020-06-30 RX ORDER — HEPARIN SODIUM (PORCINE) LOCK FLUSH IV SOLN 100 UNIT/ML 100 UNIT/ML
5 SOLUTION INTRAVENOUS EVERY 8 HOURS PRN
Status: DISCONTINUED | OUTPATIENT
Start: 2020-06-30 | End: 2020-07-08 | Stop reason: HOSPADM

## 2020-06-30 RX ADMIN — Medication 5 ML: at 13:04

## 2020-06-30 NOTE — NURSING NOTE
Chief Complaint   Patient presents with     Port Flush     Port flushed and hep locked by RN in lab. Lab only appt.     Petra Stevens RN

## 2020-07-22 ENCOUNTER — TELEPHONE (OUTPATIENT)
Dept: ONCOLOGY | Facility: CLINIC | Age: 55
End: 2020-07-22

## 2020-07-27 DIAGNOSIS — C20 MALIGNANT NEOPLASM OF RECTUM (H): ICD-10-CM

## 2020-07-27 DIAGNOSIS — E78.01 FAMILIAL HYPERCHOLESTEROLEMIA: ICD-10-CM

## 2020-07-30 NOTE — TELEPHONE ENCOUNTER
atorvastatin (LIPITOR) 40 MG tablet       Last Written Prescription Date:  12-4-19  Last Fill Quantity: 90,   # refills: 0  Last Office Visit : 11-  RTC 12 months  Future Office visit:  None  Scheduling has been notified to contact the pt for appointment.      Routing refill request to provider for review/approval because:  Over 18 months sine last clinic appointment -no appointment scheduled.      Kathleen M Doege RN

## 2020-07-31 DIAGNOSIS — E78.01 FAMILIAL HYPERCHOLESTEROLEMIA: Primary | ICD-10-CM

## 2020-07-31 RX ORDER — ATORVASTATIN CALCIUM 40 MG/1
40 TABLET, FILM COATED ORAL DAILY
Qty: 90 TABLET | Refills: 0 | Status: SHIPPED | OUTPATIENT
Start: 2020-07-31 | End: 2020-12-15

## 2020-08-03 ENCOUNTER — ANCILLARY PROCEDURE (OUTPATIENT)
Dept: CT IMAGING | Facility: CLINIC | Age: 55
End: 2020-08-03
Attending: INTERNAL MEDICINE
Payer: COMMERCIAL

## 2020-08-03 DIAGNOSIS — C20 RECTAL CANCER (H): ICD-10-CM

## 2020-08-03 DIAGNOSIS — C20 MALIGNANT NEOPLASM OF RECTUM (H): ICD-10-CM

## 2020-08-11 DIAGNOSIS — C20 RECTAL CANCER (H): Primary | ICD-10-CM

## 2020-08-19 ENCOUNTER — ANCILLARY PROCEDURE (OUTPATIENT)
Dept: CT IMAGING | Facility: CLINIC | Age: 55
End: 2020-08-19
Attending: COLON & RECTAL SURGERY
Payer: COMMERCIAL

## 2020-08-19 DIAGNOSIS — C20 RECTAL CANCER (H): ICD-10-CM

## 2020-08-19 LAB — CEA SERPL-MCNC: <0.5 UG/L (ref 0–2.5)

## 2020-08-19 PROCEDURE — 82378 CARCINOEMBRYONIC ANTIGEN: CPT | Performed by: COLON & RECTAL SURGERY

## 2020-08-19 PROCEDURE — 25000128 H RX IP 250 OP 636: Performed by: INTERNAL MEDICINE

## 2020-08-19 RX ORDER — HEPARIN SODIUM (PORCINE) LOCK FLUSH IV SOLN 100 UNIT/ML 100 UNIT/ML
5 SOLUTION INTRAVENOUS EVERY 8 HOURS PRN
Status: DISCONTINUED | OUTPATIENT
Start: 2020-08-19 | End: 2020-08-27 | Stop reason: HOSPADM

## 2020-08-19 RX ORDER — HEPARIN SODIUM (PORCINE) LOCK FLUSH IV SOLN 100 UNIT/ML 100 UNIT/ML
5 SOLUTION INTRAVENOUS ONCE
Status: COMPLETED | OUTPATIENT
Start: 2020-08-19 | End: 2020-08-19

## 2020-08-19 RX ORDER — IOPAMIDOL 755 MG/ML
126 INJECTION, SOLUTION INTRAVASCULAR ONCE
Status: COMPLETED | OUTPATIENT
Start: 2020-08-19 | End: 2020-08-19

## 2020-08-19 RX ADMIN — Medication 5 ML: at 11:07

## 2020-08-19 RX ADMIN — IOPAMIDOL 126 ML: 755 INJECTION, SOLUTION INTRAVASCULAR at 11:30

## 2020-08-19 RX ADMIN — HEPARIN SODIUM (PORCINE) LOCK FLUSH IV SOLN 100 UNIT/ML 5 ML: 100 SOLUTION at 11:38

## 2020-08-19 NOTE — NURSING NOTE
Chief Complaint   Patient presents with     Port Draw     Labs drawn via PORT by RN in lab. Lab only.      Pily Hanson RN

## 2020-08-31 NOTE — PROGRESS NOTES
Colon and Rectal Surgery Clinic Note      RE: Sarah Satinder  : 1965  JIMMY: 2020    Sarah presents today for follow up of her rectal cancer on Watch and Wait protocol.      HPI:     She was initially seen in 2018 with a moderately differentiated, invasive adenocarcinoma with intact mismatch repair 4-5 cm from the anal verge. She was staged at that time and an MRI that was initially read as a T1-T2 lesion, then possibly a T2N1 lesion. CT A/P was significant for small liver lesions too small to characterize but not concerning for malignancy and CEA was 1.1. She was discussed at tumor board the pelvic MRI was felt to be poor quality so she underwent a repeat MRI pelvis on  and her tumor was staged as T4N0 due to abutment of the levators. Abdominal MRI to evaluate the liver lesions was performed on  which was again found to have multiple sub-centimeter lesions that were difficult to characterize.     She underwent 8 cycles of FOLFOX completed on 18 and chemoradiation with XELODA on 8/15/18. 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     MRI of left hip showed a non specific T1 hypointense lesion in the proximal left femur and repeat MRI 12/10/18:               There is a T1 hypointense and subtly enhancing lesion in the              proximal left femur, stable since at least 2018.  Given this              patient's history of rectal cancer, metastatic disease cannot be excluded.    Colonoscopy last on 19 without evidence of recurrence    3T MRI 2020:  IMPRESSION:   Stable complete response to treatment, corresponding  with tumor progression grade mrTRG-1. No suspicious pelvic  lymphadenopathy. Unchanged T1/T2 hypointense lesion in the left  femoral intertrochanteric region.    CT Chest 8/3/2020:  1. No evidence of metastatic disease in the chest. No suspicious  pulmonary nodules.  2. Sequela of prior granulomatous disease.  "    CT AP 8/19/20 without evidence of metastatic disease    I last saw Sarah on 5/5/2020 with no evidence of recurrence on flexible sigmoidoscopy.    She last saw Dr. Mortensen with oncology on 5/7/2020    Interval History: Sarah is doing well. She denies any anorectal complaints.     Physical examination:  Examination was chaperoned by Mili Carlin, EMT and Amara Jackson NP     Vitals: BP (!) 145/101 (BP Location: Left arm, Patient Position: Sitting, Cuff Size: Adult Regular)   Pulse 81   Ht 5' 5\"   Wt 207 lb 14.4 oz   SpO2 99%   BMI 34.60 kg/m    BMI= Body mass index is 34.6 kg/m .    Sarah looks well and healthy.  There is no palpable inguinal adenopathy.  Digital rectal examination reveals no masses.  Flexible sigmoidoscopy to 25 cm shows a very faint scar approximately 3 cm from the anal margin left lateral to the left anterolateral. There is no evidence of tumor recurrence.  The scar is best visualized on retroflexion and is very hard to see definitively on straight view.    Laboratory data:     8/21/2019 10:27 10/9/2019 13:54 1/8/2020 17:51 5/5/2020 10:40 8/19/2020 11:11   CEA <0.5 <0.5 <0.5 <0.5 <0.5       Assessment/plan:  Sarah is doing very well now approximately 2 years following completion of total neoadjuvant therapy.  There is no evidence of tumor recurrence on examination.  MRI next week.  I congratulated Sarah on her ongoing progress and will plan to see her back in 3 months time for a flexible sigmoidoscopy. Patient's questions were answered to her stated satisfaction and she is in agreement with this plan.    Follow up per Watch and Wait Protocol:   Completed CRT: 8/15/2018    Flexible sigmoidoscopy   ? Every 2 months for 6 months: Until 2/2019-COMPLETED   ? Every 3 months for 3 years: Until 2021-DUE 12/2020  ? Every 6 months for 5 years: Until 2023  ? Every year for 10 years: Until 2028       3T MRI of pelvis  ? 6-8 weeks after CRT: COMPLETED  ? Every 4 months for 2 " years: Until 2021-DUE 2020 (Scheduled 2020)  ? Every 6 months for 2 years: Until 2023  ? Yearly for 2 years: Until        CT CAP  ? Every year for 6-8 years: Until -Due 2021       Colonoscopy   ? Last 19-Repeat 2022       CEA at every clinic or endoscopic visit    For details of past medical history, surgical history, family history, medications, allergies, and review of systems, please see details below.    Medical history:  Past Medical History:   Diagnosis Date     Depression      GERD (gastroesophageal reflux disease)      History of smoking      Insomnia      Obesity      Rectal cancer (H)      Restless leg syndrome      Seasonal affective disorder (H)        Surgical history:  Past Surgical History:   Procedure Laterality Date      SECTION      x2     COLONOSCOPY       INSERT PORT VASCULAR ACCESS Right 2018    Procedure: INSERT PORT VASCULAR ACCESS;  central venous Chest Port Placement;  Surgeon: Gaurav Yates PA-C;  Location: [facility] OR     LASIK         Family history:  Family History   Problem Relation Age of Onset     Hyperlipidemia Mother      Hypertension Mother      Gastrointestinal Disease Mother         ischemic colitis     Coronary Artery Disease Mother         stents x 3     Anesthesia Reaction Mother         hypotension, PONV     Hyperlipidemia Father      Hypertension Father      Breast Cancer Maternal Grandmother      Coronary Artery Disease Maternal Grandfather      Myocardial Infarction Maternal Grandfather      Colon Cancer Paternal Grandmother      Breast Cancer Maternal Aunt      Breast Cancer Paternal Aunt      Coronary Artery Disease Paternal Grandfather      Cerebrovascular Disease Paternal Grandfather      Family History Negative Brother      Coronary Artery Disease Maternal Uncle        Medications:  Current Outpatient Medications   Medication Sig Dispense Refill     Acetaminophen (TYLENOL PO) Take 1,000 mg by mouth At Bedtime       amitriptyline  "(ELAVIL) 10 MG tablet TAKE 1 TO 2 TABLETS BY MOUTH DAILY AT BEDTIME       amitriptyline 10 MG PO tablet TAKE 1 TO 2 TABLETS BY MOUTH DAILY AT BEDTIME       atorvastatin (LIPITOR) 40 MG tablet Take 1 tablet (40 mg) by mouth daily 90 tablet 0     atorvastatin 20 MG PO tablet Take 20 mg by mouth       atorvastatin 40 MG PO tablet TK 1 T PO ONCE D       benzonatate 100 MG PO capsule Take 200 mg by mouth       benzonatate 100 MG PO capsule        calcium carbonate (TUMS) 500 MG chewable tablet Take 1-2 chew tab by mouth daily  150 tablet      diclofenac (VOLTAREN) 75 MG EC tablet TK 1 T PO BID  2     FLUCELVAX QUADRIVALENT 0.5 ML IM RAFAEL ADM 0.5ML IM UTD  0     FLUoxetine (PROZAC) 20 MG capsule Take 1 capsule (20 mg) by mouth daily 90 capsule 3     FLUoxetine 20 MG PO capsule        lidocaine-prilocaine (EMLA) cream Apply 45 minutes prior to procedure. 30 g 3     oseltamivir 75 MG PO capsule        RANITIDINE HCL PO Take 150 mg by mouth daily as needed for heartburn         Allergies:  The patientis allergic to inapsine [droperidol] and tegaderm transparent dressing (informational only).    Social history:  Social History     Tobacco Use     Smoking status: Former Smoker     Packs/day: 0.10     Years: 8.00     Pack years: 0.80     Types: Cigarettes     Last attempt to quit: 1986     Years since quittin.5     Smokeless tobacco: Never Used   Substance Use Topics     Alcohol use: Yes     Alcohol/week: 7.0 standard drinks     Types: 7 Glasses of wine per week     Marital status: .    Review of Systems:  Nursing Notes:   Antoine Cullen LPN  2020  9:47 AM  Signed  Chief Complaint   Patient presents with     Flexible Sigmoidoscopy     flex sig       Vitals:    20 0943   BP: (!) 145/101   BP Location: Left arm   Patient Position: Sitting   Cuff Size: Adult Regular   Pulse: 81   SpO2: 99%   Weight: 207 lb 14.4 oz   Height: 5' 5\"       Body mass index is 34.6 kg/m .      Antoine Cullen LPN                  "          Total face to face time was 10 minutes, >50% counseling, plus an additional 10 minutes procedure time.      Óscar Hampton MD   Professor and Chief  Division of Colon and Rectal Surgery  [facility]      Referring Provider:  Referred Self, MD  No address on file     Primary Care Provider:  Kyra Mortensen    This note was created using speech recognition software and may contain unintended word substitutions.

## 2020-09-01 ENCOUNTER — OFFICE VISIT (OUTPATIENT)
Dept: SURGERY | Facility: CLINIC | Age: 55
End: 2020-09-01
Payer: COMMERCIAL

## 2020-09-01 VITALS
HEIGHT: 65 IN | HEART RATE: 81 BPM | WEIGHT: 207.9 LBS | SYSTOLIC BLOOD PRESSURE: 145 MMHG | OXYGEN SATURATION: 99 % | BODY MASS INDEX: 34.64 KG/M2 | DIASTOLIC BLOOD PRESSURE: 101 MMHG

## 2020-09-01 DIAGNOSIS — C20 RECTAL CANCER (H): Primary | ICD-10-CM

## 2020-09-01 ASSESSMENT — PAIN SCALES - GENERAL: PAINLEVEL: NO PAIN (0)

## 2020-09-01 ASSESSMENT — MIFFLIN-ST. JEOR: SCORE: 1548.91

## 2020-09-01 NOTE — PATIENT INSTRUCTIONS
Follow up:      Flexible sigmoidoscopy   ? Every 2 months for 6 months: Until 2/2019-COMPLETED   ? Every 3 months for 3 years: Until 2021-DUE 12/2020  ? Every 6 months for 5 years: Until 2023  ? Every year for 10 years: Until 2028       3T MRI of pelvis  ? 6-8 weeks after CRT: COMPLETED  ? Every 4 months for 2 years: Until 8/2021-DUE 9/2020 (Scheduled 9/8/2020)  ? Every 6 months for 2 years: Until 8/2023  ? Yearly for 2 years: Until 2025       CT CAP  ? Every year for 6-8 years: Until 2026-Due 8/2021        Colonoscopy   ? Last 5/13/19-Repeat 5/2022       CEA at every clinic or endoscopic visit    Please call with any questions or concerns regarding your clinic visit today.    It is a pleasure being involved in your health care.    Contacts post-consultation depending on your need:    Radiology Appointments [phone]    Schedule Clinic Appointments [phone] # 1   M-F 7:30 - 5 pm    JANE Carlos [phone] or GABRIELA Schultz [phone]    Clinic Fax Number [phone]    Surgery Scheduling [phone]    My Chart is available 24 hours a day and is a secure way to access your records and communicate with your care team.  I strongly recommend signing up if you haven't already done so, if you are comfortable with computers.  If you would like to inquire about this or are having problems with My Chart access, you may call [phone] or go online at [email or web address].  Please allow at least 24 hours for a response and extra time on weekends and Holidays.

## 2020-09-01 NOTE — NURSING NOTE
"Chief Complaint   Patient presents with     Flexible Sigmoidoscopy     flex sig       Vitals:    09/01/20 0943   BP: (!) 145/101   BP Location: Left arm   Patient Position: Sitting   Cuff Size: Adult Regular   Pulse: 81   SpO2: 99%   Weight: 207 lb 14.4 oz   Height: 5' 5\"       Body mass index is 34.6 kg/m .      Antoine Cullen LPN                        "

## 2020-09-01 NOTE — LETTER
2020       RE: Sarah Rajan  [address] [address]     Dear Colleague,    Thank you for referring your patient, Sarah Rajan, to the [facility] COLON AND RECTAL SURGERY at [facility]. Please see a copy of my visit note below.    Colon and Rectal Surgery Clinic Note      RE: Sarah Rajan  : 1965  JIMMY: 2020    Sarah presents today for follow up of her rectal cancer on Watch and Wait protocol.      HPI:     She was initially seen in 2018 with a moderately differentiated, invasive adenocarcinoma with intact mismatch repair 4-5 cm from the anal verge. She was staged at that time and an MRI that was initially read as a T1-T2 lesion, then possibly a T2N1 lesion. CT A/P was significant for small liver lesions too small to characterize but not concerning for malignancy and CEA was 1.1. She was discussed at tumor board the pelvic MRI was felt to be poor quality so she underwent a repeat MRI pelvis on  and her tumor was staged as T4N0 due to abutment of the levators. Abdominal MRI to evaluate the liver lesions was performed on  which was again found to have multiple sub-centimeter lesions that were difficult to characterize.     She underwent 8 cycles of FOLFOX completed on 18 and chemoradiation with XELODA on 8/15/18. 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     MRI of left hip showed a non specific T1 hypointense lesion in the proximal left femur and repeat MRI 12/10/18:               There is a T1 hypointense and subtly enhancing lesion in the              proximal left femur, stable since at least 2018.  Given this              patient's history of rectal cancer, metastatic disease cannot be excluded.    Colonoscopy last on 19 without evidence of recurrence    3T MRI 2020:  IMPRESSION:   Stable complete response to treatment, corresponding  with tumor progression  "grade mrTRG-1. No suspicious pelvic  lymphadenopathy. Unchanged T1/T2 hypointense lesion in the left  femoral intertrochanteric region.    CT Chest 8/3/2020:  1. No evidence of metastatic disease in the chest. No suspicious  pulmonary nodules.  2. Sequela of prior granulomatous disease.     CT AP 8/19/20 without evidence of metastatic disease    I last saw Sarah on 5/5/2020 with no evidence of recurrence on flexible sigmoidoscopy.    She last saw Dr. Mortensen with oncology on 5/7/2020    Interval History: Sarah is doing well. She denies any anorectal complaints.     Physical examination:  Examination was chaperoned by Mili Carlin, MARIBELL and Amara Jackson NP     Vitals: BP (!) 145/101 (BP Location: Left arm, Patient Position: Sitting, Cuff Size: Adult Regular)   Pulse 81   Ht 5' 5\"   Wt 207 lb 14.4 oz   SpO2 99%   BMI 34.60 kg/m    BMI= Body mass index is 34.6 kg/m .    Sarah looks well and healthy.  There is no palpable inguinal adenopathy.  Digital rectal examination reveals no masses.  Flexible sigmoidoscopy to 25 cm shows a very faint scar approximately 3 cm from the anal margin left lateral to the left anterolateral. There is no evidence of tumor recurrence.  The scar is best visualized on retroflexion and is very hard to see definitively on straight view.    Laboratory data:     8/21/2019 10:27 10/9/2019 13:54 1/8/2020 17:51 5/5/2020 10:40 8/19/2020 11:11   CEA <0.5 <0.5 <0.5 <0.5 <0.5       Assessment/plan:  Sarah is doing very well now approximately 2 years following completion of total neoadjuvant therapy.  There is no evidence of tumor recurrence on examination.  MRI next week.  I congratulated Sarah on her ongoing progress and will plan to see her back in 3 months time for a flexible sigmoidoscopy. Patient's questions were answered to her stated satisfaction and she is in agreement with this plan.    Follow up per Watch and Wait Protocol:   Completed CRT: 8/15/2018    Flexible " sigmoidoscopy   ? Every 2 months for 6 months: Until 2019-COMPLETED   ? Every 3 months for 3 years: Until -DUE 2020  ? Every 6 months for 5 years: Until   ? Every year for 10 years: Until        3T MRI of pelvis  ? 6-8 weeks after CRT: COMPLETED  ? Every 4 months for 2 years: Until 2021-DUE 2020 (Scheduled 2020)  ? Every 6 months for 2 years: Until 2023  ? Yearly for 2 years: Until        CT CAP  ? Every year for 6-8 years: Until -Due 2021       Colonoscopy   ? Last 19-Repeat 2022       CEA at every clinic or endoscopic visit    For details of past medical history, surgical history, family history, medications, allergies, and review of systems, please see details below.    Medical history:  Past Medical History:   Diagnosis Date     Depression      GERD (gastroesophageal reflux disease)      History of smoking      Insomnia      Obesity      Rectal cancer (H)      Restless leg syndrome      Seasonal affective disorder (H)        Surgical history:  Past Surgical History:   Procedure Laterality Date      SECTION      x2     COLONOSCOPY       INSERT PORT VASCULAR ACCESS Right 2018    Procedure: INSERT PORT VASCULAR ACCESS;  central venous Chest Port Placement;  Surgeon: Gaurav Yates PA-C;  Location: [facility] OR     LASIK         Family history:  Family History   Problem Relation Age of Onset     Hyperlipidemia Mother      Hypertension Mother      Gastrointestinal Disease Mother         ischemic colitis     Coronary Artery Disease Mother         stents x 3     Anesthesia Reaction Mother         hypotension, PONV     Hyperlipidemia Father      Hypertension Father      Breast Cancer Maternal Grandmother      Coronary Artery Disease Maternal Grandfather      Myocardial Infarction Maternal Grandfather      Colon Cancer Paternal Grandmother      Breast Cancer Maternal Aunt      Breast Cancer Paternal Aunt      Coronary Artery Disease Paternal Grandfather       Cerebrovascular Disease Paternal Grandfather      Family History Negative Brother      Coronary Artery Disease Maternal Uncle        Medications:  Current Outpatient Medications   Medication Sig Dispense Refill     Acetaminophen (TYLENOL PO) Take 1,000 mg by mouth At Bedtime       amitriptyline (ELAVIL) 10 MG tablet TAKE 1 TO 2 TABLETS BY MOUTH DAILY AT BEDTIME       amitriptyline 10 MG PO tablet TAKE 1 TO 2 TABLETS BY MOUTH DAILY AT BEDTIME       atorvastatin (LIPITOR) 40 MG tablet Take 1 tablet (40 mg) by mouth daily 90 tablet 0     atorvastatin 20 MG PO tablet Take 20 mg by mouth       atorvastatin 40 MG PO tablet TK 1 T PO ONCE D       benzonatate 100 MG PO capsule Take 200 mg by mouth       benzonatate 100 MG PO capsule        calcium carbonate (TUMS) 500 MG chewable tablet Take 1-2 chew tab by mouth daily  150 tablet      diclofenac (VOLTAREN) 75 MG EC tablet TK 1 T PO BID  2     FLUCELVAX QUADRIVALENT 0.5 ML IM RAFAEL ADM 0.5ML IM UTD  0     FLUoxetine (PROZAC) 20 MG capsule Take 1 capsule (20 mg) by mouth daily 90 capsule 3     FLUoxetine 20 MG PO capsule        lidocaine-prilocaine (EMLA) cream Apply 45 minutes prior to procedure. 30 g 3     oseltamivir 75 MG PO capsule        RANITIDINE HCL PO Take 150 mg by mouth daily as needed for heartburn         Allergies:  The patientis allergic to inapsine [droperidol] and tegaderm transparent dressing (informational only).    Social history:  Social History     Tobacco Use     Smoking status: Former Smoker     Packs/day: 0.10     Years: 8.00     Pack years: 0.80     Types: Cigarettes     Last attempt to quit: 1986     Years since quittin.5     Smokeless tobacco: Never Used   Substance Use Topics     Alcohol use: Yes     Alcohol/week: 7.0 standard drinks     Types: 7 Glasses of wine per week     Marital status: .    Review of Systems:  Nursing Notes:   Antoine Cullen LPN  2020  9:47 AM  Signed  Chief Complaint   Patient presents with     Flexible  "Sigmoidoscopy     flex sig       Vitals:    09/01/20 0943   BP: (!) 145/101   BP Location: Left arm   Patient Position: Sitting   Cuff Size: Adult Regular   Pulse: 81   SpO2: 99%   Weight: 207 lb 14.4 oz   Height: 5' 5\"       Body mass index is 34.6 kg/m .      Antoine Cullen LPN                           Total face to face time was 10 minutes, >50% counseling, plus an additional 10 minutes procedure time.      Óscar Hampton MD   Professor and Chief  Division of Colon and Rectal Surgery  [facility]      Referring Provider:  Referred Self, MD  No address on file     Primary Care Provider:  Kyra Mortensen    This note was created using speech recognition software and may contain unintended word substitutions.    Again, thank you for allowing me to participate in the care of your patient.      Sincerely,    Óscar Hampton MD      "

## 2020-09-01 NOTE — LETTER
2020      RE: Sarah Rajan  [address] [facility] [address]     Dear Colleague,    Thank you for referring your patient, Sarah Rajan, to the [facility] COLON AND RECTAL SURGERY at [facility]. Please see a copy of my visit note below.    Colon and Rectal Surgery Clinic Note    RE: Sarah Rajan  : 1965  JIMMY: 2020    Sarah presents today for follow up of her rectal cancer on Watch and Wait protocol.      HPI:     She was initially seen in 2018 with a moderately differentiated, invasive adenocarcinoma with intact mismatch repair 4-5 cm from the anal verge. She was staged at that time and an MRI that was initially read as a T1-T2 lesion, then possibly a T2N1 lesion. CT A/P was significant for small liver lesions too small to characterize but not concerning for malignancy and CEA was 1.1. She was discussed at tumor board the pelvic MRI was felt to be poor quality so she underwent a repeat MRI pelvis on  and her tumor was staged as T4N0 due to abutment of the levators. Abdominal MRI to evaluate the liver lesions was performed on  which was again found to have multiple sub-centimeter lesions that were difficult to characterize.     She underwent 8 cycles of FOLFOX completed on 18 and chemoradiation with XELODA on 8/15/18. 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     MRI of left hip showed a non specific T1 hypointense lesion in the proximal left femur and repeat MRI 12/10/18:               There is a T1 hypointense and subtly enhancing lesion in the              proximal left femur, stable since at least 2018.  Given this              patient's history of rectal cancer, metastatic disease cannot be excluded.    Colonoscopy last on 19 without evidence of recurrence    3T MRI 2020:  IMPRESSION:   Stable complete response to treatment, corresponding  with tumor progression  "grade mrTRG-1. No suspicious pelvic  lymphadenopathy. Unchanged T1/T2 hypointense lesion in the left  femoral intertrochanteric region.    CT Chest 8/3/2020:  1. No evidence of metastatic disease in the chest. No suspicious  pulmonary nodules.  2. Sequela of prior granulomatous disease.     CT AP 8/19/20 without evidence of metastatic disease    I last saw Sarah on 5/5/2020 with no evidence of recurrence on flexible sigmoidoscopy.    She last saw Dr. Mortensen with oncology on 5/7/2020    Interval History: Sarah is doing well. She denies any anorectal complaints.     Physical examination:  Examination was chaperoned by Mili Carlin, MARIBELL and Amara Jackson NP     Vitals: BP (!) 145/101 (BP Location: Left arm, Patient Position: Sitting, Cuff Size: Adult Regular)   Pulse 81   Ht 5' 5\"   Wt 207 lb 14.4 oz   SpO2 99%   BMI 34.60 kg/m    BMI= Body mass index is 34.6 kg/m .    Sarah looks well and healthy.  There is no palpable inguinal adenopathy.  Digital rectal examination reveals no masses.  Flexible sigmoidoscopy to 25 cm shows a very faint scar approximately 3 cm from the anal margin left lateral to the left anterolateral. There is no evidence of tumor recurrence.  The scar is best visualized on retroflexion and is very hard to see definitively on straight view.    Laboratory data:   8/21/2019 10:27 10/9/2019 13:54 1/8/2020 17:51 5/5/2020 10:40 8/19/2020 11:11   CEA <0.5 <0.5 <0.5 <0.5 <0.5       Assessment/plan:  Sarah is doing very well now approximately 2 years following completion of total neoadjuvant therapy.  There is no evidence of tumor recurrence on examination.  MRI next week.  I congratulated Sarah on her ongoing progress and will plan to see her back in 3 months time for a flexible sigmoidoscopy. Patient's questions were answered to her stated satisfaction and she is in agreement with this plan.    Follow up per Watch and Wait Protocol:   Completed CRT: 8/15/2018    Flexible " sigmoidoscopy   ? Every 2 months for 6 months: Until 2019-COMPLETED   ? Every 3 months for 3 years: Until -DUE 2020  ? Every 6 months for 5 years: Until   ? Every year for 10 years: Until        3T MRI of pelvis  ? 6-8 weeks after CRT: COMPLETED  ? Every 4 months for 2 years: Until 2021-DUE 2020 (Scheduled 2020)  ? Every 6 months for 2 years: Until 2023  ? Yearly for 2 years: Until        CT CAP  ? Every year for 6-8 years: Until -Due 2021       Colonoscopy   ? Last 19-Repeat 2022       CEA at every clinic or endoscopic visit    For details of past medical history, surgical history, family history, medications, allergies, and review of systems, please see details below.    Medical history:  Past Medical History:   Diagnosis Date     Depression      GERD (gastroesophageal reflux disease)      History of smoking      Insomnia      Obesity      Rectal cancer (H)      Restless leg syndrome      Seasonal affective disorder (H)      Surgical history:  Past Surgical History:   Procedure Laterality Date      SECTION      x2     COLONOSCOPY       INSERT PORT VASCULAR ACCESS Right 2018    Procedure: INSERT PORT VASCULAR ACCESS;  central venous Chest Port Placement;  Surgeon: Gaurav Yates PA-C;  Location: [facility] OR     LASIK       Family history:  Family History   Problem Relation Age of Onset     Hyperlipidemia Mother      Hypertension Mother      Gastrointestinal Disease Mother         ischemic colitis     Coronary Artery Disease Mother         stents x 3     Anesthesia Reaction Mother         hypotension, PONV     Hyperlipidemia Father      Hypertension Father      Breast Cancer Maternal Grandmother      Coronary Artery Disease Maternal Grandfather      Myocardial Infarction Maternal Grandfather      Colon Cancer Paternal Grandmother      Breast Cancer Maternal Aunt      Breast Cancer Paternal Aunt      Coronary Artery Disease Paternal Grandfather       Cerebrovascular Disease Paternal Grandfather      Family History Negative Brother      Coronary Artery Disease Maternal Uncle      Medications:  Current Outpatient Medications   Medication Sig Dispense Refill     Acetaminophen (TYLENOL PO) Take 1,000 mg by mouth At Bedtime       amitriptyline (ELAVIL) 10 MG tablet TAKE 1 TO 2 TABLETS BY MOUTH DAILY AT BEDTIME       amitriptyline 10 MG PO tablet TAKE 1 TO 2 TABLETS BY MOUTH DAILY AT BEDTIME       atorvastatin (LIPITOR) 40 MG tablet Take 1 tablet (40 mg) by mouth daily 90 tablet 0     atorvastatin 20 MG PO tablet Take 20 mg by mouth       atorvastatin 40 MG PO tablet TK 1 T PO ONCE D       benzonatate 100 MG PO capsule Take 200 mg by mouth       benzonatate 100 MG PO capsule        calcium carbonate (TUMS) 500 MG chewable tablet Take 1-2 chew tab by mouth daily  150 tablet      diclofenac (VOLTAREN) 75 MG EC tablet TK 1 T PO BID  2     FLUCELVAX QUADRIVALENT 0.5 ML IM RAFAEL ADM 0.5ML IM UTD  0     FLUoxetine (PROZAC) 20 MG capsule Take 1 capsule (20 mg) by mouth daily 90 capsule 3     FLUoxetine 20 MG PO capsule        lidocaine-prilocaine (EMLA) cream Apply 45 minutes prior to procedure. 30 g 3     oseltamivir 75 MG PO capsule        RANITIDINE HCL PO Take 150 mg by mouth daily as needed for heartburn       Allergies:  The patientis allergic to inapsine [droperidol] and tegaderm transparent dressing (informational only).    Social history:  Social History     Tobacco Use     Smoking status: Former Smoker     Packs/day: 0.10     Years: 8.00     Pack years: 0.80     Types: Cigarettes     Last attempt to quit: 1986     Years since quittin.5     Smokeless tobacco: Never Used   Substance Use Topics     Alcohol use: Yes     Alcohol/week: 7.0 standard drinks     Types: 7 Glasses of wine per week     Marital status: .    Review of Systems:  Nursing Notes:   Antoine Cullen LPN  2020  9:47 AM  Signed  Chief Complaint   Patient presents with     Flexible  "Sigmoidoscopy     flex sig     Vitals:    09/01/20 0943   BP: (!) 145/101   BP Location: Left arm   Patient Position: Sitting   Cuff Size: Adult Regular   Pulse: 81   SpO2: 99%   Weight: 207 lb 14.4 oz   Height: 5' 5\"     Body mass index is 34.6 kg/m .    Antoine Cullen LPN    Total face to face time was 10 minutes, >50% counseling, plus an additional 10 minutes procedure time.    This note was created using speech recognition software and may contain unintended word substitutions.    Again, thank you for allowing me to participate in the care of your patient.  Sincerely,    Óscar Hampton MD   Professor and Chief  Division of Colon and Rectal Surgery  [facility]    Referring Provider:  Referred Self, MD  No address on file     Primary Care Provider:  Kyra Mortensen  "

## 2020-09-02 DIAGNOSIS — C20 RECTAL CANCER (H): Primary | ICD-10-CM

## 2020-09-03 ENCOUNTER — TELEPHONE (OUTPATIENT)
Dept: SURGERY | Facility: CLINIC | Age: 55
End: 2020-09-03

## 2020-09-08 ENCOUNTER — ANCILLARY PROCEDURE (OUTPATIENT)
Dept: MRI IMAGING | Facility: CLINIC | Age: 55
End: 2020-09-08
Attending: INTERNAL MEDICINE
Payer: COMMERCIAL

## 2020-09-08 ENCOUNTER — TELEPHONE (OUTPATIENT)
Dept: SURGERY | Facility: CLINIC | Age: 55
End: 2020-09-08

## 2020-09-08 DIAGNOSIS — C20 MALIGNANT NEOPLASM OF RECTUM (H): ICD-10-CM

## 2020-09-08 DIAGNOSIS — C20 RECTAL CANCER (H): ICD-10-CM

## 2020-09-08 RX ORDER — HEPARIN SODIUM (PORCINE) LOCK FLUSH IV SOLN 100 UNIT/ML 100 UNIT/ML
500 SOLUTION INTRAVENOUS ONCE
Status: ACTIVE | OUTPATIENT
Start: 2020-09-08

## 2020-09-08 RX ORDER — GADOBUTROL 604.72 MG/ML
10 INJECTION INTRAVENOUS ONCE
Status: COMPLETED | OUTPATIENT
Start: 2020-09-08 | End: 2020-09-08

## 2020-09-08 RX ADMIN — GADOBUTROL 10 ML: 604.72 INJECTION INTRAVENOUS at 11:35

## 2020-09-08 NOTE — TELEPHONE ENCOUNTER
[facility] Call Center    Phone Message    May a detailed message be left on voicemail: yes     Reason for Call: Other: Pt is requesting a call back please to schedule a flex sig, Pt states it would be best to call her Thursday to schedule. Thank you!     Action Taken: Message routed to:  [facility] ([facility] Colon and Rectal     Travel Screening: Not Applicable

## 2020-09-09 ENCOUNTER — TELEPHONE (OUTPATIENT)
Dept: SURGERY | Facility: CLINIC | Age: 55
End: 2020-09-09

## 2020-09-10 ENCOUNTER — VIRTUAL VISIT (OUTPATIENT)
Dept: ONCOLOGY | Facility: CLINIC | Age: 55
End: 2020-09-10
Attending: INTERNAL MEDICINE
Payer: COMMERCIAL

## 2020-09-10 DIAGNOSIS — C20 RECTAL CANCER (H): Primary | ICD-10-CM

## 2020-09-10 PROCEDURE — 99214 OFFICE O/P EST MOD 30 MIN: CPT | Mod: 95 | Performed by: INTERNAL MEDICINE

## 2020-09-10 PROCEDURE — 40001009 ZZH VIDEO/TELEPHONE VISIT; NO CHARGE

## 2020-09-10 NOTE — LETTER
"    9/10/2020         RE: Sarah Rajan  [address] [facility] [address]        Dear Colleague,    Thank you for referring your patient, Sarah Rajan, to the [facility]. Please see a copy of my visit note below.    Sarah Rajan is a 54 year old female who is being evaluated via a billable video visit.      The patient has been notified of following:     \"This video visit will be conducted via a call between you and your physician/provider. We have found that certain health care needs can be provided without the need for an in-person physical exam.  This service lets us provide the care you need with a video conversation.  If a prescription is necessary we can send it directly to your pharmacy.  If lab work is needed we can place an order for that and you can then stop by our lab to have the test done at a later time.    Video visits are billed at different rates depending on your insurance coverage.  Please reach out to your insurance provider with any questions.    If during the course of the call the physician/provider feels a video visit is not appropriate, you will not be charged for this service.\"    Patient has given verbal consent for Video visit? Yes    How would you like to obtain your AVS? MyChart     If you are dropped from the video visit, the video invite should be resent to: Send to e-mail at: [email or web address]     Will anyone else be joining your video visit? No          I have reviewed and updated the patient's allergies and medication list.  Patient was asked to provide any patient recorded vital signs, height and/or weight.  Please see \"Patient Reported Vital Signs\" tab for that information.  Patient instructed to be in the virtual waiting room 10-15 minutes prior to appointment time.      Concerns: Patient has no new concerns.      Refills: None         MARIBELL Cannon        Video-Visit Details    Type of service:  Video Visit    Video Start Time: 11:44 " AM  Video End Time: 11:57 AM    Originating Location (pt. Location): Home    Distant Location (provider location):  [facility] CANCER [facility]     Platform used for Video Visit: Anup Mortensen MD          Oncology outpatient note    Date of service: 9/10/2020       PC: Rectal cancer. [id number] - MSI Intact    HPI:  Please see previous note for details. I have copied and updated from prior note.  She is a 54-year-old female noticed BRBPR so Screening colonoscopy on 1/9/2018 revealed 3cm rectal mass and other polyps which were removed.  Pathology indicated moderately differentiated adenocarcinoma w/ intact MMR proteins.  CT staging scan showed no metastatic disease; some liver lesions which are thought to be benign per radiology report, T2N1M0 by pelvic MRI read at [facility]. When we initially reviewed her MRI we will not exactly sure whether that was lymph node involvement or not. We opted to repeat MRI which showed T4 N0 lesion. There was up to take her to surgery as there was possibility of personally lesion without lymph node involvement but because of the findings of repeat MRI the surgery was canceled and she is coming back to discuss neoadjuvant treatment.  We also did an MRI of the liver which showed 3 subcentimeter liver lesions which were too small to characterize and will need attention on follow-up.    Baseline CEA 1.1 on 1/9/18.    She started neoadjuvant 5-FU and oxaliplatin (FOLFOX), which she started on 2/26/18. The day after she received cycle 2, she developed fevers, chills, headache, and an itchy rash on her arms and legs, for which she was evaluated in the ED. She was sent home on Benadryl. Benadryl and dexamethasone doses were increased for cycle 3.   She tolerated that cycle well    C#4 was given on 4/10/18    She completed 8 cycles of FOLFOX on 6/5/18    Repeat scans show good response to treatment without evidence of metastatic disease. There is only a small signal consistent with  residual tumor seen in the primary rectal cancer lesion.    She started on concurrent chemoradiation with Xeloda with radiation beginning on 7/9/18. There was a delay in her receiving Xeloda, so she began that on 7/10/18. She completed it on 8/16/18    Repeat scans show great response to treatment with almost completely fibrotic signal.  There is an indeterminate left intertrochanteric lesion which is stable.  Liver lesion is consistent with benign hemangioma vs cyst    She was seen by Dr Hampton and the decision was made to keep her on watchful waiting.    Her MRI of the pelvis in December 2018 was stable but it showed rectal wall edema all the flexible sigmoidoscopy was negative.  Decision was made to do a short term follow-up MRI.      Left intertrochanteric lesion-we did MRI of the left hip which also showed a nonspecific T1 hypointense lesion in the proximal left femur which has not changed since 6/20/2018.  Repeat MRI in December 2018 , 5/14/19 and 8/20/19 were also stable.      Pelvis MRI in Feb 2019 was stable.    Colonoscopy on 5/13/19 was without evidence of recurrence- Next due in 3 years.     Pelvis MRI on 5/5/2020 continues to show complete response to treatment.    Flex sig on 5/5/2020 was also unremarkable.      Interval history    This is a video visit  She feels well and denies any pain or nausea, vomiting. No diarrhea or constipation. No infections or bleeding. Denies dyspnea. She has stable neuropathy of her feet.   Energy has been fine.       ECOG 0    ROS:  Rest of the comprehensive review of the system was essentially unremarkable.      I reviewed other hx in EPIC as below    PMH:  Depression  Restless leg syndrome  Dyslipidemia    Current Outpatient Medications   Medication     Acetaminophen (TYLENOL PO)     amitriptyline 10 MG PO tablet     atorvastatin 20 MG PO tablet     calcium carbonate (TUMS) 500 MG chewable tablet     FLUoxetine 20 MG PO capsule     lidocaine-prilocaine (EMLA) cream      RANITIDINE HCL PO     amitriptyline (ELAVIL) 10 MG tablet     atorvastatin (LIPITOR) 40 MG tablet     atorvastatin 40 MG PO tablet     benzonatate 100 MG PO capsule     benzonatate 100 MG PO capsule     diclofenac (VOLTAREN) 75 MG EC tablet     FLUCELVAX QUADRIVALENT 0.5 ML IM RAFAEL     FLUoxetine (PROZAC) 20 MG capsule     oseltamivir 75 MG PO capsule     No current facility-administered medications for this visit.      Facility-Administered Medications Ordered in Other Visits   Medication     heparin 100 UNIT/ML injection 500 Units         FHx  Aunt and maternal grandmother had breast cancer  Pateral grandmother  of colorectal cancer  Mother had ischemic colitis    SHx:  , two teenage children  EtOH: 1 drink daily, occasionally more on weekends  Tobacco: never smoker  She is a microbiologist     Allergies   Allergen Reactions     Inapsine [Droperidol] Other (See Comments)     Elevated blood pressure and increased heart rate     Tegaderm Transparent Dressing (Informational Only) Itching     Pt had reaction to Tegaderm used for port dressing. Whole area was very red and itchy. Please use IV 3000 instead.        Exam:  There were no vitals taken for this visit.   Wt Readings from Last 4 Encounters:   20 94.3 kg (207 lb 14.4 oz)   20 93.3 kg (205 lb 9.6 oz)   20 95.3 kg (210 lb)   20 94.9 kg (209 lb 4.8 oz)         Constitutional.  Looks well and in no apparent distress.   Eyes.  Without eye redness or apparent jaundice.   Respiratory.  Non labored breathing. Speaking in full sentences.    Skin.  No concerning skin rashes on the skin visualized.   Neurological.  Is alert and oriented.  Psychiatric.  Mood and affect seem appropriate.      The rest of a comprehensive physical examination is deferred due to Public Health Emergency video visit restrictions.    Labs/Imaging.  Reviewed    EXAMINATION: MR PELVIS (INTRAPELVIC ORGANS) WO&W CONTRAST,  2020 11:51 AM      COMPARISON: MRI  5/5/2020. Pretreatment MRI from 1/12/2018     TECHNIQUE:  Images were acquired without and with intravenous contrast  through the pelvis. The following MR images were acquired without  contrast: multiplanar T1, multiplanar T2, axial diffusion-weighted and  axial apparent diffusion coefficient. T1-weighted images with fat  saturation were acquired at the following intervals relative to  intravenous contrast administration: pre-contrast, immediate post  contrast, 1 minute, 3 minutes and delayed.  Contrast dose: 10mL  Gadavist     HISTORY: Rectal cancer (H); Malignant neoplasm of rectum (H)        FINDINGS:     LOCATION/SIZE: On prior exam dated 1/20/2018 there was a tumor  identified in the [mid to lower]  rectum. Previously this tumor  measured 2.7 long, 2 cm wide, and currently is not visualized.     TREATMENT RESPONSE: Stable posttreatment changes. Approximately 0% of  the current mass demonstrates tumour signal intensity, with the  remainder appearing to represent fibrosis.  This corresponds to a  tumour response grade of mrTRG-1.      EXTENT: The tumor [does not clearly involve the anal sphincters or  levator ani muscle.      CIRCUMFERENTIAL RESECTION MARGIN (CRM): In terms of the resection  margin, there is no involvement of the mesorectal fascia.     LYMPH NODES: There are no new suspicious appearing pelvic lymph nodes.     VEINS: There is no evidence of extramural venous extension.      MISCELLANEOUS FINDINGS: Small bilateral hip joint effusions. Stable T2  intermediate focus in the left femoral intertrochanteric region..                                                                      IMPRESSION: In this patient with a history of [id number] moderately  differentiated adenocarcinoma of the rectum status post neoadjuvant  chemotherapy followed by concurrent chemoradiation with a complete  response:  1. There has been a complete to treatment, with a corresponding tumor  regression grade of mrTRG-1.   2. No  pathologically enlarged lymph nodes.  3. Small incidental bilateral hip effusions.    EXAMINATION: CT ABDOMEN PELVIS W CONTRAST, 8/19/2020 11:39 AM     TECHNIQUE:  Helical CT images from the lung bases through the  symphysis pubis were obtained  with IV contrast. Contrast dose: Isovue  370 126cc     COMPARISON: CT 6/22/2018, MR 5/5/2020     HISTORY: rectal ca W &W protocol; Rectal cancer (H)     FINDINGS:   The lung bases are clear.     Abdomen/pelvis: No definite liver lesions are appreciated. The 6 small  cystic lesions demonstrated on MR 2/20/2017 may well be present but  not evident on the less sensitive CT. No intrahepatic biliary  dilatation. Gallbladder and pancreas are unremarkable. Tiny splenic  splenic granulomata. Adrenal glands and kidneys are normal in  appearance. No hydronephrosis or ureteral stones.     Portal vein, celiac axis, SMA, bilateral renal arteries are patent. No  abdominal or pelvic lymphadenopathy by size criteria. The bladder is  unremarkable. Uterus and ovaries are normal in size. The colon,  appendix and small bowel are normal in caliber.     Bones and soft tissues: Degenerative changes of lumbar spine no  suspicious bony abnormalities. Sclerosis in the intertrochanteric left  femur correlating with MR finding is not significantly changed from  6/22/2018.                                                                      IMPRESSION: In this patient with rectal cancer, status post  chemoradiation: No evidence of malignant disease.      Assessment and Plan  [id number] moderately differentiated adenocarcinoma of the rectum - MSI-stable  She was started on neoadjuvant FOLFOX on 2/26/2018.  after 4 cycles she had good response to treatment. Liver lesions remain indeterminate.    we continued with the same chemotherapy and she received cycle #8 on 6/20/18.  Repeat scans show good response to treatment with only a small signal consistent with viable tumor in the primary rectal cancer. No  surrounding lymph nodes suspicious. There is no evidence of metastatic disease.    She started on concurrent chemoradiation with Xeloda with radiation beginning on 7/9/18. There was a delay in her receiving Xeloda, so she began on 7/10/18. She completed it on 8/16/18    Repeat scans show great response to treatment with almost completely fibrotic signal.  There is an indeterminate left intertrochanteric lesion which was stable.    She was seen by Dr Hampton and the decision was made to keep her on watchful waiting.      She feels good and currently no evidence of recurrence.   Recent flexible sigmoidoscopy and MRI of the pelvis and CT CAP were unremarkable.  CEA was also <0.5.    She will have repeat Flex sig in Dec 2020 per protocol.  We will repeat MRI in 6 months in March 2021 and repeat CT CAP in Aug 2021.  CEA at each visit.  Colonoscopy due in May 2022.     She was seen in Cancer Survivorship clinic.    Surveillance per protocol:  Colonoscopy on 5/13/2019 was unremarkable.  She will be due for next colonoscopy in 3 years.          Flex sig every 3 months for first 3 years-   Flex sig will be every 6 months for years 4-5 and then every year for next 5 years.     She will have 3T MRI Pelvis every 4 months for 2 years and and then every 6 months for years 3-4 and then every year for years 5-6.      She should have annual CT CAP for 5-6 years.     Will check CEA at every clinic visit.       Left intertrochanteric lesion-  She is asymptomatic and this has remained stable which is very reassuring. We will cont to follow this on MRI Pelvis.     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 Observe.       Port-A-Cath. We again discussed pros and cons of port removal and decided to wait for one more year before we would remove it.    Needs flushes q 8 weeks.        We did not address the following today.    Indeterminate liver lesion. MRI 2/5/19 shows stable lesion, most likely c/w benign hemangioma or  cyst.  At this time I do not plan to repeat MRI abdomen but when we will do CT scan, it will be monitored.      Return to clinic in 6 months with labs/MRI prior.    I answered all of her questions to her satisfaction.  She is agreeable and comfortable with the plan.      Kyra Mortensen    Video start time. 1144 AM    Video stop time.  1157 AM.        Again, thank you for allowing me to participate in the care of your patient.        Sincerely,        Kyra Mortensen MD

## 2020-09-10 NOTE — PROGRESS NOTES
"Sarah Rajan is a 54 year old female who is being evaluated via a billable video visit.      The patient has been notified of following:     \"This video visit will be conducted via a call between you and your physician/provider. We have found that certain health care needs can be provided without the need for an in-person physical exam.  This service lets us provide the care you need with a video conversation.  If a prescription is necessary we can send it directly to your pharmacy.  If lab work is needed we can place an order for that and you can then stop by our lab to have the test done at a later time.    Video visits are billed at different rates depending on your insurance coverage.  Please reach out to your insurance provider with any questions.    If during the course of the call the physician/provider feels a video visit is not appropriate, you will not be charged for this service.\"    Patient has given verbal consent for Video visit? Yes    How would you like to obtain your AVS? MyChart     If you are dropped from the video visit, the video invite should be resent to: Send to e-mail at: [email or web address]     Will anyone else be joining your video visit? No          I have reviewed and updated the patient's allergies and medication list.  Patient was asked to provide any patient recorded vital signs, height and/or weight.  Please see \"Patient Reported Vital Signs\" tab for that information.  Patient instructed to be in the virtual waiting room 10-15 minutes prior to appointment time.      Concerns: Patient has no new concerns.      Refills: None         MARIBELL Cannon        Video-Visit Details    Type of service:  Video Visit    Video Start Time: 11:44 AM  Video End Time: 11:57 AM    Originating Location (pt. Location): Home    Distant Location (provider location):  [facility]     Platform used for Video Visit: Anup Mortensen MD        "

## 2020-09-10 NOTE — PROGRESS NOTES
Oncology outpatient note    Date of service: 9/10/2020       PC: Rectal cancer. [id number] - MSI Intact    HPI:  Please see previous note for details. I have copied and updated from prior note.  She is a 54-year-old female noticed BRBPR so Screening colonoscopy on 1/9/2018 revealed 3cm rectal mass and other polyps which were removed.  Pathology indicated moderately differentiated adenocarcinoma w/ intact MMR proteins.  CT staging scan showed no metastatic disease; some liver lesions which are thought to be benign per radiology report, T2N1M0 by pelvic MRI read at [facility]. When we initially reviewed her MRI we will not exactly sure whether that was lymph node involvement or not. We opted to repeat MRI which showed T4 N0 lesion. There was up to take her to surgery as there was possibility of personally lesion without lymph node involvement but because of the findings of repeat MRI the surgery was canceled and she is coming back to discuss neoadjuvant treatment.  We also did an MRI of the liver which showed 3 subcentimeter liver lesions which were too small to characterize and will need attention on follow-up.    Baseline CEA 1.1 on 1/9/18.    She started neoadjuvant 5-FU and oxaliplatin (FOLFOX), which she started on 2/26/18. The day after she received cycle 2, she developed fevers, chills, headache, and an itchy rash on her arms and legs, for which she was evaluated in the ED. She was sent home on Benadryl. Benadryl and dexamethasone doses were increased for cycle 3.   She tolerated that cycle well    C#4 was given on 4/10/18    She completed 8 cycles of FOLFOX on 6/5/18    Repeat scans show good response to treatment without evidence of metastatic disease. There is only a small signal consistent with residual tumor seen in the primary rectal cancer lesion.    She started on concurrent chemoradiation with Xeloda with radiation beginning on 7/9/18. There was a delay in her receiving Xeloda, so she began that on 7/10/18.  She completed it on 8/16/18    Repeat scans show great response to treatment with almost completely fibrotic signal.  There is an indeterminate left intertrochanteric lesion which is stable.  Liver lesion is consistent with benign hemangioma vs cyst    She was seen by Dr Hampton and the decision was made to keep her on watchful waiting.    Her MRI of the pelvis in December 2018 was stable but it showed rectal wall edema all the flexible sigmoidoscopy was negative.  Decision was made to do a short term follow-up MRI.      Left intertrochanteric lesion-we did MRI of the left hip which also showed a nonspecific T1 hypointense lesion in the proximal left femur which has not changed since 6/20/2018.  Repeat MRI in December 2018 , 5/14/19 and 8/20/19 were also stable.      Pelvis MRI in Feb 2019 was stable.    Colonoscopy on 5/13/19 was without evidence of recurrence- Next due in 3 years.     Pelvis MRI on 5/5/2020 continues to show complete response to treatment.    Flex sig on 5/5/2020 was also unremarkable.      Interval history    This is a video visit  She feels well and denies any pain or nausea, vomiting. No diarrhea or constipation. No infections or bleeding. Denies dyspnea. She has stable neuropathy of her feet.   Energy has been fine.       ECOG 0    ROS:  Rest of the comprehensive review of the system was essentially unremarkable.      I reviewed other hx in EPIC as below    PMH:  Depression  Restless leg syndrome  Dyslipidemia    Current Outpatient Medications   Medication     Acetaminophen (TYLENOL PO)     amitriptyline 10 MG PO tablet     atorvastatin 20 MG PO tablet     calcium carbonate (TUMS) 500 MG chewable tablet     FLUoxetine 20 MG PO capsule     lidocaine-prilocaine (EMLA) cream     RANITIDINE HCL PO     amitriptyline (ELAVIL) 10 MG tablet     atorvastatin (LIPITOR) 40 MG tablet     atorvastatin 40 MG PO tablet     benzonatate 100 MG PO capsule     benzonatate 100 MG PO capsule     diclofenac  (VOLTAREN) 75 MG EC tablet     FLUCELVAX QUADRIVALENT 0.5 ML IM RAFAEL     FLUoxetine (PROZAC) 20 MG capsule     oseltamivir 75 MG PO capsule     No current facility-administered medications for this visit.      Facility-Administered Medications Ordered in Other Visits   Medication     heparin 100 UNIT/ML injection 500 Units         FHx  Aunt and maternal grandmother had breast cancer  Pateral grandmother  of colorectal cancer  Mother had ischemic colitis    SHx:  , two teenage children  EtOH: 1 drink daily, occasionally more on weekends  Tobacco: never smoker  She is a microbiologist     Allergies   Allergen Reactions     Inapsine [Droperidol] Other (See Comments)     Elevated blood pressure and increased heart rate     Tegaderm Transparent Dressing (Informational Only) Itching     Pt had reaction to Tegaderm used for port dressing. Whole area was very red and itchy. Please use IV 3000 instead.        Exam:  There were no vitals taken for this visit.   Wt Readings from Last 4 Encounters:   20 94.3 kg (207 lb 14.4 oz)   20 93.3 kg (205 lb 9.6 oz)   20 95.3 kg (210 lb)   20 94.9 kg (209 lb 4.8 oz)         Constitutional.  Looks well and in no apparent distress.   Eyes.  Without eye redness or apparent jaundice.   Respiratory.  Non labored breathing. Speaking in full sentences.    Skin.  No concerning skin rashes on the skin visualized.   Neurological.  Is alert and oriented.  Psychiatric.  Mood and affect seem appropriate.      The rest of a comprehensive physical examination is deferred due to Public Health Emergency video visit restrictions.    Labs/Imaging.  Reviewed    EXAMINATION: MR PELVIS (INTRAPELVIC ORGANS) WO&W CONTRAST,  2020 11:51 AM      COMPARISON: MRI 2020. Pretreatment MRI from 2018     TECHNIQUE:  Images were acquired without and with intravenous contrast  through the pelvis. The following MR images were acquired without  contrast: multiplanar T1,  multiplanar T2, axial diffusion-weighted and  axial apparent diffusion coefficient. T1-weighted images with fat  saturation were acquired at the following intervals relative to  intravenous contrast administration: pre-contrast, immediate post  contrast, 1 minute, 3 minutes and delayed.  Contrast dose: 10mL  Gadavist     HISTORY: Rectal cancer (H); Malignant neoplasm of rectum (H)        FINDINGS:     LOCATION/SIZE: On prior exam dated 1/20/2018 there was a tumor  identified in the [mid to lower]  rectum. Previously this tumor  measured 2.7 long, 2 cm wide, and currently is not visualized.     TREATMENT RESPONSE: Stable posttreatment changes. Approximately 0% of  the current mass demonstrates tumour signal intensity, with the  remainder appearing to represent fibrosis.  This corresponds to a  tumour response grade of mrTRG-1.      EXTENT: The tumor [does not clearly involve the anal sphincters or  levator ani muscle.      CIRCUMFERENTIAL RESECTION MARGIN (CRM): In terms of the resection  margin, there is no involvement of the mesorectal fascia.     LYMPH NODES: There are no new suspicious appearing pelvic lymph nodes.     VEINS: There is no evidence of extramural venous extension.      MISCELLANEOUS FINDINGS: Small bilateral hip joint effusions. Stable T2  intermediate focus in the left femoral intertrochanteric region..                                                                      IMPRESSION: In this patient with a history of [id number] moderately  differentiated adenocarcinoma of the rectum status post neoadjuvant  chemotherapy followed by concurrent chemoradiation with a complete  response:  1. There has been a complete to treatment, with a corresponding tumor  regression grade of mrTRG-1.   2. No pathologically enlarged lymph nodes.  3. Small incidental bilateral hip effusions.    EXAMINATION: CT ABDOMEN PELVIS W CONTRAST, 8/19/2020 11:39 AM     TECHNIQUE:  Helical CT images from the lung bases through  the  symphysis pubis were obtained  with IV contrast. Contrast dose: Isovue  370 126cc     COMPARISON: CT 6/22/2018, MR 5/5/2020     HISTORY: rectal ca W &W protocol; Rectal cancer (H)     FINDINGS:   The lung bases are clear.     Abdomen/pelvis: No definite liver lesions are appreciated. The 6 small  cystic lesions demonstrated on MR 2/20/2017 may well be present but  not evident on the less sensitive CT. No intrahepatic biliary  dilatation. Gallbladder and pancreas are unremarkable. Tiny splenic  splenic granulomata. Adrenal glands and kidneys are normal in  appearance. No hydronephrosis or ureteral stones.     Portal vein, celiac axis, SMA, bilateral renal arteries are patent. No  abdominal or pelvic lymphadenopathy by size criteria. The bladder is  unremarkable. Uterus and ovaries are normal in size. The colon,  appendix and small bowel are normal in caliber.     Bones and soft tissues: Degenerative changes of lumbar spine no  suspicious bony abnormalities. Sclerosis in the intertrochanteric left  femur correlating with MR finding is not significantly changed from  6/22/2018.                                                                      IMPRESSION: In this patient with rectal cancer, status post  chemoradiation: No evidence of malignant disease.      Assessment and Plan  [id number] moderately differentiated adenocarcinoma of the rectum - MSI-stable  She was started on neoadjuvant FOLFOX on 2/26/2018.  after 4 cycles she had good response to treatment. Liver lesions remain indeterminate.    we continued with the same chemotherapy and she received cycle #8 on 6/20/18.  Repeat scans show good response to treatment with only a small signal consistent with viable tumor in the primary rectal cancer. No surrounding lymph nodes suspicious. There is no evidence of metastatic disease.    She started on concurrent chemoradiation with Xeloda with radiation beginning on 7/9/18. There was a delay in her receiving Xeloda,  so she began on 7/10/18. She completed it on 8/16/18    Repeat scans show great response to treatment with almost completely fibrotic signal.  There is an indeterminate left intertrochanteric lesion which was stable.    She was seen by Dr Hampton and the decision was made to keep her on watchful waiting.      She feels good and currently no evidence of recurrence.   Recent flexible sigmoidoscopy and MRI of the pelvis and CT CAP were unremarkable.  CEA was also <0.5.    She will have repeat Flex sig in Dec 2020 per protocol.  We will repeat MRI in 6 months in March 2021 and repeat CT CAP in Aug 2021.  CEA at each visit.  Colonoscopy due in May 2022.     She was seen in Cancer Survivorship clinic.    Surveillance per protocol:  Colonoscopy on 5/13/2019 was unremarkable.  She will be due for next colonoscopy in 3 years.          Flex sig every 3 months for first 3 years-   Flex sig will be every 6 months for years 4-5 and then every year for next 5 years.     She will have 3T MRI Pelvis every 4 months for 2 years and and then every 6 months for years 3-4 and then every year for years 5-6.      She should have annual CT CAP for 5-6 years.     Will check CEA at every clinic visit.       Left intertrochanteric lesion-  She is asymptomatic and this has remained stable which is very reassuring. We will cont to follow this on MRI Pelvis.     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 Observe.       Port-A-Cath. We again discussed pros and cons of port removal and decided to wait for one more year before we would remove it.    Needs flushes q 8 weeks.        We did not address the following today.    Indeterminate liver lesion. MRI 2/5/19 shows stable lesion, most likely c/w benign hemangioma or cyst.  At this time I do not plan to repeat MRI abdomen but when we will do CT scan, it will be monitored.      Return to clinic in 6 months with labs/MRI prior.    I answered all of her questions to her satisfaction.   She is agreeable and comfortable with the plan.      Kyra Mortensen    Video start time. 1144 AM    Video stop time.  1157 AM.

## 2020-09-10 NOTE — TELEPHONE ENCOUNTER
Attempted to contact Pt to schedule flex sig with Dr. Hampton in Dec per w/w protocol.  No answer. Left VM to call back.    Antoine Cullen LPN

## 2020-09-11 NOTE — TELEPHONE ENCOUNTER
Attempted to contact Pt again to schedule a flex sig per w/w protocol with RDM in Dec. No answer. Left VM to call back to schedule or she can call the main scheduling line.    Antoine Cullen LPN

## 2020-10-30 ENCOUNTER — TELEPHONE (OUTPATIENT)
Dept: SURGERY | Facility: CLINIC | Age: 55
End: 2020-10-30

## 2020-10-30 NOTE — TELEPHONE ENCOUNTER
[facility] Call Center    Phone Message    May a detailed message be left on voicemail: yes     Reason for Call: Other:      Sarah is calling in to schedule her next Flex Sig with Dr Hampton.  Please call her back to schedule.         Action Taken: Message routed to:  [facility]Rectal [facility]    Travel Screening: Not Applicable

## 2020-11-03 PROCEDURE — 36591 DRAW BLOOD OFF VENOUS DEVICE: CPT

## 2020-11-03 PROCEDURE — 250N000011 HC RX IP 250 OP 636: Performed by: INTERNAL MEDICINE

## 2020-11-03 RX ORDER — HEPARIN SODIUM (PORCINE) LOCK FLUSH IV SOLN 100 UNIT/ML 100 UNIT/ML
5 SOLUTION INTRAVENOUS
Status: COMPLETED | OUTPATIENT
Start: 2020-11-03 | End: 2020-11-03

## 2020-11-03 RX ADMIN — Medication 5 ML: at 15:54

## 2020-11-03 NOTE — NURSING NOTE
"Chief Complaint   Patient presents with     Port Flush     port flushed by rn.     Port accessed with 20 gauge 3/4\" gripper needle by rn.  Port was able to be flushed with NS but no blood return obtained.  Port was flushed with NSand heparin then de-accessed.  Pt tolerated well.  Asked pt to make port flush appt with she had time for TPA if needed.    Joselyn Hughes RN          "

## 2020-11-03 NOTE — TELEPHONE ENCOUNTER
[facility] Call Center    Phone Message    May a detailed message be left on voicemail: yes     Reason for Call: Other: Pt is calling again to schedule a Flex Sig. Please reach back out to Pt. Thank you!     Action Taken: Message routed to:  [facility] Colon and Rectal     Travel Screening: Not Applicable

## 2020-12-13 ENCOUNTER — HEALTH MAINTENANCE LETTER (OUTPATIENT)
Age: 55
End: 2020-12-13

## 2020-12-14 NOTE — PROGRESS NOTES
Colon and Rectal Surgery Clinic Note      RE: Sarah Satinder  : 1965  JIMMY: 12/15/2020    Sarah presents today for follow up of her rectal cancer on Watch and Wait protocol.      HPI:     She was initially seen in 2018 with a moderately differentiated, invasive adenocarcinoma with intact mismatch repair 4-5 cm from the anal verge. She was staged at that time and an MRI that was initially read as a T1-T2 lesion, then possibly a T2N1 lesion. CT A/P was significant for small liver lesions too small to characterize but not concerning for malignancy and CEA was 1.1. She was discussed at tumor board the pelvic MRI was felt to be poor quality so she underwent a repeat MRI pelvis on  and her tumor was staged as T4N0 due to abutment of the levators. Abdominal MRI to evaluate the liver lesions was performed on  which was again found to have multiple sub-centimeter lesions that were difficult to characterize.     She underwent 8 cycles of FOLFOX completed on 18 and chemoradiation with XELODA on 8/15/18. 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     MRI of left hip showed a non specific T1 hypointense lesion in the proximal left femur and repeat MRI 12/10/18:               There is a T1 hypointense and subtly enhancing lesion in the              proximal left femur, stable since at least 2018.  Given this              patient's history of rectal cancer, metastatic disease cannot be excluded.    Colonoscopy last on 19 without evidence of recurrence    3T MRI 2020:  1. There has been a complete to treatment, with a corresponding tumor  regression grade of mrTRG-1.   2. No pathologically enlarged lymph nodes.  3. Small incidental bilateral hip effusions.    CT Chest 8/3/2020:  1. No evidence of metastatic disease in the chest. No suspicious  pulmonary nodules.  2. Sequela of prior granulomatous disease.     CT AP 20 without evidence  "of metastatic disease    I last saw Sarah on 9/1/2020 with no evidence of recurrence on flexible sigmoidoscopy.    She last saw Dr. Mortensen with oncology on 9/10/2020    Interval History: Sarah is doing well. She denies any anorectal complaints.     Physical examination:  Examination was chaperoned by Amara Jackson NP     Vitals: /89 (BP Location: Left arm, Patient Position: Sitting, Cuff Size: Adult Large)   Pulse 65   Temp 98.5  F (36.9  C) (Oral)   Ht 5' 5\"   Wt 204 lb 3.2 oz   SpO2 99%   BMI 33.98 kg/m    BMI= Body mass index is 33.98 kg/m .    Sarah looks well and healthy. Digital rectal examination reveals no masses.  Flexible sigmoidoscopy to 30 cm. Faint scar approximately 3 cm from the anal margin left lateral visualized on retroflexion and is very hard to see definitively on straight view. No evidence of recurrent tumor.    Laboratory data:     8/21/2019 10:27 10/9/2019 13:54 1/8/2020 17:51 5/5/2020 10:40 8/19/2020 11:11   CEA <0.5 <0.5 <0.5 <0.5 <0.5       Assessment/plan:  Sarah is doing very well now 2 years following completion of total neoadjuvant therapy.  There is no evidence of tumor recurrence on examination. Follow up per protocol below. Patient's questions were answered to her stated satisfaction and she is in agreement with this plan.    Follow up per Watch and Wait Protocol:   Completed CRT: 8/15/2018    Flexible sigmoidoscopy   ? Every 2 months for 6 months: Until 2/2019-COMPLETED   ? Every 3 months for 3 years: Until 8/2021-DUE 3/2021  ? Every 6 months for 5 years: Until 2023  ? Every year for 10 years: Until 2028       3T MRI of pelvis  ? 6-8 weeks after CRT: COMPLETED  ? Every 4 months for 2 years: Until 8/2021-DUE 1/2021  ? Every 6 months for 2 years: Until 8/2023  ? Yearly for 2 years: Until 2025       CT CAP  ? Every year for 6-8 years: Until 2026-Due 8/2021       Colonoscopy   ? Last 5/13/19-Repeat 5/2022       CEA at every clinic or endoscopic " visit    For details of past medical history, surgical history, family history, medications, allergies, and review of systems, please see details below.    Medical history:  Past Medical History:   Diagnosis Date     Depression      GERD (gastroesophageal reflux disease)      History of smoking      Insomnia      Obesity      Rectal cancer (H)      Restless leg syndrome      Seasonal affective disorder (H)        Surgical history:  Past Surgical History:   Procedure Laterality Date      SECTION      x2     COLONOSCOPY       INSERT PORT VASCULAR ACCESS Right 2018    Procedure: INSERT PORT VASCULAR ACCESS;  central venous Chest Port Placement;  Surgeon: Gaurav Yates PA-C;  Location:  OR     [facility]         Family history:  Family History   Problem Relation Age of Onset     Hyperlipidemia Mother      Hypertension Mother      Gastrointestinal Disease Mother         ischemic colitis     Coronary Artery Disease Mother         stents x 3     Anesthesia Reaction Mother         hypotension, PONV     Hyperlipidemia Father      Hypertension Father      Breast Cancer Maternal Grandmother      Coronary Artery Disease Maternal Grandfather      Myocardial Infarction Maternal Grandfather      Colon Cancer Paternal Grandmother      Breast Cancer Maternal Aunt      Breast Cancer Paternal Aunt      Coronary Artery Disease Paternal Grandfather      Cerebrovascular Disease Paternal Grandfather      Family History Negative Brother      Coronary Artery Disease Maternal Uncle        Medications:  Current Outpatient Medications   Medication Sig Dispense Refill     Acetaminophen (TYLENOL PO) Take 1,000 mg by mouth At Bedtime       amitriptyline 10 MG PO tablet TAKE 1 TO 2 TABLETS BY MOUTH DAILY AT BEDTIME       atorvastatin 20 MG PO tablet Take 20 mg by mouth       calcium carbonate (TUMS) 500 MG chewable tablet Take 1-2 chew tab by mouth daily  150 tablet      FLUoxetine 20 MG PO capsule        lidocaine-prilocaine  "(EMLA) cream Apply 45 minutes prior to procedure. 30 g 3     RANITIDINE HCL PO Take 150 mg by mouth daily as needed for heartburn         Allergies:  The patientis allergic to inapsine [droperidol] and tegaderm transparent dressing (informational only).    Social history:  Social History     Tobacco Use     Smoking status: Former Smoker     Packs/day: 0.10     Years: 8.00     Pack years: 0.80     Types: Cigarettes     Quit date: 1986     Years since quittin.8     Smokeless tobacco: Never Used   Substance Use Topics     Alcohol use: Yes     Alcohol/week: 7.0 standard drinks     Types: 7 Glasses of wine per week     Marital status: .    Review of Systems:  Nursing Notes:   Guera Olivo CMA  12/15/2020 11:43 AM  Signed  Chief Complaint   Patient presents with     Flexible Sigmoidoscopy     Flex Sig per W&W protocol for rectal cancer.       Vitals:    12/15/20 1135   BP: 129/89   BP Location: Left arm   Patient Position: Sitting   Cuff Size: Adult Large   Pulse: 65   Temp: 98.5  F (36.9  C)   TempSrc: Oral   SpO2: 99%   Weight: 204 lb 3.2 oz   Height: 5' 5\"       Body mass index is 33.98 kg/m .       Guera Moeller CMA         Total face to face time was 10 minutes, >50% counseling, plus an additional 10 minutes procedure time.      Óscar Hampton MD   Professor and Chief  Division of Colon and Rectal Surgery  [facility]      Referring Provider:  Referred Self, MD  No address on file     Primary Care Provider:  Kyra Mortensen    This note was created using speech recognition software and may contain unintended word substitutions.  "

## 2020-12-15 ENCOUNTER — OFFICE VISIT (OUTPATIENT)
Dept: SURGERY | Facility: CLINIC | Age: 55
End: 2020-12-15
Payer: COMMERCIAL

## 2020-12-15 VITALS
DIASTOLIC BLOOD PRESSURE: 89 MMHG | HEART RATE: 65 BPM | TEMPERATURE: 98.5 F | OXYGEN SATURATION: 99 % | BODY MASS INDEX: 34.02 KG/M2 | SYSTOLIC BLOOD PRESSURE: 129 MMHG | WEIGHT: 204.2 LBS | HEIGHT: 65 IN

## 2020-12-15 DIAGNOSIS — C20 RECTAL CANCER (H): Primary | ICD-10-CM

## 2020-12-15 PROCEDURE — 96523 IRRIG DRUG DELIVERY DEVICE: CPT

## 2020-12-15 PROCEDURE — 250N000011 HC RX IP 250 OP 636: Performed by: COLON & RECTAL SURGERY

## 2020-12-15 PROCEDURE — 45330 DIAGNOSTIC SIGMOIDOSCOPY: CPT | Performed by: COLON & RECTAL SURGERY

## 2020-12-15 RX ORDER — HEPARIN SODIUM (PORCINE) LOCK FLUSH IV SOLN 100 UNIT/ML 100 UNIT/ML
5 SOLUTION INTRAVENOUS ONCE
Status: COMPLETED | OUTPATIENT
Start: 2020-12-15 | End: 2020-12-15

## 2020-12-15 RX ADMIN — Medication 5 ML: at 12:35

## 2020-12-15 ASSESSMENT — PAIN SCALES - GENERAL: PAINLEVEL: NO PAIN (0)

## 2020-12-15 ASSESSMENT — MIFFLIN-ST. JEOR: SCORE: 1527.13

## 2020-12-15 NOTE — NURSING NOTE
"Chief Complaint   Patient presents with     Flexible Sigmoidoscopy     Flex Sig per W&W protocol for rectal cancer.       Vitals:    12/15/20 1135   BP: 129/89   BP Location: Left arm   Patient Position: Sitting   Cuff Size: Adult Large   Pulse: 65   Temp: 98.5  F (36.9  C)   TempSrc: Oral   SpO2: 99%   Weight: 204 lb 3.2 oz   Height: 5' 5\"       Body mass index is 33.98 kg/m .       Guera Moeller CMA    "

## 2020-12-15 NOTE — LETTER
12/15/2020       RE: Sarah Rajan  [address] [facility] [id number]     Dear Colleague,    Thank you for referring your patient, Sarah Rajan, to the [facility] [place] at [facility]. Please see a copy of my visit note below.    Colon and Rectal Surgery Clinic Note      RE: Sarah Rajan  : 1965  JIMMY: 12/15/2020    Sarah presents today for follow up of her rectal cancer on Watch and Wait protocol.      HPI:     She was initially seen in 2018 with a moderately differentiated, invasive adenocarcinoma with intact mismatch repair 4-5 cm from the anal verge. She was staged at that time and an MRI that was initially read as a T1-T2 lesion, then possibly a T2N1 lesion. CT A/P was significant for small liver lesions too small to characterize but not concerning for malignancy and CEA was 1.1. She was discussed at tumor board the pelvic MRI was felt to be poor quality so she underwent a repeat MRI pelvis on  and her tumor was staged as T4N0 due to abutment of the levators. Abdominal MRI to evaluate the liver lesions was performed on  which was again found to have multiple sub-centimeter lesions that were difficult to characterize.     She underwent 8 cycles of FOLFOX completed on 18 and chemoradiation with XELODA on 8/15/18. 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     MRI of left hip showed a non specific T1 hypointense lesion in the proximal left femur and repeat MRI 12/10/18:               There is a T1 hypointense and subtly enhancing lesion in the              proximal left femur, stable since at least 2018.  Given this              patient's history of rectal cancer, metastatic disease cannot be excluded.    Colonoscopy last on 19 without evidence of recurrence    3T MRI 2020:  1. There has been a complete to treatment, with a  "corresponding tumor  regression grade of mrTRG-1.   2. No pathologically enlarged lymph nodes.  3. Small incidental bilateral hip effusions.    CT Chest 8/3/2020:  1. No evidence of metastatic disease in the chest. No suspicious  pulmonary nodules.  2. Sequela of prior granulomatous disease.     CT AP 8/19/20 without evidence of metastatic disease    I last saw Sarah on 9/1/2020 with no evidence of recurrence on flexible sigmoidoscopy.    She last saw Dr. Mortensen with oncology on 9/10/2020    Interval History: Sarah is doing well. She denies any anorectal complaints.     Physical examination:  Examination was chaperoned by Amara Jackson NP     Vitals: /89 (BP Location: Left arm, Patient Position: Sitting, Cuff Size: Adult Large)   Pulse 65   Temp 98.5  F (36.9  C) (Oral)   Ht 5' 5\"   Wt 204 lb 3.2 oz   SpO2 99%   BMI 33.98 kg/m    BMI= Body mass index is 33.98 kg/m .    Sarah looks well and healthy. Digital rectal examination reveals no masses.  Flexible sigmoidoscopy to 30 cm. Faint scar approximately 3 cm from the anal margin left lateral visualized on retroflexion and is very hard to see definitively on straight view. No evidence of recurrent tumor.    Laboratory data:     8/21/2019 10:27 10/9/2019 13:54 1/8/2020 17:51 5/5/2020 10:40 8/19/2020 11:11   CEA <0.5 <0.5 <0.5 <0.5 <0.5       Assessment/plan:  Sarah is doing very well now 2 years following completion of total neoadjuvant therapy.  There is no evidence of tumor recurrence on examination. Follow up per protocol below. Patient's questions were answered to her stated satisfaction and she is in agreement with this plan.    Follow up per Watch and Wait Protocol:   Completed CRT: 8/15/2018    Flexible sigmoidoscopy   ? Every 2 months for 6 months: Until 2/2019-COMPLETED   ? Every 3 months for 3 years: Until 8/2021-DUE 3/2021  ? Every 6 months for 5 years: Until 2023  ? Every year for 10 years: Until 2028       3T MRI of " pelvis  ? 6-8 weeks after CRT: COMPLETED  ? Every 4 months for 2 years: Until 2021-DUE 2021  ? Every 6 months for 2 years: Until 2023  ? Yearly for 2 years: Until        CT CAP  ? Every year for 6-8 years: Until -Due 2021       Colonoscopy   ? Last 19-Repeat 2022       CEA at every clinic or endoscopic visit    For details of past medical history, surgical history, family history, medications, allergies, and review of systems, please see details below.    Medical history:  Past Medical History:   Diagnosis Date     Depression      GERD (gastroesophageal reflux disease)      History of smoking      Insomnia      Obesity      Rectal cancer (H)      Restless leg syndrome      Seasonal affective disorder (H)        Surgical history:  Past Surgical History:   Procedure Laterality Date      SECTION      x2     COLONOSCOPY       INSERT PORT VASCULAR ACCESS Right 2018    Procedure: INSERT PORT VASCULAR ACCESS;  central venous Chest Port Placement;  Surgeon: Gaurav Yates PA-C;  Location: [facility] OR     LASIK         Family history:  Family History   Problem Relation Age of Onset     Hyperlipidemia Mother      Hypertension Mother      Gastrointestinal Disease Mother         ischemic colitis     Coronary Artery Disease Mother         stents x 3     Anesthesia Reaction Mother         hypotension, PONV     Hyperlipidemia Father      Hypertension Father      Breast Cancer Maternal Grandmother      Coronary Artery Disease Maternal Grandfather      Myocardial Infarction Maternal Grandfather      Colon Cancer Paternal Grandmother      Breast Cancer Maternal Aunt      Breast Cancer Paternal Aunt      Coronary Artery Disease Paternal Grandfather      Cerebrovascular Disease Paternal Grandfather      Family History Negative Brother      Coronary Artery Disease Maternal Uncle        Medications:  Current Outpatient Medications   Medication Sig Dispense Refill     Acetaminophen (TYLENOL PO) Take  "1,000 mg by mouth At Bedtime       amitriptyline 10 MG PO tablet TAKE 1 TO 2 TABLETS BY MOUTH DAILY AT BEDTIME       atorvastatin 20 MG PO tablet Take 20 mg by mouth       calcium carbonate (TUMS) 500 MG chewable tablet Take 1-2 chew tab by mouth daily  150 tablet      FLUoxetine 20 MG PO capsule        lidocaine-prilocaine (EMLA) cream Apply 45 minutes prior to procedure. 30 g 3     RANITIDINE HCL PO Take 150 mg by mouth daily as needed for heartburn         Allergies:  The patientis allergic to inapsine [droperidol] and tegaderm transparent dressing (informational only).    Social history:  Social History     Tobacco Use     Smoking status: Former Smoker     Packs/day: 0.10     Years: 8.00     Pack years: 0.80     Types: Cigarettes     Quit date: 1986     Years since quittin.8     Smokeless tobacco: Never Used   Substance Use Topics     Alcohol use: Yes     Alcohol/week: 7.0 standard drinks     Types: 7 Glasses of wine per week     Marital status: .    Review of Systems:  Nursing Notes:   Guera Olivo CMA  12/15/2020 11:43 AM  Signed  Chief Complaint   Patient presents with     Flexible Sigmoidoscopy     Flex Sig per [facility] protocol for rectal cancer.       Vitals:    12/15/20 1135   BP: 129/89   BP Location: Left arm   Patient Position: Sitting   Cuff Size: Adult Large   Pulse: 65   Temp: 98.5  F (36.9  C)   TempSrc: Oral   SpO2: 99%   Weight: 204 lb 3.2 oz   Height: 5' 5\"       Body mass index is 33.98 kg/m .       Guera Moeller CMA      Total face to face time was 10 minutes, >50% counseling, plus an additional 10 minutes procedure time.      Óscar Hampton MD   Professor and Chief  Division of Colon and Rectal Surgery  [facility]      Referring Provider:  Referred Self, MD  No address on file     Primary Care Provider:  Kyra Mortensen    This note was created using speech recognition software and may contain unintended word " substitutions.

## 2020-12-20 DIAGNOSIS — E78.01 FAMILIAL HYPERCHOLESTEROLEMIA: ICD-10-CM

## 2020-12-20 DIAGNOSIS — C20 MALIGNANT NEOPLASM OF RECTUM (H): ICD-10-CM

## 2020-12-24 RX ORDER — ATORVASTATIN CALCIUM 40 MG/1
TABLET, FILM COATED ORAL
OUTPATIENT
Start: 2020-12-24

## 2020-12-24 NOTE — TELEPHONE ENCOUNTER
ATORVASTATIN 40MG TABLET  Last Written Prescription Date:  10/16/2018  Last Fill Quantity: ?,   # refills: ?  Last Office Visit : 11/26/2018  Future Office visit:  None    Routing refill request to provider for review/approval because:  Medication is reported/historical      Jessica Bowles RN  Central Triage Red Flags/Med Refills

## 2021-01-05 ENCOUNTER — TELEPHONE (OUTPATIENT)
Dept: SURGERY | Facility: CLINIC | Age: 56
End: 2021-01-05

## 2021-01-07 ENCOUNTER — TELEPHONE (OUTPATIENT)
Dept: SURGERY | Facility: CLINIC | Age: 56
End: 2021-01-07

## 2021-01-07 NOTE — TELEPHONE ENCOUNTER
LVM for patient on both phones.  Per Breanna, schedule a [facility] Flexible Sigmoidoscopy with Dr. Hampton in March and an MRI 3T Pelvis to happen in January.  Self referred.  [employer] message and letter sent.

## 2021-01-15 ENCOUNTER — HEALTH MAINTENANCE LETTER (OUTPATIENT)
Age: 56
End: 2021-01-15

## 2021-01-22 ENCOUNTER — ANCILLARY PROCEDURE (OUTPATIENT)
Dept: MRI IMAGING | Facility: CLINIC | Age: 56
End: 2021-01-22
Attending: COLON & RECTAL SURGERY
Payer: COMMERCIAL

## 2021-01-22 DIAGNOSIS — C20 RECTAL CANCER (H): ICD-10-CM

## 2021-01-22 PROCEDURE — 72197 MRI PELVIS W/O & W/DYE: CPT | Mod: GC | Performed by: RADIOLOGY

## 2021-01-22 PROCEDURE — A9585 GADOBUTROL INJECTION: HCPCS | Performed by: RADIOLOGY

## 2021-01-22 RX ORDER — HEPARIN SODIUM (PORCINE) LOCK FLUSH IV SOLN 100 UNIT/ML 100 UNIT/ML
5 SOLUTION INTRAVENOUS ONCE
Status: COMPLETED | OUTPATIENT
Start: 2021-01-22 | End: 2021-01-22

## 2021-01-22 RX ORDER — GADOBUTROL 604.72 MG/ML
10 INJECTION INTRAVENOUS ONCE
Status: COMPLETED | OUTPATIENT
Start: 2021-01-22 | End: 2021-01-22

## 2021-01-22 RX ADMIN — GADOBUTROL 9 ML: 604.72 INJECTION INTRAVENOUS at 08:15

## 2021-01-22 RX ADMIN — HEPARIN SODIUM (PORCINE) LOCK FLUSH IV SOLN 100 UNIT/ML 5 ML: 100 SOLUTION at 09:28

## 2021-01-22 NOTE — DISCHARGE INSTRUCTIONS
MRI Contrast Discharge Instructions    The IV contrast you received today will pass out of your body in your  urine. This will happen in the next 24 hours. You will not feel this process.  Your urine will not change color.    Drink at least 4 extra glasses of water or juice today (unless your doctor  has restricted your fluids). This reduces the stress on your kidneys.  You may take your regular medicines.    If you are on dialysis: It is best to have dialysis today.    If you have a reaction: Most reactions happen right away. If you have  any new symptoms after leaving the hospital (such as hives or swelling),  call your hospital at the correct number below. Or call your family doctor.  If you have breathing distress or wheezing, call 911.    Special instructions: ***    I have read and understand the above information.    Signature:______________________________________ Date:___________    Staff:__________________________________________ Date:___________     Time:__________    [place] [facility] Departments:    ___[place]: [phone]  ___[facility]: [phone]  ___[place]: [phone] ___[facility]: [phone]  ___[facility]: [phone]  ___[facility]: [phone]  ___[phone]  ___[facility]: [phone]  ___[phone]

## 2021-03-08 ENCOUNTER — ANCILLARY PROCEDURE (OUTPATIENT)
Dept: MRI IMAGING | Facility: CLINIC | Age: 56
End: 2021-03-08
Attending: INTERNAL MEDICINE
Payer: COMMERCIAL

## 2021-03-08 VITALS
BODY MASS INDEX: 35.15 KG/M2 | HEART RATE: 74 BPM | SYSTOLIC BLOOD PRESSURE: 119 MMHG | OXYGEN SATURATION: 97 % | WEIGHT: 211.2 LBS | RESPIRATION RATE: 16 BRPM | DIASTOLIC BLOOD PRESSURE: 88 MMHG | TEMPERATURE: 98 F

## 2021-03-08 DIAGNOSIS — C20 RECTAL CANCER (H): Primary | ICD-10-CM

## 2021-03-08 DIAGNOSIS — C20 RECTAL CANCER (H): ICD-10-CM

## 2021-03-08 PROCEDURE — 250N000011 HC RX IP 250 OP 636: Performed by: INTERNAL MEDICINE

## 2021-03-08 PROCEDURE — 96523 IRRIG DRUG DELIVERY DEVICE: CPT

## 2021-03-08 PROCEDURE — 72197 MRI PELVIS W/O & W/DYE: CPT | Mod: GC | Performed by: RADIOLOGY

## 2021-03-08 PROCEDURE — A9585 GADOBUTROL INJECTION: HCPCS | Performed by: RADIOLOGY

## 2021-03-08 RX ORDER — HEPARIN SODIUM (PORCINE) LOCK FLUSH IV SOLN 100 UNIT/ML 100 UNIT/ML
5 SOLUTION INTRAVENOUS ONCE
Status: COMPLETED | OUTPATIENT
Start: 2021-03-08 | End: 2021-03-08

## 2021-03-08 RX ORDER — GADOBUTROL 604.72 MG/ML
10 INJECTION INTRAVENOUS ONCE
Status: COMPLETED | OUTPATIENT
Start: 2021-03-08 | End: 2021-03-08

## 2021-03-08 RX ORDER — HEPARIN SODIUM (PORCINE) LOCK FLUSH IV SOLN 100 UNIT/ML 100 UNIT/ML
5 SOLUTION INTRAVENOUS ONCE
Status: ACTIVE | OUTPATIENT
Start: 2021-03-08

## 2021-03-08 RX ORDER — HEPARIN SODIUM (PORCINE) LOCK FLUSH IV SOLN 100 UNIT/ML 100 UNIT/ML
500 SOLUTION INTRAVENOUS ONCE
Status: COMPLETED | OUTPATIENT
Start: 2021-03-08 | End: 2021-03-08

## 2021-03-08 RX ADMIN — GADOBUTROL 10 ML: 604.72 INJECTION INTRAVENOUS at 13:41

## 2021-03-08 RX ADMIN — SODIUM CHLORIDE, PRESERVATIVE FREE 500 UNITS: 5 INJECTION INTRAVENOUS at 14:02

## 2021-03-08 RX ADMIN — Medication 5 ML: at 11:09

## 2021-03-08 ASSESSMENT — PAIN SCALES - GENERAL: PAINLEVEL: NO PAIN (0)

## 2021-03-08 NOTE — DISCHARGE INSTRUCTIONS
MRI Contrast Discharge Instructions    The IV contrast you received today will pass out of your body in your  urine. This will happen in the next 24 hours. You will not feel this process.  Your urine will not change color.    Drink at least 4 extra glasses of water or juice today (unless your doctor  has restricted your fluids). This reduces the stress on your kidneys.  You may take your regular medicines.    If you are on dialysis: It is best to have dialysis today.    If you have a reaction: Most reactions happen right away. If you have  any new symptoms after leaving the hospital (such as hives or swelling),  call your hospital at the correct number below. Or call your family doctor.  If you have breathing distress or wheezing, call 911.    Special instructions: ***    I have read and understand the above information.    Signature:______________________________________ Date:___________    Staff:__________________________________________ Date:___________     Time:__________    [facility] Departments:    ___[place]: [phone]  ___[facility]: [phone]  ___[place]: [phone] ___[facility]: [phone]  ___[facility]: [phone]  ___[facility]: [phone]  ___[phone]  ___[facility]: [phone]  ___[phone]

## 2021-03-08 NOTE — NURSING NOTE
Chief Complaint   Patient presents with     Port Draw     Port accessed. No blood return by RN in lab. VS taken.      Port accessed with 20 g 3/4 inch power needle. No blood return. Labs not drawn. Port then de-accessed and flushed with NS and heparin.  Pt tolerated well.  VS taken. Nurse supervisor consulted and appointment made for pt to receive TPA on 3/16/21. Labs re-ordered with instruction to draw at 3/16/21 appointment.       Zulema Krishna RN

## 2021-03-08 NOTE — NURSING NOTE
Chief Complaint   Patient presents with     Port Draw     Port accessed. No blood return by RN in lab. VS taken.        Pt arrived from MRI for blood draw. No blood return noted. Port de-accessed heparin locked. Pt declined to have labs drawn today.    Ericka Sanchez RN on 3/8/2021 at 1:56 PM

## 2021-03-11 ENCOUNTER — VIRTUAL VISIT (OUTPATIENT)
Dept: ONCOLOGY | Facility: CLINIC | Age: 56
End: 2021-03-11
Attending: INTERNAL MEDICINE
Payer: COMMERCIAL

## 2021-03-11 DIAGNOSIS — C20 RECTAL CANCER (H): Primary | ICD-10-CM

## 2021-03-11 DIAGNOSIS — M21.852 DEFORMITY OF FEMUR, LEFT: ICD-10-CM

## 2021-03-11 PROCEDURE — 99214 OFFICE O/P EST MOD 30 MIN: CPT | Mod: GT | Performed by: INTERNAL MEDICINE

## 2021-03-11 PROCEDURE — 999N001193 HC VIDEO/TELEPHONE VISIT; NO CHARGE

## 2021-03-11 NOTE — LETTER
3/11/2021         RE: Sarah Rajan  [address] [facility] [id number]        Dear Colleague,    Thank you for referring your patient, Sarah Rajan, to the [facility]. Please see a copy of my visit note below.    Oncology outpatient note    Date of service: 3/11/2021       PC: Rectal cancer. [id number] - MSI Intact    HPI:  Please see previous note for details. I have copied and updated from prior note.  She is a 55-year-old female noticed BRBPR so Screening colonoscopy on 1/9/2018 revealed 3cm rectal mass and other polyps which were removed.  Pathology indicated moderately differentiated adenocarcinoma w/ intact MMR proteins.  CT staging scan showed no metastatic disease; some liver lesions which are thought to be benign per radiology report, T2N1M0 by pelvic MRI read at [facility]. When we initially reviewed her MRI we will not exactly sure whether that was lymph node involvement or not. We opted to repeat MRI which showed T4 N0 lesion. There was up to take her to surgery as there was possibility of personally lesion without lymph node involvement but because of the findings of repeat MRI the surgery was canceled and she is coming back to discuss neoadjuvant treatment.  We also did an MRI of the liver which showed 3 subcentimeter liver lesions which were too small to characterize and will need attention on follow-up.    Baseline CEA 1.1 on 1/9/18.    She started neoadjuvant 5-FU and oxaliplatin (FOLFOX), which she started on 2/26/18. The day after she received cycle 2, she developed fevers, chills, headache, and an itchy rash on her arms and legs, for which she was evaluated in the ED. She was sent home on Benadryl. Benadryl and dexamethasone doses were increased for cycle 3.   She tolerated that cycle well    C#4 was given on 4/10/18    She completed 8 cycles of FOLFOX on 6/5/18    Repeat scans show good response to treatment without evidence of metastatic disease. There is  only a small signal consistent with residual tumor seen in the primary rectal cancer lesion.    She started on concurrent chemoradiation with Xeloda with radiation beginning on 7/9/18. There was a delay in her receiving Xeloda, so she began that on 7/10/18. She completed it on 8/16/18    Repeat scans show great response to treatment with almost completely fibrotic signal.  There is an indeterminate left intertrochanteric lesion which is stable.  Liver lesion is consistent with benign hemangioma vs cyst    She was seen by Dr Hampton and the decision was made to keep her on watchful waiting.    Her MRI of the pelvis in December 2018 was stable but it showed rectal wall edema all the flexible sigmoidoscopy was negative.  Decision was made to do a short term follow-up MRI.      Left intertrochanteric lesion-we did MRI of the left hip which also showed a nonspecific T1 hypointense lesion in the proximal left femur which has not changed since 6/20/2018.  Repeat MRI in December 2018 , 5/14/19 and 8/20/19 were also stable.      Pelvis MRI in Feb 2019 was stable.    Colonoscopy on 5/13/19 was without evidence of recurrence- Next due in 3 years.     Pelvis MRI on 5/5/2020 continues to show complete response to treatment.    Flex sig on 5/5/2020 was also unremarkable.    Flex sig on 12/15/2020 was unremarkable.  MRI on 3/8/2021 was without evidence of recurrence.  It continues to show complete response.    Interval history    This is a video visit through [facility].  She is doing well. Denies any abdominal pain. Eating well. No nausea or vomiting. No diarrhea or constipation. No bleeding. She has stable neuropathy in feet and sometimes feels numbness or tingling. Energy is decent. No infections. No SOB. No new swellings.       ECOG 0    ROS:  A comprehensive ROS was otherwise neg      I reviewed other hx in EPIC as below    PMH:  Depression  Restless leg syndrome  Dyslipidemia    Current Outpatient Medications   Medication      Acetaminophen (TYLENOL PO)     amitriptyline 10 MG PO tablet     atorvastatin 20 MG PO tablet     calcium carbonate (TUMS) 500 MG chewable tablet     FLUoxetine 20 MG PO capsule     lidocaine-prilocaine (EMLA) cream     RANITIDINE HCL PO     No current facility-administered medications for this visit.      Facility-Administered Medications Ordered in Other Visits   Medication     heparin 100 UNIT/ML injection 5 mL     heparin 100 UNIT/ML injection 500 Units         FHx  Aunt and maternal grandmother had breast cancer  Pateral grandmother  of colorectal cancer  Mother had ischemic colitis    SHx:  , two teenage children  EtOH: 1 drink daily, occasionally more on weekends  Tobacco: never smoker  She is a microbiologist     Allergies   Allergen Reactions     Inapsine [Droperidol] Other (See Comments)     Elevated blood pressure and increased heart rate     Tegaderm Transparent Dressing (Informational Only) Itching     Pt had reaction to Tegaderm used for port dressing. Whole area was very red and itchy. Please use IV 3000 instead.        Exam:  There were no vitals taken for this visit.   Wt Readings from Last 4 Encounters:   21 95.8 kg (211 lb 3.2 oz)   12/15/20 92.6 kg (204 lb 3.2 oz)   20 94.3 kg (207 lb 14.4 oz)   20 93.3 kg (205 lb 9.6 oz)         Constitutional.  Does not seem to be in any acute distress.  Eyes.  No redness or discharge noted.  Respiratory.  Speaking in full sentences.  Breathing seems comfortable without any accessory use of muscles.    Skin.  Visualized his skin does not show any obvious rashes.  Musculoskeletal.  Range of motion for visualized areas is intact.  Neurological.  Alert and oriented x3.  Psychiatric.  Mood, mentation and affect are normal.  Decision making capacity is intact.      The rest of a comprehensive physical examination is deferred due to Public Health Emergency video visit restrictions.      Labs/Imaging.  Reviewed  2020  CBC  unremarkable.  CMP unremarkable.  August 2020 CEA less than 0.5.    MRI of the pelvis on 3/8/2021 shows complete response to treatment with a corresponding tumor regression grade of mrTRG-1.  The left femur intertrochanteric lesion also looks a stable and I reviewed it with the radiologist.    EXAMINATION: MR PELVIS (INTRAPELVIC ORGANS) WO&W CONTRAST,  3/8/2021 2:02 PM      COMPARISON: Pelvic MRI 1/22/2021 and 9/8/2021; abdominal CT 8/19/2020     TECHNIQUE:  Images were acquired without and with intravenous contrast  through the pelvis. The following MR images were acquired without  contrast: multiplanar T1, multiplanar T2, axial diffusion-weighted and  axial apparent diffusion coefficient. T1-weighted images with fat  saturation were acquired at the following intervals relative to  intravenous contrast administration: pre-contrast, immediate post  contrast, 1 minute, 3 minutes and delayed.  Contrast dose: 10.0mL  Gadavist     HISTORY: History of invasive rectal adenocarcinoma diagnosed  1/25/2018. Status post 8 cycles of FOLFOX completed 6/15/2018 and  chemoradiation with XELODA completed on 8/15/2018. Currently on  surveillance protocol.      FINDINGS:     LOCATION/SIZE:  On prior exam dated 1/12/2018 there was a tumor identified in the mid  to lower rectum. Previously this tumor measured 2.7 x 2.0 cm (length x  width), and currently it is not visualized. Stable mild mucosal  enhancement in the mid rectum.      TREATMENT RESPONSE:   Stable post treatment changes. No enhancing mass lesion and no  suspicious T2 or DWI signal in the rectum.  This corresponds to a  tumour response grade of mrTRG-1.      EXTENT:  There is no clear involvement of the anal sphincters or levator ani  muscle. There is no invasion of adjacent organs or of the anterior  peritoneal reflection.     CIRCUMFERENTIAL RESECTION MARGIN (CRM):  There is no involvement of the mesorectal fascia.     LYMPH NODES:  No suspicious pelvic lymph  nodes.     VEINS:  No evidence of extramural venous invasion.     MISCELLANEOUS FINDINGS:  Post surgical changes of prior  section. Stable mild bladder  wall thickening. No uterine or vaginal mass. Remaining intrapelvic  bowel is unremarkable. The major pelvic vessels appear patent. No  abnormal bone marrow signal.                                                                      IMPRESSION:   1. There has been a complete response to treatment, with a  corresponding tumor regression grade of mrTRG-1.  2. No suspicious pelvic lymph nodes.    CT abdomen and pelvis in 2020 was without evidence of recurrence.    EXAMINATION: CT ABDOMEN PELVIS W CONTRAST, 2020 11:39 AM     TECHNIQUE:  Helical CT images from the lung bases through the  symphysis pubis were obtained  with IV contrast. Contrast dose: Isovue  370 126cc     COMPARISON: CT 2018, MR 2020     HISTORY: rectal ca W &W protocol; Rectal cancer (H)     FINDINGS:   The lung bases are clear.     Abdomen/pelvis: No definite liver lesions are appreciated. The 6 small  cystic lesions demonstrated on MR 2017 may well be present but  not evident on the less sensitive CT. No intrahepatic biliary  dilatation. Gallbladder and pancreas are unremarkable. Tiny splenic  splenic granulomata. Adrenal glands and kidneys are normal in  appearance. No hydronephrosis or ureteral stones.     Portal vein, celiac axis, SMA, bilateral renal arteries are patent. No  abdominal or pelvic lymphadenopathy by size criteria. The bladder is  unremarkable. Uterus and ovaries are normal in size. The colon,  appendix and small bowel are normal in caliber.     Bones and soft tissues: Degenerative changes of lumbar spine no  suspicious bony abnormalities. Sclerosis in the intertrochanteric left  femur correlating with MR finding is not significantly changed from  2018.                                                                      IMPRESSION: In this  patient with rectal cancer, status post  chemoradiation: No evidence of malignant disease.      Assessment and Plan  [id number] moderately differentiated adenocarcinoma of the rectum - MSI-stable  She was started on neoadjuvant FOLFOX on 2/26/2018.  after 4 cycles she had good response to treatment. Liver lesions remain indeterminate.    we continued with the same chemotherapy and she received cycle #8 on 6/20/18.  Repeat scans show good response to treatment with only a small signal consistent with viable tumor in the primary rectal cancer. No surrounding lymph nodes suspicious. There is no evidence of metastatic disease.    She started on concurrent chemoradiation with Xeloda with radiation beginning on 7/9/18. There was a delay in her receiving Xeloda, so she began on 7/10/18. She completed it on 8/16/18    Repeat scans show great response to treatment with almost completely fibrotic signal.  There is an indeterminate left intertrochanteric lesion which was stable.    She was seen by Dr Hampton and the decision was made to keep her on watchful waiting.      Overall she has done well and currently there is no evidence of recurrence.    She will have repeat flex sig on 3/16/2021. Colonoscopy will be due in May 2022.    We will repeat MRI Pelvis in 6 months    We will check CT CAP in Aug 2021.  CEA has not been done so we will try to draw labs next week.      She was seen in Cancer Survivorship clinic.    Surveillance per protocol:  Follow up per Watch and Wait Protocol:   Completed CRT: 8/16/2018    Flexible sigmoidoscopy   ? Every 2 months for 6 months: Until 2/2019-COMPLETED   ? Every 3 months for 3 years: Until 8/2021-DUE 3/2021  ? Every 6 months for 5 years: Until 2023  ? Every year for 10 years: Until 2028       3T MRI of pelvis  ? 6-8 weeks after CRT: COMPLETED  ? Every 4 months for 2 years: Until 8/2020-Completed  ? Every 6 months for 2 years: Until 8/2022- Due Sept 2021  ? Yearly for 2 years: Until  "2024       CT CAP  ? Every year for 6-8 years: Until 2026-Due 8/2021        Colonoscopy   ? Last 5/13/19-Repeat 5/2022       CEA at every clinic or endoscopic visit      Left intertrochanteric lesion-  She continues to be asymptomatic and I reviewed the MRI with the radiologist and this lesion has remained stable for several years which is reassuring for most likely of benign etiology.  We will following it with repeated pelvis MRI.    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 We discussed that she can try acupuncture or laser therapy.      Port-A-Cath. Cont port flushes every 8 weeks or so.          We did not address the following today.    Indeterminate liver lesion. MRI 2/5/19 shows stable lesion, most likely c/w benign hemangioma or cyst.  At this time I do not plan to repeat MRI abdomen but when we will do CT scan, it will be monitored.      Return to clinic in 6 months with labs/MRI prior.    I answered all of her questions to her satisfaction.  She is agreeable and comfortable with the plan.      Kyra Cruzirasema Smith is a 55 year old who is being evaluated via a billable video visit.      How would you like to obtain your AVS? MyChart  If the video visit is dropped, the invitation should be resent by: Text to cell phone: [phone]  Will anyone else be joining your video visit? No     Vitals - Patient Reported  Weight (Patient Reported): 93 kg (205 lb)  Height (Patient Reported): 165.1 cm (5' 5\")  BMI (Based on Pt Reported Ht/Wt): 34.11  Temperature (Patient Reported): 97.5  F (36.4  C)  Pain Score: No Pain (0)    Ines LECHUGA      Video Start Time: 12:11 PM  Video-Visit Details    Type of service:  Video Visit    Video End Time:12:27 PM    Originating Location (pt. Location): Home    Distant Location (provider location):  [facility]     Platform used for Video Visit: Anup      Again, thank you for allowing me to participate in the care " of your patient.        Sincerely,        Kyra Mortensen MD

## 2021-03-11 NOTE — PROGRESS NOTES
Oncology outpatient note    Date of service: 3/11/2021       PC: Rectal cancer. [id number] - MSI Intact    HPI:  Please see previous note for details. I have copied and updated from prior note.  She is a 55-year-old female noticed BRBPR so Screening colonoscopy on 1/9/2018 revealed 3cm rectal mass and other polyps which were removed.  Pathology indicated moderately differentiated adenocarcinoma w/ intact MMR proteins.  CT staging scan showed no metastatic disease; some liver lesions which are thought to be benign per radiology report, T2N1M0 by pelvic MRI read at [facility]. When we initially reviewed her MRI we will not exactly sure whether that was lymph node involvement or not. We opted to repeat MRI which showed T4 N0 lesion. There was up to take her to surgery as there was possibility of personally lesion without lymph node involvement but because of the findings of repeat MRI the surgery was canceled and she is coming back to discuss neoadjuvant treatment.  We also did an MRI of the liver which showed 3 subcentimeter liver lesions which were too small to characterize and will need attention on follow-up.    Baseline CEA 1.1 on 1/9/18.    She started neoadjuvant 5-FU and oxaliplatin (FOLFOX), which she started on 2/26/18. The day after she received cycle 2, she developed fevers, chills, headache, and an itchy rash on her arms and legs, for which she was evaluated in the ED. She was sent home on Benadryl. Benadryl and dexamethasone doses were increased for cycle 3.   She tolerated that cycle well    C#4 was given on 4/10/18    She completed 8 cycles of FOLFOX on 6/5/18    Repeat scans show good response to treatment without evidence of metastatic disease. There is only a small signal consistent with residual tumor seen in the primary rectal cancer lesion.    She started on concurrent chemoradiation with Xeloda with radiation beginning on 7/9/18. There was a delay in her receiving Xeloda, so she began that on 7/10/18.  She completed it on 8/16/18    Repeat scans show great response to treatment with almost completely fibrotic signal.  There is an indeterminate left intertrochanteric lesion which is stable.  Liver lesion is consistent with benign hemangioma vs cyst    She was seen by Dr Hampton and the decision was made to keep her on watchful waiting.    Her MRI of the pelvis in December 2018 was stable but it showed rectal wall edema all the flexible sigmoidoscopy was negative.  Decision was made to do a short term follow-up MRI.      Left intertrochanteric lesion-we did MRI of the left hip which also showed a nonspecific T1 hypointense lesion in the proximal left femur which has not changed since 6/20/2018.  Repeat MRI in December 2018 , 5/14/19 and 8/20/19 were also stable.      Pelvis MRI in Feb 2019 was stable.    Colonoscopy on 5/13/19 was without evidence of recurrence- Next due in 3 years.     Pelvis MRI on 5/5/2020 continues to show complete response to treatment.    Flex sig on 5/5/2020 was also unremarkable.    Flex sig on 12/15/2020 was unremarkable.  MRI on 3/8/2021 was without evidence of recurrence.  It continues to show complete response.    Interval history    This is a video visit through [employer].  She is doing well. Denies any abdominal pain. Eating well. No nausea or vomiting. No diarrhea or constipation. No bleeding. She has stable neuropathy in feet and sometimes feels numbness or tingling. Energy is decent. No infections. No SOB. No new swellings.       ECOG 0    ROS:  A comprehensive ROS was otherwise neg      I reviewed other hx in EPIC as below    PMH:  Depression  Restless leg syndrome  Dyslipidemia    Current Outpatient Medications   Medication     Acetaminophen (TYLENOL PO)     amitriptyline 10 MG PO tablet     atorvastatin 20 MG PO tablet     calcium carbonate (TUMS) 500 MG chewable tablet     FLUoxetine 20 MG PO capsule     lidocaine-prilocaine (EMLA) cream     RANITIDINE HCL PO     No current  facility-administered medications for this visit.      Facility-Administered Medications Ordered in Other Visits   Medication     heparin 100 UNIT/ML injection 5 mL     heparin 100 UNIT/ML injection 500 Units         FHx  Aunt and maternal grandmother had breast cancer  Pateral grandmother  of colorectal cancer  Mother had ischemic colitis    SHx:  , two teenage children  EtOH: 1 drink daily, occasionally more on weekends  Tobacco: never smoker  She is a microbiologist     Allergies   Allergen Reactions     Inapsine [Droperidol] Other (See Comments)     Elevated blood pressure and increased heart rate     Tegaderm Transparent Dressing (Informational Only) Itching     Pt had reaction to Tegaderm used for port dressing. Whole area was very red and itchy. Please use IV 3000 instead.        Exam:  There were no vitals taken for this visit.   Wt Readings from Last 4 Encounters:   21 95.8 kg (211 lb 3.2 oz)   12/15/20 92.6 kg (204 lb 3.2 oz)   20 94.3 kg (207 lb 14.4 oz)   20 93.3 kg (205 lb 9.6 oz)         Constitutional.  Does not seem to be in any acute distress.  Eyes.  No redness or discharge noted.  Respiratory.  Speaking in full sentences.  Breathing seems comfortable without any accessory use of muscles.    Skin.  Visualized his skin does not show any obvious rashes.  Musculoskeletal.  Range of motion for visualized areas is intact.  Neurological.  Alert and oriented x3.  Psychiatric.  Mood, mentation and affect are normal.  Decision making capacity is intact.      The rest of a comprehensive physical examination is deferred due to Public Health Emergency video visit restrictions.      Labs/Imaging.  Reviewed  2020  CBC unremarkable.  CMP unremarkable.  2020 CEA less than 0.5.    MRI of the pelvis on 3/8/2021 shows complete response to treatment with a corresponding tumor regression grade of mrTRG-1.  The left femur intertrochanteric lesion also looks a stable and I reviewed  it with the radiologist.    EXAMINATION: MR PELVIS (INTRAPELVIC ORGANS) WO&W CONTRAST,  3/8/2021 2:02 PM      COMPARISON: Pelvic MRI 2021 and 2021; abdominal CT 2020     TECHNIQUE:  Images were acquired without and with intravenous contrast  through the pelvis. The following MR images were acquired without  contrast: multiplanar T1, multiplanar T2, axial diffusion-weighted and  axial apparent diffusion coefficient. T1-weighted images with fat  saturation were acquired at the following intervals relative to  intravenous contrast administration: pre-contrast, immediate post  contrast, 1 minute, 3 minutes and delayed.  Contrast dose: 10.0mL  Gadavist     HISTORY: History of invasive rectal adenocarcinoma diagnosed  2018. Status post 8 cycles of FOLFOX completed 6/15/2018 and  chemoradiation with XELODA completed on 8/15/2018. Currently on  surveillance protocol.      FINDINGS:     LOCATION/SIZE:  On prior exam dated 2018 there was a tumor identified in the mid  to lower rectum. Previously this tumor measured 2.7 x 2.0 cm (length x  width), and currently it is not visualized. Stable mild mucosal  enhancement in the mid rectum.      TREATMENT RESPONSE:   Stable post treatment changes. No enhancing mass lesion and no  suspicious T2 or DWI signal in the rectum.  This corresponds to a  tumour response grade of mrTRG-1.      EXTENT:  There is no clear involvement of the anal sphincters or levator ani  muscle. There is no invasion of adjacent organs or of the anterior  peritoneal reflection.     CIRCUMFERENTIAL RESECTION MARGIN (CRM):  There is no involvement of the mesorectal fascia.     LYMPH NODES:  No suspicious pelvic lymph nodes.     VEINS:  No evidence of extramural venous invasion.     MISCELLANEOUS FINDINGS:  Post surgical changes of prior  section. Stable mild bladder  wall thickening. No uterine or vaginal mass. Remaining intrapelvic  bowel is unremarkable. The major pelvic vessels  appear patent. No  abnormal bone marrow signal.                                                                      IMPRESSION:   1. There has been a complete response to treatment, with a  corresponding tumor regression grade of mrTRG-1.  2. No suspicious pelvic lymph nodes.    CT abdomen and pelvis in August 2020 was without evidence of recurrence.    EXAMINATION: CT ABDOMEN PELVIS W CONTRAST, 8/19/2020 11:39 AM     TECHNIQUE:  Helical CT images from the lung bases through the  symphysis pubis were obtained  with IV contrast. Contrast dose: Isovue  370 126cc     COMPARISON: CT 6/22/2018, MR 5/5/2020     HISTORY: rectal ca W &W protocol; Rectal cancer (H)     FINDINGS:   The lung bases are clear.     Abdomen/pelvis: No definite liver lesions are appreciated. The 6 small  cystic lesions demonstrated on MR 2/20/2017 may well be present but  not evident on the less sensitive CT. No intrahepatic biliary  dilatation. Gallbladder and pancreas are unremarkable. Tiny splenic  splenic granulomata. Adrenal glands and kidneys are normal in  appearance. No hydronephrosis or ureteral stones.     Portal vein, celiac axis, SMA, bilateral renal arteries are patent. No  abdominal or pelvic lymphadenopathy by size criteria. The bladder is  unremarkable. Uterus and ovaries are normal in size. The colon,  appendix and small bowel are normal in caliber.     Bones and soft tissues: Degenerative changes of lumbar spine no  suspicious bony abnormalities. Sclerosis in the intertrochanteric left  femur correlating with MR finding is not significantly changed from  6/22/2018.                                                                      IMPRESSION: In this patient with rectal cancer, status post  chemoradiation: No evidence of malignant disease.      Assessment and Plan  [id number] moderately differentiated adenocarcinoma of the rectum - MSI-stable  She was started on neoadjuvant FOLFOX on 2/26/2018.  after 4 cycles she had good  response to treatment. Liver lesions remain indeterminate.    we continued with the same chemotherapy and she received cycle #8 on 6/20/18.  Repeat scans show good response to treatment with only a small signal consistent with viable tumor in the primary rectal cancer. No surrounding lymph nodes suspicious. There is no evidence of metastatic disease.    She started on concurrent chemoradiation with Xeloda with radiation beginning on 7/9/18. There was a delay in her receiving Xeloda, so she began on 7/10/18. She completed it on 8/16/18    Repeat scans show great response to treatment with almost completely fibrotic signal.  There is an indeterminate left intertrochanteric lesion which was stable.    She was seen by Dr Hampton and the decision was made to keep her on watchful waiting.      Overall she has done well and currently there is no evidence of recurrence.    She will have repeat flex sig on 3/16/2021. Colonoscopy will be due in May 2022.    We will repeat MRI Pelvis in 6 months    We will check CT CAP in Aug 2021.  CEA has not been done so we will try to draw labs next week.      She was seen in Cancer Survivorship clinic.    Surveillance per protocol:  Follow up per Watch and Wait Protocol:   Completed CRT: 8/16/2018    Flexible sigmoidoscopy   ? Every 2 months for 6 months: Until 2/2019-COMPLETED   ? Every 3 months for 3 years: Until 8/2021-DUE 3/2021  ? Every 6 months for 5 years: Until 2023  ? Every year for 10 years: Until 2028       3T MRI of pelvis  ? 6-8 weeks after CRT: COMPLETED  ? Every 4 months for 2 years: Until 8/2020-Completed  ? Every 6 months for 2 years: Until 8/2022- Due Sept 2021  ? Yearly for 2 years: Until 2024       CT CAP  ? Every year for 6-8 years: Until 2026-Due 8/2021        Colonoscopy   ? Last 5/13/19-Repeat 5/2022       CEA at every clinic or endoscopic visit      Left intertrochanteric lesion-  She continues to be asymptomatic and I reviewed the MRI with the radiologist and this  lesion has remained stable for several years which is reassuring for most likely of benign etiology.  We will following it with repeated pelvis MRI.    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 We discussed that she can try acupuncture or laser therapy.      Port-A-Cath. Cont port flushes every 8 weeks or so.          We did not address the following today.    Indeterminate liver lesion. MRI 2/5/19 shows stable lesion, most likely c/w benign hemangioma or cyst.  At this time I do not plan to repeat MRI abdomen but when we will do CT scan, it will be monitored.      Return to clinic in 6 months with labs/MRI prior.    I answered all of her questions to her satisfaction.  She is agreeable and comfortable with the plan.      Kyra Mortensen

## 2021-03-11 NOTE — PROGRESS NOTES
"Sarah is a 55 year old who is being evaluated via a billable video visit.      How would you like to obtain your AVS? MyChart  If the video visit is dropped, the invitation should be resent by: Text to cell phone: [phone]  Will anyone else be joining your video visit? No     Vitals - Patient Reported  Weight (Patient Reported): 93 kg (205 lb)  Height (Patient Reported): 165.1 cm (5' 5\")  BMI (Based on Pt Reported Ht/Wt): 34.11  Temperature (Patient Reported): 97.5  F (36.4  C)  Pain Score: No Pain (0)    Ines LECHUGA      Video Start Time: 12:11 PM  Video-Visit Details    Type of service:  Video Visit    Video End Time:12:27 PM    Originating Location (pt. Location): Home    Distant Location (provider location):  [facility]     Platform used for Video Visit: Anup  "

## 2021-03-11 NOTE — PATIENT INSTRUCTIONS
Follow with Dr Berta gregg every 8 weeks or so    In Aug/Sept- repeat CT CAP and Pelvis MRI and see me a few days after that

## 2021-03-15 NOTE — PROGRESS NOTES
Colon and Rectal Surgery Clinic Note      RE: Sarah Satinder  : 1965  JIMMY: 3/16/2021    Sarah presents today for follow up of her rectal cancer on Watch and Wait protocol.      HPI:     She was initially seen in 2018 with a moderately differentiated, invasive adenocarcinoma with intact mismatch repair 4-5 cm from the anal verge. She was staged at that time and an MRI that was initially read as a T1-T2 lesion, then possibly a T2N1 lesion. CT A/P was significant for small liver lesions too small to characterize but not concerning for malignancy and CEA was 1.1. She was discussed at tumor board the pelvic MRI was felt to be poor quality so she underwent a repeat MRI pelvis on  and her tumor was staged as T4N0 due to abutment of the levators. Abdominal MRI to evaluate the liver lesions was performed on  which was again found to have multiple sub-centimeter lesions that were difficult to characterize.     She underwent 8 cycles of FOLFOX completed on 18 and chemoradiation with XELODA on 8/15/18. 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     MRI of left hip showed a non specific T1 hypointense lesion in the proximal left femur and repeat MRI 12/10/18:               There is a T1 hypointense and subtly enhancing lesion in the              proximal left femur, stable since at least 2018.  Given this              patient's history of rectal cancer, metastatic disease cannot be excluded.    Colonoscopy last on 19 without evidence of recurrence    3T MRI Pelvis 3/8/2021:  IMPRESSION:   1. There has been a complete response to treatment, with a  corresponding tumor regression grade of mrTRG-1.  2. No suspicious pelvic lymph nodes.    CT Chest 8/3/2020:  1. No evidence of metastatic disease in the chest. No suspicious  pulmonary nodules.  2. Sequela of prior granulomatous disease.     CT AP 20 without evidence of metastatic  "disease    I last saw Sarah on 12/15/20 with no evidence of recurrence on flexible sigmoidoscopy.    She last saw Dr. Mortensen with oncology on 3/11/2021    Interval History: Sarah is doing well. She denies any anorectal complaints.     Physical examination:  Examination was chaperoned by Amara Jackson NP      Vitals: /86 (BP Location: Left arm, Patient Position: Sitting, Cuff Size: Adult Regular)   Pulse 83   Temp 98.2  F (36.8  C) (Oral)   Ht 5' 5\"   Wt 211 lb 1.6 oz   SpO2 96%   BMI 35.13 kg/m    BMI= Body mass index is 35.13 kg/m .    Sarah looks well and healthy. Digital rectal examination reveals no masses.  Flexible sigmoidoscopy to 20 cm. Faint scar approximately 3 cm from the anal margin left lateral visualized on retroflexion and is very hard to see definitively on straight view. No evidence of recurrent tumor.    Laboratory data:     8/21/2019 10:27 10/9/2019 13:54 1/8/2020 17:51 5/5/2020 10:40 8/19/2020 11:11   CEA <0.5 <0.5 <0.5 <0.5 <0.5       Assessment/plan:  Sarah is doing very well now.  There is no evidence of tumor recurrence on examination. Follow up per protocol below. Patient's questions were answered to her stated satisfaction and she is in agreement with this plan.    Follow up per Watch and Wait Protocol:   Completed CRT: 8/15/2018    Flexible sigmoidoscopy   ? Every 2 months for 6 months: Until 2/2019-COMPLETED   ? Every 3 months for 3 years: Until 8/2021-DUE 6/2021  ? Every 6 months for 5 years: Until 2023  ? Every year for 10 years: Until 2028       3T MRI of pelvis  ? 6-8 weeks after CRT: COMPLETED  ? Every 4 months for 2 years: Until 8/2021-DUE 7/2021  ? Every 6 months for 2 years: Until 8/2023  ? Yearly for 2 years: Until 2025       CT CAP  ? Every year for 6-8 years: Until 2026-Due 8/2021       Colonoscopy   ? Last 5/13/19-Repeat 5/2022       CEA at every clinic or endoscopic visit    For details of past medical history, surgical history, family history, " medications, allergies, and review of systems, please see details below.    Medical history:  Past Medical History:   Diagnosis Date     Depression      GERD (gastroesophageal reflux disease)      History of smoking      Insomnia      Obesity      Rectal cancer (H)      Restless leg syndrome      Seasonal affective disorder (H)        Surgical history:  Past Surgical History:   Procedure Laterality Date      SECTION      x2     COLONOSCOPY       INSERT PORT VASCULAR ACCESS Right 2018    Procedure: INSERT PORT VASCULAR ACCESS;  central venous Chest Port Placement;  Surgeon: Gaurav Yates PA-C;  Location:  OR     [facility]         Family history:  Family History   Problem Relation Age of Onset     Hyperlipidemia Mother      Hypertension Mother      Gastrointestinal Disease Mother         ischemic colitis     Coronary Artery Disease Mother         stents x 3     Anesthesia Reaction Mother         hypotension, PONV     Hyperlipidemia Father      Hypertension Father      Breast Cancer Maternal Grandmother      Coronary Artery Disease Maternal Grandfather      Myocardial Infarction Maternal Grandfather      Colon Cancer Paternal Grandmother      Breast Cancer Maternal Aunt      Breast Cancer Paternal Aunt      Coronary Artery Disease Paternal Grandfather      Cerebrovascular Disease Paternal Grandfather      Family History Negative Brother      Coronary Artery Disease Maternal Uncle        Medications:  Current Outpatient Medications   Medication Sig Dispense Refill     Acetaminophen (TYLENOL PO) Take 1,000 mg by mouth At Bedtime       amitriptyline 10 MG PO tablet TAKE 1 TO 2 TABLETS BY MOUTH DAILY AT BEDTIME       atorvastatin 20 MG PO tablet Take 20 mg by mouth       calcium carbonate (TUMS) 500 MG chewable tablet Take 1-2 chew tab by mouth daily  150 tablet      FLUoxetine 20 MG PO capsule        lidocaine-prilocaine (EMLA) cream Apply 45 minutes prior to procedure. 30 g 3     RANITIDINE HCL PO Take  "150 mg by mouth daily as needed for heartburn         Allergies:  The patientis allergic to inapsine [droperidol] and tegaderm transparent dressing (informational only).    Social history:  Social History     Tobacco Use     Smoking status: Former Smoker     Packs/day: 0.10     Years: 8.00     Pack years: 0.80     Types: Cigarettes     Quit date: 1986     Years since quittin.0     Smokeless tobacco: Never Used   Substance Use Topics     Alcohol use: Yes     Alcohol/week: 7.0 standard drinks     Types: 7 Glasses of wine per week     Marital status: .    Review of Systems:  Nursing Notes:   Guera Olivo CMA  3/16/2021 10:11 AM  Signed  Chief Complaint   Patient presents with     Flexible Sigmoidoscopy     [employer] protocolo for rectal cancer.       Vitals:    21 1007   BP: 125/86   BP Location: Left arm   Patient Position: Sitting   Cuff Size: Adult Regular   Pulse: 83   Temp: 98.2  F (36.8  C)   TempSrc: Oral   SpO2: 96%   Weight: 211 lb 1.6 oz   Height: 5' 5\"       Body mass index is 35.13 kg/m .       Guera Moeller CMA         15 minutes spent on the date of the encounter doing chart review, history and exam, documentation and further activities as noted above with an additional 10 minutes for flexible sigmoidoscopy.        Óscar Hampton MD   Professor and Chief  Division of Colon and Rectal Surgery  [facility]      Referring Provider:  Referred Self, MD  No address on file     Primary Care Provider:  Kyra Mortensen    This note was created using speech recognition software and may contain unintended word substitutions.  "

## 2021-03-16 ENCOUNTER — OFFICE VISIT (OUTPATIENT)
Dept: SURGERY | Facility: CLINIC | Age: 56
End: 2021-03-16
Payer: COMMERCIAL

## 2021-03-16 ENCOUNTER — INFUSION THERAPY VISIT (OUTPATIENT)
Dept: ONCOLOGY | Facility: CLINIC | Age: 56
End: 2021-03-16
Attending: INTERNAL MEDICINE
Payer: COMMERCIAL

## 2021-03-16 VITALS
HEART RATE: 83 BPM | SYSTOLIC BLOOD PRESSURE: 125 MMHG | DIASTOLIC BLOOD PRESSURE: 86 MMHG | TEMPERATURE: 98.2 F | HEIGHT: 65 IN | OXYGEN SATURATION: 96 % | BODY MASS INDEX: 35.17 KG/M2 | WEIGHT: 211.1 LBS

## 2021-03-16 DIAGNOSIS — C20 RECTAL CANCER (H): ICD-10-CM

## 2021-03-16 DIAGNOSIS — C20 RECTAL CANCER (H): Primary | ICD-10-CM

## 2021-03-16 LAB
ALBUMIN SERPL-MCNC: 3.7 G/DL (ref 3.4–5)
ALP SERPL-CCNC: 108 U/L (ref 40–150)
ALT SERPL W P-5'-P-CCNC: 28 U/L (ref 0–50)
ANION GAP SERPL CALCULATED.3IONS-SCNC: 5 MMOL/L (ref 3–14)
AST SERPL W P-5'-P-CCNC: 11 U/L (ref 0–45)
BASOPHILS # BLD AUTO: 0 10E9/L (ref 0–0.2)
BASOPHILS NFR BLD AUTO: 0.5 %
BILIRUB SERPL-MCNC: 0.5 MG/DL (ref 0.2–1.3)
BUN SERPL-MCNC: 14 MG/DL (ref 7–30)
CALCIUM SERPL-MCNC: 8.8 MG/DL (ref 8.5–10.1)
CEA SERPL-MCNC: <0.5 UG/L (ref 0–2.5)
CHLORIDE SERPL-SCNC: 108 MMOL/L (ref 94–109)
CO2 SERPL-SCNC: 27 MMOL/L (ref 20–32)
CREAT SERPL-MCNC: 0.7 MG/DL (ref 0.52–1.04)
DIFFERENTIAL METHOD BLD: ABNORMAL
EOSINOPHIL # BLD AUTO: 0.1 10E9/L (ref 0–0.7)
EOSINOPHIL NFR BLD AUTO: 1.4 %
ERYTHROCYTE [DISTWIDTH] IN BLOOD BY AUTOMATED COUNT: 12.6 % (ref 10–15)
GFR SERPL CREATININE-BSD FRML MDRD: >90 ML/MIN/{1.73_M2}
GLUCOSE SERPL-MCNC: 155 MG/DL (ref 70–99)
HCT VFR BLD AUTO: 37.8 % (ref 35–47)
HGB BLD-MCNC: 13 G/DL (ref 11.7–15.7)
IMM GRANULOCYTES # BLD: 0 10E9/L (ref 0–0.4)
IMM GRANULOCYTES NFR BLD: 0.2 %
LYMPHOCYTES # BLD AUTO: 0.7 10E9/L (ref 0.8–5.3)
LYMPHOCYTES NFR BLD AUTO: 15.5 %
MCH RBC QN AUTO: 30.3 PG (ref 26.5–33)
MCHC RBC AUTO-ENTMCNC: 34.4 G/DL (ref 31.5–36.5)
MCV RBC AUTO: 88 FL (ref 78–100)
MONOCYTES # BLD AUTO: 0.2 10E9/L (ref 0–1.3)
MONOCYTES NFR BLD AUTO: 3.9 %
NEUTROPHILS # BLD AUTO: 3.4 10E9/L (ref 1.6–8.3)
NEUTROPHILS NFR BLD AUTO: 78.5 %
NRBC # BLD AUTO: 0 10*3/UL
NRBC BLD AUTO-RTO: 0 /100
PLATELET # BLD AUTO: 180 10E9/L (ref 150–450)
POTASSIUM SERPL-SCNC: 3.4 MMOL/L (ref 3.4–5.3)
PROT SERPL-MCNC: 6.8 G/DL (ref 6.8–8.8)
RBC # BLD AUTO: 4.29 10E12/L (ref 3.8–5.2)
SODIUM SERPL-SCNC: 140 MMOL/L (ref 133–144)
WBC # BLD AUTO: 4.3 10E9/L (ref 4–11)

## 2021-03-16 PROCEDURE — 36593 DECLOT VASCULAR DEVICE: CPT

## 2021-03-16 PROCEDURE — 82378 CARCINOEMBRYONIC ANTIGEN: CPT | Performed by: INTERNAL MEDICINE

## 2021-03-16 PROCEDURE — 250N000011 HC RX IP 250 OP 636: Performed by: INTERNAL MEDICINE

## 2021-03-16 PROCEDURE — 85025 COMPLETE CBC W/AUTO DIFF WBC: CPT | Performed by: INTERNAL MEDICINE

## 2021-03-16 PROCEDURE — 80053 COMPREHEN METABOLIC PANEL: CPT | Performed by: INTERNAL MEDICINE

## 2021-03-16 PROCEDURE — 45330 DIAGNOSTIC SIGMOIDOSCOPY: CPT | Performed by: COLON & RECTAL SURGERY

## 2021-03-16 RX ORDER — HEPARIN SODIUM (PORCINE) LOCK FLUSH IV SOLN 100 UNIT/ML 100 UNIT/ML
500 SOLUTION INTRAVENOUS ONCE
Status: COMPLETED | OUTPATIENT
Start: 2021-03-16 | End: 2021-03-16

## 2021-03-16 RX ADMIN — Medication 500 UNITS: at 12:10

## 2021-03-16 RX ADMIN — ALTEPLASE 2 MG: 2.2 INJECTION, POWDER, LYOPHILIZED, FOR SOLUTION INTRAVENOUS at 11:05

## 2021-03-16 ASSESSMENT — PAIN SCALES - GENERAL: PAINLEVEL: NO PAIN (0)

## 2021-03-16 ASSESSMENT — MIFFLIN-ST. JEOR: SCORE: 1558.42

## 2021-03-16 NOTE — NURSING NOTE
"Chief Complaint   Patient presents with     Flexible Sigmoidoscopy     [employer] protocolo for rectal cancer.       Vitals:    03/16/21 1007   BP: 125/86   BP Location: Left arm   Patient Position: Sitting   Cuff Size: Adult Regular   Pulse: 83   Temp: 98.2  F (36.8  C)   TempSrc: Oral   SpO2: 96%   Weight: 211 lb 1.6 oz   Height: 5' 5\"       Body mass index is 35.13 kg/m .       Guera Moeller CMA    "

## 2021-03-16 NOTE — PATIENT INSTRUCTIONS
Contact Numbers  [facility]: [phone]    After Hours:  [phone]  Triage: [phone]    Please call the [facility] Triage line if you experience a temperature greater than or equal to 100.5, shaking chills, have uncontrolled nausea, vomiting and/or diarrhea, dizziness, shortness of breath, chest pain, bleeding, unexplained bruising, or if you have any other new/concerning symptoms, questions or concerns.     If it is after hours, weekends, or holidays, please call the main hospital  at  [phone] and ask to speak to the Oncology doctor on call.     If you are having any concerning symptoms or wish to speak to a provider before your next infusion visit, please call your care coordinator or triage to notify them so we can adequately serve you.     If you need a refill on a narcotic prescription or other medication, please call triage before your infusion appointment.         March 2021 Sunday Monday Tuesday Wednesday Thursday Friday Saturday        1     2     3     4     5     6       7     8    [facility] DRAW  10:30 AM   (15 min.)   [facility]   [facility]    MR PELVIS (INTRPLVC ORGNS) WWO  10:45 AM   (60 min.)   [id number]   [facility] 9     10     11    VIDEO VISIT RETURN  11:45 AM   (30 min.)   Kyra Mortensen MD   [facility] 12     13       14     15     16    [facility] FLEXIBLE SIGMOIDOSCOPY  10:00 AM   (30 min.)   Óscar Hampton MD   [facility] Colon and Rectal Surgery [facility] ONC INFUSION 30  11:00 AM   (30 min.)   [facility]   [facility] 17     18     19     20       21     22     23     24     25     26     27       28     29     30     31 April 2021 Sunday Monday Tuesday Wednesday Thursday Friday Saturday                       1     2     3       4     5     6     7      8     9     10       11     12     13     14     15     16     17       18     19     20     21     22     23     24       25     26     27     28     29     30                          Lab Results:  No results found for this or any previous visit (from the past 12 hour(s)).

## 2021-03-16 NOTE — PROGRESS NOTES
Infusion Nursing Note:  Sarah Rajan presents today for Port flushing/Alteplase.    Patient seen by provider today: No   present during visit today: Not Applicable.    Note: Patient reports feeling well. Denies fever/chills. No new concerns made. Otherwise well.    Port-a-cath in situ with no blood return. Able to flush saline with no issue. IV alteplase given. 60 minutes into dwell time able to get blood return. Able to draw 10mls of blood. IV normal saline and heparin flushing done.     Patient verbalized knowledge on when to flush her port.     Patient did meet criteria for an asymptomatic covid-19 PCR test in infusion today. Patient declined the covid-19 test.    Intravenous Access:  Implanted Port.    Treatment Conditions:  Not Applicable.      Post Infusion Assessment:  Site patent and intact, free from redness, edema or discomfort.  No evidence of extravasations.  Access discontinued per protocol.       Discharge Plan:   Patient declined prescription refills.  Discharge instructions reviewed with: Patient.  Patient and/or family verbalized understanding of discharge instructions and all questions answered.  AVS to patient via [employer].  Patient will  Call to return  for next appointment.   Patient discharged in stable condition accompanied by: self.  Departure Mode: Ambulatory.    CARYL SANDY RN

## 2021-03-16 NOTE — PATIENT INSTRUCTIONS
Follow up:    Follow up per Watch and Wait Protocol:   Completed CRT: 8/15/2018    Flexible sigmoidoscopy   ? Every 2 months for 6 months: Until 2/2019-COMPLETED   ? Every 3 months for 3 years: Until 8/2021-DUE 6/2021  ? Every 6 months for 5 years: Until 2023  ? Every year for 10 years: Until 2028       3T MRI of pelvis  ? 6-8 weeks after CRT: COMPLETED  ? Every 4 months for 2 years: Until 8/2021-DUE 7/2021  ? Every 6 months for 2 years: Until 8/2023  ? Yearly for 2 years: Until 2025       CT CAP  ? Every year for 6-8 years: Until 2026-Due 8/2021        Colonoscopy   ? Last 5/13/19-Repeat 5/2022       CEA at every clinic or endoscopic visit    Please call with any questions or concerns regarding your clinic visit today.    It is a pleasure being involved in your health care.    Contacts post-consultation depending on your need:    Radiology Appointments [phone]    Schedule Clinic Appointments [phone] # 1   M-F 7:30 - 5 pm    JANE Carlos [phone]    Clinic Fax Number [phone]    Surgery Scheduling [phone]    My Chart is available 24 hours a day and is a secure way to access your records and communicate with your care team.  I strongly recommend signing up if you haven't already done so, if you are comfortable with computers.  If you would like to inquire about this or are having problems with My Chart access, you may call [phone] or go online at [email or web address].  Please allow at least 24 hours for a response and extra time on weekends and Holidays.

## 2021-03-16 NOTE — LETTER
3/16/2021       RE: Sarah Rajan  [address] [address]     Dear Colleague,    Thank you for referring your patient, Sarah Rajan, to the [facility] [place] at [facility]. Please see a copy of my visit note below.    Colon and Rectal Surgery Clinic Note      RE: Sarah Rajan  : 1965  JIMMY: 3/16/2021    Sarah presents today for follow up of her rectal cancer on Watch and Wait protocol.      HPI:     She was initially seen in 2018 with a moderately differentiated, invasive adenocarcinoma with intact mismatch repair 4-5 cm from the anal verge. She was staged at that time and an MRI that was initially read as a T1-T2 lesion, then possibly a T2N1 lesion. CT A/P was significant for small liver lesions too small to characterize but not concerning for malignancy and CEA was 1.1. She was discussed at tumor board the pelvic MRI was felt to be poor quality so she underwent a repeat MRI pelvis on  and her tumor was staged as T4N0 due to abutment of the levators. Abdominal MRI to evaluate the liver lesions was performed on  which was again found to have multiple sub-centimeter lesions that were difficult to characterize.     She underwent 8 cycles of FOLFOX completed on 18 and chemoradiation with XELODA on 8/15/18. 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     MRI of left hip showed a non specific T1 hypointense lesion in the proximal left femur and repeat MRI 12/10/18:               There is a T1 hypointense and subtly enhancing lesion in the              proximal left femur, stable since at least 2018.  Given this              patient's history of rectal cancer, metastatic disease cannot be excluded.    Colonoscopy last on 19 without evidence of recurrence    3T MRI Pelvis 3/8/2021:  IMPRESSION:   1. There has been a  "complete response to treatment, with a  corresponding tumor regression grade of mrTRG-1.  2. No suspicious pelvic lymph nodes.    CT Chest 8/3/2020:  1. No evidence of metastatic disease in the chest. No suspicious  pulmonary nodules.  2. Sequela of prior granulomatous disease.     CT AP 8/19/20 without evidence of metastatic disease    I last saw Sarah on 12/15/20 with no evidence of recurrence on flexible sigmoidoscopy.    She last saw Dr. Mortensen with oncology on 3/11/2021    Interval History: Sarah is doing well. She denies any anorectal complaints.     Physical examination:  Examination was chaperoned by Amara Jackson NP      Vitals: /86 (BP Location: Left arm, Patient Position: Sitting, Cuff Size: Adult Regular)   Pulse 83   Temp 98.2  F (36.8  C) (Oral)   Ht 5' 5\"   Wt 211 lb 1.6 oz   SpO2 96%   BMI 35.13 kg/m    BMI= Body mass index is 35.13 kg/m .    Sarah looks well and healthy. Digital rectal examination reveals no masses.  Flexible sigmoidoscopy to 20 cm. Faint scar approximately 3 cm from the anal margin left lateral visualized on retroflexion and is very hard to see definitively on straight view. No evidence of recurrent tumor.    Laboratory data:     8/21/2019 10:27 10/9/2019 13:54 1/8/2020 17:51 5/5/2020 10:40 8/19/2020 11:11   CEA <0.5 <0.5 <0.5 <0.5 <0.5       Assessment/plan:  Sarah is doing very well now.  There is no evidence of tumor recurrence on examination. Follow up per protocol below. Patient's questions were answered to her stated satisfaction and she is in agreement with this plan.    Follow up per Watch and Wait Protocol:   Completed CRT: 8/15/2018    Flexible sigmoidoscopy   ? Every 2 months for 6 months: Until 2/2019-COMPLETED   ? Every 3 months for 3 years: Until 8/2021-DUE 6/2021  ? Every 6 months for 5 years: Until 2023  ? Every year for 10 years: Until 2028       3T MRI of pelvis  ? 6-8 weeks after CRT: COMPLETED  ? Every 4 months for 2 years: Until " 2021-DUE 2021  ? Every 6 months for 2 years: Until 2023  ? Yearly for 2 years: Until        CT CAP  ? Every year for 6-8 years: Until -Due 2021       Colonoscopy   ? Last 19-Repeat 2022       CEA at every clinic or endoscopic visit    For details of past medical history, surgical history, family history, medications, allergies, and review of systems, please see details below.    Medical history:  Past Medical History:   Diagnosis Date     Depression      GERD (gastroesophageal reflux disease)      History of smoking      Insomnia      Obesity      Rectal cancer (H)      Restless leg syndrome      Seasonal affective disorder (H)        Surgical history:  Past Surgical History:   Procedure Laterality Date      SECTION      x2     COLONOSCOPY       INSERT PORT VASCULAR ACCESS Right 2018    Procedure: INSERT PORT VASCULAR ACCESS;  central venous Chest Port Placement;  Surgeon: Gaurav Yates PA-C;  Location: [facility] OR     LASIK         Family history:  Family History   Problem Relation Age of Onset     Hyperlipidemia Mother      Hypertension Mother      Gastrointestinal Disease Mother         ischemic colitis     Coronary Artery Disease Mother         stents x 3     Anesthesia Reaction Mother         hypotension, PONV     Hyperlipidemia Father      Hypertension Father      Breast Cancer Maternal Grandmother      Coronary Artery Disease Maternal Grandfather      Myocardial Infarction Maternal Grandfather      Colon Cancer Paternal Grandmother      Breast Cancer Maternal Aunt      Breast Cancer Paternal Aunt      Coronary Artery Disease Paternal Grandfather      Cerebrovascular Disease Paternal Grandfather      Family History Negative Brother      Coronary Artery Disease Maternal Uncle        Medications:  Current Outpatient Medications   Medication Sig Dispense Refill     Acetaminophen (TYLENOL PO) Take 1,000 mg by mouth At Bedtime       amitriptyline 10 MG PO tablet TAKE 1 TO 2  "TABLETS BY MOUTH DAILY AT BEDTIME       atorvastatin 20 MG PO tablet Take 20 mg by mouth       calcium carbonate (TUMS) 500 MG chewable tablet Take 1-2 chew tab by mouth daily  150 tablet      FLUoxetine 20 MG PO capsule        lidocaine-prilocaine (EMLA) cream Apply 45 minutes prior to procedure. 30 g 3     RANITIDINE HCL PO Take 150 mg by mouth daily as needed for heartburn         Allergies:  The patientis allergic to inapsine [droperidol] and tegaderm transparent dressing (informational only).    Social history:  Social History     Tobacco Use     Smoking status: Former Smoker     Packs/day: 0.10     Years: 8.00     Pack years: 0.80     Types: Cigarettes     Quit date: 1986     Years since quittin.0     Smokeless tobacco: Never Used   Substance Use Topics     Alcohol use: Yes     Alcohol/week: 7.0 standard drinks     Types: 7 Glasses of wine per week     Marital status: .    Review of Systems:  Nursing Notes:   Guera Olivo CMA  3/16/2021 10:11 AM  Signed  Chief Complaint   Patient presents with     Flexible Sigmoidoscopy     [facility] protocolo for rectal cancer.       Vitals:    21 1007   BP: 125/86   BP Location: Left arm   Patient Position: Sitting   Cuff Size: Adult Regular   Pulse: 83   Temp: 98.2  F (36.8  C)   TempSrc: Oral   SpO2: 96%   Weight: 211 lb 1.6 oz   Height: 5' 5\"       Body mass index is 35.13 kg/m .       Guera Moeller CMA       15 minutes spent on the date of the encounter doing chart review, history and exam, documentation and further activities as noted above with an additional 10 minutes for flexible sigmoidoscopy.    Óscar Hampton MD   Professor and Chief  Division of Colon and Rectal Surgery  [facility]    Referring Provider:  Referred Self, MD  No address on file     Primary Care Provider:  Kyra Mortensen    This note was created using speech recognition software and may contain unintended word " substitutions.

## 2021-04-12 ENCOUNTER — VIRTUAL VISIT (OUTPATIENT)
Dept: CARDIOLOGY | Facility: CLINIC | Age: 56
End: 2021-04-12
Attending: INTERNAL MEDICINE
Payer: COMMERCIAL

## 2021-04-12 DIAGNOSIS — E78.01 FAMILIAL HYPERCHOLESTEROLEMIA: Primary | ICD-10-CM

## 2021-04-12 DIAGNOSIS — R07.9 CHEST PAIN, UNSPECIFIED TYPE: ICD-10-CM

## 2021-04-12 DIAGNOSIS — C20 MALIGNANT NEOPLASM OF RECTUM (H): ICD-10-CM

## 2021-04-12 PROCEDURE — 99214 OFFICE O/P EST MOD 30 MIN: CPT | Mod: GT | Performed by: INTERNAL MEDICINE

## 2021-04-12 NOTE — PROGRESS NOTES
"Sarah is a 55 year old who is being evaluated via a billable video visit.      How would you like to obtain your AVS? Mail a copy  If the video visit is dropped, the invitation should be resent by: Send to e-mail at: [email or web address]  Will anyone else be joining your video visit? No    Vitals - Patient Reported  Weight (Patient Reported): 94.8 kg (209 lb)  Height (Patient Reported): 170.2 cm (5' 7\")  BMI (Based on Pt Reported Ht/Wt): 32.73  Pain Score: No Pain (0)  Pain Loc: Chest      History:    Virtual visit.  Follow up visit. Last seen in 2018    Sarah Rajan is a 55 year old female with a history of locally advanced adenocarcinoma of the rectum for which she received  neoadjuvant 5-FU and oxaliplatin (FOLFOX) in Feb 2018 followed by radiation. She has been cancer free since.      She saw me in April 2018 after screening labs revealed high LDL (198) and normal triglycerides. Her LFTs were abnormal and after they normalized she was prescribed Lipitor 20 mg/d. LDL reduced to 85 mg/dL in 2019. She has strong family history of hyperlipidemia and age onset heart disease in her grandparents. There is premature in her maternal uncle and aunt at age 50 yr.      Patient has noticed recently a few episodes of very localized retrosternal chest pain at rest with no other exertional symptoms. She admits to increased stress but cannot recall any other inciting factors. The pain resolves on its own. She denies orthopnea, paroxysmal nocturnal dyspnea, palpitations or syncope. No recent hospitalization for HF/MI/stroke    Current Outpatient Medications   Medication Sig Dispense Refill     Acetaminophen (TYLENOL PO) Take 1,000 mg by mouth At Bedtime       amitriptyline 10 MG PO tablet TAKE 1 TO 2 TABLETS BY MOUTH DAILY AT BEDTIME       atorvastatin 20 MG PO tablet Take 20 mg by mouth       calcium carbonate (TUMS) 500 MG chewable tablet Take 1-2 chew tab by mouth daily  150 tablet      FLUoxetine 20 MG PO capsule        " lidocaine-prilocaine (EMLA) cream Apply 45 minutes prior to procedure. 30 g 3     RANITIDINE HCL PO Take 150 mg by mouth daily as needed for heartburn         Allergies - reviewed     Allergies   Allergen Reactions     Inapsine [Droperidol] Other (See Comments)     Elevated blood pressure and increased heart rate     Tegaderm Transparent Dressing (Informational Only) Itching     Pt had reaction to Tegaderm used for port dressing. Whole area was very red and itchy. Please use IV 3000 instead.        Past history -reviewed  Active Ambulatory Problems     Diagnosis Date Noted     Rectal cancer (H) 2018     Cervical cancer screening 10/23/2018     Depressive disorder 2007     Familial hypercholesterolemia 10/16/2018     Pure hyperglyceridemia 01/10/2018     RLS (restless legs syndrome) 2007     Symptomatic menopausal or female climacteric states 2020     Resolved Ambulatory Problems     Diagnosis Date Noted     No Resolved Ambulatory Problems     Past Medical History:   Diagnosis Date     Depression      GERD (gastroesophageal reflux disease)      History of smoking      Insomnia      Obesity      Restless leg syndrome      Seasonal affective disorder (H)         Social history - reviewed  Social History     Socioeconomic History     Marital status:      Spouse name: Cody Rajan     Number of children: 2     Years of education: Not on file     Highest education level: Not on file   Occupational History     Occupation: microbiologist     Employer: [employer]   Social Needs     Financial resource strain: Not on file     Food insecurity     Worry: Not on file     Inability: Not on file     Transportation needs     Medical: Not on file     Non-medical: Not on file   Tobacco Use     Smoking status: Former Smoker     Packs/day: 0.10     Years: 8.00     Pack years: 0.80     Types: Cigarettes     Quit date: 1986     Years since quittin.1     Smokeless tobacco: Never Used   Substance and  Sexual Activity     Alcohol use: Yes     Alcohol/week: 7.0 standard drinks     Types: 7 Glasses of wine per week     Drug use: No     Sexual activity: Not on file   Lifestyle     Physical activity     Days per week: Not on file     Minutes per session: Not on file     Stress: Not on file   Relationships     Social connections     Talks on phone: Not on file     Gets together: Not on file     Attends Islam service: Not on file     Active member of club or organization: Not on file     Attends meetings of clubs or organizations: Not on file     Relationship status: Not on file     Intimate partner violence     Fear of current or ex partner: Not on file     Emotionally abused: Not on file     Physically abused: Not on file     Forced sexual activity: Not on file   Other Topics Concern     Not on file   Social History Narrative     Not on file       Family history -reviewed  -MGF and PGF both  of heart attacks (MGF 63) PGF 84  - mother ischemic colitis  - mother, father, brother high cholesterol  - maternal aunt  suddenly at 72y.   - maternal uncle had CAD in his 50s, maternal aunts with CAD in 50-60s yr of age    Family History   Problem Relation Age of Onset     Hyperlipidemia Mother      Hypertension Mother      Gastrointestinal Disease Mother         ischemic colitis     Coronary Artery Disease Mother         stents x 3     Anesthesia Reaction Mother         hypotension, PONV     Hyperlipidemia Father      Hypertension Father      Breast Cancer Maternal Grandmother      Coronary Artery Disease Maternal Grandfather      Myocardial Infarction Maternal Grandfather      Colon Cancer Paternal Grandmother      Breast Cancer Maternal Aunt      Breast Cancer Paternal Aunt      Coronary Artery Disease Paternal Grandfather      Cerebrovascular Disease Paternal Grandfather      Family History Negative Brother      Coronary Artery Disease Maternal Uncle        ROS: non contributory on the 10-point review of  system    Exam:   In general, the patient is in no apparent distress.    There were no vitals taken for this visit.  Breathing is unlabored.   HEENT: NC/AT.  PERRLA.  EOMI.  Sclerae white, not injected.    Neck: No jugular venous distension.    Extremities: No edema.   Neurologic: Alert and oriented to person/place/time, normal speech and affect  Skin: No rash.    Data:    All relevant labs were personally reviewed and discussed with the patient.   Chemistry panel:   Recent Labs   Lab Test 03/16/21  1206 01/08/20  1751 06/21/18  1215 06/21/18  1215    142   < > 141   POTASSIUM 3.4 3.7   < > 3.9   CHLORIDE 108 112*   < > 108   CO2 27 26   < > 26   ANIONGAP 5 5   < > 7   * 70   < > 81   BUN 14 16   < > 12   CR 0.70 0.79   < > 0.70   GEETA 8.8 8.8   < > 9.1   MAG  --   --   --  2.4*   GFRESTIMATED >90 86   < > 88   AST 11 15   < > 64*   ALT 28 34   < > 89*    < > = values in this interval not displayed.       CBC:   Recent Labs   Lab Test 03/16/21  1206 01/08/20  1751   WBC 4.3 5.4   RBC 4.29 4.14   HGB 13.0 12.6   HCT 37.8 36.3   MCV 88 88   MCH 30.3 30.4   MCHC 34.4 34.7   RDW 12.6 12.7    277       Lipid Panel:  Recent Labs   Lab Test 05/14/19  0853 02/05/19  0937   CHOL 160 236*   HDL 60 51   LDL 85 164*   TRIG 72 103       Thyroid:   No results found for: TSH  No results found for: T4  No results found for: A1C  INR   Date Value Ref Range Status   02/19/2018 0.93 0.86 - 1.14 Final       Patient's all available and relevant cardiac investigations were personally reviewed and discussed with the patient today.    Echo: 4/19/18  Global and regional left ventricular function is normal with an EF of 60-65%.  Global right ventricular function is normal.  Pulmonary artery systolic pressure is normal.  The inferior vena cava is normal.  No pericardial effusion is present.  Previous study not available for comparison.      Assessment and Plan:  55 year old female with    1. Locally advanced rectal cancer  for which she is s/p neoadjuvant chemotherapy with Oxaliplatin, Leucovorin, Fluorouracil and radiation therapy and now in remission.   2. Familial hyperlipidemia,   3. Family history of premature coronary artery disease on her maternal side with her maternal uncle and aunt with heart disease in their 50s, mother had CAD in her 60s.    4. Chest pain, atypical     Sarah has no known heart disease. The chest discomfort episodes are atypical for angina in being very localized relieved with her pressing on it and she has no associated exertional symptoms.  She does have hyperlipidemia and notes significant stress in her life. Patient appears euvolemic on exam today. Her blood pressure and hear rate on 3/16/21 were in normal range. Given that I do not have the benefit of an exam or recent cardiac testing, I will proceed with ordering EKG and an echocardiogram to further evaluate. She agrees to seek immediate medical attention      Recommendations:     1. Continue current cardiac medications. Confirm the Lipitor dose  2. Healthy lifestyle encouraged with regular exercise (moderate intensity,150 minutes/week) and diet low in carbohydrates and saturated fat  3. Echo to assess cardiac function  4. Lipid panel, AST, ALT  5. ECG  6. Follow up 4 week (virtual)      Video Visit Details:    Type of service:  Video Visit    Video Start Time: 7.40 am  Video End Time: 8.00 am    Originating Location (pt. Location): Home    Distant Location (provider location):  [facility]     Platform used for Video Visit: [employer]      In addition to visit time documented above, I spent an additional 20 minutes on data review and documentation.      Komal Clark MD, MS  Professor of Medicine  Cardiovascular division

## 2021-04-12 NOTE — LETTER
"4/12/2021      RE: Sarah Rajan  [address] [facility] [address]       Dear Colleague,    Thank you for the opportunity to participate in the care of your patient, Sarah Rajan, at the [facility] [place] at [facility]. Please see a copy of my visit note below.    Sarah is a 55 year old who is being evaluated via a billable video visit.      How would you like to obtain your AVS? Mail a copy  If the video visit is dropped, the invitation should be resent by: Send to e-mail at: [email or web address]  Will anyone else be joining your video visit? No    Vitals - Patient Reported  Weight (Patient Reported): 94.8 kg (209 lb)  Height (Patient Reported): 170.2 cm (5' 7\")  BMI (Based on Pt Reported Ht/Wt): 32.73  Pain Score: No Pain (0)  Pain Loc: Chest      History:    Virtual visit.  Follow up visit. Last seen in 2018    Sarah Rajan is a 55 year old female with a history of locally advanced adenocarcinoma of the rectum for which she received  neoadjuvant 5-FU and oxaliplatin (FOLFOX) in Feb 2018 followed by radiation. She has been cancer free since.      She saw me in April 2018 after screening labs revealed high LDL (198) and normal triglycerides. Her LFTs were abnormal and after they normalized she was prescribed Lipitor 20 mg/d. LDL reduced to 85 mg/dL in 2019. She has strong family history of hyperlipidemia and age onset heart disease in her grandparents. There is premature in her maternal uncle and aunt at age 50 yr.      Patient has noticed recently a few episodes of very localized retrosternal chest pain at rest with no other exertional symptoms. She admits to increased stress but cannot recall any other inciting factors. The pain resolves on its own. She denies orthopnea, paroxysmal nocturnal dyspnea, palpitations or syncope. No recent hospitalization for HF/MI/stroke    Current Outpatient Medications   Medication Sig Dispense " Refill     Acetaminophen (TYLENOL PO) Take 1,000 mg by mouth At Bedtime       amitriptyline 10 MG PO tablet TAKE 1 TO 2 TABLETS BY MOUTH DAILY AT BEDTIME       atorvastatin 20 MG PO tablet Take 20 mg by mouth       calcium carbonate (TUMS) 500 MG chewable tablet Take 1-2 chew tab by mouth daily  150 tablet      FLUoxetine 20 MG PO capsule        lidocaine-prilocaine (EMLA) cream Apply 45 minutes prior to procedure. 30 g 3     RANITIDINE HCL PO Take 150 mg by mouth daily as needed for heartburn         Allergies - reviewed     Allergies   Allergen Reactions     Inapsine [Droperidol] Other (See Comments)     Elevated blood pressure and increased heart rate     Tegaderm Transparent Dressing (Informational Only) Itching     Pt had reaction to Tegaderm used for port dressing. Whole area was very red and itchy. Please use IV 3000 instead.        Past history -reviewed  Active Ambulatory Problems     Diagnosis Date Noted     Rectal cancer (H) 01/23/2018     Cervical cancer screening 10/23/2018     Depressive disorder 01/30/2007     Familial hypercholesterolemia 10/16/2018     Pure hyperglyceridemia 01/10/2018     RLS (restless legs syndrome) 06/04/2007     Symptomatic menopausal or female climacteric states 02/11/2020     Resolved Ambulatory Problems     Diagnosis Date Noted     No Resolved Ambulatory Problems     Past Medical History:   Diagnosis Date     Depression      GERD (gastroesophageal reflux disease)      History of smoking      Insomnia      Obesity      Restless leg syndrome      Seasonal affective disorder (H)         Social history - reviewed  Social History     Socioeconomic History     Marital status:      Spouse name: Cody Rajan     Number of children: 2     Years of education: Not on file     Highest education level: Not on file   Occupational History     Occupation: microbiologist     Employer: [employer]   Social Needs     Financial resource strain: Not on file     Food insecurity     Worry: Not  on file     Inability: Not on file     Transportation needs     Medical: Not on file     Non-medical: Not on file   Tobacco Use     Smoking status: Former Smoker     Packs/day: 0.10     Years: 8.00     Pack years: 0.80     Types: Cigarettes     Quit date: 1986     Years since quittin.1     Smokeless tobacco: Never Used   Substance and Sexual Activity     Alcohol use: Yes     Alcohol/week: 7.0 standard drinks     Types: 7 Glasses of wine per week     Drug use: No     Sexual activity: Not on file   Lifestyle     Physical activity     Days per week: Not on file     Minutes per session: Not on file     Stress: Not on file   Relationships     Social connections     Talks on phone: Not on file     Gets together: Not on file     Attends Gnosticist service: Not on file     Active member of club or organization: Not on file     Attends meetings of clubs or organizations: Not on file     Relationship status: Not on file     Intimate partner violence     Fear of current or ex partner: Not on file     Emotionally abused: Not on file     Physically abused: Not on file     Forced sexual activity: Not on file   Other Topics Concern     Not on file   Social History Narrative     Not on file       Family history -reviewed  -MGF and PGF both  of heart attacks (MGF 63) PGF 84  - mother ischemic colitis  - mother, father, brother high cholesterol  - maternal aunt  suddenly at 72y.   - maternal uncle had CAD in his 50s, maternal aunts with CAD in 50-60s yr of age    Family History   Problem Relation Age of Onset     Hyperlipidemia Mother      Hypertension Mother      Gastrointestinal Disease Mother         ischemic colitis     Coronary Artery Disease Mother         stents x 3     Anesthesia Reaction Mother         hypotension, PONV     Hyperlipidemia Father      Hypertension Father      Breast Cancer Maternal Grandmother      Coronary Artery Disease Maternal Grandfather      Myocardial Infarction Maternal Grandfather       Colon Cancer Paternal Grandmother      Breast Cancer Maternal Aunt      Breast Cancer Paternal Aunt      Coronary Artery Disease Paternal Grandfather      Cerebrovascular Disease Paternal Grandfather      Family History Negative Brother      Coronary Artery Disease Maternal Uncle        ROS: non contributory on the 10-point review of system    Exam:   In general, the patient is in no apparent distress.    There were no vitals taken for this visit.  Breathing is unlabored.   HEENT: NC/AT.  PERRLA.  EOMI.  Sclerae white, not injected.    Neck: No jugular venous distension.    Extremities: No edema.   Neurologic: Alert and oriented to person/place/time, normal speech and affect  Skin: No rash.    Data:    All relevant labs were personally reviewed and discussed with the patient.   Chemistry panel:   Recent Labs   Lab Test 03/16/21  1206 01/08/20  1751 06/21/18  1215 06/21/18  1215    142   < > 141   POTASSIUM 3.4 3.7   < > 3.9   CHLORIDE 108 112*   < > 108   CO2 27 26   < > 26   ANIONGAP 5 5   < > 7   * 70   < > 81   BUN 14 16   < > 12   CR 0.70 0.79   < > 0.70   GEETA 8.8 8.8   < > 9.1   MAG  --   --   --  2.4*   GFRESTIMATED >90 86   < > 88   AST 11 15   < > 64*   ALT 28 34   < > 89*    < > = values in this interval not displayed.       CBC:   Recent Labs   Lab Test 03/16/21  1206 01/08/20  1751   WBC 4.3 5.4   RBC 4.29 4.14   HGB 13.0 12.6   HCT 37.8 36.3   MCV 88 88   MCH 30.3 30.4   MCHC 34.4 34.7   RDW 12.6 12.7    277       Lipid Panel:  Recent Labs   Lab Test 05/14/19  0853 02/05/19  0937   CHOL 160 236*   HDL 60 51   LDL 85 164*   TRIG 72 103       Thyroid:   No results found for: TSH  No results found for: T4  No results found for: A1C  INR   Date Value Ref Range Status   02/19/2018 0.93 0.86 - 1.14 Final       Patient's all available and relevant cardiac investigations were personally reviewed and discussed with the patient today.    Echo: 4/19/18  Global and regional left ventricular  function is normal with an EF of 60-65%.  Global right ventricular function is normal.  Pulmonary artery systolic pressure is normal.  The inferior vena cava is normal.  No pericardial effusion is present.  Previous study not available for comparison.      Assessment and Plan:  55 year old female with    1. Locally advanced rectal cancer for which she is s/p neoadjuvant chemotherapy with Oxaliplatin, Leucovorin, Fluorouracil and radiation therapy and now in remission.   2. Familial hyperlipidemia,   3. Family history of premature coronary artery disease on her maternal side with her maternal uncle and aunt with heart disease in their 50s, mother had CAD in her 60s.    4. Chest pain, atypical     Sarah has no known heart disease. The chest discomfort episodes are atypical for angina in being very localized relieved with her pressing on it and she has no associated exertional symptoms.  She does have hyperlipidemia and notes significant stress in her life. Patient appears euvolemic on exam today. Her blood pressure and hear rate on 3/16/21 were in normal range. Given that I do not have the benefit of an exam or recent cardiac testing, I will proceed with ordering EKG and an echocardiogram to further evaluate. She agrees to seek immediate medical attention      Recommendations:     1. Continue current cardiac medications. Confirm the Lipitor dose  2. Healthy lifestyle encouraged with regular exercise (moderate intensity,150 minutes/week) and diet low in carbohydrates and saturated fat  3. Echo to assess cardiac function  4. Lipid panel, AST, ALT  5. ECG  6. Follow up 4 week (virtual)      Video Visit Details:    Type of service:  Video Visit    Video Start Time: 7.40 am  Video End Time: 8.00 am    Originating Location (pt. Location): Home    Distant Location (provider location):  [facility]     Platform used for Video Visit: Anup      In addition to visit time documented above, I spent an  additional 20 minutes on data review and documentation.      Komal Clark MD, MS  Professor of Medicine  Cardiovascular division

## 2021-04-12 NOTE — PATIENT INSTRUCTIONS
Patient Instructions:  It was a pleasure to see you in the cardiology clinic today.      If you have any questions, call  Chapis Chavis RN, at [phone].  Press Option #1 for the [facility], and then press Option #4  We are encouraging the use of MESIt to communicate with your HealthCare Provider    Please follow up with Dr. Clark in 4 weeks- virtual.     Please have echo, lab work and EKG done in 2 weeks.       If you have an urgent need after hours (8:00 am to 4:30 pm) please call [phone] and ask for the cardiology fellow on call.

## 2021-04-17 ENCOUNTER — HEALTH MAINTENANCE LETTER (OUTPATIENT)
Age: 56
End: 2021-04-17

## 2021-04-21 NOTE — NURSING NOTE
"Oncology Rooming Note    January 9, 2020 2:02 PM   Sarah Rajan is a 54 year old female who presents for:    Chief Complaint   Patient presents with     Oncology Clinic Visit     RETURN VISIT; RECTAL CANCER; VITALS TAKEN      Initial Vitals: /87   Pulse 82   Temp 98.3  F (36.8  C) (Oral)   Resp 16   Ht 1.651 m (5' 5\")   Wt 94.9 kg (209 lb 4.8 oz)   SpO2 98%   BMI 34.83 kg/m   Estimated body mass index is 34.83 kg/m  as calculated from the following:    Height as of this encounter: 1.651 m (5' 5\").    Weight as of this encounter: 94.9 kg (209 lb 4.8 oz). Body surface area is 2.09 meters squared.  No Pain (0) Comment: Data Unavailable   No LMP recorded. Patient is postmenopausal.  Allergies reviewed: Yes  Medications reviewed: Yes    Medications: Medication refills not needed today.  Pharmacy name entered into [facility]:    [facility][id number] - [address] [place] AT [facility] [place], [place] - [address] [facility] [phone]  [id number] - [place], [place] - [address] [address] [address] [facility] - [place], [place] - [address] [facility]    Clinical concerns: No new concerns today  Dr Mortensen  was notified.      Daniella Choe              "
VNSNY IMT

## 2021-04-29 ENCOUNTER — ANCILLARY PROCEDURE (OUTPATIENT)
Dept: CARDIOLOGY | Facility: CLINIC | Age: 56
End: 2021-04-29
Attending: INTERNAL MEDICINE
Payer: COMMERCIAL

## 2021-04-29 DIAGNOSIS — E78.01 FAMILIAL HYPERCHOLESTEROLEMIA: ICD-10-CM

## 2021-04-29 LAB
ALT SERPL W P-5'-P-CCNC: 30 U/L (ref 0–50)
AST SERPL W P-5'-P-CCNC: 12 U/L (ref 0–45)
CHOLEST SERPL-MCNC: 158 MG/DL
HDLC SERPL-MCNC: 60 MG/DL
INTERPRETATION ECG - MUSE: NORMAL
LDLC SERPL CALC-MCNC: 76 MG/DL
NONHDLC SERPL-MCNC: 98 MG/DL
TRIGL SERPL-MCNC: 107 MG/DL

## 2021-04-29 PROCEDURE — 84450 TRANSFERASE (AST) (SGOT): CPT | Performed by: INTERNAL MEDICINE

## 2021-04-29 PROCEDURE — 250N000011 HC RX IP 250 OP 636: Performed by: INTERNAL MEDICINE

## 2021-04-29 PROCEDURE — 84460 ALANINE AMINO (ALT) (SGPT): CPT | Performed by: INTERNAL MEDICINE

## 2021-04-29 PROCEDURE — 93306 TTE W/DOPPLER COMPLETE: CPT | Mod: GC | Performed by: STUDENT IN AN ORGANIZED HEALTH CARE EDUCATION/TRAINING PROGRAM

## 2021-04-29 PROCEDURE — 80061 LIPID PANEL: CPT | Performed by: INTERNAL MEDICINE

## 2021-04-29 RX ORDER — HEPARIN SODIUM (PORCINE) LOCK FLUSH IV SOLN 100 UNIT/ML 100 UNIT/ML
5 SOLUTION INTRAVENOUS ONCE
Status: COMPLETED | OUTPATIENT
Start: 2021-04-29 | End: 2021-04-29

## 2021-04-29 RX ADMIN — Medication 5 ML: at 08:07

## 2021-04-29 NOTE — NURSING NOTE
Chief Complaint   Patient presents with     Port Draw     Labs drawn via port by RN in lab.      Port accessed with 20 gauge 3/4 inch gripper needle and labs drawn by rn.  Port flushed with NS and heparin locked.  Pt tolerated well.      Zulema Krishna RN

## 2021-04-30 ENCOUNTER — TELEPHONE (OUTPATIENT)
Dept: CARDIOLOGY | Facility: CLINIC | Age: 56
End: 2021-04-30

## 2021-04-30 NOTE — TELEPHONE ENCOUNTER
Pt needs to schedule an appointment with Dr. Clark for a virtual visit.    F/u request has been cancelled please reinstate and link to appointment when scheduling.

## 2021-05-27 ENCOUNTER — RECORDS - HEALTHEAST (OUTPATIENT)
Dept: ADMINISTRATIVE | Facility: CLINIC | Age: 56
End: 2021-05-27

## 2021-06-08 NOTE — TELEPHONE ENCOUNTER
"Symptom  Describe your symptoms: temp 103.1  Any pain: yes  New/Ongoing: New  How long have you been having symptoms: yesterday   Have you been seen for this:  She was in the ED this morning.  Triage offered?: Yes, requesting call back from Nurse Advisor  Home remedies tried: yes    Okay to leave a detailed message? Yes       1. Acute UTI    2. Fever in adult    3. Body aches    4. Pyelonephritis, acute        Answer Assessment - Initial Assessment Questions  1. ANTIBIOTIC: \"What antibiotic are you taking?\" \"How many times per day?\"      Cephalexin  2. DURATION: \"When was the antibiotic started?\"      today  3. MAIN SYMPTOM: \"What is the main symptom you are concerned about?\"      Fever of 103  4. FEVER: \"Do you have a fever?\" If so, ask: \"What is it, how was it measured, and when did it start?\"      Fever of 103  5. OTHER SYMPTOMS: \"Do you have any other symptoms?\" (e.g., flank pain, vaginal discharge, blood in urine)      States she feels worse than when she was in ER this morning.    Protocols used: URINARY TRACT INFECTION ON ANTIBIOTIC FOLLOW-UP CALL - FEMALE-LAW-RIKKI  Pt was in ER this morning for UTI Pyelonephritis and she states this evening that she is feeling worse and her temp is now 103.1.  Triaged to return to ED as her symptoms are worsening after a full day on the antibiotics.        "

## 2021-06-10 PROCEDURE — 250N000011 HC RX IP 250 OP 636: Performed by: INTERNAL MEDICINE

## 2021-06-10 PROCEDURE — 96523 IRRIG DRUG DELIVERY DEVICE: CPT

## 2021-06-10 RX ORDER — HEPARIN SODIUM (PORCINE) LOCK FLUSH IV SOLN 100 UNIT/ML 100 UNIT/ML
5 SOLUTION INTRAVENOUS EVERY 8 HOURS PRN
Status: DISCONTINUED | OUTPATIENT
Start: 2021-06-10 | End: 2021-06-18 | Stop reason: HOSPADM

## 2021-06-10 RX ADMIN — Medication 5 ML: at 11:15

## 2021-06-10 NOTE — NURSING NOTE
Chief Complaint   Patient presents with     Port Flush     Labs drawn via PORT by RN in lab. Lab only.      Pily Hanson RN

## 2021-06-15 NOTE — PROGRESS NOTES
Colon and Rectal Surgery Clinic Note      RE: Sarah Satinder  : 1965  JIMMY: 2021    Sarah presents today for follow up of her rectal cancer on Watch and Wait protocol.      HPI:     She was initially seen in 2018 with a moderately differentiated, invasive adenocarcinoma with intact mismatch repair 4-5 cm from the anal verge. She was staged at that time and an MRI that was initially read as a T1-T2 lesion, then possibly a T2N1 lesion. CT A/P was significant for small liver lesions too small to characterize but not concerning for malignancy and CEA was 1.1. She was discussed at tumor board the pelvic MRI was felt to be poor quality so she underwent a repeat MRI pelvis on  and her tumor was staged as T4N0 due to abutment of the levators. Abdominal MRI to evaluate the liver lesions was performed on  which was again found to have multiple sub-centimeter lesions that were difficult to characterize.     She underwent 8 cycles of FOLFOX completed on 18 and chemoradiation with XELODA on 8/15/18. 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     MRI of left hip showed a non specific T1 hypointense lesion in the proximal left femur and repeat MRI 12/10/18:               There is a T1 hypointense and subtly enhancing lesion in the              proximal left femur, stable since at least 2018.  Given this              patient's history of rectal cancer, metastatic disease cannot be excluded.    Colonoscopy last on 19 without evidence of recurrence    3T MRI Pelvis 3/8/2021:  IMPRESSION:   1. There has been a complete response to treatment, with a  corresponding tumor regression grade of mrTRG-1.  2. No suspicious pelvic lymph nodes.    CT Chest 8/3/2020:  1. No evidence of metastatic disease in the chest. No suspicious  pulmonary nodules.  2. Sequela of prior granulomatous disease.     CT AP 20 without evidence of metastatic  "disease    I last saw Sarah on 3/16/21 with no evidence of recurrence on flexible sigmoidoscopy.    She last saw Dr. Mortensen with oncology on 3/11/2021    Interval History: Sarah is doing well. She denies any anorectal complaints.     Physical examination:  Examination was chaperoned by Amara Jackson NP      Vitals: BP (!) 124/92 (BP Location: Left arm, Patient Position: Sitting, Cuff Size: Adult Large)   Pulse 88   Temp 98.6  F (37  C) (Oral)   Ht 5' 5\"   Wt 209 lb   SpO2 98%   BMI 34.78 kg/m    BMI= Body mass index is 34.78 kg/m .    Sarah looks well and healthy. Digital rectal examination reveals no masses.  Flexible sigmoidoscopy to 20 cm. Faint scar approximately 3 cm from the anal margin left lateral visualized on retroflexion and is very hard to see definitively on straight view. No evidence of recurrent tumor.    Laboratory data:     10/9/2019 13:54 1/8/2020 17:51 5/5/2020 10:40 8/19/2020 11:11 3/16/2021 12:06   CEA <0.5 <0.5 <0.5 <0.5 <0.5       Assessment/plan:  Sarah is doing very well now.  There is no evidence of tumor recurrence on examination. Follow up per protocol below. Patient's questions were answered to her stated satisfaction and she is in agreement with this plan.    Follow up per Watch and Wait Protocol:   Completed CRT: 8/15/2018    Flexible sigmoidoscopy   ? Every 2 months for 6 months: Until 2/2019-COMPLETED   ? Every 3 months for 3 years: Until 8/2021-COMPLETED  ? Every 6 months for 5 years: Until 2023-Due 12/2021  ? Every year for 10 years: Until 2028       3T MRI of pelvis  ? 6-8 weeks after CRT: COMPLETED  ? Every 4 months for 2 years: Until 8/2021-DUE 7/2021  ? Every 6 months for 2 years: Until 8/2023  ? Yearly for 2 years: Until 2025       CT CAP  ? Every year for 6-8 years: Until 2026-Due 8/2021       Colonoscopy   ? Last 5/13/19-Repeat 5/2022       CEA at every clinic or endoscopic visit    For details of past medical history, surgical history, family " history, medications, allergies, and review of systems, please see details below.    Medical history:  Past Medical History:   Diagnosis Date     Depression      GERD (gastroesophageal reflux disease)      History of smoking      Insomnia      Obesity      Rectal cancer (H)      Restless leg syndrome      Seasonal affective disorder (H)        Surgical history:  Past Surgical History:   Procedure Laterality Date      SECTION      x2     COLONOSCOPY       INSERT PORT VASCULAR ACCESS Right 2018    Procedure: INSERT PORT VASCULAR ACCESS;  central venous Chest Port Placement;  Surgeon: Gaurav Yates PA-C;  Location:  OR     [facility]         Family history:  Family History   Problem Relation Age of Onset     Hyperlipidemia Mother      Hypertension Mother      Gastrointestinal Disease Mother         ischemic colitis     Coronary Artery Disease Mother         stents x 3     Anesthesia Reaction Mother         hypotension, PONV     Hyperlipidemia Father      Hypertension Father      Breast Cancer Maternal Grandmother      Coronary Artery Disease Maternal Grandfather      Myocardial Infarction Maternal Grandfather      Colon Cancer Paternal Grandmother      Breast Cancer Maternal Aunt      Breast Cancer Paternal Aunt      Coronary Artery Disease Paternal Grandfather      Cerebrovascular Disease Paternal Grandfather      Family History Negative Brother      Coronary Artery Disease Maternal Uncle        Medications:  Current Outpatient Medications   Medication Sig Dispense Refill     Acetaminophen (TYLENOL PO) Take 1,000 mg by mouth At Bedtime       amitriptyline 10 MG PO tablet TAKE 1 TO 2 TABLETS BY MOUTH DAILY AT BEDTIME       atorvastatin 20 MG PO tablet Take 40 mg by mouth       calcium carbonate (TUMS) 500 MG chewable tablet Take 1-2 chew tab by mouth daily  150 tablet      FLUoxetine 20 MG PO capsule        lidocaine-prilocaine (EMLA) cream Apply 45 minutes prior to procedure. 30 g 3     RANITIDINE HCL  "PO Take 150 mg by mouth daily as needed for heartburn         Allergies:  The patientis allergic to inapsine [droperidol] and tegaderm transparent dressing (informational only).    Social history:  Social History     Tobacco Use     Smoking status: Former Smoker     Packs/day: 0.10     Years: 8.00     Pack years: 0.80     Types: Cigarettes     Quit date: 1986     Years since quittin.3     Smokeless tobacco: Never Used   Substance Use Topics     Alcohol use: Yes     Alcohol/week: 7.0 standard drinks     Types: 7 Glasses of wine per week     Marital status: .    Review of Systems:  Nursing Notes:   Guera Olivo CMA  2021 10:41 AM  Signed  Chief Complaint   Patient presents with     Flexible Sigmoidoscopy     Flex sig for W & W protocol       Vitals:    21 1035   BP: (!) 124/92   BP Location: Left arm   Patient Position: Sitting   Cuff Size: Adult Large   Pulse: 88   Temp: 98.6  F (37  C)   TempSrc: Oral   SpO2: 98%   Weight: 209 lb   Height: 5' 5\"       Body mass index is 34.78 kg/m .        Guera Moeller CMA         10 minutes spent on the date of the encounter doing chart review, history and exam, documentation and further activities as noted above with an additional 10 minutes for flexible sigmoidoscopy.        Óscar Hampton MD   Professor and Chief  Division of Colon and Rectal Surgery  [facility]      Referring Provider:  Referred Self, MD  No address on file     Primary Care Provider:  Kyra Mortensen    This note was created using speech recognition software and may contain unintended word substitutions.  "

## 2021-06-20 NOTE — LETTER
Letter by Mayda Cruz MD at      Author: Mayda Cruz MD Service: -- Author Type: --    Filed:  Encounter Date: 2/26/2020 Status: (Other)         February 26, 2020     Patient: Sarah Rajan   YOB: 1965   Date of Visit: 2/26/2020       To Whom It May Concern:    It is my medical opinion that Sarah Rajan ws hospitalized at [facility] on February 22-26 and  may return to work on March 2, 2020.    If you have any questions or concerns, please don't hesitate to call.    Sincerely,      Electronically signed by Mayda Cruz MD   Hospital Medicine Service   [phone]   Pager [phone]   [email or web address]

## 2021-06-20 NOTE — LETTER
Letter by Ca Donald RN at      Author: Ca Donald RN Service: -- Author Type: --    Filed:  Encounter Date: 5/17/2020 Status: (Other)       5/17/2020        Sarah Rajan  [address]  [place] [address]    This letter provides a written record that you were tested for COVID-19 on 5/17/20.     Your result was negative.    This means that we didnt find the virus that causes COVID-19 in your sample. A test may show negative when you do actually have the virus. This can happen when the virus is in the early stages of infection, before you feel illness symptoms.    Even if you dont have symptoms, they may still appear. For safety, its very important to follow these rules.    Keep yourself away from others (self-isolation):      Stay home. Dont go to work, school or anywhere else.     Stay in your own room (and use your own bathroom), if you can.    Stay away from others in your home. No hugging, kissing or shaking hands. No visitors.    Clean high touch surfaces often (doorknobs, counters, handles, etc.). Use a household cleaning spray or wipes.    Cover your mouth and nose with a mask, tissue or washcloth to avoid spreading germs.    Wash your hands and face often with soap and water.    Stay in self-isolation until you meet ALL of the guidelines below:    1. You have had no fever for at least 72 hours (that is 3 full days of no fever without the use of medicine that reduces fevers), AND  2. other symptoms (such as cough, shortness of breath) have gotten better, AND  3. at least 10 days have passed since your symptoms first appeared.    Going back to work  Check with your employer for any guidelines to follow for going back to work.    Employers: This document serves as formal notice that your employee tested negative for COVID-19, as of the testing date shown above.    For questions regarding this letter or your Negative COVID-19 result, call [phone] between 8A to 6:30P (M-F) and 10A to 6:30P  (weekends).

## 2021-06-20 NOTE — LETTER
Letter by Clarice Carrillo LPN at      Author: Clarice Carrillo LPN Service: -- Author Type: --    Filed:  Encounter Date: 3/12/2020 Status: (Other)         3/12/2020    Sarah Rajan  [address] [address]      Dear Sarah Rajan,    We recently received a referral from  for you to be seen at [facility]. We have attempted to contact you a number of times to schedule this appointment but have not heard back yet.    This is our final attempt to contact you to schedule your appointment. Please call us back at [phone] to schedule. If you have decided you do not want to be seen for this consultation, please let us know so we can notify your physician.    We look forward to helping you with your care needs.         Sincerely,    Clarice FRANKEL LPN

## 2021-06-22 ENCOUNTER — OFFICE VISIT (OUTPATIENT)
Dept: SURGERY | Facility: CLINIC | Age: 56
End: 2021-06-22
Payer: COMMERCIAL

## 2021-06-22 VITALS
BODY MASS INDEX: 34.82 KG/M2 | SYSTOLIC BLOOD PRESSURE: 124 MMHG | DIASTOLIC BLOOD PRESSURE: 92 MMHG | HEIGHT: 65 IN | TEMPERATURE: 98.6 F | WEIGHT: 209 LBS | HEART RATE: 88 BPM | OXYGEN SATURATION: 98 %

## 2021-06-22 DIAGNOSIS — C20 RECTAL CANCER (H): Primary | ICD-10-CM

## 2021-06-22 PROCEDURE — 45330 DIAGNOSTIC SIGMOIDOSCOPY: CPT | Performed by: COLON & RECTAL SURGERY

## 2021-06-22 ASSESSMENT — PAIN SCALES - GENERAL: PAINLEVEL: NO PAIN (0)

## 2021-06-22 ASSESSMENT — MIFFLIN-ST. JEOR: SCORE: 1548.9

## 2021-06-22 NOTE — NURSING NOTE
"Chief Complaint   Patient presents with     Flexible Sigmoidoscopy     Flex sig for W & W protocol       Vitals:    06/22/21 1035   BP: (!) 124/92   BP Location: Left arm   Patient Position: Sitting   Cuff Size: Adult Large   Pulse: 88   Temp: 98.6  F (37  C)   TempSrc: Oral   SpO2: 98%   Weight: 209 lb   Height: 5' 5\"       Body mass index is 34.78 kg/m .        Guera Moeller CMA    "

## 2021-06-22 NOTE — LETTER
2021       RE: Sarah Rajan  [address] [facility] [address]     Dear Colleague,    Thank you for referring your patient, Sarah Rajan, to the [facility] [place] at [facility]. Please see a copy of my visit note below.    Colon and Rectal Surgery Clinic Note      RE: Sarah Rajan  : 1965  JIMMY: 2021    Sarah presents today for follow up of her rectal cancer on Watch and Wait protocol.      HPI:     She was initially seen in 2018 with a moderately differentiated, invasive adenocarcinoma with intact mismatch repair 4-5 cm from the anal verge. She was staged at that time and an MRI that was initially read as a T1-T2 lesion, then possibly a T2N1 lesion. CT A/P was significant for small liver lesions too small to characterize but not concerning for malignancy and CEA was 1.1. She was discussed at tumor board the pelvic MRI was felt to be poor quality so she underwent a repeat MRI pelvis on  and her tumor was staged as T4N0 due to abutment of the levators. Abdominal MRI to evaluate the liver lesions was performed on  which was again found to have multiple sub-centimeter lesions that were difficult to characterize.     She underwent 8 cycles of FOLFOX completed on 18 and chemoradiation with XELODA on 8/15/18. 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     MRI of left hip showed a non specific T1 hypointense lesion in the proximal left femur and repeat MRI 12/10/18:               There is a T1 hypointense and subtly enhancing lesion in the              proximal left femur, stable since at least 2018.  Given this              patient's history of rectal cancer, metastatic disease cannot be excluded.    Colonoscopy last on 19 without evidence of recurrence    3T MRI Pelvis 3/8/2021:  IMPRESSION:   1. There has been a  "complete response to treatment, with a  corresponding tumor regression grade of mrTRG-1.  2. No suspicious pelvic lymph nodes.    CT Chest 8/3/2020:  1. No evidence of metastatic disease in the chest. No suspicious  pulmonary nodules.  2. Sequela of prior granulomatous disease.     CT AP 8/19/20 without evidence of metastatic disease    I last saw Sarah on 3/16/21 with no evidence of recurrence on flexible sigmoidoscopy.    She last saw Dr. Mortensen with oncology on 3/11/2021    Interval History: Sarah is doing well. She denies any anorectal complaints.     Physical examination:  Examination was chaperoned by Amara Jackson NP      Vitals: BP (!) 124/92 (BP Location: Left arm, Patient Position: Sitting, Cuff Size: Adult Large)   Pulse 88   Temp 98.6  F (37  C) (Oral)   Ht 5' 5\"   Wt 209 lb   SpO2 98%   BMI 34.78 kg/m    BMI= Body mass index is 34.78 kg/m .    Sarah looks well and healthy. Digital rectal examination reveals no masses.  Flexible sigmoidoscopy to 20 cm. Faint scar approximately 3 cm from the anal margin left lateral visualized on retroflexion and is very hard to see definitively on straight view. No evidence of recurrent tumor.    Laboratory data:     10/9/2019 13:54 1/8/2020 17:51 5/5/2020 10:40 8/19/2020 11:11 3/16/2021 12:06   CEA <0.5 <0.5 <0.5 <0.5 <0.5       Assessment/plan:  Sarah is doing very well now.  There is no evidence of tumor recurrence on examination. Follow up per protocol below. Patient's questions were answered to her stated satisfaction and she is in agreement with this plan.    Follow up per Watch and Wait Protocol:   Completed CRT: 8/15/2018    Flexible sigmoidoscopy   ? Every 2 months for 6 months: Until 2/2019-COMPLETED   ? Every 3 months for 3 years: Until 8/2021-COMPLETED  ? Every 6 months for 5 years: Until 2023-Due 12/2021  ? Every year for 10 years: Until 2028       3T MRI of pelvis  ? 6-8 weeks after CRT: COMPLETED  ? Every 4 months for 2 years: " Until 2021-DUE 2021  ? Every 6 months for 2 years: Until 2023  ? Yearly for 2 years: Until        CT CAP  ? Every year for 6-8 years: Until -Due 2021       Colonoscopy   ? Last 19-Repeat 2022       CEA at every clinic or endoscopic visit    For details of past medical history, surgical history, family history, medications, allergies, and review of systems, please see details below.    Medical history:  Past Medical History:   Diagnosis Date     Depression      GERD (gastroesophageal reflux disease)      History of smoking      Insomnia      Obesity      Rectal cancer (H)      Restless leg syndrome      Seasonal affective disorder (H)        Surgical history:  Past Surgical History:   Procedure Laterality Date      SECTION      x2     COLONOSCOPY       INSERT PORT VASCULAR ACCESS Right 2018    Procedure: INSERT PORT VASCULAR ACCESS;  central venous Chest Port Placement;  Surgeon: Gaurav Yates PA-C;  Location: [facility] OR     LASIK         Family history:  Family History   Problem Relation Age of Onset     Hyperlipidemia Mother      Hypertension Mother      Gastrointestinal Disease Mother         ischemic colitis     Coronary Artery Disease Mother         stents x 3     Anesthesia Reaction Mother         hypotension, PONV     Hyperlipidemia Father      Hypertension Father      Breast Cancer Maternal Grandmother      Coronary Artery Disease Maternal Grandfather      Myocardial Infarction Maternal Grandfather      Colon Cancer Paternal Grandmother      Breast Cancer Maternal Aunt      Breast Cancer Paternal Aunt      Coronary Artery Disease Paternal Grandfather      Cerebrovascular Disease Paternal Grandfather      Family History Negative Brother      Coronary Artery Disease Maternal Uncle        Medications:  Current Outpatient Medications   Medication Sig Dispense Refill     Acetaminophen (TYLENOL PO) Take 1,000 mg by mouth At Bedtime       amitriptyline 10 MG PO tablet TAKE 1 TO 2  "TABLETS BY MOUTH DAILY AT BEDTIME       atorvastatin 20 MG PO tablet Take 40 mg by mouth       calcium carbonate (TUMS) 500 MG chewable tablet Take 1-2 chew tab by mouth daily  150 tablet      FLUoxetine 20 MG PO capsule        lidocaine-prilocaine (EMLA) cream Apply 45 minutes prior to procedure. 30 g 3     RANITIDINE HCL PO Take 150 mg by mouth daily as needed for heartburn         Allergies:  The patientis allergic to inapsine [droperidol] and tegaderm transparent dressing (informational only).    Social history:  Social History     Tobacco Use     Smoking status: Former Smoker     Packs/day: 0.10     Years: 8.00     Pack years: 0.80     Types: Cigarettes     Quit date: 1986     Years since quittin.3     Smokeless tobacco: Never Used   Substance Use Topics     Alcohol use: Yes     Alcohol/week: 7.0 standard drinks     Types: 7 Glasses of wine per week     Marital status: .    Review of Systems:  Nursing Notes:   Guera Olivo CMA  2021 10:41 AM  Signed  Chief Complaint   Patient presents with     Flexible Sigmoidoscopy     Flex sig for W & W protocol       Vitals:    21 1035   BP: (!) 124/92   BP Location: Left arm   Patient Position: Sitting   Cuff Size: Adult Large   Pulse: 88   Temp: 98.6  F (37  C)   TempSrc: Oral   SpO2: 98%   Weight: 209 lb   Height: 5' 5\"       Body mass index is 34.78 kg/m .        Guera Moeller CMA         10 minutes spent on the date of the encounter doing chart review, history and exam, documentation and further activities as noted above with an additional 10 minutes for flexible sigmoidoscopy.        Óscar Hampton MD   Professor and Chief  Division of Colon and Rectal Surgery  [facility]      Referring Provider:  Referred Self, MD  No address on file     Primary Care Provider:  Kyra Mortensen    This note was created using speech recognition software and may contain unintended word " substitutions.        Again, thank you for allowing me to participate in the care of your patient.      Sincerely,    Óscar Hampton MD

## 2021-07-26 ENCOUNTER — ANCILLARY PROCEDURE (OUTPATIENT)
Dept: MRI IMAGING | Facility: CLINIC | Age: 56
End: 2021-07-26
Attending: INTERNAL MEDICINE
Payer: COMMERCIAL

## 2021-07-26 ENCOUNTER — LAB (OUTPATIENT)
Dept: LAB | Facility: CLINIC | Age: 56
End: 2021-07-26
Attending: INTERNAL MEDICINE
Payer: COMMERCIAL

## 2021-07-26 ENCOUNTER — ANCILLARY PROCEDURE (OUTPATIENT)
Dept: CT IMAGING | Facility: CLINIC | Age: 56
End: 2021-07-26
Attending: INTERNAL MEDICINE
Payer: COMMERCIAL

## 2021-07-26 DIAGNOSIS — C20 MALIGNANT NEOPLASM OF RECTUM (H): ICD-10-CM

## 2021-07-26 DIAGNOSIS — C20 RECTAL CANCER (H): ICD-10-CM

## 2021-07-26 DIAGNOSIS — M21.852 DEFORMITY OF FEMUR, LEFT: ICD-10-CM

## 2021-07-26 PROCEDURE — A9585 GADOBUTROL INJECTION: HCPCS | Performed by: RADIOLOGY

## 2021-07-26 PROCEDURE — 36591 DRAW BLOOD OFF VENOUS DEVICE: CPT

## 2021-07-26 PROCEDURE — 82378 CARCINOEMBRYONIC ANTIGEN: CPT

## 2021-07-26 PROCEDURE — 250N000011 HC RX IP 250 OP 636: Performed by: INTERNAL MEDICINE

## 2021-07-26 PROCEDURE — 74177 CT ABD & PELVIS W/CONTRAST: CPT | Performed by: RADIOLOGY

## 2021-07-26 PROCEDURE — 72197 MRI PELVIS W/O & W/DYE: CPT | Performed by: RADIOLOGY

## 2021-07-26 PROCEDURE — 71260 CT THORAX DX C+: CPT | Performed by: RADIOLOGY

## 2021-07-26 RX ORDER — IOPAMIDOL 755 MG/ML
128 INJECTION, SOLUTION INTRAVASCULAR ONCE
Status: COMPLETED | OUTPATIENT
Start: 2021-07-26 | End: 2021-07-26

## 2021-07-26 RX ORDER — GADOBUTROL 604.72 MG/ML
10 INJECTION INTRAVENOUS ONCE
Status: COMPLETED | OUTPATIENT
Start: 2021-07-26 | End: 2021-07-26

## 2021-07-26 RX ORDER — HEPARIN SODIUM (PORCINE) LOCK FLUSH IV SOLN 100 UNIT/ML 100 UNIT/ML
5 SOLUTION INTRAVENOUS EVERY 8 HOURS
Status: DISCONTINUED | OUTPATIENT
Start: 2021-07-26 | End: 2021-10-15 | Stop reason: HOSPADM

## 2021-07-26 RX ADMIN — GADOBUTROL 10 ML: 604.72 INJECTION INTRAVENOUS at 16:20

## 2021-07-26 RX ADMIN — Medication 5 ML: at 15:20

## 2021-07-26 RX ADMIN — IOPAMIDOL 128 ML: 755 INJECTION, SOLUTION INTRAVASCULAR at 15:29

## 2021-07-26 NOTE — DISCHARGE INSTRUCTIONS
MRI Contrast Discharge Instructions    The IV contrast you received today will pass out of your body in your  urine. This will happen in the next 24 hours. You will not feel this process.  Your urine will not change color.    Drink at least 4 extra glasses of water or juice today (unless your doctor  has restricted your fluids). This reduces the stress on your kidneys.  You may take your regular medicines.    If you are on dialysis: It is best to have dialysis today.    If you have a reaction: Most reactions happen right away. If you have  any new symptoms after leaving the hospital (such as hives or swelling),  call your hospital at the correct number below. Or call your family doctor.  If you have breathing distress or wheezing, call 911.    Special instructions: ***    I have read and understand the above information.    Signature:______________________________________ Date:___________    Staff:__________________________________________ Date:___________     Time:__________    [place] [facility] Departments:    ___[place]: [phone]  ___[facility]: [phone]  ___[place]: [phone] ___[facility]: [phone]  ___[facility]: [phone]  ___[facility]: [phone]  ___[phone]  ___[facility]: [phone]  ___[phone]

## 2021-07-27 LAB — CEA SERPL-MCNC: <0.5 UG/L (ref 0–2.5)

## 2021-08-05 ENCOUNTER — VIRTUAL VISIT (OUTPATIENT)
Dept: ONCOLOGY | Facility: CLINIC | Age: 56
End: 2021-08-05
Attending: INTERNAL MEDICINE
Payer: COMMERCIAL

## 2021-08-05 DIAGNOSIS — M21.852 DEFORMITY OF FEMUR, LEFT: ICD-10-CM

## 2021-08-05 DIAGNOSIS — C20 RECTAL CANCER (H): Primary | ICD-10-CM

## 2021-08-05 PROCEDURE — 99214 OFFICE O/P EST MOD 30 MIN: CPT | Mod: GT | Performed by: INTERNAL MEDICINE

## 2021-08-05 NOTE — LETTER
8/5/2021         RE: Sarah Rajan  [address] [address]        Dear Colleague,    Thank you for referring your patient, Sarah Rajan, to the [facility]. Please see a copy of my visit note below.    Oncology outpatient note    Date of service: 8/05/21       PC: Rectal cancer. [id number] - MSI Intact    HPI:  Please see previous note for details. I have copied and updated from prior note.  She is a 55-year-old female noticed BRBPR so Screening colonoscopy on 1/9/2018 revealed 3cm rectal mass and other polyps which were removed.  Pathology indicated moderately differentiated adenocarcinoma w/ intact MMR proteins.  CT staging scan showed no metastatic disease; some liver lesions which are thought to be benign per radiology report, T2N1M0 by pelvic MRI read at [facility]. When we initially reviewed her MRI we will not exactly sure whether that was lymph node involvement or not. We opted to repeat MRI which showed T4 N0 lesion. There was up to take her to surgery as there was possibility of personally lesion without lymph node involvement but because of the findings of repeat MRI the surgery was canceled and she is coming back to discuss neoadjuvant treatment.  We also did an MRI of the liver which showed 3 subcentimeter liver lesions which were too small to characterize and will need attention on follow-up.    Baseline CEA 1.1 on 1/9/18.    She started neoadjuvant 5-FU and oxaliplatin (FOLFOX), which she started on 2/26/18. The day after she received cycle 2, she developed fevers, chills, headache, and an itchy rash on her arms and legs, for which she was evaluated in the ED. She was sent home on Benadryl. Benadryl and dexamethasone doses were increased for cycle 3.   She tolerated that cycle well    C#4 was given on 4/10/18    She completed 8 cycles of FOLFOX on 6/5/18    Repeat scans show good response to treatment without evidence of metastatic disease. There is  only a small signal consistent with residual tumor seen in the primary rectal cancer lesion.    She started on concurrent chemoradiation with Xeloda with radiation beginning on 7/9/18. There was a delay in her receiving Xeloda, so she began that on 7/10/18. She completed it on 8/16/18    Repeat scans show great response to treatment with almost completely fibrotic signal.  There is an indeterminate left intertrochanteric lesion which is stable.  Liver lesion is consistent with benign hemangioma vs cyst    She was seen by Dr Hampton and the decision was made to keep her on watchful waiting.    Her MRI of the pelvis in December 2018 was stable but it showed rectal wall edema all the flexible sigmoidoscopy was negative.  Decision was made to do a short term follow-up MRI.      Left intertrochanteric lesion-we did MRI of the left hip which also showed a nonspecific T1 hypointense lesion in the proximal left femur which has not changed since 6/20/2018.  Repeat MRI in December 2018 , 5/14/19 and 8/20/19 were also stable.      Pelvis MRI in Feb 2019 was stable.    Colonoscopy on 5/13/19 was without evidence of recurrence- Next due in 3 years.     Pelvis MRI on 5/5/2020 continues to show complete response to treatment.    Flex sig on 5/5/2020 was also unremarkable.    Flex sig on 12/15/2020 was unremarkable.  MRI on 3/8/2021 was without evidence of recurrence.  It continues to show complete response.    Interval history    This is a video visit through [employer].  He feels good.  Denies any problems with bowel movement.  No bleeding.  No abdominal pain.  Eating and drinking well without any nausea or vomiting.  She has mild neuropathy in the bottom of feet and toes with occasional numbness and tingling.  Energy is good.  She denies any infections or shortness of breath.  No new swellings except right foot ganglion cyst and sometimes it bothers her if she hits it.      ECOG 0    ROS:  Otherwise a comprehensive review of the  system was unremarkable.      I reviewed other hx in EPIC as below    PMH:  Depression  Restless leg syndrome  Dyslipidemia    Current Outpatient Medications   Medication     Acetaminophen (TYLENOL PO)     amitriptyline 10 MG PO tablet     atorvastatin 20 MG PO tablet     calcium carbonate (TUMS) 500 MG chewable tablet     FLUoxetine 20 MG PO capsule     lidocaine-prilocaine (EMLA) cream     RANITIDINE HCL PO     No current facility-administered medications for this visit.     Facility-Administered Medications Ordered in Other Visits   Medication     heparin 100 UNIT/ML injection 5 mL     heparin 100 UNIT/ML injection 5 mL     heparin 100 UNIT/ML injection 500 Units         FHx  Aunt and maternal grandmother had breast cancer  Pateral grandmother  of colorectal cancer  Mother had ischemic colitis    SHx:  , two teenage children  EtOH: 1 drink daily, occasionally more on weekends  Tobacco: never smoker  She is a microbiologist     Allergies   Allergen Reactions     Inapsine [Droperidol] Other (See Comments)     Elevated blood pressure and increased heart rate     Tegaderm Transparent Dressing (Informational Only) Itching     Pt had reaction to Tegaderm used for port dressing. Whole area was very red and itchy. Please use IV 3000 instead.        Exam:  There were no vitals taken for this visit.   Wt Readings from Last 4 Encounters:   21 94.8 kg (209 lb)   21 95.8 kg (211 lb 1.6 oz)   21 95.8 kg (211 lb 3.2 oz)   12/15/20 92.6 kg (204 lb 3.2 oz)       Constitutional.  Looks well and in no apparent distress.   Eyes.  Without eye redness or apparent jaundice.   Respiratory.  Non labored breathing. Speaking in full sentences.    Skin.  No concerning skin rashes on the skin visualized.   Neurological.  Is alert and oriented.  Psychiatric.  Mood and affect seem appropriate.      The rest of a comprehensive physical examination is deferred due to Public Health Emergency video visit  restrictions.      Labs/Imaging.  Reviewed  7/26/2021   CEA <0.5  3/16/2021  CBC unremarkable.  CMP unremarkable.    I reviewed with the radiologist as well.  MRI pelvis on 7/26/2021 showed a complete response to treatment with a corresponding tumor regression grade of mrTRG-1.  The left femur intertrochanteric lesion seems stable although it has not been commented upon in the main report.    CT CAP on 7/26/2021 does not show any evidence of recurrence.           Assessment and Plan  [id number] moderately differentiated adenocarcinoma of the rectum - MSI-stable  She was started on neoadjuvant FOLFOX on 2/26/2018.  after 4 cycles she had good response to treatment. Liver lesions remain indeterminate.    we continued with the same chemotherapy and she received cycle #8 on 6/20/18.  Repeat scans show good response to treatment with only a small signal consistent with viable tumor in the primary rectal cancer. No surrounding lymph nodes suspicious. There is no evidence of metastatic disease.    She started on concurrent chemoradiation with Xeloda with radiation beginning on 7/9/18. There was a delay in her receiving Xeloda, so she began on 7/10/18. She completed it on 8/16/18    Repeat scans show great response to treatment with almost completely fibrotic signal.  There is an indeterminate left intertrochanteric lesion which was stable.    She was seen by Dr Hampton and the decision was made to keep her on watchful waiting.      She feels well without evidence of recurrent malignancy.    I reviewed notes from Dr. Hampton.  She had a flexible sigmoidoscopy in June 2021 which was unremarkable and the next one would be due in December 2021.  She will be due for colonoscopy in May 2022.  We will repeat CT scan in 1 year and MRI of the pelvis and 6 months.    We will check CEA at each visit.        She was seen in Cancer Survivorship clinic.    Surveillance per protocol:  Follow up per Watch and Wait Protocol:   Completed CRT:  8/16/2018    Flexible sigmoidoscopy   ? Every 2 months for 6 months: Until 2/2019-COMPLETED   ? Every 3 months for 3 years: Until 8/2021-COMPLETED  ? Every 6 months for 5 years: Until 2023- Due 12/2021  ? Every year for 10 years: Until 2028       3T MRI of pelvis  ? 6-8 weeks after CRT: COMPLETED  ? Every 4 months for 2 years: Until 8/2020-Completed  ? Every 6 months for 2 years: Until 8/2022- Due Jan 2022  ? Yearly for 2 years: Until 2024       CT CAP  ? Every year for 6-8 years: Until 2026-Due 7/2022        Colonoscopy   ? Last 5/13/19-Repeat 5/2022       CEA at every clinic or endoscopic visit      Left intertrochanteric lesion-  I again reviewed with the radiologist and it seems to be very stable over the years.  This is reassuring for a benign etiology.  We will continue to monitor this on serial pelvis MRI.        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  It is not bothering her.  Previously I had discussed with her about trying acupuncture or laser therapy but she has not done that.      Right foot ganglion cyst.  I recommend follow-up with PCP.      Port-A-Cath.  We again discussed that she is about 3 years out from the completion of treatment and that port can be removed.  She wants to keep it for now and she wants to address this again after repeat MRI in January 2022.  For now she will get port flushes every 8 weeks or so.           We did not address the following today.    Indeterminate liver lesion. MRI 2/5/19 shows stable lesion, most likely c/w benign hemangioma or cyst.  At this time I do not plan to repeat MRI abdomen but when we will do CT scan, it will be monitored.      Return to clinic in 6 months with labs/MRI prior.    I answered all of her questions to her satisfaction.  She is agreeable and comfortable with the plan.      Kyra Mortensen    Video start time. 3:56 PM  Video stop time. 4:04 PM        Sarah is a 55 year old who is  being evaluated via a billable video visit.      How would you like to obtain your AVS? MyChart  If the video visit is dropped, the invitation should be resent by: Text to cell phone: [phone]  Will anyone else be joining your video visit? No      Video-Visit Details    Type of service:  Video Visit- [employer]          Again, thank you for allowing me to participate in the care of your patient.        Sincerely,        Kyra Mortensen MD

## 2021-08-05 NOTE — PROGRESS NOTES
Sarah is a 55 year old who is being evaluated via a billable video visit.      How would you like to obtain your AVS? [employer]  If the video visit is dropped, the invitation should be resent by: Text to cell phone: [phone]  Will anyone else be joining your video visit? No      Video-Visit Details    [facility]:  [facility]

## 2021-08-05 NOTE — PROGRESS NOTES
Oncology outpatient note    Date of service: 8/05/21       PC: Rectal cancer. [id number] - MSI Intact    HPI:  Please see previous note for details. I have copied and updated from prior note.  She is a 55-year-old female noticed BRBPR so Screening colonoscopy on 1/9/2018 revealed 3cm rectal mass and other polyps which were removed.  Pathology indicated moderately differentiated adenocarcinoma w/ intact MMR proteins.  CT staging scan showed no metastatic disease; some liver lesions which are thought to be benign per radiology report, T2N1M0 by pelvic MRI read at [facility]. When we initially reviewed her MRI we will not exactly sure whether that was lymph node involvement or not. We opted to repeat MRI which showed T4 N0 lesion. There was up to take her to surgery as there was possibility of personally lesion without lymph node involvement but because of the findings of repeat MRI the surgery was canceled and she is coming back to discuss neoadjuvant treatment.  We also did an MRI of the liver which showed 3 subcentimeter liver lesions which were too small to characterize and will need attention on follow-up.    Baseline CEA 1.1 on 1/9/18.    She started neoadjuvant 5-FU and oxaliplatin (FOLFOX), which she started on 2/26/18. The day after she received cycle 2, she developed fevers, chills, headache, and an itchy rash on her arms and legs, for which she was evaluated in the ED. She was sent home on Benadryl. Benadryl and dexamethasone doses were increased for cycle 3.   She tolerated that cycle well    C#4 was given on 4/10/18    She completed 8 cycles of FOLFOX on 6/5/18    Repeat scans show good response to treatment without evidence of metastatic disease. There is only a small signal consistent with residual tumor seen in the primary rectal cancer lesion.    She started on concurrent chemoradiation with Xeloda with radiation beginning on 7/9/18. There was a delay in her receiving Xeloda, so she began that on 7/10/18.  She completed it on 8/16/18    Repeat scans show great response to treatment with almost completely fibrotic signal.  There is an indeterminate left intertrochanteric lesion which is stable.  Liver lesion is consistent with benign hemangioma vs cyst    She was seen by Dr Hampton and the decision was made to keep her on watchful waiting.    Her MRI of the pelvis in December 2018 was stable but it showed rectal wall edema all the flexible sigmoidoscopy was negative.  Decision was made to do a short term follow-up MRI.      Left intertrochanteric lesion-we did MRI of the left hip which also showed a nonspecific T1 hypointense lesion in the proximal left femur which has not changed since 6/20/2018.  Repeat MRI in December 2018 , 5/14/19 and 8/20/19 were also stable.      Pelvis MRI in Feb 2019 was stable.    Colonoscopy on 5/13/19 was without evidence of recurrence- Next due in 3 years.     Pelvis MRI on 5/5/2020 continues to show complete response to treatment.    Flex sig on 5/5/2020 was also unremarkable.    Flex sig on 12/15/2020 was unremarkable.  MRI on 3/8/2021 was without evidence of recurrence.  It continues to show complete response.    Interval history    This is a video visit through [employer].  He feels good.  Denies any problems with bowel movement.  No bleeding.  No abdominal pain.  Eating and drinking well without any nausea or vomiting.  She has mild neuropathy in the bottom of feet and toes with occasional numbness and tingling.  Energy is good.  She denies any infections or shortness of breath.  No new swellings except right foot ganglion cyst and sometimes it bothers her if she hits it.      ECOG 0    ROS:  Otherwise a comprehensive review of the system was unremarkable.      I reviewed other hx in EPIC as below    PMH:  Depression  Restless leg syndrome  Dyslipidemia    Current Outpatient Medications   Medication     Acetaminophen (TYLENOL PO)     amitriptyline 10 MG PO tablet     atorvastatin 20 MG  PO tablet     calcium carbonate (TUMS) 500 MG chewable tablet     FLUoxetine 20 MG PO capsule     lidocaine-prilocaine (EMLA) cream     RANITIDINE HCL PO     No current facility-administered medications for this visit.     Facility-Administered Medications Ordered in Other Visits   Medication     heparin 100 UNIT/ML injection 5 mL     heparin 100 UNIT/ML injection 5 mL     heparin 100 UNIT/ML injection 500 Units         FHx  Aunt and maternal grandmother had breast cancer  Pateral grandmother  of colorectal cancer  Mother had ischemic colitis    SHx:  , two teenage children  EtOH: 1 drink daily, occasionally more on weekends  Tobacco: never smoker  She is a microbiologist     Allergies   Allergen Reactions     Inapsine [Droperidol] Other (See Comments)     Elevated blood pressure and increased heart rate     Tegaderm Transparent Dressing (Informational Only) Itching     Pt had reaction to Tegaderm used for port dressing. Whole area was very red and itchy. Please use IV 3000 instead.        Exam:  There were no vitals taken for this visit.   Wt Readings from Last 4 Encounters:   21 94.8 kg (209 lb)   21 95.8 kg (211 lb 1.6 oz)   21 95.8 kg (211 lb 3.2 oz)   12/15/20 92.6 kg (204 lb 3.2 oz)       Constitutional.  Looks well and in no apparent distress.   Eyes.  Without eye redness or apparent jaundice.   Respiratory.  Non labored breathing. Speaking in full sentences.    Skin.  No concerning skin rashes on the skin visualized.   Neurological.  Is alert and oriented.  Psychiatric.  Mood and affect seem appropriate.      The rest of a comprehensive physical examination is deferred due to Public Health Emergency video visit restrictions.      Labs/Imaging.  Reviewed  2021   CEA <0.5  3/16/2021  CBC unremarkable.  CMP unremarkable.    I reviewed with the radiologist as well.  MRI pelvis on 2021 showed a complete response to treatment with a corresponding tumor regression grade of  mrTRG-1.  The left femur intertrochanteric lesion seems stable although it has not been commented upon in the main report.    CT CAP on 7/26/2021 does not show any evidence of recurrence.           Assessment and Plan  [id number] moderately differentiated adenocarcinoma of the rectum - MSI-stable  She was started on neoadjuvant FOLFOX on 2/26/2018.  after 4 cycles she had good response to treatment. Liver lesions remain indeterminate.    we continued with the same chemotherapy and she received cycle #8 on 6/20/18.  Repeat scans show good response to treatment with only a small signal consistent with viable tumor in the primary rectal cancer. No surrounding lymph nodes suspicious. There is no evidence of metastatic disease.    She started on concurrent chemoradiation with Xeloda with radiation beginning on 7/9/18. There was a delay in her receiving Xeloda, so she began on 7/10/18. She completed it on 8/16/18    Repeat scans show great response to treatment with almost completely fibrotic signal.  There is an indeterminate left intertrochanteric lesion which was stable.    She was seen by Dr Hampton and the decision was made to keep her on watchful waiting.      She feels well without evidence of recurrent malignancy.    I reviewed notes from Dr. Hampton.  She had a flexible sigmoidoscopy in June 2021 which was unremarkable and the next one would be due in December 2021.  She will be due for colonoscopy in May 2022.  We will repeat CT scan in 1 year and MRI of the pelvis and 6 months.    We will check CEA at each visit.        She was seen in Cancer Survivorship clinic.    Surveillance per protocol:  Follow up per Watch and Wait Protocol:   Completed CRT: 8/16/2018    Flexible sigmoidoscopy   ? Every 2 months for 6 months: Until 2/2019-COMPLETED   ? Every 3 months for 3 years: Until 8/2021-COMPLETED  ? Every 6 months for 5 years: Until 2023- Due 12/2021  ? Every year for 10 years: Until 2028       3T MRI of pelvis  ? 6-8  weeks after CRT: COMPLETED  ? Every 4 months for 2 years: Until 8/2020-Completed  ? Every 6 months for 2 years: Until 8/2022- Due Jan 2022  ? Yearly for 2 years: Until 2024       CT CAP  ? Every year for 6-8 years: Until 2026-Due 7/2022        Colonoscopy   ? Last 5/13/19-Repeat 5/2022       CEA at every clinic or endoscopic visit      Left intertrochanteric lesion-  I again reviewed with the radiologist and it seems to be very stable over the years.  This is reassuring for a benign etiology.  We will continue to monitor this on serial pelvis MRI.        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  It is not bothering her.  Previously I had discussed with her about trying acupuncture or laser therapy but she has not done that.      Right foot ganglion cyst.  I recommend follow-up with PCP.      Port-A-Cath.  We again discussed that she is about 3 years out from the completion of treatment and that port can be removed.  She wants to keep it for now and she wants to address this again after repeat MRI in January 2022.  For now she will get port flushes every 8 weeks or so.           We did not address the following today.    Indeterminate liver lesion. MRI 2/5/19 shows stable lesion, most likely c/w benign hemangioma or cyst.  At this time I do not plan to repeat MRI abdomen but when we will do CT scan, it will be monitored.      Return to clinic in 6 months with labs/MRI prior.    I answered all of her questions to her satisfaction.  She is agreeable and comfortable with the plan.      Kyra Mortensen    Video start time. 3:56 PM  Video stop time. 4:04 PM

## 2021-09-26 ENCOUNTER — HEALTH MAINTENANCE LETTER (OUTPATIENT)
Age: 56
End: 2021-09-26

## 2021-09-30 ENCOUNTER — LAB (OUTPATIENT)
Dept: LAB | Facility: CLINIC | Age: 56
End: 2021-09-30
Attending: INTERNAL MEDICINE
Payer: COMMERCIAL

## 2021-09-30 PROCEDURE — 250N000011 HC RX IP 250 OP 636: Performed by: INTERNAL MEDICINE

## 2021-09-30 PROCEDURE — 96523 IRRIG DRUG DELIVERY DEVICE: CPT

## 2021-09-30 RX ORDER — HEPARIN SODIUM (PORCINE) LOCK FLUSH IV SOLN 100 UNIT/ML 100 UNIT/ML
5 SOLUTION INTRAVENOUS EVERY 8 HOURS
Status: DISCONTINUED | OUTPATIENT
Start: 2021-09-30 | End: 2021-10-15 | Stop reason: HOSPADM

## 2021-09-30 RX ADMIN — Medication 5 ML: at 09:51

## 2021-12-13 NOTE — PROGRESS NOTES
Colon and Rectal Surgery Clinic Note      RE: Sarah Satinder  : 1965  JIMMY: 2021    Sarah presents today for follow up of her rectal cancer on Watch and Wait protocol.      HPI:     She was initially seen in 2018 with a moderately differentiated, invasive adenocarcinoma with intact mismatch repair 4-5 cm from the anal verge. She was staged at that time and an MRI that was initially read as a T1-T2 lesion, then possibly a T2N1 lesion. CT A/P was significant for small liver lesions too small to characterize but not concerning for malignancy and CEA was 1.1. She was discussed at tumor board the pelvic MRI was felt to be poor quality so she underwent a repeat MRI pelvis on  and her tumor was staged as T4N0 due to abutment of the levators. Abdominal MRI to evaluate the liver lesions was performed on  which was again found to have multiple sub-centimeter lesions that were difficult to characterize.     She underwent 8 cycles of FOLFOX completed on 18 and chemoradiation with XELODA on 8/15/18. 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     MRI of left hip showed a non specific T1 hypointense lesion in the proximal left femur and repeat MRI 12/10/18:               There is a T1 hypointense and subtly enhancing lesion in the              proximal left femur, stable since at least 2018.  Given this              patient's history of rectal cancer, metastatic disease cannot be excluded.    Colonoscopy last on 19 without evidence of recurrence    I last saw Sarah on 21 with no evidence of recurrence on flexible sigmoidoscopy.    She follows with Dr. Mortensen of medical oncology and saw him last in August.    MR Pelvis 21:  IMPRESSION:   1. Complete response to treatment, with a corresponding tumor  regression grade of mrTRG-1.    2. No suspicious pelvic lymph nodes.    CT CAP 21:  IMPRESSION:  1.  No metastatic disease in  "the chest, abdomen and pelvis.    Interval History: Sarah is doing well. She denies any anorectal complaints. She kindly brought in [employer] samples as a gift for me and my team.    Physical examination:  Examination was chaperoned by Amara Jackson NP      Vitals: /89 (BP Location: Left arm, Patient Position: Sitting, Cuff Size: Adult Large)   Pulse 78   Ht 5' 5\"   Wt 210 lb   SpO2 97%   BMI 34.95 kg/m    BMI= Body mass index is 34.95 kg/m .    Sarah looks well and healthy. Digital rectal examination reveals no masses.  Flexible sigmoidoscopy to 25 cm. Faint scar approximately 3 cm from the anal margin left lateral visualized on retroflexion and is very hard to see definitively on straight view. No evidence of recurrent tumor.    Laboratory data:     1/8/2020 17:51 5/5/2020 10:40 8/19/2020 11:11 3/16/2021 12:06 7/26/2021 15:19   CEA <0.5 <0.5 <0.5 <0.5 <0.5       Assessment/plan:  Sarah is doing very well now.  There is no evidence of tumor recurrence on examination. Follow up per protocol below. Patient's questions were answered to her stated satisfaction and she is in agreement with this plan.    Follow up per Watch and Wait Protocol:   Completed CRT: 8/15/2018    Flexible sigmoidoscopy   ? Every 2 months for 6 months: Until 2/2019-COMPLETED   ? Every 3 months for 3 years: Until 8/2021-COMPLETED  ? Every 6 months for 5 years: Until 2023-Due 6/2022 but will get colonoscopy in 5/2022 and flex in 11/2022  ? Every year for 10 years: Until 2028       3T MRI of pelvis  ? 6-8 weeks after CRT: COMPLETED  ? Every 4 months for 2 years: Until 8/2021-COMPLETED  ? Every 6 months for 2 years: Until 8/2023-DUE 1/2022  ? Yearly for 2 years: Until 2025       CT CAP  ? Every year for 6-8 years: Until 2026-Due 7/2022      Colonoscopy   ? Last 5/13/19-Repeat 5/2022       CEA at every clinic or endoscopic visit    For details of past medical history, surgical history, family history, medications, allergies, " and review of systems, please see details below.    Medical history:  Past Medical History:   Diagnosis Date     Depression      GERD (gastroesophageal reflux disease)      History of smoking      Insomnia      Obesity      Rectal cancer (H)      Rectal cancer (H)      Restless leg syndrome      Seasonal affective disorder (H)        Surgical history:  Past Surgical History:   Procedure Laterality Date      SECTION      x2      SECTION       COLONOSCOPY       COLONOSCOPY       INSERT PORT VASCULAR ACCESS Right 2018    Procedure: INSERT PORT VASCULAR ACCESS;  central venous Chest Port Placement;  Surgeon: Gaurav Yates PA-C;  Location:  OR     LASIK       LASIK         Family history:  Family History   Problem Relation Age of Onset     Hyperlipidemia Mother      Hypertension Mother      Gastrointestinal Disease Mother         ischemic colitis     Coronary Artery Disease Mother         stents x 3     Anesthesia Reaction Mother         hypotension, PONV     Hyperlipidemia Father      Hypertension Father      Breast Cancer Maternal Grandmother      Coronary Artery Disease Maternal Grandfather      Myocardial Infarction Maternal Grandfather      Colon Cancer Paternal Grandmother      Breast Cancer Maternal Aunt      Breast Cancer Paternal Aunt      Coronary Artery Disease Paternal Grandfather      Cerebrovascular Disease Paternal Grandfather      Family History Negative Brother      Coronary Artery Disease Maternal Uncle        Medications:  Current Outpatient Medications   Medication Sig Dispense Refill     Acetaminophen (TYLENOL PO) Take 1,000 mg by mouth At Bedtime       amitriptyline 10 MG PO tablet TAKE 1 TO 2 TABLETS BY MOUTH DAILY AT BEDTIME       atorvastatin 20 MG PO tablet Take 40 mg by mouth       lidocaine-prilocaine (EMLA) cream Apply 45 minutes prior to procedure. 30 g 3     RANITIDINE HCL PO Take 150 mg by mouth daily as needed for heartburn       calcium carbonate (TUMS) 500  "MG chewable tablet Take 1-2 chew tab by mouth daily  (Patient not taking: Reported on 2021) 150 tablet      FLUoxetine 20 MG PO capsule  (Patient not taking: Reported on 2021)         Allergies:  The patientis allergic to inapsine [droperidol] and tegaderm transparent dressing (informational only).    Social history:  Social History     Tobacco Use     Smoking status: Former Smoker     Packs/day: 0.10     Years: 8.00     Pack years: 0.80     Types: Cigarettes     Quit date: 1986     Years since quittin.8     Smokeless tobacco: Never Used   Substance Use Topics     Alcohol use: Yes     Alcohol/week: 7.0 standard drinks     Types: 7 Glasses of wine per week     Marital status: .    Review of Systems:  Nursing Notes:   Margoth Willett  2021 10:52 AM  Signed  Chief Complaint   Patient presents with     Flexible Sigmoidoscopy       Vitals:    21 1049   BP: 130/89   BP Location: Left arm   Patient Position: Sitting   Cuff Size: Adult Large   Pulse: 78   SpO2: 97%   Weight: 210 lb   Height: 5' 5\"       Body mass index is 34.95 kg/m .    Margoth Willett CMA         10 minutes spent on the date of the encounter doing chart review, history and exam, documentation and further activities as noted above with an additional 10 minutes for flexible sigmoidoscopy.        Óscar Hampton MD   Professor and Chief  Division of Colon and Rectal Surgery  [facility]      Referring Provider:  Referred Self, MD  No address on file     Primary Care Provider:  Kyra Mortensen    This note was created using speech recognition software and may contain unintended word substitutions.  "

## 2021-12-14 ENCOUNTER — OFFICE VISIT (OUTPATIENT)
Dept: SURGERY | Facility: CLINIC | Age: 56
End: 2021-12-14
Payer: COMMERCIAL

## 2021-12-14 ENCOUNTER — LAB (OUTPATIENT)
Dept: LAB | Facility: CLINIC | Age: 56
End: 2021-12-14
Attending: INTERNAL MEDICINE
Payer: COMMERCIAL

## 2021-12-14 VITALS
HEIGHT: 65 IN | OXYGEN SATURATION: 97 % | BODY MASS INDEX: 34.99 KG/M2 | SYSTOLIC BLOOD PRESSURE: 130 MMHG | DIASTOLIC BLOOD PRESSURE: 89 MMHG | WEIGHT: 210 LBS | HEART RATE: 78 BPM

## 2021-12-14 DIAGNOSIS — Z12.11 ENCOUNTER FOR SCREENING COLONOSCOPY: ICD-10-CM

## 2021-12-14 DIAGNOSIS — C20 RECTAL CANCER (H): Primary | ICD-10-CM

## 2021-12-14 DIAGNOSIS — M21.852 DEFORMITY OF FEMUR, LEFT: ICD-10-CM

## 2021-12-14 DIAGNOSIS — C20 RECTAL CANCER (H): ICD-10-CM

## 2021-12-14 LAB
ALBUMIN SERPL-MCNC: 3.8 G/DL (ref 3.4–5)
ALP SERPL-CCNC: 109 U/L (ref 40–150)
ALT SERPL W P-5'-P-CCNC: 34 U/L (ref 0–50)
ANION GAP SERPL CALCULATED.3IONS-SCNC: 9 MMOL/L (ref 3–14)
AST SERPL W P-5'-P-CCNC: 13 U/L (ref 0–45)
BASOPHILS # BLD AUTO: 0 10E3/UL (ref 0–0.2)
BASOPHILS NFR BLD AUTO: 1 %
BILIRUB SERPL-MCNC: 0.5 MG/DL (ref 0.2–1.3)
BUN SERPL-MCNC: 12 MG/DL (ref 7–30)
CALCIUM SERPL-MCNC: 9.5 MG/DL (ref 8.5–10.1)
CEA SERPL-MCNC: <0.5 UG/L (ref 0–2.5)
CHLORIDE BLD-SCNC: 107 MMOL/L (ref 94–109)
CO2 SERPL-SCNC: 27 MMOL/L (ref 20–32)
CREAT SERPL-MCNC: 0.68 MG/DL (ref 0.52–1.04)
EOSINOPHIL # BLD AUTO: 0.1 10E3/UL (ref 0–0.7)
EOSINOPHIL NFR BLD AUTO: 2 %
ERYTHROCYTE [DISTWIDTH] IN BLOOD BY AUTOMATED COUNT: 12.6 % (ref 10–15)
GFR SERPL CREATININE-BSD FRML MDRD: >90 ML/MIN/1.73M2
GLUCOSE BLD-MCNC: 123 MG/DL (ref 70–99)
HCT VFR BLD AUTO: 40.5 % (ref 35–47)
HGB BLD-MCNC: 13.6 G/DL (ref 11.7–15.7)
IMM GRANULOCYTES # BLD: 0 10E3/UL
IMM GRANULOCYTES NFR BLD: 1 %
LYMPHOCYTES # BLD AUTO: 1 10E3/UL (ref 0.8–5.3)
LYMPHOCYTES NFR BLD AUTO: 27 %
MCH RBC QN AUTO: 29.9 PG (ref 26.5–33)
MCHC RBC AUTO-ENTMCNC: 33.6 G/DL (ref 31.5–36.5)
MCV RBC AUTO: 89 FL (ref 78–100)
MONOCYTES # BLD AUTO: 0.2 10E3/UL (ref 0–1.3)
MONOCYTES NFR BLD AUTO: 6 %
NEUTROPHILS # BLD AUTO: 2.4 10E3/UL (ref 1.6–8.3)
NEUTROPHILS NFR BLD AUTO: 63 %
NRBC # BLD AUTO: 0 10E3/UL
NRBC BLD AUTO-RTO: 0 /100
PLATELET # BLD AUTO: 200 10E3/UL (ref 150–450)
POTASSIUM BLD-SCNC: 3.7 MMOL/L (ref 3.4–5.3)
PROT SERPL-MCNC: 7 G/DL (ref 6.8–8.8)
RBC # BLD AUTO: 4.55 10E6/UL (ref 3.8–5.2)
SODIUM SERPL-SCNC: 143 MMOL/L (ref 133–144)
WBC # BLD AUTO: 3.8 10E3/UL (ref 4–11)

## 2021-12-14 PROCEDURE — 80053 COMPREHEN METABOLIC PANEL: CPT

## 2021-12-14 PROCEDURE — 36591 DRAW BLOOD OFF VENOUS DEVICE: CPT

## 2021-12-14 PROCEDURE — 85025 COMPLETE CBC W/AUTO DIFF WBC: CPT

## 2021-12-14 PROCEDURE — 82378 CARCINOEMBRYONIC ANTIGEN: CPT

## 2021-12-14 PROCEDURE — 250N000011 HC RX IP 250 OP 636: Performed by: INTERNAL MEDICINE

## 2021-12-14 PROCEDURE — 45330 DIAGNOSTIC SIGMOIDOSCOPY: CPT | Performed by: COLON & RECTAL SURGERY

## 2021-12-14 RX ORDER — HEPARIN SODIUM (PORCINE) LOCK FLUSH IV SOLN 100 UNIT/ML 100 UNIT/ML
500 SOLUTION INTRAVENOUS ONCE
Status: COMPLETED | OUTPATIENT
Start: 2021-12-14 | End: 2021-12-14

## 2021-12-14 RX ADMIN — HEPARIN 500 UNITS: 100 SYRINGE at 10:05

## 2021-12-14 ASSESSMENT — PAIN SCALES - GENERAL: PAINLEVEL: NO PAIN (0)

## 2021-12-14 ASSESSMENT — MIFFLIN-ST. JEOR: SCORE: 1548.43

## 2021-12-14 NOTE — PATIENT INSTRUCTIONS
Follow up per Watch and Wait Protocol:   Completed CRT: 8/15/2018    Flexible sigmoidoscopy   ? Every 2 months for 6 months: Until 2/2019-COMPLETED   ? Every 3 months for 3 years: Until 8/2021-COMPLETED  ? Every 6 months for 5 years: Until 2023-Due 6/2022 but will get colonoscopy in 5/2022 and flex in 11/2022- Please call endoscopy to schedule your colonoscopy at [phone]  ? Every year for 10 years: Until 2028       3T MRI of pelvis  ? 6-8 weeks after CRT: COMPLETED  ? Every 4 months for 2 years: Until 8/2021-COMPLETED  ? Every 6 months for 2 years: Until 8/2023-DUE 1/2022  ? Yearly for 2 years: Until 2025       CT CAP  ? Every year for 6-8 years: Until 2026-Due 7/2022       Colonoscopy   ? Last 5/13/19-Repeat 5/2022-Please call endoscopy to schedule your colonoscopy at [phone]       CEA at every clinic or endoscopic visit

## 2021-12-14 NOTE — NURSING NOTE
"Chief Complaint   Patient presents with     Flexible Sigmoidoscopy       Vitals:    12/14/21 1049   BP: 130/89   BP Location: Left arm   Patient Position: Sitting   Cuff Size: Adult Large   Pulse: 78   SpO2: 97%   Weight: 210 lb   Height: 5' 5\"       Body mass index is 34.95 kg/m .    Margoth Willett CMA    "

## 2021-12-14 NOTE — NURSING NOTE
"Chief Complaint   Patient presents with     Port Draw     Labs drawn via port by RN in lab.       Port accessed with 20 gauge 3/4\" gripper needle by RN, labs collected, line flushed with saline and heparin, then de-accessed.      Willow Fuentes RN    "

## 2021-12-14 NOTE — LETTER
2021       RE: Sarah Rajan  [address] [facility] [address]     Dear Colleague,    Thank you for referring your patient, Sarah Rajan, to the [facility] [place] at [facility]. Please see a copy of my visit note below.    Colon and Rectal Surgery Clinic Note      RE: Sarah Rajan  : 1965  JIMMY: 2021    Sarah presents today for follow up of her rectal cancer on Watch and Wait protocol.      HPI:     She was initially seen in 2018 with a moderately differentiated, invasive adenocarcinoma with intact mismatch repair 4-5 cm from the anal verge. She was staged at that time and an MRI that was initially read as a T1-T2 lesion, then possibly a T2N1 lesion. CT A/P was significant for small liver lesions too small to characterize but not concerning for malignancy and CEA was 1.1. She was discussed at tumor board the pelvic MRI was felt to be poor quality so she underwent a repeat MRI pelvis on  and her tumor was staged as T4N0 due to abutment of the levators. Abdominal MRI to evaluate the liver lesions was performed on  which was again found to have multiple sub-centimeter lesions that were difficult to characterize.     She underwent 8 cycles of FOLFOX completed on 18 and chemoradiation with XELODA on 8/15/18. 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     MRI of left hip showed a non specific T1 hypointense lesion in the proximal left femur and repeat MRI 12/10/18:               There is a T1 hypointense and subtly enhancing lesion in the              proximal left femur, stable since at least 2018.  Given this              patient's history of rectal cancer, metastatic disease cannot be excluded.    Colonoscopy last on 19 without evidence of recurrence    I last saw Sarah on 21 with no evidence of recurrence on  "flexible sigmoidoscopy.    She follows with Dr. Mortensen of medical oncology and saw him last in August.    MR Pelvis 7/26/21:  IMPRESSION:   1. Complete response to treatment, with a corresponding tumor  regression grade of mrTRG-1.    2. No suspicious pelvic lymph nodes.    CT CAP 7/26/21:  IMPRESSION:  1.  No metastatic disease in the chest, abdomen and pelvis.    Interval History: Sarah is doing well. She denies any anorectal complaints. She kindly brought in [employer] samples as a gift for me and my team.    Physical examination:  Examination was chaperoned by Amara Jackson NP      Vitals: /89 (BP Location: Left arm, Patient Position: Sitting, Cuff Size: Adult Large)   Pulse 78   Ht 5' 5\"   Wt 210 lb   SpO2 97%   BMI 34.95 kg/m    BMI= Body mass index is 34.95 kg/m .    Sarah looks well and healthy. Digital rectal examination reveals no masses.  Flexible sigmoidoscopy to 25 cm. Faint scar approximately 3 cm from the anal margin left lateral visualized on retroflexion and is very hard to see definitively on straight view. No evidence of recurrent tumor.    Laboratory data:     1/8/2020 17:51 5/5/2020 10:40 8/19/2020 11:11 3/16/2021 12:06 7/26/2021 15:19   CEA <0.5 <0.5 <0.5 <0.5 <0.5       Assessment/plan:  Sarah is doing very well now.  There is no evidence of tumor recurrence on examination. Follow up per protocol below. Patient's questions were answered to her stated satisfaction and she is in agreement with this plan.    Follow up per Watch and Wait Protocol:   Completed CRT: 8/15/2018    Flexible sigmoidoscopy   ? Every 2 months for 6 months: Until 2/2019-COMPLETED   ? Every 3 months for 3 years: Until 8/2021-COMPLETED  ? Every 6 months for 5 years: Until 2023-Due 6/2022 but will get colonoscopy in 5/2022 and flex in 11/2022  ? Every year for 10 years: Until 2028       3T MRI of pelvis  ? 6-8 weeks after CRT: COMPLETED  ? Every 4 months for 2 years: Until 8/2021-COMPLETED  ? Every 6 " months for 2 years: Until 2023-DUE 2022  ? Yearly for 2 years: Until        CT CAP  ? Every year for 6-8 years: Until -Due 2022      Colonoscopy   ? Last 19-Repeat 2022       CEA at every clinic or endoscopic visit    For details of past medical history, surgical history, family history, medications, allergies, and review of systems, please see details below.    Medical history:  Past Medical History:   Diagnosis Date     Depression      GERD (gastroesophageal reflux disease)      History of smoking      Insomnia      Obesity      Rectal cancer (H)      Rectal cancer (H)      Restless leg syndrome      Seasonal affective disorder (H)        Surgical history:  Past Surgical History:   Procedure Laterality Date      SECTION      x2      SECTION       COLONOSCOPY       COLONOSCOPY       INSERT PORT VASCULAR ACCESS Right 2018    Procedure: INSERT PORT VASCULAR ACCESS;  central venous Chest Port Placement;  Surgeon: Gaurav Yates PA-C;  Location: [facility] OR     LASIK       LASIK         Family history:  Family History   Problem Relation Age of Onset     Hyperlipidemia Mother      Hypertension Mother      Gastrointestinal Disease Mother         ischemic colitis     Coronary Artery Disease Mother         stents x 3     Anesthesia Reaction Mother         hypotension, PONV     Hyperlipidemia Father      Hypertension Father      Breast Cancer Maternal Grandmother      Coronary Artery Disease Maternal Grandfather      Myocardial Infarction Maternal Grandfather      Colon Cancer Paternal Grandmother      Breast Cancer Maternal Aunt      Breast Cancer Paternal Aunt      Coronary Artery Disease Paternal Grandfather      Cerebrovascular Disease Paternal Grandfather      Family History Negative Brother      Coronary Artery Disease Maternal Uncle        Medications:  Current Outpatient Medications   Medication Sig Dispense Refill     Acetaminophen (TYLENOL PO) Take 1,000 mg by mouth At  "Bedtime       amitriptyline 10 MG PO tablet TAKE 1 TO 2 TABLETS BY MOUTH DAILY AT BEDTIME       atorvastatin 20 MG PO tablet Take 40 mg by mouth       lidocaine-prilocaine (EMLA) cream Apply 45 minutes prior to procedure. 30 g 3     RANITIDINE HCL PO Take 150 mg by mouth daily as needed for heartburn       calcium carbonate (TUMS) 500 MG chewable tablet Take 1-2 chew tab by mouth daily  (Patient not taking: Reported on 2021) 150 tablet      FLUoxetine 20 MG PO capsule  (Patient not taking: Reported on 2021)         Allergies:  The patientis allergic to inapsine [droperidol] and tegaderm transparent dressing (informational only).    Social history:  Social History     Tobacco Use     Smoking status: Former Smoker     Packs/day: 0.10     Years: 8.00     Pack years: 0.80     Types: Cigarettes     Quit date: 1986     Years since quittin.8     Smokeless tobacco: Never Used   Substance Use Topics     Alcohol use: Yes     Alcohol/week: 7.0 standard drinks     Types: 7 Glasses of wine per week     Marital status: .    Review of Systems:  Nursing Notes:   Margoth Willett  2021 10:52 AM  Signed  Chief Complaint   Patient presents with     Flexible Sigmoidoscopy       Vitals:    21 1049   BP: 130/89   BP Location: Left arm   Patient Position: Sitting   Cuff Size: Adult Large   Pulse: 78   SpO2: 97%   Weight: 210 lb   Height: 5' 5\"       Body mass index is 34.95 kg/m .    Margoth Willett CMA         10 minutes spent on the date of the encounter doing chart review, history and exam, documentation and further activities as noted above with an additional 10 minutes for flexible sigmoidoscopy.        Óscar Hampton MD   Professor and Chief  Division of Colon and Rectal Surgery  [facility]      Referring Provider:  Referred Self, MD  No address on file     Primary Care Provider:  Kyra Mortensen    This note was created using speech recognition software and may " contain unintended word substitutions.

## 2021-12-16 ENCOUNTER — TELEPHONE (OUTPATIENT)
Dept: GASTROENTEROLOGY | Facility: CLINIC | Age: 56
End: 2021-12-16
Payer: COMMERCIAL

## 2021-12-16 NOTE — TELEPHONE ENCOUNTER
Screening Questions  1. Are you active on mychart? Yes     2. What insurance is in the chart? [facility]     2.  Ordering/Referring Provider: Óscar Hampton MD     3. BMI 34.9, If greater than 40 review exclusion criteria    4.  Respiratory Screening (If yes to any of the following Hospital setting only):     Do you use daily home oxygen? N  Do you have mod to severe Obstructive Sleep Apnea? N   Do you have Pulmonary Hypertension? N   Do you have UNCONTROLLED asthma? N    5. Have you had a heart or lung transplant (If yes, please review exclusion criteria) ? N    6. Are you currently on dialysis or have chronic kidney disease? N    7. Have you had a stroke or Transient ischemic attack (TIA) within 6 months? N    8. In the past 6 months, have you had any heart related issues including cardiomyopathy or heart attack? N                 If yes, did it require cardiac stenting or other implantable device?N      9. Do you have any implantable devices in your body (pacemaker, defib, LVAD)? N    10. Do you take nitroglycerin? If yes, how often? N    11. Are you currently taking any blood thinners?N    12. Are you a diabetic? N    13. (Females) Are you currently pregnant? N  If yes, how many weeks?      15. Are you taking any prescription pain medications on a routine schedule? N If yes, MAC sedation.    16. Do you have any chemical dependencies such as alcohol, street drugs, or methadone? NIf yes, MAC sedation.    17. Do you have any history of post-traumatic stress syndrome, severe anxiety or history of psychosis? N    18. Do you transfer independently? Y    19.  Do you have any issues with constipation? N    20. Preferred Pharmacy for Pre Prescription [facility]/ On Chart     Scheduling Details    Which Colonoscopy Prep was Sent?:    Procedure Scheduled: Colonosocpy   Surgeon: Berta   Date of Procedure: 05/09/2022   Location: [facility]   Caller (Please ask for phone number if not scheduled by patient): Sarah Rajan       Sedation  Type: MAC   Conscious Sedation- Needs  for 6 hours after the procedure  MAC/General-Needs  for 24 hours after procedure    Pre-op Required at [facility], [place], [place] and OR for MAC sedation:   (if yes advise patient they will need a pre-op prior to procedure)      Is patient on blood thinners? -N (If yes- inform patient to follow up with PCP or provider for follow up instructions)     Informed patient they will need an adult  Y  Cannot take any type of public or medical transportation alone    Pre-Procedure Covid test to be completed at [facility] or Externally: Y    Confirmed Nurse will call to complete assessment Y    Additional comments: N

## 2022-01-10 ENCOUNTER — LAB (OUTPATIENT)
Dept: LAB | Facility: CLINIC | Age: 57
End: 2022-01-10
Payer: COMMERCIAL

## 2022-01-10 ENCOUNTER — MYC MEDICAL ADVICE (OUTPATIENT)
Dept: ONCOLOGY | Facility: CLINIC | Age: 57
End: 2022-01-10

## 2022-01-10 ENCOUNTER — ANCILLARY PROCEDURE (OUTPATIENT)
Dept: MRI IMAGING | Facility: CLINIC | Age: 57
End: 2022-01-10
Attending: INTERNAL MEDICINE
Payer: COMMERCIAL

## 2022-01-10 DIAGNOSIS — C20 RECTAL CANCER (H): ICD-10-CM

## 2022-01-10 DIAGNOSIS — M21.852 DEFORMITY OF FEMUR, LEFT: ICD-10-CM

## 2022-01-10 LAB
HOLD SPECIMEN: NORMAL

## 2022-01-10 PROCEDURE — A9585 GADOBUTROL INJECTION: HCPCS | Performed by: RADIOLOGY

## 2022-01-10 PROCEDURE — 72197 MRI PELVIS W/O & W/DYE: CPT | Performed by: RADIOLOGY

## 2022-01-10 PROCEDURE — 85025 COMPLETE CBC W/AUTO DIFF WBC: CPT

## 2022-01-10 PROCEDURE — 80053 COMPREHEN METABOLIC PANEL: CPT

## 2022-01-10 PROCEDURE — 250N000011 HC RX IP 250 OP 636: Performed by: INTERNAL MEDICINE

## 2022-01-10 PROCEDURE — 36591 DRAW BLOOD OFF VENOUS DEVICE: CPT | Performed by: INTERNAL MEDICINE

## 2022-01-10 PROCEDURE — 36591 DRAW BLOOD OFF VENOUS DEVICE: CPT

## 2022-01-10 RX ORDER — HEPARIN SODIUM (PORCINE) LOCK FLUSH IV SOLN 100 UNIT/ML 100 UNIT/ML
5 SOLUTION INTRAVENOUS ONCE
Status: COMPLETED | OUTPATIENT
Start: 2022-01-10 | End: 2022-01-10

## 2022-01-10 RX ORDER — GADOBUTROL 604.72 MG/ML
10 INJECTION INTRAVENOUS ONCE
Status: COMPLETED | OUTPATIENT
Start: 2022-01-10 | End: 2022-01-10

## 2022-01-10 RX ADMIN — GADOBUTROL 10 ML: 604.72 INJECTION INTRAVENOUS at 12:16

## 2022-01-10 RX ADMIN — Medication 5 ML: at 13:44

## 2022-01-10 NOTE — NURSING NOTE
Chief Complaint   Patient presents with     Port Draw     Labs drawn via port by RN.     Labs drawn via port by RN. Port was flushed NS and heparin. De-accessed. Pt tolerated well.  Pt did not have lab orders. Extra tubes were drawn and sent to 1st floor lab for processing. An in-box message and page were sent to the provider.    Gaurav Hernandez RN

## 2022-01-11 DIAGNOSIS — C20 RECTAL CANCER (H): Primary | ICD-10-CM

## 2022-01-11 LAB
ALBUMIN SERPL-MCNC: 4 G/DL (ref 3.4–5)
ALP SERPL-CCNC: 102 U/L (ref 40–150)
ALT SERPL W P-5'-P-CCNC: 36 U/L (ref 0–50)
ANION GAP SERPL CALCULATED.3IONS-SCNC: 9 MMOL/L (ref 3–14)
AST SERPL W P-5'-P-CCNC: 15 U/L (ref 0–45)
BASOPHILS # BLD AUTO: 0 10E3/UL (ref 0–0.2)
BASOPHILS NFR BLD AUTO: 1 %
BILIRUB SERPL-MCNC: 0.5 MG/DL (ref 0.2–1.3)
BUN SERPL-MCNC: 14 MG/DL (ref 7–30)
CALCIUM SERPL-MCNC: 8.8 MG/DL (ref 8.5–10.1)
CHLORIDE BLD-SCNC: 107 MMOL/L (ref 94–109)
CO2 SERPL-SCNC: 26 MMOL/L (ref 20–32)
CREAT SERPL-MCNC: 0.73 MG/DL (ref 0.52–1.04)
EOSINOPHIL # BLD AUTO: 0.1 10E3/UL (ref 0–0.7)
EOSINOPHIL NFR BLD AUTO: 2 %
ERYTHROCYTE [DISTWIDTH] IN BLOOD BY AUTOMATED COUNT: 13 % (ref 10–15)
GFR SERPL CREATININE-BSD FRML MDRD: >90 ML/MIN/1.73M2
GLUCOSE BLD-MCNC: 76 MG/DL (ref 70–99)
HCT VFR BLD AUTO: 41.7 % (ref 35–47)
HGB BLD-MCNC: 13.8 G/DL (ref 11.7–15.7)
IMM GRANULOCYTES # BLD: 0 10E3/UL
IMM GRANULOCYTES NFR BLD: 1 %
LYMPHOCYTES # BLD AUTO: 1.2 10E3/UL (ref 0.8–5.3)
LYMPHOCYTES NFR BLD AUTO: 15 %
MCH RBC QN AUTO: 29.9 PG (ref 26.5–33)
MCHC RBC AUTO-ENTMCNC: 33.1 G/DL (ref 31.5–36.5)
MCV RBC AUTO: 90 FL (ref 78–100)
MONOCYTES # BLD AUTO: 0.5 10E3/UL (ref 0–1.3)
MONOCYTES NFR BLD AUTO: 6 %
NEUTROPHILS # BLD AUTO: 6.4 10E3/UL (ref 1.6–8.3)
NEUTROPHILS NFR BLD AUTO: 75 %
NRBC # BLD AUTO: 0 10E3/UL
NRBC BLD AUTO-RTO: 0 /100
PLATELET # BLD AUTO: 208 10E3/UL (ref 150–450)
POTASSIUM BLD-SCNC: 4 MMOL/L (ref 3.4–5.3)
PROT SERPL-MCNC: 7 G/DL (ref 6.8–8.8)
RBC # BLD AUTO: 4.62 10E6/UL (ref 3.8–5.2)
SODIUM SERPL-SCNC: 142 MMOL/L (ref 133–144)
WBC # BLD AUTO: 8.2 10E3/UL (ref 4–11)

## 2022-01-13 ENCOUNTER — VIRTUAL VISIT (OUTPATIENT)
Dept: ONCOLOGY | Facility: CLINIC | Age: 57
End: 2022-01-13
Attending: INTERNAL MEDICINE
Payer: COMMERCIAL

## 2022-01-13 DIAGNOSIS — C20 RECTAL CANCER (H): Primary | ICD-10-CM

## 2022-01-13 DIAGNOSIS — M21.852 DEFORMITY OF FEMUR, LEFT: ICD-10-CM

## 2022-01-13 PROCEDURE — 99214 OFFICE O/P EST MOD 30 MIN: CPT | Mod: GT | Performed by: INTERNAL MEDICINE

## 2022-01-13 NOTE — LETTER
1/13/2022      RE: Sarah RICE Satinder  [address] [facility] [address]       Oncology outpatient note    Date of service: 1/13/22       PC: Rectal cancer. [id number] - MSI Intact    HPI:  Please see previous note for details. I have copied and updated from prior note.  She is a 56-year-old female noticed BRBPR so Screening colonoscopy on 1/9/2018 revealed 3cm rectal mass and other polyps which were removed.  Pathology indicated moderately differentiated adenocarcinoma w/ intact MMR proteins.  CT staging scan showed no metastatic disease; some liver lesions which are thought to be benign per radiology report, T2N1M0 by pelvic MRI read at [facility]. When we initially reviewed her MRI we will not exactly sure whether that was lymph node involvement or not. We opted to repeat MRI which showed T4 N0 lesion. There was up to take her to surgery as there was possibility of personally lesion without lymph node involvement but because of the findings of repeat MRI the surgery was canceled and she is coming back to discuss neoadjuvant treatment.  We also did an MRI of the liver which showed 3 subcentimeter liver lesions which were too small to characterize and will need attention on follow-up.    Baseline CEA 1.1 on 1/9/18.    She started neoadjuvant 5-FU and oxaliplatin (FOLFOX), which she started on 2/26/18. The day after she received cycle 2, she developed fevers, chills, headache, and an itchy rash on her arms and legs, for which she was evaluated in the ED. She was sent home on Benadryl. Benadryl and dexamethasone doses were increased for cycle 3.   She tolerated that cycle well    C#4 was given on 4/10/18    She completed 8 cycles of FOLFOX on 6/5/18    Repeat scans show good response to treatment without evidence of metastatic disease. There is only a small signal consistent with residual tumor seen in the primary rectal cancer lesion.    She started on concurrent chemoradiation with Xeloda with radiation beginning  on 7/9/18. There was a delay in her receiving Xeloda, so she began that on 7/10/18. She completed it on 8/16/18    Repeat scans show great response to treatment with almost completely fibrotic signal.  There is an indeterminate left intertrochanteric lesion which is stable.  Liver lesion is consistent with benign hemangioma vs cyst    She was seen by Dr Hampton and the decision was made to keep her on watchful waiting.    Her MRI of the pelvis in December 2018 was stable but it showed rectal wall edema all the flexible sigmoidoscopy was negative.  Decision was made to do a short term follow-up MRI.      Left intertrochanteric lesion-we did MRI of the left hip which also showed a nonspecific T1 hypointense lesion in the proximal left femur which has not changed since 6/20/2018.  Repeat MRI in December 2018 , 5/14/19 and 8/20/19 were also stable.      Pelvis MRI in Feb 2019 was stable.    Colonoscopy on 5/13/19 was without evidence of recurrence- Next due in 3 years.     Pelvis MRI on 5/5/2020 continues to show complete response to treatment.    Flex sig on 5/5/2020 was also unremarkable.    Flex sig on 12/15/2020 was unremarkable.  MRI on 3/8/2021 was without evidence of recurrence.  It continues to show complete response.    Interval history    [facility]  She is doing well.  She denies any abdominal pain, nausea vomiting diarrhea constipation or bleeding issues.  No new swellings.  Energy has been good.  Neuropathy is very mild in the bottom of her feet.  No weight loss.     ECOG 0    ROS:  Otherwise a comprehensive review of the system was unremarkable.      I reviewed other hx in EPIC as below    PMH:  Depression  Restless leg syndrome  Dyslipidemia    Current Outpatient Medications   Medication     Acetaminophen (TYLENOL PO)     amitriptyline 10 MG PO tablet     atorvastatin 20 MG PO tablet     calcium carbonate (TUMS) 500 MG chewable tablet     FLUoxetine 20 MG PO capsule      lidocaine-prilocaine (EMLA) cream     RANITIDINE HCL PO     No current facility-administered medications for this visit.     Facility-Administered Medications Ordered in Other Visits   Medication     heparin 100 UNIT/ML injection 5 mL     heparin 100 UNIT/ML injection 500 Units         FHx  Aunt and maternal grandmother had breast cancer  Pateral grandmother  of colorectal cancer  Mother had ischemic colitis    SHx:  , two teenage children  EtOH: 1 drink daily, occasionally more on weekends  Tobacco: never smoker  She is a microbiologist     Allergies   Allergen Reactions     Inapsine [Droperidol] Other (See Comments)     Elevated blood pressure and increased heart rate     Tegaderm Transparent Dressing (Informational Only) Itching     Pt had reaction to Tegaderm used for port dressing. Whole area was very red and itchy. Please use IV 3000 instead.        Exam:  There were no vitals taken for this visit.   Wt Readings from Last 4 Encounters:   21 95.3 kg (210 lb)   21 94.8 kg (209 lb)   21 95.8 kg (211 lb 1.6 oz)   21 95.8 kg (211 lb 3.2 oz)           Constitutional.  Does not seem to be in any acute distress.  Eyes.  No redness or discharge noted.  Respiratory.  Speaking in full sentences.  Breathing seems comfortable without any accessory use of muscles.    Skin.  Visualized his skin does not show any obvious rashes.  Musculoskeletal.  Range of motion for visualized areas is intact.  Neurological.  Alert and oriented x3.  Psychiatric.  Mood, mentation and affect are normal.  Decision making capacity is intact.      The rest of a comprehensive physical examination is deferred due to Public Health Emergency video visit restrictions.        Labs/Imaging.  Reviewed  01/10/2022  CBC unremarkable.  CMP unremarkable.  CEA <0.5 on 2021    1/10/2022.  MRI of the pelvis showed continued complete response to treatment with a corresponding tumor regression grade of one.  No suspicious  pelvic lymphadenopathy.  Stable left femoral intertrochanteric region lesion.    CT CAP on 7/26/2021 does not show any evidence of recurrence.           Assessment and Plan  [id number] moderately differentiated adenocarcinoma of the rectum - MSI-stable  She was started on neoadjuvant FOLFOX on 2/26/2018.  after 4 cycles she had good response to treatment. Liver lesions remain indeterminate.    we continued with the same chemotherapy and she received cycle #8 on 6/20/18.  Repeat scans show good response to treatment with only a small signal consistent with viable tumor in the primary rectal cancer. No surrounding lymph nodes suspicious. There is no evidence of metastatic disease.    She started on concurrent chemoradiation with Xeloda with radiation beginning on 7/9/18. There was a delay in her receiving Xeloda, so she began on 7/10/18. She completed it on 8/16/18    Repeat scans show great response to treatment with almost completely fibrotic signal.  There is an indeterminate left intertrochanteric lesion which was stable.    She was seen by Dr Hampton and the decision was made to keep her on watchful waiting.      Currently doing well.  There is no evidence of recurrence.  I also reviewed notes from Dr. Hampton.  She had a flexible sigmoidoscopy on 12/14/2021 which was unremarkable.    Pelvic MRI in January 2022 continues to show complete response.    CT chest abdomen and pelvis was stable in July 2021.      Going forward plan is to repeat MRI pelvis and CT chest abdomen pelvis in 6 months.  She will be due for colonoscopy in May 2022.  We will check CEA serially.        She was seen in Cancer Survivorship clinic.    Surveillance per protocol:  Follow up per Watch and Wait Protocol:   Completed CRT: 8/16/2018    Flexible sigmoidoscopy   ? Every 2 months for 6 months: Until 2/2019-COMPLETED   ? Every 3 months for 3 years: Until 8/2021-COMPLETED  ? Every 6 months for 5 years: Until 2023- Due 6/2022 ( will do colonoscopy  in 5/22 and flex sig in 11/2022)  ? Every year for 10 years: Until 2028       3T MRI of pelvis  ? 6-8 weeks after CRT: COMPLETED  ? Every 4 months for 2 years: Until 8/2020-Completed  ? Every 6 months for 2 years: Until 8/2022- Due July 2022  ? Yearly for 2 years: Until 2024       CT CAP  ? Every year for 6-8 years: Until 2026-Due 7/2022        Colonoscopy   ? Last 5/13/19-Repeat 5/2022       CEA at every clinic or endoscopic visit      Left intertrochanteric lesion-  This has been stable which is wonderful news and points towards its benign nature. Cont to keep a watch on this with serial Pelvis MRI which will be done as part of surveillance for rectal cancer.    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  It is not hampering her activities.  Continue to observe.      Port-A-Cath.  She is thinking about removing the Port-A-Cath after she had repeat testing in 6 months.  I believe it would be reasonable to do that.  In the meantime she will continue with port flushes every 8 weeks.       We did not address the following today.  Right foot ganglion cyst.  I recommend follow-up with PCP.        Indeterminate liver lesion. MRI 2/5/19 shows stable lesion, most likely c/w benign hemangioma or cyst.  At this time I do not plan to repeat MRI abdomen but when we will do CT scan, it will be monitored.      Return to clinic in 6 months with labs/MRI/CT scan prior.    I answered all of her questions to her satisfaction.  She is agreeable and comfortable with the plan.      Sarah is a 56 year old who is being evaluated via a billable video visit.      How would you like to obtain your AVS? MyChart  If the video visit is dropped, the invitation should be resent by: Text to cell phone: [phone]   Will anyone else be joining your video visit? No      Colt Mortensen MD

## 2022-01-13 NOTE — LETTER
1/13/2022         RE: Sarah Rajan  [address] [facility] [id number]        Dear Colleague,    Thank you for referring your patient, Sarah Rajan, to the [facility]. Please see a copy of my visit note below.    Oncology outpatient note    Date of service: 1/13/22       PC: Rectal cancer. [id number] - MSI Intact    HPI:  Please see previous note for details. I have copied and updated from prior note.  She is a 56-year-old female noticed BRBPR so Screening colonoscopy on 1/9/2018 revealed 3cm rectal mass and other polyps which were removed.  Pathology indicated moderately differentiated adenocarcinoma w/ intact MMR proteins.  CT staging scan showed no metastatic disease; some liver lesions which are thought to be benign per radiology report, T2N1M0 by pelvic MRI read at [facility]. When we initially reviewed her MRI we will not exactly sure whether that was lymph node involvement or not. We opted to repeat MRI which showed T4 N0 lesion. There was up to take her to surgery as there was possibility of personally lesion without lymph node involvement but because of the findings of repeat MRI the surgery was canceled and she is coming back to discuss neoadjuvant treatment.  We also did an MRI of the liver which showed 3 subcentimeter liver lesions which were too small to characterize and will need attention on follow-up.    Baseline CEA 1.1 on 1/9/18.    She started neoadjuvant 5-FU and oxaliplatin (FOLFOX), which she started on 2/26/18. The day after she received cycle 2, she developed fevers, chills, headache, and an itchy rash on her arms and legs, for which she was evaluated in the ED. She was sent home on Benadryl. Benadryl and dexamethasone doses were increased for cycle 3.   She tolerated that cycle well    C#4 was given on 4/10/18    She completed 8 cycles of FOLFOX on 6/5/18    Repeat scans show good response to treatment without evidence of metastatic disease. There is  only a small signal consistent with residual tumor seen in the primary rectal cancer lesion.    She started on concurrent chemoradiation with Xeloda with radiation beginning on 7/9/18. There was a delay in her receiving Xeloda, so she began that on 7/10/18. She completed it on 8/16/18    Repeat scans show great response to treatment with almost completely fibrotic signal.  There is an indeterminate left intertrochanteric lesion which is stable.  Liver lesion is consistent with benign hemangioma vs cyst    She was seen by Dr Hampton and the decision was made to keep her on watchful waiting.    Her MRI of the pelvis in December 2018 was stable but it showed rectal wall edema all the flexible sigmoidoscopy was negative.  Decision was made to do a short term follow-up MRI.      Left intertrochanteric lesion-we did MRI of the left hip which also showed a nonspecific T1 hypointense lesion in the proximal left femur which has not changed since 6/20/2018.  Repeat MRI in December 2018 , 5/14/19 and 8/20/19 were also stable.      Pelvis MRI in Feb 2019 was stable.    Colonoscopy on 5/13/19 was without evidence of recurrence- Next due in 3 years.     Pelvis MRI on 5/5/2020 continues to show complete response to treatment.    Flex sig on 5/5/2020 was also unremarkable.    Flex sig on 12/15/2020 was unremarkable.  MRI on 3/8/2021 was without evidence of recurrence.  It continues to show complete response.    Interval history    This is a video visit through [facility].  She is doing well.  She denies any abdominal pain, nausea vomiting diarrhea constipation or bleeding issues.  No new swellings.  Energy has been good.  Neuropathy is very mild in the bottom of her feet.  No weight loss.     ECOG 0    ROS:  Otherwise a comprehensive review of the system was unremarkable.      I reviewed other hx in EPIC as below    PMH:  Depression  Restless leg syndrome  Dyslipidemia    Current Outpatient Medications   Medication     Acetaminophen  (TYLENOL PO)     amitriptyline 10 MG PO tablet     atorvastatin 20 MG PO tablet     calcium carbonate (TUMS) 500 MG chewable tablet     FLUoxetine 20 MG PO capsule     lidocaine-prilocaine (EMLA) cream     RANITIDINE HCL PO     No current facility-administered medications for this visit.     Facility-Administered Medications Ordered in Other Visits   Medication     heparin 100 UNIT/ML injection 5 mL     heparin 100 UNIT/ML injection 500 Units         FHx  Aunt and maternal grandmother had breast cancer  Pateral grandmother  of colorectal cancer  Mother had ischemic colitis    SHx:  , two teenage children  EtOH: 1 drink daily, occasionally more on weekends  Tobacco: never smoker  She is a microbiologist     Allergies   Allergen Reactions     Inapsine [Droperidol] Other (See Comments)     Elevated blood pressure and increased heart rate     Tegaderm Transparent Dressing (Informational Only) Itching     Pt had reaction to Tegaderm used for port dressing. Whole area was very red and itchy. Please use IV 3000 instead.        Exam:  There were no vitals taken for this visit.   Wt Readings from Last 4 Encounters:   21 95.3 kg (210 lb)   21 94.8 kg (209 lb)   21 95.8 kg (211 lb 1.6 oz)   21 95.8 kg (211 lb 3.2 oz)           Constitutional.  Does not seem to be in any acute distress.  Eyes.  No redness or discharge noted.  Respiratory.  Speaking in full sentences.  Breathing seems comfortable without any accessory use of muscles.    Skin.  Visualized his skin does not show any obvious rashes.  Musculoskeletal.  Range of motion for visualized areas is intact.  Neurological.  Alert and oriented x3.  Psychiatric.  Mood, mentation and affect are normal.  Decision making capacity is intact.      The rest of a comprehensive physical examination is deferred due to Public Health Emergency video visit restrictions.        Labs/Imaging.  Reviewed  01/10/2022  CBC unremarkable.  CMP unremarkable.  CEA  <0.5 on 12/14/2021    1/10/2022.  MRI of the pelvis showed continued complete response to treatment with a corresponding tumor regression grade of one.  No suspicious pelvic lymphadenopathy.  Stable left femoral intertrochanteric region lesion.    CT CAP on 7/26/2021 does not show any evidence of recurrence.           Assessment and Plan  [id number] moderately differentiated adenocarcinoma of the rectum - MSI-stable  She was started on neoadjuvant FOLFOX on 2/26/2018.  after 4 cycles she had good response to treatment. Liver lesions remain indeterminate.    we continued with the same chemotherapy and she received cycle #8 on 6/20/18.  Repeat scans show good response to treatment with only a small signal consistent with viable tumor in the primary rectal cancer. No surrounding lymph nodes suspicious. There is no evidence of metastatic disease.    She started on concurrent chemoradiation with Xeloda with radiation beginning on 7/9/18. There was a delay in her receiving Xeloda, so she began on 7/10/18. She completed it on 8/16/18    Repeat scans show great response to treatment with almost completely fibrotic signal.  There is an indeterminate left intertrochanteric lesion which was stable.    She was seen by Dr Hampton and the decision was made to keep her on watchful waiting.      Currently doing well.  There is no evidence of recurrence.  I also reviewed notes from Dr. Hampton.  She had a flexible sigmoidoscopy on 12/14/2021 which was unremarkable.    Pelvic MRI in January 2022 continues to show complete response.    CT chest abdomen and pelvis was stable in July 2021.      Going forward plan is to repeat MRI pelvis and CT chest abdomen pelvis in 6 months.  She will be due for colonoscopy in May 2022.  We will check CEA serially.        She was seen in Cancer Survivorship clinic.    Surveillance per protocol:  Follow up per Watch and Wait Protocol:   Completed CRT: 8/16/2018    Flexible sigmoidoscopy   ? Every 2 months  for 6 months: Until 2/2019-COMPLETED   ? Every 3 months for 3 years: Until 8/2021-COMPLETED  ? Every 6 months for 5 years: Until 2023- Due 6/2022 ( will do colonoscopy in 5/22 and flex sig in 11/2022)  ? Every year for 10 years: Until 2028       3T MRI of pelvis  ? 6-8 weeks after CRT: COMPLETED  ? Every 4 months for 2 years: Until 8/2020-Completed  ? Every 6 months for 2 years: Until 8/2022- Due July 2022  ? Yearly for 2 years: Until 2024       CT CAP  ? Every year for 6-8 years: Until 2026-Due 7/2022        Colonoscopy   ? Last 5/13/19-Repeat 5/2022       CEA at every clinic or endoscopic visit      Left intertrochanteric lesion-  This has been stable which is wonderful news and points towards its benign nature. Cont to keep a watch on this with serial Pelvis MRI which will be done as part of surveillance for rectal cancer.    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  It is not hampering her activities.  Continue to observe.      Port-A-Cath.  She is thinking about removing the Port-A-Cath after she had repeat testing in 6 months.  I believe it would be reasonable to do that.  In the meantime she will continue with port flushes every 8 weeks.       We did not address the following today.  Right foot ganglion cyst.  I recommend follow-up with PCP.        Indeterminate liver lesion. MRI 2/5/19 shows stable lesion, most likely c/w benign hemangioma or cyst.  At this time I do not plan to repeat MRI abdomen but when we will do CT scan, it will be monitored.      Return to clinic in 6 months with labs/MRI/CT scan prior.    I answered all of her questions to her satisfaction.  She is agreeable and comfortable with the plan.    Sarah is a 56 year old who is being evaluated via a billable video visit.      How would you like to obtain your AVS? MyChart  If the video visit is dropped, the invitation should be resent by: Text to cell phone: [phone]   Will  anyone else be joining your video visit? No    Colt Mccabe            Again, thank you for allowing me to participate in the care of your patient.      Sincerely,    Kyra Mortensen MD

## 2022-01-13 NOTE — PROGRESS NOTES
Oncology outpatient note    Date of service: 1/13/22       PC: Rectal cancer. [id number] - MSI Intact    HPI:  Please see previous note for details. I have copied and updated from prior note.  She is a 56-year-old female noticed BRBPR so Screening colonoscopy on 1/9/2018 revealed 3cm rectal mass and other polyps which were removed.  Pathology indicated moderately differentiated adenocarcinoma w/ intact MMR proteins.  CT staging scan showed no metastatic disease; some liver lesions which are thought to be benign per radiology report, T2N1M0 by pelvic MRI read at [facility]. When we initially reviewed her MRI we will not exactly sure whether that was lymph node involvement or not. We opted to repeat MRI which showed T4 N0 lesion. There was up to take her to surgery as there was possibility of personally lesion without lymph node involvement but because of the findings of repeat MRI the surgery was canceled and she is coming back to discuss neoadjuvant treatment.  We also did an MRI of the liver which showed 3 subcentimeter liver lesions which were too small to characterize and will need attention on follow-up.    Baseline CEA 1.1 on 1/9/18.    She started neoadjuvant 5-FU and oxaliplatin (FOLFOX), which she started on 2/26/18. The day after she received cycle 2, she developed fevers, chills, headache, and an itchy rash on her arms and legs, for which she was evaluated in the ED. She was sent home on Benadryl. Benadryl and dexamethasone doses were increased for cycle 3.   She tolerated that cycle well    C#4 was given on 4/10/18    She completed 8 cycles of FOLFOX on 6/5/18    Repeat scans show good response to treatment without evidence of metastatic disease. There is only a small signal consistent with residual tumor seen in the primary rectal cancer lesion.    She started on concurrent chemoradiation with Xeloda with radiation beginning on 7/9/18. There was a delay in her receiving Xeloda, so she began that on 7/10/18.  She completed it on 8/16/18    Repeat scans show great response to treatment with almost completely fibrotic signal.  There is an indeterminate left intertrochanteric lesion which is stable.  Liver lesion is consistent with benign hemangioma vs cyst    She was seen by Dr Hampton and the decision was made to keep her on watchful waiting.    Her MRI of the pelvis in December 2018 was stable but it showed rectal wall edema all the flexible sigmoidoscopy was negative.  Decision was made to do a short term follow-up MRI.      Left intertrochanteric lesion-we did MRI of the left hip which also showed a nonspecific T1 hypointense lesion in the proximal left femur which has not changed since 6/20/2018.  Repeat MRI in December 2018 , 5/14/19 and 8/20/19 were also stable.      Pelvis MRI in Feb 2019 was stable.    Colonoscopy on 5/13/19 was without evidence of recurrence- Next due in 3 years.     Pelvis MRI on 5/5/2020 continues to show complete response to treatment.    Flex sig on 5/5/2020 was also unremarkable.    Flex sig on 12/15/2020 was unremarkable.  MRI on 3/8/2021 was without evidence of recurrence.  It continues to show complete response.    Interval history    This is a video visit through [employer].  She is doing well.  She denies any abdominal pain, nausea vomiting diarrhea constipation or bleeding issues.  No new swellings.  Energy has been good.  Neuropathy is very mild in the bottom of her feet.  No weight loss.     ECOG 0    ROS:  Otherwise a comprehensive review of the system was unremarkable.      I reviewed other hx in EPIC as below    PMH:  Depression  Restless leg syndrome  Dyslipidemia    Current Outpatient Medications   Medication     Acetaminophen (TYLENOL PO)     amitriptyline 10 MG PO tablet     atorvastatin 20 MG PO tablet     calcium carbonate (TUMS) 500 MG chewable tablet     FLUoxetine 20 MG PO capsule     lidocaine-prilocaine (EMLA) cream     RANITIDINE HCL PO     No current  facility-administered medications for this visit.     Facility-Administered Medications Ordered in Other Visits   Medication     heparin 100 UNIT/ML injection 5 mL     heparin 100 UNIT/ML injection 500 Units         FHx  Aunt and maternal grandmother had breast cancer  Pateral grandmother  of colorectal cancer  Mother had ischemic colitis    SHx:  , two teenage children  EtOH: 1 drink daily, occasionally more on weekends  Tobacco: never smoker  She is a microbiologist     Allergies   Allergen Reactions     Inapsine [Droperidol] Other (See Comments)     Elevated blood pressure and increased heart rate     Tegaderm Transparent Dressing (Informational Only) Itching     Pt had reaction to Tegaderm used for port dressing. Whole area was very red and itchy. Please use IV 3000 instead.        Exam:  There were no vitals taken for this visit.   Wt Readings from Last 4 Encounters:   21 95.3 kg (210 lb)   21 94.8 kg (209 lb)   21 95.8 kg (211 lb 1.6 oz)   21 95.8 kg (211 lb 3.2 oz)           Constitutional.  Does not seem to be in any acute distress.  Eyes.  No redness or discharge noted.  Respiratory.  Speaking in full sentences.  Breathing seems comfortable without any accessory use of muscles.    Skin.  Visualized his skin does not show any obvious rashes.  Musculoskeletal.  Range of motion for visualized areas is intact.  Neurological.  Alert and oriented x3.  Psychiatric.  Mood, mentation and affect are normal.  Decision making capacity is intact.      The rest of a comprehensive physical examination is deferred due to Public Health Emergency video visit restrictions.        Labs/Imaging.  Reviewed  01/10/2022  CBC unremarkable.  CMP unremarkable.  CEA <0.5 on 2021    1/10/2022.  MRI of the pelvis showed continued complete response to treatment with a corresponding tumor regression grade of one.  No suspicious pelvic lymphadenopathy.  Stable left femoral intertrochanteric region  lesion.    CT CAP on 7/26/2021 does not show any evidence of recurrence.           Assessment and Plan  [id number] moderately differentiated adenocarcinoma of the rectum - MSI-stable  She was started on neoadjuvant FOLFOX on 2/26/2018.  after 4 cycles she had good response to treatment. Liver lesions remain indeterminate.    we continued with the same chemotherapy and she received cycle #8 on 6/20/18.  Repeat scans show good response to treatment with only a small signal consistent with viable tumor in the primary rectal cancer. No surrounding lymph nodes suspicious. There is no evidence of metastatic disease.    She started on concurrent chemoradiation with Xeloda with radiation beginning on 7/9/18. There was a delay in her receiving Xeloda, so she began on 7/10/18. She completed it on 8/16/18    Repeat scans show great response to treatment with almost completely fibrotic signal.  There is an indeterminate left intertrochanteric lesion which was stable.    She was seen by Dr Hampton and the decision was made to keep her on watchful waiting.      Currently doing well.  There is no evidence of recurrence.  I also reviewed notes from Dr. Hampton.  She had a flexible sigmoidoscopy on 12/14/2021 which was unremarkable.    Pelvic MRI in January 2022 continues to show complete response.    CT chest abdomen and pelvis was stable in July 2021.      Going forward plan is to repeat MRI pelvis and CT chest abdomen pelvis in 6 months.  She will be due for colonoscopy in May 2022.  We will check CEA serially.        She was seen in Cancer Survivorship clinic.    Surveillance per protocol:  Follow up per Watch and Wait Protocol:   Completed CRT: 8/16/2018    Flexible sigmoidoscopy   ? Every 2 months for 6 months: Until 2/2019-COMPLETED   ? Every 3 months for 3 years: Until 8/2021-COMPLETED  ? Every 6 months for 5 years: Until 2023- Due 6/2022 ( will do colonoscopy in 5/22 and flex sig in 11/2022)  ? Every year for 10 years: Until  2028       3T MRI of pelvis  ? 6-8 weeks after CRT: COMPLETED  ? Every 4 months for 2 years: Until 8/2020-Completed  ? Every 6 months for 2 years: Until 8/2022- Due July 2022  ? Yearly for 2 years: Until 2024       CT CAP  ? Every year for 6-8 years: Until 2026-Due 7/2022        Colonoscopy   ? Last 5/13/19-Repeat 5/2022       CEA at every clinic or endoscopic visit      Left intertrochanteric lesion-  This has been stable which is wonderful news and points towards its benign nature. Cont to keep a watch on this with serial Pelvis MRI which will be done as part of surveillance for rectal cancer.    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  It is not hampering her activities.  Continue to observe.      Port-A-Cath.  She is thinking about removing the Port-A-Cath after she had repeat testing in 6 months.  I believe it would be reasonable to do that.  In the meantime she will continue with port flushes every 8 weeks.       We did not address the following today.  Right foot ganglion cyst.  I recommend follow-up with PCP.        Indeterminate liver lesion. MRI 2/5/19 shows stable lesion, most likely c/w benign hemangioma or cyst.  At this time I do not plan to repeat MRI abdomen but when we will do CT scan, it will be monitored.      Return to clinic in 6 months with labs/MRI/CT scan prior.    I answered all of her questions to her satisfaction.  She is agreeable and comfortable with the plan.      Kyra Mortensen    Video start time. 8:49 AM  Video stop time. 8:59 AM

## 2022-01-13 NOTE — PROGRESS NOTES
Sarah is a 56 year old who is being evaluated via a billable video visit.      How would you like to obtain your AVS? Ariesohart  If the video visit is dropped, the invitation should be resent by: Text to cell phone: [phone]   Will anyone else be joining your video visit? Tawny Mccabe

## 2022-03-20 DIAGNOSIS — Z11.59 ENCOUNTER FOR SCREENING FOR OTHER VIRAL DISEASES: Primary | ICD-10-CM

## 2022-04-27 ENCOUNTER — TELEPHONE (OUTPATIENT)
Dept: GASTROENTEROLOGY | Facility: CLINIC | Age: 57
End: 2022-04-27

## 2022-04-27 NOTE — TELEPHONE ENCOUNTER
Patient scheduled for colonoscopy on 5.9.2022.     Covid test scheduled: 5.5.2022    Arrival time: 0630    Facility location: [facility]    Sedation type: MAC    Indication for procedure: hx of rectal cancer, screening colonoscopy    Referring provider: Óscar Hampton MD    Bowel prep recommendation: Miralax/Magnesium citrate/Dulcolax    Pre visit planning completed.    Mariana Bernard RN

## 2022-04-28 ENCOUNTER — APPOINTMENT (OUTPATIENT)
Dept: URBAN - METROPOLITAN AREA CLINIC 252 | Age: 57
Setting detail: DERMATOLOGY
End: 2022-04-29

## 2022-04-28 VITALS — WEIGHT: 200 LBS | HEIGHT: 66 IN

## 2022-04-28 DIAGNOSIS — L259 CONTACT DERMATITIS AND OTHER ECZEMA, UNSPECIFIED CAUSE: ICD-10-CM

## 2022-04-28 PROBLEM — L23.9 ALLERGIC CONTACT DERMATITIS, UNSPECIFIED CAUSE: Status: ACTIVE | Noted: 2022-04-28

## 2022-04-28 PROCEDURE — OTHER COUNSELING: OTHER

## 2022-04-28 PROCEDURE — OTHER INTRAMUSCULAR KENALOG: OTHER

## 2022-04-28 PROCEDURE — OTHER PRESCRIPTION: OTHER

## 2022-04-28 PROCEDURE — 99213 OFFICE O/P EST LOW 20 MIN: CPT | Mod: 25

## 2022-04-28 RX ORDER — HYDROXYZINE HYDROCHLORIDE 25 MG/1
25 MG TABLET, FILM COATED ORAL BID
Qty: 30 | Refills: 1 | Status: ERX | COMMUNITY
Start: 2022-04-28

## 2022-04-28 RX ORDER — TRIAMCINOLONE ACETONIDE 1 MG/G
0.1% CREAM TOPICAL BID
Qty: 454 | Refills: 1 | Status: ERX | COMMUNITY
Start: 2022-04-28

## 2022-04-28 ASSESSMENT — LOCATION SIMPLE DESCRIPTION DERM
LOCATION SIMPLE: RIGHT BUTTOCK
LOCATION SIMPLE: ABDOMEN

## 2022-04-28 ASSESSMENT — LOCATION DETAILED DESCRIPTION DERM
LOCATION DETAILED: LEFT RIB CAGE
LOCATION DETAILED: RIGHT RIB CAGE
LOCATION DETAILED: RIGHT BUTTOCK

## 2022-04-28 ASSESSMENT — LOCATION ZONE DERM: LOCATION ZONE: TRUNK

## 2022-04-28 NOTE — TELEPHONE ENCOUNTER
Attempted to contact patient regarding upcoming colonoscopy procedure on 5.9.2022 for pre assessment questions. No answer.     Left message to return call to [phone] #2    Mariana Bernard RN

## 2022-04-28 NOTE — PROCEDURE: COUNSELING
Detail Level: Zone
Patient Specific Counseling (Will Not Stick From Patient To Patient): Discussed T.R.U.E. Patch testing vs. cutting all fragranced or dyed products empirically vs. doing both.

## 2022-04-28 NOTE — HPI: RASH
How Severe Is Your Rash?: moderate
Is This A New Presentation, Or A Follow-Up?: Rash
Additional History: Nothing is new.  Uses Aveeda rosemary mint soap and fragranted detergent. Got all clear and rewashed undergarment. Itch is 9/10 at night. [place] on vacation in a few days. Takes amytryptaline for RLS. Nobody else at home is itchy.

## 2022-05-04 NOTE — TELEPHONE ENCOUNTER
Second attempt for pre-assessment prior to upcoming colonoscopy.    No answer.  Left message to return call [phone] #2    Mariana Bernard RN

## 2022-05-06 NOTE — TELEPHONE ENCOUNTER
Third attempt for pre-assessment prior to upcoming colonoscopy.    No answer.  Left message to return call [phone] #2    COVID test?  COVID scheduling number left on pt's VM.    Dasher message sent.    Mariana Bernard RN

## 2022-05-08 ENCOUNTER — HEALTH MAINTENANCE LETTER (OUTPATIENT)
Age: 57
End: 2022-05-08

## 2022-05-09 ENCOUNTER — TRANSFERRED RECORDS (OUTPATIENT)
Dept: HEALTH INFORMATION MANAGEMENT | Facility: CLINIC | Age: 57
End: 2022-05-09
Payer: COMMERCIAL

## 2022-05-09 ENCOUNTER — LAB REQUISITION (OUTPATIENT)
Dept: LAB | Facility: CLINIC | Age: 57
End: 2022-05-09
Payer: COMMERCIAL

## 2022-05-09 ENCOUNTER — DOCUMENTATION ONLY (OUTPATIENT)
Dept: GASTROENTEROLOGY | Facility: OUTPATIENT CENTER | Age: 57
End: 2022-05-09
Payer: COMMERCIAL

## 2022-05-09 DIAGNOSIS — Z12.11 ENCOUNTER FOR SCREENING COLONOSCOPY: ICD-10-CM

## 2022-05-09 DIAGNOSIS — C20 RECTAL CANCER (H): ICD-10-CM

## 2022-05-09 PROCEDURE — 88305 TISSUE EXAM BY PATHOLOGIST: CPT | Mod: TC,ORL | Performed by: COLON & RECTAL SURGERY

## 2022-05-10 LAB
PATH REPORT.COMMENTS IMP SPEC: NORMAL
PATH REPORT.COMMENTS IMP SPEC: NORMAL
PATH REPORT.FINAL DX SPEC: NORMAL
PATH REPORT.GROSS SPEC: NORMAL
PATH REPORT.MICROSCOPIC SPEC OTHER STN: NORMAL
PATH REPORT.RELEVANT HX SPEC: NORMAL
PHOTO IMAGE: NORMAL

## 2022-05-10 PROCEDURE — 88305 TISSUE EXAM BY PATHOLOGIST: CPT | Mod: 26 | Performed by: PATHOLOGY

## 2022-05-11 ENCOUNTER — MYC MEDICAL ADVICE (OUTPATIENT)
Dept: SURGERY | Facility: CLINIC | Age: 57
End: 2022-05-11
Payer: COMMERCIAL

## 2022-07-14 ENCOUNTER — ANCILLARY PROCEDURE (OUTPATIENT)
Dept: CT IMAGING | Facility: CLINIC | Age: 57
End: 2022-07-14
Attending: INTERNAL MEDICINE
Payer: COMMERCIAL

## 2022-07-14 ENCOUNTER — LAB (OUTPATIENT)
Dept: LAB | Facility: CLINIC | Age: 57
End: 2022-07-14
Payer: COMMERCIAL

## 2022-07-14 DIAGNOSIS — C20 RECTAL CANCER (H): ICD-10-CM

## 2022-07-14 DIAGNOSIS — M21.852 DEFORMITY OF FEMUR, LEFT: ICD-10-CM

## 2022-07-14 DIAGNOSIS — Z95.828 PORT-A-CATH IN PLACE: Primary | ICD-10-CM

## 2022-07-14 LAB
ALBUMIN SERPL-MCNC: 3.7 G/DL (ref 3.4–5)
ALP SERPL-CCNC: 96 U/L (ref 40–150)
ALT SERPL W P-5'-P-CCNC: 31 U/L (ref 0–50)
ANION GAP SERPL CALCULATED.3IONS-SCNC: 8 MMOL/L (ref 3–14)
AST SERPL W P-5'-P-CCNC: 16 U/L (ref 0–45)
BASOPHILS # BLD AUTO: 0 10E3/UL (ref 0–0.2)
BASOPHILS NFR BLD AUTO: 0 %
BILIRUB SERPL-MCNC: 0.6 MG/DL (ref 0.2–1.3)
BUN SERPL-MCNC: 16 MG/DL (ref 7–30)
CALCIUM SERPL-MCNC: 9.1 MG/DL (ref 8.5–10.1)
CEA SERPL-MCNC: 1.1 NG/ML
CHLORIDE BLD-SCNC: 106 MMOL/L (ref 94–109)
CO2 SERPL-SCNC: 27 MMOL/L (ref 20–32)
CREAT SERPL-MCNC: 0.73 MG/DL (ref 0.52–1.04)
EOSINOPHIL # BLD AUTO: 0.1 10E3/UL (ref 0–0.7)
EOSINOPHIL NFR BLD AUTO: 2 %
ERYTHROCYTE [DISTWIDTH] IN BLOOD BY AUTOMATED COUNT: 13.5 % (ref 10–15)
GFR SERPL CREATININE-BSD FRML MDRD: >90 ML/MIN/1.73M2
GLUCOSE BLD-MCNC: 120 MG/DL (ref 70–99)
HCT VFR BLD AUTO: 40.6 % (ref 35–47)
HGB BLD-MCNC: 13.8 G/DL (ref 11.7–15.7)
IMM GRANULOCYTES # BLD: 0 10E3/UL
IMM GRANULOCYTES NFR BLD: 0 %
LYMPHOCYTES # BLD AUTO: 1 10E3/UL (ref 0.8–5.3)
LYMPHOCYTES NFR BLD AUTO: 23 %
MCH RBC QN AUTO: 31 PG (ref 26.5–33)
MCHC RBC AUTO-ENTMCNC: 34 G/DL (ref 31.5–36.5)
MCV RBC AUTO: 91 FL (ref 78–100)
MONOCYTES # BLD AUTO: 0.3 10E3/UL (ref 0–1.3)
MONOCYTES NFR BLD AUTO: 7 %
NEUTROPHILS # BLD AUTO: 3.1 10E3/UL (ref 1.6–8.3)
NEUTROPHILS NFR BLD AUTO: 68 %
NRBC # BLD AUTO: 0 10E3/UL
NRBC BLD AUTO-RTO: 0 /100
PLATELET # BLD AUTO: 179 10E3/UL (ref 150–450)
POTASSIUM BLD-SCNC: 3.6 MMOL/L (ref 3.4–5.3)
PROT SERPL-MCNC: 7.1 G/DL (ref 6.8–8.8)
RBC # BLD AUTO: 4.45 10E6/UL (ref 3.8–5.2)
SODIUM SERPL-SCNC: 141 MMOL/L (ref 133–144)
WBC # BLD AUTO: 4.5 10E3/UL (ref 4–11)

## 2022-07-14 PROCEDURE — 80053 COMPREHEN METABOLIC PANEL: CPT

## 2022-07-14 PROCEDURE — 74177 CT ABD & PELVIS W/CONTRAST: CPT | Performed by: RADIOLOGY

## 2022-07-14 PROCEDURE — 85025 COMPLETE CBC W/AUTO DIFF WBC: CPT

## 2022-07-14 PROCEDURE — 71260 CT THORAX DX C+: CPT | Performed by: RADIOLOGY

## 2022-07-14 PROCEDURE — 82378 CARCINOEMBRYONIC ANTIGEN: CPT

## 2022-07-14 PROCEDURE — 36415 COLL VENOUS BLD VENIPUNCTURE: CPT

## 2022-07-14 PROCEDURE — 250N000011 HC RX IP 250 OP 636: Performed by: INTERNAL MEDICINE

## 2022-07-14 RX ORDER — IOPAMIDOL 755 MG/ML
112 INJECTION, SOLUTION INTRAVASCULAR ONCE
Status: COMPLETED | OUTPATIENT
Start: 2022-07-14 | End: 2022-07-14

## 2022-07-14 RX ORDER — HEPARIN SODIUM (PORCINE) LOCK FLUSH IV SOLN 100 UNIT/ML 100 UNIT/ML
500 SOLUTION INTRAVENOUS ONCE
Status: COMPLETED | OUTPATIENT
Start: 2022-07-14 | End: 2022-07-14

## 2022-07-14 RX ORDER — HEPARIN SODIUM (PORCINE) LOCK FLUSH IV SOLN 100 UNIT/ML 100 UNIT/ML
5 SOLUTION INTRAVENOUS ONCE
Status: COMPLETED | OUTPATIENT
Start: 2022-07-14 | End: 2022-07-14

## 2022-07-14 RX ADMIN — SODIUM CHLORIDE, PRESERVATIVE FREE 5 ML: 5 INJECTION INTRAVENOUS at 10:36

## 2022-07-14 RX ADMIN — IOPAMIDOL 112 ML: 755 INJECTION, SOLUTION INTRAVASCULAR at 10:50

## 2022-07-14 NOTE — NURSING NOTE
Chief Complaint   Patient presents with     Port Draw     Labs drawn by RN via port, vitals taken.     Port accessed with 20 gauge 3/4 inch power port needle by RN, labs collected, line flushed with saline and heparin.  Vitals taken. Pt checked in for appointment(s).    Margarita Camacho RN

## 2022-07-28 ENCOUNTER — VIRTUAL VISIT (OUTPATIENT)
Dept: ONCOLOGY | Facility: CLINIC | Age: 57
End: 2022-07-28
Attending: INTERNAL MEDICINE
Payer: COMMERCIAL

## 2022-07-28 DIAGNOSIS — C20 RECTAL CANCER (H): Primary | ICD-10-CM

## 2022-07-28 DIAGNOSIS — M21.852 DEFORMITY OF FEMUR, LEFT: ICD-10-CM

## 2022-07-28 PROCEDURE — 99215 OFFICE O/P EST HI 40 MIN: CPT | Mod: GT | Performed by: INTERNAL MEDICINE

## 2022-07-28 PROCEDURE — G0463 HOSPITAL OUTPT CLINIC VISIT: HCPCS | Mod: PN,RTG | Performed by: INTERNAL MEDICINE

## 2022-07-28 NOTE — LETTER
Date:July 28, 2022      Provider requested that no letter be sent. Do not send.       [facility]

## 2022-07-28 NOTE — LETTER
7/28/2022         RE: Sarah Rajan  [address] [address]        Dear Colleague,    Thank you for referring your patient, Sarah Rajan, to the [facility]. Please see a copy of my visit note below.    Sarah is a 56 year old who is being evaluated via a billable video visit.      Sarah Rajan stated she is in the state of [place] for the visit today.    How would you like to obtain your AVS? MyChart  If the video visit is dropped, the invitation should be resent by: Text to cell phone: [phone]  Will anyone else be joining your video visit? No      Video-Visit Details    Video Start Time: 11:01 AM    Type of service:  Video Visit    Video End Time:11:08 AM    Originating Location (pt. Location): Home    Distant Location (provider location):  [facility]     Platform used for Video Visit: Tori Banegas, Virtual Visit Facilitator          Oncology outpatient note    Date of service: 7/28/22       PC: Rectal cancer. [id number] - MSI Intact    HPI:  Please see previous note for details. I have copied and updated from prior note.  She is a 56 year old  female noticed BRBPR so Screening colonoscopy on 1/9/2018 revealed 3cm rectal mass and other polyps which were removed.  Pathology indicated moderately differentiated adenocarcinoma w/ intact MMR proteins.  CT staging scan showed no metastatic disease; some liver lesions which are thought to be benign per radiology report, T2N1M0 by pelvic MRI read at [facility]. When we initially reviewed her MRI we will not exactly sure whether that was lymph node involvement or not. We opted to repeat MRI which showed T4 N0 lesion. There was up to take her to surgery as there was possibility of personally lesion without lymph node involvement but because of the findings of repeat MRI the surgery was canceled and she is coming back to discuss neoadjuvant treatment.  We also did an MRI of the liver  which showed 3 subcentimeter liver lesions which were too small to characterize and will need attention on follow-up.    Baseline CEA 1.1 on 1/9/18.    She started neoadjuvant 5-FU and oxaliplatin (FOLFOX), which she started on 2/26/18. The day after she received cycle 2, she developed fevers, chills, headache, and an itchy rash on her arms and legs, for which she was evaluated in the ED. She was sent home on Benadryl. Benadryl and dexamethasone doses were increased for cycle 3.   She tolerated that cycle well    C#4 was given on 4/10/18    She completed 8 cycles of FOLFOX on 6/5/18    Repeat scans show good response to treatment without evidence of metastatic disease. There is only a small signal consistent with residual tumor seen in the primary rectal cancer lesion.    She started on concurrent chemoradiation with Xeloda with radiation beginning on 7/9/18. There was a delay in her receiving Xeloda, so she began that on 7/10/18. She completed it on 8/16/18    Repeat scans show great response to treatment with almost completely fibrotic signal.  There is an indeterminate left intertrochanteric lesion which is stable.  Liver lesion is consistent with benign hemangioma vs cyst    She was seen by Dr Hampton and the decision was made to keep her on watchful waiting.    Her MRI of the pelvis in December 2018 was stable but it showed rectal wall edema all the flexible sigmoidoscopy was negative.  Decision was made to do a short term follow-up MRI.      Left intertrochanteric lesion-we did MRI of the left hip which also showed a nonspecific T1 hypointense lesion in the proximal left femur which has not changed since 6/20/2018.  Repeat MRI in December 2018 , 5/14/19 and 8/20/19 were also stable.      Pelvis MRI in Feb 2019 was stable.    Colonoscopy on 5/13/19 was without evidence of recurrence- Next due in 3 years.     Pelvis MRI on 5/5/2020 continues to show complete response to treatment.    Flex sig on 5/5/2020 was also  unremarkable.    Flex sig on 12/15/2020 was unremarkable.  MRI on 3/8/2021 was without evidence of recurrence.  It continues to show complete response.    Interval history    This is a video visit.  She feels good.  Denies any abdominal discomfort.  Bowel movements are good.  No abnormal bleeding.  No nausea or vomiting.  Energy is fine.  No infections or dyspnea.  No new lumps bumps or swellings.  She has minimal neuropathy in the bottom of the feet which is not bothering her.      ECOG 0    ROS:  Otherwise a comprehensive review of the system was unremarkable.      I reviewed other hx in EPIC as below    PMH:  Depression  Restless leg syndrome  Dyslipidemia    Current Outpatient Medications   Medication     Acetaminophen (TYLENOL PO)     amitriptyline 10 MG PO tablet     atorvastatin 20 MG PO tablet     calcium carbonate (TUMS) 500 MG chewable tablet     FLUoxetine 20 MG PO capsule     lidocaine-prilocaine (EMLA) cream     RANITIDINE HCL PO     No current facility-administered medications for this visit.     Facility-Administered Medications Ordered in Other Visits   Medication     heparin 100 UNIT/ML injection 5 mL     heparin 100 UNIT/ML injection 500 Units         FHx  Aunt and maternal grandmother had breast cancer  Pateral grandmother  of colorectal cancer  Mother had ischemic colitis    SHx:  , two teenage children  EtOH: 1 drink daily, occasionally more on weekends  Tobacco: never smoker  She is a microbiologist     Allergies   Allergen Reactions     Inapsine [Droperidol] Other (See Comments)     Elevated blood pressure and increased heart rate     Tegaderm Transparent Dressing (Informational Only) Itching     Pt had reaction to Tegaderm used for port dressing. Whole area was very red and itchy. Please use IV 3000 instead.        Exam:  There were no vitals taken for this visit.   Wt Readings from Last 4 Encounters:   21 95.3 kg (210 lb)   21 94.8 kg (209 lb)   21 95.8 kg (211  lb 1.6 oz)   03/08/21 95.8 kg (211 lb 3.2 oz)         Constitutional.  Looks well and in no apparent distress.   Eyes.  Without eye redness or apparent jaundice.   Respiratory.  Non labored breathing. Speaking in full sentences.    Skin.  No concerning skin rashes on the skin visualized.   Neurological.  Is alert and oriented.  Psychiatric.  Mood and affect seem appropriate.      The rest of a comprehensive physical examination is deferred due to Public [facility] video visit restrictions.        Labs/Imaging.  Reviewed  7/14/2022.  CBC unremarkable.  CMP is normal.  CEA 1.1.    7/14/2022.  MRI of the pelvis continues to show complete response to treatment with a corresponding tumor regression grade of mrTRG-1  No suspicious pelvic lymphadenopathy.   Stable T1/T2 hypointense enhancing lesion in the left femoral intertrochanteric lesion.      CT CAP on 7/14/2022 showed a stable findings without evidence of recurrent/metastatic disease.      She had a colonoscopy in May 2022 which showed a polyp in the ascending colon as well as in the descending colon which were removed.  Pathology showed sessile serrated adenoma without any dysplasia from the polyp in the ascending colon.    The descending colon polyp showed colonic mucosa without any significant abnormality.    Assessment and Plan  [id number] moderately differentiated adenocarcinoma of the rectum - MSI-stable  She was started on neoadjuvant FOLFOX on 2/26/2018.  after 4 cycles she had good response to treatment. Liver lesions remain indeterminate.    we continued with the same chemotherapy and she received cycle #8 on 6/20/18.  Repeat scans show good response to treatment with only a small signal consistent with viable tumor in the primary rectal cancer. No surrounding lymph nodes suspicious. There is no evidence of metastatic disease.    She started on concurrent chemoradiation with Xeloda with radiation beginning on 7/9/18. There was a delay in her receiving  Xeloda, so she began on 7/10/18. She completed it on 8/16/18    Repeat scans show great response to treatment with almost completely fibrotic signal.  There is an indeterminate left intertrochanteric lesion which was stable.    She was seen by Dr Hampton and the decision was made to keep her on watchful waiting.      Currently doing well.    Colonoscopy in May 2022 showed ascending colon polyp which was serrated adenoma without any atypia.  Descending colon polyp was without any histological abnormality.  Otherwise colonoscopy was unremarkable.  Repeat colonoscopy will be in 5 years.  She needs flexible sigmoidoscopy in November 2022 as part of surveillance.  MRI of the pelvis in July 2022 continues to show complete response to treatment.  We will do repeat MRI in July 2023.  CT scan is also unremarkable.  Repeat CT scan in July 2023  CEA is normal but it is not reliable because she did not have elevated CEA at baseline.      She was seen in Cancer Survivorship clinic.    Surveillance per protocol:  Follow up per Watch and Wait Protocol:   Completed CRT: 8/16/2018    Flexible sigmoidoscopy   ? Every 2 months for 6 months: Until 2/2019-COMPLETED   ? Every 3 months for 3 years: Until 8/2021-COMPLETED  ? Every 6 months for 5 years: Until 2023- Colonoscopy done in 5/22 and flex sig to be done in 11/2022)  ? Every year for 10 years: Until 2028       3T MRI of pelvis  ? 6-8 weeks after CRT: COMPLETED  ? Every 4 months for 2 years: Until 8/2020-Completed  ? Every 6 months for 2 years: Until 8/2022- Completed  ? Yearly for 2 years: Until 2024- DUE in July 2023       CT CAP  ? Every year for 6-8 years: Until 2026-Due 7/2023       Colonoscopy   ? Last 5/2022- DUE 5/2027       CEA at every clinic or endoscopic visit      Left intertrochanteric lesion-  This has remained stable.  We will continue to serially monitor when we do scans as part of surveillance for rectal cancer.      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  She feels tingling in the bottom of the feet.  It is not bothering her.  Continue to monitor.       Port-A-Cath.  She tells me that she is thinking of removing the Port-A-Cath later on during this year.  For now we will flush the Port-A-Cath every 8 weeks and once she wants it removed, she will let me know and we will arrange that.     We did not address the following today.  Right foot ganglion cyst.  I recommend follow-up with PCP.        Indeterminate liver lesion. MRI 2/5/19 shows stable lesion, most likely c/w benign hemangioma or cyst.  At this time I do not plan to repeat MRI abdomen but when we will do CT scan, it will be monitored.      I will see her back in 1 year with labs/CT scan/MRI prior.    I answered all of her questions to her satisfaction.  She is agreeable and comfortable with the plan.      Kyra Mortensen              Again, thank you for allowing me to participate in the care of your patient.        Sincerely,        Kyra Mortensen MD

## 2022-07-28 NOTE — PROGRESS NOTES
Sarah is a 56 year old who is being evaluated via a billable video visit.      Sarah Rajan stated she is in the state of [place] for the visit today.    How would you like to obtain your AVS? MyChart  If the video visit is dropped, the invitation should be resent by: Text to cell phone: [phone]  Will anyone else be joining your video visit? No      Video-Visit Details    Video Start Time: 11:01 AM    Type of service:  Video Visit    Video End Time:11:08 AM    Originating Location (pt. Location): Home    Distant Location (provider location):  [facility]     Platform used for Video Visit: Tori Banegas, Virtual Visit Facilitator          Oncology outpatient note    Date of service: 7/28/22       PC: Rectal cancer. [id number] - MSI Intact    HPI:  Please see previous note for details. I have copied and updated from prior note.  She is a 56 year old  female noticed BRBPR so Screening colonoscopy on 1/9/2018 revealed 3cm rectal mass and other polyps which were removed.  Pathology indicated moderately differentiated adenocarcinoma w/ intact MMR proteins.  CT staging scan showed no metastatic disease; some liver lesions which are thought to be benign per radiology report, T2N1M0 by pelvic MRI read at [facility]. When we initially reviewed her MRI we will not exactly sure whether that was lymph node involvement or not. We opted to repeat MRI which showed T4 N0 lesion. There was up to take her to surgery as there was possibility of personally lesion without lymph node involvement but because of the findings of repeat MRI the surgery was canceled and she is coming back to discuss neoadjuvant treatment.  We also did an MRI of the liver which showed 3 subcentimeter liver lesions which were too small to characterize and will need attention on follow-up.    Baseline CEA 1.1 on 1/9/18.    She started neoadjuvant 5-FU and oxaliplatin (FOLFOX), which she started on 2/26/18. The day after she  received cycle 2, she developed fevers, chills, headache, and an itchy rash on her arms and legs, for which she was evaluated in the ED. She was sent home on Benadryl. Benadryl and dexamethasone doses were increased for cycle 3.   She tolerated that cycle well    C#4 was given on 4/10/18    She completed 8 cycles of FOLFOX on 6/5/18    Repeat scans show good response to treatment without evidence of metastatic disease. There is only a small signal consistent with residual tumor seen in the primary rectal cancer lesion.    She started on concurrent chemoradiation with Xeloda with radiation beginning on 7/9/18. There was a delay in her receiving Xeloda, so she began that on 7/10/18. She completed it on 8/16/18    Repeat scans show great response to treatment with almost completely fibrotic signal.  There is an indeterminate left intertrochanteric lesion which is stable.  Liver lesion is consistent with benign hemangioma vs cyst    She was seen by Dr Hampton and the decision was made to keep her on watchful waiting.    Her MRI of the pelvis in December 2018 was stable but it showed rectal wall edema all the flexible sigmoidoscopy was negative.  Decision was made to do a short term follow-up MRI.      Left intertrochanteric lesion-we did MRI of the left hip which also showed a nonspecific T1 hypointense lesion in the proximal left femur which has not changed since 6/20/2018.  Repeat MRI in December 2018 , 5/14/19 and 8/20/19 were also stable.      Pelvis MRI in Feb 2019 was stable.    Colonoscopy on 5/13/19 was without evidence of recurrence- Next due in 3 years.     Pelvis MRI on 5/5/2020 continues to show complete response to treatment.    Flex sig on 5/5/2020 was also unremarkable.    Flex sig on 12/15/2020 was unremarkable.  MRI on 3/8/2021 was without evidence of recurrence.  It continues to show complete response.    Interval history    This is a video visit.  She feels good.  Denies any abdominal discomfort.   Bowel movements are good.  No abnormal bleeding.  No nausea or vomiting.  Energy is fine.  No infections or dyspnea.  No new lumps bumps or swellings.  She has minimal neuropathy in the bottom of the feet which is not bothering her.      ECOG 0    ROS:  Otherwise a comprehensive review of the system was unremarkable.      I reviewed other hx in EPIC as below    PMH:  Depression  Restless leg syndrome  Dyslipidemia    Current Outpatient Medications   Medication     Acetaminophen (TYLENOL PO)     amitriptyline 10 MG PO tablet     atorvastatin 20 MG PO tablet     calcium carbonate (TUMS) 500 MG chewable tablet     FLUoxetine 20 MG PO capsule     lidocaine-prilocaine (EMLA) cream     RANITIDINE HCL PO     No current facility-administered medications for this visit.     Facility-Administered Medications Ordered in Other Visits   Medication     heparin 100 UNIT/ML injection 5 mL     heparin 100 UNIT/ML injection 500 Units         FHx  Aunt and maternal grandmother had breast cancer  Pateral grandmother  of colorectal cancer  Mother had ischemic colitis    SHx:  , two teenage children  EtOH: 1 drink daily, occasionally more on weekends  Tobacco: never smoker  She is a microbiologist     Allergies   Allergen Reactions     Inapsine [Droperidol] Other (See Comments)     Elevated blood pressure and increased heart rate     Tegaderm Transparent Dressing (Informational Only) Itching     Pt had reaction to Tegaderm used for port dressing. Whole area was very red and itchy. Please use IV 3000 instead.        Exam:  There were no vitals taken for this visit.   Wt Readings from Last 4 Encounters:   21 95.3 kg (210 lb)   21 94.8 kg (209 lb)   21 95.8 kg (211 lb 1.6 oz)   21 95.8 kg (211 lb 3.2 oz)         Constitutional.  Looks well and in no apparent distress.   Eyes.  Without eye redness or apparent jaundice.   Respiratory.  Non labored breathing. Speaking in full sentences.    Skin.  No  concerning skin rashes on the skin visualized.   Neurological.  Is alert and oriented.  Psychiatric.  Mood and affect seem appropriate.      The rest of a comprehensive physical examination is deferred due to Public Health Emergency video visit restrictions.        Labs/Imaging.  Reviewed  7/14/2022.  CBC unremarkable.  CMP is normal.  CEA 1.1.    7/14/2022.  MRI of the pelvis continues to show complete response to treatment with a corresponding tumor regression grade of mrTRG-1  No suspicious pelvic lymphadenopathy.   Stable T1/T2 hypointense enhancing lesion in the left femoral intertrochanteric lesion.      CT CAP on 7/14/2022 showed a stable findings without evidence of recurrent/metastatic disease.      She had a colonoscopy in May 2022 which showed a polyp in the ascending colon as well as in the descending colon which were removed.  Pathology showed sessile serrated adenoma without any dysplasia from the polyp in the ascending colon.    The descending colon polyp showed colonic mucosa without any significant abnormality.    Assessment and Plan  [id number] moderately differentiated adenocarcinoma of the rectum - MSI-stable  She was started on neoadjuvant FOLFOX on 2/26/2018.  after 4 cycles she had good response to treatment. Liver lesions remain indeterminate.    we continued with the same chemotherapy and she received cycle #8 on 6/20/18.  Repeat scans show good response to treatment with only a small signal consistent with viable tumor in the primary rectal cancer. No surrounding lymph nodes suspicious. There is no evidence of metastatic disease.    She started on concurrent chemoradiation with Xeloda with radiation beginning on 7/9/18. There was a delay in her receiving Xeloda, so she began on 7/10/18. She completed it on 8/16/18    Repeat scans show great response to treatment with almost completely fibrotic signal.  There is an indeterminate left intertrochanteric lesion which was stable.    She was seen by  Dr Hampton and the decision was made to keep her on watchful waiting.      Currently doing well.    Colonoscopy in May 2022 showed ascending colon polyp which was serrated adenoma without any atypia.  Descending colon polyp was without any histological abnormality.  Otherwise colonoscopy was unremarkable.  Repeat colonoscopy will be in 5 years.  She needs flexible sigmoidoscopy in November 2022 as part of surveillance.  MRI of the pelvis in July 2022 continues to show complete response to treatment.  We will do repeat MRI in July 2023.  CT scan is also unremarkable.  Repeat CT scan in July 2023  CEA is normal but it is not reliable because she did not have elevated CEA at baseline.      She was seen in Cancer Survivorship clinic.    Surveillance per protocol:  Follow up per Watch and Wait Protocol:   Completed CRT: 8/16/2018    Flexible sigmoidoscopy   ? Every 2 months for 6 months: Until 2/2019-COMPLETED   ? Every 3 months for 3 years: Until 8/2021-COMPLETED  ? Every 6 months for 5 years: Until 2023- Colonoscopy done in 5/22 and flex sig to be done in 11/2022)  ? Every year for 10 years: Until 2028       3T MRI of pelvis  ? 6-8 weeks after CRT: COMPLETED  ? Every 4 months for 2 years: Until 8/2020-Completed  ? Every 6 months for 2 years: Until 8/2022- Completed  ? Yearly for 2 years: Until 2024- DUE in July 2023       CT CAP  ? Every year for 6-8 years: Until 2026-Due 7/2023       Colonoscopy   ? Last 5/2022- DUE 5/2027       CEA at every clinic or endoscopic visit      Left intertrochanteric lesion-  This has remained stable.  We will continue to serially monitor when we do scans as part of surveillance for rectal cancer.      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  She feels tingling in the bottom of the feet.  It is not bothering her.  Continue to monitor.       Port-A-Cath.  She tells me that she is thinking of removing the Port-A-Cath later on during this year.  For now we will  flush the Port-A-Cath every 8 weeks and once she wants it removed, she will let me know and we will arrange that.     We did not address the following today.  Right foot ganglion cyst.  I recommend follow-up with PCP.        Indeterminate liver lesion. MRI 2/5/19 shows stable lesion, most likely c/w benign hemangioma or cyst.  At this time I do not plan to repeat MRI abdomen but when we will do CT scan, it will be monitored.      I will see her back in 1 year with labs/CT scan/MRI prior.    I answered all of her questions to her satisfaction.  She is agreeable and comfortable with the plan.      Kyra Mortensen

## 2022-07-28 NOTE — NURSING NOTE
Sarah Rajan verified meds and allergies are correct via patients eCheck-in. Patient confirms no changes at this time.    Keesha Banegas, Virtual Facilitator

## 2022-07-28 NOTE — PATIENT INSTRUCTIONS
Port flushes every 8 weeks or so    In July 2023, repeat labs, MRI and CT scan and see me a few days after that    Flex sig and CEA in Nov through Colorectal Surgery office

## 2022-09-08 DIAGNOSIS — C20 RECTAL CANCER (H): Primary | ICD-10-CM

## 2022-09-27 ENCOUNTER — MYC MEDICAL ADVICE (OUTPATIENT)
Dept: ONCOLOGY | Facility: CLINIC | Age: 57
End: 2022-09-27

## 2022-10-25 ENCOUNTER — ANESTHESIA EVENT (OUTPATIENT)
Dept: SURGERY | Facility: AMBULATORY SURGERY CENTER | Age: 57
End: 2022-10-25
Payer: COMMERCIAL

## 2022-10-26 ENCOUNTER — HOSPITAL ENCOUNTER (OUTPATIENT)
Facility: AMBULATORY SURGERY CENTER | Age: 57
Discharge: HOME | End: 2022-10-26
Attending: RADIOLOGY
Payer: COMMERCIAL

## 2022-10-26 ENCOUNTER — ANCILLARY PROCEDURE (OUTPATIENT)
Dept: RADIOLOGY | Facility: AMBULATORY SURGERY CENTER | Age: 57
End: 2022-10-26
Attending: INTERNAL MEDICINE
Payer: COMMERCIAL

## 2022-10-26 ENCOUNTER — ANESTHESIA (OUTPATIENT)
Dept: SURGERY | Facility: AMBULATORY SURGERY CENTER | Age: 57
End: 2022-10-26
Payer: COMMERCIAL

## 2022-10-26 VITALS
WEIGHT: 195 LBS | HEIGHT: 66 IN | OXYGEN SATURATION: 97 % | SYSTOLIC BLOOD PRESSURE: 128 MMHG | TEMPERATURE: 97.1 F | DIASTOLIC BLOOD PRESSURE: 81 MMHG | BODY MASS INDEX: 31.34 KG/M2 | RESPIRATION RATE: 16 BRPM | HEART RATE: 78 BPM

## 2022-10-26 PROCEDURE — 36590 REMOVAL TUNNELED CV CATH: CPT | Performed by: RADIOLOGY

## 2022-10-26 PROCEDURE — 36590 REMOVAL TUNNELED CV CATH: CPT

## 2022-10-26 RX ORDER — ONDANSETRON 2 MG/ML
4 INJECTION INTRAMUSCULAR; INTRAVENOUS EVERY 30 MIN PRN
Status: DISCONTINUED | OUTPATIENT
Start: 2022-10-26 | End: 2022-10-27 | Stop reason: HOSPADM

## 2022-10-26 RX ORDER — OXYCODONE HYDROCHLORIDE 5 MG/1
5 TABLET ORAL EVERY 4 HOURS PRN
Status: DISCONTINUED | OUTPATIENT
Start: 2022-10-26 | End: 2022-10-27 | Stop reason: HOSPADM

## 2022-10-26 RX ORDER — HYDROMORPHONE HYDROCHLORIDE 1 MG/ML
0.2 INJECTION, SOLUTION INTRAMUSCULAR; INTRAVENOUS; SUBCUTANEOUS EVERY 5 MIN PRN
Status: DISCONTINUED | OUTPATIENT
Start: 2022-10-26 | End: 2022-10-27 | Stop reason: HOSPADM

## 2022-10-26 RX ORDER — SODIUM CHLORIDE, SODIUM LACTATE, POTASSIUM CHLORIDE, CALCIUM CHLORIDE 600; 310; 30; 20 MG/100ML; MG/100ML; MG/100ML; MG/100ML
INJECTION, SOLUTION INTRAVENOUS CONTINUOUS
Status: DISCONTINUED | OUTPATIENT
Start: 2022-10-26 | End: 2022-10-27 | Stop reason: HOSPADM

## 2022-10-26 RX ORDER — LIDOCAINE 40 MG/G
CREAM TOPICAL
Status: DISCONTINUED | OUTPATIENT
Start: 2022-10-26 | End: 2022-10-27 | Stop reason: HOSPADM

## 2022-10-26 RX ORDER — LIDOCAINE HYDROCHLORIDE 10 MG/ML
INJECTION, SOLUTION EPIDURAL; INFILTRATION; INTRACAUDAL; PERINEURAL DAILY PRN
Status: DISCONTINUED | OUTPATIENT
Start: 2022-10-26 | End: 2022-10-26 | Stop reason: HOSPADM

## 2022-10-26 RX ORDER — PROPOFOL 10 MG/ML
INJECTION, EMULSION INTRAVENOUS PRN
Status: DISCONTINUED | OUTPATIENT
Start: 2022-10-26 | End: 2022-10-26

## 2022-10-26 RX ORDER — PROPOFOL 10 MG/ML
INJECTION, EMULSION INTRAVENOUS CONTINUOUS PRN
Status: DISCONTINUED | OUTPATIENT
Start: 2022-10-26 | End: 2022-10-26

## 2022-10-26 RX ORDER — LIDOCAINE HYDROCHLORIDE 20 MG/ML
INJECTION, SOLUTION INFILTRATION; PERINEURAL PRN
Status: DISCONTINUED | OUTPATIENT
Start: 2022-10-26 | End: 2022-10-26

## 2022-10-26 RX ORDER — FENTANYL CITRATE 50 UG/ML
25 INJECTION, SOLUTION INTRAMUSCULAR; INTRAVENOUS EVERY 5 MIN PRN
Status: DISCONTINUED | OUTPATIENT
Start: 2022-10-26 | End: 2022-10-27 | Stop reason: HOSPADM

## 2022-10-26 RX ORDER — ACETAMINOPHEN 325 MG/1
975 TABLET ORAL ONCE
Status: COMPLETED | OUTPATIENT
Start: 2022-10-26 | End: 2022-10-26

## 2022-10-26 RX ORDER — FENTANYL CITRATE 50 UG/ML
25 INJECTION, SOLUTION INTRAMUSCULAR; INTRAVENOUS
Status: DISCONTINUED | OUTPATIENT
Start: 2022-10-26 | End: 2022-10-27 | Stop reason: HOSPADM

## 2022-10-26 RX ORDER — ONDANSETRON 4 MG/1
4 TABLET, ORALLY DISINTEGRATING ORAL EVERY 30 MIN PRN
Status: DISCONTINUED | OUTPATIENT
Start: 2022-10-26 | End: 2022-10-27 | Stop reason: HOSPADM

## 2022-10-26 RX ADMIN — PROPOFOL 70 MG: 10 INJECTION, EMULSION INTRAVENOUS at 10:03

## 2022-10-26 RX ADMIN — PROPOFOL 30 MG: 10 INJECTION, EMULSION INTRAVENOUS at 10:12

## 2022-10-26 RX ADMIN — PROPOFOL 150 MCG/KG/MIN: 10 INJECTION, EMULSION INTRAVENOUS at 10:03

## 2022-10-26 RX ADMIN — LIDOCAINE HYDROCHLORIDE 60 MG: 20 INJECTION, SOLUTION INFILTRATION; PERINEURAL at 10:03

## 2022-10-26 RX ADMIN — ACETAMINOPHEN 975 MG: 325 TABLET ORAL at 09:45

## 2022-10-26 RX ADMIN — SODIUM CHLORIDE, SODIUM LACTATE, POTASSIUM CHLORIDE, CALCIUM CHLORIDE: 600; 310; 30; 20 INJECTION, SOLUTION INTRAVENOUS at 09:54

## 2022-10-26 NOTE — BRIEF OP NOTE
[facility]    Brief Operative Note    Pre-operative diagnosis: Rectal cancer (H) [C20]  Post-operative diagnosis Same as pre-operative diagnosis    Procedure: Procedure(s):  REMOVAL, VASCULAR ACCESS PORT RIGHT  Surgeon: Surgeon(s) and Role:     * Dipesh Castorena MD - Primary  Anesthesia: MAC   Estimated Blood Loss: Minimal    Drains: None  Specimens: * No specimens in log *  Findings:   None.  Complications: None.  Implants:   Implant Name Type Inv. Item Serial No.  Lot No. LRB No. Used Action   CATH PORT POWERPORT CLEARVUE ISP [id number] Catheter CATH PORT POWERPORT CLEARVUE ISP [id number]   [employer] [facility] [id number] Right 1 Explanted       Port removed completely. 23 cm marker noted on the catheter. Subcutaneous pocket liberally irrigated with sterile saline and dried. Closed primarily.

## 2022-10-26 NOTE — H&P
A collaboration between [facility] and [facility]  Experts in minimally invasive, targeted treatments performed using imaging guidance    History & Physical    Patient Name:  Sarah Rajan   YOB: 1965  Medical Record Number (MRN):  [id number]  Age:  56 year old female      Requested IR procedure: Port removal    Indication / Associated Diagnosis: No longer needed    Sedation planned:  Per anesthesia services, Monitored Anesthesia Care (MAC)    Expected Level: Moderate to Deep Sedation    Indication: Sedation is required for the following type of Procedure: Venous Access      Focused history and physical completed prior to procedure. I have reviewed the lab findings, diagnostic data, and medications.     I have determined this patient to be an appropriate candidate for the planned procedure and have reassessed the patient IMMEDIATELY PRIOR to procedure.    -----    Patient Data:    Physical Exam:  Heart:  RRR w/o mm  Pulm: Lungs CTA bilaterally  Airway: Mouth open fully, > 3 fingers wide    Review of Systems: (negative unless bolded and red font)  Heart: palpatations, h/o MI, HTN, chest pain, cardiac surgery  Pulm: sleep apnea, COPD, chronic cough, asthma  GI: nausea, vomiting, ulcers, reflux  MSK: significant arthralgias, myalgias, physical limitations, pain  Endo: diabetes  Heme: bleeding disorder, anticoagulation    -----    Past Medical History:   Diagnosis Date     Depression      GERD (gastroesophageal reflux disease)      History of smoking      Insomnia      Obesity      Rectal cancer (H)      Rectal cancer (H)      Restless leg syndrome      Seasonal affective disorder (H)          Patient Active Problem List    Diagnosis Date Noted     Symptomatic menopausal or female climacteric states 02/11/2020     Priority: Medium     Overview:   Created by Conversion       Cervical cancer screening 10/23/2018     Priority: Medium     Overview:    From visit on 10/10/18:  History of abnormal pap tests?  No  2010 NILM  2012 NILM  2018 ASCUS   (Reactive cellular changes associated with radiation or chemotherapy )    , Positive for High Risk HPV types other than 16 or 18    52 y.o.  Plan:  Cotesting 10/2019       Familial hypercholesterolemia 10/16/2018     Priority: Medium     Rectal cancer (H) 01/23/2018     Priority: Medium     Pure hyperglyceridemia 01/10/2018     Priority: Medium     RLS (restless legs syndrome) 06/04/2007     Priority: Medium     Depressive disorder 01/30/2007     Priority: Medium     Overview:   Created by Conversion           Prescription Medications as of 10/26/2022       Rx Number Disp Refills Start End Last Dispensed Date Next Fill Date Owning Pharmacy    Acetaminophen (TYLENOL PO)            Sig: Take 1,000 mg by mouth At Bedtime    Class: Historical    Route: Oral    amitriptyline 10 MG PO tablet    12/3/2019        Sig: TAKE 1 TO 2 TABLETS BY MOUTH DAILY AT BEDTIME    Class: Historical    atorvastatin 20 MG PO tablet    10/16/2018        Sig: Take 40 mg by mouth    Class: Historical    Route: Oral    calcium carbonate (TUMS) 500 MG chewable tablet  150 tablet  2/6/2018    [id number] - [address] AT [facility]    Sig: Take 1-2 chew tab by mouth daily     Class: Historical    Route: Oral    FLUoxetine 20 MG PO capsule    12/3/2019        Class: Historical    lidocaine-prilocaine (EMLA) cream  30 g 3 2/26/2018    [address]    Sig: Apply 45 minutes prior to procedure.    Class: E-Prescribe    RANITIDINE HCL PO            Sig: Take 150 mg by mouth daily as needed for heartburn    Class: Historical    Route: Oral      Clinic-Administered Medications as of 10/26/2022       Dose Frequency Start End    heparin 100 UNIT/ML injection 5 mL 5 mL ONCE 3/8/2021     Route: Intracatheter    heparin 100  "UNIT/ML injection 500 Units 500 Units ONCE 9/8/2020     Route: Intracatheter      Hospital Medications as of 10/26/2022       Dose Frequency Start End    acetaminophen (TYLENOL) tablet 975 mg (Completed) 975 mg ONCE 10/26/2022 10/26/2022    Admin Instructions: Maximum acetaminophen dose from all sources = 75 mg/kg/day not to exceed 4 grams/day.    Class: E-Prescribe    Route: Oral    lactated ringers infusion  CONTINUOUS 10/26/2022     Class: E-Prescribe    Route: Intravenous    lidocaine (LMX4) kit  EVERY 1 HOUR PRN 10/26/2022     Admin Instructions: Apply at least 30 minutes prior to VAD insertion in divided doses as needed for size of site for insertion.  MAX Dose: 2.5 g (  of 5 g tube)  Do NOT give if patient has a history of allergy to any local anesthetic or any \"tabitha\" product.  Do NOT use both lidocaine intradermal/subcutaneous injection and the lidocaine cream on the same site.    Class: E-Prescribe    Route: Topical    lidocaine 1 % 0.1-1 mL 0.1-1 mL EVERY 1 HOUR PRN 10/26/2022     Admin Instructions: MAX dose 1 mL subcutaneous OR intradermal along the side of the vein in divided doses as needed for VAD insertion.  Do NOT give if patient has a history of allergy to any local anesthetic or any \"tabitha\" product.  Do NOT use both lidocaine intradermal/subcutaneous injection and the lidocaine cream on the same site.    Class: E-Prescribe    Route: Other    sodium chloride (PF) 0.9% PF flush 3 mL 3 mL EVERY 8 HOURS 10/26/2022     Admin Instructions: to lock peripheral IV dormant line    Class: E-Prescribe    Route: Intracatheter    sodium chloride (PF) 0.9% PF flush 3 mL 3 mL EVERY 1 MIN PRN 10/26/2022     Class: E-Prescribe    Route: Intracatheter    sodium chloride 0.9 % bag TABLE SOLN 1 Bag CONTINUOUS PRN 10/25/2022     Admin Instructions: Maximum total dose 5000 mL  Nurse will document number of bags used at the end of the procedure.    NOT A PRESSURE BAG    Class: E-Prescribe    Route: TABLE SOLN    " "        Allergies   Allergen Reactions     Inapsine [Droperidol] Other (See Comments)     Elevated blood pressure and increased heart rate     Tegaderm Transparent Dressing (Informational Only) Itching     Pt had reaction to Tegaderm used for port dressing. Whole area was very red and itchy. Please use IV 3000 instead.          Complete Blood Count:  Lab Results   Component Value Date     07/14/2022     03/16/2021       Coagulation:  Lab Results   Component Value Date    INR 0.93 02/19/2018       Vital Signs:  BP (!) 145/104 (BP Location: Right arm)   Pulse 63   Temp 98  F (36.7  C) (Temporal)   Resp 18   Ht 1.676 m (5' 6\")   Wt 88.5 kg (195 lb)   SpO2 98%   BMI 31.47 kg/m      -----    Geovanni Jin PA-C      "

## 2022-10-26 NOTE — ANESTHESIA POSTPROCEDURE EVALUATION
Patient: Sarah RICE Satinder    Procedure: Procedure(s):  REMOVAL, VASCULAR ACCESS PORT RIGHT       Anesthesia Type:  No value filed.    Note:  Disposition: Outpatient   Postop Pain Control: Uneventful            Sign Out: Well controlled pain   PONV: No   Neuro/Psych: Uneventful            Sign Out: Acceptable/Baseline neuro status   Airway/Respiratory: Uneventful            Sign Out: Acceptable/Baseline resp. status   CV/Hemodynamics: Uneventful            Sign Out: Acceptable CV status   Other NRE: NONE   DID A NON-ROUTINE EVENT OCCUR? No           Last vitals:  Vitals Value Taken Time   /81 10/26/22 1130   Temp 36.2  C (97.1  F) 10/26/22 1130   Pulse 78 10/26/22 1130   Resp 16 10/26/22 1130   SpO2 97 % 10/26/22 1130       Electronically Signed By: Dipesh Pulido MD  October 26, 2022  3:35 PM

## 2022-10-26 NOTE — INTERVAL H&P NOTE
"I have reviewed the surgical (or preoperative) H&P that is linked to this encounter, and examined the patient. There are no significant changes    Clinical Conditions Present on Arrival:  Clinically Significant Risk Factors Present on Admission                    # Obesity: Estimated body mass index is 31.47 kg/m  as calculated from the following:    Height as of this encounter: 1.676 m (5' 6\").    Weight as of this encounter: 88.5 kg (195 lb).       "

## 2022-10-26 NOTE — ANESTHESIA CARE TRANSFER NOTE
Patient: Sarah Rajan    Procedure: Procedure(s):  REMOVAL, VASCULAR ACCESS PORT RIGHT       Diagnosis: Rectal cancer (H) [C20]  Diagnosis Additional Information: No value filed.    Anesthesia Type:   No value filed.     Note:    Oropharynx: oropharynx clear of all foreign objects and spontaneously breathing  Level of Consciousness: awake  Oxygen Supplementation: room air    Independent Airway: airway patency satisfactory and stable  Dentition: dentition unchanged  Vital Signs Stable: post-procedure vital signs reviewed and stable  Report to RN Given: handoff report given  Patient transferred to: Phase II    Handoff Report: Identifed the Patient, Identified the Reponsible Provider, Reviewed the pertinent medical history, Discussed the surgical course, Reviewed Intra-OP anesthesia mangement and issues during anesthesia, Set expectations for post-procedure period and Allowed opportunity for questions and acknowledgement of understanding      Vitals:  Vitals Value Taken Time   BP     Temp     Pulse     Resp     SpO2         Electronically Signed By: LISANDRA Lezama CRNA  October 26, 2022  10:34 AM

## 2022-10-26 NOTE — DISCHARGE INSTRUCTIONS
A collaboration between [facility] and [facility]  Experts in minimally invasive, targeted treatments performed using imaging guidance    Venous Access Device, Port Catheter or Tunneled Central Line Removal    Today you had your existing venous access device removed, either because it was no longer needed or because there was malfunction or infection issues.    After you go home:  Drink plenty of fluids.  Generally 6-8 (8 ounce) glasses a day is recommended.  Resume your regular diet unless otherwise ordered by a medical provider.  Keep any applied tape/gauze dressings clean and dry.  Change tape/gauze dressings if they get wet or soiled.  You may shower the following day after procedure, however cover and protect from moisture any tape/gauze dressings.  You may let water hit and run over dried skin glue, but do not scrub.  Pat the area dry after showering.  Port removal incisions are closed with absorbable suture, meaning they do not need to be removed at a later date, and a topical skin adhesive (skin glue).  This glue will wear off in 7-14 days.  Do not remove before this time.  If 14 days have passed and residual glue is present, you may gently remove it.  You may remove tape/gauze dressings after 5 days if the site looks closed and in the process of healing.  Do not apply gels, lotions, or ointments to the glue site for the first 10 days as this may cause the glue to prematurely soften and fail.  Do not perform strenuous activities or lift greater than 10 pounds for the next three days.  If there is bleeding or oozing from the procedure site, apply firm pressure to the area for 5-10 minutes.  If the bleeding continues seek medical advice at the numbers below.  Mild procedure site discomfort can be treated with an ice pack and over-the-counter pain relievers.              For 24 hours after any sedation used:  Relax and take it easy.  No strenuous  activities.  Do not drive or operate machines at home or at work.  No alcohol consumption.  Do not make any important or legal decisions.    Call our Interventional Radiology (IR) service if:  If you start bleeding from the procedure site.  If you do start to bleed from the site, lie down and hold some pressure on the site.  Our radiology provider can help you decide if you need to return to the hospital.  If you have new or worsening pain related to the procedure.  If you have concerning swelling at the procedure site.  If you develop persistent nausea or vomiting.  If you develop hives or a rash or any unexplained itching.  If you have a fever of greater than 100.5  F and chills in the first 5 days after procedure.  Any other concerns related to your procedure.      [facility]  Interventional Radiology (IR)  [address] [facility] [facility] [address]  [address] [facility] [place] [address]    Contact Number:  [phone]  (IR control desk)  Monday - Friday 8:00 am - 4:30 pm    After hours for urgent concerns:  [phone]  After 4:30 pm Monday - Friday, Weekends and Holidays.   Ask for Interventional Radiology on-call.  Someone is available 24 hours a day.  [facility] toll free number:  [phone]

## 2022-10-26 NOTE — ANESTHESIA PREPROCEDURE EVALUATION
Anesthesia Pre-Procedure Evaluation    Patient: Sarah Rajan   MRN: [id number] : 1965        Procedure : Procedure(s):  REMOVAL, VASCULAR ACCESS PORT RIGHT          Past Medical History:   Diagnosis Date     Depression      GERD (gastroesophageal reflux disease)      History of smoking      Insomnia      Obesity      Rectal cancer (H)      Rectal cancer (H)      Restless leg syndrome      Seasonal affective disorder (H)       Past Surgical History:   Procedure Laterality Date      SECTION      x2      SECTION       COLONOSCOPY       COLONOSCOPY       INSERT PORT VASCULAR ACCESS Right 2018    Procedure: INSERT PORT VASCULAR ACCESS;  central venous Chest Port Placement;  Surgeon: Gaurav Yates PA-C;  Location: [facility] OR     LASIK       LASIK        Allergies   Allergen Reactions     Inapsine [Droperidol] Other (See Comments)     Elevated blood pressure and increased heart rate     Tegaderm Transparent Dressing (Informational Only) Itching     Pt had reaction to Tegaderm used for port dressing. Whole area was very red and itchy. Please use IV 3000 instead.       Social History     Tobacco Use     Smoking status: Former     Packs/day: 0.10     Years: 8.00     Pack years: 0.80     Types: Cigarettes     Quit date: 1986     Years since quittin.7     Smokeless tobacco: Never   Substance Use Topics     Alcohol use: Yes     Alcohol/week: 7.0 standard drinks     Types: 7 Glasses of wine per week     Comment: 2 drinks/week      Wt Readings from Last 1 Encounters:   10/26/22 88.5 kg (195 lb)        Anesthesia Evaluation            ROS/MED HX  ENT/Pulmonary:       Neurologic:       Cardiovascular:       METS/Exercise Tolerance:     Hematologic:       Musculoskeletal:       GI/Hepatic:     (+) GERD,     Renal/Genitourinary:       Endo:     (+) Obesity,     Psychiatric/Substance Use:       Infectious Disease:       Malignancy:   (+) Malignancy, History of Other.Other CA remission Remission  status post.    Other:            Physical Exam    Airway  airway exam normal      Mallampati: II   TM distance: > 3 FB   Neck ROM: full   Mouth opening: > 3 cm    Respiratory Devices and Support         Dental  no notable dental history         Cardiovascular          Rhythm and rate: regular and normal     Pulmonary   pulmonary exam normal        breath sounds clear to auscultation           OUTSIDE LABS:  CBC:   Lab Results   Component Value Date    WBC 4.5 07/14/2022    WBC 8.2 01/10/2022    HGB 13.8 07/14/2022    HGB 13.8 01/10/2022    HCT 40.6 07/14/2022    HCT 41.7 01/10/2022     07/14/2022     01/10/2022     BMP:   Lab Results   Component Value Date     07/14/2022     01/10/2022    POTASSIUM 3.6 07/14/2022    POTASSIUM 4.0 01/10/2022    CHLORIDE 106 07/14/2022    CHLORIDE 107 01/10/2022    CO2 27 07/14/2022    CO2 26 01/10/2022    BUN 16 07/14/2022    BUN 14 01/10/2022    CR 0.73 07/14/2022    CR 0.73 01/10/2022     (H) 07/14/2022    GLC 76 01/10/2022     COAGS:   Lab Results   Component Value Date    INR 0.93 02/19/2018     POC:   Lab Results   Component Value Date    HCG Negative 02/22/2020    HCGS Negative 05/17/2020     HEPATIC:   Lab Results   Component Value Date    ALBUMIN 3.7 07/14/2022    PROTTOTAL 7.1 07/14/2022    ALT 31 07/14/2022    AST 16 07/14/2022    ALKPHOS 96 07/14/2022    BILITOTAL 0.6 07/14/2022     OTHER:   Lab Results   Component Value Date    LACT 0.8 05/17/2020    GEETA 9.1 07/14/2022    MAG 1.8 05/17/2020    LIPASE 12 05/17/2020    TSH 1.74 02/24/2020       Anesthesia Plan    ASA Status:  3   NPO Status:  NPO Appropriate    Anesthesia Type: MAC.     - Reason for MAC: immobility needed, straight local not clinically adequate   Induction: Intravenous.   Maintenance: TIVA.        Consents    Anesthesia Plan(s) and associated risks, benefits, and realistic alternatives discussed. Questions answered and patient/representative(s) expressed understanding.    -  Discussed:     - Discussed with:  Patient      - Extended Intubation/Ventilatory Support Discussed: No.      - Patient is DNR/DNI Status: No    Use of blood products discussed: No .     Postoperative Care    Pain management: IV analgesics, Oral pain medications, Multi-modal analgesia.   PONV prophylaxis: Ondansetron (or other 5HT-3), Background Propofol Infusion     Comments:                Dipesh Pulido MD

## 2022-11-07 NOTE — PROGRESS NOTES
Colon and Rectal Surgery Follow-up Clinic Note      RE: Sarah RICE Satinder  : 1965  JIMMY: 2022    DIAGNOSIS: Sarah presents today for follow up of her rectal cancer on Watch and Wait protocol.      HPI:   Initial Staging:    She was initially seen in 2018 with a moderately differentiated, invasive adenocarcinoma with intact mismatch repair 4-5 cm from the anal verge. She was staged at that time and an MRI that was initially read as a T1-T2 lesion, then possibly a T2N1 lesion. CT A/P was significant for small liver lesions too small to characterize but not concerning for malignancy and CEA was 1.1. She was discussed at tumor board the pelvic MRI was felt to be poor quality so she underwent a repeat MRI pelvis on  and her tumor was staged as T4N0 due to abutment of the levators. Abdominal MRI to evaluate the liver lesions was performed on  which was again found to have multiple sub-centimeter lesions that were difficult to characterize.   Treatment:    She underwent 8 cycles of FOLFOX completed on 18 and chemoradiation with XELODA on 8/15/18. 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     She was found to have a complete clinical response and enrolled in our Watch and Wait protocol with Dr. Óscar Hampton.    MRI of left hip showed a non specific T1 hypointense lesion in the proximal left femur and repeat MRI 12/10/18:               There is a T1 hypointense and subtly enhancing lesion in the              proximal left femur, stable since at least 2018.  Given this              patient's history of rectal cancer, metastatic disease cannot be excluded.  Follow Up:    She follows with Dr. Mortensen of medical oncology and saw him last in July.    She last saw Dr. Hampton on 21 with no evidence of recurrence at that time and is transferring her care to me since he has retired.    Colonoscopy in May 2022 showed ascending colon polyp  "which was serrated adenoma without any atypia.    CT CAP 7/14/22:  IMPRESSION:  1.  No metastatic disease in the chest, abdomen, or pelvis.    MR Pelvis 7/14/22:  IMPRESSION:   1. There has been a complete response to treatment, with a  corresponding tumor regression grade of 1.    2. No suspicious pelvic lymph nodes.  3. Stable T1/T2 hypointense enhancing lesion in the left femoral  intertrochanteric region. Attention on follow-up.     Latest Reference Range & Units 03/16/21 12:06 07/26/21 15:19 12/14/21 10:03 07/14/22 10:34   CEA ng/mL    1.1   CEA 0.0 - 2.5 ug/L <0.5 <0.5 <0.5      INTERVAL HISTORY: Denies increased pain, fevers, or chills. Tolerating diet well. Having formed BM's without blood.      Physical Examination:  BP (!) 147/94 (BP Location: Left arm, Patient Position: Sitting, Cuff Size: Adult Large)   Pulse 92   Ht 5' 6\"   SpO2 98%   BMI 31.47 kg/m    Abdomen soft, nontender.  No inguinal adenopathy palpated.  Perianal skin intact.  Digital rectal exam: No masses palpated.  Flexible sigmoidoscopy: after obtaining informed consent and performing a \"time out\", an adult flexible sigmoidoscope was introduced through the anus and passed up to the mid sigmoid colon. The quality of the prep was good. Findings: 2 cm white, flat, and faint scar with central telangiectasia at the left lateral aspect of the distal rectum right above the dentate line, approximately 3 cm from the anal verge. No additional abnormalities were seen. Total scope time: 12 minutes. The patient tolerated the procedure well.    ASSESSMENT  No evidence of recurrent neoplasia.    PLAN    Follow up per Watch and Wait Protocol:   Completed CRT: 8/15/2018    Flexible sigmoidoscopy   ? Every 3 months for 6 months: Until 2/2019-COMPLETED   ? Every 4 months for 3 years: Until 8/2021-COMPLETED  ? Every 6 months for 5 years: Until 8/2023-Due 5/2023  ? Every year for 10 years: Until 2028       3T MRI of pelvis  ? 6-8 weeks after CRT: " COMPLETED  ? Every 4 months for 2 years: Until 8/2021-COMPLETED  ? Every 6 months for 2 years: Until 8/2023-DUE 1/2023  ? Yearly for 2 years: Until 2025       CT CAP  ? Every year for 6-8 years: Until 2026-Due 7/2023       Colonoscopy   ? Last 5/2022-Repeat 2027       CEA at every clinic or endoscopic visit    30 minutes spent on the date of encounter (excluding time performing procedures with or without biopsy) performing chart review, history and exam, documentation and further activities as noted above.    Asif Murrell MD, MSc, FACS, FASCRS.    Chief, Division of Colon & Rectal Surgery  Stanley M. Goldberg, MD Endowed Chair, Colon & Rectal Surgery  Department of Surgery   [facility]      Referring Provider:  Referred Self, MD  No address on file     Primary Care Provider:  Kyra Mortensen

## 2022-11-08 ENCOUNTER — MYC MEDICAL ADVICE (OUTPATIENT)
Dept: SURGERY | Facility: CLINIC | Age: 57
End: 2022-11-08

## 2022-11-11 ENCOUNTER — TELEPHONE (OUTPATIENT)
Dept: SURGERY | Facility: CLINIC | Age: 57
End: 2022-11-11

## 2022-11-14 ENCOUNTER — OFFICE VISIT (OUTPATIENT)
Dept: SURGERY | Facility: CLINIC | Age: 57
End: 2022-11-14
Payer: COMMERCIAL

## 2022-11-14 VITALS
BODY MASS INDEX: 31.47 KG/M2 | HEIGHT: 66 IN | DIASTOLIC BLOOD PRESSURE: 94 MMHG | OXYGEN SATURATION: 98 % | SYSTOLIC BLOOD PRESSURE: 147 MMHG | HEART RATE: 92 BPM

## 2022-11-14 DIAGNOSIS — C20 RECTAL CANCER (H): Primary | ICD-10-CM

## 2022-11-14 PROCEDURE — 99214 OFFICE O/P EST MOD 30 MIN: CPT | Mod: 25 | Performed by: COLON & RECTAL SURGERY

## 2022-11-14 PROCEDURE — 45330 DIAGNOSTIC SIGMOIDOSCOPY: CPT | Performed by: COLON & RECTAL SURGERY

## 2022-11-14 ASSESSMENT — PAIN SCALES - GENERAL: PAINLEVEL: NO PAIN (0)

## 2022-11-14 NOTE — LETTER
2022       RE: Sarah Rajan  [address] [address]     Dear Colleague,    Thank you for referring your patient, Sarah Rajan, to the [facility] [place] at [facility]. Please see a copy of my visit note below.    Colon and Rectal Surgery Follow-up Clinic Note      RE: Sarah Rajan  : 1965  JIMMY: 2022    DIAGNOSIS: Sarah presents today for follow up of her rectal cancer on Watch and Wait protocol.      HPI:   Initial Staging:    She was initially seen in 2018 with a moderately differentiated, invasive adenocarcinoma with intact mismatch repair 4-5 cm from the anal verge. She was staged at that time and an MRI that was initially read as a T1-T2 lesion, then possibly a T2N1 lesion. CT A/P was significant for small liver lesions too small to characterize but not concerning for malignancy and CEA was 1.1. She was discussed at tumor board the pelvic MRI was felt to be poor quality so she underwent a repeat MRI pelvis on  and her tumor was staged as T4N0 due to abutment of the levators. Abdominal MRI to evaluate the liver lesions was performed on  which was again found to have multiple sub-centimeter lesions that were difficult to characterize.   Treatment:    She underwent 8 cycles of FOLFOX completed on 18 and chemoradiation with XELODA on 8/15/18. 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     She was found to have a complete clinical response and enrolled in our Watch and Wait protocol with Dr. Óscar Hampton.    MRI of left hip showed a non specific T1 hypointense lesion in the proximal left femur and repeat MRI 12/10/18:               There is a T1 hypointense and subtly enhancing lesion in the              proximal left femur, stable since at least 2018.  Given this              patient's history of rectal  "cancer, metastatic disease cannot be excluded.  Follow Up:    She follows with Dr. Mortensen of medical oncology and saw him last in July.    She last saw Dr. Hampton on 12/14/21 with no evidence of recurrence at that time and is transferring her care to me since he has retired.    Colonoscopy in May 2022 showed ascending colon polyp which was serrated adenoma without any atypia.    CT CAP 7/14/22:  IMPRESSION:  1.  No metastatic disease in the chest, abdomen, or pelvis.    MR Pelvis 7/14/22:  IMPRESSION:   1. There has been a complete response to treatment, with a  corresponding tumor regression grade of 1.    2. No suspicious pelvic lymph nodes.  3. Stable T1/T2 hypointense enhancing lesion in the left femoral  intertrochanteric region. Attention on follow-up.     Latest Reference Range & Units 03/16/21 12:06 07/26/21 15:19 12/14/21 10:03 07/14/22 10:34   CEA ng/mL    1.1   CEA 0.0 - 2.5 ug/L <0.5 <0.5 <0.5      INTERVAL HISTORY: Denies increased pain, fevers, or chills. Tolerating diet well. Having formed BM's without blood.      Physical Examination:  BP (!) 147/94 (BP Location: Left arm, Patient Position: Sitting, Cuff Size: Adult Large)   Pulse 92   Ht 5' 6\"   SpO2 98%   BMI 31.47 kg/m    Abdomen soft, nontender.  No inguinal adenopathy palpated.  Perianal skin intact.  Digital rectal exam: No masses palpated.  Flexible sigmoidoscopy: after obtaining informed consent and performing a \"time out\", an adult flexible sigmoidoscope was introduced through the anus and passed up to the mid sigmoid colon. The quality of the prep was good. Findings: 2 cm white, flat, and faint scar with central telangiectasia at the left lateral aspect of the distal rectum right above the dentate line, approximately 3 cm from the anal verge. No additional abnormalities were seen. Total scope time: 12 minutes. The patient tolerated the procedure well.    ASSESSMENT  No evidence of recurrent neoplasia.    PLAN    Follow up per Watch and " Wait Protocol:   Completed CRT: 8/15/2018    Flexible sigmoidoscopy   ? Every 3 months for 6 months: Until 2/2019-COMPLETED   ? Every 4 months for 3 years: Until 8/2021-COMPLETED  ? Every 6 months for 5 years: Until 8/2023-Due 5/2023  ? Every year for 10 years: Until 2028       3T MRI of pelvis  ? 6-8 weeks after CRT: COMPLETED  ? Every 4 months for 2 years: Until 8/2021-COMPLETED  ? Every 6 months for 2 years: Until 8/2023-DUE 1/2023  ? Yearly for 2 years: Until 2025       CT CAP  ? Every year for 6-8 years: Until 2026-Due 7/2023       Colonoscopy   ? Last 5/2022-Repeat 2027       CEA at every clinic or endoscopic visit    30 minutes spent on the date of encounter (excluding time performing procedures with or without biopsy) performing chart review, history and exam, documentation and further activities as noted above.      Referring Provider:  Referred Self, MD  No address on file     Primary Care Provider:  Kyra Mortensen, thank you for allowing me to participate in the care of your patient.      Sincerely,    Asif Murrell MD

## 2022-11-14 NOTE — NURSING NOTE
"Chief Complaint   Patient presents with     Flexible Sigmoidoscopy       Vitals:    11/14/22 1104   BP: (!) 147/94   BP Location: Left arm   Patient Position: Sitting   Cuff Size: Adult Large   Pulse: 92   SpO2: 98%   Height: 5' 6\"       Body mass index is 31.47 kg/m .    Carloz Mcgee EMT-P    "

## 2022-11-14 NOTE — PATIENT INSTRUCTIONS
Follow up per Watch and Wait Protocol:   Completed CRT: 8/15/2018  Flexible sigmoidoscopy   Every 3 months for 6 months: Until 2/2019-COMPLETED   Every 4 months for 3 years: Until 8/2021-COMPLETED  Every 6 months for 5 years: Until 8/2023-Due 5/2023  Every year for 10 years: Until 2028     3T MRI of pelvis  6-8 weeks after CRT: COMPLETED  Every 4 months for 2 years: Until 8/2021-COMPLETED  Every 6 months for 2 years: Until 8/2023-DUE 1/2023  Yearly for 2 years: Until 2025     CT CAP  Every year for 6-8 years: Until 2026-Due 7/2023     Colonoscopy   Last 5/2022-Repeat 2027     CEA at every clinic or endoscopic visit      Schedule MRI at [phone]  Yara LARA [phone]

## 2023-01-14 ENCOUNTER — HEALTH MAINTENANCE LETTER (OUTPATIENT)
Age: 58
End: 2023-01-14

## 2023-02-16 ENCOUNTER — ANCILLARY PROCEDURE (OUTPATIENT)
Dept: MRI IMAGING | Facility: CLINIC | Age: 58
End: 2023-02-16
Attending: COLON & RECTAL SURGERY
Payer: COMMERCIAL

## 2023-02-16 DIAGNOSIS — C20 RECTAL CANCER (H): ICD-10-CM

## 2023-02-16 PROCEDURE — 74183 MRI ABD W/O CNTR FLWD CNTR: CPT | Performed by: RADIOLOGY

## 2023-02-16 PROCEDURE — A9585 GADOBUTROL INJECTION: HCPCS | Performed by: RADIOLOGY

## 2023-02-16 RX ORDER — GADOBUTROL 604.72 MG/ML
10 INJECTION INTRAVENOUS ONCE
Status: COMPLETED | OUTPATIENT
Start: 2023-02-16 | End: 2023-02-16

## 2023-02-16 RX ADMIN — GADOBUTROL 8.5 ML: 604.72 INJECTION INTRAVENOUS at 08:55

## 2023-02-16 NOTE — DISCHARGE INSTRUCTIONS
MRI Contrast Discharge Instructions    The IV contrast you received today will pass out of your body in your  urine. This will happen in the next 24 hours. You will not feel this process.  Your urine will not change color.    Drink at least 4 extra glasses of water or juice today (unless your doctor  has restricted your fluids). This reduces the stress on your kidneys.  You may take your regular medicines.    If you are on dialysis: It is best to have dialysis today.    If you have a reaction: Most reactions happen right away. If you have  any new symptoms after leaving the hospital (such as hives or swelling),  call your hospital at the correct number below. Or call your family doctor.  If you have breathing distress or wheezing, call 911.    Special instructions: ***    I have read and understand the above information.    Signature:______________________________________ Date:___________    Staff:__________________________________________ Date:___________     Time:__________    [place] [facility] Departments:    ___[place]: [phone]  ___[facility]: [phone]  ___[place]: [phone] ___[facility]: [phone]  ___[facility]: [phone]  ___[facility]: [phone]  ___[phone]  ___[facility]: [phone]  ___[phone]

## 2023-04-23 ENCOUNTER — HEALTH MAINTENANCE LETTER (OUTPATIENT)
Age: 58
End: 2023-04-23

## 2023-05-03 ENCOUNTER — MYC MEDICAL ADVICE (OUTPATIENT)
Dept: SURGERY | Facility: CLINIC | Age: 58
End: 2023-05-03
Payer: COMMERCIAL

## 2023-05-15 ENCOUNTER — OFFICE VISIT (OUTPATIENT)
Dept: SURGERY | Facility: CLINIC | Age: 58
End: 2023-05-15
Payer: COMMERCIAL

## 2023-05-15 VITALS
HEIGHT: 66 IN | HEART RATE: 73 BPM | DIASTOLIC BLOOD PRESSURE: 97 MMHG | SYSTOLIC BLOOD PRESSURE: 139 MMHG | OXYGEN SATURATION: 98 % | WEIGHT: 200 LBS | BODY MASS INDEX: 32.14 KG/M2

## 2023-05-15 DIAGNOSIS — C20 RECTAL CANCER (H): Primary | ICD-10-CM

## 2023-05-15 PROCEDURE — 45330 DIAGNOSTIC SIGMOIDOSCOPY: CPT | Performed by: COLON & RECTAL SURGERY

## 2023-05-15 ASSESSMENT — PAIN SCALES - GENERAL: PAINLEVEL: NO PAIN (0)

## 2023-05-15 NOTE — LETTER
5/15/2023       RE: Sarah Rajan  [address] [address]     Dear Colleague,    Thank you for referring your patient, Sarah Rajan, to the [facility]ERY [facility] [place] at [facility]. Please see a copy of my visit note below.    Colon and Rectal Surgery Follow-up Clinic Note    RE: Sarah Rajan  : 1965  JIMMY: 5/15/2023    DIAGNOSIS: mT2N1 rectal adenocarcinoma status post total neoadjuvant therapy with cCR on watch and wait protocol.        HPI:   Initial Staging:  She was initially seen in 2018 with a moderately differentiated, invasive adenocarcinoma with intact mismatch repair 4-5 cm from the anal verge. She was staged at that time and an MRI that was initially read as a T1-T2 lesion, then possibly a T2N1 lesion. CT A/P was significant for small liver lesions too small to characterize but not concerning for malignancy and CEA was 1.1. She was discussed at tumor board the pelvic MRI was felt to be poor quality so she underwent a repeat MRI pelvis on  and her tumor was staged as T4N0 due to abutment of the levators. Abdominal MRI to evaluate the liver lesions was performed on  which was again found to have multiple sub-centimeter lesions that were difficult to characterize.   Treatment:  She underwent 8 cycles of FOLFOX completed on 18 and chemoradiation with XELODA on 8/15/18. 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   She was found to have a complete clinical response and enrolled in our Watch and Wait protocol with Dr. Óscar Hampton.  MRI of left hip showed a non specific T1 hypointense lesion in the proximal left femur and repeat MRI 12/10/18:               There is a T1 hypointense and subtly enhancing lesion in the              proximal left femur, stable since at least 2018.  Given this              patient's history of  "rectal cancer, metastatic disease cannot be excluded.  Follow Up:  She follows with Dr. Mortensen of medical oncology  Colonoscopy in May 2022 showed ascending colon polyp which was serrated adenoma without any atypia.  CT CAP 7/14/22:  IMPRESSION:  1.  No metastatic disease in the chest, abdomen, or pelvis.  I saw her in November 2022 without evidence of recurrence  MR Pelvis 2/16/23:  IMPRESSION:   1. There has been a complete response to treatment, with a  corresponding tumor regression grade of mrTRG-1 .  No residual tumor  identified.  2. No lymphadenopathy     Latest Reference Range & Units 01/08/20 17:51 05/05/20 10:40 08/19/20 11:11 03/16/21 12:06 07/26/21 15:19 12/14/21 10:03 07/14/22 10:34   CEA 0.0 - 2.5 ug/L <0.5 <0.5 <0.5 <0.5 <0.5 <0.5 1.1     INTERVAL HISTORY: Denies increased pain, fevers, or chills. Tolerating diet well. Having formed BM's without blood.      Physical Examination:  BP (!) 139/97 (BP Location: Left arm, Patient Position: Sitting, Cuff Size: Adult Regular)   Pulse 73   Ht 1.676 m (5' 6\")   Wt 90.7 kg (200 lb)   SpO2 98%   BMI 32.28 kg/m    Abdomen soft, nontender.  No inguinal adenopathy palpated.  Perianal skin intact.  Digital rectal exam: No masses palpated.  Flexible sigmoidoscopy: after obtaining informed consent and performing a \"time out\", an adult flexible sigmoidoscope was introduced through the anus and passed up to the mid sigmoid colon. The quality of the prep was good. Findings: 2 cm white, flat, and faint scar with central telangiectasia at the left lateral aspect of the distal rectum right above the dentate line, approximately 3 cm from the anal verge. No additional abnormalities were seen.     ASSESSMENT  No evidence of recurrent neoplasia.    PLAN  Follow up per Watch and Wait Protocol:   Completed CRT: 8/15/2018  Flexible sigmoidoscopy   Every 3 months for 6 months: Until 2/2019-COMPLETED   Every 4 months for 3 years: Until 8/2021-COMPLETED  Every 6 months for 5 " years: Until 8/2023-Due 11/2023 then yearly  Every year for 10 years: Until 2028     3T MRI of pelvis  6-8 weeks after CRT: COMPLETED  Every 4 months for 2 years: Until 8/2021-COMPLETED  Every 6 months for 2 years: Until 8/2023-DUE 8/2023 and already scheduled  Yearly for 2 years: Until 2025     CT CAP  Every year for 6-8 years: Until 2026-Due 7/2023 and already scheduled     Colonoscopy   Last 5/2022-Repeat 2027     CEA at every clinic or endoscopic visit    30 minutes spent on the date of encounter performing chart review, history and exam, documentation and further activities as noted above with an additional 15 minutes for flexible sigmoidoscopy.       Referring Provider:  Referred Self, MD  No address on file     Primary Care Provider:  Kyra Mortensen        Again, thank you for allowing me to participate in the care of your patient.      Sincerely,    Asif Murrell MD

## 2023-05-15 NOTE — NURSING NOTE
"Chief Complaint   Patient presents with     Flexible Sigmoidoscopy       Vitals:    05/15/23 0825   BP: (!) 139/97   BP Location: Left arm   Patient Position: Sitting   Cuff Size: Adult Regular   Pulse: 73   SpO2: 98%   Weight: 200 lb   Height: 5' 6\"       Body mass index is 32.28 kg/m .    Margoth Willett CMA    "

## 2023-05-15 NOTE — PATIENT INSTRUCTIONS
Follow up:    Follow up per Watch and Wait Protocol:   Completed CRT: 8/15/2018  Flexible sigmoidoscopy   Every 3 months for 6 months: Until 2/2019-COMPLETED   Every 4 months for 3 years: Until 8/2021-COMPLETED  Every 6 months for 5 years: Until 8/2023-Due 11/2023 then yearly  Every year for 10 years: Until 2028     3T MRI of pelvis  6-8 weeks after CRT: COMPLETED  Every 4 months for 2 years: Until 8/2021-COMPLETED  Every 6 months for 2 years: Until 8/2023-DUE 8/2023 and already scheduled  Yearly for 2 years: Until 2025     CT CAP  Every year for 6-8 years: Until 2026-Due 7/2023 and already scheduled     Colonoscopy   Last 5/2022-Repeat 2027     CEA at every clinic or endoscopic visit      Please call with any questions or concerns regarding your clinic visit today.    It is a pleasure being involved in your health care.    Contacts post-consultation depending on your need:    Radiology Appointments [phone]    Schedule Clinic Appointments [phone] # 1   M-F 7:30 - 5 pm    [facility] [phone]    Clinic Fax Number [phone]    Surgery Scheduling [phone]    My Chart is available 24 hours a day and is a secure way to access your records and communicate with your care team.  I strongly recommend signing up if you haven't already done so, if you are comfortable with computers.  If you would like to inquire about this or are having problems with My Chart access, you may call [phone] or go online at [email or web address].  Please allow at least 24 hours for a response and extra time on weekends and Holidays.

## 2023-06-02 ENCOUNTER — HEALTH MAINTENANCE LETTER (OUTPATIENT)
Age: 58
End: 2023-06-02

## 2023-07-20 ENCOUNTER — ANCILLARY ORDERS (OUTPATIENT)
Dept: ONCOLOGY | Facility: CLINIC | Age: 58
End: 2023-07-20

## 2023-07-20 ENCOUNTER — ANCILLARY PROCEDURE (OUTPATIENT)
Dept: MRI IMAGING | Facility: CLINIC | Age: 58
End: 2023-07-20
Attending: INTERNAL MEDICINE
Payer: COMMERCIAL

## 2023-07-20 ENCOUNTER — LAB (OUTPATIENT)
Dept: LAB | Facility: CLINIC | Age: 58
End: 2023-07-20
Payer: COMMERCIAL

## 2023-07-20 ENCOUNTER — ANCILLARY PROCEDURE (OUTPATIENT)
Dept: CT IMAGING | Facility: CLINIC | Age: 58
End: 2023-07-20
Attending: INTERNAL MEDICINE
Payer: COMMERCIAL

## 2023-07-20 DIAGNOSIS — M21.852 DEFORMITY OF FEMUR, LEFT: ICD-10-CM

## 2023-07-20 DIAGNOSIS — C20 RECTAL CANCER (H): ICD-10-CM

## 2023-07-20 LAB
ALBUMIN SERPL BCG-MCNC: 4.5 G/DL (ref 3.5–5.2)
ALP SERPL-CCNC: 95 U/L (ref 35–104)
ALT SERPL W P-5'-P-CCNC: 23 U/L (ref 0–50)
ANION GAP SERPL CALCULATED.3IONS-SCNC: 10 MMOL/L (ref 7–15)
AST SERPL W P-5'-P-CCNC: 17 U/L (ref 0–45)
BASOPHILS # BLD AUTO: 0 10E3/UL (ref 0–0.2)
BASOPHILS NFR BLD AUTO: 1 %
BILIRUB SERPL-MCNC: 0.3 MG/DL
BUN SERPL-MCNC: 17.1 MG/DL (ref 6–20)
CALCIUM SERPL-MCNC: 9.7 MG/DL (ref 8.6–10)
CEA SERPL-MCNC: 0.8 NG/ML
CHLORIDE SERPL-SCNC: 103 MMOL/L (ref 98–107)
CREAT SERPL-MCNC: 0.85 MG/DL (ref 0.51–0.95)
DEPRECATED HCO3 PLAS-SCNC: 27 MMOL/L (ref 22–29)
EOSINOPHIL # BLD AUTO: 0.1 10E3/UL (ref 0–0.7)
EOSINOPHIL NFR BLD AUTO: 2 %
ERYTHROCYTE [DISTWIDTH] IN BLOOD BY AUTOMATED COUNT: 13 % (ref 10–15)
GFR SERPL CREATININE-BSD FRML MDRD: 80 ML/MIN/1.73M2
GLUCOSE SERPL-MCNC: 108 MG/DL (ref 70–99)
HCT VFR BLD AUTO: 40.5 % (ref 35–47)
HGB BLD-MCNC: 13.8 G/DL (ref 11.7–15.7)
IMM GRANULOCYTES # BLD: 0 10E3/UL
IMM GRANULOCYTES NFR BLD: 0 %
LYMPHOCYTES # BLD AUTO: 1.1 10E3/UL (ref 0.8–5.3)
LYMPHOCYTES NFR BLD AUTO: 27 %
MCH RBC QN AUTO: 30.5 PG (ref 26.5–33)
MCHC RBC AUTO-ENTMCNC: 34.1 G/DL (ref 31.5–36.5)
MCV RBC AUTO: 90 FL (ref 78–100)
MONOCYTES # BLD AUTO: 0.3 10E3/UL (ref 0–1.3)
MONOCYTES NFR BLD AUTO: 7 %
NEUTROPHILS # BLD AUTO: 2.7 10E3/UL (ref 1.6–8.3)
NEUTROPHILS NFR BLD AUTO: 63 %
NRBC # BLD AUTO: 0 10E3/UL
NRBC BLD AUTO-RTO: 0 /100
PLATELET # BLD AUTO: 197 10E3/UL (ref 150–450)
POTASSIUM SERPL-SCNC: 4.4 MMOL/L (ref 3.4–5.3)
PROT SERPL-MCNC: 6.8 G/DL (ref 6.4–8.3)
RBC # BLD AUTO: 4.52 10E6/UL (ref 3.8–5.2)
SODIUM SERPL-SCNC: 140 MMOL/L (ref 136–145)
WBC # BLD AUTO: 4.3 10E3/UL (ref 4–11)

## 2023-07-20 PROCEDURE — 74177 CT ABD & PELVIS W/CONTRAST: CPT | Mod: GC | Performed by: RADIOLOGY

## 2023-07-20 PROCEDURE — A9585 GADOBUTROL INJECTION: HCPCS | Mod: JZ | Performed by: STUDENT IN AN ORGANIZED HEALTH CARE EDUCATION/TRAINING PROGRAM

## 2023-07-20 PROCEDURE — 85025 COMPLETE CBC W/AUTO DIFF WBC: CPT | Performed by: PATHOLOGY

## 2023-07-20 PROCEDURE — 80053 COMPREHEN METABOLIC PANEL: CPT | Performed by: PATHOLOGY

## 2023-07-20 PROCEDURE — 82378 CARCINOEMBRYONIC ANTIGEN: CPT | Performed by: INTERNAL MEDICINE

## 2023-07-20 PROCEDURE — 99000 SPECIMEN HANDLING OFFICE-LAB: CPT | Performed by: PATHOLOGY

## 2023-07-20 PROCEDURE — 71260 CT THORAX DX C+: CPT | Mod: GC | Performed by: RADIOLOGY

## 2023-07-20 PROCEDURE — 36415 COLL VENOUS BLD VENIPUNCTURE: CPT | Performed by: PATHOLOGY

## 2023-07-20 PROCEDURE — 74183 MRI ABD W/O CNTR FLWD CNTR: CPT | Performed by: STUDENT IN AN ORGANIZED HEALTH CARE EDUCATION/TRAINING PROGRAM

## 2023-07-20 RX ORDER — IOPAMIDOL 755 MG/ML
111 INJECTION, SOLUTION INTRAVASCULAR ONCE
Status: COMPLETED | OUTPATIENT
Start: 2023-07-20 | End: 2023-07-20

## 2023-07-20 RX ORDER — GADOBUTROL 604.72 MG/ML
10 INJECTION INTRAVENOUS ONCE
Status: COMPLETED | OUTPATIENT
Start: 2023-07-20 | End: 2023-07-20

## 2023-07-20 RX ADMIN — IOPAMIDOL 111 ML: 755 INJECTION, SOLUTION INTRAVASCULAR at 08:40

## 2023-07-20 RX ADMIN — GADOBUTROL 9 ML: 604.72 INJECTION INTRAVENOUS at 08:53

## 2023-07-25 ENCOUNTER — VIRTUAL VISIT (OUTPATIENT)
Dept: ONCOLOGY | Facility: CLINIC | Age: 58
End: 2023-07-25
Attending: INTERNAL MEDICINE
Payer: COMMERCIAL

## 2023-07-25 VITALS — BODY MASS INDEX: 30.61 KG/M2 | HEIGHT: 67 IN | WEIGHT: 195 LBS

## 2023-07-25 DIAGNOSIS — C20 RECTAL CANCER (H): Primary | ICD-10-CM

## 2023-07-25 PROCEDURE — 99214 OFFICE O/P EST MOD 30 MIN: CPT | Mod: VID | Performed by: INTERNAL MEDICINE

## 2023-07-25 NOTE — LETTER
7/25/2023         RE: Sarah Rajan  [address] [facility] [id number]        Dear Colleague,    Thank you for referring your patient, Sarah Rajan, to the [facility]. Please see a copy of my visit note below.    Virtual Visit Details    Type of service:  Video Visit     Originating Location (pt. Location): Home    Distant Location (provider location):  Off-site  Platform used for Video Visit: Choose Digital  Video start time. 8:18 AM   Video stop time. 8:28 AM     Oncology outpatient note    Date of service: 7/25/23       PC: Rectal cancer. [id number] - MSI Intact    HPI:  Please see previous note for details. I have copied and updated from prior note.  She is a 57 year old  female noticed BRBPR so Screening colonoscopy on 1/9/2018 revealed 3cm rectal mass and other polyps which were removed.  Pathology indicated moderately differentiated adenocarcinoma w/ intact MMR proteins.  CT staging scan showed no metastatic disease; some liver lesions which are thought to be benign per radiology report, T2N1M0 by pelvic MRI read at [facility]. When we initially reviewed her MRI we will not exactly sure whether that was lymph node involvement or not. We opted to repeat MRI which showed T4 N0 lesion. There was up to take her to surgery as there was possibility of personally lesion without lymph node involvement but because of the findings of repeat MRI the surgery was canceled and she is coming back to discuss neoadjuvant treatment.  We also did an MRI of the liver which showed 3 subcentimeter liver lesions which were too small to characterize and will need attention on follow-up.    Baseline CEA 1.1 on 1/9/18.    She started neoadjuvant 5-FU and oxaliplatin (FOLFOX), which she started on 2/26/18. The day after she received cycle 2, she developed fevers, chills, headache, and an itchy rash on her arms and legs, for which she was evaluated in the ED. She was sent home on Benadryl. Benadryl  and dexamethasone doses were increased for cycle 3.   She tolerated that cycle well    C#4 was given on 4/10/18    She completed 8 cycles of FOLFOX on 6/5/18    Repeat scans show good response to treatment without evidence of metastatic disease. There is only a small signal consistent with residual tumor seen in the primary rectal cancer lesion.    She started on concurrent chemoradiation with Xeloda with radiation beginning on 7/9/18. There was a delay in her receiving Xeloda, so she began that on 7/10/18. She completed it on 8/16/18    Repeat scans show great response to treatment with almost completely fibrotic signal.  There is an indeterminate left intertrochanteric lesion which is stable.  Liver lesion is consistent with benign hemangioma vs cyst    She was seen by Dr Hampton and the decision was made to keep her on watchful waiting.    Her MRI of the pelvis in December 2018 was stable but it showed rectal wall edema all the flexible sigmoidoscopy was negative.  Decision was made to do a short term follow-up MRI.      Left intertrochanteric lesion-we did MRI of the left hip which also showed a nonspecific T1 hypointense lesion in the proximal left femur which has not changed since 6/20/2018.  Repeat MRI in December 2018 , 5/14/19 and 8/20/19 were also stable.      Pelvis MRI in Feb 2019 was stable.    Colonoscopy on 5/13/19 was without evidence of recurrence- Next due in 3 years.     Pelvis MRI on 5/5/2020 continues to show complete response to treatment.    Flex sig on 5/5/2020 was also unremarkable.    Flex sig on 12/15/2020 was unremarkable.  MRI on 3/8/2021 was without evidence of recurrence.  It continues to show complete response.    Interval history    This is a video visit.  I also reviewed notes from Dr. Murrell.  She had flexible sigmoidoscopy in May 2023 which was unremarkable.  She feels good.  Denies any abdominal complaints.  No issues with bowel movement.  No bleeding.  Energy is good.  No  "fevers or infections or shortness of breath.     ECOG 0    ROS:  Otherwise a comprehensive review of the system was unremarkable.      I reviewed other hx in EPIC as below    PMH:  Depression  Restless leg syndrome  Dyslipidemia    Current Outpatient Medications   Medication     Acetaminophen (TYLENOL PO)     amitriptyline 10 MG PO tablet     atorvastatin 20 MG PO tablet     RANITIDINE HCL PO     calcium carbonate (TUMS) 500 MG chewable tablet     FLUoxetine 20 MG PO capsule     lidocaine-prilocaine (EMLA) cream     No current facility-administered medications for this visit.     Facility-Administered Medications Ordered in Other Visits   Medication     heparin 100 UNIT/ML injection 5 mL     heparin 100 UNIT/ML injection 500 Units         FHx  Aunt and maternal grandmother had breast cancer  Pateral grandmother  of colorectal cancer  Mother had ischemic colitis    SHx:  , two teenage children  EtOH: 1 drink daily, occasionally more on weekends  Tobacco: never smoker  She is a microbiologist     Allergies   Allergen Reactions     Inapsine [Droperidol] Other (See Comments)     Elevated blood pressure and increased heart rate     Tegaderm Transparent Dressing (Informational Only) Itching     Pt had reaction to Tegaderm used for port dressing. Whole area was very red and itchy. Please use IV 3000 instead.        Exam:  Ht 1.702 m (5' 7\")   Wt 88.5 kg (195 lb)   BMI 30.54 kg/m     Wt Readings from Last 4 Encounters:   23 88.5 kg (195 lb)   05/15/23 90.7 kg (200 lb)   10/26/22 88.5 kg (195 lb)   21 95.3 kg (210 lb)         Constitutional.  Looks well and in no apparent distress.   Eyes.  Without eye redness or apparent jaundice.   Respiratory.  Non labored breathing. Speaking in full sentences.    Skin.  No concerning skin rashes on the skin visualized.   Neurological.  Is alert and oriented.  Psychiatric.  Mood and affect seem appropriate.      The rest of a comprehensive physical examination is " deferred due to Public Health Emergency video visit restrictions.        Labs/Imaging.  Reviewed  7/20/2023.  CBC unremarkable.    CMP is normal.  CEA 0.8    7/20/2023.  MRI of the rectum     IMPRESSION:   1. There has been a complete response to treatment, with a  corresponding tumor regression grade of 1.    2. No suspicious lymphadenopathy.  3. Unchanged enhancing left intertrochanteric femur lesion.      CT CAP on 7/20/2023     IMPRESSION:   No evidence of metastatic disease in the chest, abdomen, or pelvis.     She had a colonoscopy in May 2022 which showed a polyp in the ascending colon as well as in the descending colon which were removed.  Pathology showed sessile serrated adenoma without any dysplasia from the polyp in the ascending colon.    The descending colon polyp showed colonic mucosa without any significant abnormality.    Assessment and Plan  [id number] moderately differentiated adenocarcinoma of the rectum - MSI-stable  She was started on neoadjuvant FOLFOX on 2/26/2018.  after 4 cycles she had good response to treatment. Liver lesions remain indeterminate.    we continued with the same chemotherapy and she received cycle #8 on 6/20/18.  Repeat scans show good response to treatment with only a small signal consistent with viable tumor in the primary rectal cancer. No surrounding lymph nodes suspicious. There is no evidence of metastatic disease.    She started on concurrent chemoradiation with Xeloda with radiation beginning on 7/9/18. There was a delay in her receiving Xeloda, so she began on 7/10/18. She completed it on 8/16/18    Repeat scans show great response to treatment with almost completely fibrotic signal.  There is an indeterminate left intertrochanteric lesion which was stable.    She was seen by Dr Hampton and the decision was made to keep her on watchful waiting.      She feels very well.      Colonoscopy in May 2022 showed ascending colon polyp which was serrated adenoma without any  atypia.  Descending colon polyp was without any histological abnormality.  Otherwise colonoscopy was unremarkable.  Repeat colonoscopy will be in 5 years.  Flexible sigmoidoscopy in May 2023 was unremarkable.  She will be due for next sigmoidoscopy in November 2023.  After that we will do it yearly.  MRI continues to show complete response to treatment so the next 1 will be due again in July 2024.  Next CT scan will be due in July 2024.  We will continue to monitor CEA serially.    She was seen in Cancer Survivorship clinic.    Surveillance per protocol:  Follow up per Watch and Wait Protocol:   Completed CRT: 8/16/2018  Flexible sigmoidoscopy   Every 2 months for 6 months: Until 2/2019-COMPLETED   Every 3 months for 3 years: Until 8/2021-COMPLETED  Every 6 months for 5 years: Until 2023-next in November 2023  Every year for 10 years: Until 2028     3T MRI of pelvis  6-8 weeks after CRT: COMPLETED  Every 4 months for 2 years: Until 8/2020-Completed  Every 6 months for 2 years: Until 8/2022- Completed  Yearly for 2 years: Until 2024- DUE in July 2024     CT CAP  Every year for 6-8 years: Until 2026-Due 7/2024     Colonoscopy   Last 5/2022- DUE 5/2027     CEA at every clinic or endoscopic visit    Left intertrochanteric lesion-  This continues to show stability which is reassuring.  We will continue to keep a watch on this on repeat MRI.      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  She feels tingling in the bottom of the feet.  It is not bothering her.  Continue to observe.       Port-A-Cath.  This was removed in October 2022    We did not address the following today.  Right foot ganglion cyst.  I recommend follow-up with PCP.        Indeterminate liver lesion. MRI 2/5/19 shows stable lesion, most likely c/w benign hemangioma or cyst.  At this time I do not plan to repeat MRI abdomen but when we will do CT scan, it will be monitored.      I will see her back in 1 year with labs/CT  scan/MRI prior.    I answered all of her questions to her satisfaction.  She is agreeable and comfortable with the plan.      Kyra Mortensen          Again, thank you for allowing me to participate in the care of your patient.        Sincerely,        Kyra Mortensen MD

## 2023-07-25 NOTE — NURSING NOTE
Is the patient currently in the state of [place]? YES    Visit mode:VIDEO    If the visit is dropped, the patient can be reconnected by: VIDEO VISIT: Text to cell phone: [phone]    Will anyone else be joining the visit? NO      How would you like to obtain your AVS? MyChart    Are changes needed to the allergy or medication list? NO    Reason for visit: RECHECK (Video visit )     Lesa Mercedes

## 2023-07-25 NOTE — PROGRESS NOTES
Oncology outpatient note    Date of service: 7/25/23       PC: Rectal cancer. [id number] - MSI Intact    HPI:  Please see previous note for details. I have copied and updated from prior note.  She is a 57 year old  female noticed BRBPR so Screening colonoscopy on 1/9/2018 revealed 3cm rectal mass and other polyps which were removed.  Pathology indicated moderately differentiated adenocarcinoma w/ intact MMR proteins.  CT staging scan showed no metastatic disease; some liver lesions which are thought to be benign per radiology report, T2N1M0 by pelvic MRI read at [facility]. When we initially reviewed her MRI we will not exactly sure whether that was lymph node involvement or not. We opted to repeat MRI which showed T4 N0 lesion. There was up to take her to surgery as there was possibility of personally lesion without lymph node involvement but because of the findings of repeat MRI the surgery was canceled and she is coming back to discuss neoadjuvant treatment.  We also did an MRI of the liver which showed 3 subcentimeter liver lesions which were too small to characterize and will need attention on follow-up.    Baseline CEA 1.1 on 1/9/18.    She started neoadjuvant 5-FU and oxaliplatin (FOLFOX), which she started on 2/26/18. The day after she received cycle 2, she developed fevers, chills, headache, and an itchy rash on her arms and legs, for which she was evaluated in the ED. She was sent home on Benadryl. Benadryl and dexamethasone doses were increased for cycle 3.   She tolerated that cycle well    C#4 was given on 4/10/18    She completed 8 cycles of FOLFOX on 6/5/18    Repeat scans show good response to treatment without evidence of metastatic disease. There is only a small signal consistent with residual tumor seen in the primary rectal cancer lesion.    She started on concurrent chemoradiation with Xeloda with radiation beginning on 7/9/18. There was a delay in her receiving Xeloda, so she began that on 7/10/18.  She completed it on 8/16/18    Repeat scans show great response to treatment with almost completely fibrotic signal.  There is an indeterminate left intertrochanteric lesion which is stable.  Liver lesion is consistent with benign hemangioma vs cyst    She was seen by Dr Hampton and the decision was made to keep her on watchful waiting.    Her MRI of the pelvis in December 2018 was stable but it showed rectal wall edema all the flexible sigmoidoscopy was negative.  Decision was made to do a short term follow-up MRI.      Left intertrochanteric lesion-we did MRI of the left hip which also showed a nonspecific T1 hypointense lesion in the proximal left femur which has not changed since 6/20/2018.  Repeat MRI in December 2018 , 5/14/19 and 8/20/19 were also stable.      Pelvis MRI in Feb 2019 was stable.    Colonoscopy on 5/13/19 was without evidence of recurrence- Next due in 3 years.     Pelvis MRI on 5/5/2020 continues to show complete response to treatment.    Flex sig on 5/5/2020 was also unremarkable.    Flex sig on 12/15/2020 was unremarkable.  MRI on 3/8/2021 was without evidence of recurrence.  It continues to show complete response.    Interval history    This is a video visit.  I also reviewed notes from Dr. Murrell.  She had flexible sigmoidoscopy in May 2023 which was unremarkable.  She feels good.  Denies any abdominal complaints.  No issues with bowel movement.  No bleeding.  Energy is good.  No fevers or infections or shortness of breath.     ECOG 0    ROS:  Otherwise a comprehensive review of the system was unremarkable.      I reviewed other hx in EPIC as below    PMH:  Depression  Restless leg syndrome  Dyslipidemia    Current Outpatient Medications   Medication    Acetaminophen (TYLENOL PO)    amitriptyline 10 MG PO tablet    atorvastatin 20 MG PO tablet    RANITIDINE HCL PO    calcium carbonate (TUMS) 500 MG chewable tablet    FLUoxetine 20 MG PO capsule    lidocaine-prilocaine (EMLA) cream     No  "current facility-administered medications for this visit.     Facility-Administered Medications Ordered in Other Visits   Medication    heparin 100 UNIT/ML injection 5 mL    heparin 100 UNIT/ML injection 500 Units         FHx  Aunt and maternal grandmother had breast cancer  Pateral grandmother  of colorectal cancer  Mother had ischemic colitis    SHx:  , two teenage children  EtOH: 1 drink daily, occasionally more on weekends  Tobacco: never smoker  She is a microbiologist     Allergies   Allergen Reactions    Inapsine [Droperidol] Other (See Comments)     Elevated blood pressure and increased heart rate    Tegaderm Transparent Dressing (Informational Only) Itching     Pt had reaction to Tegaderm used for port dressing. Whole area was very red and itchy. Please use IV 3000 instead.        Exam:  Ht 1.702 m (5' 7\")   Wt 88.5 kg (195 lb)   BMI 30.54 kg/m     Wt Readings from Last 4 Encounters:   23 88.5 kg (195 lb)   05/15/23 90.7 kg (200 lb)   10/26/22 88.5 kg (195 lb)   21 95.3 kg (210 lb)         Constitutional.  Looks well and in no apparent distress.   Eyes.  Without eye redness or apparent jaundice.   Respiratory.  Non labored breathing. Speaking in full sentences.    Skin.  No concerning skin rashes on the skin visualized.   Neurological.  Is alert and oriented.  Psychiatric.  Mood and affect seem appropriate.      The rest of a comprehensive physical examination is deferred due to Public Health Emergency video visit restrictions.        Labs/Imaging.  Reviewed  2023.  CBC unremarkable.    CMP is normal.  CEA 0.8    2023.  MRI of the rectum     IMPRESSION:   1. There has been a complete response to treatment, with a  corresponding tumor regression grade of 1.    2. No suspicious lymphadenopathy.  3. Unchanged enhancing left intertrochanteric femur lesion.      CT CAP on 2023     IMPRESSION:   No evidence of metastatic disease in the chest, abdomen, or pelvis.     She had " a colonoscopy in May 2022 which showed a polyp in the ascending colon as well as in the descending colon which were removed.  Pathology showed sessile serrated adenoma without any dysplasia from the polyp in the ascending colon.    The descending colon polyp showed colonic mucosa without any significant abnormality.    Assessment and Plan  [id number] moderately differentiated adenocarcinoma of the rectum - MSI-stable  She was started on neoadjuvant FOLFOX on 2/26/2018.  after 4 cycles she had good response to treatment. Liver lesions remain indeterminate.    we continued with the same chemotherapy and she received cycle #8 on 6/20/18.  Repeat scans show good response to treatment with only a small signal consistent with viable tumor in the primary rectal cancer. No surrounding lymph nodes suspicious. There is no evidence of metastatic disease.    She started on concurrent chemoradiation with Xeloda with radiation beginning on 7/9/18. There was a delay in her receiving Xeloda, so she began on 7/10/18. She completed it on 8/16/18    Repeat scans show great response to treatment with almost completely fibrotic signal.  There is an indeterminate left intertrochanteric lesion which was stable.    She was seen by Dr Hampton and the decision was made to keep her on watchful waiting.      She feels very well.      Colonoscopy in May 2022 showed ascending colon polyp which was serrated adenoma without any atypia.  Descending colon polyp was without any histological abnormality.  Otherwise colonoscopy was unremarkable.  Repeat colonoscopy will be in 5 years.  Flexible sigmoidoscopy in May 2023 was unremarkable.  She will be due for next sigmoidoscopy in November 2023.  After that we will do it yearly.  MRI continues to show complete response to treatment so the next 1 will be due again in July 2024.  Next CT scan will be due in July 2024.  We will continue to monitor CEA serially.    She was seen in Cancer Survivorship  clinic.    Surveillance per protocol:  Follow up per Watch and Wait Protocol:   Completed CRT: 8/16/2018  Flexible sigmoidoscopy   Every 2 months for 6 months: Until 2/2019-COMPLETED   Every 3 months for 3 years: Until 8/2021-COMPLETED  Every 6 months for 5 years: Until 2023-next in November 2023  Every year for 10 years: Until 2028     3T MRI of pelvis  6-8 weeks after CRT: COMPLETED  Every 4 months for 2 years: Until 8/2020-Completed  Every 6 months for 2 years: Until 8/2022- Completed  Yearly for 2 years: Until 2024- DUE in July 2024     CT CAP  Every year for 6-8 years: Until 2026-Due 7/2024     Colonoscopy   Last 5/2022- DUE 5/2027     CEA at every clinic or endoscopic visit    Left intertrochanteric lesion-  This continues to show stability which is reassuring.  We will continue to keep a watch on this on repeat MRI.      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  She feels tingling in the bottom of the feet.  It is not bothering her.  Continue to observe.       Port-A-Cath.  This was removed in October 2022    We did not address the following today.  Right foot ganglion cyst.  I recommend follow-up with PCP.        Indeterminate liver lesion. MRI 2/5/19 shows stable lesion, most likely c/w benign hemangioma or cyst.  At this time I do not plan to repeat MRI abdomen but when we will do CT scan, it will be monitored.      I will see her back in 1 year with labs/CT scan/MRI prior.    I answered all of her questions to her satisfaction.  She is agreeable and comfortable with the plan.      Kyra Mortensen

## 2023-07-25 NOTE — PROGRESS NOTES
Virtual Visit Details    Type of service:  Video Visit     Originating Location (pt. Location): Home    Distant Location (provider location):  Off-site  Platform used for Video Visit: Joyride  Video start time. 8:18 AM   Video stop time. 8:28 AM

## 2023-08-16 ENCOUNTER — ANCILLARY ORDERS (OUTPATIENT)
Dept: MAMMOGRAPHY | Facility: CLINIC | Age: 58
End: 2023-08-16

## 2023-08-16 DIAGNOSIS — Z12.31 VISIT FOR SCREENING MAMMOGRAM: Primary | ICD-10-CM

## 2023-08-22 NOTE — LETTER
4/16/2018      RE: Sarah Rajan  [address] [facility] [address]       Oncology/Hematology Visit Note  Apr 16, 2018    Reason for Visit: follow up of [id number] moderately differentiated adenocarcinoma of the rectum     History of Present Illness: Sarah Rajan is a 52 year old female with  recently diagnosed locally advanced rectal cancer. She noticed BRBPR so screening colonoscopy on 1/9/2018 revealed 3 cm rectal mass and other polyps which were removed.  Pathology indicated moderately differentiated adenocarcinoma w/ intact MMR proteins.  CT staging scan showed no metastatic disease; some liver lesions which are thought to be benign per radiology report, T2N1M0 by pelvic MRI read at [facility]. When we initially reviewed her MRI we were not exactly sure whether that was lymph node involvement or not. We opted to repeat MRI which showed T4 N0 lesion.   We also did an MRI of the liver which showed 3 subcentimeter liver lesions which were too small to characterize and will need attention on follow-up. She is currently undergoing treatment with neoadjuvant 5-FU and oxaliplatin (FOLFOX), which she started on 2/26/18. The day after she received cycle 2, she developed fevers, chills, headache, and an itchy rash on her arms and legs, for which she was evaluated in the ED. She was sent home on Benadryl. Benadryl and dexamethasone doses were increased for cycle 3. She received cycle 4 on 4/10/18. Please see Dr. Mortensen's previous notes for further details on the patient's history. She comes in today on an add on basis to evaluate a feeling of coming out of her skin.     Interval History:  Patient reports that she went to the emergency room on April 14 as she felt like she was coming out of her skin.  She reports that she felt this way previously when she was dehydrated.  She received 1 L of IV normal saline and felt much better afterwards.  However, she now feels more anxious and irritable.  Other than the previous  dehydration episode, she denies having this issue in the past.  She has been taking Benadryl and Ativan.  She took 25 mg of Benadryl 3 times a day yesterday and 0.5 mg of Ativan 3 times yesterday.  She denies any chest pain.  She has dyspnea on exertion with stairs that is unchanged since starting chemotherapy.  She feels her energy is okay.  She did have a headache yesterday that was alleviated with Tylenol.  She otherwise denies recent headaches.  She denies feeling that her heart is racing.  Her  notes that she did feel similarly following cycle 3 around her pump unhook time, but that the symptoms subsided much more quickly than they did this time, as they are still present.  She denies any bleeding issues.  She denies any dark tarry stools.  She denies other concerns.      Current Outpatient Prescriptions   Medication Sig Dispense Refill     RANITIDINE HCL PO Take 150 mg by mouth daily as needed for heartburn       fluorouracil Administer 4,968 mg intravenously . Continuous infusion over 46-48 hr via ambulatory pump q14d Supplied by outside provider with pump.       LORazepam (ATIVAN) 0.5 MG tablet Take 1 tablet (0.5 mg) by mouth every 4 hours as needed (Anxiety, Nausea/Vomiting or Sleep) 30 tablet 2     lidocaine-prilocaine (EMLA) cream Apply 45 minutes prior to procedure. 30 g 3     Acetaminophen (TYLENOL PO) Take 1,000 mg by mouth At Bedtime       calcium carbonate (TUMS) 500 MG chewable tablet Take 1-2 chew tab by mouth daily  150 tablet      ondansetron (ZOFRAN) 4 MG tablet Take one tablet by mouth every 6 hours as needed for nausea when taking neomycin and flagyl 3 tablet 0     zolpidem (AMBIEN) 5 MG tablet Take 1 tablet (5 mg) by mouth nightly as needed for sleep (Patient not taking: Reported on 4/16/2018) 30 tablet 3     traZODone (DESYREL) 50 MG tablet Take 0.5-2 tablets ( mg) by mouth At Bedtime (Patient not taking: Reported on 4/16/2018) 30 tablet 3     prochlorperazine (COMPAZINE) 10 MG  "tablet Take 1 tablet (10 mg) by mouth every 6 hours as needed for nausea or vomiting (Patient not taking: Reported on 4/16/2018) 20 tablet 1     FLUoxetine (PROZAC) 20 MG capsule 1 tablet in the morning  1     AMITRIPTYLINE HCL PO Take 20 mg by mouth At Bedtime        Physical Examination:  General: The patient is a pleasant female in no acute distress. She is here today with her .  /90 (BP Location: Left arm)  Pulse 96  Temp 98.5  F (36.9  C)  Ht 1.676 m (5' 5.98\")  Wt 88.9 kg (195 lb 14.4 oz)  SpO2 97%  BMI 31.63 kg/m2  Wt Readings from Last 10 Encounters:   04/16/18 88.9 kg (195 lb 14.4 oz)   04/16/18 89.2 kg (196 lb 11.2 oz)   04/14/18 92 kg (202 lb 13.2 oz)   04/10/18 92 kg (202 lb 14.4 oz)   03/26/18 92.3 kg (203 lb 6.4 oz)   03/12/18 92.9 kg (204 lb 14.4 oz)   02/26/18 93.3 kg (205 lb 9.6 oz)   02/19/18 90.7 kg (200 lb)   02/15/18 91.9 kg (202 lb 9.6 oz)   02/06/18 91.2 kg (201 lb)   HEENT: EOMI, PERRL. Sclerae are anicteric. Oral mucosa is pink and moist with no lesions or thrush.   Lymph: Neck is supple with no lymphadenopathy in the cervical or supraclavicular areas.   Heart: Regular rate and rhythm.   Lungs: Clear to auscultation bilaterally.   Abdomen: Bowel sounds present, soft, nontender with no palpable hepatosplenomegaly or masses.   Extremities: No lower extremity edema noted bilaterally.   Neuro: Cranial nerves II through XII are grossly intact.  Skin: No rashes, petechiae, or bruising noted on exposed skin.     Laboratory Data:   4/16/2018 12:06   Bilirubin Total 0.8   Bilirubin Direct 0.2   Lactate Dehydrogenase 247 (H)   WBC 3.3 (L)   Hemoglobin 12.8   Hematocrit 35.4   Platelet Count 128 (L)   RBC Count 4.09   MCV 87   MCH 31.3   MCHC 36.2   RDW 14.0   Diff Method Automated Method   % Neutrophils 57.9   % Lymphocytes 31.6   % Monocytes 7.2   % Eosinophils 2.1   % Basophils 0.9   % Immature Granulocytes 0.3   Nucleated RBCs 0   Absolute Neutrophil 1.9   Absolute Lymphocytes " 1.1   Absolute Monocytes 0.2   Absolute Eosinophils 0.1   Absolute Basophils 0.0   Abs Immature Granulocytes 0.0   Absolute Nucleated RBC 0.0   Platelet Estimate Confirming automa...   % Retic 0.5   Absolute Retic 20.5 (L)     Assessment and Plan:  1. Anemia. Patient had a dramatic drop in her hemoglobin in the ED on 4/14. Will recheck labs today with CBC, retic, type and screen, LD, and bilirubin. Patient denies any bleeding issues. Her bilirubin in the ED was normal, so hemolysis seems unlikely. Her  reports they had some issues with contaminated samples in the ED, so will retest for accuracy today as well.     2. Anxiety and irritability. Possibly secondary to Benadryl or Ativan. Will try stopping Ativan. If not improved with stopping Ativan, would try stopping the Benadryl in the event that she is having a paradoxical effect to one or both of these medications.     3. Locally advanced moderately differentiated adenocarcinoma of the rectum: [id number]. MSI stable. Started neoadjuvant FOLFOX on 2/26. Plan for 4 cycles then restaging with MRI pelvis + abdomen. She has received 4 cycles of FOLFOX, last on 4/10/18. She will see Dr. Mortensen with an abd/pelvis MRI prior to consideration of cycle 5. She would like to get some of her infusions done at [facility] and prefers Monday appointments. She will call sooner for concerns.     Cathleen Ramos PA-C  [facility]  [address], [place] [id number] [phone]    Addendum: Hemoglobin is much improved from the last check, which I suspect was not accurate. Her WBC count and platelets are also improved from prior.     Cathleen Ramos PA-C       Azithromycin Pregnancy And Lactation Text: This medication is considered safe during pregnancy and is also secreted in breast milk.

## 2023-09-27 ENCOUNTER — ANCILLARY PROCEDURE (OUTPATIENT)
Dept: MAMMOGRAPHY | Facility: HOSPITAL | Age: 58
End: 2023-09-27
Attending: FAMILY MEDICINE
Payer: COMMERCIAL

## 2023-09-27 DIAGNOSIS — Z12.31 VISIT FOR SCREENING MAMMOGRAM: ICD-10-CM

## 2023-09-27 PROCEDURE — 77067 SCR MAMMO BI INCL CAD: CPT

## 2023-09-28 ENCOUNTER — ANCILLARY ORDERS (OUTPATIENT)
Dept: RADIOLOGY | Facility: CLINIC | Age: 58
End: 2023-09-28

## 2023-11-07 ENCOUNTER — MYC MEDICAL ADVICE (OUTPATIENT)
Dept: SURGERY | Facility: CLINIC | Age: 58
End: 2023-11-07
Payer: COMMERCIAL

## 2023-11-14 NOTE — PROGRESS NOTES
Colon and Rectal Surgery Follow-up Clinic Note    RE: Sarah RICE Satinder  : 1965  JIMMY: 2023    DIAGNOSIS: mT2N1 rectal adenocarcinoma status post total neoadjuvant therapy with cCR on watch and wait protocol.        HPI:   Initial Staging:  She was initially seen in 2018 with a moderately differentiated, invasive adenocarcinoma with intact mismatch repair 4-5 cm from the anal verge. She was staged at that time and an MRI that was initially read as a T1-T2 lesion, then possibly a T2N1 lesion. CT A/P was significant for small liver lesions too small to characterize but not concerning for malignancy and CEA was 1.1. She was discussed at tumor board the pelvic MRI was felt to be poor quality so she underwent a repeat MRI pelvis on  and her tumor was staged as T4N0 due to abutment of the levators. Abdominal MRI to evaluate the liver lesions was performed on  which was again found to have multiple sub-centimeter lesions that were difficult to characterize.   Treatment:  She underwent 8 cycles of FOLFOX completed on 18 and chemoradiation with XELODA on 8/15/18. 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   She was found to have a complete clinical response and enrolled in our Watch and Wait protocol with Dr. Óscar Hampton.  MRI of left hip showed a non specific T1 hypointense lesion in the proximal left femur and repeat MRI 12/10/18:               There is a T1 hypointense and subtly enhancing lesion in the              proximal left femur, stable since at least 2018.  Given this              patient's history of rectal cancer, metastatic disease cannot be excluded.  Follow Up:  She follows with Dr. Mortensen of medical oncology  Colonoscopy in May 2022 showed ascending colon polyp which was serrated adenoma without any atypia.  CT CAP 23  IMPRESSION:   No evidence of metastatic disease in the chest, abdomen, or pelvis.  I have personally  "reviewed the examination and initial interpretation  and I agree with the findings.  MR Pelvis 7/20/23:  IMPRESSION:   1. There has been a complete response to treatment, with a  corresponding tumor regression grade of 1.    2. No suspicious lymphadenopathy.  3. Unchanged enhancing left intertrochanteric femur lesion.     11/15/18 13:33 02/05/19 09:37 05/14/19 08:53 08/21/19 10:27 10/09/19 13:54 01/08/20 17:51 05/05/20 10:40 08/19/20 11:11 03/16/21 12:06 07/26/21 15:19 12/14/21 10:03 07/14/22 10:34 07/20/23 07:24   CEA <0.5 <0.5 <0.5 <0.5 <0.5 <0.5 <0.5 <0.5 <0.5 <0.5 <0.5 1.1 0.8     INTERVAL HISTORY: Denies increased pain, fevers, or chills. Tolerating diet well. Having formed BM's without blood.      Physical Examination:  /84 (BP Location: Left arm, Patient Position: Sitting, Cuff Size: Adult Regular)   Pulse 95   Ht 1.676 m (5' 6\")   Wt 93.4 kg (205 lb 12.8 oz)   SpO2 98%   BMI 33.22 kg/m    Abdomen soft, nontender.  No inguinal adenopathy palpated.  Perianal skin intact.  Digital rectal exam: No masses palpated.  Flexible sigmoidoscopy: after obtaining informed consent and performing a \"time out\", an adult flexible sigmoidoscope was introduced through the anus and passed up to the mid sigmoid colon. The quality of the prep was good. Findings: 2 cm white, flat, and faint scar with central telangiectasia at the left lateral aspect of the distal rectum right above the dentate line, approximately 3 cm from the anal verge. No additional abnormalities were seen.     ASSESSMENT  No evidence of recurrent neoplasia.    PLAN  Follow up per Watch and Wait Protocol:   Completed CRT: 8/15/2018  Flexible sigmoidoscopy   Every 3 months for 6 months: Until 2/2019-COMPLETED   Every 4 months for 3 years: Until 8/2021-COMPLETED  Every 6 months for 5 years: Until 8/2023-COMPLETED  Every year for 10 years: Until 2028 Due 11/2024     3T MRI of pelvis  6-8 weeks after CRT: COMPLETED  Every 4 months for 2 years: Until " 8/2021-COMPLETED  Every 6 months for 2 years: Until 8/2023-COMPLETED  Yearly for 2 years: Until 2025 Due 7/2024     CT CAP  Every year for 6-8 years: Until 2026-Due 7/2024      Colonoscopy   Last 5/2022-Repeat 2027     CEA at every clinic or endoscopic visit    30 minutes spent on the date of encounter performing chart review, history and exam, documentation and further activities as noted above with an additional 15 minutes for flexible sigmoidoscopy.     Asif Murrell MD, MSc, FACS, FASCRS.    Chief, Division of Colon & Rectal Surgery  Stanley M. Goldberg, MD Endowed Chair, Colon & Rectal Surgery  Department of Surgery   [facility]      Referring Provider:  Referred Self, MD  No address on file     Primary Care Provider:  Kyra Mortensen

## 2023-11-20 ENCOUNTER — OFFICE VISIT (OUTPATIENT)
Dept: SURGERY | Facility: CLINIC | Age: 58
End: 2023-11-20
Payer: COMMERCIAL

## 2023-11-20 VITALS
DIASTOLIC BLOOD PRESSURE: 84 MMHG | HEIGHT: 66 IN | WEIGHT: 205.8 LBS | SYSTOLIC BLOOD PRESSURE: 127 MMHG | HEART RATE: 95 BPM | OXYGEN SATURATION: 98 % | BODY MASS INDEX: 33.07 KG/M2

## 2023-11-20 DIAGNOSIS — C20 RECTAL CANCER (H): Primary | ICD-10-CM

## 2023-11-20 PROCEDURE — 45330 DIAGNOSTIC SIGMOIDOSCOPY: CPT | Performed by: COLON & RECTAL SURGERY

## 2023-11-20 ASSESSMENT — PAIN SCALES - GENERAL: PAINLEVEL: NO PAIN (0)

## 2023-11-20 NOTE — CONFIDENTIAL NOTE
Sarah Rajan had flexible endoscope (serial number [id number], model Olympus [id number]) used for a procedure on 11/20/2023 at 9:40 AM.         Margoth Willett CMA

## 2023-11-20 NOTE — LETTER
2023       RE: Sarah Rajan  [address] [facility] [address]     Dear Colleague,    Thank you for referring your patient, Sarah Rajan, to the [facility] [place] at [facility]. Please see a copy of my visit note below.    Colon and Rectal Surgery Follow-up Clinic Note    RE: Sarah Rajan  : 1965  JIMMY: 2023    DIAGNOSIS: mT2N1 rectal adenocarcinoma status post total neoadjuvant therapy with cCR on watch and wait protocol.        HPI:   Initial Staging:  She was initially seen in 2018 with a moderately differentiated, invasive adenocarcinoma with intact mismatch repair 4-5 cm from the anal verge. She was staged at that time and an MRI that was initially read as a T1-T2 lesion, then possibly a T2N1 lesion. CT A/P was significant for small liver lesions too small to characterize but not concerning for malignancy and CEA was 1.1. She was discussed at tumor board the pelvic MRI was felt to be poor quality so she underwent a repeat MRI pelvis on  and her tumor was staged as T4N0 due to abutment of the levators. Abdominal MRI to evaluate the liver lesions was performed on  which was again found to have multiple sub-centimeter lesions that were difficult to characterize.   Treatment:  She underwent 8 cycles of FOLFOX completed on 18 and chemoradiation with XELODA on 8/15/18. 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   She was found to have a complete clinical response and enrolled in our Watch and Wait protocol with Dr. Óscar Hampton.  MRI of left hip showed a non specific T1 hypointense lesion in the proximal left femur and repeat MRI 12/10/18:               There is a T1 hypointense and subtly enhancing lesion in the              proximal left femur, stable since at least 2018.  Given this              patient's history of  "rectal cancer, metastatic disease cannot be excluded.  Follow Up:  She follows with Dr. Mortensen of medical oncology  Colonoscopy in May 2022 showed ascending colon polyp which was serrated adenoma without any atypia.  CT CAP 7/20/23  IMPRESSION:   No evidence of metastatic disease in the chest, abdomen, or pelvis.  I have personally reviewed the examination and initial interpretation  and I agree with the findings.  MR Pelvis 7/20/23:  IMPRESSION:   1. There has been a complete response to treatment, with a  corresponding tumor regression grade of 1.    2. No suspicious lymphadenopathy.  3. Unchanged enhancing left intertrochanteric femur lesion.     11/15/18 13:33 02/05/19 09:37 05/14/19 08:53 08/21/19 10:27 10/09/19 13:54 01/08/20 17:51 05/05/20 10:40 08/19/20 11:11 03/16/21 12:06 07/26/21 15:19 12/14/21 10:03 07/14/22 10:34 07/20/23 07:24   CEA <0.5 <0.5 <0.5 <0.5 <0.5 <0.5 <0.5 <0.5 <0.5 <0.5 <0.5 1.1 0.8     INTERVAL HISTORY: Denies increased pain, fevers, or chills. Tolerating diet well. Having formed BM's without blood.      Physical Examination:  /84 (BP Location: Left arm, Patient Position: Sitting, Cuff Size: Adult Regular)   Pulse 95   Ht 1.676 m (5' 6\")   Wt 93.4 kg (205 lb 12.8 oz)   SpO2 98%   BMI 33.22 kg/m    Abdomen soft, nontender.  No inguinal adenopathy palpated.  Perianal skin intact.  Digital rectal exam: No masses palpated.  Flexible sigmoidoscopy: after obtaining informed consent and performing a \"time out\", an adult flexible sigmoidoscope was introduced through the anus and passed up to the mid sigmoid colon. The quality of the prep was good. Findings: 2 cm white, flat, and faint scar with central telangiectasia at the left lateral aspect of the distal rectum right above the dentate line, approximately 3 cm from the anal verge. No additional abnormalities were seen.     ASSESSMENT  No evidence of recurrent neoplasia.    PLAN  Follow up per Watch and Wait Protocol:   Completed CRT: " 8/15/2018  Flexible sigmoidoscopy   Every 3 months for 6 months: Until 2/2019-COMPLETED   Every 4 months for 3 years: Until 8/2021-COMPLETED  Every 6 months for 5 years: Until 8/2023-COMPLETED  Every year for 10 years: Until 2028 Due 11/2024     3T MRI of pelvis  6-8 weeks after CRT: COMPLETED  Every 4 months for 2 years: Until 8/2021-COMPLETED  Every 6 months for 2 years: Until 8/2023-COMPLETED  Yearly for 2 years: Until 2025 Due 7/2024     CT CAP  Every year for 6-8 years: Until 2026-Due 7/2024      Colonoscopy   Last 5/2022-Repeat 2027     CEA at every clinic or endoscopic visit    30 minutes spent on the date of encounter performing chart review, history and exam, documentation and further activities as noted above with an additional 15 minutes for flexible sigmoidoscopy.           Referring Provider:  Referred Self, MD  No address on file     Primary Care Provider:  Kyra Mortensen      Again, thank you for allowing me to participate in the care of your patient.      Sincerely,    Asif Murrell MD

## 2023-11-20 NOTE — NURSING NOTE
"Chief Complaint   Patient presents with    Flexible Sigmoidoscopy       Vitals:    11/20/23 0937   BP: 127/84   BP Location: Left arm   Patient Position: Sitting   Cuff Size: Adult Regular   Pulse: 95   SpO2: 98%   Weight: 205 lb 12.8 oz   Height: 5' 6\"       Body mass index is 33.22 kg/m .    Margoth Willett CMA    "

## 2024-02-06 NOTE — DISCHARGE INSTRUCTIONS
MRI Contrast Discharge Instructions    The IV contrast you received today will pass out of your body in your  urine. This will happen in the next 24 hours. You will not feel this process.  Your urine will not change color.    Drink at least 4 extra glasses of water or juice today (unless your doctor  has restricted your fluids). This reduces the stress on your kidneys.  You may take your regular medicines.    If you are on dialysis: It is best to have dialysis today.    If you have a reaction: Most reactions happen right away. If you have  any new symptoms after leaving the hospital (such as hives or swelling),  call your hospital at the correct number below. Or call your family doctor.  If you have breathing distress or wheezing, call 911.    Special instructions: ***    I have read and understand the above information.    Signature:______________________________________ Date:___________    Staff:__________________________________________ Date:___________     Time:__________    [place] [facility] Departments:    ___[place]: [phone]  ___[facility]: [phone]  ___[place]: [phone] ___[facility]: [phone]  ___[facility]: [phone]  ___[facility]: [phone]  ___[phone]  ___[facility]: [phone]  ___[phone]   clear to auscultation bilaterally/no rales/no rhonchi/no use of accessory muscles/respirations non-labored

## 2024-04-01 ENCOUNTER — TELEPHONE (OUTPATIENT)
Dept: SURGERY | Facility: CLINIC | Age: 59
End: 2024-04-01
Payer: COMMERCIAL

## 2024-04-01 NOTE — TELEPHONE ENCOUNTER
Left Voicemail (1st Attempt), sent myc for the patient to call back and schedule the following:    Appointment type: Flex Sig  Provider: Dr. Murrell  Return date: November 2024  Specialty phone number: [phone]  Additional appointment(s) needed: n/a  Additonal Notes: per staff message from Breanna LIU RN  For / Appt Notes: flex sig w&W    Left CC#

## 2024-06-30 ENCOUNTER — HEALTH MAINTENANCE LETTER (OUTPATIENT)
Age: 59
End: 2024-06-30

## 2024-07-15 ENCOUNTER — ANCILLARY PROCEDURE (OUTPATIENT)
Dept: CT IMAGING | Facility: CLINIC | Age: 59
End: 2024-07-15
Attending: INTERNAL MEDICINE
Payer: COMMERCIAL

## 2024-07-15 ENCOUNTER — APPOINTMENT (OUTPATIENT)
Dept: LAB | Facility: CLINIC | Age: 59
End: 2024-07-15
Payer: COMMERCIAL

## 2024-07-15 ENCOUNTER — ANCILLARY PROCEDURE (OUTPATIENT)
Dept: MRI IMAGING | Facility: CLINIC | Age: 59
End: 2024-07-15
Attending: INTERNAL MEDICINE
Payer: COMMERCIAL

## 2024-07-15 DIAGNOSIS — C20 RECTAL CANCER (H): ICD-10-CM

## 2024-07-15 PROCEDURE — 74177 CT ABD & PELVIS W/CONTRAST: CPT | Mod: GC | Performed by: RADIOLOGY

## 2024-07-15 PROCEDURE — 71260 CT THORAX DX C+: CPT | Mod: GC | Performed by: RADIOLOGY

## 2024-07-15 PROCEDURE — A9585 GADOBUTROL INJECTION: HCPCS | Mod: JW | Performed by: RADIOLOGY

## 2024-07-15 PROCEDURE — 74183 MRI ABD W/O CNTR FLWD CNTR: CPT | Performed by: RADIOLOGY

## 2024-07-15 RX ORDER — GADOBUTROL 604.72 MG/ML
10 INJECTION INTRAVENOUS ONCE
Status: COMPLETED | OUTPATIENT
Start: 2024-07-15 | End: 2024-07-15

## 2024-07-15 RX ORDER — IOPAMIDOL 755 MG/ML
102 INJECTION, SOLUTION INTRAVASCULAR ONCE
Status: COMPLETED | OUTPATIENT
Start: 2024-07-15 | End: 2024-07-15

## 2024-07-15 RX ORDER — GADOBUTROL 604.72 MG/ML
10 INJECTION INTRAVENOUS ONCE
OUTPATIENT
Start: 2024-07-15 | End: 2024-07-15

## 2024-07-15 RX ADMIN — GADOBUTROL 9.5 ML: 604.72 INJECTION INTRAVENOUS at 07:39

## 2024-07-15 RX ADMIN — IOPAMIDOL 102 ML: 755 INJECTION, SOLUTION INTRAVASCULAR at 07:59

## 2024-07-15 NOTE — DISCHARGE INSTRUCTIONS

## 2024-07-15 NOTE — DISCHARGE INSTRUCTIONS
MRI Contrast Discharge Instructions    The IV contrast you received today will pass out of your body in your  urine. This will happen in the next 24 hours. You will not feel this process.  Your urine will not change color.    Drink at least 4 extra glasses of water or juice today (unless your doctor  has restricted your fluids). This reduces the stress on your kidneys.  You may take your regular medicines.    If you are on dialysis: It is best to have dialysis today.    If you have a reaction: Most reactions happen right away. If you have  any new symptoms after leaving the hospital (such as hives or swelling),  call your hospital at the correct number below. Or call your family doctor.  If you have breathing distress or wheezing, call 911.    Special instructions: ***    I have read and understand the above information.    Signature:______________________________________ Date:___________    Staff:__________________________________________ Date:___________     Time:__________    [place] [facility] Departments:    ___[place]: [phone]  ___[facility]: [phone]  ___[place]: [phone] ___[facility]: [phone]  ___[facility]: [phone]  ___[facility]: [phone]  ___[phone]  ___[facility]: [phone]  ___[phone]

## 2024-08-27 ENCOUNTER — VIRTUAL VISIT (OUTPATIENT)
Dept: ONCOLOGY | Facility: CLINIC | Age: 59
End: 2024-08-27
Attending: INTERNAL MEDICINE
Payer: COMMERCIAL

## 2024-08-27 ENCOUNTER — LAB (OUTPATIENT)
Dept: INFUSION THERAPY | Facility: CLINIC | Age: 59
End: 2024-08-27
Attending: INTERNAL MEDICINE
Payer: COMMERCIAL

## 2024-08-27 DIAGNOSIS — C20 RECTAL CANCER (H): Primary | ICD-10-CM

## 2024-08-27 DIAGNOSIS — C20 RECTAL CANCER (H): ICD-10-CM

## 2024-08-27 DIAGNOSIS — M21.852 DEFORMITY OF FEMUR, LEFT: ICD-10-CM

## 2024-08-27 LAB
ALBUMIN SERPL BCG-MCNC: 4.7 G/DL (ref 3.5–5.2)
ALP SERPL-CCNC: 107 U/L (ref 40–150)
ALT SERPL W P-5'-P-CCNC: 36 U/L (ref 0–50)
ANION GAP SERPL CALCULATED.3IONS-SCNC: 12 MMOL/L (ref 7–15)
AST SERPL W P-5'-P-CCNC: 32 U/L (ref 0–45)
BASOPHILS # BLD AUTO: 0 10E3/UL (ref 0–0.2)
BASOPHILS NFR BLD AUTO: 1 %
BILIRUB SERPL-MCNC: 0.6 MG/DL
BUN SERPL-MCNC: 14.9 MG/DL (ref 6–20)
CALCIUM SERPL-MCNC: 10 MG/DL (ref 8.8–10.4)
CEA SERPL-MCNC: 1.1 NG/ML
CHLORIDE SERPL-SCNC: 102 MMOL/L (ref 98–107)
CREAT SERPL-MCNC: 0.78 MG/DL (ref 0.51–0.95)
EGFRCR SERPLBLD CKD-EPI 2021: 88 ML/MIN/1.73M2
EOSINOPHIL # BLD AUTO: 0.1 10E3/UL (ref 0–0.7)
EOSINOPHIL NFR BLD AUTO: 2 %
ERYTHROCYTE [DISTWIDTH] IN BLOOD BY AUTOMATED COUNT: 12.2 % (ref 10–15)
GLUCOSE SERPL-MCNC: 101 MG/DL (ref 70–99)
HCO3 SERPL-SCNC: 26 MMOL/L (ref 22–29)
HCT VFR BLD AUTO: 43.2 % (ref 35–47)
HGB BLD-MCNC: 14.7 G/DL (ref 11.7–15.7)
HOLD SPECIMEN: NORMAL
IMM GRANULOCYTES # BLD: 0 10E3/UL
IMM GRANULOCYTES NFR BLD: 0 %
LYMPHOCYTES # BLD AUTO: 1.1 10E3/UL (ref 0.8–5.3)
LYMPHOCYTES NFR BLD AUTO: 24 %
MCH RBC QN AUTO: 30.9 PG (ref 26.5–33)
MCHC RBC AUTO-ENTMCNC: 34 G/DL (ref 31.5–36.5)
MCV RBC AUTO: 91 FL (ref 78–100)
MONOCYTES # BLD AUTO: 0.3 10E3/UL (ref 0–1.3)
MONOCYTES NFR BLD AUTO: 6 %
NEUTROPHILS # BLD AUTO: 3.1 10E3/UL (ref 1.6–8.3)
NEUTROPHILS NFR BLD AUTO: 67 %
NRBC # BLD AUTO: 0 10E3/UL
NRBC BLD AUTO-RTO: 0 /100
PLATELET # BLD AUTO: 205 10E3/UL (ref 150–450)
POTASSIUM SERPL-SCNC: 4.2 MMOL/L (ref 3.4–5.3)
PROT SERPL-MCNC: 7.6 G/DL (ref 6.4–8.3)
RBC # BLD AUTO: 4.75 10E6/UL (ref 3.8–5.2)
SODIUM SERPL-SCNC: 140 MMOL/L (ref 135–145)
WBC # BLD AUTO: 4.7 10E3/UL (ref 4–11)

## 2024-08-27 PROCEDURE — 82040 ASSAY OF SERUM ALBUMIN: CPT

## 2024-08-27 PROCEDURE — 36415 COLL VENOUS BLD VENIPUNCTURE: CPT

## 2024-08-27 PROCEDURE — 85041 AUTOMATED RBC COUNT: CPT

## 2024-08-27 PROCEDURE — 82378 CARCINOEMBRYONIC ANTIGEN: CPT

## 2024-08-27 PROCEDURE — 99214 OFFICE O/P EST MOD 30 MIN: CPT | Performed by: INTERNAL MEDICINE

## 2024-08-27 NOTE — PROGRESS NOTES
Virtual Visit Details    Type of service:  Telephone Visit   Phone call duration: 5 minutes   Originating Location (pt. Location): Home    Distant Location (provider location):  Off-site    Oncology outpatient note    Date of service: 8/27/24       PC: Rectal cancer. [id number] - MSI Intact    HPI:  Please see previous note for details. I have copied and updated from prior note.  She is a 58 year old  female noticed BRBPR so Screening colonoscopy on 1/9/2018 revealed 3cm rectal mass and other polyps which were removed.  Pathology indicated moderately differentiated adenocarcinoma w/ intact MMR proteins.  CT staging scan showed no metastatic disease; some liver lesions which are thought to be benign per radiology report, T2N1M0 by pelvic MRI read at [facility]. When we initially reviewed her MRI we will not exactly sure whether that was lymph node involvement or not. We opted to repeat MRI which showed T4 N0 lesion. There was up to take her to surgery as there was possibility of personally lesion without lymph node involvement but because of the findings of repeat MRI the surgery was canceled and she is coming back to discuss neoadjuvant treatment.  We also did an MRI of the liver which showed 3 subcentimeter liver lesions which were too small to characterize and will need attention on follow-up.    Baseline CEA 1.1 on 1/9/18.    She started neoadjuvant 5-FU and oxaliplatin (FOLFOX), which she started on 2/26/18. The day after she received cycle 2, she developed fevers, chills, headache, and an itchy rash on her arms and legs, for which she was evaluated in the ED. She was sent home on Benadryl. Benadryl and dexamethasone doses were increased for cycle 3.   She tolerated that cycle well    C#4 was given on 4/10/18    She completed 8 cycles of FOLFOX on 6/5/18    Repeat scans show good response to treatment without evidence of metastatic disease. There is only a small signal consistent with residual tumor seen in the  primary rectal cancer lesion.    She started on concurrent chemoradiation with Xeloda with radiation beginning on 7/9/18. There was a delay in her receiving Xeloda, so she began that on 7/10/18. She completed it on 8/16/18    Repeat scans show great response to treatment with almost completely fibrotic signal.  There is an indeterminate left intertrochanteric lesion which is stable.  Liver lesion is consistent with benign hemangioma vs cyst    She was seen by Dr Hampton and the decision was made to keep her on watchful waiting.    Her MRI of the pelvis in December 2018 was stable but it showed rectal wall edema all the flexible sigmoidoscopy was negative.  Decision was made to do a short term follow-up MRI.      Left intertrochanteric lesion-we did MRI of the left hip which also showed a nonspecific T1 hypointense lesion in the proximal left femur which has not changed since 6/20/2018.  Repeat MRI in December 2018 , 5/14/19 and 8/20/19 were also stable.      Pelvis MRI in Feb 2019 was stable.    Colonoscopy on 5/13/19 was without evidence of recurrence- Next due in 3 years.     Pelvis MRI on 5/5/2020 continues to show complete response to treatment.    Flex sig on 5/5/2020 was also unremarkable.    Flex sig on 12/15/2020 was unremarkable.  MRI on 3/8/2021 was without evidence of recurrence.  It continues to show complete response.    Interval history    This is a telephone visit.  I also reviewed notes from Dr. Murrell.  She had flexible sigmoidoscopy in Nov 2023 which was unremarkable.  She denies nausea vomiting. BMs are fine. No bleeding. Good energy. No infections. No SOB. No new swellings. She has mild residual neuropathy which is unchanged.       ECOG 0    ROS:  Otherwise a comprehensive review of the system was unremarkable.      I reviewed other hx in EPIC as below    PMH:  Depression  Restless leg syndrome  Dyslipidemia    Current Outpatient Medications   Medication Sig Dispense Refill    Acetaminophen  (TYLENOL PO) Take 1,000 mg by mouth At Bedtime      amitriptyline 10 MG PO tablet TAKE 1 TO 2 TABLETS BY MOUTH DAILY AT BEDTIME      atorvastatin 20 MG PO tablet Take 40 mg by mouth      calcium carbonate (TUMS) 500 MG chewable tablet Take 1-2 chew tab by mouth daily  (Patient not taking: Reported on 2021) 150 tablet     FLUoxetine 20 MG PO capsule  (Patient not taking: Reported on 2021)      lidocaine-prilocaine (EMLA) cream Apply 45 minutes prior to procedure. 30 g 3    RANITIDINE HCL PO Take 150 mg by mouth daily as needed for heartburn       No current facility-administered medications for this visit.     Facility-Administered Medications Ordered in Other Visits   Medication Dose Route Frequency Provider Last Rate Last Admin    heparin 100 UNIT/ML injection 5 mL  5 mL Intracatheter Once Kyra Mortensen MD        heparin 100 UNIT/ML injection 500 Units  500 Units Intracatheter Once Kyra Mortensen MD             FHx  Aunt and maternal grandmother had breast cancer  Pateral grandmother  of colorectal cancer  Mother had ischemic colitis    SHx:  , two teenage children  EtOH: 1 drink daily, occasionally more on weekends  Tobacco: never smoker  She is a microbiologist     Allergies   Allergen Reactions    Inapsine [Droperidol] Other (See Comments)     Elevated blood pressure and increased heart rate    Tegaderm Transparent Dressing (Informational Only) Itching     Pt had reaction to Tegaderm used for port dressing. Whole area was very red and itchy. Please use IV 3000 instead.        Exam:  There were no vitals taken for this visit.   Wt Readings from Last 4 Encounters:   23 93.4 kg (205 lb 12.8 oz)   23 88.5 kg (195 lb)   05/15/23 90.7 kg (200 lb)   10/26/22 88.5 kg (195 lb)       Constitutional.  Does not seem to be in any acute distress.  Respiratory.  Speaking in full sentences.  No coughing noted.  Neurological.  Alert and oriented x3.  Psychiatric.  Mood, mentation and affect are  normal.  Decision making capacity is intact.      Labs/Imaging.  Reviewed  8/27/2024    CBC unremarkable.    CMP unremarkable.      CEA is pending    7/16/2024.  MRI of the rectum     FINDINGS:     LOCATION/SIZE:  On prior exam dated 2/8/2018 there was a tumor identified in the mid  rectum. Previously this tumor measured 2.6 cm, and currently it is not  visualized.      TREATMENT RESPONSE:   Approximately 0% of the current mass demonstrates tumour signal  intensity, with the remainder appearing to represent fibrosis.  This  corresponds to a tumour response grade of 1.      EXTENT:  The tumor does not clearly involve the anal sphincters or levator ani  muscle.      CIRCUMFERENTIAL RESECTION MARGIN (CRM):  In terms of the resection margin, there is not involvement of the  mesorectal fascia.     LYMPH NODES:  There are no new suspicious appearing pelvic lymph nodes.     VEINS:  There is not evidence of extramural venous extension.      MISCELLANEOUS FINDINGS:  Colonic diverticulosis. Stable T1/T2 hypointense 18 mm lesion in the  intertrochanteric region of the left femur.                                                                      IMPRESSION:   1. There has been continued complete response to treatment, with a  corresponding tumor regression grade of mrTRG-1 .  2. No suspicious lymphadenopathy.      CT CAP on 7/15/2024    IMPRESSION:   No evidence of metastatic disease in the chest, abdomen, or pelvis.     She had a colonoscopy in May 2022 which showed a polyp in the ascending colon as well as in the descending colon which were removed.  Pathology showed sessile serrated adenoma without any dysplasia from the polyp in the ascending colon.    The descending colon polyp showed colonic mucosa without any significant abnormality.    Assessment and Plan  [id number] moderately differentiated adenocarcinoma of the rectum - MSI-stable    She completed neoadjuvant therapy in August 2018 and achieved a complete clinical  response so she has been on a wait and watch protocol.    She has been doing well.  I reviewed notes from Dr. Murrell.  She had flexible sigmoidoscopy in November 2023 which was unremarkable.          Colonoscopy in May 2022 showed ascending colon polyp which was serrated adenoma without any atypia.  Descending colon polyp was without any histological abnormality.  Otherwise colonoscopy was unremarkable.  Repeat colonoscopy will be in 5 years.    She will have repeat sigmoidoscopy in November 20243 as the one in November 2023 was unremarkable.    We will repeat CT CAP and MRI of the rectum in July 2025 as recently these were without evidence of recurrence.    CEA has not been done recently so we will do that.  We will follow CEA serially.      She was seen in Cancer Survivorship clinic.    Surveillance per protocol:  Follow up per Watch and Wait Protocol:   Completed CRT: 8/16/2018  Flexible sigmoidoscopy   Every 2 months for 6 months: Until 2/2019-COMPLETED   Every 3 months for 3 years: Until 8/2021-COMPLETED  Every 6 months for 5 years: Until 2023-COMPLETED  Every year for 10 years: Until 2028- DUE 11/2024     3T MRI of pelvis  6-8 weeks after CRT: COMPLETED  Every 4 months for 2 years: Until 8/2020-Completed  Every 6 months for 2 years: Until 8/2022- Completed  Yearly for 2 years: Until 2024- DUE in July 2025     CT CAP  Every year for 6-8 years: Until 2026-Due 7/2025     Colonoscopy   Last 5/2022- DUE 5/2027     CEA at every clinic or endoscopic visit      Left intertrochanteric lesion-  This has been stable.  Continue to observe.        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  She feels tingling in the bottom of the feet.  Continue to monitor.      We did not address appointment today    Port-A-Cath.  This was removed in October 2022    Right foot ganglion cyst.  I recommend follow-up with PCP.        Indeterminate liver lesion. MRI 2/5/19 shows stable lesion, most likely c/w  benign hemangioma or cyst.  At this time I do not plan to repeat MRI abdomen but when we will do CT scan, it will be monitored.      I will see her back in 1 year with labs/CT scan/MRI prior.    I answered all of her questions to her satisfaction.  She is agreeable and comfortable with the plan.      Kyra Mortensen

## 2024-08-27 NOTE — NURSING NOTE
Current patient location:  Pt states she is currently in her car.    Is the patient currently in the state of [place]? YES    Visit mode:TELEPHONE    If the visit is dropped, the patient can be reconnected by: TELEPHONE VISIT: Phone number:   Telephone Information:   Mobile [phone]       Will anyone else be joining the visit? NO  (If patient encounters technical issues they should call [phone]    How would you like to obtain your AVS? MyChart    Are changes needed to the allergy or medication list? Pt stated no changes to allergies and Pt stated no med changes    Are refills needed on medications prescribed by this physician? NO    Rooming Documentation:  Questionnaire(s) completed      Reason for visit: RECHECK    Briana JON

## 2024-10-30 ENCOUNTER — MYC MEDICAL ADVICE (OUTPATIENT)
Dept: SURGERY | Facility: CLINIC | Age: 59
End: 2024-10-30
Payer: COMMERCIAL

## 2024-11-04 NOTE — PROGRESS NOTES
Colon and Rectal Surgery Follow-up Clinic Note    RE: Sarah RICE Satinder  : 1965  JIMMY: 2024    DIAGNOSIS: mT2N1 rectal adenocarcinoma status post total neoadjuvant therapy with cCR on watch and wait protocol.        HPI:   Initially seen in 2018 with a moderately differentiated, invasive adenocarcinoma with intact mismatch repair 4-5 cm from the anal verge. She was staged at that time and an MRI that was initially read as a T1-T2 lesion, then possibly a T2N1 lesion. CT A/P was significant for small liver lesions too small to characterize but not concerning for malignancy and CEA was 1.1. She was discussed at tumor board the pelvic MRI was felt to be poor quality so she underwent a repeat MRI pelvis on  and her tumor was staged as T4N0 due to abutment of the levators. Abdominal MRI to evaluate the liver lesions was performed on  which was again found to have multiple sub-centimeter lesions that were difficult to characterize.   Treatment:  She underwent 8 cycles of FOLFOX completed on 18 and chemoradiation with XELODA on 8/15/18. 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   She was found to have a complete clinical response and enrolled in our Watch and Wait protocol with Dr. Óscar Hampton.  MRI of left hip showed a non specific T1 hypointense lesion in the proximal left femur and repeat MRI 12/10/18:               There is a T1 hypointense and subtly enhancing lesion in the              proximal left femur, stable since at least 2018.  Given this              patient's history of rectal cancer, metastatic disease cannot be excluded.  Follow Up:  She follows with Dr. Mortensen of medical oncology  Colonoscopy in May 2022 showed ascending colon polyp which was serrated adenoma without any atypia.  CT CAP 7/15/2024:  IMPRESSION:  No evidence of metastatic disease in chest, abdomen and pelvis.  I have personally reviewed the examination and  "initial interpretation  and I agree with the findings.  MR Pelvis 7/15/2024:  IMPRESSION:   1. There has been continued complete response to treatment, with a  corresponding tumor regression grade of mrTRG-1 .  2. No suspicious lymphadenopathy.     11/15/18 13:33 02/05/19 09:37 05/14/19 08:53 08/21/19 10:27 10/09/19 13:54 01/08/20 17:51 05/05/20 10:40 08/19/20 11:11 03/16/21 12:06 07/26/21 15:19 12/14/21 10:03 07/14/22 10:34 07/20/23 07:24 08/27/24 12:02   CEA <0.5 <0.5 <0.5 <0.5 <0.5 <0.5 <0.5 <0.5 <0.5 <0.5 <0.5 1.1 0.8 1.1     INTERVAL HISTORY: Denies increased pain, fevers, or chills. Tolerating diet well. Having formed BM's without blood.  Denies sexual or genitourinary issues at this time.    Physical Examination:  BP (!) 144/108 (BP Location: Left arm, Patient Position: Sitting, Cuff Size: Adult Regular)   Pulse 77   Ht 1.676 m (5' 6\")   Wt 95.3 kg (210 lb 1.6 oz)   SpO2 97%   BMI 33.91 kg/m    Abdomen soft, nontender.  No inguinal adenopathy palpated.  Perianal skin intact.  Digital rectal exam: No masses palpated.  Flexible sigmoidoscopy: after obtaining informed consent and performing a \"time out\", an adult flexible sigmoidoscope was introduced through the anus and passed up to the mid sigmoid colon. The quality of the prep was good. Findings: 2 cm white, flat, and faint scar with central telangiectasia at the left lateral aspect of the distal rectum right above the dentate line, approximately 3 cm from the anal verge. No additional abnormalities were seen.     ASSESSMENT  No evidence of recurrent neoplasia.    PLAN  Follow up per Watch and Wait Protocol:   Completed CRT: 8/15/2018  Flexible sigmoidoscopy   Every 3 months for 6 months: Until 2/2019-COMPLETED   Every 4 months for 3 years: Until 8/2021-COMPLETED  Every 6 months for 5 years: Until 8/2023-COMPLETED  Every year for 10 years: Until 2028 Due 11/2025     3T MRI of pelvis  6-8 weeks after CRT: COMPLETED  Every 4 months for 2 years: Until " 8/2021-COMPLETED  Every 6 months for 2 years: Until 8/2023-COMPLETED  Yearly for 2 years: Until 2025 Due 7/2025     CT CAP  Every year for 6-8 years: Until 2026-Due 7/2025     Colonoscopy   Last 5/2022-Repeat 2027     CEA at every clinic or endoscopic visit    30 minutes spent on the date of encounter performing chart review, history and exam, documentation and further activities as noted above with an additional 15 minutes for flexible sigmoidoscopy.     Asif Murrell MD, MSc, FACS, FASCRS.    Chief, Division of Colon & Rectal Surgery  Stanley M. Goldberg, MD Endowed Chair, Colon & Rectal Surgery  Department of Surgery   [facility]      Referring Provider:  Referred Self, MD  No address on file     Primary Care Provider:  Kyra Mortensen

## 2024-11-11 ENCOUNTER — OFFICE VISIT (OUTPATIENT)
Dept: SURGERY | Facility: CLINIC | Age: 59
End: 2024-11-11
Payer: COMMERCIAL

## 2024-11-11 VITALS
HEIGHT: 66 IN | DIASTOLIC BLOOD PRESSURE: 108 MMHG | WEIGHT: 210.1 LBS | SYSTOLIC BLOOD PRESSURE: 144 MMHG | BODY MASS INDEX: 33.77 KG/M2 | HEART RATE: 77 BPM | OXYGEN SATURATION: 97 %

## 2024-11-11 DIAGNOSIS — C20 RECTAL CANCER (H): Primary | ICD-10-CM

## 2024-11-11 PROCEDURE — 45330 DIAGNOSTIC SIGMOIDOSCOPY: CPT | Performed by: COLON & RECTAL SURGERY

## 2024-11-11 PROCEDURE — 99214 OFFICE O/P EST MOD 30 MIN: CPT | Mod: 25 | Performed by: COLON & RECTAL SURGERY

## 2024-11-11 ASSESSMENT — PAIN SCALES - GENERAL: PAINLEVEL_OUTOF10: NO PAIN (0)

## 2024-11-11 NOTE — PATIENT INSTRUCTIONS
PLAN  Follow up per Watch and Wait Protocol:   Completed CRT: 8/15/2018  Flexible sigmoidoscopy   Every 3 months for 6 months: Until 2/2019-COMPLETED   Every 4 months for 3 years: Until 8/2021-COMPLETED  Every 6 months for 5 years: Until 8/2023-COMPLETED  Every year for 10 years: Until 2028 Due 11/2025     3T MRI of pelvis  6-8 weeks after CRT: COMPLETED  Every 4 months for 2 years: Until 8/2021-COMPLETED  Every 6 months for 2 years: Until 8/2023-COMPLETED  Yearly for 2 years: Until 2025 Due 7/2025     CT CAP  Every year for 6-8 years: Until 2026-Due 7/2025     Colonoscopy   Last 5/2022-Repeat 2027     CEA at every clinic or endoscopic visit

## 2024-11-11 NOTE — LETTER
2024       RE: Sarah Rajan  [address] [id number]     Dear Colleague,    Thank you for referring your patient, Sarah Rajan, to the [facility] [place] at [facility]. Please see a copy of my visit note below.    Colon and Rectal Surgery Follow-up Clinic Note    RE: Sarah Rajan  : 1965  JIMMY: 2024    DIAGNOSIS: mT2N1 rectal adenocarcinoma status post total neoadjuvant therapy with cCR on watch and wait protocol.        HPI:   Initially seen in 2018 with a moderately differentiated, invasive adenocarcinoma with intact mismatch repair 4-5 cm from the anal verge. She was staged at that time and an MRI that was initially read as a T1-T2 lesion, then possibly a T2N1 lesion. CT A/P was significant for small liver lesions too small to characterize but not concerning for malignancy and CEA was 1.1. She was discussed at tumor board the pelvic MRI was felt to be poor quality so she underwent a repeat MRI pelvis on  and her tumor was staged as T4N0 due to abutment of the levators. Abdominal MRI to evaluate the liver lesions was performed on  which was again found to have multiple sub-centimeter lesions that were difficult to characterize.   Treatment:  She underwent 8 cycles of FOLFOX completed on 18 and chemoradiation with XELODA on 8/15/18. 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   She was found to have a complete clinical response and enrolled in our Watch and Wait protocol with Dr. Óscar Hampton.  MRI of left hip showed a non specific T1 hypointense lesion in the proximal left femur and repeat MRI 12/10/18:               There is a T1 hypointense and subtly enhancing lesion in the              proximal left femur, stable since at least 2018.  Given this              patient's history of rectal cancer, metastatic  "disease cannot be excluded.  Follow Up:  She follows with Dr. Mortensen of medical oncology  Colonoscopy in May 2022 showed ascending colon polyp which was serrated adenoma without any atypia.  CT CAP 7/15/2024:  IMPRESSION:  No evidence of metastatic disease in chest, abdomen and pelvis.  I have personally reviewed the examination and initial interpretation  and I agree with the findings.  MR Pelvis 7/15/2024:  IMPRESSION:   1. There has been continued complete response to treatment, with a  corresponding tumor regression grade of mrTRG-1 .  2. No suspicious lymphadenopathy.     11/15/18 13:33 02/05/19 09:37 05/14/19 08:53 08/21/19 10:27 10/09/19 13:54 01/08/20 17:51 05/05/20 10:40 08/19/20 11:11 03/16/21 12:06 07/26/21 15:19 12/14/21 10:03 07/14/22 10:34 07/20/23 07:24 08/27/24 12:02   CEA <0.5 <0.5 <0.5 <0.5 <0.5 <0.5 <0.5 <0.5 <0.5 <0.5 <0.5 1.1 0.8 1.1     INTERVAL HISTORY: Denies increased pain, fevers, or chills. Tolerating diet well. Having formed BM's without blood.  Denies sexual or genitourinary issues at this time.    Physical Examination:  BP (!) 144/108 (BP Location: Left arm, Patient Position: Sitting, Cuff Size: Adult Regular)   Pulse 77   Ht 1.676 m (5' 6\")   Wt 95.3 kg (210 lb 1.6 oz)   SpO2 97%   BMI 33.91 kg/m    Abdomen soft, nontender.  No inguinal adenopathy palpated.  Perianal skin intact.  Digital rectal exam: No masses palpated.  Flexible sigmoidoscopy: after obtaining informed consent and performing a \"time out\", an adult flexible sigmoidoscope was introduced through the anus and passed up to the mid sigmoid colon. The quality of the prep was good. Findings: 2 cm white, flat, and faint scar with central telangiectasia at the left lateral aspect of the distal rectum right above the dentate line, approximately 3 cm from the anal verge. No additional abnormalities were seen.     ASSESSMENT  No evidence of recurrent neoplasia.    PLAN  Follow up per Watch and Wait Protocol:   Completed CRT: " 8/15/2018  Flexible sigmoidoscopy   Every 3 months for 6 months: Until 2/2019-COMPLETED   Every 4 months for 3 years: Until 8/2021-COMPLETED  Every 6 months for 5 years: Until 8/2023-COMPLETED  Every year for 10 years: Until 2028 Due 11/2025     3T MRI of pelvis  6-8 weeks after CRT: COMPLETED  Every 4 months for 2 years: Until 8/2021-COMPLETED  Every 6 months for 2 years: Until 8/2023-COMPLETED  Yearly for 2 years: Until 2025 Due 7/2025     CT CAP  Every year for 6-8 years: Until 2026-Due 7/2025     Colonoscopy   Last 5/2022-Repeat 2027     CEA at every clinic or endoscopic visit    30 minutes spent on the date of encounter performing chart review, history and exam, documentation and further activities as noted above with an additional 15 minutes for flexible sigmoidoscopy.     Asif Murrell MD, MSc, FACS, FASCRS.    Chief, Division of Colon & Rectal Surgery  Stanley M. Goldberg, MD Endowed Chair, Colon & Rectal Surgery  Department of Surgery   [facility]      Referring Provider:  Referred Self, MD  No address on file     Primary Care Provider:  Kyra Mortensen      Again, thank you for allowing me to participate in the care of your patient.      Sincerely,    Asif Murrell MD

## 2024-11-11 NOTE — NURSING NOTE
"Chief Complaint   Patient presents with    Flexible Sigmoidoscopy       Vitals:    11/11/24 1135   BP: (!) 144/108   BP Location: Left arm   Patient Position: Sitting   Cuff Size: Adult Regular   Pulse: 77   SpO2: 97%   Weight: 210 lb 1.6 oz   Height: 5' 6\"       Body mass index is 33.91 kg/m .    Margoth Calero CMA    "

## 2025-01-08 ENCOUNTER — PATIENT OUTREACH (OUTPATIENT)
Dept: ONCOLOGY | Facility: CLINIC | Age: 60
End: 2025-01-08
Payer: COMMERCIAL

## 2025-01-08 NOTE — PROGRESS NOTES
[facility]: Cancer Care Short Note                                                                                          Completed chart audit and assigned Oncology Care Coordination enrollment status.      Natasha Marcano, RN, BSN, OCN  RN Care Coordinator   [facility]

## 2025-05-09 ENCOUNTER — ANCILLARY PROCEDURE (OUTPATIENT)
Dept: MAMMOGRAPHY | Facility: HOSPITAL | Age: 60
End: 2025-05-09
Attending: FAMILY MEDICINE
Payer: COMMERCIAL

## 2025-05-09 DIAGNOSIS — Z12.31 VISIT FOR SCREENING MAMMOGRAM: ICD-10-CM

## 2025-05-09 PROCEDURE — 77063 BREAST TOMOSYNTHESIS BI: CPT

## 2025-07-13 ENCOUNTER — HEALTH MAINTENANCE LETTER (OUTPATIENT)
Age: 60
End: 2025-07-13

## 2025-08-19 ENCOUNTER — ANCILLARY PROCEDURE (OUTPATIENT)
Dept: CT IMAGING | Facility: CLINIC | Age: 60
End: 2025-08-19
Attending: INTERNAL MEDICINE
Payer: COMMERCIAL

## 2025-08-19 ENCOUNTER — ANCILLARY PROCEDURE (OUTPATIENT)
Dept: MRI IMAGING | Facility: CLINIC | Age: 60
End: 2025-08-19
Attending: INTERNAL MEDICINE
Payer: COMMERCIAL

## 2025-08-19 DIAGNOSIS — M21.852 DEFORMITY OF FEMUR, LEFT: ICD-10-CM

## 2025-08-19 DIAGNOSIS — C20 RECTAL CANCER (H): ICD-10-CM

## 2025-08-19 PROCEDURE — A9585 GADOBUTROL INJECTION: HCPCS | Performed by: STUDENT IN AN ORGANIZED HEALTH CARE EDUCATION/TRAINING PROGRAM

## 2025-08-19 PROCEDURE — 72197 MRI PELVIS W/O & W/DYE: CPT | Performed by: STUDENT IN AN ORGANIZED HEALTH CARE EDUCATION/TRAINING PROGRAM

## 2025-08-19 PROCEDURE — 71260 CT THORAX DX C+: CPT | Performed by: RADIOLOGY

## 2025-08-19 PROCEDURE — 74177 CT ABD & PELVIS W/CONTRAST: CPT | Performed by: RADIOLOGY

## 2025-08-19 RX ORDER — GADOBUTROL 604.72 MG/ML
10 INJECTION INTRAVENOUS ONCE
Status: COMPLETED | OUTPATIENT
Start: 2025-08-19 | End: 2025-08-19

## 2025-08-19 RX ORDER — IOPAMIDOL 755 MG/ML
103 INJECTION, SOLUTION INTRAVASCULAR ONCE
Status: COMPLETED | OUTPATIENT
Start: 2025-08-19 | End: 2025-08-19

## 2025-08-19 RX ADMIN — GADOBUTROL 9.5 ML: 604.72 INJECTION INTRAVENOUS at 11:56

## 2025-08-19 RX ADMIN — IOPAMIDOL 103 ML: 755 INJECTION, SOLUTION INTRAVASCULAR at 11:26

## 2025-08-27 ENCOUNTER — TELEPHONE (OUTPATIENT)
Dept: ONCOLOGY | Facility: CLINIC | Age: 60
End: 2025-08-27
Payer: COMMERCIAL

## (undated) DEVICE — GLOVE PROTEXIS POWDER FREE SMT 8.0  2D72PT80X

## (undated) DEVICE — SU MONOCRYL 4-0 P-3 18" UND Y494G

## (undated) DEVICE — ADH SKIN CLOSURE PREMIERPRO EXOFIN 1.0ML 3470

## (undated) DEVICE — GOWN XLG DISP 9545

## (undated) DEVICE — LINEN GOWN XLG 5407

## (undated) DEVICE — LINEN TOWEL PACK X5 5464

## (undated) DEVICE — DECANTER BAG 2002S

## (undated) DEVICE — COVER ULTRASOUND PROBE W/GEL FLEXI-FEEL 6"X58" LF  25-FF658

## (undated) DEVICE — KNIFE HANDLE W/15 BLADE 371615

## (undated) DEVICE — Device

## (undated) DEVICE — SOL NACL 0.9% INJ 250ML BAG 2B1322Q

## (undated) DEVICE — SU VICRYL 3-0 SH 27" J316H

## (undated) DEVICE — SU VICRYL 4-0 P-3 18" UND  J494H

## (undated) DEVICE — SU DERMABOND ADVANCED .7ML DNX12

## (undated) DEVICE — PACK CENTRAL LINE INSERTION SAN32CLFCG

## (undated) DEVICE — KIT INTRODUCER FLUENT MICRO 5FRX10CM ECHO TIP KIT-038-04

## (undated) RX ORDER — PROPOFOL 10 MG/ML
INJECTION, EMULSION INTRAVENOUS
Status: DISPENSED
Start: 2018-02-19

## (undated) RX ORDER — HEPARIN SODIUM (PORCINE) LOCK FLUSH IV SOLN 100 UNIT/ML 100 UNIT/ML
SOLUTION INTRAVENOUS
Status: DISPENSED
Start: 2018-05-02

## (undated) RX ORDER — ACETAMINOPHEN 325 MG/1
TABLET ORAL
Status: DISPENSED
Start: 2022-10-26

## (undated) RX ORDER — HEPARIN SODIUM (PORCINE) LOCK FLUSH IV SOLN 100 UNIT/ML 100 UNIT/ML
SOLUTION INTRAVENOUS
Status: DISPENSED
Start: 2018-07-24

## (undated) RX ORDER — HEPARIN SODIUM (PORCINE) LOCK FLUSH IV SOLN 100 UNIT/ML 100 UNIT/ML
SOLUTION INTRAVENOUS
Status: DISPENSED
Start: 2018-07-17

## (undated) RX ORDER — HEPARIN SODIUM (PORCINE) LOCK FLUSH IV SOLN 100 UNIT/ML 100 UNIT/ML
SOLUTION INTRAVENOUS
Status: DISPENSED
Start: 2020-05-05

## (undated) RX ORDER — HEPARIN SODIUM (PORCINE) LOCK FLUSH IV SOLN 100 UNIT/ML 100 UNIT/ML
SOLUTION INTRAVENOUS
Status: DISPENSED
Start: 2018-06-22

## (undated) RX ORDER — HEPARIN SODIUM (PORCINE) LOCK FLUSH IV SOLN 100 UNIT/ML 100 UNIT/ML
SOLUTION INTRAVENOUS
Status: DISPENSED
Start: 2021-07-26

## (undated) RX ORDER — LIDOCAINE HYDROCHLORIDE 20 MG/ML
INJECTION, SOLUTION EPIDURAL; INFILTRATION; INTRACAUDAL; PERINEURAL
Status: DISPENSED
Start: 2018-02-19

## (undated) RX ORDER — HEPARIN SODIUM (PORCINE) LOCK FLUSH IV SOLN 100 UNIT/ML 100 UNIT/ML
SOLUTION INTRAVENOUS
Status: DISPENSED
Start: 2022-07-14

## (undated) RX ORDER — HEPARIN SODIUM (PORCINE) LOCK FLUSH IV SOLN 100 UNIT/ML 100 UNIT/ML
SOLUTION INTRAVENOUS
Status: DISPENSED
Start: 2019-05-22

## (undated) RX ORDER — HEPARIN SODIUM (PORCINE) LOCK FLUSH IV SOLN 100 UNIT/ML 100 UNIT/ML
SOLUTION INTRAVENOUS
Status: DISPENSED
Start: 2021-01-22

## (undated) RX ORDER — HEPARIN SODIUM (PORCINE) LOCK FLUSH IV SOLN 100 UNIT/ML 100 UNIT/ML
SOLUTION INTRAVENOUS
Status: DISPENSED
Start: 2020-09-08

## (undated) RX ORDER — HEPARIN SODIUM (PORCINE) LOCK FLUSH IV SOLN 100 UNIT/ML 100 UNIT/ML
SOLUTION INTRAVENOUS
Status: DISPENSED
Start: 2019-05-14

## (undated) RX ORDER — LIDOCAINE 50 MG/G
OINTMENT TOPICAL
Status: DISPENSED
Start: 2018-12-11

## (undated) RX ORDER — HEPARIN SODIUM (PORCINE) LOCK FLUSH IV SOLN 100 UNIT/ML 100 UNIT/ML
SOLUTION INTRAVENOUS
Status: DISPENSED
Start: 2020-08-19

## (undated) RX ORDER — HEPARIN SODIUM (PORCINE) LOCK FLUSH IV SOLN 100 UNIT/ML 100 UNIT/ML
SOLUTION INTRAVENOUS
Status: DISPENSED
Start: 2018-06-20